# Patient Record
Sex: MALE | Race: WHITE | NOT HISPANIC OR LATINO | Employment: UNEMPLOYED | URBAN - METROPOLITAN AREA
[De-identification: names, ages, dates, MRNs, and addresses within clinical notes are randomized per-mention and may not be internally consistent; named-entity substitution may affect disease eponyms.]

---

## 2017-01-24 ENCOUNTER — ALLSCRIPTS OFFICE VISIT (OUTPATIENT)
Dept: OTHER | Facility: OTHER | Age: 37
End: 2017-01-24

## 2017-01-24 ENCOUNTER — GENERIC CONVERSION - ENCOUNTER (OUTPATIENT)
Dept: OTHER | Facility: OTHER | Age: 37
End: 2017-01-24

## 2017-02-23 ENCOUNTER — ALLSCRIPTS OFFICE VISIT (OUTPATIENT)
Dept: OTHER | Facility: OTHER | Age: 37
End: 2017-02-23

## 2017-02-23 DIAGNOSIS — R51.9 HEADACHE: ICD-10-CM

## 2017-02-23 DIAGNOSIS — E55.9 VITAMIN D DEFICIENCY: ICD-10-CM

## 2017-02-23 DIAGNOSIS — R30.0 DYSURIA: ICD-10-CM

## 2017-02-23 DIAGNOSIS — K50.90 CROHN'S DISEASE WITHOUT COMPLICATION (HCC): ICD-10-CM

## 2017-03-20 ENCOUNTER — TRANSCRIBE ORDERS (OUTPATIENT)
Dept: ADMINISTRATIVE | Facility: HOSPITAL | Age: 37
End: 2017-03-20

## 2017-03-20 ENCOUNTER — ALLSCRIPTS OFFICE VISIT (OUTPATIENT)
Dept: OTHER | Facility: OTHER | Age: 37
End: 2017-03-20

## 2017-03-20 ENCOUNTER — HOSPITAL ENCOUNTER (OUTPATIENT)
Dept: RADIOLOGY | Facility: HOSPITAL | Age: 37
Discharge: HOME | End: 2017-03-20
Payer: COMMERCIAL

## 2017-03-20 DIAGNOSIS — R51.9 HEADACHE: ICD-10-CM

## 2017-03-20 DIAGNOSIS — R51.9 FACIAL PAIN: Primary | ICD-10-CM

## 2017-03-20 DIAGNOSIS — R51.9 FACIAL PAIN: ICD-10-CM

## 2017-03-20 PROCEDURE — 70450 CT HEAD/BRAIN W/O DYE: CPT

## 2017-03-24 ENCOUNTER — ALLSCRIPTS OFFICE VISIT (OUTPATIENT)
Dept: OTHER | Facility: OTHER | Age: 37
End: 2017-03-24

## 2017-06-26 ENCOUNTER — APPOINTMENT (OUTPATIENT)
Dept: LAB | Facility: MEDICAL CENTER | Age: 37
End: 2017-06-26
Payer: COMMERCIAL

## 2017-06-26 ENCOUNTER — TRANSCRIBE ORDERS (OUTPATIENT)
Dept: ADMINISTRATIVE | Facility: HOSPITAL | Age: 37
End: 2017-06-26

## 2017-06-26 DIAGNOSIS — R30.0 DYSURIA: ICD-10-CM

## 2017-06-26 DIAGNOSIS — E55.9 VITAMIN D DEFICIENCY: ICD-10-CM

## 2017-06-26 DIAGNOSIS — K50.90 CROHN'S DISEASE WITHOUT COMPLICATION (HCC): ICD-10-CM

## 2017-06-26 LAB
25(OH)D3 SERPL-MCNC: 24.5 NG/ML (ref 30–100)
ALBUMIN SERPL BCP-MCNC: 3.9 G/DL (ref 3.5–5)
ALP SERPL-CCNC: 73 U/L (ref 46–116)
ALT SERPL W P-5'-P-CCNC: 36 U/L (ref 12–78)
ANION GAP SERPL CALCULATED.3IONS-SCNC: 6 MMOL/L (ref 4–13)
AST SERPL W P-5'-P-CCNC: 18 U/L (ref 5–45)
BILIRUB SERPL-MCNC: 0.62 MG/DL (ref 0.2–1)
BILIRUB UR QL STRIP: NEGATIVE
BUN SERPL-MCNC: 15 MG/DL (ref 5–25)
CALCIUM SERPL-MCNC: 9.4 MG/DL (ref 8.3–10.1)
CHLORIDE SERPL-SCNC: 108 MMOL/L (ref 100–108)
CLARITY UR: CLEAR
CO2 SERPL-SCNC: 28 MMOL/L (ref 21–32)
COLOR UR: YELLOW
CREAT SERPL-MCNC: 1.79 MG/DL (ref 0.6–1.3)
GFR SERPL CREATININE-BSD FRML MDRD: 43.2 ML/MIN/1.73SQ M
GLUCOSE SERPL-MCNC: 105 MG/DL (ref 65–140)
GLUCOSE UR STRIP-MCNC: NEGATIVE MG/DL
HGB UR QL STRIP.AUTO: NEGATIVE
KETONES UR STRIP-MCNC: NEGATIVE MG/DL
LEUKOCYTE ESTERASE UR QL STRIP: NEGATIVE
NITRITE UR QL STRIP: NEGATIVE
PH UR STRIP.AUTO: 7 [PH] (ref 4.5–8)
POTASSIUM SERPL-SCNC: 4.6 MMOL/L (ref 3.5–5.3)
PROT SERPL-MCNC: 7.5 G/DL (ref 6.4–8.2)
PROT UR STRIP-MCNC: NEGATIVE MG/DL
SODIUM SERPL-SCNC: 142 MMOL/L (ref 136–145)
SP GR UR STRIP.AUTO: 1.02 (ref 1–1.03)
UROBILINOGEN UR QL STRIP.AUTO: 0.2 E.U./DL
VIT B12 SERPL-MCNC: 432 PG/ML (ref 100–900)

## 2017-06-26 PROCEDURE — 81003 URINALYSIS AUTO W/O SCOPE: CPT

## 2017-06-26 PROCEDURE — 82306 VITAMIN D 25 HYDROXY: CPT

## 2017-06-26 PROCEDURE — 82607 VITAMIN B-12: CPT

## 2017-06-26 PROCEDURE — 80053 COMPREHEN METABOLIC PANEL: CPT

## 2017-06-26 PROCEDURE — 36415 COLL VENOUS BLD VENIPUNCTURE: CPT

## 2017-06-27 ENCOUNTER — ALLSCRIPTS OFFICE VISIT (OUTPATIENT)
Dept: OTHER | Facility: OTHER | Age: 37
End: 2017-06-27

## 2017-06-27 DIAGNOSIS — K50.90 CROHN'S DISEASE WITHOUT COMPLICATION (HCC): ICD-10-CM

## 2017-06-27 DIAGNOSIS — K90.0 CELIAC DISEASE: ICD-10-CM

## 2017-06-27 DIAGNOSIS — M79.10 MYALGIA: ICD-10-CM

## 2017-06-30 ENCOUNTER — APPOINTMENT (OUTPATIENT)
Dept: LAB | Facility: MEDICAL CENTER | Age: 37
End: 2017-06-30
Payer: COMMERCIAL

## 2017-06-30 ENCOUNTER — ALLSCRIPTS OFFICE VISIT (OUTPATIENT)
Dept: OTHER | Facility: OTHER | Age: 37
End: 2017-06-30

## 2017-06-30 ENCOUNTER — TRANSCRIBE ORDERS (OUTPATIENT)
Dept: ADMINISTRATIVE | Facility: HOSPITAL | Age: 37
End: 2017-06-30

## 2017-06-30 DIAGNOSIS — K50.90 CROHN'S DISEASE WITHOUT COMPLICATION (HCC): ICD-10-CM

## 2017-06-30 LAB
ALBUMIN SERPL BCP-MCNC: 4.3 G/DL (ref 3.5–5)
ALP SERPL-CCNC: 77 U/L (ref 46–116)
ALT SERPL W P-5'-P-CCNC: 34 U/L (ref 12–78)
ANION GAP SERPL CALCULATED.3IONS-SCNC: 5 MMOL/L (ref 4–13)
AST SERPL W P-5'-P-CCNC: 22 U/L (ref 5–45)
BILIRUB SERPL-MCNC: 0.74 MG/DL (ref 0.2–1)
BUN SERPL-MCNC: 17 MG/DL (ref 5–25)
CALCIUM SERPL-MCNC: 9.8 MG/DL (ref 8.3–10.1)
CHLORIDE SERPL-SCNC: 106 MMOL/L (ref 100–108)
CO2 SERPL-SCNC: 27 MMOL/L (ref 21–32)
CREAT SERPL-MCNC: 1.19 MG/DL (ref 0.6–1.3)
CRP SERPL QL: <3 MG/L
ERYTHROCYTE [SEDIMENTATION RATE] IN BLOOD: 2 MM/HOUR (ref 0–10)
GFR SERPL CREATININE-BSD FRML MDRD: >60 ML/MIN/1.73SQ M
GLUCOSE P FAST SERPL-MCNC: 86 MG/DL (ref 65–99)
POTASSIUM SERPL-SCNC: 4.5 MMOL/L (ref 3.5–5.3)
PROT SERPL-MCNC: 7.9 G/DL (ref 6.4–8.2)
SODIUM SERPL-SCNC: 138 MMOL/L (ref 136–145)

## 2017-06-30 PROCEDURE — 85652 RBC SED RATE AUTOMATED: CPT

## 2017-06-30 PROCEDURE — 86140 C-REACTIVE PROTEIN: CPT

## 2017-06-30 PROCEDURE — 36415 COLL VENOUS BLD VENIPUNCTURE: CPT

## 2017-06-30 PROCEDURE — 80053 COMPREHEN METABOLIC PANEL: CPT

## 2017-07-01 ENCOUNTER — TRANSCRIBE ORDERS (OUTPATIENT)
Dept: ADMINISTRATIVE | Facility: HOSPITAL | Age: 37
End: 2017-07-01

## 2017-07-01 DIAGNOSIS — K50.019 CROHN'S DISEASE OF SMALL INTESTINE WITH COMPLICATION (HCC): Primary | ICD-10-CM

## 2017-07-05 ENCOUNTER — TRANSCRIBE ORDERS (OUTPATIENT)
Dept: ADMINISTRATIVE | Facility: HOSPITAL | Age: 37
End: 2017-07-05

## 2017-07-05 ENCOUNTER — APPOINTMENT (OUTPATIENT)
Dept: LAB | Facility: CLINIC | Age: 37
End: 2017-07-05
Payer: COMMERCIAL

## 2017-07-05 ENCOUNTER — GENERIC CONVERSION - ENCOUNTER (OUTPATIENT)
Dept: OTHER | Facility: OTHER | Age: 37
End: 2017-07-05

## 2017-07-05 ENCOUNTER — TRANSCRIBE ORDERS (OUTPATIENT)
Dept: LAB | Facility: CLINIC | Age: 37
End: 2017-07-05

## 2017-07-05 DIAGNOSIS — K50.919 CROHN'S DISEASE WITH COMPLICATION, UNSPECIFIED GASTROINTESTINAL TRACT LOCATION (HCC): Primary | ICD-10-CM

## 2017-07-05 DIAGNOSIS — K50.90 CROHN'S DISEASE WITHOUT COMPLICATION (HCC): ICD-10-CM

## 2017-07-05 PROCEDURE — 87899 AGENT NOS ASSAY W/OPTIC: CPT

## 2017-07-05 PROCEDURE — 87493 C DIFF AMPLIFIED PROBE: CPT

## 2017-07-05 PROCEDURE — 87045 FECES CULTURE AEROBIC BACT: CPT

## 2017-07-05 PROCEDURE — 83993 ASSAY FOR CALPROTECTIN FECAL: CPT

## 2017-07-05 PROCEDURE — 87046 STOOL CULTR AEROBIC BACT EA: CPT

## 2017-07-05 PROCEDURE — 87015 SPECIMEN INFECT AGNT CONCNTJ: CPT

## 2017-07-06 ENCOUNTER — GENERIC CONVERSION - ENCOUNTER (OUTPATIENT)
Dept: OTHER | Facility: OTHER | Age: 37
End: 2017-07-06

## 2017-07-06 LAB — C DIFF TOX GENS STL QL NAA+PROBE: NORMAL

## 2017-07-07 ENCOUNTER — GENERIC CONVERSION - ENCOUNTER (OUTPATIENT)
Dept: OTHER | Facility: OTHER | Age: 37
End: 2017-07-07

## 2017-07-07 LAB
BACTERIA STL CULT: NORMAL
BACTERIA STL CULT: NORMAL

## 2017-07-10 ENCOUNTER — HOSPITAL ENCOUNTER (OUTPATIENT)
Dept: CT IMAGING | Facility: HOSPITAL | Age: 37
Discharge: HOME | End: 2017-07-10
Payer: COMMERCIAL

## 2017-07-10 DIAGNOSIS — K50.919 CROHN'S DISEASE WITH COMPLICATION, UNSPECIFIED GASTROINTESTINAL TRACT LOCATION (HCC): ICD-10-CM

## 2017-07-10 LAB — CALPROTECTIN STL-MCNT: <16 UG/G (ref 0–120)

## 2017-07-10 PROCEDURE — 74177 CT ABD & PELVIS W/CONTRAST: CPT

## 2017-07-10 RX ADMIN — IOHEXOL 100 ML: 350 INJECTION, SOLUTION INTRAVENOUS at 20:51

## 2017-07-17 ENCOUNTER — GENERIC CONVERSION - ENCOUNTER (OUTPATIENT)
Dept: OTHER | Facility: OTHER | Age: 37
End: 2017-07-17

## 2017-07-19 ENCOUNTER — ALLSCRIPTS OFFICE VISIT (OUTPATIENT)
Dept: OTHER | Facility: OTHER | Age: 37
End: 2017-07-19

## 2017-07-19 ENCOUNTER — GENERIC CONVERSION - ENCOUNTER (OUTPATIENT)
Dept: OTHER | Facility: OTHER | Age: 37
End: 2017-07-19

## 2017-07-21 RX ORDER — CETIRIZINE HYDROCHLORIDE 10 MG/1
10 TABLET ORAL DAILY PRN
COMMUNITY
End: 2018-04-04 | Stop reason: SDUPTHER

## 2017-07-24 ENCOUNTER — TRANSCRIBE ORDERS (OUTPATIENT)
Dept: LAB | Facility: CLINIC | Age: 37
End: 2017-07-24

## 2017-07-24 ENCOUNTER — ALLSCRIPTS OFFICE VISIT (OUTPATIENT)
Dept: OTHER | Facility: OTHER | Age: 37
End: 2017-07-24

## 2017-07-24 ENCOUNTER — APPOINTMENT (OUTPATIENT)
Dept: LAB | Facility: CLINIC | Age: 37
End: 2017-07-24
Payer: COMMERCIAL

## 2017-07-24 DIAGNOSIS — K50.90 CROHN'S DISEASE WITHOUT COMPLICATION (HCC): ICD-10-CM

## 2017-07-24 DIAGNOSIS — J34.2 DEVIATED NASAL SEPTUM: ICD-10-CM

## 2017-07-24 DIAGNOSIS — M79.10 MYALGIA: ICD-10-CM

## 2017-07-24 DIAGNOSIS — J34.89 ATROPHY OF NASAL TURBINATES: Primary | ICD-10-CM

## 2017-07-24 DIAGNOSIS — J34.89 ATROPHY OF NASAL TURBINATES: ICD-10-CM

## 2017-07-24 LAB
ALBUMIN SERPL BCP-MCNC: 3.8 G/DL (ref 3.5–5)
ALP SERPL-CCNC: 87 U/L (ref 46–116)
ALT SERPL W P-5'-P-CCNC: 35 U/L (ref 12–78)
ANION GAP SERPL CALCULATED.3IONS-SCNC: 8 MMOL/L (ref 4–13)
AST SERPL W P-5'-P-CCNC: 17 U/L (ref 5–45)
BASOPHILS # BLD AUTO: 0.05 THOUSANDS/ΜL (ref 0–0.1)
BASOPHILS NFR BLD AUTO: 1 % (ref 0–1)
BILIRUB SERPL-MCNC: 0.4 MG/DL (ref 0.2–1)
BUN SERPL-MCNC: 15 MG/DL (ref 5–25)
CALCIUM SERPL-MCNC: 8.8 MG/DL (ref 8.3–10.1)
CHLORIDE SERPL-SCNC: 103 MMOL/L (ref 100–108)
CO2 SERPL-SCNC: 25 MMOL/L (ref 21–32)
CREAT SERPL-MCNC: 0.91 MG/DL (ref 0.6–1.3)
EOSINOPHIL # BLD AUTO: 0.18 THOUSAND/ΜL (ref 0–0.61)
EOSINOPHIL NFR BLD AUTO: 2 % (ref 0–6)
ERYTHROCYTE [DISTWIDTH] IN BLOOD BY AUTOMATED COUNT: 13 % (ref 11.6–15.1)
GFR SERPL CREATININE-BSD FRML MDRD: 108 ML/MIN/1.73SQ M
GLUCOSE SERPL-MCNC: 96 MG/DL (ref 65–140)
HCT VFR BLD AUTO: 43.2 % (ref 36.5–49.3)
HGB BLD-MCNC: 15.3 G/DL (ref 12–17)
LYMPHOCYTES # BLD AUTO: 2.08 THOUSANDS/ΜL (ref 0.6–4.47)
LYMPHOCYTES NFR BLD AUTO: 25 % (ref 14–44)
MCH RBC QN AUTO: 32.6 PG (ref 26.8–34.3)
MCHC RBC AUTO-ENTMCNC: 35.4 G/DL (ref 31.4–37.4)
MCV RBC AUTO: 92 FL (ref 82–98)
MONOCYTES # BLD AUTO: 0.74 THOUSAND/ΜL (ref 0.17–1.22)
MONOCYTES NFR BLD AUTO: 9 % (ref 4–12)
NEUTROPHILS # BLD AUTO: 5.18 THOUSANDS/ΜL (ref 1.85–7.62)
NEUTS SEG NFR BLD AUTO: 63 % (ref 43–75)
NRBC BLD AUTO-RTO: 0 /100 WBCS
PLATELET # BLD AUTO: 251 THOUSANDS/UL (ref 149–390)
PMV BLD AUTO: 11 FL (ref 8.9–12.7)
POTASSIUM SERPL-SCNC: 3.9 MMOL/L (ref 3.5–5.3)
PROT SERPL-MCNC: 7.4 G/DL (ref 6.4–8.2)
RBC # BLD AUTO: 4.69 MILLION/UL (ref 3.88–5.62)
SODIUM SERPL-SCNC: 136 MMOL/L (ref 136–145)
TSH SERPL DL<=0.05 MIU/L-ACNC: 1.37 UIU/ML (ref 0.36–3.74)
WBC # BLD AUTO: 8.26 THOUSAND/UL (ref 4.31–10.16)

## 2017-07-24 PROCEDURE — 87389 HIV-1 AG W/HIV-1&-2 AB AG IA: CPT

## 2017-07-24 PROCEDURE — 36415 COLL VENOUS BLD VENIPUNCTURE: CPT

## 2017-07-24 PROCEDURE — 85025 COMPLETE CBC W/AUTO DIFF WBC: CPT

## 2017-07-24 PROCEDURE — 86038 ANTINUCLEAR ANTIBODIES: CPT

## 2017-07-24 PROCEDURE — 86803 HEPATITIS C AB TEST: CPT

## 2017-07-24 PROCEDURE — 84443 ASSAY THYROID STIM HORMONE: CPT

## 2017-07-24 PROCEDURE — 86705 HEP B CORE ANTIBODY IGM: CPT

## 2017-07-24 PROCEDURE — 87340 HEPATITIS B SURFACE AG IA: CPT

## 2017-07-24 PROCEDURE — 86235 NUCLEAR ANTIGEN ANTIBODY: CPT

## 2017-07-24 PROCEDURE — 86704 HEP B CORE ANTIBODY TOTAL: CPT

## 2017-07-24 PROCEDURE — 86430 RHEUMATOID FACTOR TEST QUAL: CPT

## 2017-07-24 PROCEDURE — 86200 CCP ANTIBODY: CPT

## 2017-07-24 PROCEDURE — 80053 COMPREHEN METABOLIC PANEL: CPT

## 2017-07-24 PROCEDURE — 81374 HLA I TYPING 1 ANTIGEN LR: CPT

## 2017-07-25 LAB
ENA SS-A AB SER-ACNC: <0.2 AI (ref 0–0.9)
ENA SS-B AB SER-ACNC: <0.2 AI (ref 0–0.9)
HBV CORE AB SER QL: NORMAL
HBV CORE IGM SER QL: NORMAL
HBV SURFACE AG SER QL: NORMAL
HCV AB SER QL: NORMAL
RHEUMATOID FACT SER QL LA: NEGATIVE

## 2017-07-26 LAB
CCP IGA+IGG SERPL IA-ACNC: 3 UNITS (ref 0–19)
HIV 1+2 AB+HIV1 P24 AG SERPL QL IA: NORMAL
RYE IGE QN: NEGATIVE

## 2017-07-27 ENCOUNTER — ANESTHESIA EVENT (OUTPATIENT)
Dept: PERIOP | Facility: HOSPITAL | Age: 37
End: 2017-07-27
Payer: COMMERCIAL

## 2017-07-28 ENCOUNTER — ANESTHESIA (OUTPATIENT)
Dept: PERIOP | Facility: HOSPITAL | Age: 37
End: 2017-07-28
Payer: COMMERCIAL

## 2017-07-28 ENCOUNTER — HOSPITAL ENCOUNTER (OUTPATIENT)
Facility: HOSPITAL | Age: 37
Setting detail: OUTPATIENT SURGERY
Discharge: HOME | End: 2017-07-28
Attending: OTOLARYNGOLOGY | Admitting: OTOLARYNGOLOGY
Payer: COMMERCIAL

## 2017-07-28 VITALS
SYSTOLIC BLOOD PRESSURE: 154 MMHG | HEIGHT: 68 IN | DIASTOLIC BLOOD PRESSURE: 100 MMHG | OXYGEN SATURATION: 97 % | BODY MASS INDEX: 24.25 KG/M2 | WEIGHT: 160 LBS | RESPIRATION RATE: 18 BRPM | HEART RATE: 49 BPM | TEMPERATURE: 100.9 F

## 2017-07-28 LAB — HLA-B27 QL NAA+PROBE: POSITIVE

## 2017-07-28 RX ORDER — CEPHALEXIN 500 MG/1
500 CAPSULE ORAL 4 TIMES DAILY
Qty: 40 CAPSULE | Refills: 0 | Status: SHIPPED | OUTPATIENT
Start: 2017-07-28 | End: 2017-08-07

## 2017-07-28 RX ORDER — LIDOCAINE HYDROCHLORIDE 10 MG/ML
INJECTION, SOLUTION INFILTRATION; PERINEURAL AS NEEDED
Status: DISCONTINUED | OUTPATIENT
Start: 2017-07-28 | End: 2017-07-28 | Stop reason: SURG

## 2017-07-28 RX ORDER — PROPOFOL 10 MG/ML
INJECTION, EMULSION INTRAVENOUS AS NEEDED
Status: DISCONTINUED | OUTPATIENT
Start: 2017-07-28 | End: 2017-07-28 | Stop reason: SURG

## 2017-07-28 RX ORDER — SODIUM CHLORIDE/ALOE VERA
GEL (GRAM) NASAL AS NEEDED
Status: DISCONTINUED | OUTPATIENT
Start: 2017-07-28 | End: 2017-07-28 | Stop reason: HOSPADM

## 2017-07-28 RX ORDER — SODIUM CHLORIDE, SODIUM LACTATE, POTASSIUM CHLORIDE, CALCIUM CHLORIDE 600; 310; 30; 20 MG/100ML; MG/100ML; MG/100ML; MG/100ML
125 INJECTION, SOLUTION INTRAVENOUS CONTINUOUS
Status: DISCONTINUED | OUTPATIENT
Start: 2017-07-28 | End: 2017-07-28 | Stop reason: HOSPADM

## 2017-07-28 RX ORDER — FENTANYL CITRATE 50 UG/ML
INJECTION, SOLUTION INTRAMUSCULAR; INTRAVENOUS AS NEEDED
Status: DISCONTINUED | OUTPATIENT
Start: 2017-07-28 | End: 2017-07-28 | Stop reason: SURG

## 2017-07-28 RX ORDER — ACETAMINOPHEN 325 MG/1
650 TABLET ORAL EVERY 6 HOURS PRN
Status: DISCONTINUED | OUTPATIENT
Start: 2017-07-28 | End: 2017-07-28 | Stop reason: HOSPADM

## 2017-07-28 RX ORDER — SODIUM CHLORIDE, SODIUM LACTATE, POTASSIUM CHLORIDE, CALCIUM CHLORIDE 600; 310; 30; 20 MG/100ML; MG/100ML; MG/100ML; MG/100ML
50 INJECTION, SOLUTION INTRAVENOUS CONTINUOUS
Status: DISCONTINUED | OUTPATIENT
Start: 2017-07-28 | End: 2017-07-28 | Stop reason: HOSPADM

## 2017-07-28 RX ORDER — FENTANYL CITRATE/PF 50 MCG/ML
25 SYRINGE (ML) INJECTION
Status: COMPLETED | OUTPATIENT
Start: 2017-07-28 | End: 2017-07-28

## 2017-07-28 RX ORDER — OXYMETAZOLINE HYDROCHLORIDE 0.05 G/100ML
SPRAY NASAL AS NEEDED
Status: DISCONTINUED | OUTPATIENT
Start: 2017-07-28 | End: 2017-07-28 | Stop reason: HOSPADM

## 2017-07-28 RX ORDER — LIDOCAINE HYDROCHLORIDE AND EPINEPHRINE 10; 10 MG/ML; UG/ML
INJECTION, SOLUTION INFILTRATION; PERINEURAL AS NEEDED
Status: DISCONTINUED | OUTPATIENT
Start: 2017-07-28 | End: 2017-07-28 | Stop reason: HOSPADM

## 2017-07-28 RX ORDER — GLYCOPYRROLATE 0.2 MG/ML
INJECTION INTRAMUSCULAR; INTRAVENOUS AS NEEDED
Status: DISCONTINUED | OUTPATIENT
Start: 2017-07-28 | End: 2017-07-28 | Stop reason: SURG

## 2017-07-28 RX ORDER — ACETAMINOPHEN 500 MG
500 TABLET ORAL EVERY 6 HOURS PRN
Qty: 30 TABLET | Refills: 0 | Status: SHIPPED | OUTPATIENT
Start: 2017-07-28 | End: 2017-08-02

## 2017-07-28 RX ORDER — ROCURONIUM BROMIDE 10 MG/ML
INJECTION, SOLUTION INTRAVENOUS AS NEEDED
Status: DISCONTINUED | OUTPATIENT
Start: 2017-07-28 | End: 2017-07-28 | Stop reason: SURG

## 2017-07-28 RX ORDER — ONDANSETRON 2 MG/ML
4 INJECTION INTRAMUSCULAR; INTRAVENOUS ONCE AS NEEDED
Status: DISCONTINUED | OUTPATIENT
Start: 2017-07-28 | End: 2017-07-28 | Stop reason: HOSPADM

## 2017-07-28 RX ORDER — ONDANSETRON 2 MG/ML
4 INJECTION INTRAMUSCULAR; INTRAVENOUS EVERY 6 HOURS PRN
Status: DISCONTINUED | OUTPATIENT
Start: 2017-07-28 | End: 2017-07-28 | Stop reason: HOSPADM

## 2017-07-28 RX ADMIN — FENTANYL CITRATE 100 MCG: 50 INJECTION, SOLUTION INTRAMUSCULAR; INTRAVENOUS at 09:41

## 2017-07-28 RX ADMIN — FENTANYL CITRATE 25 MCG: 50 INJECTION INTRAMUSCULAR; INTRAVENOUS at 12:01

## 2017-07-28 RX ADMIN — ROCURONIUM BROMIDE 30 MG: 10 INJECTION, SOLUTION INTRAVENOUS at 09:41

## 2017-07-28 RX ADMIN — FENTANYL CITRATE 25 MCG: 50 INJECTION INTRAMUSCULAR; INTRAVENOUS at 11:56

## 2017-07-28 RX ADMIN — FENTANYL CITRATE 25 MCG: 50 INJECTION INTRAMUSCULAR; INTRAVENOUS at 11:47

## 2017-07-28 RX ADMIN — NEOSTIGMINE METHYLSULFATE 3 MG: 1 INJECTION, SOLUTION INTRAMUSCULAR; INTRAVENOUS; SUBCUTANEOUS at 11:23

## 2017-07-28 RX ADMIN — FENTANYL CITRATE 25 MCG: 50 INJECTION INTRAMUSCULAR; INTRAVENOUS at 12:06

## 2017-07-28 RX ADMIN — SODIUM CHLORIDE, SODIUM LACTATE, POTASSIUM CHLORIDE, AND CALCIUM CHLORIDE: .6; .31; .03; .02 INJECTION, SOLUTION INTRAVENOUS at 09:37

## 2017-07-28 RX ADMIN — CEFAZOLIN SODIUM 1000 MG: 1 SOLUTION INTRAVENOUS at 09:41

## 2017-07-28 RX ADMIN — GLYCOPYRROLATE 0.4 MG: 0.2 INJECTION, SOLUTION INTRAMUSCULAR; INTRAVENOUS at 11:23

## 2017-07-28 RX ADMIN — SODIUM CHLORIDE, SODIUM LACTATE, POTASSIUM CHLORIDE, AND CALCIUM CHLORIDE 50 ML/HR: .6; .31; .03; .02 INJECTION, SOLUTION INTRAVENOUS at 08:03

## 2017-07-28 RX ADMIN — LIDOCAINE HYDROCHLORIDE 50 MG: 10 INJECTION, SOLUTION INFILTRATION; PERINEURAL at 09:41

## 2017-07-28 RX ADMIN — PROPOFOL 150 MG: 10 INJECTION, EMULSION INTRAVENOUS at 09:41

## 2017-07-28 RX ADMIN — ACETAMINOPHEN 650 MG: 325 TABLET, FILM COATED ORAL at 12:46

## 2017-08-25 ENCOUNTER — GENERIC CONVERSION - ENCOUNTER (OUTPATIENT)
Dept: OTHER | Facility: OTHER | Age: 37
End: 2017-08-25

## 2017-09-14 ENCOUNTER — ALLSCRIPTS OFFICE VISIT (OUTPATIENT)
Dept: OTHER | Facility: OTHER | Age: 37
End: 2017-09-14

## 2017-09-25 ENCOUNTER — ALLSCRIPTS OFFICE VISIT (OUTPATIENT)
Dept: OTHER | Facility: OTHER | Age: 37
End: 2017-09-25

## 2017-09-25 ENCOUNTER — LAB REQUISITION (OUTPATIENT)
Dept: LAB | Facility: HOSPITAL | Age: 37
End: 2017-09-25
Payer: COMMERCIAL

## 2017-09-25 ENCOUNTER — APPOINTMENT (OUTPATIENT)
Dept: LAB | Facility: HOSPITAL | Age: 37
End: 2017-09-25
Attending: INTERNAL MEDICINE
Payer: COMMERCIAL

## 2017-09-25 DIAGNOSIS — R10.11 RIGHT UPPER QUADRANT PAIN: ICD-10-CM

## 2017-09-25 LAB
ALBUMIN SERPL BCP-MCNC: 4.1 G/DL (ref 3.5–5)
ALP SERPL-CCNC: 77 U/L (ref 46–116)
ALT SERPL W P-5'-P-CCNC: 22 U/L (ref 12–78)
ANION GAP SERPL CALCULATED.3IONS-SCNC: 9 MMOL/L (ref 4–13)
AST SERPL W P-5'-P-CCNC: 15 U/L (ref 5–45)
BASOPHILS # BLD AUTO: 0.06 THOUSANDS/ΜL (ref 0–0.1)
BASOPHILS NFR BLD AUTO: 1 % (ref 0–1)
BILIRUB SERPL-MCNC: 0.44 MG/DL (ref 0.2–1)
BUN SERPL-MCNC: 13 MG/DL (ref 5–25)
CALCIUM SERPL-MCNC: 8.9 MG/DL (ref 8.3–10.1)
CHLORIDE SERPL-SCNC: 105 MMOL/L (ref 100–108)
CO2 SERPL-SCNC: 23 MMOL/L (ref 21–32)
CORTIS SERPL-MCNC: 21.2 UG/DL
CREAT SERPL-MCNC: 1.06 MG/DL (ref 0.6–1.3)
EOSINOPHIL # BLD AUTO: 0.05 THOUSAND/ΜL (ref 0–0.61)
EOSINOPHIL NFR BLD AUTO: 1 % (ref 0–6)
ERYTHROCYTE [DISTWIDTH] IN BLOOD BY AUTOMATED COUNT: 12.9 % (ref 11.6–15.1)
GFR SERPL CREATININE-BSD FRML MDRD: 90 ML/MIN/1.73SQ M
GLUCOSE P FAST SERPL-MCNC: 101 MG/DL (ref 65–99)
HCT VFR BLD AUTO: 44.3 % (ref 36.5–49.3)
HGB BLD-MCNC: 16 G/DL (ref 12–17)
LIPASE SERPL-CCNC: 134 U/L (ref 73–393)
LYMPHOCYTES # BLD AUTO: 1.72 THOUSANDS/ΜL (ref 0.6–4.47)
LYMPHOCYTES NFR BLD AUTO: 23 % (ref 14–44)
MCH RBC QN AUTO: 32.6 PG (ref 26.8–34.3)
MCHC RBC AUTO-ENTMCNC: 36.1 G/DL (ref 31.4–37.4)
MCV RBC AUTO: 90 FL (ref 82–98)
MONOCYTES # BLD AUTO: 0.51 THOUSAND/ΜL (ref 0.17–1.22)
MONOCYTES NFR BLD AUTO: 7 % (ref 4–12)
NEUTROPHILS # BLD AUTO: 5.02 THOUSANDS/ΜL (ref 1.85–7.62)
NEUTS SEG NFR BLD AUTO: 68 % (ref 43–75)
NRBC BLD AUTO-RTO: 0 /100 WBCS
PLATELET # BLD AUTO: 237 THOUSANDS/UL (ref 149–390)
PMV BLD AUTO: 10.3 FL (ref 8.9–12.7)
POTASSIUM SERPL-SCNC: 3.8 MMOL/L (ref 3.5–5.3)
PROT SERPL-MCNC: 7.3 G/DL (ref 6.4–8.2)
RBC # BLD AUTO: 4.91 MILLION/UL (ref 3.88–5.62)
SODIUM SERPL-SCNC: 137 MMOL/L (ref 136–145)
WBC # BLD AUTO: 7.38 THOUSAND/UL (ref 4.31–10.16)

## 2017-09-25 PROCEDURE — 84260 ASSAY OF SEROTONIN: CPT

## 2017-09-25 PROCEDURE — 80053 COMPREHEN METABOLIC PANEL: CPT

## 2017-09-25 PROCEDURE — 82533 TOTAL CORTISOL: CPT

## 2017-09-25 PROCEDURE — 36415 COLL VENOUS BLD VENIPUNCTURE: CPT

## 2017-09-25 PROCEDURE — 85025 COMPLETE CBC W/AUTO DIFF WBC: CPT

## 2017-09-25 PROCEDURE — 83497 ASSAY OF 5-HIAA: CPT | Performed by: INTERNAL MEDICINE

## 2017-09-25 PROCEDURE — 82570 ASSAY OF URINE CREATININE: CPT | Performed by: INTERNAL MEDICINE

## 2017-09-25 PROCEDURE — 84307 ASSAY OF SOMATOSTATIN: CPT

## 2017-09-25 PROCEDURE — 83690 ASSAY OF LIPASE: CPT

## 2017-09-26 ENCOUNTER — ALLSCRIPTS OFFICE VISIT (OUTPATIENT)
Dept: OTHER | Facility: OTHER | Age: 37
End: 2017-09-26

## 2017-09-27 ENCOUNTER — APPOINTMENT (OUTPATIENT)
Dept: LAB | Facility: CLINIC | Age: 37
End: 2017-09-27
Payer: COMMERCIAL

## 2017-09-27 ENCOUNTER — TRANSCRIBE ORDERS (OUTPATIENT)
Dept: LAB | Facility: CLINIC | Age: 37
End: 2017-09-27

## 2017-09-27 DIAGNOSIS — R10.11 RIGHT UPPER QUADRANT PAIN: ICD-10-CM

## 2017-09-27 PROCEDURE — 83835 ASSAY OF METANEPHRINES: CPT

## 2017-09-28 LAB — SEROTONIN PLAS-MCNC: 27 NG/ML (ref 21–321)

## 2017-09-30 LAB
METANEPH 24H UR-MRATE: 145 UG/24 HR (ref 45–290)
METANEPHS 24H UR-MCNC: 58 UG/L
NORMETANEPHRINE 24H UR-MCNC: 84 UG/L
NORMETANEPHRINE 24H UR-MRATE: 210 UG/24 HR (ref 82–500)

## 2017-10-08 LAB — MISCELLANEOUS LAB TEST RESULT: NORMAL

## 2017-10-12 LAB — SCAN RESULT: NORMAL

## 2017-10-25 DIAGNOSIS — K52.9 NONINFECTIVE GASTROENTERITIS AND COLITIS: ICD-10-CM

## 2017-10-25 DIAGNOSIS — Z15.89 GENETIC SUSCEPTIBILITY TO OTHER DISEASE(V84.89): ICD-10-CM

## 2017-11-07 ENCOUNTER — ALLSCRIPTS OFFICE VISIT (OUTPATIENT)
Dept: OTHER | Facility: OTHER | Age: 37
End: 2017-11-07

## 2017-11-08 NOTE — PROGRESS NOTES
Assessment  1  Crohn's ileitis (555 0) (K50 00)   2  Celiac disease (579 0) (K90 0)   3  Constipation (564 00) (K59 00)   4  Abdominal pain, right lower quadrant (789 03) (R10 31)    Plan  Abdominal pain, right lower quadrant, Celiac disease, Constipation, Crohn's ileitis    · Follow Up After Tests Complete Evaluation and Treatment  Follow-up  Status: Hold For -  Scheduling  Requested for: [id number]   Ordered; For: Abdominal pain, right lower quadrant, Celiac disease, Constipation, Crohn's ileitis; Ordered By: Reina Judge Performed:  Due: 29TZP4433   · Endoscopy Capsule Esophagus Through Ileum; Status:Hold For - Scheduling;  Requested PBR:05PQB1725;    Perform:[facility]; FYV:67YQP9147; Ordered; For:Abdominal pain, right lower quadrant, Celiac disease, Constipation, Crohn's ileitis; Ordered By:Lloyd Fowler;    Discussion/Summary  Discussion Summary:   Based on his labs, history, exam, and recent CT enterography, he does not have clinical evidence of active Crohn's disease at this time  Unfortunately he was not able to tolerate the budesonide and because of his allergy to aspirin he may not be able to take any of our 5 ASA formulation such as Pentasa  Because his allergy from aspirin was anaphylaxis, and since he does not currently have evidence of active Crohn's disease I will hold off on starting pantoprazole for now  I have asked him to take dicyclomine as needed for his irritable bowel syndrome and since it seems to help  also asked to continue trying a low FODMAP diet as that may help his symptoms as well  He is going to get further management for his whole body tension and discomfort and for his sinusitis  I will refer him for a video capsule endoscopy to evaluate for other evidence of small bowel Crohn's disease  If there is no improvement in his GI symptoms, then I will discuss referral to an allergist for possible desensitization to the Pentasa or starting a biologic treatment such as Remicade  Chief Complaint  Chief Complaint Free Text Note Form: f/u for IBS and Crohn's ileitis  Pt c/o abd pain, nausea, and constipation/diarrhea  Chief Complaint Chronic Condition [facility]: Patient is here today for follow up of chronic conditions described in HPI  History of Present Illness  HPI: He presents for follow-up of his mild Crohn's disease and irritable bowel syndrome  His colonoscopy revealed mild terminal ileum inflammation  He said he was unable to tolerate budesonide because it caused him to feel tense throughout his body  He has not been taking any medication for his Crohn's disease and takes dicyclomine as needed for his pain and feels it helps  His CT enterography, C-reactive protein x 2, fecal calprotectin were all normal without any evidence of active Crohn's disease  History Reviewed: The history was obtained today from the patient and I agree with the documented history  Review of Systems  Complete-Male GI Adult:   Constitutional: No fever or chills, feels well, no tiredness, no recent weight gain or weight loss  Eyes: eyesight problems-- and-- glasses, but-- No complaints of eye pain, no red eyes, no discharge from eyes, no itchy eyes,-- no eye pain,-- no dryness of the eyes,-- eyes not red,-- no purulent discharge from the eyes-- and-- no itching of the eyes  ENT: no complaints of earache, no hearing loss, no nosebleeds, no nasal discharge, no sore throat, no hoarseness  Cardiovascular: No complaints of slow heart rate, no fast heart rate, no chest pain, no palpitations, no leg claudication, no lower extremity  Respiratory: No complaints of shortness of breath, no wheezing, no cough, no SOB on exertion, no orthopnea or PND  Gastrointestinal: abdominal pain, but-- as noted in HPI,-- no nausea,-- no vomiting,-- no constipation,-- no diarrhea-- and-- no blood in stools     Genitourinary: No complaints of dysuria, no incontinence, no hesitancy, no nocturia, no genital lesion, no testicular pain  Musculoskeletal: No complaints of arthralgia, no myalgias, no joint swelling or stiffness, no limb pain or swelling  Integumentary: No complaints of skin rash or skin lesions, no itching, no skin wound, no dry skin  Neurological: No compliants of headache, no confusion, no convulsions, no numbness or tingling, no dizziness or fainting, no limb weakness, no difficulty walking  Psychiatric: Is not suicidal, no sleep disturbances, no anxiety or depression, no change in personality, no emotional problems  Endocrine: No complaints of proptosis, no hot flashes, no muscle weakness, no erectile dysfunction, no deepening of the voice, no feelings of weakness  Hematologic/Lymphatic: No complaints of swollen glands, no swollen glands in the neck, does not bleed easily, no easy bruising  ROS Reviewed:   ROS reviewed  Active Problems  1  ABRAHAM (acute kidney injury) (584 9) (N17 9)   2  Allergic asthma (493 00) (J45 909)   3  Anxiety (300 00) (F41 9)   4  Celiac disease (579 0) (K90 0)   5  Constipation (564 00) (K59 00)   6  Crohn's disease (555 9) (K50 90)   7  Crohn's ileitis (555 0) (K50 00)   8  Deviated septum (470) (J34 2)   9  Environmental and seasonal allergies (477 8) (J30 89)   10  Eustachian tube anomaly (744 3) (Q17 8)   11  Fatigue (780 79) (R53 83)   12  Headache (784 0) (R51)   13  HLA B27 (HLA B27 positive) (V84 89) (Z15 89)   14  IBD (inflammatory bowel disease) (558 9) (K52 9)   15  Irritable bowel syndrome without diarrhea (564 1) (K58 9)   16  Myofascial muscle pain (729 1) (M79 1)   17  Panic attack (300 01) (F41 0)   18  Retention cyst of nasal sinus (478 19) (J34 1)   19  Vitamin D deficiency (268 9) (E55 9)   20  Weight loss, unintentional (783 21) (R63 4)    Past Medical History  1  History of Abdominal pain, right upper quadrant (789 01) (R10 11)   2  History of Abnormal Liver Function Test (790 6)   3   History of Acute bilateral low back pain without sciatica (724 2,338 19) (M54 5)   4  History of Acute colitis (558 9) (K52 9)   5  History of Allergic rhinitis due to pollen (477 0) (J30 1)   6  History of Anxiety (300 00) (F41 9)   7  History of Benign essential HTN (401 1) (I10)   8  Denied: History of Carrier Of STD   9  History of Cervical lymphadenopathy (785 6) (R59 0)   10  History of Dependence On Nicotine In Cigarettes (305 1)   11  History of Dysuria (788 1) (R30 0)   12  History of Elevated BP without diagnosis of hypertension (796 2) (R03 0)   13  History of External Hemorrhoids (455 3)   14  History of Flank pain (789 09) (R10 9)   15  History of abnormal weight loss (V13 89) (Z87 898)   16  History of acute sinusitis (V12 69) (Z87 09)   17  History of acute sinusitis (V12 69) (Z87 09)   18  History of asthma (V12 69) (Z87 09)   19  History of chronic sinusitis (V12 69) (Z87 09)   20  History of Crohn's disease (V12 70) (Z87 19)   21  History of dizziness (V13 89) (Z87 898)   22  History of dysuria (V13 00) (Z87 898)   23  History of esophageal reflux (V12 79) (Z87 19)   24  History of esophageal reflux (V12 79) (Z87 19)   25  History of fatigue (V13 89) (Z87 898)   26  History of headache (V13 89) (Z87 898)   27  History of psoriasis (V13 3) (Z87 2)   28  History of urinary frequency (V13 09) (Z87 898)   29  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)   30  Denied: History of Mental Status Change   31  History of Pancreatic Neoplasm (239 0)   32  History of Positive depression screening (796 4) (Z13 89)   33  History of Viral URI with cough (465 9) (J06 9,B97 89)   34  History of Vitamin B12 deficiency (266 2) (E53 8)  Active Problems And Past Medical History Reviewed: The active problems and past medical history were reviewed and updated today  Surgical History  1  History of Cholecystectomy   2  History of Frontal Sinusectomy   3  History of Nasal Septal Deviation Repair   4   History of Tonsillectomy With Adenoidectomy  Surgical History Reviewed: The surgical history was reviewed and updated today  Family History  Mother    1  Family history of Anxiety   2  Family history of diabetes mellitus (V18 0) (Z83 3)   3  Family history of fibromyalgia (V17 89) (Z82 69)   4  Family history of thyroid disease (V18 19) (Z83 49)  Father    5  Family history of Hypertension (V17 49)  Sister    10  Family history of hypertension (V17 49) (Z82 49)  Grandparent    7  Family history of diabetes mellitus (V18 0) (Z83 3)  Paternal Cousin    6  Family history of rheumatoid arthritis (V17 7) (Z82 61)  Family History    9  Denied: Family history of Crohn's disease   10  Denied: Family history of psoriasis   11  Denied: Family history of systemic lupus erythematosus   12  Denied: Family history of ulcerative colitis  Family History Reviewed: The family history was reviewed and updated today  Social History   · Cigarette smoker (305 1) (F17 210)   · Current Every Day Smoker (305 1)   · Marital History - Single   · Occupation:   · Rarely consumes alcohol (V49 89) (Z78 9)   · 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  Social History Reviewed: The social history was reviewed and updated today  Current Meds   1  Amitriptyline HCl - 10 MG Oral Tablet; TAKE 1 TABLET AT BEDTIME  Requested for:   84YBE5120; Last Rx:40Tzh1780 Ordered   2  Benadryl 25 MG TABS; TAKE 1 TABLET Twice daily PRN ALLERGIES; Therapy: (Recorded:68Fpr0676) to Recorded   3  ClonazePAM 0 5 MG Oral Tablet; TAKE 1 TABLET EVERY 12 HOURS AS NEEDED; Therapy: 53RUP1602 to (39 463 135); Last Rx:12Ipx5002 Ordered   4  Dicyclomine HCl - 20 MG Oral Tablet; TAKE 1 TABLET EVERY 6 HOURS AS NEEDED; Therapy: 57APO6511 to (Juanita Garduno)  Requested for: 75IDT0029; Last   QK:99JNB5644 Ordered   5  Fluticasone Propionate 50 MCG/ACT Nasal Suspension; USE 1 SPRAY IN EACH   NOSTRIL ONCE DAILY; Therapy: 87BEZ4929 to (Last Rx:25Bgp5564)  Requested for: 97Uov4447 Ordered   6   Linzess 145 MCG Oral Capsule; take 1 capsule daily; Therapy: 50CRI9831 to (Last FQ:98KSN8522)  Requested for: 27Jun2017 Ordered  Medication List Reviewed: The medication list was reviewed and updated today  Allergies  1  Advil CAPS   2  Aspirin Buffered TABS   3  NSAIDs   4  Codeine Derivatives  5  Apples   6  Eggs   7  Other   8  Peanuts   9  Seasonal    Vitals  Vital Signs    Recorded: 29APE0789 08:46AM   Temperature 97 9 F   Heart Rate 70   Systolic 365, LUE, Sitting   Diastolic 82, LUE, Sitting   Height 5 ft 7 5 in   Weight 165 lb    BMI Calculated 25 46   BSA Calculated 1 87   O2 Saturation 98, RA     Physical Exam    Constitutional   General appearance: No acute distress, well appearing and well nourished  Eyes   Conjunctiva and lids: No swelling, erythema, or discharge  Pupils and irises: Equal, round and reactive to light  Ears, Nose, Mouth, and Throat   External inspection of ears and nose: Normal     Nasal mucosa, septum, and turbinates: Normal without edema or erythema  Oropharynx: Normal with no erythema, edema, exudate or lesions  Pulmonary   Respiratory effort: No increased work of breathing or signs of respiratory distress  Auscultation of lungs: Clear to auscultation, equal breath sounds bilaterally, no wheezes, no rales, no rhonci  Cardiovascular   Auscultation of heart: Normal rate and rhythm, normal S1 and S2, without murmurs  Examination of extremities for edema and/or varicosities: Normal     Abdomen   Abdomen: Abnormal  -- mild RLQ tenderness  Liver and spleen: No hepatomegaly or splenomegaly  Lymphatic   Palpation of lymph nodes in neck: No lymphadenopathy  Musculoskeletal   Gait and station: Normal     Digits and nails: Normal without clubbing or cyanosis  Inspection/palpation of joints, bones, and muscles: Normal     Skin   Skin and subcutaneous tissue: Normal without rashes or lesions      Psychiatric   Orientation to person, place and time: Normal     Mood and affect: Normal          Health Management  History of Depression screening   *VB-Depression Screening; every 1 year; Last 38ZKN2196; Next Due: 11LJK5136;  Active    Future Appointments    Date/Time Provider Specialty Site   12/19/2017 01:00 PM Peter Zarco MD Internal Medicine [facility]   11/09/2017 01:15 PM Maryam Smith [facility]     Signatures   Electronically signed by : Judit Boucher MD; Nov 7 2017 11:45AM EST                       (Author)

## 2017-11-09 ENCOUNTER — ALLSCRIPTS OFFICE VISIT (OUTPATIENT)
Dept: OTHER | Facility: OTHER | Age: 37
End: 2017-11-09

## 2017-11-10 NOTE — PROGRESS NOTES
Assessment    1  Abdominal pain of multiple sites (789 09) (R10 9)   2  Irritable bowel syndrome without diarrhea (564 1) (K58 9)   3  Constipation (564 00) (K59 00)   4  Panic attack (300 01) (F41 0)    Discussion/Summary  Discussion Summary:   Since last visit patient has seen GI, fluctuates with constipation, has not needed linzess in some time  Using Bentyl PRN  He is following with rheum  He is being evaluated for capsule endoscopy, he will call central scheduling to set up  Continue with amitriptyline at HS, has been taking 1 table daily  Restart OTC vitamin D3 capsules (2000 IU daily)  Klonopin as needed for anxiety  Discussed continue use of nasal sprays  Can use ocean nasal spray as well  Add daily claritin or zyrtec to help with persistent allergy sx  Discussed may need allegist if sx persist    Counseling Documentation With Imm: The patient was counseled regarding diagnostic results,-- instructions for management,-- risk factor reductions,-- prognosis,-- patient and family education,-- impressions,-- risks and benefits of treatment options,-- importance of compliance with treatment  Medication SE Review and Pt Understands Tx: Possible side effects of new medications were reviewed with the patient/guardian today  The treatment plan was reviewed with the patient/guardian  The patient/guardian understands and agrees with the treatment plan      Chief Complaint  Chief Complaint Free Text Note Form: pt here for f/u, need papers filled out for fmla recertification  History of Present Illness  HPI: 39 yo male for follow up  He saw GI and is being eval for capsule endoscopy  Notes his GI sx are improving  Notes he is getting more testing for possible UC/Crohns  He notes that his GI sx are worsened when he has sinus congestion  Notes he uses netipot and Flonase  Has to schedule capsule endoscopy, notes he is awaiting call back  Does not have a number to call  Constipation (Brief):  The patient is being seen for an initial evaluation of constipation  Symptoms:  incomplete evacuation, but-- no infrequent stools,-- no straining at stools,-- no hard stools-- and-- no anal leakage  Associated symptoms:  Notes his weight improving with decreasing GI sx , but-- no chills,-- no nausea,-- no vomiting-- and-- no rectal bleeding  Panic Attack (Brief): Associated symptoms:  Stable on klonipin, recently changed from ativan  Improved control of his sx Takes 1 tab daily of amitryptyline, has not increased to 2 tablets daily  Abdominal Pain (Brief): The patient is being seen for follow-up of abdominal pain  Symptoms: no diarrhea--   The patient presents with complaints of abdominal pain (Improved pain  Fluctuates at times in middle of abdomen, other times in RLQ  Has capsule endoscopy scheduled)  Irritable Bowel Syndrome (Follow-Up): The patient is being seen for follow-up of Following with GI, See HPI  Interval symptoms:  improved abdominal pain-- and-- improved constipation  Associated symptoms: no melena-- and-- no bloody stools  Review of Systems  Complete-Male:  Constitutional: Has sinus sx earlier this year  NOtes sinus sx stable, benadryl PRN  Flonase PRN  , but-- no fever-- and-- no chills  Cardiovascular: no chest pain-- and-- no palpitations  Respiratory: no cough  Gastrointestinal: 1 tablet of amitryptyline at night , but-- as noted in HPI  Active Problems  1  Abdominal pain, right lower quadrant (789 03) (R10 31)   2  Allergic asthma (493 00) (J45 909)   3  Anxiety (300 00) (F41 9)   4  Celiac disease (579 0) (K90 0)   5  Constipation (564 00) (K59 00)   6  Crohn's disease (555 9) (K50 90)   7  Crohn's ileitis (555 0) (K50 00)   8  Deviated septum (470) (J34 2)   9  Environmental and seasonal allergies (477 8) (J30 89)   10  Fatigue (780 79) (R53 83)   11  HLA B27 (HLA B27 positive) (V84 89) (Z15 89)   12  IBD (inflammatory bowel disease) (558 9) (K52 9)   13   Irritable bowel syndrome without diarrhea (564 1) (K58 9)   14  Myofascial muscle pain (729 1) (M79 1)   15  Panic attack (300 01) (F41 0)   16  Retention cyst of nasal sinus (478 19) (J34 1)   17  Vitamin D deficiency (268 9) (E55 9)   18  Weight loss, unintentional (783 21) (R63 4)    Past Medical History  1  History of Abdominal pain, right upper quadrant (789 01) (R10 11)   2  History of Abnormal Liver Function Test (790 6)   3  History of Acute bilateral low back pain without sciatica (724 2,338 19) (M54 5)   4  History of Acute colitis (558 9) (K52 9)   5  History of ABRAHAM (acute kidney injury) (584 9) (N17 9)   6  History of Allergic rhinitis due to pollen (477 0) (J30 1)   7  History of Anxiety (300 00) (F41 9)   8  History of Benign essential HTN (401 1) (I10)   9  Denied: History of Carrier Of STD   10  History of Cervical lymphadenopathy (785 6) (R59 0)   11  History of Dependence On Nicotine In Cigarettes (305 1)   12  History of Dysuria (788 1) (R30 0)   13  History of Elevated BP without diagnosis of hypertension (796 2) (R03 0)   14  History of Eustachian tube anomaly (744 3) (Q17 8)   15  History of External Hemorrhoids (455 3)   16  History of Flank pain (789 09) (R10 9)   17  History of abnormal weight loss (V13 89) (Z87 898)   18  History of acute sinusitis (V12 69) (Z87 09)   19  History of acute sinusitis (V12 69) (Z87 09)   20  History of asthma (V12 69) (Z87 09)   21  History of chronic sinusitis (V12 69) (Z87 09)   22  History of Crohn's disease (V12 70) (Z87 19)   23  History of dizziness (V13 89) (Z87 898)   24  History of dysuria (V13 00) (Z87 898)   25  History of esophageal reflux (V12 79) (Z87 19)   26  History of esophageal reflux (V12 79) (Z87 19)   27  History of fatigue (V13 89) (Z87 898)   28  History of headache (V13 89) (Z87 898)   29  History of headache (V13 89) (Z87 898)   30  History of psoriasis (V13 3) (Z87 2)   31  History of urinary frequency (V13 09) (Z87 898)   32   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)   33  Denied: History of Mental Status Change   34  History of Pancreatic Neoplasm (239 0)   35  History of Positive depression screening (796 4) (Z13 89)   36  History of Viral URI with cough (465 9) (J06 9,B97 89)   37  History of Vitamin B12 deficiency (266 2) (E53 8)  Active Problems And Past Medical History Reviewed: The active problems and past medical history were reviewed and updated today  Surgical History    1  History of Cholecystectomy   2  History of Frontal Sinusectomy   3  History of Nasal Septal Deviation Repair   4  History of Pancreatic Surgery   5  History of Tonsillectomy With Adenoidectomy  Surgical History Reviewed: The surgical history was reviewed and updated today  Family History  Mother    1  Family history of Anxiety   2  Family history of diabetes mellitus (V18 0) (Z83 3)   3  Family history of fibromyalgia (V17 89) (Z82 69)   4  Family history of thyroid disease (V18 19) (Z83 49)  Father    5  Family history of Hypertension (V17 49)  Sister    10  Family history of hypertension (V17 49) (Z82 49)  Grandparent    7  Family history of diabetes mellitus (V18 0) (Z83 3)  Paternal Cousin    6  Family history of rheumatoid arthritis (V17 7) (Z82 61)  Family History    9  Denied: Family history of Crohn's disease   10  Denied: Family history of psoriasis   11  Denied: Family history of systemic lupus erythematosus   12  Denied: Family history of ulcerative colitis  Family History Reviewed: The family history was reviewed and updated today  Social History     · Cigarette smoker (305 1) (F17 210)   · Current Every Day Smoker (305 1)   · Marital History - Single   · Occupation:   · Rarely consumes alcohol (V49 89) (Z78 9)   · Denied: History of 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  Social History Reviewed: The social history was reviewed and updated today  Current Meds   1   Amitriptyline HCl - 10 MG Oral Tablet; TAKE 1 TABLET AT BEDTIME  Requested for: 60DFX3888; Last Rx:96Men5394 Ordered   2  Benadryl 25 MG TABS; TAKE 1 TABLET Twice daily PRN ALLERGIES; Therapy: (Recorded:95Ibm7138) to Recorded   3  ClonazePAM 0 5 MG Oral Tablet; TAKE 1 TABLET EVERY 12 HOURS AS NEEDED; Therapy: 71CMZ3640 to [phone]); Last Rx:01Ond0648 Ordered   4  Dicyclomine HCl - 20 MG Oral Tablet; TAKE 1 TABLET EVERY 6 HOURS AS NEEDED; Therapy: 14HKI7633 to (182 89 564)  Requested for: 52MOX5190; Last VF:82UGD5438 Ordered   5  Fluticasone Propionate 50 MCG/ACT Nasal Suspension; USE 1 SPRAY IN EACH NOSTRIL ONCE DAILY; Therapy: 73XKY0816 to (Last Rx:76Jam8201)  Requested for: 52Zoi5230 Ordered   6  Linzess 145 MCG Oral Capsule; take 1 capsule daily; Therapy: 13NBG5835 to (Last PP:82HIN3527)  Requested for: 27Jun2017 Ordered  Medication List Reviewed: The medication list was reviewed and updated today  Allergies  1  Advil CAPS   2  Aspirin Buffered TABS   3  NSAIDs   4  Codeine Derivatives  5  Apples   6  Eggs   7  Other   8  Peanuts   9  Seasonal    Vitals  Vital Signs    Recorded: 90YGX8251 01:02PM   Temperature 98 4 F, Oral   Heart Rate 71   Systolic 680, LUE, Sitting   Diastolic 76, LUE, Sitting   Height 5 ft 7 25 in   Weight 165 lb    BMI Calculated 25 65   BSA Calculated 1 87   O2 Saturation 98, RA       Physical Exam   Constitutional  General appearance: No acute distress, well appearing and well nourished  -- Seated in room in NAD  Eyes  Pupils and irises: Equal, round and reactive to light  -- Reactive  Ears, Nose, Mouth, and Throat + clear nasal drainage  Oropharynx: Normal with no erythema, edema, exudate or lesions  -- MMM  Pulmonary  Respiratory effort: No increased work of breathing or signs of respiratory distress  -- CTA throughout  Auscultation of lungs: Clear to auscultation, equal breath sounds bilaterally, no wheezes, no rales, no rhonci  Cardiovascular  Auscultation of heart: Normal rate and rhythm, normal S1 and S2, without murmurs  -- RRR  Abdomen +BS x4, mild intermittent abdominal TTP  No palpable masses  No rigidity, no rebound  Musculoskeletal  Gait and station: Normal  -- No focal deficits  Psychiatric  Mood and affect: Normal          Health Management  History of Depression screening   *VB-Depression Screening; every 1 year; Last 47AVO9997; Next Due: 13YND6576;  Active    Signatures   Electronically signed by : Jerrell Crabtree, [facility]; Nov 9 2017  2:13PM EST                       (Author)    Electronically signed by : Jacki Givens DO; Nov 9 2017  2:46PM EST

## 2017-11-27 ENCOUNTER — APPOINTMENT (OUTPATIENT)
Dept: URGENT CARE | Age: 37
End: 2017-11-27
Payer: OTHER MISCELLANEOUS

## 2017-11-27 PROCEDURE — G0382 LEV 3 HOSP TYPE B ED VISIT: HCPCS | Performed by: PREVENTIVE MEDICINE

## 2017-11-27 PROCEDURE — 99283 EMERGENCY DEPT VISIT LOW MDM: CPT | Performed by: PREVENTIVE MEDICINE

## 2017-11-29 ENCOUNTER — GENERIC CONVERSION - ENCOUNTER (OUTPATIENT)
Dept: GASTROENTEROLOGY | Facility: CLINIC | Age: 37
End: 2017-11-29

## 2017-11-29 ENCOUNTER — HOSPITAL ENCOUNTER (OUTPATIENT)
Dept: GASTROENTEROLOGY | Facility: HOSPITAL | Age: 37
Discharge: HOME | End: 2017-11-29
Payer: COMMERCIAL

## 2017-11-29 ENCOUNTER — GENERIC CONVERSION - ENCOUNTER (OUTPATIENT)
Dept: OTHER | Facility: OTHER | Age: 37
End: 2017-11-29

## 2017-11-29 PROCEDURE — 91110 GI TRC IMG INTRAL ESOPH-ILE: CPT

## 2017-12-01 ENCOUNTER — APPOINTMENT (OUTPATIENT)
Dept: URGENT CARE | Age: 37
End: 2017-12-01
Payer: OTHER MISCELLANEOUS

## 2017-12-01 PROCEDURE — 99213 OFFICE O/P EST LOW 20 MIN: CPT | Performed by: PREVENTIVE MEDICINE

## 2017-12-06 ENCOUNTER — TRANSCRIBE ORDERS (OUTPATIENT)
Dept: URGENT CARE | Age: 37
End: 2017-12-06

## 2017-12-06 ENCOUNTER — APPOINTMENT (OUTPATIENT)
Dept: URGENT CARE | Age: 37
End: 2017-12-06
Payer: OTHER MISCELLANEOUS

## 2017-12-06 PROCEDURE — 99213 OFFICE O/P EST LOW 20 MIN: CPT | Performed by: PREVENTIVE MEDICINE

## 2017-12-19 ENCOUNTER — GENERIC CONVERSION - ENCOUNTER (OUTPATIENT)
Dept: OTHER | Facility: OTHER | Age: 37
End: 2017-12-19

## 2017-12-28 ENCOUNTER — GENERIC CONVERSION - ENCOUNTER (OUTPATIENT)
Dept: OTHER | Facility: OTHER | Age: 37
End: 2017-12-28

## 2018-01-09 NOTE — RESULT NOTES
Verified Results  CT SMALL BOWEL ENTEROGRAPHY [id number] 05:57PM Bronson Smaller     Test Name Result Flag Reference   CT SMALL BOWEL ENTEROGRAPHY (Report)     CT ABDOMEN AND PELVIS WITH IV CONTRAST- ENTEROGRAPHY - WITH CONTRAST     INDICATION: Crohn's disease  Right lower quadrant pain and cramping  Diarrhea, constipation and nausea  COMPARISON: 11/17/2016     TECHNIQUE: Contrast-enhanced CT examination of the abdomen and pelvis was performed utilizing thin section technique and after the administration of low density enteric contrast according to protocol designed specifically to obtain sensitive evaluation    of the small bowel  Reformatted images were created in axial, sagittal, and coronal planes  Radiation dose length product (DLP) for this visit: 749 mGy-cm   This examination, like all CT scans performed in the [facility], was performed utilizing techniques to minimize radiation dose exposure, including the use of iterative    reconstruction and automated exposure control  IV Contrast: 100 mL of iohexol (OMNIPAQUE)     Enteric Contrast: Standard volumen contrast      FINDINGS:      ABDOMEN     SMALL BOWEL: There is no small bowel wall thickening, abnormal bowel wall enhancement, or mesenteric inflammatory process  The terminal ileum appears within normal limits  Small bowel is normal in caliber  LARGE BOWEL: Normal caliber  No focal wall thickening or pericolonic inflammatory change  LOWER CHEST: No significant abnormality in the lung bases  STOMACH: Unremarkable  LIVER/BILIARY TREE: Unremarkable  GALLBLADDER: No calcified gallstones  No pericholecystic inflammatory change  SPLEEN: Unremarkable  PANCREAS: Unremarkable  ADRENAL GLANDS: Unremarkable  KIDNEYS/URETERS: Unremarkable  No hydronephrosis  PELVIS     REPRODUCTIVE ORGANS: Unremarkable for patient's age  URINARY BLADDER: Unremarkable       APPENDIX: No findings to suggest appendicitis  ABDOMINOPELVIC CAVITY: No ascites or free intraperitoneal air  No lymphadenopathy  VESSELS: Unremarkable for patient's age  ABDOMINAL WALL/INGUINAL REGIONS: Unremarkable  OSSEOUS STRUCTURES: No destructive osseous lesion  IMPRESSION:     Normal examination  Specifically, no small bowel abnormality is detected  Workstation performed: [id number]     Signed by:    Marysol Dawson MD   7/13/17

## 2018-01-10 NOTE — RESULT NOTES
Verified Results  (1) TISSUE EXAM [id number] 03:39PM Nahid Licona     Test Name Result Flag Reference   LAB AP CASE REPORT (Report)     Surgical Pathology Report             Case: [id number]                   Authorizing Provider: Fabrice Walker MD      Collected:      11/18/2016 1539        Ordering Location:   [address] [place] [address]   Received:      11/21/2016 [facility] [facility]                               Pathologist:      Piper Patterson MD                             Specimens:  A) - Small Bowel, NOS, Terminal illeum, illeitis, r/o Crohns                      B) - Colon, Random colon bx, r/o microscopic colitis   LAB AP FINAL DIAGNOSIS (Report)     A  Terminal ileum, biopsy:  - Benign small bowel mucosa with reactive changes, chronic inflammatory   cells and lymphoid aggregates (see note)  - Negative for active ileitis, dysplasia or carcinoma  B  Colon, random, biopsies:  - Benign colonic mucosa with mild reactive changes   - No thickened basement membranes or intraepithelial lymphocytosis to   suggest microscopic colitis (i e  collagenous colitis or lymphocytic   colitis, respectively)  - No active or chronic colitis, dysplasia or carcinoma  Electronically signed by Piper Patterson MD on 11/23/2016 at 1:14 PM   LAB AP NOTE (Report)     The terminal ileum biopsy shows mild mucosal reactive change with   associated lamina propria chronic inflammation and lymphoid aggregates  No   significant features of acute ileitis or chronic architectural distortion   are identified  The findings are most suggestive of Peyer's patch   sampling, though a component of inactive ileitis cannot be entirely   excluded  Correlation with clinical and imaging impression is recommended  LAB AP SURGICAL ADDITIONAL INFORMATION (Report)     These tests were developed and their performance characteristics   determined by [facility] or [employer]   They may not be cleared or approved by the [place]  Food and   Drug Administration  The FDA has determined that such clearance or   approval is not necessary  These tests are used for clinical purposes  They should not be regarded as investigational or for research  This   laboratory has been approved by [place] 88, designated as a high-complexity   laboratory and is qualified to perform these tests  Interpretation performed at [facility], [address] [place] [address], [id number]   LAB AP GROSS DESCRIPTION (Report)     A  The specimen is received in formalin, labeled with the patient's name   and hospital number, and is designated terminal ileum  The specimen   consists of 6 tan soft tissue fragments ranging in greatest dimension from   0 2 to 0 3 centimeters  Entirely submitted  One cassette  B  The specimen is received in formalin, labeled with the patient's name   and hospital number, and is designated random colon  The specimen   consists of 2 tan soft tissue fragments measuring 0 3 and 1 5 centimeters   in greatest dimension  Entirely submitted  One cassette  Note: The estimated total formalin fixation time based upon information   provided by the submitting clinician and the standard processing schedule   is over 72 hours      BMV

## 2018-01-10 NOTE — RESULT NOTES
Verified Results  (1) C  DIFFICILE TOXIN BY PCR [id number] 08:51AM Tabitha Margarita Order Number: [id number]     Test Name Result Flag Reference   C  DIFFICILE TOXIN BY PCR   NEGATIVE for C difficle toxin by PCR  NEGATIVE for C difficle toxin by PCR

## 2018-01-12 VITALS
BODY MASS INDEX: 22.82 KG/M2 | TEMPERATURE: 99.1 F | DIASTOLIC BLOOD PRESSURE: 88 MMHG | HEART RATE: 72 BPM | WEIGHT: 150.6 LBS | SYSTOLIC BLOOD PRESSURE: 100 MMHG | HEIGHT: 68 IN | OXYGEN SATURATION: 98 %

## 2018-01-12 NOTE — MISCELLANEOUS
Message  Return to work or school:   Srinivasa Trivedi is under my professional care  He was seen in my office on 01/26/2016   He is able to return to work on  01/29/2016            Signatures   Electronically signed by :  Fran Dandy, MD; Feb 1 2016 12:42PM EST                       (Author)

## 2018-01-12 NOTE — PROGRESS NOTES
Assessment    1  Viral URI with cough (465 9) (J06 9)    Plan  Viral URI with cough    · Azithromycin 250 MG Oral Tablet; Take 2 tablets today, then 1 tablet daily for 4 days   · ProAir  (90 Base) MCG/ACT Inhalation Aerosol Solution; INHALE 1 TO 2  PUFFS EVERY 4 TO 6 HOURS AS NEEDED   · Call [phone] if: The cough is not gone in 10 days ; Status:Complete;   Done:  10CAO4133   · Call [phone] if: The symptoms seem worse ; Status:Complete;   Done:  28JJM3128   · Seek Immediate Medical Attention if: Breathing starts to have a wheeze or whistling  sound ; Status:Complete;   Done: 90IQC7387   · Seek Immediate Medical Attention if: You have a fever, headache, and vomiting, or have a  stiff neck ; Status:Complete;   Done: 29ZMZ5551   · Seek Immediate Medical Attention if: You have pain in the chest that gets worse with  deep breathing or coughing ; Status:Complete;   Done: 01FKZ2751    Discussion/Summary    Symptoms are most likely viral  It is self-limiting and should resolve within one week  Start taking over the counter Tylenol Multi-Symptom Cold as directed on box  Drink plenty of fluids and get plenty of rest  Warm humidification and warm salt water gargles will help sooth symptoms  Practice good hand hygiene, do not share food or fluids  Contact office if symptoms do not improve  Written RX for antibiotic to take if not better in 3-5 days  You need to decrease the amount of cigarettes smoked and consider stop smoking in the near future  Cigarettes make you more susceptible to upper respiratory infections  Quitting will improve your overall quality of health and decrease your risks for other medical conditions  Work note for today  The patient was counseled regarding instructions for management, risk factor reductions, prognosis, patient and family education, impressions, risks and benefits of treatment options, importance of compliance with treatment     Possible side effects of new medications were reviewed with the patient/guardian today  The treatment plan was reviewed with the patient/guardian  The patient/guardian understands and agrees with the treatment plan      Chief Complaint  pt is here with c/o chest congestion  he says it has been a couple of days      History of Present Illness  Cold Symptoms:   Jaelyn Cummings presents with complaints of gradual onset of constant episodes of mild cold symptoms  His symptoms are caused by no known event  Symptoms are improved by cough suppressants  Symptoms are not made worse by activity and lying down  Symptoms are unchanged  Risk Factors: smoking Pertinent Medical History: allergic rhinitis and asthma (Sx x3 days  Complains most of chest congestion  Has been using cough drops and Vicks vapor rub  Of note, pt was seen last month for sinusitis and treated with Augmentin  )   Associated symptoms include nasal congestion, runny nose, post nasal drainage, sore throat, shortness of breath and fatigue, but no sneezing, no scratchy throat, no hoarseness, no dry cough, no productive cough, no facial pressure, no facial pain, no headache, no plugged ear(s), no ear pain, no swollen lymph nodes, no wheezing, no nausea, no vomiting, no diarrhea, no fever and no chills  Review of Systems    Constitutional: feeling poorly, but as noted in HPI    ENT: as noted in HPI  Cardiovascular: no chest pain and no palpitations  Respiratory: as noted in HPI  Gastrointestinal: as noted in HPI  Neurological: as noted in HPI  Active Problems    1  Abnormal Liver Function Test (790 6)   2  Abnormal loss of weight (783 21) (R63 4)   3  Acid reflux (530 81) (K21 9)   4  Acute colitis (558 9) (K52 9)   5  Acute sinusitis (461 9) (J01 90)   6  Allergic rhinitis due to pollen (477 0) (J30 1)   7  Celiac disease (579 0) (K90 0)   8  Chronic sinusitis (473 9) (J32 9)   9  Crohn's disease (555 9) (K50 90)   10  Dependence On Nicotine In Cigarettes (305 1)   11  Dizziness (780 4) (R42)   12  External Hemorrhoids (455 3)   13  Fatigue (780 79) (R53 83)   14  Hypertension (401 9) (I10)   15  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)   16  Pancreatic Neoplasm (239 0)   17  Regional enteritis (555 9) (K50 90)   18  Screening for diabetes mellitus (V77 1) (Z13 1)   19  Screening for hyperlipidemia (V77 91) (Z13 220)   20  Vitamin B12 deficiency (266 2) (E53 8)   21  Vitamin D deficiency (268 9) (E55 9)    Past Medical History  Active Problems And Past Medical History Reviewed: The active problems and past medical history were reviewed and updated today  Social History    · Being A Social Drinker   · Current Every Day Smoker (305 1)   · Marital History - Single   · Occupation:   ·    · 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   · former marijuana use  The social history was reviewed and updated today  The social history was reviewed and is unchanged  Family History  Family History Reviewed: The family history was reviewed and updated today  Current Meds   1  LORazepam 0 5 MG Oral Tablet; TAKE 1 TABLET DAILY AS NEEDED; Last Rx:[id number]   Ordered   2  Omeprazole 20 MG Oral Capsule Delayed Release; one prn; Last Rx:[id number]   Ordered    The medication list was reviewed and updated today  Allergies    1  Advil CAPS   2  Aspirin Buffered TABS   3  Codeine Derivatives    4  Seasonal    Vitals   Recorded: 76HPP2912 10:56AM   Temperature 98 4 F, Oral   Heart Rate 76   Systolic 310, LUE, Sitting   Diastolic 86, LUE, Sitting   Height 5 ft 8 in   Weight 171 lb 8 oz   BMI Calculated 26 08   BSA Calculated 1 91   O2 Saturation 99, RA     Physical Exam    Constitutional   General appearance: No acute distress, well appearing and well nourished  Eyes   Conjunctiva and lids: No swelling, erythema, or discharge  Pupils and irises: Equal, round and reactive to light      Ears, Nose, Mouth, and Throat   External inspection of ears and nose: Normal     Otoscopic examination: Tympanic membrance translucent with normal light reflex  Canals patent without erythema  Nasal mucosa, septum, and turbinates: Abnormal   (-TTP over sinus) There was clear rhinorrhea from both nares  The bilateral nasal mucosa was edematous  Oropharynx: Normal with no erythema, edema, exudate or lesions  Pulmonary   Respiratory effort: Abnormal   Respiratory Findings: dry cough  Auscultation of lungs: Clear to auscultation, equal breath sounds bilaterally, no wheezes, no rales, no rhonci  Cardiovascular   Auscultation of heart: Normal rate and rhythm, normal S1 and S2, without murmurs  Abdomen   Abdomen: Non-tender, no masses  Lymphatic   Palpation of lymph nodes in neck: No lymphadenopathy  Musculoskeletal   Gait and station: Normal     Skin   Skin and subcutaneous tissue: Normal without rashes or lesions  Neurologic   Cranial nerves: Cranial nerves 2-12 intact  Psychiatric   Orientation to person, place and time: Normal     Mood and affect: Normal          Signatures   Electronically signed by : NAIF Ravi; Jan 26 2016 11:12AM EST                       (Author)    Electronically signed by :  Frederic Graham MD; Jan 26 2016 11:43AM EST                       (Author)

## 2018-01-13 VITALS
HEIGHT: 68 IN | HEART RATE: 82 BPM | BODY MASS INDEX: 23.79 KG/M2 | DIASTOLIC BLOOD PRESSURE: 80 MMHG | OXYGEN SATURATION: 98 % | TEMPERATURE: 99.4 F | SYSTOLIC BLOOD PRESSURE: 136 MMHG | WEIGHT: 157 LBS

## 2018-01-13 VITALS
TEMPERATURE: 98.4 F | WEIGHT: 165 LBS | OXYGEN SATURATION: 98 % | SYSTOLIC BLOOD PRESSURE: 118 MMHG | BODY MASS INDEX: 25.9 KG/M2 | HEART RATE: 71 BPM | HEIGHT: 67 IN | DIASTOLIC BLOOD PRESSURE: 76 MMHG

## 2018-01-13 VITALS
TEMPERATURE: 99.2 F | HEART RATE: 90 BPM | SYSTOLIC BLOOD PRESSURE: 122 MMHG | OXYGEN SATURATION: 99 % | DIASTOLIC BLOOD PRESSURE: 86 MMHG

## 2018-01-13 VITALS
WEIGHT: 157.5 LBS | OXYGEN SATURATION: 98 % | HEIGHT: 68 IN | TEMPERATURE: 98 F | SYSTOLIC BLOOD PRESSURE: 125 MMHG | HEART RATE: 75 BPM | DIASTOLIC BLOOD PRESSURE: 86 MMHG | BODY MASS INDEX: 23.87 KG/M2

## 2018-01-13 VITALS
DIASTOLIC BLOOD PRESSURE: 80 MMHG | SYSTOLIC BLOOD PRESSURE: 118 MMHG | BODY MASS INDEX: 23.17 KG/M2 | OXYGEN SATURATION: 97 % | WEIGHT: 152.4 LBS | TEMPERATURE: 99.2 F | HEART RATE: 70 BPM

## 2018-01-13 NOTE — MISCELLANEOUS
Message  Return to work or school:   Alicia Vick is under my professional care  He was seen in my office on 07/08/2016   He is able to return to work on  07/10/2016      Weight Bearing Status: Full Weight-Bearing  Please excuse form work 7/7/16 for medical reasons  Please allow patient to work 8 hours shifts for total of 40 hours per week  Call if any questions          Signatures   Electronically signed by : Alexey Louie, [facility]; Jul 28 2016  4:25PM EST                       (Author)

## 2018-01-13 NOTE — MISCELLANEOUS
Message  Return to work or school:   Fredrick Klein is under my professional care   He was seen in my office on 09/26/2017   He is able to return to work on  10/01/2017            Signatures   Electronically signed by : Shira Villanueva MD; Sep 27 2017  5:32PM EST

## 2018-01-14 VITALS
DIASTOLIC BLOOD PRESSURE: 70 MMHG | WEIGHT: 158 LBS | HEIGHT: 68 IN | OXYGEN SATURATION: 98 % | HEART RATE: 80 BPM | TEMPERATURE: 98.9 F | BODY MASS INDEX: 23.95 KG/M2 | SYSTOLIC BLOOD PRESSURE: 112 MMHG | RESPIRATION RATE: 18 BRPM

## 2018-01-14 VITALS
TEMPERATURE: 98.6 F | SYSTOLIC BLOOD PRESSURE: 120 MMHG | BODY MASS INDEX: 23.92 KG/M2 | HEART RATE: 76 BPM | HEIGHT: 68 IN | DIASTOLIC BLOOD PRESSURE: 80 MMHG | OXYGEN SATURATION: 98 % | WEIGHT: 157.8 LBS

## 2018-01-14 VITALS
DIASTOLIC BLOOD PRESSURE: 82 MMHG | TEMPERATURE: 99.9 F | OXYGEN SATURATION: 97 % | WEIGHT: 160.13 LBS | BODY MASS INDEX: 24.27 KG/M2 | HEART RATE: 90 BPM | HEIGHT: 68 IN | SYSTOLIC BLOOD PRESSURE: 124 MMHG

## 2018-01-14 VITALS
HEIGHT: 68 IN | HEART RATE: 80 BPM | SYSTOLIC BLOOD PRESSURE: 136 MMHG | DIASTOLIC BLOOD PRESSURE: 84 MMHG | BODY MASS INDEX: 24.42 KG/M2 | WEIGHT: 161.13 LBS

## 2018-01-14 VITALS
BODY MASS INDEX: 23.66 KG/M2 | SYSTOLIC BLOOD PRESSURE: 122 MMHG | OXYGEN SATURATION: 98 % | DIASTOLIC BLOOD PRESSURE: 76 MMHG | WEIGHT: 156.13 LBS | TEMPERATURE: 98.2 F | HEIGHT: 68 IN | HEART RATE: 70 BPM

## 2018-01-14 VITALS
DIASTOLIC BLOOD PRESSURE: 88 MMHG | SYSTOLIC BLOOD PRESSURE: 130 MMHG | HEIGHT: 68 IN | HEART RATE: 82 BPM | TEMPERATURE: 98.4 F | OXYGEN SATURATION: 98 % | BODY MASS INDEX: 23.97 KG/M2 | WEIGHT: 158.13 LBS

## 2018-01-14 NOTE — MISCELLANEOUS
Message  Spoke with patient via telephone in regards to his lab results  Patient did have a positive HLA-B27 however all other autoimmune serologies were negative  Patient also had a negative hepatitis and HIV panel  Patient had normal inflammatory markers  Patient is describing widespread muscle and joint pain which appears to be more consistent with a myofascial pain syndrome  He is currently seeing GI for further evaluation of possible inflammatory bowel disease  Patient was asked to repeat blood work in about 3 months and if he does have inflammation on blood work and may consider further treatment if patient continues to have joint pain  He will contact the office after he has his blood work performed  Patient voiced understanding and thanked me  Signatures   Electronically signed by : Richard Amaral, [facility];  Aug 25 2017  3:39PM EST                       (Author)

## 2018-01-14 NOTE — PROGRESS NOTES
Assessment    1  Myofascial muscle pain (729 1) (M79 1)   2  Crohn's disease (555 9) (K50 90)   3  Vitamin D deficiency (268 9) (E55 9)   4  Irritable bowel syndrome without diarrhea (564 1) (K58 9)   5  Anxiety (300 00) (F41 9)    Plan  Abdominal pain, right upper quadrant, Anxiety    · Amitriptyline HCl - 10 MG Oral Tablet; TAKE 2 TABLET Bedtime  Crohn's disease, Myofascial muscle pain    · (1) LYDIA SCREEN W/REFLEX TO TITER/PATTERN; Status:Active; Requested  for:24Svl8709;    · (1) CBC/PLT/DIFF; Status:Active; Requested for:27Mes4800;    · (1) CHRONIC HEPATITIS PANEL; Status:Active; Requested for:68Aot2745;    · (1) COMPREHENSIVE METABOLIC PANEL; Status:Active; Requested CYO:68VME6803;    · (1) CYCLIC CITRULLINATED PEPTIDE; Status:Active; Requested for:51Fav1095;    · (1) HIV AG/AB COMBO, 4TH GEN; [Do Not Release]; Status:Active; Requested  for:78Ipg1240;    · (1) HLA B27; Status:Active; Requested for:80Lfd6306;    · (1) RHEUMATOID FACTOR SCREEN; Status:Active; Requested for:24Sik2463;    · (1) SJOGRENS ANTIBODIES; Status:Active; Requested for:47Yak5096;    · (1) TSH WITH FT4 REFLEX; Status:Active; Requested for:52Yhj6637;    · Follow-up PRN Evaluation and Treatment  Follow-up  Status: Complete  Done:  39CFA5742  Myofascial muscle pain    · Call [phone] if: The pain seems worse ; Status:Complete;   Done: 67NKX6155   · Call [phone] if: You have questions or concerns about your problem ;  Status:Complete;   Done: 95SYV0299    Discussion/Summary    Mr  Tennis Nela 11year-old  male who presents the office with a history of abdominal pain since the age of 28  He reports that he has been diagnosed with Crohn's disease on several occasions, but the diagnosis has also been "revoked" several times  He has had multiple CTs which have shown inflammation in the terminal ileum, and biopsies to have showed inflammatory changes well, but nothing was definitive for Crohn's disease   He has been placed on a trial of Lialda, but was unable to tolerate it due to an allergy  He has not been on any immunosuppressive therapy including Biologics for his possible Crohn's at this point  He reports feeling generally unwell  He complains of stiffness in his muscles, as well as intermittent gland swelling  He has not been taking any medications for his discomfort, as he is unable to tolerate NSAIDs due to anaphylaxis  He has tried steroids in the past, but he does report that this seems to worsen his stiffness  He denies any significant joint pain or obvious joint swelling  He states that his muscular stiffness is typically associated with stomach flares  He reports occasional morning stiffness typically lasting one hour before improvement  He does report difficulty sleeping due to abdominal pain on occasion  He also reports occasional nonrestorative sleep and occasional fatigue  On exam, there is no active synovitis  There is no tenderness to palpation of any of the joints or muscles  He has full passive range of motion of all the joints  There is no objective muscular weakness  Review of laboratory studies from June 26, 2017 revealed a mildly decreased vitamin D at 24 5  A CMP, ESR, and CRP from June 30, 2017 were within normal limits  At this time, his history, exam, and laboratory studies are not consistent with any specific autoimmune arthropathy or underlying connective tissue disease  There has been a questionable history of Crohn's disease, and it is possible that we may be dealing with a mild IBD associated spondyloarthropathy, but I would expect elevated inflammatory markers and synovitis if this were the case  I will check some additional laboratory studies to further investigate any cause for his symptoms  For now, I will increase his amitriptyline to 20 mg daily at bedtime to see if this will help with some of his abdominal complaints as well   If no other connective tissue diseases found, I will defer his management to his gastroenterologist  I advised him to contact my office approximately 1 week after the laboratory studies are drawn so we may review them and make further clinical decisions  I will not schedule a follow-up evaluation at this time  He will call if he has any additional questions or concerns  Patient is able to Self-Care  Counseling  Rheumatology Counseling Documentation: The patient was counseled regarding diagnostic results, instructions for management and impressions  Chief Complaint  Abdominal pain and myalgias      History of Present Illness  Pt  is a 39 yo  male who presents with history of abdominal pain since age of 28  Has been Dx'ed with Crohn's several times, but Dx was also revoked  CTs show inflammation in terminal ileum, but told he does not have definite Crohn's  Feels generally unwell  c/o stiffness in muscles and gland swelling as well  Muscle stiffness is intermittent  No pain meds taken  Has tried steroids in past with worsening of stiffness  Cannot take NSAIDs due to allergy  No significant joint pain  No obvious joint swelling  Stiffness is a/w with stomach flares  + AM stiffness on occasion x 1 hour  + difficulty sleeping due to abdominal pain  + occasional non-restorative sleep  + occasional fatigue  RAPID3: 14 7/30      Review of Systems    Constitutional: no fever, no recent weight gain, no chills and no anorexia    The patient presents with complaints of occasional episodes of fatigue  The patient presents with complaints of recent 20 lb weight loss (over 1 year; unintentional)   HEENT: dryness mouth and feeling congested, but no blurred vision, no double vision, no amaurosis fugax, no dryness of the eyes, no erythema eye(s), no mouth sores and no sore throat    The patient presents with complaints of occasional episodes of bilateral eye pain ("pressure" a/w sinus symptoms)     Cardiovascular: no dyspnea on exertion and no swelling in the arms or legs    The patient presents with complaints of occasional episodes of anterior mid-chest chest pain or pressure, described as heavy and tight  Episodes last about 2 hours  Respiratory: no unusual or persistent cough, no shortness of breath and no pleurisy  Gastrointestinal: constipation, but no vomiting, no heartburn, no melena and no BRBPR    The patient presents with complaints of intermittent episodes of abdominal pain  The patient presents with complaints of occasional episodes of nausea  The patient presents with complaints of diarrhea (intermittent with constipation)   Genitourinary: + weak stream at times; no foamy urine, but no dysuria and no hematuria  Musculoskeletal: as noted in HPI  Integumentary no rash, no Raynaud's, no alopecia, no nail changes and no photosensitivity  Endocrine no polyuria and no polydipsia  Hematologic/Lymphatic: no unusual bleeding and no tendency for easy bruising  Neurological: headache, but no tingling and no weakness    The patient presents with complaints of occasional episodes of vertigo or dizziness, described as lightheadedness Symptoms are made worse by standing ("foggy")  Psychiatric: insomnia, non-restorative sleep and anxiety, but no depression  Active Problems    1  Abdominal pain, right upper quadrant (789 01) (R10 11)   2  ABRAHAM (acute kidney injury) (584 9) (N17 9)   3  Anxiety (300 00) (F41 9)   4  Celiac disease (579 0) (K90 0)   5  Constipation (564 00) (K59 00)   6  Crohn's disease (555 9) (K50 90)   7  Crohn's ileitis (555 0) (K50 00)   8  Deviated septum (470) (J34 2)   9  Eustachian tube anomaly (744 3) (Q17 8)   10  Fatigue (780 79) (R53 83)   11  Headache (784 0) (R51)   12  IBD (inflammatory bowel disease) (558 9) (K52 9)   13  Irritable bowel syndrome without diarrhea (564 1) (K58 9)   14  Retention cyst of nasal sinus (478 19) (J34 1)   15   Vitamin D deficiency (268 9) (E55 9)   16  Weight loss, unintentional (783 21) (R63 4)    Past Medical History    1  History of Abnormal Liver Function Test (790 6)   2  History of Acute bilateral low back pain without sciatica (724 2,338 19) (M54 5)   3  History of Acute colitis (558 9) (K52 9)   4  History of Allergic rhinitis due to pollen (477 0) (J30 1)   5  History of Anxiety (300 00) (F41 9)   6  History of Benign essential HTN (401 1) (I10)   7  Denied: History of Carrier Of STD   8  History of Cervical lymphadenopathy (785 6) (R59 0)   9  History of Dependence On Nicotine In Cigarettes (305 1)   10  History of Dysuria (788 1) (R30 0)   11  History of Elevated BP without diagnosis of hypertension (796 2) (R03 0)   12  History of External Hemorrhoids (455 3)   13  History of Flank pain (789 09) (R10 9)   14  History of abnormal weight loss (V13 89) (Z87 898)   15  History of acute sinusitis (V12 69) (Z87 09)   16  History of acute sinusitis (V12 69) (Z87 09)   17  History of asthma (V12 69) (Z87 09)   18  History of chronic sinusitis (V12 69) (Z87 09)   19  History of Crohn's disease (V12 70) (Z87 19)   20  History of dizziness (V13 89) (Z87 898)   21  History of dysuria (V13 00) (Z87 898)   22  History of esophageal reflux (V12 79) (Z87 19)   23  History of esophageal reflux (V12 79) (Z87 19)   24  History of fatigue (V13 89) (Z87 898)   25  History of headache (V13 89) (Z87 898)   26  History of psoriasis (V13 3) (Z87 2)   27  History of urinary frequency (V13 09) (Z87 898)   28  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)   29  Denied: History of Mental Status Change   30  History of Pancreatic Neoplasm (239 0)   31  History of Positive depression screening (796 4) (Z13 89)   32  History of Viral URI with cough (465 9) (J06 9,B97 89)   33  History of Vitamin B12 deficiency (266 2) (E53 8)    Surgical History    1  History of Cholecystectomy   2  History of Frontal Sinusectomy   3  History of Nasal Septal Deviation Repair   4   History of Tonsillectomy With Adenoidectomy    Family History  Mother    1  Family history of Anxiety   2  Family history of diabetes mellitus (V18 0) (Z83 3)   3  Family history of fibromyalgia (V17 89) (Z82 69)   4  Family history of thyroid disease (V18 19) (Z83 49)  Father    5  Family history of Hypertension (V17 49)  Sister    10  Family history of hypertension (V17 49) (Z82 49)  Grandparent    7  Family history of diabetes mellitus (V18 0) (Z83 3)  Paternal Cousin    6  Family history of rheumatoid arthritis (V17 7) (Z82 61)  Family History    9  Denied: Family history of Crohn's disease   10  Denied: Family history of psoriasis   11  Denied: Family history of systemic lupus erythematosus   12  Denied: Family history of ulcerative colitis    Social History    · Cigarette smoker (305 1) (F17 210)   · Current Every Day Smoker (305 1)   · Marital History - Single   · Occupation:   · Rarely consumes alcohol (V49 89) (Z78 9)   · 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    Current Meds   1  Amitriptyline HCl - 10 MG Oral Tablet; TAKE 1 TABLET AT BEDTIME  Requested for:   93JMJ1864; Last Rx:83Oef5549 Ordered   2  Benadryl 25 MG TABS; TAKE 1 TABLET Twice daily PRN ALLERGIES; Therapy: (Recorded:96Abn4020) to Recorded   3  Dicyclomine HCl - 20 MG Oral Tablet; TAKE 1 TABLET EVERY 6 HOURS AS NEEDED; Therapy: 95MVT8231 [facility]  Requested for: 46HHP9349; Last   RD:38LMC4977 Ordered   4  Fluticasone Propionate 50 MCG/ACT Nasal Suspension; USE 1 SPRAY IN EACH   NOSTRIL ONCE DAILY; Therapy: 34LLQ9242 to (Last Rx:20Mar2017)  Requested for: 20Mar2017 Ordered   5  Linzess 145 MCG Oral Capsule; take 1 capsule daily; Therapy: 22MSJ9365 to (Last Rx:27Jun2017)  Requested for: 27Jun2017 Ordered   6  LORazepam 0 5 MG Oral Tablet; TAKE 1 TABLET TWICE DAILY AS NEEDED; Last   Rx:27Jun2017 Ordered   7  Vitamin D3 2000 UNIT Oral Tablet; Take 1 tablet daily; Therapy: 53VXC4688 to (Evaluate:15Jan2018)  Requested for: 62IIK0136; Last   Rx:68Gsy3719 Ordered    Allergies    1   Advil CAPS   2  Aspirin Buffered TABS   3  NSAIDs   4  Codeine Derivatives    5  Apples   6  Eggs   7  Other   8  Peanuts   9  Seasonal    Vitals  Signs   Recorded: 54Jej8064 09:21AM   Heart Rate: 80  Systolic: 664  Diastolic: 84  Height: 5 ft 8 in  Weight: 161 lb 2 oz  BMI Calculated: 24 5  BSA Calculated: 1 86    Physical Exam    Constitutional   General appearance: No acute distress, well appearing and well nourished  Eyes   Conjunctiva and lids: No swelling, erythema or discharge  Pupils and irises: Equal, round and reactive to light  Ears, Nose, Mouth, and Throat   External inspection of ears and nose: Normal     Oropharynx: Normal with no erythema, edema, exudate lesions, or ulcers  Pulmonary   Respiratory effort: No increased work of breathing or signs of respiratory distress  Auscultation of lungs: Clear to auscultation  Cardiovascular   Auscultation of heart: Normal rate and rhythm, normal S1 and S2, without murmurs  Examination of extremities for edema and/or varicosities: Normal     Lymphatic   Palpation of lymph nodes in neck: No lymphadenopathy  Psychiatric   Orientation to person, place, and time: Normal     Mood and affect: Normal         Right Upper Extremity: All normal    Left Upper Extremity: All normal    Right Lower Extremity: All normal    Left Lower Extremity: All normal    Musculoskeletal - Muscle strength/tone: Normal  Motor Strength Findings: right hand dominance, but normal upper extremity strength and normal lower extremity strength  Right upper extremity: shoulder flexion 5/5, shoulder extension 5/5, biceps 5/5, triceps 5/5, but normal hand   Left upper extremity shoulder flexion 5/5, shoulder extension 5/5, biceps 5/5, triceps 5/5, but normal hand   Right lower extremity strength: hip flexion 5/5, hip abduction 5/5, hip adduction 5/5, knee flexion 5/5, knee extension 5/5, ankle dorsiflexion 5/5, ankle plantar flexion 5/5     Left lower extremity strength: hip flexion 5/5, hip abduction 5/5, hip adduction 5/5, knee flexion 5/5, knee extension 5/5, ankle dorsiflexion 5/5, ankle plantar flexion 5/5  Skin - Skin and subcutaneous tissue: Abnormal  multiple tattoos  Neurologic - Reflexes: Normal  Deep tendon reflexes: 1+ right biceps, 1+ left biceps, 1+ right triceps, 1+ left triceps, 1+ right brachioradialis, 1+ left brachioradialis, 1+ right patella, 1+ left patella, 1+ right ankle jerk and 1+ left ankle jerk  Sensation: Normal       Results/Data  (1) C-REACTIVE PROTEIN 30Jun2017 04:43PM Asmita Solum Order Number: [id number]     Test Name Result Flag Reference   C-REACT PROTEIN <3 0 mg/L  <3 0     (1) SED RATE [id number] 04:43PM Asmita Solum Order Number: [id number]     Test Name Result Flag Reference   SED RATE 2 mm/hour  0-10     (1) COMPREHENSIVE METABOLIC PANEL 13EAQ8895 34:68CX Asmita Solum Order Number: [id number]     Test Name Result Flag Reference   SODIUM 138 mmol/L  136-145   POTASSIUM 4 5 mmol/L  3 5-5 3   CHLORIDE 106 mmol/L  100-108   CARBON DIOXIDE 27 mmol/L  21-32   ANION GAP (CALC) 5 mmol/L  4-13   BLOOD UREA NITROGEN 17 mg/dL  5-25   CREATININE 1 19 mg/dL  0 60-1 30   Standardized to IDMS reference method   CALCIUM 9 8 mg/dL  8 3-10 1   BILI, TOTAL 0 74 mg/dL  0 20-1 00   ALK PHOSPHATAS 77 U/L     ALT (SGPT) 34 U/L  12-78   AST(SGOT) 22 U/L  5-45   ALBUMIN 4 3 g/dL  3 5-5 0   TOTAL PROTEIN 7 9 g/dL  6 4-8 2   eGFR Non-African American      >60 0 ml/min/1 73sq [facility] Disease Education Program recommendations are as follows:  GFR calculation is accurate only with a steady state creatinine  Chronic Kidney disease less than 60 ml/min/1 73 sq  meters  Kidney failure less than 15 ml/min/1 73 sq  meters     GLUCOSE FASTING 86 mg/dL  65-99     (1) VITAMIN D 25-HYDROXY 17Zgu9446 04:35PM Brennan Cassette Order Number: [id number]     Test Name Result Flag Reference   VIT D 25-HYDROX 24 5 ng/mL L 30 0-100 0   This assay is a certified procedure of the CDC Vitamin D Standardization Certification Program (VDSCP)     Deficiency <20ng/ml   Insufficiency 20-30ng/ml   Sufficient  ng/ml     *Patients undergoing fluorescein dye angiography may retain small amounts of fluorescein in the body for 48-72 hours post procedure  Samples containing fluorescein can produce falsely elevated Vitamin D values  If the patient had this procedure, a specimen should be resubmitted post fluorescein clearance         Future Appointments    Date/Time Provider Specialty Site   09/26/2017 09:00 AM Quintin Ballesteros MD Gastroenterology Adult [address]     Signatures   Electronically signed by : Shahrzad Jones DO; Jul 24 2017 10:05AM EST                       (Author)

## 2018-01-14 NOTE — RESULT NOTES
Verified Results  (1) STOOL CULTURE 84RAX2416 08:51AM Sarai Chandler Order Number: [id number]     Test Name Result Flag Reference   CLINICAL REPORT (Report)     Test:        Stool culture  Specimen Type:   Stool  Specimen Date:   7/5/2017 8:51 AM  Result Date:    7/7/2017 10:45 AM  Result Status:   Final result  Resulting Lab:   [place] [address]            Tel: [phone]      CULTURE                                       ------------------                                   No Salmonella, Shigella or Campylobacter isolated      Shiga Toxin 1 NOT detected, Shiga Toxin 2 NOT detected

## 2018-01-15 VITALS — SYSTOLIC BLOOD PRESSURE: 132 MMHG | DIASTOLIC BLOOD PRESSURE: 92 MMHG

## 2018-01-15 VITALS
BODY MASS INDEX: 25.01 KG/M2 | SYSTOLIC BLOOD PRESSURE: 118 MMHG | HEIGHT: 68 IN | TEMPERATURE: 97.9 F | WEIGHT: 165 LBS | HEART RATE: 70 BPM | DIASTOLIC BLOOD PRESSURE: 82 MMHG | OXYGEN SATURATION: 98 %

## 2018-01-15 NOTE — MISCELLANEOUS
Message  Return to work or school:   Misha Nunez is under my professional care  He was seen in my office on 04/04/2016   He is able to return to work on  04/06/2016       Please also excuse patient from 04/03/2016  Signatures   Electronically signed by : Charline Scott DO;  Apr 5 2016  2:56PM EST

## 2018-01-15 NOTE — MISCELLANEOUS
Message  Message Free Text Note Form: Pt was called made aware of negative urine and if still having symptoms needes to follow up with PCP      Signatures   Electronically signed by : Sofie Garcia, [facility]; May  1 2016  1:49PM EST                       (Author)

## 2018-01-16 NOTE — PROGRESS NOTES
Assessment    1  Fatigue (780 79) (R53 83)   2  Constipation (564 00) (K59 00)   3   Abdominal pain (789 00) (R10 9)    Plan  Abdominal pain    · Avoid alcohol for 2 weeks ; Status:Complete;   Done: 17VEQ4427 05:16PM   · Avoid caffeine for 2 weeks ; Status:Complete;   Done: 46AYN7914 05:16PM   · Avoid dairy products for 5 days ; Status:Complete;   Done: 39TLH3857 05:16PM   · Avoid foods that contain lactose ; Status:Complete;   Done: 04LVL4492 05:16PM   · Do not take aspirin, ibuprofen, or naproxen for 1 month ; Status:Complete;   Done: 89CJT2405  05:16PM   · Drink at least 6 glasses of clear liquids a day ; Status:Complete;   Done: 11EVR5367 05:16PM   · Eat only clear liquids for 24 hours ; Status:Complete;   Done: 52AAH5376 05:16PM   · Please call our office in 3 days to report on how you are doing ; Status:Complete;   Done:  81TMK5592 05:16PM   · Take your temperature every 12 hours or if you feel you have a fever ; Status:Complete;   Done:  28RQB3725 05:16PM   · You may slowly resume your normal level of activity once you feel better ; Status:Complete;   Done:  84RRH7657 05:16PM   · Call [phone] if: You develop a cough ; Status:Complete;   Done: 16IBJ8271 05:16PM   · Call [phone] if: You have not had a normal bowel movement in 2 days ; Status:Complete;    Done: 97YCS6752 05:16PM   · Call [phone] if: You have pain in your chest ; Status:Complete;   Done: 58XER0831 05:16PM   · Call [phone] if: You notice your skin or the whites of your eyes appear yellow ;  Status:Complete;   Done: 52NNE4643 05:16PM   · Call [phone] if: Your abdominal pain is not better in 8 hours ; Status:Complete;   Done:  47TSA7804 05:16PM   · Call [phone] if: Your bowel movements become loose or watery ; Status:Complete;   Done:  28LUW9656 05:16PM   · Call [phone] if: Your temperature is higher than 102F ; Status:Complete;   Done: 68PQF3834  05:16PM   · Call [phone] if: You are vomiting any blood or material that looks black, or like coffee grounds ;  Status:Complete;   Done: 75JAJ4145 05:16PM   · Seek Immediate Medical Attention if: The symptoms are suddenly worse ; Status:Complete;   Done:  53XVN0575 05:16PM   · Seek Immediate Medical Attention if: You become dizzy or lightheaded, especially when you stand  up after sitting for awhile ; Status:Complete;   Done: 27AAH3786 05:16PM   · Seek Immediate Medical Attention if: You see any blood in the stool ; Status:Complete;   Done:  66GRR2193 05:16PM   · Seek Immediate Medical Attention if: You start vomiting ; Status:Complete;   Done: 06FDR3654  05:16PM   · Seek Immediate Medical Attention if: Your abdomen is getting large and round without an increase in  the amount of food you are eating ; Status:Complete;   Done: 99WIJ3562 05:16PM   · Seek Immediate Medical Attention if: Your abdominal pain is worse ; Status:Complete;   Done:  59GZT6214 05:16PM  Constipation    · Avoid frequent use of laxatives ; Status:Complete;   Done: 42DBC7032   · Drink plenty of fluids ; Status:Complete;   Done: 10WBQ4854   · Start eating more fiber ; Status:Complete;   Done: 81EHD9166   · Call [phone] if: The symptoms come back after a period of time of being normal ;  Status:Complete;   Done: 97JSI3030   · Call [phone] if: The symptoms come back after the medications are finished ;  Status:Complete;   Done: 31DVN7868   · Call [phone] if: You have not had a normal bowel movement in 2 days ; Status:Complete;    Done: 31ILZ5645   · Call [phone] if: You have pain around the rectum ; Status:Complete;   Done: 05QNT8590   · Call [phone] if: You have pain in your abdomen ; Status:Complete;   Done: 29OZU3882   · Call [phone] if: You see any blood in the stool ; Status:Complete;   Done: 44WMI5461   · Call [phone] if: Your constipation is getting worse ; Status:Complete;   Done: 60WHY0991  Fatigue    · (1) TSH WITH FT4 REFLEX; Status:Active; Requested for:04Apr2016;     Discussion/Summary  Discussion Summary:   As for fatigue - checking TSH  As for abd pain and constipation: Reviewed labs from ED visit - no CT sent with records, pt was told was negative  Reviewed labs and CT ordered by Rosamaria Nagy PA-C  Pt has not had a normal BM in 2 weeks  c/o lower abd pain, low back pain and rectal pain  Has tried OTC fleet enema and started senna last night with minimal relief  recommend trial of mirlax and/or mag citrate and to continue senna (pt states that he has mirlax at home and does not need a rx)  Discussed keeping well hydrated  Pt is not and has not been taking any narcotics to cause constipation  Pt states that he has been dx with chrons previously and has a f/u appt with GI  tomorrow  Counseling Documentation With Imm: The patient was counseled regarding diagnostic results, instructions for management, risk factor reductions, prognosis, patient and family education, impressions, risks and benefits of treatment options, importance of compliance with treatment  Medication SE Review and Pt Understands Tx: Possible side effects of new medications were reviewed with the patient/guardian today  The treatment plan was reviewed with the patient/guardian  The patient/guardian understands and agrees with the treatment plan      Chief Complaint  Chief Complaint Free Text Note Form: Pt is here for a follow-up appt after being in the ER @ [facility] 4/2/2016 for muscle aches, chest pain, dyspnea, nausea, abdominal pain, back pain & weakness  Pt states that he is still having pain and pressure in his abdomen/stomach and back, dry mouth, constant tiredness, weakness  Chief Complaint Chronic Condition [facility]: The patient is here for an emergency room follow-up  History of Present Illness  HPI: pt c/o lower abd pain, low back pain since saturday - constant pressure  c/o increased gas  took senna last night - small amount   States hasn't had normal bowel movement in approx 2 weeks - has small hard stools and small amount of loose stool  prior was having bowel movements daily  Denies n/v  Denies fever/chills  Fatigue:   [place] presents with complaints of gradual onset of constant episodes of mild fatigue  Episodes started 3 days ago  Symptoms are not made worse by exertion, walking and running  Symptoms are worsening Risk Factors: no poor sleep habits and no obesity (c/o fatigue for the past few days, progressively worse in the past few days)   Associated symptoms include abdominal pain and change in bowel habits, but no weakness, no poor sleep, no myalgias, no arthralgias, no sore throat, no irritability, no nausea, no anxiety, no depression, no dyspnea, no irregular heartbeat and no chest pain  Abdominal Pain (Follow-Up): The patient is being seen for follow-up of abdominal pain  The patient reports no change in the condition  Comorbid Illnesses: chron's and celiac  Interval symptoms:  new onset of abdominal pain, denies abdominal distention, denies nausea, denies vomiting, new onset of diarrhea and new onset of constipation  Associated symptoms: back pain, but no hematemesis, no melena, no hematochezia, no fever, no hematuria and no weight loss  Review of Systems  Complete-Male:   Constitutional: feeling poorly and feeling tired, but no fever and no chills  ENT: no earache and no nasal discharge  Cardiovascular: the heart rate was not slow, no chest pain, the heart rate was not fast, no palpitations and no extremity edema  Respiratory: no shortness of breath, no cough and no wheezing  Gastrointestinal: abdominal pain, constipation and diarrhea, but as noted in HPI and no nausea  Active Problems    1  Acid reflux (530 81) (K21 9)   2  Anxiety (300 00) (F41 9)   3  Benign essential HTN (401 1) (I10)   4  Celiac disease (579 0) (K90 0)   5  Crohn's disease (555 9) (K50 90)   6   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 2) (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 8)   7  IBD (inflammatory bowel disease) (558 9) (K63 89)   8  Increased blood pressure (not hypertension) (796 2) (R03 0)   9  Right lower quadrant abdominal pain (789 03) (R10 31)   10  Vitamin B12 deficiency (266 2) (E53 8)   11  Vitamin D deficiency (268 9) (E55 9)    Past Medical History    1  History of Abnormal Liver Function Test (790 6)   2  History of Acute colitis (558 9) (K52 9)   3  History of Allergic rhinitis due to pollen (477 0) (J30 1)   4  History of Anxiety (300 00) (F41 9)   5  Denied: History of Carrier Of STD   6  History of Dependence On Nicotine In Cigarettes (305 1)   7  History of External Hemorrhoids (455 3)   8  History of abnormal weight loss (V13 89) (Z87 898)   9  History of acute sinusitis (V12 69) (Z87 09)   10  History of asthma (V12 69) (Z87 09)   11  History of chronic sinusitis (V12 69) (Z87 09)   12  History of Crohn's disease (V12 70) (Z87 19)   13  History of dizziness (V13 89) (Z87 898)   14  History of esophageal reflux (V12 79) (Z87 19)   15  History of fatigue (V13 89) (Z87 898)   16  History of headache (V13 89) (Z87 898)   17  Denied: History of Mental Status Change   18  History of Pancreatic Neoplasm (239 0)   19  History of Viral URI with cough (465 9) (J06 9,B97 89)  Active Problems And Past Medical History Reviewed: The active problems and past medical history were reviewed and updated today  Surgical History    1  History of Cholecystectomy   2  History of Frontal Sinusectomy   3  History of Tonsillectomy With Adenoidectomy  Surgical History Reviewed: The surgical history was reviewed and updated today  Family History    1  Family history of Anxiety   2  Family history of diabetes mellitus (V18 0) (Z83 3)   3  Family history of fibromyalgia (V17 89) (Z82 69)   4  Family history of thyroid disease (V18 19) (Z83 49)    5  Family history of Hypertension (V17 49)    6  Family history of hypertension (V17 49) (Z82 49)    7   Family history of diabetes mellitus (V18 0) (Z83 3)  Family History Reviewed: The family history was reviewed and updated today  Social History    · Being A Social Drinker   · Current Every Day Smoker (305 1)   · Marital History - Single   · Occupation:   · 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  Social History Reviewed: The social history was reviewed and updated today  The social history was reviewed and is unchanged  Current Meds   1  LORazepam 0 5 MG Oral Tablet; TAKE 1 TABLET DAILY AS NEEDED; Last Rx:22Mar2016 Ordered   2  Omeprazole 20 MG Oral Capsule Delayed Release; one prn; Last Rx:82Ukd9563 Ordered  Medication List Reviewed: The medication list was reviewed and updated today  Allergies    1  Advil CAPS   2  Aspirin Buffered TABS   3  Codeine Derivatives    4  Apples   5  Other   6  Peanuts   7  Seasonal    Vitals  Vital Signs [Data Includes: Current Encounter]    Recorded: 04Apr2016 04:18PM   Temperature 98 4 F, Oral   Heart Rate 86   Systolic 890, LUE, Sitting   Diastolic 86, LUE, Sitting   BP CUFF SIZE Large   Height 5 ft 9 in   Weight 170 lb 8 oz   BMI Calculated 25 18   BSA Calculated 1 93   O2 Saturation 98, RA     Physical Exam    Constitutional   General appearance: No acute distress, well appearing and well nourished  pleasant, cooperative  Eyes   Conjunctiva and lids: No swelling, erythema, or discharge  Pupils and irises: Equal, round and reactive to light  Ears, Nose, Mouth, and Throat   External inspection of ears and nose: Normal     Otoscopic examination: Tympanic membrance translucent with normal light reflex  Canals patent without erythema  Nasal mucosa, septum, and turbinates: Normal without edema or erythema  Oropharynx: Normal with no erythema, edema, exudate or lesions  MMM  Pulmonary   Respiratory effort: No increased work of breathing or signs of respiratory distress      Auscultation of lungs: Clear to auscultation, equal breath sounds bilaterally, no wheezes, no rales, no rhonci  no rhonchi or wheezing  Cardiovascular   Auscultation of heart: Normal rate and rhythm, normal S1 and S2, without murmurs  no pedal edema  Abdomen soft, + BS x 4 quad, non distended, slight tenderness lower abd with deep palation  no CVA tenderness  Liver and spleen: No hepatomegaly or splenomegaly  Lymphatic   Palpation of lymph nodes in neck: No lymphadenopathy  Musculoskeletal   Gait and station: Normal     Digits and nails: Normal without clubbing or cyanosis  Skin   Skin and subcutaneous tissue: Normal without rashes or lesions  Psychiatric   Orientation to person, place and time: Normal     Mood and affect: Normal          Future Appointments    Date/Time Provider Specialty Site   04/22/2016 04:30 PM Joie Mae, [facility] PRIMARY CARE     Signatures   Electronically signed by : [employer], [address] [place] [address]; Apr 4 2016  5:17PM EST                       (Author)    Electronically signed by : Elsa Alicea DO;  Apr 5 2016  2:56PM EST

## 2018-01-16 NOTE — RESULT NOTES
Verified Results  (1) CALPROTECTIN, FECAL 76PHE1623 08:51AM [facility] Order Number: [id number]     Test Name Result Flag Reference   CALPROTECTIN, FECAL <16 ug/g  0 - 120   Concentration     Interpretation   Follow-Up  <16 - 50 ug/g     Normal           None  >50 -120 ug/g     Borderline       Re-evaluate in 4-6 weeks      >120 ug/g     Abnormal         Repeat as clinically                                      indicated    Performed at:  [address]  [id number]  : Tiffanie Andersen MD, Phone:  [phone]

## 2018-01-16 NOTE — MISCELLANEOUS
Message  Return to work or school:   Shakira Bello is under my professional care   He was seen in my office on 01/26/2016   He is able to return to work on  01/27/2016            Signatures   Electronically signed by : NAIF Crow; Jan 26 2016 11:16AM EST                       (Author)

## 2018-01-17 NOTE — RESULT NOTES
Verified Results  (1) COMPREHENSIVE METABOLIC PANEL 51VWG4573 86:14LU Darren Marion    Order Number: [id number]  TW Order Number: [id number] Order Number: [id number]     Test Name Result Flag Reference   GLUCOSE,RANDM 90 mg/dL     If the patient is fasting, the ADA then defines impaired fasting glucose as > 100 mg/dL and diabetes as > or equal to 123 mg/dL  SODIUM 137 mmol/L  136-145   POTASSIUM 4 4 mmol/L  3 5-5 3   CHLORIDE 104 mmol/L  100-108   CARBON DIOXIDE 29 mmol/L  21-32   ANION GAP (CALC) 4 mmol/L  4-13   BLOOD UREA NITROGEN 16 mg/dL  5-25   CREATININE 1 13 mg/dL  0 60-1 30   Standardized to IDMS reference method   CALCIUM 9 1 mg/dL  8 3-10 1   BILI, TOTAL 0 78 mg/dL  0 20-1 00   ALK PHOSPHATAS 72 U/L     ALT (SGPT) 29 U/L  12-78   AST(SGOT) 16 U/L  5-45   ALBUMIN 4 1 g/dL  3 5-5 0   TOTAL PROTEIN 7 0 g/dL  6 4-8 2   eGFR Non-African American      >60 0 ml/min/1 73sq [facility] Disease Education Program recommendations are as follows:  GFR calculation is accurate only with a steady state creatinine  Chronic Kidney disease less than 60 ml/min/1 73 sq  meters  Kidney failure less than 15 ml/min/1 73 sq  meters       (1) LIPASE 47NQU0634 10:35AM Eddye Negin Order Number: [id number]  TW Order Number: [id number] Order Number: [id number]     Test Name Result Flag Reference   LIPASE 164 u/L

## 2018-01-18 NOTE — RESULT NOTES
Verified Results  (1) TISSUE EXAM 28Apr2016 12:35PM Florinda Yusuf     Test Name Result Flag Reference   LAB AP CASE REPORT (Report)     Surgical Pathology Report             Case: [id number]                   Authorizing Provider: Ana Amaral MD      Collected:      04/28/2016 1235        Ordering Location:   [facility]    Received:      04/29/2016 [address] [place] [address] Endoscopy                            Pathologist:      Mike Plunkett MD                           Specimens:  A) - Duodenum, cold bx, duodenum, celiac                                B) - Stomach, cold bx, gastrum, r/o h pylori                              C) - Ileum, ileostomy stoma, cold bx, terminal ileum                          D) - Colon, cold bx, random colon, r/o crohns and microcolitis   LAB AP FINAL DIAGNOSIS (Report)     A  Duodenum, biopsy:    - Duodenal mucosa without significant histopathologic abnormalities  - Normal villous architecture is present; no active or chronic   enteritis  - Negative for granulomata, infectious organisms, dysplasia and   malignancy  B  Stomach, biopsy:    - Inactive mild chronic oxyntoantral gastritis  - No H  pylori organisms are identified on H&E and immunohistochemical   stains     - Negative for intestinal metaplasia, dysplasia and malignancy  Comment: Appropriate results obtained with controls for ancillary H    pylori staining  C  Terminal ileum, biopsy:    - Small bowel mucosa with focal prominent lymphoid aggregates  - Normal villous architecture is present; no active or chronic   enteritis  - Negative for granulomata, infectious organisms, dysplasia and   malignancy  D  Random colon, biopsies:    - Colonic mucosa without significant histopathologic abnormalities  - Negative for chronic colitis, active colitis and microscopic colitis  - Negative for dysplasia and malignancy     LAB AP NOTE      Interpretation performed at [facility], [address] [facility] [address]   LAB AP SURGICAL ADDITIONAL INFORMATION (Report)     These tests were developed and their performance characteristics   determined by [facility] or [facility]  They may not be cleared or approved by the [place]  Food and   Drug Administration  The FDA has determined that such clearance or   approval is not necessary  These tests are used for clinical purposes  They should not be regarded as investigational or for research  This   laboratory has been approved by CLIA 88, designated as a high-complexity   laboratory and is qualified to perform these tests  LAB AP GROSS DESCRIPTION (Report)     A  The specimen is received in formalin, labeled with the patient's name   and hospital number, and is designated duodenum biopsy  The specimen   consists of 2 tan-pink soft tissue fragments measuring 0 3 centimeters and   0 4 centimeters in greatest dimension  Entirely submitted  One cassette  B  The specimen is received in formalin, labeled with the patient's name   and hospital number, and is designated gastric biopsy  The specimen   consists of 2 tan-pink soft tissue fragments each measuring 0 3   centimeters in greatest dimension  Entirely submitted  One cassette  C  The specimen is received in formalin, labeled with the patient's name   and hospital number, and is designated terminal ileum biopsy  The   specimen consists of 4 tan-pink soft tissue fragments measuring 0 3   centimeters, 0 3 centimeters, 0 4 centimeters, and 0 4 centimeters in   greatest dimension  Entirely submitted  One cassette  D  The specimen is received in formalin, labeled with the patient's name   and hospital number, and is designated random colon biopsy  The   specimen consists of 2 tan-pink soft tissue fragments measuring 0 3   centimeters and 0 5 centimeters in greatest dimension  Entirely submitted  One cassette      Note: The estimated total formalin fixation time based upon information   provided by the submitting clinician and the standard processing schedule   is 39 5hours    MAS   LAB AP CLINICAL INFORMATION celiac     celiac

## 2018-01-22 VITALS
TEMPERATURE: 98.8 F | HEART RATE: 82 BPM | WEIGHT: 161.25 LBS | OXYGEN SATURATION: 97 % | DIASTOLIC BLOOD PRESSURE: 100 MMHG | HEIGHT: 68 IN | SYSTOLIC BLOOD PRESSURE: 130 MMHG | BODY MASS INDEX: 24.44 KG/M2

## 2018-01-23 NOTE — MISCELLANEOUS
Message  GI Reminder Recall [facility]:   Date: 12/28/2017   Dear Shakira Grande:     Review of our records shows you are due for the following: Follow Up Visit  Please call the following office to schedule your appointment:   [address], [address], [place], [place], [address] [facility] [phone]  We look forward to hearing from you! Sincerely,     [facility]      Signatures   Electronically signed by :  Geeta Puentes, ; Dec 28 2017  4:46PM EST                       (Author)

## 2018-01-24 VITALS
SYSTOLIC BLOOD PRESSURE: 124 MMHG | OXYGEN SATURATION: 98 % | DIASTOLIC BLOOD PRESSURE: 80 MMHG | HEART RATE: 70 BPM | BODY MASS INDEX: 24.31 KG/M2 | TEMPERATURE: 97.6 F | HEIGHT: 69 IN | WEIGHT: 164.13 LBS

## 2018-02-28 DIAGNOSIS — F41.9 ANXIETY: Primary | ICD-10-CM

## 2018-02-28 RX ORDER — CLONAZEPAM 0.5 MG/1
1 TABLET ORAL EVERY 12 HOURS PRN
COMMUNITY
Start: 2017-09-25 | End: 2018-02-28 | Stop reason: SDUPTHER

## 2018-02-28 RX ORDER — CLONAZEPAM 0.5 MG/1
0.5 TABLET ORAL EVERY 12 HOURS PRN
Qty: 30 TABLET | Refills: 0 | Status: SHIPPED | OUTPATIENT
Start: 2018-02-28 | End: 2018-04-04 | Stop reason: SDUPTHER

## 2018-04-04 ENCOUNTER — OFFICE VISIT (OUTPATIENT)
Dept: INTERNAL MEDICINE CLINIC | Facility: CLINIC | Age: 38
End: 2018-04-04
Payer: COMMERCIAL

## 2018-04-04 VITALS
OXYGEN SATURATION: 98 % | BODY MASS INDEX: 24.88 KG/M2 | HEART RATE: 59 BPM | HEIGHT: 69 IN | DIASTOLIC BLOOD PRESSURE: 82 MMHG | TEMPERATURE: 98.4 F | SYSTOLIC BLOOD PRESSURE: 120 MMHG | WEIGHT: 168 LBS | RESPIRATION RATE: 20 BRPM

## 2018-04-04 DIAGNOSIS — J30.1 SEASONAL ALLERGIC RHINITIS DUE TO POLLEN, UNSPECIFIED CHRONICITY: Primary | ICD-10-CM

## 2018-04-04 DIAGNOSIS — J30.2 CHRONIC SEASONAL ALLERGIC RHINITIS, UNSPECIFIED TRIGGER: Primary | ICD-10-CM

## 2018-04-04 DIAGNOSIS — F41.9 ANXIETY: ICD-10-CM

## 2018-04-04 PROBLEM — J34.1 RETENTION CYST OF NASAL SINUS: Status: ACTIVE | Noted: 2017-03-20

## 2018-04-04 PROBLEM — J30.9 ALLERGIC RHINITIS: Status: ACTIVE | Noted: 2018-04-04

## 2018-04-04 PROBLEM — J34.2 DEVIATED SEPTUM: Status: ACTIVE | Noted: 2017-03-20

## 2018-04-04 PROBLEM — J30.89 ENVIRONMENTAL AND SEASONAL ALLERGIES: Status: ACTIVE | Noted: 2017-09-14

## 2018-04-04 PROCEDURE — 99214 OFFICE O/P EST MOD 30 MIN: CPT | Performed by: INTERNAL MEDICINE

## 2018-04-04 RX ORDER — FLUTICASONE PROPIONATE 50 MCG
2 SPRAY, SUSPENSION (ML) NASAL AS NEEDED
COMMUNITY
End: 2018-11-11

## 2018-04-04 RX ORDER — CETIRIZINE HYDROCHLORIDE 10 MG/1
10 TABLET ORAL DAILY PRN
Qty: 30 TABLET | Refills: 5 | Status: SHIPPED | OUTPATIENT
Start: 2018-04-04

## 2018-04-04 RX ORDER — DIPHENHYDRAMINE HCL 25 MG
25 TABLET ORAL AS NEEDED
COMMUNITY
End: 2021-12-29

## 2018-04-04 RX ORDER — CLONAZEPAM 0.5 MG/1
0.5 TABLET ORAL EVERY 12 HOURS PRN
Qty: 30 TABLET | Refills: 2 | Status: SHIPPED | OUTPATIENT
Start: 2018-04-04 | End: 2018-07-10 | Stop reason: SDUPTHER

## 2018-04-04 RX ORDER — CETIRIZINE HYDROCHLORIDE 10 MG/1
10 TABLET ORAL DAILY PRN
Qty: 30 TABLET | Refills: 5 | Status: SHIPPED | OUTPATIENT
Start: 2018-04-04 | End: 2018-06-06

## 2018-04-05 NOTE — PROGRESS NOTES
Assessment/Plan:    Anxiety  Under fair control  Continue meds prn    Environmental and seasonal allergies  In addition to present meds recommend referral to allergist for maintenance injections    IBD (inflammatory bowel disease)  Wt has been steady, continue present rx plan       Diagnoses and all orders for this visit:    Chronic seasonal allergic rhinitis, unspecified trigger  -     cetirizine (ZyrTEC) 10 mg tablet; Take 1 tablet (10 mg total) by mouth daily as needed for allergies    Anxiety  -     clonazePAM (KlonoPIN) 0 5 mg tablet; Take 1 tablet (0 5 mg total) by mouth every 12 (twelve) hours as needed for anxiety    Other orders  -     diphenhydrAMINE (BENADRYL) 25 mg tablet; Take by mouth Twice daily  -     fluticasone (FLONASE) 50 mcg/act nasal spray; 2 sprays into each nostril          Subjective:      Patient ID: Zach Prieto is a 40 y o  male  Allergic Rhinitis  Patient presents for evaluation of allergic symptoms  Symptoms include clear rhinorrhea, headaches, nasal congestion, postnasal drip and sinus pressure and are present in a seasonal pattern     Treatment in the past has included nasal saline, intranasal steroids: , oral decongestants: , oral antihistamines: , beta-agonist inhalers:  , inhaled steroids:   Treatment currently includes nasal saline, intranasal steroids:  and are somewhat effective  Inflammatory Bowel Disease  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  Diagnosis was made by colonoscopy  Onset was a few months ago    The patient has had rx with Linzess and Bentyl for treatment of their IBD  The patient is currently having variable bowel movements per day which are mucous-laden  The patient currently denies significant abdominal pain or discomfort  The patient does not have fever  The patient has not weight loss  The patient does not have extraintestinal symptoms     effective in the patient's opinion                The following portions of the patient's history were reviewed and updated as appropriate: past family history, past medical history, past social history, past surgical history and problem list     Review of Systems   Constitutional: Positive for fatigue  HENT: Positive for postnasal drip, rhinorrhea, sinus pain, sinus pressure and sore throat  Respiratory: Positive for chest tightness and wheezing  Gastrointestinal: Positive for constipation, diarrhea and nausea  Negative for blood in stool  Musculoskeletal: Positive for back pain and neck pain  Allergic/Immunologic: Positive for environmental allergies  Neurological: Positive for headaches  All other systems reviewed and are negative              Objective:      /82 (BP Location: Left arm, Patient Position: Sitting, Cuff Size: Adult)   Pulse 59   Temp 98 4 °F (36 9 °C) (Oral)   Resp 20   Ht 5' 9" (1 753 m)   Wt 76 2 kg (168 lb)   SpO2 98%   BMI 24 81 kg/m²          Physical Exam      /82 (BP Location: Left arm, Patient Position: Sitting, Cuff Size: Adult)   Pulse 59   Temp 98 4 °F (36 9 °C) (Oral)   Resp 20   Ht 5' 9" (1 753 m)   Wt 76 2 kg (168 lb)   SpO2 98%   BMI 24 81 kg/m²     General Appearance:    Alert, cooperative, no distress, appears stated age   Head:    Normocephalic, without obvious abnormality, atraumatic   Eyes:    PERRL, conjunctiva/corneas clear, EOM's intact, fundi     benign, both eyes        Ears:    Normal TM's, clear serous fluid   Nose:   Nares normal, septum midline, mucosa boggy, clear drainage mild sinus tenderness   Throat:   Lips, mucosa, and tongue normal; teeth and gums normal   Neck:   Supple, symmetrical, trachea midline, no adenopathy;        thyroid:  No enlargement/tenderness/nodules; no carotid    bruit or JVD   Back:     Symmetric, no curvature, ROM normal, no CVA tenderness   Lungs:     Clear to auscultation bilaterally, respirations unlabored   Chest wall:    No tenderness or deformity   Heart:    Regular rate and rhythm, S1 and S2 normal, no murmur, rub    or gallop   Abdomen:     Soft, non-tender, bowel sounds active all four quadrants,     no masses, no organomegaly           Extremities:   Extremities normal, atraumatic, no cyanosis or edema   Pulses:   2+ and symmetric all extremities   Skin:   Skin color, texture, turgor normal, no rashes or lesions   Lymph nodes:   Cervical, supraclavicular, and axillary nodes normal   Neurologic:   CNII-XII intact   Normal strength, sensation and reflexes       throughout

## 2018-06-06 ENCOUNTER — LAB (OUTPATIENT)
Dept: LAB | Facility: MEDICAL CENTER | Age: 38
End: 2018-06-06
Attending: INTERNAL MEDICINE
Payer: COMMERCIAL

## 2018-06-06 ENCOUNTER — OFFICE VISIT (OUTPATIENT)
Dept: GASTROENTEROLOGY | Facility: MEDICAL CENTER | Age: 38
End: 2018-06-06
Payer: COMMERCIAL

## 2018-06-06 VITALS
TEMPERATURE: 99.6 F | WEIGHT: 165 LBS | BODY MASS INDEX: 24.44 KG/M2 | DIASTOLIC BLOOD PRESSURE: 70 MMHG | HEIGHT: 69 IN | HEART RATE: 86 BPM | SYSTOLIC BLOOD PRESSURE: 122 MMHG

## 2018-06-06 DIAGNOSIS — K50.00 CROHN'S DISEASE INVOLVING TERMINAL ILEUM (HCC): Primary | ICD-10-CM

## 2018-06-06 DIAGNOSIS — R19.8 ALTERNATING CONSTIPATION AND DIARRHEA: ICD-10-CM

## 2018-06-06 DIAGNOSIS — K50.00 CROHN'S DISEASE INVOLVING TERMINAL ILEUM (HCC): ICD-10-CM

## 2018-06-06 LAB
CRP SERPL QL: <3 MG/L
ERYTHROCYTE [SEDIMENTATION RATE] IN BLOOD: 3 MM/HOUR (ref 0–10)

## 2018-06-06 PROCEDURE — 36415 COLL VENOUS BLD VENIPUNCTURE: CPT

## 2018-06-06 PROCEDURE — 85652 RBC SED RATE AUTOMATED: CPT

## 2018-06-06 PROCEDURE — 99214 OFFICE O/P EST MOD 30 MIN: CPT | Performed by: INTERNAL MEDICINE

## 2018-06-06 PROCEDURE — 86140 C-REACTIVE PROTEIN: CPT

## 2018-06-06 RX ORDER — ACETAMINOPHEN 500 MG
1000 TABLET ORAL EVERY 4 HOURS PRN
COMMUNITY

## 2018-06-06 NOTE — LETTER
June 6, 2018     Arnold West DO  [address] [place] [address]    Patient: Radha Lott   YOB: 1980   Date of Visit: 6/6/2018       Dear Dr Lanza Pain: Thank you for referring Sam Eng to me for evaluation  Below are my notes for this consultation  If you have questions, please do not hesitate to call me  I look forward to following your patient along with you  Sincerely,        Mishel Ayala MD        CC: No Recipients  Mishel Ayala MD  6/6/2018  1:45 PM  Sign at close encounter  [facility] - Outpatient Follow-up Note  Radha Lott 41 y o  male MRN: [id number]  Encounter: [id number]          ASSESSMENT AND PLAN:      1  Crohn's disease involving terminal ileum ([address] )  2  Alternating constipation and diarrhea  His symptoms are most likely due to irritable bowel syndrome with alternating diarrhea and constipation, but he may have mild Crohn's disease involving the terminal ileum contributing to his symptoms as well  I will check his C-reactive protein, sedimentation rate, fecal calprotectin, and fecal occult blood to get a better sense if he has active inflammation or if his symptoms are only due to irritable bowel syndrome  I will have him follow up in the office in six weeks to discuss his results and his treatment plan further  Based on his allergies, I suspect there is a good chance he would be able to tolerate one of our biologic therapies and this would be the next step if there is evidence of significant active Crohn's disease   - C-reactive protein; Future  - Sedimentation rate, automated; Future  - Calprotectin,Fecal; Future  - Occult Bloood,Fecal Immunochemical; Future    ______________________________________________________________________    SUBJECTIVE:  He presents for follow-up of his Crohn's disease and alternating diarrhea and constipation   He has an allergy to many anti-inflammatories, so it has been difficult to find treatment for his Crohn's disease  His most recent C-reactive protein was normal and his most recent colonoscopy and capsule endoscopy revealed mild terminal ileum inflammation  He has not been having any bleeding, weight loss, or abdominal pain, but he continues to have alternating mild diarrhea and constipation  He was taking dicyclomine as needed when he was having the pain and he felt it helped  REVIEW OF SYSTEMS IS OTHERWISE NEGATIVE  Historical Information   Past Medical History:   Diagnosis Date    Abdominal pain     Abnormal liver function test     last assessed: Oct 16, 2013     Abnormal weight loss     last assessed: Yung 15, 2014     Allergic rhinitis due to pollen     last assessed: Yung 15, 2014     Anxiety     Anxiety     last assessed/resolved:  Aug 5, 2015     Asthma     last assessed: Yung 15, 2014     Benign essential HTN     last assessed/resolved: Feb 23, 2017     Celiac disease     Cervical lymphadenopathy     last assessed/resolved: Feb 23, 2017     Chronic sinusitis     last assessed: Yung 15, 2014     Constipation     [address]'s [address] ([facility] [address]     Crohn's disease [facility])     last assessed: Yung 15, 2014     Dysuria     last assessed: April 29, 2016     Elevated BP without diagnosis of hypertension     last assessed/resolved: Feb 23, 2017     Esophageal reflux     last assessed/resolved: Feb 23, 2017     Eustachian tube anomaly     Resolved: Nov 9, 2017     External hemorrhoids     last assessed: Yung 15, 2014     Hypertension     borderline    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     last assessed/resolved: July 19, 2017     Pancreatic neoplasm     last assessed: Yung 15, 2014     Positive depression screening     resolved: July 24, 2017     Psoriasis     Seasonal allergies     Vitamin B12 deficiency     last assessed/resolved: Feb 23, 2017      Past Surgical History:   Procedure Laterality Date    CHOLECYSTECTOMY      resolved: 2011     COLONOSCOPY N/A 11/18/2016    Procedure: COLONOSCOPY;  Surgeon: Maia Kellogg MD;  Location:  GI LAB; Service:     COLONOSCOPY N/A 4/28/2016    Procedure: COLONOSCOPY;  Surgeon: Maia Kellogg MD;  Location: [facility] GI LAB; Service:     ESOPHAGOGASTRODUODENOSCOPY N/A 4/28/2016    Procedure: ESOPHAGOGASTRODUODENOSCOPY (EGD); Surgeon: Maia Kellogg MD;  Location: [facility] GI LAB;   Service:    [facility]      resolved: April 2009     PANCREAS SURGERY      [place] REPAIR OF NASAL SEPTUM N/A 7/28/2017    Procedure: REVISION SEPTOPLASTY; TURBINOPLASTY; BILATERAL  GRAFTS; ALAR GRAFT; POSSIBLE AURICULAR CARTILAGE GRAFT;  Surgeon: Ranulfo Jain MD;  Location:  MAIN OR;  Service: ENT    TONSILLECTOMY AND ADENOIDECTOMY       Social History   History   Alcohol Use No     Comment: Carson Evans consumes alcohol noted in "allscripts"      History   Drug Use No     Comment: former marijuana use noted in "allscripts"      History   Smoking Status    Current Every Day Smoker    Packs/day: 0 50    Types: Cigarettes   Smokeless Tobacco    Never Used     Comment: Dependence on nicotine in cigarettes - 1/2 PPD x 20 years      Family History   Problem Relation Age of Onset    Diabetes Mother      mellitus     Hypertension Mother    [facility] Anxiety disorder Mother     Thyroid disease Mother     Fibromyalgia Mother     Diabetes Father     Hypertension Father     Hypertension Sister     No Known Problems Brother     No Known Problems Maternal Grandmother     No Known Problems Maternal Grandfather     No Known Problems Paternal Grandmother     No Known Problems Paternal Grandfather     Diabetes Other      mellitus     Rheum arthritis Cousin        Meds/Allergies       Current Outpatient Prescriptions:     acetaminophen (TYLENOL) 100 mg/mL solution    cetirizine (ZyrTEC) 10 mg tablet    clonazePAM (KlonoPIN) 0 5 mg tablet    dicyclomine (BENTYL) 20 mg tablet    diphenhydrAMINE (BENADRYL) 25 mg tablet    fluticasone (FLONASE) 50 mcg/act nasal spray    Linaclotide (LINZESS) 145 MCG CAPS    Allergies   Allergen Reactions    Advil [Ibuprofen] Anaphylaxis and Hives     Category: Allergy;     Apple Anaphylaxis    Aspirin Anaphylaxis and Hives     Category: Allergy;     Eggs Or Egg-Derived Products Anaphylaxis     Category: Allergy;     Nsaids Anaphylaxis     Category: Allergy;     Other Anaphylaxis     Category: Allergy; Annotation - [id number]: cherries, grapes , and kiwis    Peanuts [Peanut Oil] Anaphylaxis    Pollen Extract     Codeine Anxiety           Objective     Blood pressure 122/70, pulse 86, temperature 99 6 °F (37 6 °C), temperature source Tympanic, height 5' 9" (1 753 m), weight 74 8 kg (165 lb)  Body mass index is 24 37 kg/m²  PHYSICAL EXAM:      General Appearance:   Alert, cooperative, no distress   HEENT:   Normocephalic, atraumatic, anicteric      Neck:  Supple, symmetrical, trachea midline   Lungs:   Clear to auscultation bilaterally; no rales, rhonchi or wheezing; respirations unlabored    Heart[de-identified]   Regular rate and rhythm; no murmur, rub, or gallop  Abdomen:   Soft, mild RLQ tenderness, non-distended; normal bowel sounds; no masses, no organomegaly    Genitalia:   Deferred    Rectal:   Deferred    Extremities:  No cyanosis, clubbing or edema    Pulses:  2+ and symmetric    Skin:  No jaundice, rashes, or lesions    Lymph nodes:  No palpable cervical lymphadenopathy        Lab Results:   No visits with results within 1 Day(s) from this visit  Latest known visit with results is:   Appointment on 09/27/2017   Component Date Value    Metaneph, Total, 24H Ur 09/27/2017 145     Metanephrines, Ur 09/27/2017 58     Normetanephrine, Ur 09/27/2017 84     Normetanephrine, 24H Ur 09/27/2017 210          Radiology Results:   No results found

## 2018-06-06 NOTE — PROGRESS NOTES
[facility] - Outpatient Follow-up Note  Oliva Siu 41 y o  male MRN: [id number]  Encounter: [id number]          ASSESSMENT AND PLAN:      1  Crohn's disease involving terminal ileum ([address] )  2  Alternating constipation and diarrhea  His symptoms are most likely due to irritable bowel syndrome with alternating diarrhea and constipation, but he may have mild Crohn's disease involving the terminal ileum contributing to his symptoms as well  I will check his C-reactive protein, sedimentation rate, fecal calprotectin, and fecal occult blood to get a better sense if he has active inflammation or if his symptoms are only due to irritable bowel syndrome  I will have him follow up in the office in six weeks to discuss his results and his treatment plan further  Based on his allergies, I suspect there is a good chance he would be able to tolerate one of our biologic therapies and this would be the next step if there is evidence of significant active Crohn's disease   - C-reactive protein; Future  - Sedimentation rate, automated; Future  - Calprotectin,Fecal; Future  - Occult Bloood,Fecal Immunochemical; Future    ______________________________________________________________________    SUBJECTIVE:  He presents for follow-up of his Crohn's disease and alternating diarrhea and constipation  He has an allergy to many anti-inflammatories, so it has been difficult to find treatment for his Crohn's disease  His most recent C-reactive protein was normal and his most recent colonoscopy and capsule endoscopy revealed mild terminal ileum inflammation  He has not been having any bleeding, weight loss, or abdominal pain, but he continues to have alternating mild diarrhea and constipation  He was taking dicyclomine as needed when he was having the pain and he felt it helped  REVIEW OF SYSTEMS IS OTHERWISE NEGATIVE        Historical Information   Past Medical History:   Diagnosis Date    Abdominal pain     Abnormal liver function test     last assessed: Oct 16, 2013     Abnormal weight loss     last assessed: Yung 15, 2014     Allergic rhinitis due to pollen     last assessed: Yung 15, 2014     Anxiety     Anxiety     last assessed/resolved: Aug 5, 2015     Asthma     last assessed: Yung 15, 2014     Benign essential HTN     last assessed/resolved: Feb 23, 2017     Celiac disease     Cervical lymphadenopathy     last assessed/resolved: Feb 23, 2017     Chronic sinusitis     last assessed: Yung 15, 2014     Constipation     [facility]'s disease ([facility] [address] )     Crohn's disease [facility])     last assessed: Yung 15, 2014     Dysuria     last assessed: April 29, 2016     Elevated BP without diagnosis of hypertension     last assessed/resolved: Feb 23, 2017     Esophageal reflux     last assessed/resolved: Feb 23, 2017     Eustachian tube anomaly     Resolved: Nov 9, 2017     External hemorrhoids     last assessed: Yung 15, 2014     Hypertension     borderline    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     last assessed/resolved: July 19, 2017     Pancreatic neoplasm     last assessed: Yung 15, 2014     Positive depression screening     resolved: July 24, 2017     Psoriasis     Seasonal allergies     Vitamin B12 deficiency     last assessed/resolved: Feb 23, 2017      Past Surgical History:   Procedure Laterality Date    CHOLECYSTECTOMY      resolved: 2011     COLONOSCOPY N/A 11/18/2016    Procedure: COLONOSCOPY;  Surgeon: Reid Aguila MD;  Location:  GI LAB; Service:     COLONOSCOPY N/A 4/28/2016    Procedure: COLONOSCOPY;  Surgeon: Reid Aguila MD;  Location: [facility] GI LAB; Service:     ESOPHAGOGASTRODUODENOSCOPY N/A 4/28/2016    Procedure: ESOPHAGOGASTRODUODENOSCOPY (EGD); Surgeon: Reid Aguila MD;  Location: [facility] GI LAB;   Service:     FRONTAL SINUSOTOMY      resolved: April 2009     PANCREAS SURGERY      [place] REPAIR OF NASAL SEPTUM N/A 7/28/2017    Procedure: REVISION SEPTOPLASTY; TURBINOPLASTY; BILATERAL  GRAFTS; ALAR GRAFT; POSSIBLE AURICULAR CARTILAGE GRAFT;  Surgeon: Nguyễn Santos MD;  Location: [facility] MAIN [facility];  Service: ENT    TONSILLECTOMY AND ADENOIDECTOMY       Social History   History   Alcohol Use No     Comment: Kimber Rivera consumes alcohol noted in "allscripts"      History   Drug Use No     Comment: former marijuana use noted in "allscripts"      History   Smoking Status    Current Every Day Smoker    Packs/day: 0 50    Types: Cigarettes   Smokeless Tobacco    Never Used     Comment: Dependence on nicotine in cigarettes - 1/2 PPD x 20 years      Family History   Problem Relation Age of Onset    Diabetes Mother      mellitus     Hypertension Mother     Anxiety disorder Mother     Thyroid disease Mother     Fibromyalgia Mother     Diabetes Father     Hypertension Father     Hypertension Sister     No Known Problems Brother     No Known Problems Maternal Grandmother     No Known Problems Maternal Grandfather     No Known Problems Paternal Grandmother     No Known Problems Paternal Grandfather     Diabetes Other      mellitus     Rheum arthritis Cousin        Meds/Allergies       Current Outpatient Prescriptions:     acetaminophen (TYLENOL) 100 mg/mL solution    cetirizine (ZyrTEC) 10 mg tablet    clonazePAM (KlonoPIN) 0 5 mg tablet    dicyclomine (BENTYL) 20 mg tablet    diphenhydrAMINE (BENADRYL) 25 mg tablet    fluticasone (FLONASE) 50 mcg/act nasal spray    Linaclotide (LINZESS) 145 MCG CAPS    Allergies   Allergen Reactions    Advil [Ibuprofen] Anaphylaxis and Hives     Category: Allergy;     Apple Anaphylaxis    Aspirin Anaphylaxis and Hives     Category: Allergy;     Eggs Or Egg-Derived Products Anaphylaxis     Category: Allergy;     Nsaids Anaphylaxis     Category: Allergy;     Other Anaphylaxis     Category: Allergy;  Annotation - [id number]: cherries, grapes , and kiwis    Peanuts [Peanut Oil] Anaphylaxis    Pollen Extract     Codeine Anxiety Objective     Blood pressure 122/70, pulse 86, temperature 99 6 °F (37 6 °C), temperature source Tympanic, height 5' 9" (1 753 m), weight 74 8 kg (165 lb)  Body mass index is 24 37 kg/m²  PHYSICAL EXAM:      General Appearance:   Alert, cooperative, no distress   HEENT:   Normocephalic, atraumatic, anicteric      Neck:  Supple, symmetrical, trachea midline   Lungs:   Clear to auscultation bilaterally; no rales, rhonchi or wheezing; respirations unlabored    Heart[de-identified]   Regular rate and rhythm; no murmur, rub, or gallop  Abdomen:   Soft, mild RLQ tenderness, non-distended; normal bowel sounds; no masses, no organomegaly    Genitalia:   Deferred    Rectal:   Deferred    Extremities:  No cyanosis, clubbing or edema    Pulses:  2+ and symmetric    Skin:  No jaundice, rashes, or lesions    Lymph nodes:  No palpable cervical lymphadenopathy        Lab Results:   No visits with results within 1 Day(s) from this visit  Latest known visit with results is:   Appointment on 09/27/2017   Component Date Value    Metaneph, Total, 24H Ur 09/27/2017 145     Metanephrines, Ur 09/27/2017 58     Normetanephrine, Ur 09/27/2017 84     Normetanephrine, 24H Ur 09/27/2017 210          Radiology Results:   No results found

## 2018-06-07 NOTE — PROGRESS NOTES
My medical assistant will call him with his results  C-reactive protein and sed rate were both negative indicating no significant inflammation and symptoms are most likely due to IBS

## 2018-06-12 ENCOUNTER — LAB (OUTPATIENT)
Dept: LAB | Facility: MEDICAL CENTER | Age: 38
End: 2018-06-12
Attending: INTERNAL MEDICINE
Payer: COMMERCIAL

## 2018-06-12 ENCOUNTER — TRANSCRIBE ORDERS (OUTPATIENT)
Dept: ADMINISTRATIVE | Facility: HOSPITAL | Age: 38
End: 2018-06-12

## 2018-06-12 DIAGNOSIS — R19.8 GI SYMPTOMS: Primary | ICD-10-CM

## 2018-06-12 DIAGNOSIS — K50.00 CROHN'S DISEASE INVOLVING TERMINAL ILEUM (HCC): ICD-10-CM

## 2018-06-12 DIAGNOSIS — K50.919 CROHN'S DISEASE WITH COMPLICATION, UNSPECIFIED GASTROINTESTINAL TRACT LOCATION (HCC): ICD-10-CM

## 2018-06-12 DIAGNOSIS — R19.8 ALTERNATING CONSTIPATION AND DIARRHEA: ICD-10-CM

## 2018-06-12 PROCEDURE — 83993 ASSAY FOR CALPROTECTIN FECAL: CPT

## 2018-06-13 ENCOUNTER — OFFICE VISIT (OUTPATIENT)
Dept: INTERNAL MEDICINE CLINIC | Facility: CLINIC | Age: 38
End: 2018-06-13
Payer: COMMERCIAL

## 2018-06-13 VITALS
OXYGEN SATURATION: 96 % | HEIGHT: 69 IN | BODY MASS INDEX: 24.85 KG/M2 | DIASTOLIC BLOOD PRESSURE: 76 MMHG | HEART RATE: 68 BPM | SYSTOLIC BLOOD PRESSURE: 110 MMHG | TEMPERATURE: 98.4 F | WEIGHT: 167.8 LBS

## 2018-06-13 DIAGNOSIS — M54.2 NECK PAIN: Primary | ICD-10-CM

## 2018-06-13 PROCEDURE — 99214 OFFICE O/P EST MOD 30 MIN: CPT | Performed by: INTERNAL MEDICINE

## 2018-06-13 NOTE — ASSESSMENT & PLAN NOTE
Patient had extensive evaluations with orthopedic surgery and would like to be referred to a rheumatologist to evaluate if his positive HLA B27 could be contributing to his neck pain

## 2018-06-13 NOTE — PROGRESS NOTES
Assessment/Plan:    Neck pain   Patient had extensive evaluations with orthopedic surgery and would like to be referred to a rheumatologist to evaluate if his positive HLA B27 could be contributing to his neck pain  Diagnoses and all orders for this visit:    Neck pain  -     Ambulatory referral to Rheumatology; Future          Subjective:      Patient ID: Chacha Mcneil is a 41 y o  male  Neck Pain    This is a recurrent problem  The current episode started more than 1 month ago  The problem occurs intermittently  The problem has been unchanged  The pain is associated with lifting a heavy object and a twisting injury  The pain is present in the occipital region  The quality of the pain is described as shooting and stabbing  The pain is at a severity of 6/10  The pain is moderate  The symptoms are aggravated by bending, position and twisting  Pertinent negatives include no chest pain, numbness, pain with swallowing, paresis, tingling or weakness  He has tried acetaminophen, neck support and home exercises (evaluated by Orthopedics and advised to lift no more than 10 lbs) for the symptoms  The treatment provided no relief  Patient expressed the need to see a rheumatologist to evaluate the reason for his pain apparently the pain gets better when he is able to rest   However when he goes back to work it seems to aggravate his neck and upper back pain  He has been seen by a rheumatologist in the past because of his ongoing Crohn's disease and a complete workup which was reviewed was negative except for positive HLA B27  The following portions of the patient's history were reviewed and updated as appropriate: past family history, past medical history, past social history, past surgical history and problem list     Review of Systems   HENT: Positive for postnasal drip  Cardiovascular: Negative for chest pain     Gastrointestinal: Positive for abdominal distention, constipation, diarrhea and nausea  Endocrine: Negative  Genitourinary: Negative  Musculoskeletal: Positive for back pain and neck pain  Allergic/Immunologic: Positive for environmental allergies  Neurological: Negative for tingling, weakness and numbness  All other systems reviewed and are negative  Objective:      /76 (BP Location: Left arm, Patient Position: Sitting, Cuff Size: Standard)   Pulse 68   Temp 98 4 °F (36 9 °C) (Oral)   Ht 5' 9" (1 753 m)   Wt 76 1 kg (167 lb 12 8 oz)   SpO2 96%   BMI 24 78 kg/m²          Physical Exam      Active Problems  1  Allergic asthma (493 00) (J45 909)   2  Anxiety (300 00) (F41 9)   3  Celiac disease (579 0) (K90 0)   4  Constipation (564 00) (K59 00)   5  Crohn's disease (555 9) (K50 90)   6  Crohn's ileitis (555 0) (K50 00)   7  Deviated septum (470) (J34 2)   8  Environmental and seasonal allergies (477 8) (J30 89)   9  Fatigue (780 79) (R53 83)   10  HLA B27 (HLA B27 positive) (V84 89) (Z15 89)   11  IBD (inflammatory bowel disease) (558 9) (K52 9)   12  Irritable bowel syndrome without diarrhea (564 1) (K58 9)   13  Myofascial muscle pain (729 1) (M79 1)   14  Panic attack (300 01) (F41 0)   15  Retention cyst of nasal sinus (478 19) (J34 1)   16  Vitamin D deficiency (268 9) (E55 9)   17  Weight loss, unintentional (783 21) (R63 4)    Past Medical History  1  History of Abdominal pain of multiple sites (789 09) (R10 9)   2  History of Abdominal pain, right lower quadrant (789 03) (R10 31)   3  History of Abdominal pain, right upper quadrant (789 01) (R10 11)   4  History of Abnormal Liver Function Test (790 6)   5  History of Acute bilateral low back pain without sciatica (724 2,338 19) (M54 5)   6  History of Acute colitis (558 9) (K52 9)   7  History of ABRAHAM (acute kidney injury) (584 9) (N17 9)   8  History of Allergic rhinitis due to pollen (477 0) (J30 1)   9  History of Anxiety (300 00) (F41 9)   10  History of Benign essential HTN (401 1) (I10)    12  History of Cervical lymphadenopathy (785 6) (R59 0)   13  History of Dependence On Nicotine In Cigarettes (305 1)   14  History of Dysuria (788 1) (R30 0)   15  History of Elevated BP without diagnosis of hypertension (796 2) (R03 0)   16  History of Eustachian tube anomaly (744 3) (Q17 8)   17  History of External Hemorrhoids (455 3)   18  History of Flank pain (789 09) (R10 9)   19  History of abnormal weight loss (V13 89) (Z87 898)   20  History of acute sinusitis (V12 69) (Z87 09)   21  History of acute sinusitis (V12 69) (Z87 09)   22  History of asthma (V12 69) (Z87 09)   23  History of chronic sinusitis (V12 69) (Z87 09)   24  History of Crohn's disease (V12 70) (Z87 19)   25  History of dizziness (V13 89) (Z87 898)   26  History of dysuria (V13 00) (Z87 898)   27  History of esophageal reflux (V12 79) (Z87 19)  29  History of fatigue (V13 89) (Z87 898)   30  History of headache (V13 89) (Z87 898)   31  History of headache (V13 89) (Z87 898)   32  History of psoriasis (V13 3) (Z87 2)   33  History of urinary frequency (V13 09) (Z87 898)   34  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)   35  Denied: History of Mental Status Change  37  History of Positive depression screening (796 4) (Z13 89)   38  History of Viral URI with cough (465 9) (J06 9,B97 89)   39  History of Vitamin B12 deficiency (266 2) (E53 8)      Active Problems And Past Medical History Reviewed: The active problems and past medical history were reviewed and updated today  Surgical History  1  History of Cholecystectomy   2  History of Frontal Sinusectomy   3  History of Nasal Septal Deviation Repair   4  History of Pancreatic Surgery   5  History of Tonsillectomy With Adenoidectomy  Surgical History Reviewed: The surgical history was reviewed and updated today  Family History  Mother    1  Family history of Anxiety   2  Family history of diabetes mellitus (V18 0) (Z83 3)   3   Family history of fibromyalgia (V17 89) (Z82 69)   4  Family history of thyroid disease (V18 19) (Z83 49)  Father    5  Family history of Hypertension (V17 49)  Sister    10  Family history of hypertension (V17 49) (Z82 49)  Grandparent    7  Family history of diabetes mellitus (V18 0) (Z83 3)  Paternal Cousin    6  Family history of rheumatoid arthritis (V17 7) (Z82 61)  Family History    9  Denied: Family history of Crohn's disease   10  Denied: Family history of psoriasis   11  Denied: Family history of systemic lupus erythematosus   12  Denied: Family history of ulcerative colitis  Family History Reviewed: The family history was reviewed and updated today  Social History   · Cigarette smoker (305 1) (F17 210)   · Current Every Day Smoker (305 1)   · Marital History - Single   · Occupation:   · Rarely consumes alcohol (V49 89) (Z78 9)   · Denied: History of 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  Social History Reviewed: The social history was reviewed and updated today  The social history was reviewed and is unchanged  Physical Exam   Constitutional  General appearance: No acute distress, well appearing and well nourished  -- Seated in room in NAD  Eyes  Pupils and irises: Equal, round and reactive to light  -- Reactive  Ears, Nose, Mouth, and Throat + clear nasal drainage  Oropharynx: Normal with no erythema, edema, exudate or lesions  -- MMM  Pulmonary  Respiratory effort: No increased work of breathing or signs of respiratory distress  -- CTA throughout  Auscultation of lungs: Clear to auscultation, equal breath sounds bilaterally, no wheezes, no rales, no rhonci  Cardiovascular  Auscultation of heart: Normal rate and rhythm, normal S1 and S2, without murmurs  -- RRR  Abdomen +BS x4, mild intermittent abdominal TTP  No palpable masses  No rigidity, no rebound  Musculoskeletal  Gait and station: Normal  -- No focal deficits    Psychiatric  Mood and affect: Normal

## 2018-06-15 LAB — CALPROTECTIN STL-MCNT: 148 UG/G (ref 0–120)

## 2018-06-16 NOTE — PROGRESS NOTES
I called him with his results  His fecal calprotectin was slightly elevated and 148  We are still awaiting his fecal immunochemical test in his C-reactive protein

## 2018-06-24 ENCOUNTER — LAB (OUTPATIENT)
Dept: LAB | Facility: MEDICAL CENTER | Age: 38
End: 2018-06-24
Attending: INTERNAL MEDICINE
Payer: COMMERCIAL

## 2018-06-24 DIAGNOSIS — K50.919 CROHN'S DISEASE WITH COMPLICATION, UNSPECIFIED GASTROINTESTINAL TRACT LOCATION (HCC): ICD-10-CM

## 2018-06-24 DIAGNOSIS — R19.8 GI SYMPTOMS: ICD-10-CM

## 2018-06-24 LAB — HEMOCCULT STL QL IA: NEGATIVE

## 2018-06-24 PROCEDURE — G0328 FECAL BLOOD SCRN IMMUNOASSAY: HCPCS

## 2018-06-26 NOTE — PROGRESS NOTES
I called him with his results  Fecal calprotectin was elevated but FIT and CRP were negative  He may have mild inflammation for mild IBD but symptoms likely mainly from IBS and given his multiple allergies, I wouldn't start IBD treatment based on these labs  He will follow up with me in office for next visit within 1-2 months and he has an upcoming appt with Rheumatology

## 2018-07-01 ENCOUNTER — HOSPITAL ENCOUNTER (EMERGENCY)
Facility: HOSPITAL | Age: 38
Discharge: HOME | End: 2018-07-01
Attending: EMERGENCY MEDICINE | Admitting: EMERGENCY MEDICINE
Payer: OTHER MISCELLANEOUS

## 2018-07-01 VITALS
RESPIRATION RATE: 16 BRPM | OXYGEN SATURATION: 97 % | HEART RATE: 60 BPM | TEMPERATURE: 98 F | WEIGHT: 165 LBS | BODY MASS INDEX: 24.37 KG/M2 | DIASTOLIC BLOOD PRESSURE: 79 MMHG | SYSTOLIC BLOOD PRESSURE: 120 MMHG

## 2018-07-01 DIAGNOSIS — G89.29 CHRONIC MIDLINE THORACIC BACK PAIN: Primary | ICD-10-CM

## 2018-07-01 DIAGNOSIS — M54.6 CHRONIC MIDLINE THORACIC BACK PAIN: Primary | ICD-10-CM

## 2018-07-01 LAB
ALBUMIN SERPL BCP-MCNC: 3.9 G/DL (ref 3.5–5)
ALP SERPL-CCNC: 68 U/L (ref 46–116)
ALT SERPL W P-5'-P-CCNC: 38 U/L (ref 12–78)
ANION GAP SERPL CALCULATED.3IONS-SCNC: 8 MMOL/L (ref 4–13)
AST SERPL W P-5'-P-CCNC: 23 U/L (ref 5–45)
BILIRUB SERPL-MCNC: 0.36 MG/DL (ref 0.2–1)
BUN SERPL-MCNC: 17 MG/DL (ref 5–25)
CALCIUM SERPL-MCNC: 9.2 MG/DL (ref 8.3–10.1)
CHLORIDE SERPL-SCNC: 104 MMOL/L (ref 100–108)
CO2 SERPL-SCNC: 30 MMOL/L (ref 21–32)
CREAT SERPL-MCNC: 1.49 MG/DL (ref 0.6–1.3)
GFR SERPL CREATININE-BSD FRML MDRD: 59 ML/MIN/1.73SQ M
GLUCOSE SERPL-MCNC: 84 MG/DL (ref 65–140)
POTASSIUM SERPL-SCNC: 4.3 MMOL/L (ref 3.5–5.3)
PROT SERPL-MCNC: 7.1 G/DL (ref 6.4–8.2)
SODIUM SERPL-SCNC: 142 MMOL/L (ref 136–145)

## 2018-07-01 PROCEDURE — 99283 EMERGENCY DEPT VISIT LOW MDM: CPT

## 2018-07-01 PROCEDURE — 80053 COMPREHEN METABOLIC PANEL: CPT | Performed by: EMERGENCY MEDICINE

## 2018-07-01 PROCEDURE — 36415 COLL VENOUS BLD VENIPUNCTURE: CPT | Performed by: EMERGENCY MEDICINE

## 2018-07-01 PROCEDURE — 96372 THER/PROPH/DIAG INJ SC/IM: CPT

## 2018-07-01 RX ORDER — DIAZEPAM 5 MG/ML
10 INJECTION, SOLUTION INTRAMUSCULAR; INTRAVENOUS ONCE
Status: COMPLETED | OUTPATIENT
Start: 2018-07-01 | End: 2018-07-01

## 2018-07-01 RX ORDER — LIDOCAINE 50 MG/G
1 PATCH TOPICAL EVERY 24 HOURS
Qty: 15 PATCH | Refills: 0 | Status: SHIPPED | OUTPATIENT
Start: 2018-07-01 | End: 2018-07-10 | Stop reason: CLARIF

## 2018-07-01 RX ORDER — DIAZEPAM 5 MG/ML
10 INJECTION, SOLUTION INTRAMUSCULAR; INTRAVENOUS ONCE
Status: DISCONTINUED | OUTPATIENT
Start: 2018-07-01 | End: 2018-07-01

## 2018-07-01 RX ORDER — LIDOCAINE 50 MG/G
1 PATCH TOPICAL ONCE
Status: DISCONTINUED | OUTPATIENT
Start: 2018-07-01 | End: 2018-07-01 | Stop reason: HOSPADM

## 2018-07-01 RX ORDER — DIAZEPAM 5 MG/1
5 TABLET ORAL EVERY 8 HOURS PRN
Qty: 8 TABLET | Refills: 0 | Status: SHIPPED | OUTPATIENT
Start: 2018-07-01 | End: 2018-08-09

## 2018-07-01 RX ADMIN — DIAZEPAM 10 MG: 5 INJECTION, SOLUTION INTRAMUSCULAR; INTRAVENOUS at 19:37

## 2018-07-01 RX ADMIN — LIDOCAINE 1 PATCH: 50 PATCH TOPICAL at 19:38

## 2018-07-01 NOTE — ED PROVIDER NOTES
History  Chief Complaint   Patient presents with    Back Pain     Patient reports that he had injured his back 1 month ago at work  Patient reports that he had back checked and went for MRI  Reports seeing ortho as well  Patient reports that he is unaware of any fractures or herniations  Patient reports that he started having upper back and neck pain this week that he cannot tolerate and weakness in b/l legs  Patient reports that he cannot take NSAIDS or muscle relaxers  Patient denies loss of bowel or bladder function  40 yo male c/o pain he localizes to cervical and upper thoracic spine, chronic now for several months, originally attributed to work injury, for which he has had occupational follow up, was out of work on 3 separate occasions, and released back after periods of disability, says he has been getting "continuous" treatment, with PT, orthopedic referral, PCP surveillance, with MRI in [place] 23 and of thoracic spine in May, both of which were normal, says he has been having more pain again now for several weeks, worsening no matter what treatment is recommended  He says he can't take NSAIDs due to anaphylaxis, muscle relaxers--specifically tizanidine and robaxin flare up his Crohn's disease, steroids give him side effects and give him no therapeutic effect, particularly for his Crohn's  He recalls being prescribed tramadol, but says he is not actually looking for more pain medication  He has been referred to Rheumatologist by his PCP soon, because he is convinced he has an inflammatory condition that is not showing up on bloodwork, particularly CRP and ESR from 6/8/18, and in the past   He denies fever, chills, weakness, and denies bowel and bladder dysfunction  History provided by:  Patient      Prior to Admission Medications   Prescriptions Last Dose Informant Patient Reported? Taking?    Linaclotide (LINZESS) 145 MCG CAPS   Yes Yes   Sig: Take 145 mcg by mouth as needed acetaminophen (TYLENOL) 100 mg/mL solution   Yes No   Sig: Take 10 mg/kg by mouth every 4 (four) hours as needed for fever   cetirizine (ZyrTEC) 10 mg tablet   No No   Sig: Take 1 tablet (10 mg total) by mouth daily as needed for allergies   clonazePAM (KlonoPIN) 0 5 mg tablet   No Yes   Sig: Take 1 tablet (0 5 mg total) by mouth every 12 (twelve) hours as needed for anxiety   dicyclomine (BENTYL) 20 mg tablet   No Yes   Sig: Take 1 tablet by mouth every 6 (six) hours   Patient taking differently: Take 20 mg by mouth every 6 (six) hours as needed     diphenhydrAMINE (BENADRYL) 25 mg tablet   Yes Yes   Sig: Take by mouth Twice daily   fluticasone (FLONASE) 50 mcg/act nasal spray   Yes Yes   Si sprays into each nostril      Facility-Administered Medications: None       Past Medical History:   Diagnosis Date    Abdominal pain     Abnormal liver function test     last assessed: Oct 16, 2013     Abnormal weight loss     last assessed: Yung 15, 2014     Allergic rhinitis due to pollen     last assessed: Yung 15, 2014     Anxiety     Anxiety     last assessed/resolved:  Aug 5, 2015     Asthma     last assessed: Yung 15, 2014     Benign essential HTN     last assessed/resolved: 2017     Celiac disease     Cervical lymphadenopathy     last assessed/resolved: 2017     Chronic sinusitis     last assessed: Yung 15, 2014     Constipation     Crohn's disease ([facility] [address] )     Crohn's disease [facility])     last assessed: Yung 15, 2014     Dysuria     last assessed: 2016     Elevated BP without diagnosis of hypertension     last assessed/resolved: 2017     Esophageal reflux     last assessed/resolved: 2017     Eustachian tube anomaly     Resolved: 2017     External hemorrhoids     last assessed: Yung 15, 2014     Hypertension     borderline    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     last assessed/resolved: 2017     Pancreatic neoplasm     last assessed: Yung 15, 2014  Positive depression screening     resolved: July 24, 2017     Psoriasis     Seasonal allergies     Vitamin B12 deficiency     last assessed/resolved: Feb 23, 2017        Past Surgical History:   Procedure Laterality Date    CHOLECYSTECTOMY      resolved: 2011     COLONOSCOPY N/A 11/18/2016    Procedure: COLONOSCOPY;  Surgeon: Judit Boucher MD;  Location:  GI LAB; Service:     COLONOSCOPY N/A 4/28/2016    Procedure: COLONOSCOPY;  Surgeon: Judit Boucher MD;  Location: [facility] GI LAB; Service:     ESOPHAGOGASTRODUODENOSCOPY N/A 4/28/2016    Procedure: ESOPHAGOGASTRODUODENOSCOPY (EGD); Surgeon: Judit Boucher MD;  Location: [facility] GI LAB; Service:    [address]      resolved: April 2009     PANCREAS SURGERY      DE REPAIR OF NASAL SEPTUM N/A 7/28/2017    Procedure: REVISION SEPTOPLASTY; TURBINOPLASTY; BILATERAL  GRAFTS; ALAR GRAFT; POSSIBLE AURICULAR CARTILAGE GRAFT;  Surgeon: Mark Wan MD;  Location:  MAIN OR;  Service: ENT    TONSILLECTOMY AND ADENOIDECTOMY         Family History   Problem Relation Age of Onset    Diabetes Mother         mellitus     Hypertension Mother     Anxiety disorder Mother     Thyroid disease Mother     Fibromyalgia Mother     Diabetes Father     Hypertension Father     Hypertension Sister     No Known Problems Brother     No Known Problems Maternal Grandmother     No Known Problems Maternal Grandfather     No Known Problems Paternal Grandmother     No Known Problems Paternal Grandfather     Diabetes Other         mellitus     Rheum arthritis Cousin      I have reviewed and agree with the history as documented      Social History   Substance Use Topics    Smoking status: Current Every Day Smoker     Packs/day: 0 50     Types: Cigarettes    Smokeless tobacco: Never Used      Comment: Dependence on nicotine in cigarettes - 1/2 PPD x 20 years     Alcohol use No      Comment: John consumes alcohol noted in "[employer]"         Review of Systems   Constitutional: Negative for appetite change, chills and fever  HENT: Negative for sore throat  Respiratory: Negative for cough, shortness of breath and wheezing  Cardiovascular: Negative for chest pain and palpitations  Gastrointestinal: Negative for abdominal pain, diarrhea, nausea and vomiting  Genitourinary: Negative for dysuria and hematuria  Musculoskeletal: Positive for back pain  Negative for neck pain  Skin: Negative for rash  Neurological: Negative for dizziness, weakness and headaches  Psychiatric/Behavioral: Negative for suicidal ideas  All other systems reviewed and are negative  Physical Exam  Physical Exam   Constitutional: He is oriented to person, place, and time  He appears well-developed and well-nourished  Non-toxic appearance  HENT:   Head: Normocephalic  Right Ear: Tympanic membrane and external ear normal    Left Ear: External ear normal    Nose: Nose normal    Mouth/Throat: Oropharynx is clear and moist    Eyes: Conjunctivae, EOM and lids are normal  Pupils are equal, round, and reactive to light  Neck: Normal range of motion  Neck supple  No JVD present  No Brudzinski's sign and no Kernig's sign noted  Cardiovascular: Normal rate, regular rhythm and normal heart sounds  No murmur heard  Pulmonary/Chest: Effort normal and breath sounds normal  No accessory muscle usage  No tachypnea  No respiratory distress  He has no wheezes  Abdominal: Soft  Normal appearance and bowel sounds are normal  He exhibits no distension and no mass  There is no tenderness  There is no rigidity, no rebound and no guarding  Musculoskeletal: Normal range of motion  Lymphadenopathy:        Head (right side): No submental, no submandibular, no preauricular and no posterior auricular adenopathy present  Head (left side): No submental, no submandibular, no preauricular and no posterior auricular adenopathy present  He has no cervical adenopathy  Neurological: He is alert and oriented to person, place, and time  He has normal strength and normal reflexes  No cranial nerve deficit or sensory deficit  Coordination normal  GCS eye subscore is 4  GCS verbal subscore is 5  GCS motor subscore is 6  Skin: Skin is warm and dry  No rash noted  He is not diaphoretic  Psychiatric: He has a normal mood and affect  His speech is normal and behavior is normal  Thought content normal  Cognition and memory are normal    Nursing note and vitals reviewed        Vital Signs  ED Triage Vitals   Temperature Pulse Respirations Blood Pressure SpO2   07/01/18 1836 07/01/18 1836 07/01/18 1836 07/01/18 1837 07/01/18 1836   98 °F (36 7 °C) 81 18 125/89 100 %      Temp Source Heart Rate Source Patient Position - Orthostatic VS BP Location FiO2 (%)   07/01/18 1836 07/01/18 1836 07/01/18 1836 07/01/18 1836 --   Oral Monitor Sitting Right arm       Pain Score       07/01/18 1836       Worst Possible Pain           Vitals:    07/01/18 1836 07/01/18 1837 07/01/18 2009 07/01/18 2106   BP:  125/89 125/77 120/79   Pulse: 81  61 60   Patient Position - Orthostatic VS: Sitting  Lying Lying       Visual Acuity      ED Medications  Medications   lidocaine (LIDODERM) 5 % patch 1 patch (1 patch Transdermal Medication Applied 7/1/18 1938)   diazepam (VALIUM) injection 10 mg (10 mg Intramuscular Given 7/1/18 1937)       Diagnostic Studies  Results Reviewed     Procedure Component Value Units Date/Time    Comprehensive metabolic panel [[id number]]  (Abnormal) Collected:  07/01/18 1931    Lab Status:  Final result Specimen:  Blood from Arm, Right Updated:  07/01/18 1956     Sodium 142 mmol/L      Potassium 4 3 mmol/L      Chloride 104 mmol/L      CO2 30 mmol/L      Anion Gap 8 mmol/L      BUN 17 mg/dL      Creatinine 1 49 (H) mg/dL      Glucose 84 mg/dL      Calcium 9 2 mg/dL      AST 23 U/L      ALT 38 U/L      Alkaline Phosphatase 68 U/L      Total Protein 7 1 g/dL      Albumin 3 9 g/dL      Total Bilirubin 0 36 mg/dL      eGFR 59 ml/min/1 73sq m     Narrative:         National Kidney Disease Education Program recommendations are as follows:  GFR calculation is accurate only with a steady state creatinine  Chronic Kidney disease less than 60 ml/min/1 73 sq  meters  Kidney failure less than 15 ml/min/1 73 sq  meters  No orders to display              Procedures  Procedures       Phone Contacts  ED Phone Contact    ED Course  ED Course as of Jul 01 2216   Sun Jul 01, 2018   6624 Reviewed PA , he prescribed clonazepam chronically, and tramadol finished in April                                [facility]  CritCare Time    Disposition  Final diagnoses:   Chronic midline thoracic back pain     Time reflects when diagnosis was documented in both MDM as applicable and the Disposition within this note     Time User Action Codes Description Comment    7/1/2018  8:55 PM Dayanara Robles Add [M54 6,  G89 29] Chronic midline thoracic back pain       ED Disposition     ED Disposition Condition Comment    Discharge  [facility] discharge to home/self care  Condition at discharge: Good        Follow-up Information     Follow up With Specialties Details Why Iris Henson MD Internal Medicine Go to For followup [facility] [address] [facility]  [phone]            Discharge Medication List as of 7/1/2018  8:58 PM      START taking these medications    Details   diazepam (VALIUM) 5 mg tablet Take 1 tablet (5 mg total) by mouth every 8 (eight) hours as needed for anxiety for up to 10 days, Starting Sun 7/1/2018, Until Wed 7/11/2018, Print      lidocaine (LIDODERM) 5 % Place 1 patch on the skin every 24 hours Remove & Discard patch within 12 hours or as directed by MD, Starting Sun 7/1/2018, Print         CONTINUE these medications which have NOT CHANGED    Details   clonazePAM (KlonoPIN) 0 5 mg tablet Take 1 tablet (0 5 mg total) by mouth every 12 (twelve) hours as needed for anxiety, Starting Wed 4/4/2018, Print      dicyclomine (BENTYL) 20 mg tablet Take 1 tablet by mouth every 6 (six) hours, Starting 11/17/2016, Until Discontinued, Print      diphenhydrAMINE (BENADRYL) 25 mg tablet Take by mouth Twice daily, Historical Med      fluticasone (FLONASE) 50 mcg/act nasal spray 2 sprays into each nostril, Historical Med      Linaclotide (LINZESS) 145 MCG CAPS Take 145 mcg by mouth as needed  , Historical Med      acetaminophen (TYLENOL) 100 mg/mL solution Take 10 mg/kg by mouth every 4 (four) hours as needed for fever, Historical Med      cetirizine (ZyrTEC) 10 mg tablet Take 1 tablet (10 mg total) by mouth daily as needed for allergies, Starting Wed 4/4/2018, Normal           No discharge procedures on file      ED Provider  Electronically Signed by           Michoacano Saha MD  07/01/18 1703

## 2018-07-10 ENCOUNTER — OFFICE VISIT (OUTPATIENT)
Dept: INTERNAL MEDICINE CLINIC | Age: 38
End: 2018-07-10
Payer: COMMERCIAL

## 2018-07-10 VITALS
HEART RATE: 84 BPM | DIASTOLIC BLOOD PRESSURE: 88 MMHG | BODY MASS INDEX: 24.76 KG/M2 | TEMPERATURE: 98.6 F | SYSTOLIC BLOOD PRESSURE: 128 MMHG | HEIGHT: 69 IN | WEIGHT: 167.2 LBS | OXYGEN SATURATION: 98 %

## 2018-07-10 DIAGNOSIS — K52.9 IBD (INFLAMMATORY BOWEL DISEASE): ICD-10-CM

## 2018-07-10 DIAGNOSIS — F41.9 ANXIETY: ICD-10-CM

## 2018-07-10 DIAGNOSIS — M54.6 CHRONIC MIDLINE THORACIC BACK PAIN: ICD-10-CM

## 2018-07-10 DIAGNOSIS — J30.89 ENVIRONMENTAL AND SEASONAL ALLERGIES: ICD-10-CM

## 2018-07-10 DIAGNOSIS — R10.84 GENERALIZED ABDOMINAL PAIN: Primary | ICD-10-CM

## 2018-07-10 DIAGNOSIS — G89.29 CHRONIC MIDLINE THORACIC BACK PAIN: ICD-10-CM

## 2018-07-10 PROCEDURE — 99214 OFFICE O/P EST MOD 30 MIN: CPT | Performed by: INTERNAL MEDICINE

## 2018-07-10 RX ORDER — CLONAZEPAM 0.5 MG/1
0.5 TABLET ORAL EVERY 12 HOURS PRN
Qty: 90 TABLET | Refills: 1 | Status: SHIPPED | OUTPATIENT
Start: 2018-07-10 | End: 2018-12-31 | Stop reason: SDUPTHER

## 2018-07-10 RX ORDER — DICYCLOMINE HCL 20 MG
20 TABLET ORAL EVERY 6 HOURS PRN
Qty: 60 TABLET | Refills: 1 | Status: SHIPPED | OUTPATIENT
Start: 2018-07-10 | End: 2018-11-19

## 2018-07-15 PROBLEM — G89.29 CHRONIC MIDLINE THORACIC BACK PAIN: Status: ACTIVE | Noted: 2018-07-15

## 2018-07-15 PROBLEM — M54.6 CHRONIC MIDLINE THORACIC BACK PAIN: Status: ACTIVE | Noted: 2018-07-15

## 2018-07-15 NOTE — ASSESSMENT & PLAN NOTE
Unclear etiology of upper back/necp pain  Pt to see Rheumatology next week    Also referred to pain management

## 2018-07-23 ENCOUNTER — TRANSCRIBE ORDERS (OUTPATIENT)
Dept: ADMINISTRATIVE | Facility: HOSPITAL | Age: 38
End: 2018-07-23

## 2018-07-23 ENCOUNTER — HOSPITAL ENCOUNTER (OUTPATIENT)
Dept: RADIOLOGY | Facility: HOSPITAL | Age: 38
Discharge: HOME | End: 2018-07-23
Payer: COMMERCIAL

## 2018-07-23 DIAGNOSIS — R76.8 OTHER SPECIFIED ABNORMAL IMMUNOLOGICAL FINDINGS IN SERUM: ICD-10-CM

## 2018-07-23 DIAGNOSIS — R76.8 OTHER SPECIFIED ABNORMAL IMMUNOLOGICAL FINDINGS IN SERUM: Primary | ICD-10-CM

## 2018-07-23 PROCEDURE — 72202 X-RAY EXAM SI JOINTS 3/> VWS: CPT

## 2018-07-23 PROCEDURE — 72110 X-RAY EXAM L-2 SPINE 4/>VWS: CPT

## 2018-07-30 ENCOUNTER — APPOINTMENT (EMERGENCY)
Dept: CT IMAGING | Facility: HOSPITAL | Age: 38
End: 2018-07-30
Payer: COMMERCIAL

## 2018-07-30 ENCOUNTER — HOSPITAL ENCOUNTER (EMERGENCY)
Facility: HOSPITAL | Age: 38
Discharge: HOME | End: 2018-07-30
Attending: EMERGENCY MEDICINE | Admitting: EMERGENCY MEDICINE
Payer: COMMERCIAL

## 2018-07-30 ENCOUNTER — APPOINTMENT (EMERGENCY)
Dept: RADIOLOGY | Facility: HOSPITAL | Age: 38
End: 2018-07-30
Payer: COMMERCIAL

## 2018-07-30 VITALS
HEART RATE: 65 BPM | TEMPERATURE: 98.9 F | OXYGEN SATURATION: 98 % | WEIGHT: 165 LBS | RESPIRATION RATE: 18 BRPM | SYSTOLIC BLOOD PRESSURE: 115 MMHG | HEIGHT: 68 IN | BODY MASS INDEX: 25.01 KG/M2 | DIASTOLIC BLOOD PRESSURE: 61 MMHG

## 2018-07-30 DIAGNOSIS — R51.9 HEADACHE: Primary | ICD-10-CM

## 2018-07-30 DIAGNOSIS — R42 DIZZINESS: ICD-10-CM

## 2018-07-30 LAB
ALBUMIN SERPL BCP-MCNC: 4.6 G/DL (ref 3.5–5.7)
ALP SERPL-CCNC: 62 U/L (ref 40–150)
ALT SERPL W P-5'-P-CCNC: 16 U/L (ref 7–52)
AMPHETAMINES SERPL QL SCN: NEGATIVE
ANION GAP SERPL CALCULATED.3IONS-SCNC: 7 MMOL/L (ref 4–13)
AST SERPL W P-5'-P-CCNC: 16 U/L (ref 13–39)
BARBITURATES UR QL: NEGATIVE
BASOPHILS # BLD AUTO: 0.1 THOUSANDS/ΜL (ref 0–0.1)
BASOPHILS NFR BLD AUTO: 1 % (ref 0–1)
BENZODIAZ UR QL: NEGATIVE
BILIRUB SERPL-MCNC: 0.9 MG/DL (ref 0.2–1)
BILIRUB UR QL STRIP: NEGATIVE
BUN SERPL-MCNC: 10 MG/DL (ref 7–25)
CALCIUM SERPL-MCNC: 9.8 MG/DL (ref 8.6–10.5)
CHLORIDE SERPL-SCNC: 103 MMOL/L (ref 98–107)
CLARITY UR: CLEAR
CO2 SERPL-SCNC: 25 MMOL/L (ref 21–31)
COCAINE UR QL: NEGATIVE
COLOR UR: YELLOW
CREAT SERPL-MCNC: 1.11 MG/DL (ref 0.7–1.3)
EOSINOPHIL # BLD AUTO: 0.1 THOUSAND/ΜL (ref 0–0.61)
EOSINOPHIL NFR BLD AUTO: 1 % (ref 0–6)
ERYTHROCYTE [DISTWIDTH] IN BLOOD BY AUTOMATED COUNT: 13.5 % (ref 11.6–15.1)
GFR SERPL CREATININE-BSD FRML MDRD: 84 ML/MIN/1.73SQ M
GLUCOSE SERPL-MCNC: 94 MG/DL (ref 65–140)
GLUCOSE UR STRIP-MCNC: NEGATIVE MG/DL
HCT VFR BLD AUTO: 47.3 % (ref 42–52)
HGB BLD-MCNC: 16.4 G/DL (ref 12–17)
HGB UR QL STRIP.AUTO: NEGATIVE
KETONES UR STRIP-MCNC: NEGATIVE MG/DL
LEUKOCYTE ESTERASE UR QL STRIP: NEGATIVE
LYMPHOCYTES # BLD AUTO: 2 THOUSANDS/ΜL (ref 0.6–4.47)
LYMPHOCYTES NFR BLD AUTO: 22 % (ref 14–44)
MCH RBC QN AUTO: 32.5 PG (ref 26.8–34.3)
MCHC RBC AUTO-ENTMCNC: 34.6 G/DL (ref 31.4–37.4)
MCV RBC AUTO: 94 FL (ref 82–98)
METHADONE UR QL: NEGATIVE
MONOCYTES # BLD AUTO: 0.7 THOUSAND/ΜL (ref 0.17–1.22)
MONOCYTES NFR BLD AUTO: 7 % (ref 4–12)
NEUTROPHILS # BLD AUTO: 6.4 THOUSANDS/ΜL (ref 1.85–7.62)
NEUTS SEG NFR BLD AUTO: 69 % (ref 43–75)
NITRITE UR QL STRIP: NEGATIVE
NRBC BLD AUTO-RTO: 0 /100 WBCS
OPIATES UR QL SCN: NEGATIVE
PCP UR QL: NEGATIVE
PH UR STRIP.AUTO: 6 [PH] (ref 5–8)
PLATELET # BLD AUTO: 212 THOUSANDS/UL (ref 149–390)
PMV BLD AUTO: 8.3 FL (ref 8.9–12.7)
POTASSIUM SERPL-SCNC: 3.8 MMOL/L (ref 3.5–5.5)
PROT SERPL-MCNC: 7.3 G/DL (ref 6.4–8.9)
PROT UR STRIP-MCNC: NEGATIVE MG/DL
RBC # BLD AUTO: 5.04 MILLION/UL (ref 3.88–5.62)
SODIUM SERPL-SCNC: 135 MMOL/L (ref 134–143)
SP GR UR STRIP.AUTO: <=1.005 (ref 1–1.03)
THC UR QL: NEGATIVE
TROPONIN I SERPL-MCNC: <0.03 NG/ML
UROBILINOGEN UR QL STRIP.AUTO: 0.2 E.U./DL
WBC # BLD AUTO: 9.3 THOUSAND/UL (ref 4.31–10.16)

## 2018-07-30 PROCEDURE — 80053 COMPREHEN METABOLIC PANEL: CPT | Performed by: EMERGENCY MEDICINE

## 2018-07-30 PROCEDURE — 80307 DRUG TEST PRSMV CHEM ANLYZR: CPT | Performed by: EMERGENCY MEDICINE

## 2018-07-30 PROCEDURE — 71046 X-RAY EXAM CHEST 2 VIEWS: CPT

## 2018-07-30 PROCEDURE — 85025 COMPLETE CBC W/AUTO DIFF WBC: CPT | Performed by: EMERGENCY MEDICINE

## 2018-07-30 PROCEDURE — 99285 EMERGENCY DEPT VISIT HI MDM: CPT

## 2018-07-30 PROCEDURE — 36415 COLL VENOUS BLD VENIPUNCTURE: CPT | Performed by: EMERGENCY MEDICINE

## 2018-07-30 PROCEDURE — 84484 ASSAY OF TROPONIN QUANT: CPT | Performed by: EMERGENCY MEDICINE

## 2018-07-30 PROCEDURE — 81003 URINALYSIS AUTO W/O SCOPE: CPT | Performed by: EMERGENCY MEDICINE

## 2018-07-30 PROCEDURE — 70450 CT HEAD/BRAIN W/O DYE: CPT

## 2018-07-30 PROCEDURE — 93005 ELECTROCARDIOGRAM TRACING: CPT

## 2018-07-30 RX ORDER — MECLIZINE HYDROCHLORIDE 25 MG/1
25 TABLET ORAL ONCE
Status: COMPLETED | OUTPATIENT
Start: 2018-07-30 | End: 2018-07-30

## 2018-07-30 RX ADMIN — MECLIZINE HYDROCHLORIDE 25 MG: 25 TABLET ORAL at 14:27

## 2018-07-30 NOTE — ED PROVIDER NOTES
History  Chief Complaint   Patient presents with    Dizziness     pain on right side of head, dizziness, feels like he is going to pass out     Patient reports that while he was cutting grass today he developed severe choking feeling like he was suffocating and right-sided headache  Patient also experienced severe dizziness which has continued until now  Apparently the emergency department patient has dizziness and right-sided head pain/pressure  Patient reports he has been having chest pain on and off for the last month since he was in an accident  Patient currently has no chest pain  History provided by:  Patient      Prior to Admission Medications   Prescriptions Last Dose Informant Patient Reported? Taking? Linaclotide (LINZESS) 145 MCG CAPS   Yes Yes   Sig: Take 145 mcg by mouth as needed     acetaminophen (TYLENOL) 500 mg tablet  Self Yes No   Sig: Take 10 mg/kg by mouth every 4 (four) hours as needed for fever   cetirizine (ZyrTEC) 10 mg tablet   No No   Sig: Take 1 tablet (10 mg total) by mouth daily as needed for allergies   clonazePAM (KlonoPIN) 0 5 mg tablet   No Yes   Sig: Take 1 tablet (0 5 mg total) by mouth every 12 (twelve) hours as needed for anxiety   diazepam (VALIUM) 5 mg tablet   No No   Sig: Take 1 tablet (5 mg total) by mouth every 8 (eight) hours as needed for anxiety for up to 10 days   dicyclomine (BENTYL) 20 mg tablet   No Yes   Sig: Take 1 tablet (20 mg total) by mouth every 6 (six) hours as needed (pain)   diphenhydrAMINE (BENADRYL) 25 mg tablet   Yes Yes   Sig: Take by mouth Twice daily   fluticasone (FLONASE) 50 mcg/act nasal spray   Yes Yes   Si sprays into each nostril      Facility-Administered Medications: None       Past Medical History:   Diagnosis Date    Abdominal pain     Abnormal liver function test     last assessed:  Oct 16, 2013     Abnormal weight loss     last assessed: Yung 15, 2014     Allergic rhinitis due to pollen     last assessed: Yung 15, 2014     Anxiety     Anxiety     last assessed/resolved: Aug 5, 2015     Asthma     last assessed: Yung 15, 2014     Benign essential HTN     last assessed/resolved: Feb 23, 2017     Celiac disease     Cervical lymphadenopathy     last assessed/resolved: Feb 23, 2017     Chronic sinusitis     last assessed: Yung 15, 2014     Constipation     Crohn's disease ([facility] [address] ) 01/01/2011    last assessed: Yung 15, 2014     Dysuria     last assessed: April 29, 2016     Elevated BP without diagnosis of hypertension     last assessed/resolved: Feb 23, 2017     Esophageal reflux     last assessed/resolved: Feb 23, 2017     Eustachian tube anomaly     Resolved: Nov 9, 2017     External hemorrhoids     last assessed: Yung 15, 2014     Hypertension     borderline    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     last assessed/resolved: July 19, 2017     Pancreatic neoplasm     last assessed: Yung 15, 2014     Positive depression screening     resolved: July 24, 2017     Psoriasis     Seasonal allergies     Vitamin B12 deficiency     last assessed/resolved: Feb 23, 2017        Past Surgical History:   Procedure Laterality Date    CHOLECYSTECTOMY      resolved: 2011     COLONOSCOPY N/A 11/18/2016    Procedure: COLONOSCOPY;  Surgeon: Shanda Lr MD;  Location:  GI LAB; Service:     COLONOSCOPY N/A 4/28/2016    Procedure: COLONOSCOPY;  Surgeon: Shanda Lr MD;  Location: [facility]; Service:     ESOPHAGOGASTRODUODENOSCOPY N/A 4/28/2016    Procedure: ESOPHAGOGASTRODUODENOSCOPY (EGD); Surgeon: Shanda Lr MD;  Location: [facility] GI LAB;   Service:     FRONTAL SINUSOTOMY      resolved: April 2009     PANCREAS SURGERY      [place] REPAIR OF NASAL SEPTUM N/A 7/28/2017    Procedure: REVISION SEPTOPLASTY; TURBINOPLASTY; BILATERAL  GRAFTS; ALAR GRAFT; POSSIBLE AURICULAR CARTILAGE GRAFT;  Surgeon: Abiodun Guerrero MD;  Location:  MAIN OR;  Service: ENT    TONSILLECTOMY AND ADENOIDECTOMY         Family History   Problem Relation Age of Onset    Diabetes Mother         mellitus     Hypertension Mother     Anxiety disorder Mother     Thyroid disease Mother     Fibromyalgia Mother     Diabetes Father     Hypertension Father     Hypertension Sister     No Known Problems Brother     No Known Problems Maternal Grandmother     No Known Problems Maternal Grandfather     No Known Problems Paternal Grandmother     No Known Problems Paternal Grandfather     Diabetes Other         mellitus     Rheum arthritis Cousin      I have reviewed and agree with the history as documented  Social History   Substance Use Topics    Smoking status: Current Every Day Smoker     Packs/day: 0 50     Types: Cigarettes    Smokeless tobacco: Never Used      Comment: Dependence on nicotine in cigarettes - 1/2 PPD x 20 years     Alcohol use No      Comment: John consumes alcohol noted in "allscripts"         Review of Systems   Constitutional: Negative for chills and fever  HENT: Negative for rhinorrhea and sore throat  Eyes: Negative for visual disturbance  Respiratory: Negative for cough and shortness of breath  Choking feeling in the throat   Cardiovascular: Negative for chest pain and leg swelling  Gastrointestinal: Negative for abdominal pain, diarrhea, nausea and vomiting  Genitourinary: Negative for dysuria  Musculoskeletal: Negative for back pain and myalgias  Skin: Negative for rash  Neurological: Positive for dizziness, light-headedness and headaches  Difficulty walking due to feeling dizzy   Psychiatric/Behavioral: Negative for confusion  All other systems reviewed and are negative  Physical Exam  Physical Exam   Constitutional: He is oriented to person, place, and time  He appears well-developed and well-nourished  Anxious   HENT:   Nose: Nose normal    Mouth/Throat: Oropharynx is clear and moist  No oropharyngeal exudate     Eyes: Conjunctivae and EOM are normal  Pupils are equal, round, and reactive to light  No scleral icterus  Neck: Normal range of motion  Neck supple  No JVD present  No tracheal deviation present  Cardiovascular: Normal rate, regular rhythm and normal heart sounds  No murmur heard  Pulmonary/Chest: Effort normal and breath sounds normal  No respiratory distress  He has no wheezes  He has no rales  Abdominal: Soft  Bowel sounds are normal  There is no tenderness  There is no guarding  Musculoskeletal: Normal range of motion  He exhibits no edema or tenderness  Neurological: He is alert and oriented to person, place, and time  No cranial nerve deficit or sensory deficit  He exhibits normal muscle tone  5/5 motor, nl sens   Skin: Skin is warm and dry  Psychiatric: He has a normal mood and affect  His behavior is normal    Nursing note and vitals reviewed        Vital Signs  ED Triage Vitals [07/30/18 1359]   Temperature Pulse Respirations Blood Pressure SpO2   98 9 °F (37 2 °C) 60 20 144/98 100 %      Temp Source Heart Rate Source Patient Position - Orthostatic VS BP Location FiO2 (%)   Tympanic Monitor Lying Left arm --      Pain Score       7           Vitals:    07/30/18 1424 07/30/18 1425 07/30/18 1517 07/30/18 1606   BP: 134/92 121/86 130/88 115/61   Pulse: 63 76 68 65   Patient Position - Orthostatic VS: Sitting - Orthostatic VS Standing - Orthostatic VS Lying Lying       Visual Acuity      ED Medications  Medications   meclizine (ANTIVERT) tablet 25 mg (25 mg Oral Given 7/30/18 1427)       Diagnostic Studies  Results Reviewed     Procedure Component Value Units Date/Time    Rapid drug screen, urine [[id number]]  (Normal) Collected:  07/30/18 1514    Lab Status:  Final result Specimen:  Urine from Urine, Clean Catch Updated:  07/30/18 1539     Amph/Meth UR Negative     Barbiturate Ur Negative     Benzodiazepine Urine Negative     Cocaine Urine Negative     Methadone Urine Negative     Opiate Urine Negative     PCP Ur Negative     THC Urine Negative    Narrative: FOR MEDICAL PURPOSES ONLY  IF CONFIRMATION NEEDED PLEASE CONTACT THE LAB WITHIN 5 DAYS  Drug Screen Cutoff Levels:  AMPHETAMINE/METHAMPHETAMINES  1000 ng/mL  BARBITURATES     200 ng/mL  BENZODIAZEPINES     200 ng/mL  COCAINE      300 ng/mL  METHADONE      300 ng/mL  OPIATES      300 ng/mL  PHENCYCLIDINE     25 ng/mL  THC       50 ng/mL    UA w Reflex to Microscopic w Reflex to Culture [[id number]]  (Normal) Collected:  07/30/18 1514    Lab Status:  Final result Specimen:  Urine from Urine, Clean Catch Updated:  07/30/18 1523     Color, UA Yellow     Clarity, UA Clear     Specific Gravity, UA <=1 005     pH, UA 6 0     Leukocytes, UA Negative     Nitrite, UA Negative     Protein, UA Negative mg/dl      Glucose, UA Negative mg/dl      Ketones, UA Negative mg/dl      Urobilinogen, UA 0 2 E U /dl      Bilirubin, UA Negative     Blood, UA Negative    Troponin I [[id number]]  (Normal) Collected:  07/30/18 1415    Lab Status:  Final result Specimen:  Blood from Arm, Left Updated:  07/30/18 1450     Troponin I <0 03 ng/mL     Comprehensive metabolic panel [[id number]] Collected:  07/30/18 1415    Lab Status:  Final result Specimen:  Blood from Arm, Left Updated:  07/30/18 1450     Sodium 135 mmol/L      Potassium 3 8 mmol/L      Chloride 103 mmol/L      CO2 25 mmol/L      Anion Gap 7 mmol/L      BUN 10 mg/dL      Creatinine 1 11 mg/dL      Glucose 94 mg/dL      Calcium 9 8 mg/dL      AST 16 U/L      ALT 16 U/L      Alkaline Phosphatase 62 U/L      Total Protein 7 3 g/dL      Albumin 4 6 g/dL      Total Bilirubin 0 90 mg/dL      eGFR 84 ml/min/1 73sq m     Narrative:         National Kidney Disease Education Program recommendations are as follows:  GFR calculation is accurate only with a steady state creatinine  Chronic Kidney disease less than 60 ml/min/1 73 sq  meters  Kidney failure less than 15 ml/min/1 73 sq  meters      CBC and differential [[id number]]  (Abnormal) Collected:  07/30/18 1415    Lab Status:  Final result Specimen:  Blood from Arm, Left Updated:  07/30/18 1426     WBC 9 30 Thousand/uL      RBC 5 04 Million/uL      Hemoglobin 16 4 g/dL      Hematocrit 47 3 %      MCV 94 fL      MCH 32 5 pg      MCHC 34 6 g/dL      RDW 13 5 %      MPV 8 3 (L) fL      Platelets 146 Thousands/uL      nRBC 0 /100 WBCs      Neutrophils Relative 69 %      Lymphocytes Relative 22 %      Monocytes Relative 7 %      Eosinophils Relative 1 %      Basophils Relative 1 %      Neutrophils Absolute 6 40 Thousands/µL      Lymphocytes Absolute 2 00 Thousands/µL      Monocytes Absolute 0 70 Thousand/µL      Eosinophils Absolute 0 10 Thousand/µL      Basophils Absolute 0 10 Thousands/µL                  CT head without contrast   Final Result by Ehlam Tee (07/30 1523)   No change  No acute intracranial process  Signed by Marge Palacio MD      XR chest 2 views   Final Result by Interface, Radiology Results In (07/30 1518)   Normal chest evaluation  Clear lungs  The chest is stable in   the interval since April 2016              Signed by Dot Cain MD                 Procedures  Procedures       Phone Contacts  ED Phone Contact    ED Course  ED Course as of Jul 30 1612   Mon Jul 30, 2018   1409 EKG is sinus bradycardia at a rate of 56 beats per minute, normal axis, normal conduction, normal ST-T segments, no STEMI seen  ECG 12 lead   1559 FullResults reviewed and discussed with patient and spouse  Diagnosis disposition plan of treatment and follow up with Neurology for possible migraine headache discussed  Patient reports that his Crohn's disease may be acting up  Patient does have a gastroenterologist and has made an appointment                                  MDM  CritCare Time    Disposition  Final diagnoses:   Headache   Dizziness     Time reflects when diagnosis was documented in both MDM as applicable and the Disposition within this note     Time User Action Codes Description Comment    7/30/2018  4:00 PM Chris Palma Add [R51] Headache     7/30/2018  4:00 PM Chris Palma Add [R42] Dizziness       ED Disposition     ED Disposition Condition Comment    Discharge  [facility] discharge to home/self care  Condition at discharge: Stable        Follow-up Information    None         Discharge Medication List as of 7/30/2018  4:01 PM      CONTINUE these medications which have NOT CHANGED    Details   clonazePAM (KlonoPIN) 0 5 mg tablet Take 1 tablet (0 5 mg total) by mouth every 12 (twelve) hours as needed for anxiety, Starting Tue 7/10/2018, Print      dicyclomine (BENTYL) 20 mg tablet Take 1 tablet (20 mg total) by mouth every 6 (six) hours as needed (pain), Starting Tue 7/10/2018, Print      diphenhydrAMINE (BENADRYL) 25 mg tablet Take by mouth Twice daily, Historical Med      fluticasone (FLONASE) 50 mcg/act nasal spray 2 sprays into each nostril, Historical Med      Linaclotide (LINZESS) 145 MCG CAPS Take 145 mcg by mouth as needed  , Historical Med      acetaminophen (TYLENOL) 500 mg tablet Take 10 mg/kg by mouth every 4 (four) hours as needed for fever, Historical Med      cetirizine (ZyrTEC) 10 mg tablet Take 1 tablet (10 mg total) by mouth daily as needed for allergies, Starting Wed 4/4/2018, Normal      diazepam (VALIUM) 5 mg tablet Take 1 tablet (5 mg total) by mouth every 8 (eight) hours as needed for anxiety for up to 10 days, Starting Sun 7/1/2018, Until Wed 7/11/2018, Print           No discharge procedures on file      ED Provider  Electronically Signed by           Darlyn Soto DO  07/30/18 8653

## 2018-07-30 NOTE — DISCHARGE INSTRUCTIONS
Acute Headache, Ambulatory Care   GENERAL INFORMATION:   An acute headache  is pain or discomfort that starts suddenly and gets worse quickly  The cause of an acute headache may not be known  It may be triggered by stress, fatigue, hormones, food, or trauma  Common related symptoms include the following:   · Fever    · Sinus pressure    · Loss of memory    · Nausea or vomiting    · Problems with your vision, such as watery or red eyes, loss of vision, or pain in bright light    · Stiff neck    · Tenderness of the head and neck area    · Trouble staying awake, or being less alert than usual     · Weakness or less energy  Seek immediate care for the following symptoms:   · Severe pain    · A headache that occurs after a blow to the head, a fall, or other trauma     · Confusion or forgetfulness    · Numbness on one side of your face or body  Treatment for an acute headache  may include medicine to decrease pain  You may also need biofeedback or cognitive behavioral therapy  Ask your healthcare provider about these and other treatments for an acute headache  Manage my symptoms:   · Apply heat  on your head for 20 to 30 minutes every 2 hours for as many days as directed  Heat helps decrease pain and muscle spasms  You may alternate heat and ice  · Apply ice  on your head for 15 to 20 minutes every hour or as directed  Use an ice pack, or put crushed ice in a plastic bag  Cover it with a towel  Ice helps decrease pain  · Relax your muscles  Lie down in a comfortable position and close your eyes  Relax your muscles slowly  Start at your toes and work your way up your body  · Keep a record of your headaches  Write down when your headaches start and stop  Include your symptoms and what you were doing when the headache began  Record what you ate or drank for 24 hours before the headache started  Describe the pain and where it hurts  Keep track of what you did to treat your headache and whether it worked    Follow up with your healthcare provider as directed:  Bring your headache record with you when you see your healthcare provider  Write down your questions so you remember to ask them during your visits  CARE AGREEMENT:   You have the right to help plan your care  Learn about your health condition and how it may be treated  Discuss treatment options with your caregivers to decide what care you want to receive  You always have the right to refuse treatment  The above information is an  only  It is not intended as medical advice for individual conditions or treatments  Talk to your doctor, nurse or pharmacist before following any medical regimen to see if it is safe and effective for you  © 2014 [address] is for End User's use only and may not be sold, redistributed or otherwise used for commercial purposes  All illustrations and images included in CareNotes® are the copyrighted property of [employer]  or [employer]  Dizziness   WHAT YOU NEED TO KNOW:   Dizziness is a feeling of being off balance or unsteady  Common causes of dizziness are an inner ear fluid imbalance or a lack of oxygen in your blood  Dizziness may be acute (lasts 3 days or less) or chronic (lasts longer than 3 days)  You may have dizzy spells that last from seconds to a few hours  DISCHARGE INSTRUCTIONS:   Return to the emergency department if:   · You have a headache and a stiff neck  · You have shaking chills and a fever  · You vomit over and over with no relief  · Your vomit or bowel movements are red or black  · You have pain in your chest, back, or abdomen  · You have numbness, especially in your face, arms, or legs  · You have trouble moving your arms or legs  · You are confused  Contact your healthcare provider if:   · You have a fever  · Your symptoms do not get better with treatment       · You have questions or concerns about your condition or care   Manage your symptoms:   · Do not drive  or operate heavy machinery when you are dizzy  · Get up slowly  from sitting or lying down  · Drink plenty of liquids  Liquids help prevent dehydration  Ask how much liquid to drink each day and which liquids are best for you  Follow up with your healthcare provider as directed:  Write down your questions so you remember to ask them during your visits  © 2017 [address] is for End User's use only and may not be sold, redistributed or otherwise used for commercial purposes  All illustrations and images included in CareNotes® are the copyrighted property of [employer]  or Gerald Baz[employer]  The above information is an  only  It is not intended as medical advice for individual conditions or treatments  Talk to your doctor, nurse or pharmacist before following any medical regimen to see if it is safe and effective for you

## 2018-07-31 LAB
ATRIAL RATE: 55 BPM
ATRIAL RATE: 56 BPM
P AXIS: 137 DEGREES
P AXIS: 150 DEGREES
P AXIS: 151 DEGREES
P AXIS: 50 DEGREES
PR INTERVAL: 134 MS
PR INTERVAL: 140 MS
PR INTERVAL: 140 MS
PR INTERVAL: 142 MS
PR INTERVAL: 144 MS
QRS AXIS: 136 DEGREES
QRS AXIS: 139 DEGREES
QRS AXIS: 139 DEGREES
QRS AXIS: 44 DEGREES
QRS AXIS: 46 DEGREES
QRSD INTERVAL: 80 MS
QRSD INTERVAL: 82 MS
QRSD INTERVAL: 84 MS
QRSD INTERVAL: 84 MS
QRSD INTERVAL: 86 MS
QT INTERVAL: 424 MS
QT INTERVAL: 426 MS
QT INTERVAL: 428 MS
QT INTERVAL: 430 MS
QT INTERVAL: 430 MS
QTC INTERVAL: 409 MS
QTC INTERVAL: 411 MS
QTC INTERVAL: 411 MS
QTC INTERVAL: 413 MS
QTC INTERVAL: 414 MS
T WAVE AXIS: 138 DEGREES
T WAVE AXIS: 144 DEGREES
T WAVE AXIS: 145 DEGREES
T WAVE AXIS: 147 DEGREES
T WAVE AXIS: 47 DEGREES
VENTRICULAR RATE: 55 BPM
VENTRICULAR RATE: 56 BPM

## 2018-08-08 ENCOUNTER — APPOINTMENT (EMERGENCY)
Dept: CT IMAGING | Facility: HOSPITAL | Age: 38
End: 2018-08-08
Payer: COMMERCIAL

## 2018-08-08 ENCOUNTER — OFFICE VISIT (OUTPATIENT)
Dept: INTERNAL MEDICINE CLINIC | Facility: CLINIC | Age: 38
End: 2018-08-08
Payer: COMMERCIAL

## 2018-08-08 ENCOUNTER — HOSPITAL ENCOUNTER (EMERGENCY)
Facility: HOSPITAL | Age: 38
Discharge: HOME | End: 2018-08-08
Attending: EMERGENCY MEDICINE | Admitting: EMERGENCY MEDICINE
Payer: COMMERCIAL

## 2018-08-08 VITALS
RESPIRATION RATE: 18 BRPM | HEIGHT: 67 IN | TEMPERATURE: 97.1 F | DIASTOLIC BLOOD PRESSURE: 66 MMHG | HEART RATE: 68 BPM | OXYGEN SATURATION: 95 % | SYSTOLIC BLOOD PRESSURE: 112 MMHG | WEIGHT: 153.22 LBS | BODY MASS INDEX: 24.05 KG/M2

## 2018-08-08 VITALS
HEART RATE: 91 BPM | WEIGHT: 153.2 LBS | BODY MASS INDEX: 24.04 KG/M2 | SYSTOLIC BLOOD PRESSURE: 124 MMHG | TEMPERATURE: 98.7 F | DIASTOLIC BLOOD PRESSURE: 88 MMHG | OXYGEN SATURATION: 98 % | HEIGHT: 67 IN

## 2018-08-08 DIAGNOSIS — R10.9 ABDOMINAL PAIN: Primary | ICD-10-CM

## 2018-08-08 DIAGNOSIS — R10.13 EPIGASTRIC PAIN: Primary | ICD-10-CM

## 2018-08-08 LAB
ALBUMIN SERPL BCP-MCNC: 4.8 G/DL (ref 3.5–5.7)
ALP SERPL-CCNC: 67 U/L (ref 40–150)
ALT SERPL W P-5'-P-CCNC: 33 U/L (ref 7–52)
AMYLASE SERPL-CCNC: 26 IU/L (ref 29–103)
ANION GAP SERPL CALCULATED.3IONS-SCNC: 14 MMOL/L (ref 4–13)
AST SERPL W P-5'-P-CCNC: 20 U/L (ref 13–39)
BASOPHILS # BLD AUTO: 0.1 THOUSANDS/ΜL (ref 0–0.1)
BASOPHILS NFR BLD AUTO: 1 % (ref 0–1)
BILIRUB SERPL-MCNC: 1 MG/DL (ref 0.2–1)
BILIRUB UR QL STRIP: ABNORMAL
BUN SERPL-MCNC: 12 MG/DL (ref 7–25)
CALCIUM SERPL-MCNC: 9.6 MG/DL (ref 8.6–10.5)
CHLORIDE SERPL-SCNC: 101 MMOL/L (ref 98–107)
CLARITY UR: CLEAR
CO2 SERPL-SCNC: 21 MMOL/L (ref 21–31)
COLOR UR: YELLOW
CREAT SERPL-MCNC: 1.02 MG/DL (ref 0.7–1.3)
EOSINOPHIL # BLD AUTO: 0.1 THOUSAND/ΜL (ref 0–0.61)
EOSINOPHIL NFR BLD AUTO: 1 % (ref 0–6)
ERYTHROCYTE [DISTWIDTH] IN BLOOD BY AUTOMATED COUNT: 13.2 % (ref 11.6–15.1)
GFR SERPL CREATININE-BSD FRML MDRD: 93 ML/MIN/1.73SQ M
GLUCOSE SERPL-MCNC: 70 MG/DL (ref 65–140)
GLUCOSE UR STRIP-MCNC: NEGATIVE MG/DL
HCT VFR BLD AUTO: 48.6 % (ref 42–52)
HGB BLD-MCNC: 16.6 G/DL (ref 12–17)
HGB UR QL STRIP.AUTO: NEGATIVE
KETONES UR STRIP-MCNC: ABNORMAL MG/DL
LEUKOCYTE ESTERASE UR QL STRIP: NEGATIVE
LIPASE SERPL-CCNC: 11 U/L (ref 11–82)
LYMPHOCYTES # BLD AUTO: 2.6 THOUSANDS/ΜL (ref 0.6–4.47)
LYMPHOCYTES NFR BLD AUTO: 28 % (ref 14–44)
MCH RBC QN AUTO: 32.1 PG (ref 26.8–34.3)
MCHC RBC AUTO-ENTMCNC: 34.2 G/DL (ref 31.4–37.4)
MCV RBC AUTO: 94 FL (ref 82–98)
MONOCYTES # BLD AUTO: 0.8 THOUSAND/ΜL (ref 0.17–1.22)
MONOCYTES NFR BLD AUTO: 8 % (ref 4–12)
NEUTROPHILS # BLD AUTO: 5.8 THOUSANDS/ΜL (ref 1.85–7.62)
NEUTS SEG NFR BLD AUTO: 62 % (ref 43–75)
NITRITE UR QL STRIP: NEGATIVE
NRBC BLD AUTO-RTO: 0 /100 WBCS
PH UR STRIP.AUTO: 5.5 [PH] (ref 5–8)
PLATELET # BLD AUTO: 218 THOUSANDS/UL (ref 149–390)
PMV BLD AUTO: 8.5 FL (ref 8.9–12.7)
POTASSIUM SERPL-SCNC: 4.1 MMOL/L (ref 3.5–5.5)
PROT SERPL-MCNC: 7.5 G/DL (ref 6.4–8.9)
PROT UR STRIP-MCNC: NEGATIVE MG/DL
RBC # BLD AUTO: 5.18 MILLION/UL (ref 3.88–5.62)
SODIUM SERPL-SCNC: 136 MMOL/L (ref 134–143)
SP GR UR STRIP.AUTO: 1.01 (ref 1–1.03)
UROBILINOGEN UR QL STRIP.AUTO: 0.2 E.U./DL
WBC # BLD AUTO: 9.3 THOUSAND/UL (ref 4.31–10.16)

## 2018-08-08 PROCEDURE — 83690 ASSAY OF LIPASE: CPT | Performed by: EMERGENCY MEDICINE

## 2018-08-08 PROCEDURE — 96361 HYDRATE IV INFUSION ADD-ON: CPT

## 2018-08-08 PROCEDURE — 36415 COLL VENOUS BLD VENIPUNCTURE: CPT | Performed by: EMERGENCY MEDICINE

## 2018-08-08 PROCEDURE — 99284 EMERGENCY DEPT VISIT MOD MDM: CPT

## 2018-08-08 PROCEDURE — 81003 URINALYSIS AUTO W/O SCOPE: CPT | Performed by: EMERGENCY MEDICINE

## 2018-08-08 PROCEDURE — 99214 OFFICE O/P EST MOD 30 MIN: CPT | Performed by: NURSE PRACTITIONER

## 2018-08-08 PROCEDURE — 85025 COMPLETE CBC W/AUTO DIFF WBC: CPT | Performed by: EMERGENCY MEDICINE

## 2018-08-08 PROCEDURE — 96374 THER/PROPH/DIAG INJ IV PUSH: CPT

## 2018-08-08 PROCEDURE — 80053 COMPREHEN METABOLIC PANEL: CPT | Performed by: EMERGENCY MEDICINE

## 2018-08-08 PROCEDURE — 74177 CT ABD & PELVIS W/CONTRAST: CPT

## 2018-08-08 PROCEDURE — 82150 ASSAY OF AMYLASE: CPT | Performed by: EMERGENCY MEDICINE

## 2018-08-08 RX ORDER — DIPHENHYDRAMINE HYDROCHLORIDE 50 MG/ML
25 INJECTION INTRAMUSCULAR; INTRAVENOUS ONCE
Status: COMPLETED | OUTPATIENT
Start: 2018-08-08 | End: 2018-08-08

## 2018-08-08 RX ADMIN — IOHEXOL 38 ML: 240 INJECTION, SOLUTION INTRATHECAL; INTRAVASCULAR; INTRAVENOUS; ORAL at 20:37

## 2018-08-08 RX ADMIN — SODIUM CHLORIDE 1000 ML: 0.9 INJECTION, SOLUTION INTRAVENOUS at 18:19

## 2018-08-08 RX ADMIN — DIPHENHYDRAMINE HYDROCHLORIDE 25 MG: 50 INJECTION INTRAMUSCULAR; INTRAVENOUS at 20:38

## 2018-08-08 RX ADMIN — IOHEXOL 50 ML: 350 INJECTION, SOLUTION INTRAVENOUS at 20:37

## 2018-08-08 NOTE — PATIENT INSTRUCTIONS
Go for STAT labs now to check for pancreatitis  If positive will need to go to hospital  If negative recommend seeing our office for further eval tomorrow  Can continue bentyl as needed  Drink plenty of fluids and rest  If any worsening symptoms over night, recommend going to ER

## 2018-08-08 NOTE — ED PROVIDER NOTES
History  Chief Complaint   Patient presents with    Abdominal Pain     Patient reports epigastric pain for approximately 1 week and mucousy stools for approximately 1 week  Today patient began and pain in his back directly behind the epigastrium worse when the epigastric pain is exacerbating  Patient was seen by his primary care physician who ordered lab testing and told patient if the pain gets worse to go to the emergency department  Patient reports she has Crohn's disease but does not believe this to be an exacerbation of his Crohn's  Patient also has had abdominal surgery including a cholecystectomy  History provided by:  Patient      Prior to Admission Medications   Prescriptions Last Dose Informant Patient Reported? Taking? Linaclotide (LINZESS) 145 MCG CAPS  Self Yes No   Sig: Take 145 mcg by mouth as needed     acetaminophen (TYLENOL) 500 mg tablet  Self Yes No   Sig: Take 10 mg/kg by mouth every 4 (four) hours as needed     cetirizine (ZyrTEC) 10 mg tablet  Self No No   Sig: Take 1 tablet (10 mg total) by mouth daily as needed for allergies   clonazePAM (KlonoPIN) 0 5 mg tablet 2018 at Unknown time Self No Yes   Sig: Take 1 tablet (0 5 mg total) by mouth every 12 (twelve) hours as needed for anxiety   dicyclomine (BENTYL) 20 mg tablet  Self No No   Sig: Take 1 tablet (20 mg total) by mouth every 6 (six) hours as needed (pain)   diphenhydrAMINE (BENADRYL) 25 mg tablet  Self Yes No   Sig: Take 25 mg by mouth as needed     fluticasone (FLONASE) 50 mcg/act nasal spray  Self Yes No   Si sprays into each nostril as needed        Facility-Administered Medications: None       Past Medical History:   Diagnosis Date    Abdominal pain     Abnormal liver function test     last assessed: Oct 16, 2013     Abnormal weight loss     last assessed: Yung 15, 2014     Allergic rhinitis due to pollen     last assessed: Yung 15, 2014     Anxiety     Anxiety     last assessed/resolved:  Aug 5, 2015    Phuc Sahni Asthma     last assessed: Yung 15, 2014     Benign essential HTN     last assessed/resolved: Feb 23, 2017     Cervical lymphadenopathy     last assessed/resolved: Feb 23, 2017     Chronic sinusitis     last assessed: Yung 15, 2014     Constipation     Crohn's disease ([facility] [address] ) 01/01/2011    last assessed: Yung 15, 2014     Dysuria     last assessed: April 29, 2016     Elevated BP without diagnosis of hypertension     last assessed/resolved: Feb 23, 2017     Esophageal reflux     last assessed/resolved: Feb 23, 2017     Eustachian tube anomaly     Resolved: Nov 9, 2017     External hemorrhoids     last assessed: Yung 15, 2014     Hypertension     borderline    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     last assessed/resolved: July 19, 2017     Pancreatic neoplasm     last assessed: Yung 15, 2014     Positive depression screening     resolved: July 24, 2017     Psoriasis     Seasonal allergies     Vitamin B12 deficiency     last assessed/resolved: Feb 23, 2017        Past Surgical History:   Procedure Laterality Date    CHOLECYSTECTOMY      resolved: 2011     COLONOSCOPY N/A 11/18/2016    Procedure: COLONOSCOPY;  Surgeon: Syeda Strong MD;  Location:  GI LAB; Service:     COLONOSCOPY N/A 4/28/2016    Procedure: COLONOSCOPY;  Surgeon: Syeda Strong MD;  Location: [facility] GI LAB; Service:     ESOPHAGOGASTRODUODENOSCOPY N/A 4/28/2016    Procedure: ESOPHAGOGASTRODUODENOSCOPY (EGD); Surgeon: Syeda Strong MD;  Location: [facility] GI LAB;   Service:     FRONTAL SINUSOTOMY      resolved: April 2009     PANCREAS SURGERY      HI REPAIR OF NASAL SEPTUM N/A 7/28/2017    Procedure: REVISION SEPTOPLASTY; TURBINOPLASTY; BILATERAL  GRAFTS; ALAR GRAFT; POSSIBLE AURICULAR CARTILAGE GRAFT;  Surgeon: Stanford Do MD;  Location:  MAIN OR;  Service: ENT    TONSILLECTOMY AND ADENOIDECTOMY         Family History   Problem Relation Age of Onset    Diabetes Mother         mellitus     Hypertension Mother     Anxiety disorder Mother     Thyroid disease Mother     Fibromyalgia Mother     Diabetes Father     Hypertension Father     Hypertension Sister     No Known Problems Brother     No Known Problems Maternal Grandmother     No Known Problems Maternal Grandfather     No Known Problems Paternal Grandmother     No Known Problems Paternal Grandfather     Diabetes Other         mellitus     Rheum arthritis Cousin      I have reviewed and agree with the history as documented  Social History   Substance Use Topics    Smoking status: Current Every Day Smoker     Packs/day: 0 50     Types: Cigarettes    Smokeless tobacco: Never Used      Comment: Dependence on nicotine in cigarettes - 1/2 PPD x 20 years     Alcohol use No      Comment: John consumes alcohol noted in "allscripts"         Review of Systems   Constitutional: Negative for chills and fever  HENT: Negative for rhinorrhea and sore throat  Eyes: Negative for visual disturbance  Respiratory: Negative for cough and shortness of breath  Cardiovascular: Negative for chest pain and leg swelling  Gastrointestinal: Positive for abdominal pain  Negative for diarrhea, nausea and vomiting  Genitourinary: Negative for dysuria  Musculoskeletal: Positive for back pain  Negative for myalgias  Skin: Negative for rash  Neurological: Negative for dizziness and headaches  Psychiatric/Behavioral: Negative for confusion  All other systems reviewed and are negative  Physical Exam  Physical Exam   Constitutional: He is oriented to person, place, and time  He appears well-developed and well-nourished  HENT:   Nose: Nose normal    Mouth/Throat: Oropharynx is clear and moist  No oropharyngeal exudate  Eyes: Conjunctivae and EOM are normal  Pupils are equal, round, and reactive to light  No scleral icterus  Neck: Normal range of motion  Neck supple  No JVD present  No tracheal deviation present     Cardiovascular: Normal rate, regular rhythm and normal heart sounds  No murmur heard  Pulmonary/Chest: Effort normal and breath sounds normal  No respiratory distress  He has no wheezes  He has no rales  Abdominal: Soft  Bowel sounds are normal  He exhibits no distension  There is tenderness  There is guarding  There is no rebound  No hernia  There is epigastric tenderness with palpation of the upper abdomen which mildly exacerbates patient's back pain  Bowel sounds are decreased  There is minimal guarding when palpating to the epigastric region  There is very minimal tenderness with palpation of the periumbilical region  There is no tenderness with palpation of the right upper quadrant right lower quadrant left upper quadrant or left lower quadrant  Musculoskeletal: Normal range of motion  He exhibits no edema or tenderness  Neurological: He is alert and oriented to person, place, and time  No cranial nerve deficit or sensory deficit  He exhibits normal muscle tone  5/5 motor, nl sens   Skin: Skin is warm and dry  Psychiatric: He has a normal mood and affect  His behavior is normal    Nursing note and vitals reviewed        Vital Signs  ED Triage Vitals [08/08/18 1801]   Temperature Pulse Respirations Blood Pressure SpO2   100 °F (37 8 °C) 103 18 149/97 97 %      Temp Source Heart Rate Source Patient Position - Orthostatic VS BP Location FiO2 (%)   Tympanic Monitor Lying Left arm --      Pain Score       7           Vitals:    08/08/18 1801 08/08/18 2006 08/08/18 2254   BP: 149/97 125/75 112/66   Pulse: 103 89 68   Patient Position - Orthostatic VS: Lying Lying        Visual Acuity      ED Medications  Medications   sodium chloride 0 9 % bolus 1,000 mL (0 mL Intravenous Stopped 8/8/18 2031)   diphenhydrAMINE (BENADRYL) injection 25 mg (25 mg Intravenous Given 8/8/18 2038)   iohexol (OMNIPAQUE) 240 MG/ML solution 50 mL (38 mL Oral Given 8/8/18 2037)   iohexol (OMNIPAQUE) 350 MG/ML injection (SINGLE-DOSE) 50 mL (50 mL Intravenous Given 8/8/18 2037)       Diagnostic Studies  Results Reviewed     Procedure Component Value Units Date/Time    Amylase [[id number]]  (Abnormal) Collected:  08/08/18 1817    Lab Status:  Final result Specimen:  Blood from Arm, Left Updated:  08/08/18 1856     Amylase 26 (L) IU/L     Lipase [[id number]]  (Normal) Collected:  08/08/18 1817    Lab Status:  Final result Specimen:  Blood from Arm, Left Updated:  08/08/18 1856     Lipase 11 u/L     CMP [[id number]]  (Abnormal) Collected:  08/08/18 1817    Lab Status:  Final result Specimen:  Blood from Arm, Left Updated:  08/08/18 1856     Sodium 136 mmol/L      Potassium 4 1 mmol/L      Chloride 101 mmol/L      CO2 21 mmol/L      Anion Gap 14 (H) mmol/L      BUN 12 mg/dL      Creatinine 1 02 mg/dL      Glucose 70 mg/dL      Calcium 9 6 mg/dL      AST 20 U/L      ALT 33 U/L      Alkaline Phosphatase 67 U/L      Total Protein 7 5 g/dL      Albumin 4 8 g/dL      Total Bilirubin 1 00 mg/dL      eGFR 93 ml/min/1 73sq m     Narrative:         National Kidney Disease Education Program recommendations are as follows:  GFR calculation is accurate only with a steady state creatinine  Chronic Kidney disease less than 60 ml/min/1 73 sq  meters  Kidney failure less than 15 ml/min/1 73 sq  meters      CBC and differential [[id number]]  (Abnormal) Collected:  08/08/18 1817    Lab Status:  Final result Specimen:  Blood from Arm, Left Updated:  08/08/18 1830     WBC 9 30 Thousand/uL      RBC 5 18 Million/uL      Hemoglobin 16 6 g/dL      Hematocrit 48 6 %      MCV 94 fL      MCH 32 1 pg      MCHC 34 2 g/dL      RDW 13 2 %      MPV 8 5 (L) fL      Platelets 420 Thousands/uL      nRBC 0 /100 WBCs      Neutrophils Relative 62 %      Lymphocytes Relative 28 %      Monocytes Relative 8 %      Eosinophils Relative 1 %      Basophils Relative 1 %      Neutrophils Absolute 5 80 Thousands/µL      Lymphocytes Absolute 2 60 Thousands/µL      Monocytes Absolute 0 80 Thousand/µL      Eosinophils Absolute 0 10 Thousand/µL Basophils Absolute 0 10 Thousands/µL     UA w Reflex to Microscopic w Reflex to Culture [[id number]]  (Abnormal) Collected:  08/08/18 1819    Lab Status:  Final result Specimen:  Urine from Urine, Clean Catch Updated:  08/08/18 1826     Color, UA Yellow     Clarity, UA Clear     Specific Gravity, UA 1 015     pH, UA 5 5     Leukocytes, UA Negative     Nitrite, UA Negative     Protein, UA Negative mg/dl      Glucose, UA Negative mg/dl      Ketones, UA 80 (3+) (A) mg/dl      Urobilinogen, UA 0 2 E U /dl      Bilirubin, UA 1+ (A)     Blood, UA Negative                 CT abdomen pelvis with contrast   Final Result by Tangela Daniel (08/08 2106)      Prior cholecystectomy  No acute findings  Signed by Ellen Doyle MD                 Procedures  Procedures       Phone Contacts  ED Phone Contact    ED Course  ED Course as of Aug 13 0712   Wed Aug 08, 2018   Chong Lloyd My plan for this patient is to sign out to the oncoming physician at 7:00 p m , Dr Cletus Lombard is scheduled  Patient is awaiting a CT scan of his abdomen with oral and IV contrast for probable acute pancreatitis  However, patient does have a history of Crohn's disease and this may be exacerbated this time also  MDM  CritCare Time    Disposition  Final diagnoses:   Abdominal pain     Time reflects when diagnosis was documented in both MDM as applicable and the Disposition within this note     Time User Action Codes Description Comment    8/8/2018 10:30 PM Cletus Lombard, [place] [phone] [R10 9] Abdominal pain       ED Disposition     ED Disposition Condition Comment    Discharge  [facility] discharge to home/self care      Condition at discharge: Stable        Follow-up Information    None         Discharge Medication List as of 8/8/2018 10:57 PM      CONTINUE these medications which have NOT CHANGED    Details   acetaminophen (TYLENOL) 500 mg tablet Take 10 mg/kg by mouth every 4 (four) hours as needed  , Historical Med cetirizine (ZyrTEC) 10 mg tablet Take 1 tablet (10 mg total) by mouth daily as needed for allergies, Starting Wed 4/4/2018, Normal      clonazePAM (KlonoPIN) 0 5 mg tablet Take 1 tablet (0 5 mg total) by mouth every 12 (twelve) hours as needed for anxiety, Starting Tue 7/10/2018, Print      dicyclomine (BENTYL) 20 mg tablet Take 1 tablet (20 mg total) by mouth every 6 (six) hours as needed (pain), Starting Tue 7/10/2018, Print      diphenhydrAMINE (BENADRYL) 25 mg tablet Take 25 mg by mouth as needed  , Historical Med      fluticasone (FLONASE) 50 mcg/act nasal spray 2 sprays into each nostril as needed  , Historical Med      Linaclotide (LINZESS) 145 MCG CAPS Take 145 mcg by mouth as needed  , Historical Med      diazepam (VALIUM) 5 mg tablet Take 1 tablet (5 mg total) by mouth every 8 (eight) hours as needed for anxiety for up to 10 days, Starting Sun 7/1/2018, Until Wed 7/11/2018, Print           No discharge procedures on file      ED Provider  Electronically Signed by           Patricia Biggs, DO  08/13/18 3295

## 2018-08-08 NOTE — PROGRESS NOTES
Assessment/Plan:     Diagnoses and all orders for this visit:    Epigastric pain  -     CBC and differential; Future  -     Basic metabolic panel; Future  -     Amylase; Future  -     Lipase; Future      advised patient that this could likely be pancreatitis with his severe epigastric pain radiating to his back  Will send him for stat lab work now  Close follow-up tomorrow  I made our on call aware of the patient's situation and advised the patient that if his labs are positive he will need to go to the emergency room tonight  I also advised him that if any of his symptoms worsen throughout the night he should go to the ER  Currently the patient appears stable  Vital signs within normal limits  Afebrile  Patient stated that he  Really did not feel well and that he was getting dizzy occasionally and therefore I advised that he call  Someone to drive him home  Offered him Zofran but the patient really is not having nausea  Advised that he can continue his Bentyl as needed for cramping  Subjective:      Patient ID: Layvonne Shone is a 41 y o  male  Abdominal Pain   This is a new problem  The current episode started 1 to 4 weeks ago (a little over 1 week)  The onset quality is gradual  The problem occurs constantly  The problem has been gradually worsening  The pain is located in the epigastric region  The pain is at a severity of 7/10  The pain is severe  The quality of the pain is aching (shooting pains)  The abdominal pain radiates to the back  Associated symptoms include anorexia, belching (belched up yellow bile today) and headaches (on and off)  Pertinent negatives include no arthralgias, constipation, diarrhea, dysuria, fever, flatus, frequency, hematochezia, hematuria, melena, myalgias, nausea or vomiting  Associated symptoms comments: Shakiness    The pain is aggravated by eating and palpation (drinking)  Relieved by: fetal position helps pain  Treatments tried: linzess and bentyl   The treatment provided no relief  He has not been eating much for 3 days  Yesterday he had 1 pack of ramen  He has been drinking multiple bottles of water per day to stay hydrated  Associated symptoms also include some dizziness with position changes from going from a kneeling position to standing  The following portions of the patient's history were reviewed and updated as appropriate: allergies, current medications, past family history, past medical history, past social history, past surgical history and problem list     Review of Systems   Constitutional: Negative for chills and fever  Gastrointestinal: Positive for abdominal pain and anorexia  Negative for abdominal distention, constipation, diarrhea, flatus, hematochezia, melena, nausea and vomiting  Genitourinary: Negative for dysuria, frequency and hematuria  Musculoskeletal: Negative for arthralgias and myalgias  Neurological: Positive for dizziness and headaches (on and off)  Negative for syncope and light-headedness  Past Medical History:   Diagnosis Date    Abdominal pain     Abnormal liver function test     last assessed: Oct 16, 2013     Abnormal weight loss     last assessed: Yung 15, 2014     Allergic rhinitis due to pollen     last assessed: Yung 15, 2014     Anxiety     Anxiety     last assessed/resolved:  Aug 5, 2015     Asthma     last assessed: Yung 15, 2014     Benign essential HTN     last assessed/resolved: Feb 23, 2017     Celiac disease     Cervical lymphadenopathy     last assessed/resolved: Feb 23, 2017     Chronic sinusitis     last assessed: Yung 15, 2014     Constipation     Crohn's disease ([facility] [address] ) 01/01/2011    last assessed: Yung 15, 2014     Dysuria     last assessed: April 29, 2016     Elevated BP without diagnosis of hypertension     last assessed/resolved: Feb 23, 2017     Esophageal reflux     last assessed/resolved: Feb 23, 2017     Eustachian tube anomaly     Resolved: Nov 9, 2017    Paras Cook External hemorrhoids     last assessed: Yung 15, 2014     Hypertension     borderline    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     last assessed/resolved: July 19, 2017     Pancreatic neoplasm     last assessed: Yung 15, 2014     Positive depression screening     resolved: July 24, 2017     Psoriasis     Seasonal allergies     Vitamin B12 deficiency     last assessed/resolved: Feb 23, 2017          Current Outpatient Prescriptions:     acetaminophen (TYLENOL) 500 mg tablet, Take 10 mg/kg by mouth every 4 (four) hours as needed  , Disp: , Rfl:     cetirizine (ZyrTEC) 10 mg tablet, Take 1 tablet (10 mg total) by mouth daily as needed for allergies, Disp: 30 tablet, Rfl: 5    clonazePAM (KlonoPIN) 0 5 mg tablet, Take 1 tablet (0 5 mg total) by mouth every 12 (twelve) hours as needed for anxiety, Disp: 90 tablet, Rfl: 1    dicyclomine (BENTYL) 20 mg tablet, Take 1 tablet (20 mg total) by mouth every 6 (six) hours as needed (pain), Disp: 60 tablet, Rfl: 1    diphenhydrAMINE (BENADRYL) 25 mg tablet, Take 25 mg by mouth as needed  , Disp: , Rfl:     fluticasone (FLONASE) 50 mcg/act nasal spray, 2 sprays into each nostril as needed  , Disp: , Rfl:     Linaclotide (LINZESS) 145 MCG CAPS, Take 145 mcg by mouth as needed  , Disp: , Rfl:     diazepam (VALIUM) 5 mg tablet, Take 1 tablet (5 mg total) by mouth every 8 (eight) hours as needed for anxiety for up to 10 days, Disp: 8 tablet, Rfl: 0    Allergies   Allergen Reactions    Advil [Ibuprofen] Anaphylaxis and Hives     Category: Allergy;     Apple Anaphylaxis    Aspirin Anaphylaxis and Hives     Category: Allergy;     Eggs Or Egg-Derived Products Anaphylaxis     Category: Allergy;     Nsaids Anaphylaxis     Category: Allergy;     Other Anaphylaxis     Category: Allergy;  Annotation - [id number]: cherries, grapes , and kiwis    Peanuts [Peanut Oil] Anaphylaxis    Pollen Extract     Codeine Anxiety       Social History   Past Surgical History:   Procedure Laterality Date    CHOLECYSTECTOMY      resolved: 2011     COLONOSCOPY N/A 11/18/2016    Procedure: COLONOSCOPY;  Surgeon: Fabrice Walker MD;  Location:  GI LAB; Service:     COLONOSCOPY N/A 4/28/2016    Procedure: COLONOSCOPY;  Surgeon: Fabrice Walker MD;  Location: [facility] GI LAB; Service:     ESOPHAGOGASTRODUODENOSCOPY N/A 4/28/2016    Procedure: ESOPHAGOGASTRODUODENOSCOPY (EGD); Surgeon: Fabrice Walker MD;  Location: [facility] GI LAB; Service:    [address]      resolved: April 2009     PANCREAS SURGERY      AR REPAIR OF NASAL SEPTUM N/A 7/28/2017    Procedure: REVISION SEPTOPLASTY; TURBINOPLASTY; BILATERAL  GRAFTS; ALAR GRAFT; POSSIBLE AURICULAR CARTILAGE GRAFT;  Surgeon: Dhiraj Andrew MD;  Location:  MAIN OR;  Service: ENT    TONSILLECTOMY AND ADENOIDECTOMY       Family History   Problem Relation Age of Onset    Diabetes Mother         mellitus     Hypertension Mother     Anxiety disorder Mother     Thyroid disease Mother     Fibromyalgia Mother     Diabetes Father     Hypertension Father     Hypertension Sister     No Known Problems Brother     No Known Problems Maternal Grandmother     No Known Problems Maternal Grandfather     No Known Problems Paternal Grandmother     No Known Problems Paternal Grandfather     Diabetes Other         mellitus     Rheum arthritis Cousin        Objective:  /88 (BP Location: Right arm, Patient Position: Sitting, Cuff Size: Adult)   Pulse 91   Temp 98 7 °F (37 1 °C) (Oral)   Ht 5' 7" (1 702 m)   Wt 69 5 kg (153 lb 3 2 oz)   SpO2 98% Comment: room air  BMI 23 99 kg/m²      Physical Exam   Constitutional: He is oriented to person, place, and time  He appears well-developed and well-nourished  No distress  HENT:   Head: Normocephalic and atraumatic     Right Ear: Tympanic membrane and external ear normal    Left Ear: Tympanic membrane and external ear normal    Nose: Nose normal    Mouth/Throat: Oropharynx is clear and moist    Eyes: Conjunctivae and EOM are normal  Pupils are equal, round, and reactive to light  Cardiovascular: Normal rate, regular rhythm and normal heart sounds  No murmur heard  Pulmonary/Chest: Effort normal and breath sounds normal  No respiratory distress  He has no decreased breath sounds  He has no wheezes  He has no rhonchi  Abdominal: Soft  Normal appearance and bowel sounds are normal  He exhibits no distension and no mass  There is tenderness in the epigastric area  There is no CVA tenderness, no tenderness at McBurney's point and negative Coleman's sign  Musculoskeletal: He exhibits no edema  Thoracic back: He exhibits tenderness  He exhibits no bony tenderness, no swelling, no edema and no spasm  Back:    Neurological: He is alert and oriented to person, place, and time  Skin: Skin is warm and dry  He is not diaphoretic  Psychiatric: He has a normal mood and affect  His behavior is normal    Vitals reviewed

## 2018-08-09 ENCOUNTER — OFFICE VISIT (OUTPATIENT)
Dept: INTERNAL MEDICINE CLINIC | Age: 38
End: 2018-08-09
Payer: COMMERCIAL

## 2018-08-09 VITALS
DIASTOLIC BLOOD PRESSURE: 74 MMHG | TEMPERATURE: 98.5 F | OXYGEN SATURATION: 97 % | HEART RATE: 81 BPM | SYSTOLIC BLOOD PRESSURE: 106 MMHG

## 2018-08-09 DIAGNOSIS — R00.2 PALPITATIONS: Primary | ICD-10-CM

## 2018-08-09 DIAGNOSIS — K50.919 CROHN'S DISEASE WITH COMPLICATION, UNSPECIFIED GASTROINTESTINAL TRACT LOCATION (HCC): ICD-10-CM

## 2018-08-09 DIAGNOSIS — R42 DIZZINESS: ICD-10-CM

## 2018-08-09 DIAGNOSIS — R10.13 EPIGASTRIC PAIN: ICD-10-CM

## 2018-08-09 PROCEDURE — 99214 OFFICE O/P EST MOD 30 MIN: CPT | Performed by: NURSE PRACTITIONER

## 2018-08-09 NOTE — PROGRESS NOTES
Assessment/Plan:    Patient presents for follow-up from emergency room yesterday for abdominal pain  Reviewed the ER records to include CT abdomen with contrast, labs  Patient with a past medical history of Crohn's disease and was felt likely secondary to IBD  Patient is followed by [address] [place] [address] Gastroenterology but he feels they are not helping with his pain  He is seeking a 2nd opinion with Dr Pretty Pulling  No further work workup is warranted at this time  Patient did report tenderness on palpation to right upper quadrant, epigastric and left upper quadrant  However he did not appear in discomfort with palpation  Patient was instructed to patient by aggravating foods or factors  In follow-up with Gastroenterology  He should stay well hydrated  Additionally patient presented for follow up for emergency department one week ago for dizziness, lightheadedness, palpitations  Reviewed ER records to include CT of the head, labs, EKG  Of note orthostatic vitals were ordered  Do not see results  Patient told me were negative  Possibly secondary to dehydration relating to his abdominal pain S his symptoms are intermittent dizziness and lightheadedness  Will check holter    Patient to follow in two weeks, sooner if needed  Diagnoses and all orders for this visit:    Palpitations  -     Holter monitor - 48 hour; Future    Epigastric pain    Dizziness  -     Holter monitor - 48 hour; Future    Crohn's disease with complication, unspecified gastrointestinal tract location [facility])          Subjective:      Patient ID: Radha Lott is a 41 y o  male  Patient presents for emergency room follow-up  Patient was seen in office yesterday and emergency department yesterday for abdominal pain  Patient had a CT abdomen with contrast completed with no acute findings, labs  Abdominal Pain   This is a new problem  Episode onset: 1 week  The problem occurs constantly  The problem has been waxing and waning   The pain is located in the epigastric region, RUQ and right flank  The quality of the pain is aching, cramping, dull and sharp (stabbing  pressure)  Associated symptoms include constipation, diarrhea, headaches, nausea, vomiting and weight loss  Pertinent negatives include no belching, dysuria, fever, frequency, hematochezia or hematuria  Nothing aggravates the pain  Prior diagnostic workup includes lower endoscopy, GI consult, CT scan and upper endoscopy  His past medical history is significant for Crohn's disease  Patient is followed by [facility]  Patient states that they are not helping him with his pain and will not provide further medications  Patient is seeking a 2nd opinion with Dr Tej Martin with Gastroenterology  Additionally patient is following up regarding palpitations and dizziness that he was seen in the emergency department for one week ago  Patient states he has been having intermittent dizziness and lightheadedness primarily with standing as well as intermittent palpitations  On 07/30 patient was the emergency department he had a CT of the head which was no acute, a chest x-ray which was normal, EKG showing sinus bradycardia with no acute changes, labs  Additionally he had orthostatic vital signs completed  I see order from emergency room physician do not see results  Patient was told that they were normal     The following portions of the patient's history were reviewed and updated as appropriate: allergies, current medications, past family history, past medical history, past social history, past surgical history and problem list     Review of Systems   Constitutional: Positive for fatigue and weight loss  Negative for chills and fever  Respiratory: Negative for cough, chest tightness, shortness of breath and wheezing  Cardiovascular: Positive for palpitations (intermittently)  Negative for chest pain and leg swelling     Gastrointestinal: Positive for abdominal pain, constipation, diarrhea, nausea and vomiting  Negative for blood in stool and hematochezia  Genitourinary: Negative for dysuria, frequency and hematuria  Neurological: Positive for dizziness (intermittent for 1 week) and headaches  Negative for syncope, weakness, light-headedness and numbness  Past Medical History:   Diagnosis Date    Abdominal pain     Abnormal liver function test     last assessed: Oct 16, 2013     Abnormal weight loss     last assessed: Yung 15, 2014     Allergic rhinitis due to pollen     last assessed: Yung 15, 2014     Anxiety     Anxiety     last assessed/resolved:  Aug 5, 2015     Asthma     last assessed: Yung 15, 2014     Benign essential HTN     last assessed/resolved: Feb 23, 2017     Cervical lymphadenopathy     last assessed/resolved: Feb 23, 2017     Chronic sinusitis     last assessed: Yung 15, 2014     Constipation     [facility]    last assessed: Yung 15, 2014     Dysuria     last assessed: April 29, 2016     Elevated BP without diagnosis of hypertension     last assessed/resolved: Feb 23, 2017     Esophageal reflux     last assessed/resolved: Feb 23, 2017     Eustachian tube anomaly     Resolved: Nov 9, 2017     External hemorrhoids     last assessed: Yung 15, 2014     Hypertension     borderline    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     last assessed/resolved: July 19, 2017     Pancreatic neoplasm     last assessed: Yung 15, 2014     Positive depression screening     resolved: July 24, 2017     Psoriasis     Seasonal allergies     Vitamin B12 deficiency     last assessed/resolved: Feb 23, 2017          Current Outpatient Prescriptions:     acetaminophen (TYLENOL) 500 mg tablet, Take 10 mg/kg by mouth every 4 (four) hours as needed  , Disp: , Rfl:     cetirizine (ZyrTEC) 10 mg tablet, Take 1 tablet (10 mg total) by mouth daily as needed for allergies, Disp: 30 tablet, Rfl: 5    clonazePAM (KlonoPIN) 0 5 mg tablet, Take 1 tablet (0 5 mg total) by mouth every 12 (twelve) hours as needed for anxiety, Disp: 90 tablet, Rfl: 1    dicyclomine (BENTYL) 20 mg tablet, Take 1 tablet (20 mg total) by mouth every 6 (six) hours as needed (pain), Disp: 60 tablet, Rfl: 1    diphenhydrAMINE (BENADRYL) 25 mg tablet, Take 25 mg by mouth as needed  , Disp: , Rfl:     fluticasone (FLONASE) 50 mcg/act nasal spray, 2 sprays into each nostril as needed  , Disp: , Rfl:     Linaclotide (LINZESS) 145 MCG CAPS, Take 145 mcg by mouth as needed  , Disp: , Rfl:     diazepam (VALIUM) 5 mg tablet, Take 1 tablet (5 mg total) by mouth every 8 (eight) hours as needed for anxiety for up to 10 days, Disp: 8 tablet, Rfl: 0  No current facility-administered medications for this visit  Allergies   Allergen Reactions    Advil [Ibuprofen] Anaphylaxis and Hives     Category: Allergy;     Apple Anaphylaxis    Aspirin Anaphylaxis and Hives     Category: Allergy;     Eggs Or Egg-Derived Products Anaphylaxis     Category: Allergy;     Nsaids Anaphylaxis     Category: Allergy;     Other Anaphylaxis     Category: Allergy; [facility] - [id number]: cherries, grapes , and kiwis    Peanuts [Peanut Oil] Anaphylaxis    Pollen Extract     Codeine Anxiety       Social History   Past Surgical History:   Procedure Laterality Date    CHOLECYSTECTOMY      resolved: 2011     COLONOSCOPY N/A 11/18/2016    Procedure: COLONOSCOPY;  Surgeon: Aden Martinez MD;  Location:  GI LAB; Service:     COLONOSCOPY N/A 4/28/2016    Procedure: COLONOSCOPY;  Surgeon: Aden Martinez MD;  Location: [facility] LAB; Service:     ESOPHAGOGASTRODUODENOSCOPY N/A 4/28/2016    Procedure: ESOPHAGOGASTRODUODENOSCOPY (EGD); Surgeon: Aden Martinez MD;  Location: [facility] GI LAB;   Service:     FRONTAL SINUSOTOMY      resolved: April 2009     PANCREAS SURGERY      ME REPAIR OF NASAL SEPTUM N/A 7/28/2017    Procedure: REVISION SEPTOPLASTY; TURBINOPLASTY; BILATERAL  GRAFTS; ALAR GRAFT; POSSIBLE AURICULAR CARTILAGE GRAFT;  Surgeon: Jocelin Sumner MD;  Location: [facility];  Service: ENT    TONSILLECTOMY AND ADENOIDECTOMY       Family History   Problem Relation Age of Onset    Diabetes Mother         mellitus     Hypertension Mother     Anxiety disorder Mother     Thyroid disease Mother     Fibromyalgia Mother     Diabetes Father     Hypertension Father     Hypertension Sister     No Known Problems Brother     No Known Problems Maternal Grandmother     No Known Problems Maternal Grandfather     No Known Problems Paternal Grandmother     No Known Problems Paternal Grandfather     Diabetes Other         mellitus     Rheum arthritis Cousin        Objective:  /74 (BP Location: Left arm, Patient Position: Sitting, Cuff Size: Standard)   Pulse 81   Temp 98 5 °F (36 9 °C) (Tympanic)   SpO2 97%      Physical Exam   Constitutional: He is oriented to person, place, and time  He appears well-developed and well-nourished  No distress  Cardiovascular: Normal rate, regular rhythm and normal heart sounds  No murmur heard  No pedal edema   Pulmonary/Chest: Effort normal and breath sounds normal  No respiratory distress  He has no wheezes  Abdominal: Soft  Bowel sounds are normal  He exhibits no distension and no mass  There is tenderness (Patient reports tenderness with palpation to right upper quadrant, epigastric and left upper quadrant  However upon exam he does not appear in discomfort with palpation  )  There is no rebound and no guarding  Neurological: He is alert and oriented to person, place, and time  No focal deficits   Skin: Skin is warm and dry  Psychiatric: He has a normal mood and affect  His behavior is normal  Judgment and thought content normal    Nursing note and vitals reviewed

## 2018-08-09 NOTE — ED PROVIDER NOTES
History  Chief Complaint   Patient presents with    Abdominal Pain     HPI    Prior to Admission Medications   Prescriptions Last Dose Informant Patient Reported? Taking? Linaclotide (LINZESS) 145 MCG CAPS  Self Yes No   Sig: Take 145 mcg by mouth as needed     acetaminophen (TYLENOL) 500 mg tablet  Self Yes No   Sig: Take 10 mg/kg by mouth every 4 (four) hours as needed     cetirizine (ZyrTEC) 10 mg tablet  Self No No   Sig: Take 1 tablet (10 mg total) by mouth daily as needed for allergies   clonazePAM (KlonoPIN) 0 5 mg tablet 2018 at Unknown time Self No Yes   Sig: Take 1 tablet (0 5 mg total) by mouth every 12 (twelve) hours as needed for anxiety   diazepam (VALIUM) 5 mg tablet   No No   Sig: Take 1 tablet (5 mg total) by mouth every 8 (eight) hours as needed for anxiety for up to 10 days   dicyclomine (BENTYL) 20 mg tablet  Self No No   Sig: Take 1 tablet (20 mg total) by mouth every 6 (six) hours as needed (pain)   diphenhydrAMINE (BENADRYL) 25 mg tablet  Self Yes No   Sig: Take 25 mg by mouth as needed     fluticasone (FLONASE) 50 mcg/act nasal spray  Self Yes No   Si sprays into each nostril as needed        Facility-Administered Medications: None       Past Medical History:   Diagnosis Date    Abdominal pain     Abnormal liver function test     last assessed: Oct 16, 2013     Abnormal weight loss     last assessed: Yung 15, 2014     Allergic rhinitis due to pollen     last assessed: Yung 15, 2014     Anxiety     Anxiety     last assessed/resolved:  Aug 5, 2015     Asthma     last assessed: Yung 15, 2014     Benign essential HTN     last assessed/resolved: 2017     Cervical lymphadenopathy     last assessed/resolved: 2017     Chronic sinusitis     last assessed: Yung 15, 2014     Constipation     Crohn's disease [facility]) 2011    last assessed: Yung 15, 2014     Dysuria     last assessed: 2016     Elevated BP without diagnosis of hypertension     last assessed/resolved: Feb 23, 2017     Esophageal reflux     last assessed/resolved: Feb 23, 2017     Eustachian tube anomaly     Resolved: Nov 9, 2017     External hemorrhoids     last assessed: Yung 15, 2014     Hypertension     borderline    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     last assessed/resolved: July 19, 2017     Pancreatic neoplasm     last assessed: Yung 15, 2014     Positive depression screening     resolved: July 24, 2017     Psoriasis     Seasonal allergies     Vitamin B12 deficiency     last assessed/resolved: Feb 23, 2017        Past Surgical History:   Procedure Laterality Date    CHOLECYSTECTOMY      resolved: 2011     COLONOSCOPY N/A 11/18/2016    Procedure: COLONOSCOPY;  Surgeon: Judit Boucher MD;  Location:  GI LAB; Service:     COLONOSCOPY N/A 4/28/2016    Procedure: COLONOSCOPY;  Surgeon: Judit Boucher MD;  Location: [facility] GI LAB; Service:     ESOPHAGOGASTRODUODENOSCOPY N/A 4/28/2016    Procedure: ESOPHAGOGASTRODUODENOSCOPY (EGD); Surgeon: Judit Bouchre MD;  Location: [facility] GI LAB;   Service:    [address] [place] [address]      resolved: April 2009     PANCREAS SURGERY      [place] REPAIR OF NASAL SEPTUM N/A 7/28/2017    Procedure: REVISION SEPTOPLASTY; TURBINOPLASTY; BILATERAL  GRAFTS; ALAR GRAFT; POSSIBLE AURICULAR CARTILAGE GRAFT;  Surgeon: Mark Wan MD;  Location:  MAIN OR;  Service: ENT    TONSILLECTOMY AND ADENOIDECTOMY         Family History   Problem Relation Age of Onset    Diabetes Mother         mellitus     Hypertension Mother    [facility] Anxiety disorder Mother     Thyroid disease Mother     Fibromyalgia Mother     Diabetes Father     Hypertension Father     Hypertension Sister     No Known Problems Brother     No Known Problems Maternal Grandmother     No Known Problems Maternal Grandfather     No Known Problems Paternal Grandmother     No Known Problems Paternal Grandfather     Diabetes Other         mellitus     Rheum arthritis Cousin      I have reviewed and agree with the history as documented      Social History   Substance Use Topics    Smoking status: Current Every Day Smoker     Packs/day: 0 50     Types: Cigarettes    Smokeless tobacco: Never Used      Comment: Dependence on nicotine in cigarettes - 1/2 PPD x 20 years     Alcohol use No      Comment: John consumes alcohol noted in "allscripts"         Review of Systems    Physical Exam  Physical Exam    Vital Signs  ED Triage Vitals [08/08/18 1801]   Temperature Pulse Respirations Blood Pressure SpO2   100 °F (37 8 °C) 103 18 149/97 97 %      Temp Source Heart Rate Source Patient Position - Orthostatic VS BP Location FiO2 (%)   Tympanic Monitor Lying Left arm --      Pain Score       7           Vitals:    08/08/18 1801 08/08/18 2006 08/08/18 2254   BP: 149/97 125/75 112/66   Pulse: 103 89 68   Patient Position - Orthostatic VS: Lying Lying        Visual Acuity      ED Medications  Medications   sodium chloride 0 9 % bolus 1,000 mL (0 mL Intravenous Stopped 8/8/18 2031)   diphenhydrAMINE (BENADRYL) injection 25 mg (25 mg Intravenous Given 8/8/18 2038)   iohexol (OMNIPAQUE) 240 MG/ML solution 50 mL (38 mL Oral Given 8/8/18 2037)   iohexol (OMNIPAQUE) 350 MG/ML injection (SINGLE-DOSE) 50 mL (50 mL Intravenous Given 8/8/18 2037)       Diagnostic Studies  Results Reviewed     Procedure Component Value Units Date/Time    Amylase [[id number]]  (Abnormal) Collected:  08/08/18 1817    Lab Status:  Final result Specimen:  Blood from Arm, Left Updated:  08/08/18 1856     Amylase 26 (L) IU/L     Lipase [[id number]]  (Normal) Collected:  08/08/18 1817    Lab Status:  Final result Specimen:  Blood from Arm, Left Updated:  08/08/18 1856     Lipase 11 u/L     CMP [[id number]]  (Abnormal) Collected:  08/08/18 1817    Lab Status:  Final result Specimen:  Blood from Arm, Left Updated:  08/08/18 1856     Sodium 136 mmol/L      Potassium 4 1 mmol/L      Chloride 101 mmol/L      CO2 21 mmol/L      Anion Gap 14 (H) mmol/L BUN 12 mg/dL      Creatinine 1 02 mg/dL      Glucose 70 mg/dL      Calcium 9 6 mg/dL      AST 20 U/L      ALT 33 U/L      Alkaline Phosphatase 67 U/L      Total Protein 7 5 g/dL      Albumin 4 8 g/dL      Total Bilirubin 1 00 mg/dL      eGFR 93 ml/min/1 73sq m     Narrative:         National Kidney Disease Education Program recommendations are as follows:  GFR calculation is accurate only with a steady state creatinine  Chronic Kidney disease less than 60 ml/min/1 73 sq  meters  Kidney failure less than 15 ml/min/1 73 sq  meters  CBC and differential [[id number]]  (Abnormal) Collected:  08/08/18 1817    Lab Status:  Final result Specimen:  Blood from Arm, Left Updated:  08/08/18 1830     WBC 9 30 Thousand/uL      RBC 5 18 Million/uL      Hemoglobin 16 6 g/dL      Hematocrit 48 6 %      MCV 94 fL      MCH 32 1 pg      MCHC 34 2 g/dL      RDW 13 2 %      MPV 8 5 (L) fL      Platelets 271 Thousands/uL      nRBC 0 /100 WBCs      Neutrophils Relative 62 %      Lymphocytes Relative 28 %      Monocytes Relative 8 %      Eosinophils Relative 1 %      Basophils Relative 1 %      Neutrophils Absolute 5 80 Thousands/µL      Lymphocytes Absolute 2 60 Thousands/µL      Monocytes Absolute 0 80 Thousand/µL      Eosinophils Absolute 0 10 Thousand/µL      Basophils Absolute 0 10 Thousands/µL     UA w Reflex to Microscopic w Reflex to Culture [[id number]]  (Abnormal) Collected:  08/08/18 1819    Lab Status:  Final result Specimen:  Urine from Urine, Clean Catch Updated:  08/08/18 1826     Color, UA Yellow     Clarity, UA Clear     Specific Gravity, UA 1 015     pH, UA 5 5     Leukocytes, UA Negative     Nitrite, UA Negative     Protein, UA Negative mg/dl      Glucose, UA Negative mg/dl      Ketones, UA 80 (3+) (A) mg/dl      Urobilinogen, UA 0 2 E U /dl      Bilirubin, UA 1+ (A)     Blood, UA Negative                 CT abdomen pelvis with contrast   Final Result by Mindi Rinne (08/08 2106)      Prior cholecystectomy    No acute findings  Signed by Ema Parham MD                 Procedures  Procedures       Phone Contacts  ED Phone Contact    ED Course  ED Course as of Aug 09 0405   Wed Aug 08, 2018   0200 Patient's labs tests were benign  So, no pancreatitis or elevated WBC  His CT was reported as non-surgical/benign by Radiologist  He says he had past history of Crohn's  Therefore, this could easily be IBD episode  He says he is allergic to NSAID  But he does not want a brief Rx  Of Percocets  Told F/U with FMD or return if worse  MDM  CritCare Time    Disposition  Final diagnoses:   Abdominal pain     Time reflects when diagnosis was documented in both MDM as applicable and the Disposition within this note     Time User Action Codes Description Comment    8/8/2018 10:30 PM [address] [facility], [place] [address] [R10 9] Abdominal pain       ED Disposition     ED Disposition Condition Comment    Discharge  [facility] discharge to home/self care      Condition at discharge: Stable        Follow-up Information    None         Discharge Medication List as of 8/8/2018 10:57 PM      CONTINUE these medications which have NOT CHANGED    Details   clonazePAM (KlonoPIN) 0 5 mg tablet Take 1 tablet (0 5 mg total) by mouth every 12 (twelve) hours as needed for anxiety, Starting Tue 7/10/2018, Print      acetaminophen (TYLENOL) 500 mg tablet Take 10 mg/kg by mouth every 4 (four) hours as needed  , Historical Med      cetirizine (ZyrTEC) 10 mg tablet Take 1 tablet (10 mg total) by mouth daily as needed for allergies, Starting Wed 4/4/2018, Normal      diazepam (VALIUM) 5 mg tablet Take 1 tablet (5 mg total) by mouth every 8 (eight) hours as needed for anxiety for up to 10 days, Starting Sun 7/1/2018, Until Wed 7/11/2018, Print      dicyclomine (BENTYL) 20 mg tablet Take 1 tablet (20 mg total) by mouth every 6 (six) hours as needed (pain), Starting Tue 7/10/2018, Print      diphenhydrAMINE (BENADRYL) 25 mg tablet Take 25 mg by mouth as needed  , Historical Med      fluticasone (FLONASE) 50 mcg/act nasal spray 2 sprays into each nostril as needed  , Historical Med      Linaclotide (LINZESS) 145 MCG CAPS Take 145 mcg by mouth as needed  , Historical Med           No discharge procedures on file      ED Provider  Electronically Signed by           Christine Nova MD  08/09/18 2203

## 2018-08-09 NOTE — PATIENT INSTRUCTIONS
Follow-up with GI - Dr London Cevallos and/or Dr Miranda Hodge re: abdominal pain and chron's    Will check holter for palpitations and dizziness    Follow-up in 2 weeks, sooner if needed

## 2018-08-09 NOTE — ED NOTES
Patient states he is shaky since he came back from CT scan  Dr Salomon Poster at bedside and examined patient  Will order dose of Benadryl IV  Patient is having some fine tremors of upper extremities no hives or trouble breathing       Natalia Taylor RN  08/08/18 2034

## 2018-08-09 NOTE — ED NOTES
8/8/19 6649   Patient left without his discharge instructions, unwilling to "wait any longer "  IV heplock was removed     China Vences, CHANG  08/08/18 2865

## 2018-08-09 NOTE — DISCHARGE INSTRUCTIONS

## 2018-08-13 ENCOUNTER — HOSPITAL ENCOUNTER (OUTPATIENT)
Dept: NON INVASIVE DIAGNOSTICS | Facility: HOSPITAL | Age: 38
Discharge: HOME | End: 2018-08-13
Payer: COMMERCIAL

## 2018-08-13 DIAGNOSIS — R42 DIZZINESS: ICD-10-CM

## 2018-08-13 DIAGNOSIS — R00.2 PALPITATIONS: ICD-10-CM

## 2018-08-13 PROCEDURE — 93226 XTRNL ECG REC<48 HR SCAN A/R: CPT

## 2018-08-13 PROCEDURE — 93225 XTRNL ECG REC<48 HRS REC: CPT

## 2018-08-17 PROCEDURE — 93227 XTRNL ECG REC<48 HR R&I: CPT | Performed by: INTERNAL MEDICINE

## 2018-08-24 ENCOUNTER — OFFICE VISIT (OUTPATIENT)
Dept: INTERNAL MEDICINE CLINIC | Facility: CLINIC | Age: 38
End: 2018-08-24
Payer: COMMERCIAL

## 2018-08-24 VITALS
SYSTOLIC BLOOD PRESSURE: 130 MMHG | OXYGEN SATURATION: 98 % | HEART RATE: 85 BPM | TEMPERATURE: 98.1 F | DIASTOLIC BLOOD PRESSURE: 80 MMHG

## 2018-08-24 DIAGNOSIS — L02.216 ABSCESS OF UMBILICUS: ICD-10-CM

## 2018-08-24 DIAGNOSIS — R10.33 PERIUMBILICAL ABDOMINAL PAIN: Primary | ICD-10-CM

## 2018-08-24 PROCEDURE — 99213 OFFICE O/P EST LOW 20 MIN: CPT | Performed by: NURSE PRACTITIONER

## 2018-08-24 RX ORDER — CEPHALEXIN 500 MG/1
500 CAPSULE ORAL EVERY 8 HOURS SCHEDULED
Qty: 21 CAPSULE | Refills: 0 | Status: SHIPPED | OUTPATIENT
Start: 2018-08-24 | End: 2018-08-30 | Stop reason: ALTCHOICE

## 2018-08-24 NOTE — ASSESSMENT & PLAN NOTE
Do not feel that further imaging is needed at this point in time  Will start patient on Keflex to take 3 times a day for the next 7 days, advised patient to continue to stay hydrated and take his antibiotic with food  Also advised patient to take antibiotic with probiotic to prevent GI complaints  Patient is to apply warm compresses to the umbilicus 4 times a day, and wash area with mild soap and water  Will consider abdominal ultrasound, if patient has continued pain

## 2018-08-24 NOTE — PROGRESS NOTES
Assessment/Plan:    Abscess of umbilicus    Do not feel that further imaging is needed at this point in time  Will start patient on Keflex to take 3 times a day for the next 7 days, advised patient to continue to stay hydrated and take his antibiotic with food  Also advised patient to take antibiotic with probiotic to prevent GI complaints  Patient is to apply warm compresses to the umbilicus 4 times a day, and wash area with mild soap and water  Will consider abdominal ultrasound, if patient has continued pain  Diagnoses and all orders for this visit:    Periumbilical abdominal pain    Abscess of umbilicus  -     cephalexin (KEFLEX) 500 mg capsule; Take 1 capsule (500 mg total) by mouth every 8 (eight) hours for 7 days          Subjective:      Patient ID: Prashant Blackburn is a 41 y o  male  Patient presents today with complaints of abdominal pain  For the past 4 days  Patient has not been taking anything for the pain  And the pain is not located in the umbilical area  Patient stated that a few days ago his belly button aged really bad, and a red "pimple appeared ", he reports that he did have some drainage from his belly button  He describes the discomfort as a "stabbing feeling over his belly button "  Of note patient was in the emergency room for abdominal pain on 08/08/2018, at which time a CT scan was performed  Patient has a new GI appointment on September 7, 2018 for his history of Crohn's disease  Of note patient also had had his gallbladder taken out  Patient denies any changes bowel habits  Patient denies blood in his stools  Abdominal Pain   This is a new problem  Episode onset: 4 days  The onset quality is gradual  The problem occurs intermittently  The problem has been waxing and waning  The pain is located in the periumbilical region  The abdominal pain does not radiate  Associated symptoms include diarrhea (one episode) and nausea (mild)   Pertinent negatives include no arthralgias, belching, constipation, dysuria, fever, flatus, frequency, headaches, hematuria or vomiting  Associated symptoms comments: Has mucous in his stool which he reports is normal  The pain is aggravated by movement  The pain is relieved by nothing  He has tried nothing (warm compresses) for the symptoms  His past medical history is significant for Crohn's disease  The following portions of the patient's history were reviewed and updated as appropriate: allergies, current medications, past family history, past medical history, past social history, past surgical history and problem list     Review of Systems   Constitutional: Negative for activity change, appetite change, chills, diaphoresis and fever  HENT: Negative for congestion, ear discharge, ear pain, postnasal drip, rhinorrhea, sinus pain, sinus pressure and sore throat  Eyes: Negative for pain, discharge, itching and visual disturbance  Respiratory: Negative for cough, chest tightness, shortness of breath and wheezing  Cardiovascular: Negative for chest pain, palpitations and leg swelling  Gastrointestinal: Positive for abdominal pain, diarrhea (one episode) and nausea (mild)  Negative for blood in stool, constipation, flatus and vomiting  Endocrine: Negative for polydipsia, polyphagia and polyuria  Genitourinary: Negative for difficulty urinating, dysuria, frequency, hematuria and urgency  Musculoskeletal: Negative for arthralgias, back pain and neck pain  Skin: Negative for rash and wound  Neurological: Negative for dizziness, weakness, numbness and headaches  Past Medical History:   Diagnosis Date    Abdominal pain     Abnormal liver function test     last assessed: Oct 16, 2013     Abnormal weight loss     last assessed: Yung 15, 2014     Allergic rhinitis due to pollen     last assessed: Yung 15, 2014     Anxiety     Anxiety     last assessed/resolved:  Aug 5, 2015     Asthma     last assessed: Yung 15, 2014     Benign essential HTN     last assessed/resolved: Feb 23, 2017     Cervical lymphadenopathy     last assessed/resolved: Feb 23, 2017     Chronic sinusitis     last assessed: Yung 15, 2014     Constipation     [facility]    last assessed: Yung 15, 2014     Dysuria     last assessed: April 29, 2016     Elevated BP without diagnosis of hypertension     last assessed/resolved: Feb 23, 2017     Esophageal reflux     last assessed/resolved: Feb 23, 2017     Eustachian tube anomaly     Resolved: Nov 9, 2017     External hemorrhoids     last assessed: Yung 15, 2014     Hypertension     borderline    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     last assessed/resolved: July 19, 2017     Pancreatic neoplasm     last assessed: Yung 15, 2014     Positive depression screening     resolved: July 24, 2017     Psoriasis     Seasonal allergies     Vitamin B12 deficiency     last assessed/resolved: Feb 23, 2017          Current Outpatient Prescriptions:     acetaminophen (TYLENOL) 500 mg tablet, Take 10 mg/kg by mouth every 4 (four) hours as needed  , Disp: , Rfl:     cetirizine (ZyrTEC) 10 mg tablet, Take 1 tablet (10 mg total) by mouth daily as needed for allergies, Disp: 30 tablet, Rfl: 5    clonazePAM (KlonoPIN) 0 5 mg tablet, Take 1 tablet (0 5 mg total) by mouth every 12 (twelve) hours as needed for anxiety, Disp: 90 tablet, Rfl: 1    dicyclomine (BENTYL) 20 mg tablet, Take 1 tablet (20 mg total) by mouth every 6 (six) hours as needed (pain), Disp: 60 tablet, Rfl: 1    diphenhydrAMINE (BENADRYL) 25 mg tablet, Take 25 mg by mouth as needed  , Disp: , Rfl:     fluticasone (FLONASE) 50 mcg/act nasal spray, 2 sprays into each nostril as needed  , Disp: , Rfl:     Linaclotide (LINZESS) 145 MCG CAPS, Take 145 mcg by mouth as needed  , Disp: , Rfl:     cephalexin (KEFLEX) 500 mg capsule, Take 1 capsule (500 mg total) by mouth every 8 (eight) hours for 7 days, Disp: 21 capsule, Rfl: 0    Allergies   Allergen Reactions    Advil [Ibuprofen] Anaphylaxis and Hives     Category: Allergy;     Apple Anaphylaxis    Aspirin Anaphylaxis and Hives     Category: Allergy;     Eggs Or Egg-Derived Products Anaphylaxis     Category: Allergy;     Nsaids Anaphylaxis     Category: Allergy;     Other Anaphylaxis     Category: Allergy; Annotation - [id number]: cherries, grapes , and kiwis    Peanuts [Peanut Oil] Anaphylaxis    Pollen Extract     Codeine Anxiety       Social History   Past Surgical History:   Procedure Laterality Date    CHOLECYSTECTOMY      resolved: 2011     COLONOSCOPY N/A 11/18/2016    Procedure: COLONOSCOPY;  Surgeon: Liyah Baker MD;  Location:  GI LAB; Service:     COLONOSCOPY N/A 4/28/2016    Procedure: COLONOSCOPY;  Surgeon: Liyah Baker MD;  Location: [facility]; Service:     ESOPHAGOGASTRODUODENOSCOPY N/A 4/28/2016    Procedure: ESOPHAGOGASTRODUODENOSCOPY (EGD); Surgeon: Liyah Baker MD;  Location: [facility] GI LAB;   Service:    [address] [place] [address]      resolved: April 2009     PANCREAS SURGERY      MA REPAIR OF NASAL SEPTUM N/A 7/28/2017    Procedure: REVISION SEPTOPLASTY; TURBINOPLASTY; BILATERAL  GRAFTS; ALAR GRAFT; POSSIBLE AURICULAR CARTILAGE GRAFT;  Surgeon: Lucinda Lam MD;  Location:  MAIN OR;  Service: ENT    TONSILLECTOMY AND ADENOIDECTOMY       Family History   Problem Relation Age of Onset    Diabetes Mother         mellitus     Hypertension Mother    [facility] Anxiety disorder Mother     Thyroid disease Mother     Fibromyalgia Mother     Diabetes Father     Hypertension Father     Hypertension Sister     No Known Problems Brother     No Known Problems Maternal Grandmother     No Known Problems Maternal Grandfather     No Known Problems Paternal Grandmother     No Known Problems Paternal Grandfather     Diabetes Other         mellitus     Rheum arthritis Cousin        Objective:  /80 (BP Location: Left arm, Patient Position: Sitting, Cuff Size: Adult)   Pulse 85   Temp 98 1 °F (36 7 °C) (Oral)   SpO2 98% Comment: room air    Recent Results (from the past 1344 hour(s))   Comprehensive metabolic panel    Collection Time: 07/01/18  7:31 PM   Result Value Ref Range    Sodium 142 136 - 145 mmol/L    Potassium 4 3 3 5 - 5 3 mmol/L    Chloride 104 100 - 108 mmol/L    CO2 30 21 - 32 mmol/L    Anion Gap 8 4 - 13 mmol/L    BUN 17 5 - 25 mg/dL    Creatinine 1 49 (H) 0 60 - 1 30 mg/dL    Glucose 84 65 - 140 mg/dL    Calcium 9 2 8 3 - 10 1 mg/dL    AST 23 5 - 45 U/L    ALT 38 12 - 78 U/L    Alkaline Phosphatase 68 46 - 116 U/L    Total Protein 7 1 6 4 - 8 2 g/dL    Albumin 3 9 3 5 - 5 0 g/dL    Total Bilirubin 0 36 0 20 - 1 00 mg/dL    eGFR 59 ml/min/1 73sq m   ECG 12 lead    Collection Time: 07/30/18  2:04 PM   Result Value Ref Range    Ventricular Rate 56 BPM    Atrial Rate 56 BPM    NJ Interval 142 ms    QRSD Interval 82 ms    QT Interval 428 ms    QTC Interval 413 ms    P [place] 151 degrees    QRS Axis 136 degrees    T Wave Axis 147 degrees   ECG 12 lead    Collection Time: 07/30/18  2:05 PM   Result Value Ref Range    Ventricular Rate 56 BPM    Atrial Rate 56 BPM    NJ Interval 134 ms    QRSD Interval 80 ms    QT Interval 426 ms    QTC Interval 411 ms    P Axis  degrees    QRS Axis 46 degrees    T Wave Axis 138 degrees   ECG 12 lead    Collection Time: 07/30/18  2:05 PM   Result Value Ref Range    Ventricular Rate 55 BPM    Atrial Rate 55 BPM    NJ Interval 144 ms    QRSD Interval 84 ms    QT Interval 430 ms    QTC Interval 411 ms    P [place] 137 degrees    QRS Axis 139 degrees    T Wave Axis 145 degrees   ECG 12 lead    Collection Time: 07/30/18  2:06 PM   Result Value Ref Range    Ventricular Rate 56 BPM    Atrial Rate 56 BPM    NJ Interval 140 ms    QRSD Interval 84 ms    QT Interval 430 ms    QTC Interval 414 ms    P Axis 150 degrees    QRS Axis 139 degrees    T Wave Axis 144 degrees   ECG 12 lead    Collection Time: 07/30/18  2:09 PM Result Value Ref Range    Ventricular Rate 56 BPM    Atrial Rate 56 BPM    DC Interval 140 ms    QRSD Interval 86 ms    QT Interval 424 ms    QTC Interval 409 ms    P Axis 50 degrees    QRS Axis 44 degrees    T Wave Axis 47 degrees   CBC and differential    Collection Time: 07/30/18  2:15 PM   Result Value Ref Range    WBC 9 30 4 31 - 10 16 Thousand/uL    RBC 5 04 3 88 - 5 62 Million/uL    Hemoglobin 16 4 12 0 - 17 0 g/dL    Hematocrit 47 3 42 0 - 52 0 %    MCV 94 82 - 98 fL    MCH 32 5 26 8 - 34 3 pg    MCHC 34 6 31 4 - 37 4 g/dL    RDW 13 5 11 6 - 15 1 %    MPV 8 3 (L) 8 9 - 12 7 fL    Platelets 439 031 - 137 Thousands/uL    nRBC 0 /100 WBCs    Neutrophils Relative 69 43 - 75 %    Lymphocytes Relative 22 14 - 44 %    Monocytes Relative 7 4 - 12 %    Eosinophils Relative 1 0 - 6 %    Basophils Relative 1 0 - 1 %    Neutrophils Absolute 6 40 1 85 - 7 62 Thousands/µL    Lymphocytes Absolute 2 00 0 60 - 4 47 Thousands/µL    Monocytes Absolute 0 70 0 17 - 1 22 Thousand/µL    Eosinophils Absolute 0 10 0 00 - 0 61 Thousand/µL    Basophils Absolute 0 10 0 00 - 0 10 Thousands/µL   Comprehensive metabolic panel    Collection Time: 07/30/18  2:15 PM   Result Value Ref Range    Sodium 135 134 - 143 mmol/L    Potassium 3 8 3 5 - 5 5 mmol/L    Chloride 103 98 - 107 mmol/L    CO2 25 21 - 31 mmol/L    Anion Gap 7 4 - 13 mmol/L    BUN 10 7 - 25 mg/dL    Creatinine 1 11 0 70 - 1 30 mg/dL    Glucose 94 65 - 140 mg/dL    Calcium 9 8 8 6 - 10 5 mg/dL    AST 16 13 - 39 U/L    ALT 16 7 - 52 U/L    Alkaline Phosphatase 62 40 - 150 U/L    Total Protein 7 3 6 4 - 8 9 g/dL    Albumin 4 6 3 5 - 5 7 g/dL    Total Bilirubin 0 90 0 20 - 1 00 mg/dL    eGFR 84 ml/min/1 73sq m   Troponin I    Collection Time: 07/30/18  2:15 PM   Result Value Ref Range    Troponin I <0 03 <=0 03 ng/mL   Rapid drug screen, urine    Collection Time: 07/30/18  3:14 PM   Result Value Ref Range    Amph/Meth UR Negative Negative    Barbiturate Ur Negative Negative Benzodiazepine Urine Negative Negative    Cocaine Urine Negative Negative    Methadone Urine Negative Negative    Opiate Urine Negative Negative    PCP Ur Negative Negative    THC Urine Negative Negative   UA w Reflex to Microscopic w Reflex to Culture    Collection Time: 07/30/18  3:14 PM   Result Value Ref Range    Color, UA Yellow Yellow, Straw    Clarity, UA Clear Hazy, Clear    Specific Gravity, UA <=1 005 1 005 - 1 030    pH, UA 6 0 5 0-<8 0    Leukocytes, UA Negative Negative    Nitrite, UA Negative Negative    Protein, UA Negative Negative, Interference- unable to analyze mg/dl    Glucose, UA Negative Negative mg/dl    Ketones, UA Negative Negative mg/dl    Urobilinogen, UA 0 2 0 2, 1 0 E U /dl E U /dl    Bilirubin, UA Negative Negative    Blood, UA Negative Negative   CBC and differential    Collection Time: 08/08/18  6:17 PM   Result Value Ref Range    WBC 9 30 4 31 - 10 16 Thousand/uL    RBC 5 18 3 88 - 5 62 Million/uL    Hemoglobin 16 6 12 0 - 17 0 g/dL    Hematocrit 48 6 42 0 - 52 0 %    MCV 94 82 - 98 fL    MCH 32 1 26 8 - 34 3 pg    MCHC 34 2 31 4 - 37 4 g/dL    RDW 13 2 11 6 - 15 1 %    MPV 8 5 (L) 8 9 - 12 7 fL    Platelets 724 917 - 451 Thousands/uL    nRBC 0 /100 WBCs    Neutrophils Relative 62 43 - 75 %    Lymphocytes Relative 28 14 - 44 %    Monocytes Relative 8 4 - 12 %    Eosinophils Relative 1 0 - 6 %    Basophils Relative 1 0 - 1 %    Neutrophils Absolute 5 80 1 85 - 7 62 Thousands/µL    Lymphocytes Absolute 2 60 0 60 - 4 47 Thousands/µL    Monocytes Absolute 0 80 0 17 - 1 22 Thousand/µL    Eosinophils Absolute 0 10 0 00 - 0 61 Thousand/µL    Basophils Absolute 0 10 0 00 - 0 10 Thousands/µL   CMP    Collection Time: 08/08/18  6:17 PM   Result Value Ref Range    Sodium 136 134 - 143 mmol/L    Potassium 4 1 3 5 - 5 5 mmol/L    Chloride 101 98 - 107 mmol/L    CO2 21 21 - 31 mmol/L    Anion Gap 14 (H) 4 - 13 mmol/L    BUN 12 7 - 25 mg/dL    Creatinine 1 02 0 70 - 1 30 mg/dL    Glucose 70 65 - 140 mg/dL    Calcium 9 6 8 6 - 10 5 mg/dL    AST 20 13 - 39 U/L    ALT 33 7 - 52 U/L    Alkaline Phosphatase 67 40 - 150 U/L    Total Protein 7 5 6 4 - 8 9 g/dL    Albumin 4 8 3 5 - 5 7 g/dL    Total Bilirubin 1 00 0 20 - 1 00 mg/dL    eGFR 93 ml/min/1 73sq m   Lipase    Collection Time: 08/08/18  6:17 PM   Result Value Ref Range    Lipase 11 11 - 82 u/L   Amylase    Collection Time: 08/08/18  6:17 PM   Result Value Ref Range    Amylase 26 (L) 29 - 103 IU/L   UA w Reflex to Microscopic w Reflex to Culture    Collection Time: 08/08/18  6:19 PM   Result Value Ref Range    Color, UA Yellow Yellow, Straw    Clarity, UA Clear Hazy, Clear    Specific Gravity, UA 1 015 1 005 - 1 030    pH, UA 5 5 5 0-<8 0    Leukocytes, UA Negative Negative    Nitrite, UA Negative Negative    Protein, UA Negative Negative, Interference- unable to analyze mg/dl    Glucose, UA Negative Negative mg/dl    Ketones, UA 80 (3+) (A) Negative mg/dl    Urobilinogen, UA 0 2 0 2, 1 0 E U /dl E U /dl    Bilirubin, UA 1+ (A) Negative    Blood, UA Negative Negative            Physical Exam   Constitutional: He is oriented to person, place, and time  He appears well-developed and well-nourished  No distress  HENT:   Head: Normocephalic and atraumatic  Right Ear: External ear normal    Left Ear: External ear normal    Nose: Nose normal    Mouth/Throat: Oropharynx is clear and moist  No oropharyngeal exudate  Eyes: Conjunctivae and EOM are normal  Pupils are equal, round, and reactive to light  Right eye exhibits no discharge  Left eye exhibits no discharge  Neck: Normal range of motion  Neck supple  No thyromegaly present  Cardiovascular: Normal rate, regular rhythm, normal heart sounds and intact distal pulses  Exam reveals no gallop and no friction rub  No murmur heard  Pulmonary/Chest: Effort normal and breath sounds normal  No stridor  No respiratory distress  He has no wheezes  He has no rales  Abdominal: Soft   Bowel sounds are normal  He exhibits no distension  There is tenderness (mild tenderness around umbilicus) in the periumbilical area  erythremia noted to the distal aspect of the umbilicus at the 3 o clock area, no drainage noted, umbilicus tender to touch   Lymphadenopathy:     He has no cervical adenopathy  Neurological: He is alert and oriented to person, place, and time  Skin: Skin is warm and dry  No rash noted  He is not diaphoretic  No erythema  Psychiatric: He has a normal mood and affect   His behavior is normal  Judgment and thought content normal

## 2018-08-30 ENCOUNTER — OFFICE VISIT (OUTPATIENT)
Dept: INTERNAL MEDICINE CLINIC | Facility: CLINIC | Age: 38
End: 2018-08-30
Payer: COMMERCIAL

## 2018-08-30 VITALS
BODY MASS INDEX: 23.7 KG/M2 | TEMPERATURE: 98.3 F | HEART RATE: 84 BPM | DIASTOLIC BLOOD PRESSURE: 88 MMHG | SYSTOLIC BLOOD PRESSURE: 120 MMHG | OXYGEN SATURATION: 98 % | HEIGHT: 68 IN | WEIGHT: 156.4 LBS

## 2018-08-30 DIAGNOSIS — L02.216 ABSCESS OF UMBILICUS: ICD-10-CM

## 2018-08-30 DIAGNOSIS — R00.2 PALPITATIONS: Primary | ICD-10-CM

## 2018-08-30 DIAGNOSIS — R10.33 UMBILICAL PAIN: ICD-10-CM

## 2018-08-30 DIAGNOSIS — R42 DIZZINESS: ICD-10-CM

## 2018-08-30 PROBLEM — J45.909 ALLERGIC ASTHMA: Status: ACTIVE | Noted: 2017-09-14

## 2018-08-30 PROCEDURE — 99213 OFFICE O/P EST LOW 20 MIN: CPT | Performed by: INTERNAL MEDICINE

## 2018-08-30 NOTE — ASSESSMENT & PLAN NOTE
Has resolution of umbilical abscess with antibiotic therapy  CT A/P was negative on 8/8 for hernias or fistulas (given h/o of Crohn's disease)  He has an appointment with a new gastroenterologist on 9/7/2018  Will defer on ordering US of the umbilical region at this time until evaluation from GI  Last colonoscopy was in 2016

## 2018-08-30 NOTE — ASSESSMENT & PLAN NOTE
48-hour Holter was negative for arrythmias, however he continues to have daily palpitations  Palpitations may be from anxiety, however will order an echocardiogram for further cardiac evaluation

## 2018-09-06 ENCOUNTER — HOSPITAL ENCOUNTER (OUTPATIENT)
Dept: NON INVASIVE DIAGNOSTICS | Facility: HOSPITAL | Age: 38
Discharge: HOME | End: 2018-09-06
Payer: COMMERCIAL

## 2018-09-06 DIAGNOSIS — R42 DIZZINESS: ICD-10-CM

## 2018-09-06 DIAGNOSIS — R00.2 PALPITATIONS: ICD-10-CM

## 2018-09-06 PROCEDURE — 93306 TTE W/DOPPLER COMPLETE: CPT | Performed by: INTERNAL MEDICINE

## 2018-09-06 PROCEDURE — 93306 TTE W/DOPPLER COMPLETE: CPT

## 2018-09-11 ENCOUNTER — OFFICE VISIT (OUTPATIENT)
Dept: SURGERY | Facility: CLINIC | Age: 38
End: 2018-09-11
Payer: COMMERCIAL

## 2018-09-11 VITALS
SYSTOLIC BLOOD PRESSURE: 118 MMHG | BODY MASS INDEX: 23.82 KG/M2 | DIASTOLIC BLOOD PRESSURE: 82 MMHG | TEMPERATURE: 97.4 F | HEIGHT: 68 IN | WEIGHT: 157.2 LBS

## 2018-09-11 DIAGNOSIS — R10.33 UMBILICAL PAIN: Primary | ICD-10-CM

## 2018-09-11 PROCEDURE — 99203 OFFICE O/P NEW LOW 30 MIN: CPT | Performed by: SURGERY

## 2018-09-11 NOTE — ASSESSMENT & PLAN NOTE
He reportedly had more drainage previously which is since resolving  There is no inflammatory changes around his umbilicus at this time  By CT he does have a small hernia  At the umbilicus from previous laparoscopic cholecystectomy  The drainage he had could be from 1 of 2 sources  It could be a Crohn's fistula which I do not think is going on since the bowel underneath the area on CT scan was fairly normal-looking  The other possibility is there could be  debris from previous surgery in the old incision which  came to a head and began draining  At this time things seemed to be resolving    I told him I would recheck in a month's time before doing anything  Surgical

## 2018-09-11 NOTE — PROGRESS NOTES
Office Visit - General Surgery  Layvonne Shone MRN: [id number]  Encounter: [id number]    Assessment and Plan    Problem List Items Addressed This Visit        Other    Umbilical pain - Primary      He reportedly had more drainage previously which is since resolving  There is no inflammatory changes around his umbilicus at this time  By CT he does have a small hernia  At the umbilicus from previous laparoscopic cholecystectomy  The drainage he had could be from 1 of 2 sources  It could be a Crohn's fistula which I do not think is going on since the bowel underneath the area on CT scan was fairly normal-looking  The other possibility is there could be  debris from previous surgery in the old incision which  came to a head and began draining  At this time things seemed to be resolving  I told him I would recheck in a month's time before doing anything  Surgical                Chief Complaint:  Layvonne Shone is a 41 y o  male who presents for Hernia (CONSULT)    Subjective    43-year-old male who presents with pain around his umbilicus and drainage  He noted at several weeks ago the added lump and drainage at his umbilicus  He was seen by his family doctor who placed him on antibiotics for that  He had noted some brownish a drainage from the wound  This is getting somewhat better  Overall he said he felt like needles stabbing him around his umbilicus  He has not been feeling well of late  He previously has a questionable diagnosis of Crohn's disease  He had a capsule endoscopy in 2017 and colonoscopy in 2016 which showed inflammation in his terminal ileum  Past Medical History  Past Medical History:   Diagnosis Date    Abdominal pain     Abnormal liver function test     last assessed: Oct 16, 2013     Abnormal weight loss     last assessed: Yung 15, 2014     Allergic rhinitis due to pollen     last assessed: Yung 15, 2014     Anxiety     Anxiety     last assessed/resolved:  Aug 5, 2015    Gurwinder Lo Asthma     last assessed: Yung 15, 2014     Benign essential HTN     last assessed/resolved: Feb 23, 2017     Cervical lymphadenopathy     last assessed/resolved: Feb 23, 2017     Chronic sinusitis     last assessed: Yung 15, 2014     Constipation     Crohn's disease ([facility] [address] ) 01/01/2011    last assessed: Yung 15, 2014     Dysuria     last assessed: April 29, 2016     Elevated BP without diagnosis of hypertension     last assessed/resolved: Feb 23, 2017     Esophageal reflux     last assessed/resolved: Feb 23, 2017     Eustachian tube anomaly     Resolved: Nov 9, 2017     External hemorrhoids     last assessed: Yung 15, 2014     Hypertension     borderline    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     last assessed/resolved: July 19, 2017     Pancreatic neoplasm     last assessed: Yung 15, 2014     Positive depression screening     resolved: July 24, 2017     Psoriasis     Seasonal allergies     Vitamin B12 deficiency     last assessed/resolved: Feb 23, 2017        Past Surgical History  Past Surgical History:   Procedure Laterality Date    CHOLECYSTECTOMY      resolved: 2011     COLONOSCOPY N/A 11/18/2016    Procedure: COLONOSCOPY;  Surgeon: Trevor Godinez MD;  Location:  GI LAB; Service:     COLONOSCOPY N/A 4/28/2016    Procedure: COLONOSCOPY;  Surgeon: Trevor Godinez MD;  Location: [facility] GI LAB; Service:     ESOPHAGOGASTRODUODENOSCOPY N/A 4/28/2016    Procedure: ESOPHAGOGASTRODUODENOSCOPY (EGD); Surgeon: Trevor Godinez MD;  Location: [facility] GI LAB;   Service:     FRONTAL SINUSOTOMY      resolved: April 2009     PANCREAS SURGERY      [place] REPAIR OF NASAL SEPTUM N/A 7/28/2017    Procedure: REVISION SEPTOPLASTY; TURBINOPLASTY; BILATERAL  GRAFTS; ALAR GRAFT; POSSIBLE AURICULAR CARTILAGE GRAFT;  Surgeon: Sherry Rodriguez MD;  Location:  MAIN OR;  Service: ENT    TONSILLECTOMY AND ADENOIDECTOMY         Family History  Family History   Problem Relation Age of Onset    Diabetes Mother mellitus     Hypertension Mother     Anxiety disorder Mother     Thyroid disease Mother     Fibromyalgia Mother     Diabetes Father     Hypertension Father     Hypertension Sister     No Known Problems Brother     No Known Problems Maternal Grandmother     No Known Problems Maternal Grandfather     No Known Problems Paternal Grandmother     No Known Problems Paternal Grandfather     Diabetes Other         mellitus     Rheum arthritis Cousin        Medications  Current Outpatient Prescriptions on File Prior to Visit   Medication Sig Dispense Refill    acetaminophen (TYLENOL) 500 mg tablet Take 10 mg/kg by mouth every 4 (four) hours as needed        cetirizine (ZyrTEC) 10 mg tablet Take 1 tablet (10 mg total) by mouth daily as needed for allergies 30 tablet 5    clonazePAM (KlonoPIN) 0 5 mg tablet Take 1 tablet (0 5 mg total) by mouth every 12 (twelve) hours as needed for anxiety 90 tablet 1    dicyclomine (BENTYL) 20 mg tablet Take 1 tablet (20 mg total) by mouth every 6 (six) hours as needed (pain) 60 tablet 1    diphenhydrAMINE (BENADRYL) 25 mg tablet Take 25 mg by mouth as needed        fluticasone (FLONASE) 50 mcg/act nasal spray 2 sprays into each nostril as needed        Linaclotide (LINZESS) 145 MCG CAPS Take 145 mcg by mouth as needed         No current facility-administered medications on file prior to visit  Allergies  Allergies   Allergen Reactions    Advil [Ibuprofen] Anaphylaxis and Hives     Category: Allergy;     Apple Anaphylaxis    Aspirin Anaphylaxis and Hives     Category: Allergy;     Eggs Or Egg-Derived Products Anaphylaxis     Category: Allergy;     Nsaids Anaphylaxis     Category: Allergy;     Other Anaphylaxis     Category: Allergy; Annotation - [id number]: cherries, grapes , and kiwis    Peanuts [Peanut Oil] Anaphylaxis    Pollen Extract     Codeine Anxiety       Review of Systems   Constitutional: Positive for appetite change and fatigue   Negative for chills and fever  Not feeling as well as the used to  HENT: Negative for congestion, ear pain, sneezing, trouble swallowing and voice change  Eyes: Negative for pain and discharge  Respiratory: Negative for cough, shortness of breath and wheezing  Cardiovascular: Negative for palpitations and leg swelling  Gastrointestinal: Positive for abdominal pain and nausea  Negative for vomiting  Abdominal discomfort as noted  Usually soft stools but a hard time going on occasion   Endocrine: Negative for cold intolerance, heat intolerance and polyuria  Genitourinary: Negative for decreased urine volume, difficulty urinating and dysuria  Musculoskeletal: Negative for arthralgias and back pain  Skin: Negative for rash and wound  Allergic/Immunologic: Negative for environmental allergies and food allergies  Neurological: Negative for dizziness and weakness  Hematological: Negative for adenopathy  Does not bruise/bleed easily  Psychiatric/Behavioral: Negative for confusion and sleep disturbance  The patient is not nervous/anxious  All other systems reviewed and are negative        Objective  Vitals:    09/11/18 1157   BP: 118/82   Temp: (!) 97 4 °F (36 3 °C)       Physical Exam

## 2018-09-11 NOTE — LETTER
September 11, 2018     Tierney Cardenas MD  [facility]    Patient: Angus Miranda   YOB: 1980   Date of Visit: 9/11/2018       Dear Dr Everett Limon: Thank you for referring Romayne Caves to me for evaluation  Below are my notes for this consultation  If you have questions, please do not hesitate to call me  I look forward to following your patient along with you  Sincerely,        Caroline Boyd MD        CC: No Recipients  Caroline Boyd MD  9/11/2018 12:45 PM  Sign at close encounter  Office Visit - General Surgery  Angus Miranda MRN: [id number]  Encounter: [id number]    Assessment and Plan    Problem List Items Addressed This Visit        Other    Umbilical pain - Primary      He reportedly had more drainage previously which is since resolving  There is no inflammatory changes around his umbilicus at this time  By CT he does have a small hernia  At the umbilicus from previous laparoscopic cholecystectomy  The drainage he had could be from 1 of 2 sources  It could be a Crohn's fistula which I do not think is going on since the bowel underneath the area on CT scan was fairly normal-looking  The other possibility is there could be  debris from previous surgery in the old incision which  came to a head and began draining  At this time things seemed to be resolving  I told him I would recheck in a month's time before doing anything  Surgical                Chief Complaint:  Angus Miranda is a 41 y o  male who presents for Hernia (CONSULT)    Subjective    52-year-old male who presents with pain around his umbilicus and drainage  He noted at several weeks ago the added lump and drainage at his umbilicus  He was seen by his family doctor who placed him on antibiotics for that  He had noted some brownish a drainage from the wound  This is getting somewhat better    Overall he said he felt like needles stabbing him around his umbilicus  He has not been feeling well of late  He previously has a questionable diagnosis of Crohn's disease  He had a capsule endoscopy in 2017 and colonoscopy in 2016 which showed inflammation in his terminal ileum  Past Medical History  Past Medical History:   Diagnosis Date    Abdominal pain     Abnormal liver function test     last assessed: Oct 16, 2013     Abnormal weight loss     last assessed: Yung 15, 2014     Allergic rhinitis due to pollen     last assessed: Yung 15, 2014     Anxiety     Anxiety     last assessed/resolved: Aug 5, 2015     Asthma     last assessed: Yung 15, 2014     Benign essential HTN     last assessed/resolved: Feb 23, 2017     Cervical lymphadenopathy     last assessed/resolved: Feb 23, 2017     Chronic sinusitis     last assessed: Yung 15, 2014     Constipation     Crohn's disease ([facility] [address] ) 01/01/2011    last assessed: Yung 15, 2014     Dysuria     last assessed: April 29, 2016     Elevated BP without diagnosis of hypertension     last assessed/resolved: Feb 23, 2017     Esophageal reflux     last assessed/resolved: Feb 23, 2017     Eustachian tube anomaly     Resolved: Nov 9, 2017     External hemorrhoids     last assessed: Yung 15, 2014     Hypertension     borderline    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     last assessed/resolved: July 19, 2017     Pancreatic neoplasm     last assessed: Yung 15, 2014     Positive depression screening     resolved: July 24, 2017     Psoriasis     Seasonal allergies     Vitamin B12 deficiency     last assessed/resolved: Feb 23, 2017        Past Surgical History  Past Surgical History:   Procedure Laterality Date    CHOLECYSTECTOMY      resolved: 2011     COLONOSCOPY N/A 11/18/2016    Procedure: COLONOSCOPY;  Surgeon: Mike Cedillo MD;  Location:  GI LAB; Service:     COLONOSCOPY N/A 4/28/2016    Procedure: COLONOSCOPY;  Surgeon: Mike Cedillo MD;  Location: [facility] GI LAB;   Service:     ESOPHAGOGASTRODUODENOSCOPY N/A 4/28/2016    Procedure: ESOPHAGOGASTRODUODENOSCOPY (EGD); Surgeon: Rachael Bucio MD;  Location: [facility] GI LAB; Service:    [facility]      resolved: April 2009     PANCREAS SURGERY      [place] REPAIR OF NASAL SEPTUM N/A 7/28/2017    Procedure: REVISION SEPTOPLASTY; TURBINOPLASTY; BILATERAL  GRAFTS; ALAR GRAFT; POSSIBLE AURICULAR CARTILAGE GRAFT;  Surgeon: Mariama Toscano MD;  Location:  MAIN OR;  Service: ENT    TONSILLECTOMY AND ADENOIDECTOMY         Family History  Family History   Problem Relation Age of Onset    Diabetes Mother         mellitus     Hypertension Mother     Anxiety disorder Mother     Thyroid disease Mother     Fibromyalgia Mother     Diabetes Father     Hypertension Father     Hypertension Sister     No Known Problems Brother     No Known Problems Maternal Grandmother     No Known Problems Maternal Grandfather     No Known Problems Paternal Grandmother     No Known Problems Paternal Grandfather     Diabetes Other         mellitus     Rheum arthritis Cousin        Medications  Current Outpatient Prescriptions on File Prior to Visit   Medication Sig Dispense Refill    acetaminophen (TYLENOL) 500 mg tablet Take 10 mg/kg by mouth every 4 (four) hours as needed        cetirizine (ZyrTEC) 10 mg tablet Take 1 tablet (10 mg total) by mouth daily as needed for allergies 30 tablet 5    clonazePAM (KlonoPIN) 0 5 mg tablet Take 1 tablet (0 5 mg total) by mouth every 12 (twelve) hours as needed for anxiety 90 tablet 1    dicyclomine (BENTYL) 20 mg tablet Take 1 tablet (20 mg total) by mouth every 6 (six) hours as needed (pain) 60 tablet 1    diphenhydrAMINE (BENADRYL) 25 mg tablet Take 25 mg by mouth as needed        fluticasone (FLONASE) 50 mcg/act nasal spray 2 sprays into each nostril as needed        Linaclotide (LINZESS) 145 MCG CAPS Take 145 mcg by mouth as needed         No current facility-administered medications on file prior to visit  Allergies  Allergies   Allergen Reactions    Advil [Ibuprofen] Anaphylaxis and Hives     Category: Allergy;     Apple Anaphylaxis    Aspirin Anaphylaxis and Hives     Category: Allergy;     Eggs Or Egg-Derived Products Anaphylaxis     Category: Allergy;     Nsaids Anaphylaxis     Category: Allergy;     Other Anaphylaxis     Category: Allergy; Annotation - [id number]: cherries, grapes , and kiwis    Peanuts [Peanut Oil] Anaphylaxis    Pollen Extract     Codeine Anxiety       Review of Systems   Constitutional: Positive for appetite change and fatigue  Negative for chills and fever  Not feeling as well as the used to  HENT: Negative for congestion, ear pain, sneezing, trouble swallowing and voice change  Eyes: Negative for pain and discharge  Respiratory: Negative for cough, shortness of breath and wheezing  Cardiovascular: Negative for palpitations and leg swelling  Gastrointestinal: Positive for abdominal pain and nausea  Negative for vomiting  Abdominal discomfort as noted  Usually soft stools but a hard time going on occasion   Endocrine: Negative for cold intolerance, heat intolerance and polyuria  Genitourinary: Negative for decreased urine volume, difficulty urinating and dysuria  Musculoskeletal: Negative for arthralgias and back pain  Skin: Negative for rash and wound  Allergic/Immunologic: Negative for environmental allergies and food allergies  Neurological: Negative for dizziness and weakness  Hematological: Negative for adenopathy  Does not bruise/bleed easily  Psychiatric/Behavioral: Negative for confusion and sleep disturbance  The patient is not nervous/anxious  All other systems reviewed and are negative        Objective  Vitals:    09/11/18 1157   BP: 118/82   Temp: (!) 97 4 °F (36 3 °C)       Physical Exam

## 2018-09-19 ENCOUNTER — TELEPHONE (OUTPATIENT)
Dept: GASTROENTEROLOGY | Facility: HOSPITAL | Age: 38
End: 2018-09-19

## 2018-09-19 NOTE — TELEPHONE ENCOUNTER
It looks like Dr Presley Dunbar ordered this who works with [address] [facility]  & alex was seeking a second opinion from them  He should call their office for results    Olga Lidia Blackwood

## 2018-09-19 NOTE — TELEPHONE ENCOUNTER
Patient called in regards to his lab results dated 9/10/18  It's listed as a miscellaneous lab, results are complete just need to be reviewed  Please advise, thank you

## 2018-09-19 NOTE — PROGRESS NOTES
I called him with his results  I spoke to him about the results  His Prometheus antibody panel was not consistent with inflammatory bowel disease  Based on this, his imaging studies, and is normal sed rate and C-reactive protein, I suspect his symptoms are due to irritable bowel syndrome or adhesions  He is going to follow up with me in the office for further management

## 2018-10-08 ENCOUNTER — TELEPHONE (OUTPATIENT)
Dept: GASTROENTEROLOGY | Facility: AMBULARY SURGERY CENTER | Age: 38
End: 2018-10-08

## 2018-10-08 ENCOUNTER — APPOINTMENT (OUTPATIENT)
Dept: LAB | Facility: HOSPITAL | Age: 38
End: 2018-10-08
Attending: INTERNAL MEDICINE
Payer: COMMERCIAL

## 2018-10-08 ENCOUNTER — OFFICE VISIT (OUTPATIENT)
Dept: GASTROENTEROLOGY | Facility: MEDICAL CENTER | Age: 38
End: 2018-10-08
Payer: COMMERCIAL

## 2018-10-08 VITALS
BODY MASS INDEX: 23.66 KG/M2 | SYSTOLIC BLOOD PRESSURE: 104 MMHG | WEIGHT: 155.6 LBS | HEART RATE: 68 BPM | TEMPERATURE: 98.6 F | DIASTOLIC BLOOD PRESSURE: 68 MMHG

## 2018-10-08 DIAGNOSIS — R10.13 EPIGASTRIC PAIN: ICD-10-CM

## 2018-10-08 DIAGNOSIS — K52.9 IBD (INFLAMMATORY BOWEL DISEASE): Primary | ICD-10-CM

## 2018-10-08 DIAGNOSIS — K59.00 CONSTIPATION, UNSPECIFIED CONSTIPATION TYPE: ICD-10-CM

## 2018-10-08 DIAGNOSIS — K52.9 IBD (INFLAMMATORY BOWEL DISEASE): ICD-10-CM

## 2018-10-08 DIAGNOSIS — R10.33 UMBILICAL PAIN: ICD-10-CM

## 2018-10-08 DIAGNOSIS — R79.89 ELEVATED LACTIC ACID LEVEL: ICD-10-CM

## 2018-10-08 LAB
BASOPHILS # BLD AUTO: 0.1 THOUSANDS/ΜL (ref 0–0.1)
BASOPHILS NFR BLD AUTO: 1 % (ref 0–1)
CRP SERPL QL: 3.3 MG/L
EOSINOPHIL # BLD AUTO: 0.2 THOUSAND/ΜL (ref 0–0.61)
EOSINOPHIL NFR BLD AUTO: 2 % (ref 0–6)
ERYTHROCYTE [DISTWIDTH] IN BLOOD BY AUTOMATED COUNT: 13 % (ref 11.6–15.1)
ERYTHROCYTE [SEDIMENTATION RATE] IN BLOOD: 0 MM/HOUR (ref 0–10)
HCT VFR BLD AUTO: 44.2 % (ref 36.5–49.3)
HGB BLD-MCNC: 15 G/DL (ref 12–17)
IMM GRANULOCYTES # BLD AUTO: 0.04 THOUSAND/UL (ref 0–0.2)
IMM GRANULOCYTES NFR BLD AUTO: 1 % (ref 0–2)
LACTATE SERPL-SCNC: 2.8 MMOL/L (ref 0.5–2)
LYMPHOCYTES # BLD AUTO: 2.04 THOUSANDS/ΜL (ref 0.6–4.47)
LYMPHOCYTES NFR BLD AUTO: 24 % (ref 14–44)
MCH RBC QN AUTO: 32.3 PG (ref 26.8–34.3)
MCHC RBC AUTO-ENTMCNC: 33.9 G/DL (ref 31.4–37.4)
MCV RBC AUTO: 95 FL (ref 82–98)
MONOCYTES # BLD AUTO: 0.84 THOUSAND/ΜL (ref 0.17–1.22)
MONOCYTES NFR BLD AUTO: 10 % (ref 4–12)
NEUTROPHILS # BLD AUTO: 5.19 THOUSANDS/ΜL (ref 1.85–7.62)
NEUTS SEG NFR BLD AUTO: 62 % (ref 43–75)
NRBC BLD AUTO-RTO: 0 /100 WBCS
PLATELET # BLD AUTO: 225 THOUSANDS/UL (ref 149–390)
PMV BLD AUTO: 9.9 FL (ref 8.9–12.7)
RBC # BLD AUTO: 4.65 MILLION/UL (ref 3.88–5.62)
WBC # BLD AUTO: 8.41 THOUSAND/UL (ref 4.31–10.16)

## 2018-10-08 PROCEDURE — 85025 COMPLETE CBC W/AUTO DIFF WBC: CPT

## 2018-10-08 PROCEDURE — 83605 ASSAY OF LACTIC ACID: CPT

## 2018-10-08 PROCEDURE — 85652 RBC SED RATE AUTOMATED: CPT

## 2018-10-08 PROCEDURE — 86140 C-REACTIVE PROTEIN: CPT

## 2018-10-08 PROCEDURE — 36415 COLL VENOUS BLD VENIPUNCTURE: CPT

## 2018-10-08 PROCEDURE — 99214 OFFICE O/P EST MOD 30 MIN: CPT | Performed by: INTERNAL MEDICINE

## 2018-10-08 RX ORDER — SODIUM, POTASSIUM,MAG SULFATES 17.5-3.13G
1 SOLUTION, RECONSTITUTED, ORAL ORAL ONCE
Qty: 2 BOTTLE | Refills: 0 | Status: SHIPPED | OUTPATIENT
Start: 2018-10-08 | End: 2018-10-29

## 2018-10-08 RX ORDER — ZINC OXIDE 13 %
1 CREAM (GRAM) TOPICAL DAILY
Qty: 30 CAPSULE | Refills: 0 | Status: SHIPPED | OUTPATIENT
Start: 2018-10-08 | End: 2018-11-08 | Stop reason: HOSPADM

## 2018-10-08 RX ORDER — GABAPENTIN 100 MG/1
100 CAPSULE ORAL 3 TIMES DAILY
Qty: 90 CAPSULE | Refills: 0 | Status: SHIPPED | OUTPATIENT
Start: 2018-10-08 | End: 2018-11-03 | Stop reason: SDUPTHER

## 2018-10-08 NOTE — TELEPHONE ENCOUNTER
[address] [facility] lab called with critical results from Lactic acid, 2 8    was tiger txt

## 2018-10-08 NOTE — PROGRESS NOTES
Assessment/Plan:    IBD (inflammatory bowel disease)  It is unclear if he has inflammatory bowel disease  His prior workup has been negative  His Prometheus panel was negative  At this time due to his ongoing symptoms I would recommend that he undergo another endoscopy and colonoscopy for further evaluation  We will check his inflammatory markers as well  He did have a recent stool calprotectin which was elevated at 148 but previously had been normal   Follow up with Dr Alvis Closs  Elevated lactic acid level  Unclear etiology for the lactic acid elevation  Repeat levels  Unlikely to be ischemia since he does not appear symptomatic like that  He will be seeing surgery tomorrow and will await their recommendations regarding this  Other possibilities could include liver disease, in correct collection method  Umbilical pain  Umbilical hernia  Surgical evaluation  Epigastric pain  Recommend EGD with biopsies for evaluation  Since all his workup has been unremarkable and it does appear to be musculoskeletal in this pain could be radiated from the back  Could also represent neuropathy since he also describes burning sensation  I would suggest a trial of Neurontin  Follow up with Dr Alvis Closs  Constipation  Recommend to continue Linzess  Probiotics  Diagnoses and all orders for this visit:    IBD (inflammatory bowel disease)  -     Case request operating room: EGD AND COLONOSCOPY; Standing  -     Lactic acid, plasma; Future  -     CBC and differential; Future  -     SUPREP BOWEL PREP KIT 17 5-3 13-1 6 GM/180ML SOLN; Take 1 kit by mouth once for 1 dose Please follow instructions as per the office   -     gabapentin (NEURONTIN) 100 mg capsule; Take 1 capsule (100 mg total) by mouth 3 (three) times a day for 30 days  -     Probiotic Product (PROBIOTIC DAILY) CAPS;  Take 1 capsule by mouth daily for 30 days May use any over the counter brand - align, equate ([employer]), culturelle, monte or other store brands  -     Case request operating room: EGD AND COLONOSCOPY  -     C-reactive protein; Future  -     Sedimentation rate, automated; Future  -     Calprotectin,Fecal; Future    Umbilical pain  -     Case request operating room: EGD AND COLONOSCOPY; Standing  -     Case request operating room: EGD AND COLONOSCOPY  -     Linaclotide (LINZESS) 145 MCG CAPS; Take 1 capsule (145 mcg total) by mouth daily for 30 days    Elevated lactic acid level    Epigastric pain    Constipation, unspecified constipation type    Other orders  -     Cancel: Diet NPO; Sips with meds; Standing  -     Cancel: Void on call to OR; Standing  -     Cancel: Insert peripheral IV; Standing          Subjective:      Patient ID: Anthony Chang is a 41 y o  male  HPI    Izaita November presents for evaluation of ongoing abdominal pain  He has had pain diffusely throughout the abdomen but more so on the right side  This can also be exacerbated by movement  He he has been worked up for [place] disease however all workup so far has been unremarkable including endoscopy and colonoscopy in 2016 which were unremarkable  He recently had a drainage from his umbilicus  This was thought to be an abscess  He was treated with antibiotics  This has improved now  Another thought was that this could be secondary to a Crohn's fistula however his CT scan was unremarkable in August 2018  He has been seen by surgery as well  More concerning is that his lactate has been high  His lactic acid was in the 3 range  He did have a recent stool calprotectin which was elevated  He is has been complaining of back pain  He had a work injury (neck and back)  Pain around the umbilicus  He has had right abdominal pain as well  He has constipation  He is on Linzess 145 mcg daily however does not take that every day  No rectal bleeding  No discharge    The following portions of the patient's history were reviewed and updated as appropriate: allergies, current medications, past family history, past medical history, past social history, past surgical history and problem list     Review of Systems   Constitutional: Negative  HENT: Negative  Eyes: Negative  Respiratory: Negative  Cardiovascular: Negative  Gastrointestinal:        See HPI   Endocrine: Negative  Genitourinary: Negative  Musculoskeletal: Positive for back pain  Skin: Negative  Allergic/Immunologic: Negative  Neurological: Negative  Hematological: Negative  Psychiatric/Behavioral: Negative  All other systems reviewed and are negative  Objective:      /68 (BP Location: Left arm, Patient Position: Sitting, Cuff Size: Standard)   Pulse 68   Temp 98 6 °F (37 °C) (Tympanic)   Wt 70 6 kg (155 lb 9 6 oz)   BMI 23 66 kg/m²          Physical Exam   Constitutional: He is oriented to person, place, and time  Vital signs are normal  He appears well-developed and well-nourished  HENT:   Head: Normocephalic and atraumatic  Eyes: Pupils are equal, round, and reactive to light  Conjunctivae are normal  No scleral icterus  Neck: Normal range of motion  Cardiovascular: Normal rate, regular rhythm and normal heart sounds  Pulmonary/Chest: Effort normal and breath sounds normal  No respiratory distress  Abdominal: Soft  Normal appearance and bowel sounds are normal  He exhibits no distension, no ascites and no mass  There is no hepatosplenomegaly  There is tenderness (Tenderness in the right side  )  No hernia  He has a small umbilical hernia  No abscess or discharges seen at this time  Musculoskeletal: Normal range of motion  Lymphadenopathy:     He has no cervical adenopathy  Neurological: He is alert and oriented to person, place, and time  Skin: Skin is warm  Psychiatric: He has a normal mood and affect   His behavior is normal  Thought content normal

## 2018-10-09 ENCOUNTER — OFFICE VISIT (OUTPATIENT)
Dept: SURGERY | Facility: CLINIC | Age: 38
End: 2018-10-09
Payer: COMMERCIAL

## 2018-10-09 VITALS
SYSTOLIC BLOOD PRESSURE: 104 MMHG | DIASTOLIC BLOOD PRESSURE: 84 MMHG | BODY MASS INDEX: 23.43 KG/M2 | TEMPERATURE: 98.1 F | WEIGHT: 154.6 LBS | HEIGHT: 68 IN

## 2018-10-09 DIAGNOSIS — K43.2 INCISIONAL HERNIA, WITHOUT OBSTRUCTION OR GANGRENE: Primary | ICD-10-CM

## 2018-10-09 PROCEDURE — 99213 OFFICE O/P EST LOW 20 MIN: CPT | Performed by: SURGERY

## 2018-10-09 NOTE — PROGRESS NOTES
Office Visit - General Surgery  Florence England MRN: [id number]  Encounter: [id number]    Assessment and Plan    Problem List Items Addressed This Visit        Other    Incisional hernia, without obstruction or gangrene - Primary     Discussed repairing hernia at umbilicus laparoscopically which will allow us to see more of his intestines  He is to have his EGD and colonoscopy first   I discussed the proposed procedure including risks, benefits, alternatives, and what to expect postoperatively  He is agreeable to proceed  Plan: laparoscopic incisional hernia repair  Chief Complaint:  Florence England is a 41 y o  male who presents for Follow-up (Umbilical Pain)    Subjective  60-year-old male who returns for follow-up of umbilical pain  He has had no further drainage from the umbilicus  Still has pain at that site  Was recently seen by GI and plan for EGD and colonoscopy    Past Medical History  Past Medical History:   Diagnosis Date    Abdominal pain     Abnormal liver function test     last assessed: Oct 16, 2013     Abnormal weight loss     last assessed: Yung 15, 2014     Allergic rhinitis due to pollen     last assessed: Yung 15, 2014     Anxiety     Anxiety     last assessed/resolved:  Aug 5, 2015     Asthma     last assessed: Yung 15, 2014     Benign essential HTN     last assessed/resolved: Feb 23, 2017     Cervical lymphadenopathy     last assessed/resolved: Feb 23, 2017     Chronic sinusitis     last assessed: Yung 15, 2014     Constipation     Crohn's disease ([facility] [address] ) 01/01/2011    last assessed: Yung 15, 2014     Dysuria     last assessed: April 29, 2016     Elevated BP without diagnosis of hypertension     last assessed/resolved: Feb 23, 2017     Esophageal reflux     last assessed/resolved: Feb 23, 2017     Eustachian tube anomaly     Resolved: Nov 9, 2017     External hemorrhoids     last assessed: Yung 15, 2014     Hypertension     borderline    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     last assessed/resolved: July 19, 2017     Pancreatic neoplasm     last assessed: Yung 15, 2014     PONV (postoperative nausea and vomiting)     Positive depression screening     resolved: July 24, 2017     Psoriasis     Seasonal allergies     Vitamin B12 deficiency     last assessed/resolved: Feb 23, 2017        Past Surgical History  Past Surgical History:   Procedure Laterality Date    CHOLECYSTECTOMY      resolved: 2011     COLONOSCOPY N/A 11/18/2016    Procedure: COLONOSCOPY;  Surgeon: Piter Farmer MD;  Location:  GI LAB; Service:     COLONOSCOPY N/A 4/28/2016    Procedure: COLONOSCOPY;  Surgeon: Piter Farmer MD;  Location: [facility] GI LAB; Service:     ESOPHAGOGASTRODUODENOSCOPY N/A 4/28/2016    Procedure: ESOPHAGOGASTRODUODENOSCOPY (EGD); Surgeon: Piter Farmer MD;  Location: [facility] GI LAB; Service:     FRONTAL SINUSOTOMY      resolved: April 2009     PANCREAS SURGERY      NM COLONOSCOPY FLX DX W/COLLJ [facility] WHEN PFRMD N/A 10/12/2018    Procedure: EGD AND COLONOSCOPY;  Surgeon: José Miguel Gonzalez MD;  Location: [facility] GI LAB;   Service: Gastroenterology    NM REPAIR OF NASAL SEPTUM N/A 7/28/2017    Procedure: REVISION SEPTOPLASTY; TURBINOPLASTY; BILATERAL  GRAFTS; ALAR GRAFT; POSSIBLE AURICULAR CARTILAGE GRAFT;  Surgeon: Renato Atkinson MD;  [facility];  Service: ENT    TONSILLECTOMY AND ADENOIDECTOMY         Family History  Family History   Problem Relation Age of Onset    Diabetes Mother         mellitus     Hypertension Mother    Ardeth Needs Anxiety disorder Mother     Thyroid disease Mother     Fibromyalgia Mother     Diabetes Father     Hypertension Father     Hypertension Sister     No Known Problems Brother     No Known Problems Maternal Grandmother     No Known Problems Maternal Grandfather     No Known Problems Paternal Grandmother     No Known Problems Paternal Grandfather     Diabetes Other         mellitus     Rheum arthritis Cousin Medications  Current Outpatient Prescriptions on File Prior to Visit   Medication Sig Dispense Refill    acetaminophen (TYLENOL) 500 mg tablet Take 10 mg/kg by mouth every 4 (four) hours as needed        cetirizine (ZyrTEC) 10 mg tablet Take 1 tablet (10 mg total) by mouth daily as needed for allergies 30 tablet 5    clonazePAM (KlonoPIN) 0 5 mg tablet Take 1 tablet (0 5 mg total) by mouth every 12 (twelve) hours as needed for anxiety 90 tablet 1    dicyclomine (BENTYL) 20 mg tablet Take 1 tablet (20 mg total) by mouth every 6 (six) hours as needed (pain) 60 tablet 1    diphenhydrAMINE (BENADRYL) 25 mg tablet Take 25 mg by mouth as needed        fluticasone (FLONASE) 50 mcg/act nasal spray 2 sprays into each nostril as needed        gabapentin (NEURONTIN) 100 mg capsule Take 1 capsule (100 mg total) by mouth 3 (three) times a day for 30 days 90 capsule 0    Linaclotide (LINZESS) 145 MCG CAPS Take 1 capsule (145 mcg total) by mouth daily for 30 days 30 capsule 3    Probiotic Product (PROBIOTIC DAILY) CAPS Take 1 capsule by mouth daily for 30 days May use any over the counter brand - align, equate ([employer]), culturelle, monte or other store brands  30 capsule 0    SUPREP BOWEL PREP KIT 17 5-3 13-1 6 GM/180ML SOLN Take 1 kit by mouth once for 1 dose Please follow instructions as per the office  2 Bottle 0     No current facility-administered medications on file prior to visit  Allergies  Allergies   Allergen Reactions    Advil [Ibuprofen] Anaphylaxis and Hives     Category: Allergy;     Apple Anaphylaxis    Aspirin Anaphylaxis and Hives     Category: Allergy;     Eggs Or Egg-Derived Products Anaphylaxis     Category: Allergy;     Nsaids Anaphylaxis     Category: Allergy;     Other Anaphylaxis     Category: Allergy;  Annotation - [id number]: cherries, grapes , and kiwis    Peanuts [Peanut Oil] Anaphylaxis    Penicillins     Pollen Extract     Codeine Anxiety       Review of Systems Constitutional: Negative for chills, fatigue and fever  Does not quite feel well overall   HENT: Negative for congestion, ear pain, sneezing, trouble swallowing and voice change  Eyes: Negative for pain and discharge  Respiratory: Negative for cough, shortness of breath and wheezing  Cardiovascular: Negative for palpitations and leg swelling  Gastrointestinal: Positive for abdominal pain  Negative for vomiting  Endocrine: Negative for cold intolerance, heat intolerance and polyuria  Genitourinary: Negative for decreased urine volume, difficulty urinating and dysuria  Musculoskeletal: Negative for arthralgias and back pain  Skin: Negative for rash and wound  Allergic/Immunologic: Negative for environmental allergies and food allergies  Neurological: Negative for dizziness and weakness  Hematological: Negative for adenopathy  Does not bruise/bleed easily  Psychiatric/Behavioral: Negative for confusion and sleep disturbance  The patient is not nervous/anxious  All other systems reviewed and are negative  Objective  Vitals:    10/09/18 1147   BP: 104/84   Temp: 98 1 °F (36 7 °C)       Physical Exam   Constitutional: He is oriented to person, place, and time  He appears well-developed and well-nourished  No distress  HENT:   Head: Normocephalic and atraumatic  Right Ear: External ear normal    Left Ear: External ear normal    Eyes: Conjunctivae are normal  No scleral icterus  Neck: Normal range of motion  Neck supple  No tracheal deviation present  No thyromegaly present  Cardiovascular: Normal rate, regular rhythm and normal heart sounds  No murmur heard  Pulmonary/Chest: Effort normal and breath sounds normal  No respiratory distress  He has no wheezes  He has no rales  Abdominal: Soft  He exhibits no distension and no mass  There is no rebound and no guarding     Laparoscopic incisions epigastrium 2 on the right side and 1 supraumbilical   At the umbilicus there is a defect in the fascia about 2 cm  Remaining abdomen is soft   Musculoskeletal: Normal range of motion  He exhibits no edema  Lymphadenopathy:     He has no cervical adenopathy  Neurological: He is alert and oriented to person, place, and time  Skin: Skin is warm and dry  He is not diaphoretic  Psychiatric: He has a normal mood and affect   His behavior is normal  Judgment and thought content normal

## 2018-10-11 ENCOUNTER — ANESTHESIA EVENT (OUTPATIENT)
Dept: GASTROENTEROLOGY | Facility: MEDICAL CENTER | Age: 38
End: 2018-10-11
Payer: COMMERCIAL

## 2018-10-12 ENCOUNTER — HOSPITAL ENCOUNTER (OUTPATIENT)
Facility: MEDICAL CENTER | Age: 38
Setting detail: OUTPATIENT SURGERY
Discharge: HOME | End: 2018-10-12
Attending: INTERNAL MEDICINE | Admitting: INTERNAL MEDICINE
Payer: COMMERCIAL

## 2018-10-12 ENCOUNTER — ANESTHESIA (OUTPATIENT)
Dept: GASTROENTEROLOGY | Facility: MEDICAL CENTER | Age: 38
End: 2018-10-12
Payer: COMMERCIAL

## 2018-10-12 VITALS
OXYGEN SATURATION: 99 % | DIASTOLIC BLOOD PRESSURE: 78 MMHG | SYSTOLIC BLOOD PRESSURE: 118 MMHG | HEART RATE: 61 BPM | RESPIRATION RATE: 20 BRPM | WEIGHT: 154 LBS | TEMPERATURE: 98.8 F | HEIGHT: 68 IN | BODY MASS INDEX: 23.34 KG/M2

## 2018-10-12 DIAGNOSIS — K52.9 IBD (INFLAMMATORY BOWEL DISEASE): ICD-10-CM

## 2018-10-12 DIAGNOSIS — R10.33 UMBILICAL PAIN: ICD-10-CM

## 2018-10-12 PROCEDURE — 88305 TISSUE EXAM BY PATHOLOGIST: CPT | Performed by: PATHOLOGY

## 2018-10-12 PROCEDURE — 43239 EGD BIOPSY SINGLE/MULTIPLE: CPT | Performed by: INTERNAL MEDICINE

## 2018-10-12 PROCEDURE — 88342 IMHCHEM/IMCYTCHM 1ST ANTB: CPT | Performed by: PATHOLOGY

## 2018-10-12 PROCEDURE — 45380 COLONOSCOPY AND BIOPSY: CPT | Performed by: INTERNAL MEDICINE

## 2018-10-12 RX ORDER — MIDAZOLAM HYDROCHLORIDE 1 MG/ML
INJECTION INTRAMUSCULAR; INTRAVENOUS AS NEEDED
Status: DISCONTINUED | OUTPATIENT
Start: 2018-10-12 | End: 2018-10-12 | Stop reason: SURG

## 2018-10-12 RX ORDER — PROPOFOL 10 MG/ML
INJECTION, EMULSION INTRAVENOUS CONTINUOUS PRN
Status: DISCONTINUED | OUTPATIENT
Start: 2018-10-12 | End: 2018-10-12 | Stop reason: SURG

## 2018-10-12 RX ORDER — SODIUM CHLORIDE 9 MG/ML
125 INJECTION, SOLUTION INTRAVENOUS CONTINUOUS
Status: DISCONTINUED | OUTPATIENT
Start: 2018-10-12 | End: 2018-10-12 | Stop reason: HOSPADM

## 2018-10-12 RX ORDER — PROPOFOL 10 MG/ML
INJECTION, EMULSION INTRAVENOUS AS NEEDED
Status: DISCONTINUED | OUTPATIENT
Start: 2018-10-12 | End: 2018-10-12 | Stop reason: SURG

## 2018-10-12 RX ORDER — LIDOCAINE HYDROCHLORIDE 20 MG/ML
INJECTION, SOLUTION EPIDURAL; INFILTRATION; INTRACAUDAL; PERINEURAL AS NEEDED
Status: DISCONTINUED | OUTPATIENT
Start: 2018-10-12 | End: 2018-10-12 | Stop reason: SURG

## 2018-10-12 RX ORDER — MULTIVIT-MIN/IRON/FOLIC ACID/K 18-600-40
CAPSULE ORAL
COMMUNITY
End: 2018-11-11

## 2018-10-12 RX ADMIN — PROPOFOL 180 MCG/KG/MIN: 10 INJECTION, EMULSION INTRAVENOUS at 14:09

## 2018-10-12 RX ADMIN — PROPOFOL 110 MG: 10 INJECTION, EMULSION INTRAVENOUS at 14:04

## 2018-10-12 RX ADMIN — PROPOFOL 20 MG: 10 INJECTION, EMULSION INTRAVENOUS at 14:06

## 2018-10-12 RX ADMIN — PROPOFOL 10 MG: 10 INJECTION, EMULSION INTRAVENOUS at 14:05

## 2018-10-12 RX ADMIN — PROPOFOL 20 MG: 10 INJECTION, EMULSION INTRAVENOUS at 14:08

## 2018-10-12 RX ADMIN — SODIUM CHLORIDE 125 ML/HR: 0.9 INJECTION, SOLUTION INTRAVENOUS at 13:39

## 2018-10-12 RX ADMIN — SODIUM CHLORIDE: 0.9 INJECTION, SOLUTION INTRAVENOUS at 14:29

## 2018-10-12 RX ADMIN — LIDOCAINE HYDROCHLORIDE 3 ML: 20 INJECTION, SOLUTION EPIDURAL; INFILTRATION; INTRACAUDAL; PERINEURAL at 14:04

## 2018-10-12 RX ADMIN — MIDAZOLAM 2 MG: 1 INJECTION INTRAMUSCULAR; INTRAVENOUS at 14:04

## 2018-10-12 NOTE — OP NOTE
COLONOSCOPY    PROCEDURE: Colonoscopy/ Biopsy    INDICATIONS: weight loss    POST-OP DIAGNOSIS: See the impression below    SEDATION: Monitored anesthesia care, check anesthesia records    PHYSICAL EXAM:    /67   Pulse 67   Temp 98 8 °F (37 1 °C) (Temporal)   Resp (!) 25   Ht 5' 8" (1 727 m)   Wt 69 9 kg (154 lb)   SpO2 96%   BMI 23 42 kg/m²   Body mass index is 23 42 kg/m²  General: NAD  Heart: S1 & S2 normal, RRR  Lungs: CTA, No rales or rhonchi  Abdomen: Soft, nontender, nondistended, good bowel sounds    CONSENT:  Informed consent was obtained for the procedure, including sedation after explaining the risks and benefits of the procedure  Risks including but not limited to bleeding, perforation, infection, aspiration were discussed in detail  Also explained about less than 100%$ sensitivity with the exam and other alternatives  PREPARATION:   EKG tracing, pulse oximetry, blood pressure were monitored throughout the procedure  Patient was identified by myself both verbally and by visual inspection of ID band  DESCRIPTION:   Patient was placed in the left lateral decubitus position and was sedated with the above medication  Digital rectal examination was performed  The colonoscope was introduced in to the anal canal and advanced up to terminal ileum  A careful inspection was made as the colonoscope was withdrawn, including a retroflexed view of the rectum; findings and interventions are described below  Appropriate photodocumentation was obtained  The quality of the colonic preparation was good  Intubation time: 5 minutes  Withdrawal time: 9 minutes  FINDINGS:    TI: very mild ileitis  Cold forceps biopsies taken  Colon: Normal colonic mucosa  Random biopsies taken using cold forceps biopsies  Small internal hemorrhoids during retroflexed  IMPRESSIONS:      Mild ileitis  Colon appears to be normal with small hemorrhoids  RECOMMENDATIONS:    Follow up biopsy results  Repeat colonoscopy based on biopsy results  Follow-up in office  COMPLICATIONS:  None; patient tolerated the procedure well      DISPOSITION: PACU           CONDITION: Stable

## 2018-10-12 NOTE — OP NOTE
ESOPHAGOGASTRODUODENOSCOPY    PROCEDURE: EGD/ Biopsy    INDICATIONS: weight loss    POST-OP DIAGNOSIS: See the impression below    SEDATION: Monitored anesthesia care, check anesthesia records    PHYSICAL EXAM:    Vitals:    10/12/18 1318   BP: 127/85   Pulse: 67   Resp: 16   Temp: 98 8 °F (37 1 °C)   SpO2: 98%    Body mass index is 23 42 kg/m²  General: NAD  Heart: S1 & S2 normal, RRR  Lungs: CTA, No rales or rhonchi  Abdomen: Soft, nontender, nondistended, good bowel sounds    CONSENT:  Informed consent was obtained for the procedure, including sedation after explaining the risks and benefits of the procedure  Risks including but not limited to bleeding, perforation, infection, aspiration were discussed in detail  Also explained about less than 100% sensitivity with the exam and other alternatives  PREPARATION:   EKG tracing, pulse oximetry, blood pressure were monitored throughout the procedure  Patient was identified by myself both verbally and by visual inspection of ID band  DESCRIPTION:   Patient was placed in the left lateral decubitus position and was sedated with the above medication  The gastroscope was introduced in to the oropharynx and the esophagus was intubated under direct visualization  Scope was passed down the esophagus up to 2nd part of the duodenum  A careful inspection was made as the gastroscope was withdrawn, including a retroflexed view of the stomach; findings and interventions are described below  FINDINGS:    #1  Esophagus and GEJ- Z-line at 42 cm  Normal esophagus  #2  Stomach- Mild gastritis in the antrum  Cold forceps biopsies taken  #3  Duodenum- Normal         IMPRESSIONS:      Mild gastritis    RECOMMENDATIONS:     Follow up biopsy result  COMPLICATIONS:  None; patient tolerated the procedure well            DISPOSITION: PACU           CONDITION: Stable

## 2018-10-12 NOTE — DISCHARGE INSTRUCTIONS

## 2018-10-12 NOTE — ANESTHESIA PREPROCEDURE EVALUATION
Review of Systems/Medical History  Patient summary reviewed  Chart reviewed  History of anesthetic complications PONV    Cardiovascular  Hypertension controlled,   Comment: Borderline HTN,  Pulmonary  No asthma ,        GI/Hepatic    GERD , Bowel prep  Comment: Crohn's disease  Celiac disease  Constipation     Negative  ROS        Endo/Other  History of thyroid disease , hypothyroidism,   Comment: Deviated nasal septum  Sinus cyst  Turbinate hypertrophy    GYN       Hematology  Negative hematology ROS      Musculoskeletal  Negative musculoskeletal ROS        Neurology  Negative neurology ROS      Psychology   Anxiety,              Physical Exam    Airway    Mallampati score: II  TM Distance: >3 FB  Neck ROM: full     Dental   No notable dental hx     Cardiovascular  Rhythm: regular, Rate: normal, Cardiovascular exam normal    Pulmonary  Pulmonary exam normal Breath sounds clear to auscultation,     Other Findings        Anesthesia Plan  ASA Score- 2     Anesthesia Type- IV sedation with anesthesia with ASA Monitors  Additional Monitors:   Airway Plan:         Plan Factors- Patient instructed to abstain from smoking on day of procedure  Patient did not smoke on day of surgery  Induction- intravenous  Postoperative Plan-     Informed Consent- Anesthetic plan and risks discussed with patient              Recent labs personally reviewed:

## 2018-10-12 NOTE — H&P
History and Physical - [facility]  Anthony Chang 41 y o  male MRN: [id number]                  HPI: Anthony Chang is a 41y o  year old male who presents for hx of crohn's dz      REVIEW OF SYSTEMS: Per the HPI, and otherwise unremarkable  Historical Information   Past Medical History:   Diagnosis Date    Abdominal pain     Abnormal liver function test     last assessed: Oct 16, 2013     Abnormal weight loss     last assessed: Yung 15, 2014     Allergic rhinitis due to pollen     last assessed: Yung 15, 2014     Anxiety     Anxiety     last assessed/resolved: Aug 5, 2015     Asthma     last assessed: Yung 15, 2014     Benign essential HTN     last assessed/resolved: Feb 23, 2017     Cervical lymphadenopathy     last assessed/resolved: Feb 23, 2017     Chronic sinusitis     last assessed: Yung 15, 2014     Constipation     [facility]    last assessed: Yung 15, 2014     Dysuria     last assessed: April 29, 2016     Elevated BP without diagnosis of hypertension     last assessed/resolved: Feb 23, 2017     Esophageal reflux     last assessed/resolved: Feb 23, 2017     Eustachian tube anomaly     Resolved: Nov 9, 2017     External hemorrhoids     last assessed: Yung 15, 2014     Hypertension     borderline    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     last assessed/resolved: July 19, 2017     Pancreatic neoplasm     last assessed: Yung 15, 2014     PONV (postoperative nausea and vomiting)     Positive depression screening     resolved: July 24, 2017     Psoriasis     Seasonal allergies     Vitamin B12 deficiency     last assessed/resolved: Feb 23, 2017      Past Surgical History:   Procedure Laterality Date    CHOLECYSTECTOMY      resolved: 2011     COLONOSCOPY N/A 11/18/2016    Procedure: COLONOSCOPY;  Surgeon: Nelsy Marie MD;  [facility]: [facility];   Service:     COLONOSCOPY N/A 4/28/2016    Procedure: COLONOSCOPY;  Surgeon: Nelsy Marie MD; Location: [facility] GI LAB; Service:     ESOPHAGOGASTRODUODENOSCOPY N/A 4/28/2016    Procedure: ESOPHAGOGASTRODUODENOSCOPY (EGD); Surgeon: Francisca Troncoso MD;  Location: [facility] GI LAB;   Service:    [address] [facility]      resolved: April 2009     PANCREAS SURGERY      IA REPAIR OF NASAL SEPTUM N/A 7/28/2017    Procedure: REVISION SEPTOPLASTY; TURBINOPLASTY; BILATERAL  GRAFTS; ALAR GRAFT; POSSIBLE AURICULAR CARTILAGE GRAFT;  Surgeon: Aaron Goyal MD;  [facility];  Service: ENT    TONSILLECTOMY AND ADENOIDECTOMY       Social History   History   Alcohol Use No     Comment: Floridalma Bowser consumes alcohol noted in "allscripts"      History   Drug Use No     Comment: former marijuana use noted in "allscripts"      History   Smoking Status    Current Every Day Smoker    Packs/day: 0 50    Types: Cigarettes   Smokeless Tobacco    Never Used     Comment: Dependence on nicotine in cigarettes - 1/2 PPD x 20 years      Family History   Problem Relation Age of Onset    Diabetes Mother         mellitus     Hypertension Mother     Anxiety disorder Mother     Thyroid disease Mother     Fibromyalgia Mother     Diabetes Father     Hypertension Father     Hypertension Sister     No Known Problems Brother     No Known Problems Maternal Grandmother     No Known Problems Maternal Grandfather     No Known Problems Paternal Grandmother     No Known Problems Paternal Grandfather     Diabetes Other         mellitus     Rheum arthritis Cousin        Meds/Allergies     Prescriptions Prior to Admission   Medication    acetaminophen (TYLENOL) 500 mg tablet    Ascorbic Acid (VITAMIN C) 500 MG CAPS    clonazePAM (KlonoPIN) 0 5 mg tablet    dicyclomine (BENTYL) 20 mg tablet    fluticasone (FLONASE) 50 mcg/act nasal spray    gabapentin (NEURONTIN) 100 mg capsule    Linaclotide (LINZESS) 145 MCG CAPS    Probiotic Product (PROBIOTIC DAILY) CAPS    cetirizine (ZyrTEC) 10 mg tablet    diphenhydrAMINE (BENADRYL) 25 mg tablet    SUPREP BOWEL PREP KIT 17 5-3 13-1 6 GM/180ML SOLN       Allergies   Allergen Reactions    Advil [Ibuprofen] Anaphylaxis and Hives     Category: Allergy;     Apple Anaphylaxis    Aspirin Anaphylaxis and Hives     Category: Allergy;     Eggs Or Egg-Derived Products Anaphylaxis     Category: Allergy;     Nsaids Anaphylaxis     Category: Allergy;     Other Anaphylaxis     Category: Allergy; Annotation - [id number]: cherries, grapes , and kiwis    Peanuts [Peanut Oil] Anaphylaxis    Penicillins     Pollen Extract     Codeine Anxiety       Objective     Blood pressure 127/85, pulse 67, temperature 98 8 °F (37 1 °C), temperature source Temporal, resp  rate 16, height 5' 8" (1 727 m), weight 69 9 kg (154 lb), SpO2 98 %  PHYSICAL EXAM    Gen: NAD  CV: RRR  CHEST: Clear  ABD: soft, NT/ND  EXT: no edema      ASSESSMENT/PLAN:  This is a 41y o  year old male here for hx of crohn's dz, and he is stable and optimized for his procedure

## 2018-10-12 NOTE — DISCHARGE INSTR - AVS FIRST PAGE
ESOPHAGOGASTRODUODENOSCOPY    PROCEDURE: EGD/ Biopsy    INDICATIONS: weight loss    POST-OP DIAGNOSIS: See the impression below    SEDATION: Monitored anesthesia care, check anesthesia records    PHYSICAL EXAM:    Vitals:    10/12/18 1318   BP: 127/85   Pulse: 67   Resp: 16   Temp: 98 8 °F (37 1 °C)   SpO2: 98%    Body mass index is 23 42 kg/m²  General: NAD  Heart: S1 & S2 normal, RRR  Lungs: CTA, No rales or rhonchi  Abdomen: Soft, nontender, nondistended, good bowel sounds    CONSENT:  Informed consent was obtained for the procedure, including sedation after explaining the risks and benefits of the procedure  Risks including but not limited to bleeding, perforation, infection, aspiration were discussed in detail  Also explained about less than 100% sensitivity with the exam and other alternatives  PREPARATION:   EKG tracing, pulse oximetry, blood pressure were monitored throughout the procedure  Patient was identified by myself both verbally and by visual inspection of ID band  DESCRIPTION:   Patient was placed in the left lateral decubitus position and was sedated with the above medication  The gastroscope was introduced in to the oropharynx and the esophagus was intubated under direct visualization  Scope was passed down the esophagus up to 2nd part of the duodenum  A careful inspection was made as the gastroscope was withdrawn, including a retroflexed view of the stomach; findings and interventions are described below  FINDINGS:    #1  Esophagus and GEJ- Z-line at 42 cm  Normal esophagus  #2  Stomach- Mild gastritis in the antrum  Cold forceps biopsies taken  #3  Duodenum- Normal         IMPRESSIONS:      Mild gastritis    RECOMMENDATIONS:     Follow up biopsy result  COMPLICATIONS:  None; patient tolerated the procedure well            DISPOSITION: PACU           CONDITION: Stable      COLONOSCOPY    PROCEDURE: Colonoscopy/ Biopsy    INDICATIONS: weight loss    POST-OP DIAGNOSIS: See the impression below    SEDATION: Monitored anesthesia care, check anesthesia records    PHYSICAL EXAM:    /67   Pulse 67   Temp 98 8 °F (37 1 °C) (Temporal)   Resp (!) 25   Ht 5' 8" (1 727 m)   Wt 69 9 kg (154 lb)   SpO2 96%   BMI 23 42 kg/m²    Body mass index is 23 42 kg/m²  General: NAD  Heart: S1 & S2 normal, RRR  Lungs: CTA, No rales or rhonchi  Abdomen: Soft, nontender, nondistended, good bowel sounds    CONSENT:  Informed consent was obtained for the procedure, including sedation after explaining the risks and benefits of the procedure  Risks including but not limited to bleeding, perforation, infection, aspiration were discussed in detail  Also explained about less than 100%$ sensitivity with the exam and other alternatives  PREPARATION:   EKG tracing, pulse oximetry, blood pressure were monitored throughout the procedure  Patient was identified by myself both verbally and by visual inspection of ID band  DESCRIPTION:   Patient was placed in the left lateral decubitus position and was sedated with the above medication  Digital rectal examination was performed  The colonoscope was introduced in to the anal canal and advanced up to terminal ileum  A careful inspection was made as the colonoscope was withdrawn, including a retroflexed view of the rectum; findings and interventions are described below  Appropriate photodocumentation was obtained  The quality of the colonic preparation was good  Intubation time: 5 minutes  Withdrawal time: 9 minutes  FINDINGS:    TI: very mild ileitis  Cold forceps biopsies taken  Colon: Normal colonic mucosa  Random biopsies taken using cold forceps biopsies  Small internal hemorrhoids during retroflexed  IMPRESSIONS:      Mild ileitis  Colon appears to be normal with small hemorrhoids  RECOMMENDATIONS:    Follow up biopsy results  Repeat colonoscopy based on biopsy results  Follow-up in office      COMPLICATIONS:  None; patient tolerated the procedure well      DISPOSITION: PACU           CONDITION: Stable

## 2018-10-15 ENCOUNTER — TELEPHONE (OUTPATIENT)
Dept: GASTROENTEROLOGY | Facility: AMBULARY SURGERY CENTER | Age: 38
End: 2018-10-15

## 2018-10-15 PROBLEM — K59.00 CONSTIPATION: Status: ACTIVE | Noted: 2018-10-15

## 2018-10-15 NOTE — TELEPHONE ENCOUNTER
DR HODGES'S PT    Pt spouse called after receiving a call from the hospital to check pt health  Caller stated pt is currently experiencing abd pain towards the left side of his abd  They are requesting advise   Thank you

## 2018-10-15 NOTE — ASSESSMENT & PLAN NOTE
It is unclear if he has inflammatory bowel disease  His prior workup has been negative  His Prometheus panel was negative  At this time due to his ongoing symptoms I would recommend that he undergo another endoscopy and colonoscopy for further evaluation  We will check his inflammatory markers as well  He did have a recent stool calprotectin which was elevated at 148 but previously had been normal   Follow up with Dr De La Garza Hidden

## 2018-10-15 NOTE — ASSESSMENT & PLAN NOTE
Recommend EGD with biopsies for evaluation  Since all his workup has been unremarkable and it does appear to be musculoskeletal in this pain could be radiated from the back  Could also represent neuropathy since he also describes burning sensation  I would suggest a trial of Neurontin  Follow up with Dr Santillan More

## 2018-10-15 NOTE — ASSESSMENT & PLAN NOTE
Discussed repairing hernia at umbilicus laparoscopically which will allow us to see more of his intestines  He is to have his EGD and colonoscopy first   I discussed the proposed procedure including risks, benefits, alternatives, and what to expect postoperatively  He is agreeable to proceed  Plan: laparoscopic incisional hernia repair

## 2018-10-15 NOTE — ASSESSMENT & PLAN NOTE
Unclear etiology for the lactic acid elevation  Repeat levels  Unlikely to be ischemia since he does not appear symptomatic like that  He will be seeing surgery tomorrow and will await their recommendations regarding this  Other possibilities could include liver disease, in correct collection method

## 2018-10-16 NOTE — TELEPHONE ENCOUNTER
Spoke with pt  Having the same pain in the RUQ as he had prior to procedure  No relief with Bentyl  Bx not back from EGD/ colon yet  I discussed we will call when we get it & if the pain became more severe to go to the ER    Danni Denisse

## 2018-10-29 ENCOUNTER — ANESTHESIA EVENT (OUTPATIENT)
Dept: PERIOP | Facility: HOSPITAL | Age: 38
End: 2018-10-29
Payer: COMMERCIAL

## 2018-10-29 NOTE — PRE-PROCEDURE INSTRUCTIONS
Pre-Surgery Instructions:   Medication Instructions    acetaminophen (TYLENOL) 500 mg tablet Instructed patient per Anesthesia Guidelines   Ascorbic Acid (VITAMIN C) 500 MG CAPS Instructed patient per Anesthesia Guidelines   cetirizine (ZyrTEC) 10 mg tablet Instructed patient per Anesthesia Guidelines   clonazePAM (KlonoPIN) 0 5 mg tablet Instructed patient per Anesthesia Guidelines   dicyclomine (BENTYL) 20 mg tablet Instructed patient per Anesthesia Guidelines   diphenhydrAMINE (BENADRYL) 25 mg tablet Instructed patient per Anesthesia Guidelines   fluticasone (FLONASE) 50 mcg/act nasal spray Instructed patient per Anesthesia Guidelines   gabapentin (NEURONTIN) 100 mg capsule Instructed patient per Anesthesia Guidelines   Linaclotide (LINZESS) 145 MCG CAPS Instructed patient per Anesthesia Guidelines   Probiotic Product (PROBIOTIC DAILY) CAPS Instructed patient per Anesthesia Guidelines  Spoke to pt  Medication list reviewed & instructed  As of 11 1 18 pt to stop OTC vitamins / supplements  Instructed on tylenol only  Am DOS pt to take gabapentin & klonopin with 1-2 sips of water  Showering instructions given by office, reviewed at time of call  Smoking cessation education provided  Instructed no smoking 24 hour prior including am DOS  Pt understands  All instructions verbally understood by patient  No further questions  Acetaminophen Med Class     Continue to take this medication on your normal schedule  If this is an oral medication and you take it in the morning, then you may take this medicine with a sip of water  Antiepileptic Med Class     Continue to take this medication on your normal schedule  If this is an oral medication and you take it in the morning, then you may take this medicine with a sip of water  Benzodiazepine antagonist Med Class     If this medication is needed please continue to take on your normal schedule    If you take it in the morning, then you may take this medicine with a sip of water

## 2018-11-03 DIAGNOSIS — K52.9 IBD (INFLAMMATORY BOWEL DISEASE): ICD-10-CM

## 2018-11-05 RX ORDER — GABAPENTIN 100 MG/1
CAPSULE ORAL
Qty: 90 CAPSULE | Refills: 0 | Status: SHIPPED | OUTPATIENT
Start: 2018-11-05 | End: 2018-11-30 | Stop reason: SDUPTHER

## 2018-11-06 ENCOUNTER — TRANSCRIBE ORDERS (OUTPATIENT)
Dept: ADMINISTRATIVE | Facility: HOSPITAL | Age: 38
End: 2018-11-06

## 2018-11-06 ENCOUNTER — APPOINTMENT (OUTPATIENT)
Dept: LAB | Facility: HOSPITAL | Age: 38
End: 2018-11-06
Attending: SURGERY
Payer: COMMERCIAL

## 2018-11-06 DIAGNOSIS — Z13.9 SCREENING FOR CONDITION: ICD-10-CM

## 2018-11-06 DIAGNOSIS — Z13.9 SCREENING FOR CONDITION: Primary | ICD-10-CM

## 2018-11-06 LAB
ANION GAP SERPL CALCULATED.3IONS-SCNC: 6 MMOL/L (ref 4–13)
BUN SERPL-MCNC: 12 MG/DL (ref 5–25)
CALCIUM SERPL-MCNC: 9.1 MG/DL (ref 8.3–10.1)
CHLORIDE SERPL-SCNC: 104 MMOL/L (ref 100–108)
CO2 SERPL-SCNC: 28 MMOL/L (ref 21–32)
CREAT SERPL-MCNC: 0.91 MG/DL (ref 0.6–1.3)
GFR SERPL CREATININE-BSD FRML MDRD: 107 ML/MIN/1.73SQ M
GLUCOSE P FAST SERPL-MCNC: 105 MG/DL (ref 65–99)
POTASSIUM SERPL-SCNC: 4.8 MMOL/L (ref 3.5–5.3)
SODIUM SERPL-SCNC: 138 MMOL/L (ref 136–145)

## 2018-11-06 PROCEDURE — 80048 BASIC METABOLIC PNL TOTAL CA: CPT

## 2018-11-06 PROCEDURE — 36415 COLL VENOUS BLD VENIPUNCTURE: CPT

## 2018-11-08 ENCOUNTER — ANESTHESIA (OUTPATIENT)
Dept: PERIOP | Facility: HOSPITAL | Age: 38
End: 2018-11-08
Payer: COMMERCIAL

## 2018-11-08 ENCOUNTER — HOSPITAL ENCOUNTER (OUTPATIENT)
Facility: HOSPITAL | Age: 38
Setting detail: OUTPATIENT SURGERY
Discharge: HOME | End: 2018-11-08
Attending: SURGERY | Admitting: SURGERY
Payer: COMMERCIAL

## 2018-11-08 VITALS
TEMPERATURE: 96.9 F | BODY MASS INDEX: 23.34 KG/M2 | DIASTOLIC BLOOD PRESSURE: 90 MMHG | HEIGHT: 68 IN | WEIGHT: 154 LBS | SYSTOLIC BLOOD PRESSURE: 152 MMHG | HEART RATE: 54 BPM | OXYGEN SATURATION: 97 % | RESPIRATION RATE: 16 BRPM

## 2018-11-08 DIAGNOSIS — K43.2 INCISIONAL HERNIA, WITHOUT OBSTRUCTION OR GANGRENE: Primary | ICD-10-CM

## 2018-11-08 PROCEDURE — 49654 PR LAP, INCISIONAL HERNIA REPAIR,REDUCIBLE: CPT | Performed by: SURGERY

## 2018-11-08 PROCEDURE — C1781 MESH (IMPLANTABLE): HCPCS | Performed by: SURGERY

## 2018-11-08 DEVICE — CAPSURE PERMANENT FIXATION SYSTEM 30 PERMANENT FASTENERS
Type: IMPLANTABLE DEVICE | Site: ABDOMEN | Status: FUNCTIONAL
Brand: CAPSURE PERMANENT FIXATION SYSTEM

## 2018-11-08 DEVICE — VENTRIO HERNIA PATCH CIRCLE
Type: IMPLANTABLE DEVICE | Site: ABDOMEN | Status: FUNCTIONAL
Brand: VENTRIO HERNIA PATCH

## 2018-11-08 RX ORDER — ONDANSETRON 2 MG/ML
4 INJECTION INTRAMUSCULAR; INTRAVENOUS ONCE AS NEEDED
Status: CANCELLED | OUTPATIENT
Start: 2018-11-08

## 2018-11-08 RX ORDER — SODIUM CHLORIDE, SODIUM LACTATE, POTASSIUM CHLORIDE, CALCIUM CHLORIDE 600; 310; 30; 20 MG/100ML; MG/100ML; MG/100ML; MG/100ML
125 INJECTION, SOLUTION INTRAVENOUS CONTINUOUS
Status: DISCONTINUED | OUTPATIENT
Start: 2018-11-08 | End: 2018-11-08

## 2018-11-08 RX ORDER — FENTANYL CITRATE/PF 50 MCG/ML
50 SYRINGE (ML) INJECTION
Status: DISCONTINUED | OUTPATIENT
Start: 2018-11-08 | End: 2018-11-08 | Stop reason: HOSPADM

## 2018-11-08 RX ORDER — ONDANSETRON 2 MG/ML
4 INJECTION INTRAMUSCULAR; INTRAVENOUS ONCE AS NEEDED
Status: DISCONTINUED | OUTPATIENT
Start: 2018-11-08 | End: 2018-11-08 | Stop reason: HOSPADM

## 2018-11-08 RX ORDER — FENTANYL CITRATE/PF 50 MCG/ML
25 SYRINGE (ML) INJECTION
Status: CANCELLED | OUTPATIENT
Start: 2018-11-08

## 2018-11-08 RX ORDER — PROPOFOL 10 MG/ML
INJECTION, EMULSION INTRAVENOUS AS NEEDED
Status: DISCONTINUED | OUTPATIENT
Start: 2018-11-08 | End: 2018-11-08 | Stop reason: SURG

## 2018-11-08 RX ORDER — SODIUM CHLORIDE, SODIUM LACTATE, POTASSIUM CHLORIDE, CALCIUM CHLORIDE 600; 310; 30; 20 MG/100ML; MG/100ML; MG/100ML; MG/100ML
100 INJECTION, SOLUTION INTRAVENOUS CONTINUOUS
Status: DISCONTINUED | OUTPATIENT
Start: 2018-11-08 | End: 2018-11-08 | Stop reason: HOSPADM

## 2018-11-08 RX ORDER — BUPIVACAINE HYDROCHLORIDE AND EPINEPHRINE 5; 5 MG/ML; UG/ML
INJECTION, SOLUTION PERINEURAL AS NEEDED
Status: DISCONTINUED | OUTPATIENT
Start: 2018-11-08 | End: 2018-11-08 | Stop reason: HOSPADM

## 2018-11-08 RX ORDER — CEFAZOLIN SODIUM 1 G/3ML
INJECTION, POWDER, FOR SOLUTION INTRAMUSCULAR; INTRAVENOUS AS NEEDED
Status: DISCONTINUED | OUTPATIENT
Start: 2018-11-08 | End: 2018-11-08 | Stop reason: SURG

## 2018-11-08 RX ORDER — OXYCODONE HYDROCHLORIDE 5 MG/1
5 TABLET ORAL EVERY 4 HOURS PRN
Qty: 30 TABLET | Refills: 0 | Status: SHIPPED | OUTPATIENT
Start: 2018-11-08 | End: 2018-11-16 | Stop reason: ALTCHOICE

## 2018-11-08 RX ORDER — HYDROMORPHONE HCL/PF 1 MG/ML
0.4 SYRINGE (ML) INJECTION
Status: DISCONTINUED | OUTPATIENT
Start: 2018-11-08 | End: 2018-11-08 | Stop reason: HOSPADM

## 2018-11-08 RX ORDER — ONDANSETRON 2 MG/ML
INJECTION INTRAMUSCULAR; INTRAVENOUS AS NEEDED
Status: DISCONTINUED | OUTPATIENT
Start: 2018-11-08 | End: 2018-11-08 | Stop reason: SURG

## 2018-11-08 RX ORDER — DIPHENHYDRAMINE HYDROCHLORIDE 50 MG/ML
12.5 INJECTION INTRAMUSCULAR; INTRAVENOUS ONCE AS NEEDED
Status: CANCELLED | OUTPATIENT
Start: 2018-11-08

## 2018-11-08 RX ORDER — HYDROMORPHONE HCL/PF 1 MG/ML
SYRINGE (ML) INJECTION AS NEEDED
Status: DISCONTINUED | OUTPATIENT
Start: 2018-11-08 | End: 2018-11-08 | Stop reason: SURG

## 2018-11-08 RX ORDER — HYDROMORPHONE HCL/PF 1 MG/ML
0.2 SYRINGE (ML) INJECTION
Status: CANCELLED | OUTPATIENT
Start: 2018-11-08

## 2018-11-08 RX ORDER — MIDAZOLAM HYDROCHLORIDE 1 MG/ML
INJECTION INTRAMUSCULAR; INTRAVENOUS AS NEEDED
Status: DISCONTINUED | OUTPATIENT
Start: 2018-11-08 | End: 2018-11-08 | Stop reason: SURG

## 2018-11-08 RX ORDER — DIPHENHYDRAMINE HYDROCHLORIDE 50 MG/ML
12.5 INJECTION INTRAMUSCULAR; INTRAVENOUS ONCE AS NEEDED
Status: COMPLETED | OUTPATIENT
Start: 2018-11-08 | End: 2018-11-08

## 2018-11-08 RX ORDER — METOCLOPRAMIDE HYDROCHLORIDE 5 MG/ML
INJECTION INTRAMUSCULAR; INTRAVENOUS AS NEEDED
Status: DISCONTINUED | OUTPATIENT
Start: 2018-11-08 | End: 2018-11-08 | Stop reason: SURG

## 2018-11-08 RX ORDER — PROMETHAZINE HYDROCHLORIDE 25 MG/ML
25 INJECTION, SOLUTION INTRAMUSCULAR; INTRAVENOUS ONCE AS NEEDED
Status: CANCELLED | OUTPATIENT
Start: 2018-11-08

## 2018-11-08 RX ORDER — ROCURONIUM BROMIDE 10 MG/ML
INJECTION, SOLUTION INTRAVENOUS AS NEEDED
Status: DISCONTINUED | OUTPATIENT
Start: 2018-11-08 | End: 2018-11-08 | Stop reason: SURG

## 2018-11-08 RX ORDER — ALBUTEROL SULFATE 2.5 MG/3ML
2.5 SOLUTION RESPIRATORY (INHALATION) AS NEEDED
Status: DISCONTINUED | OUTPATIENT
Start: 2018-11-08 | End: 2018-11-08 | Stop reason: HOSPADM

## 2018-11-08 RX ORDER — FENTANYL CITRATE 50 UG/ML
INJECTION, SOLUTION INTRAMUSCULAR; INTRAVENOUS AS NEEDED
Status: DISCONTINUED | OUTPATIENT
Start: 2018-11-08 | End: 2018-11-08 | Stop reason: SURG

## 2018-11-08 RX ORDER — PROMETHAZINE HYDROCHLORIDE 25 MG/ML
6.25 INJECTION, SOLUTION INTRAMUSCULAR; INTRAVENOUS AS NEEDED
Status: DISCONTINUED | OUTPATIENT
Start: 2018-11-08 | End: 2018-11-08 | Stop reason: HOSPADM

## 2018-11-08 RX ORDER — LIDOCAINE HYDROCHLORIDE 10 MG/ML
INJECTION, SOLUTION INFILTRATION; PERINEURAL AS NEEDED
Status: DISCONTINUED | OUTPATIENT
Start: 2018-11-08 | End: 2018-11-08 | Stop reason: SURG

## 2018-11-08 RX ORDER — OXYCODONE HYDROCHLORIDE 5 MG/1
5 TABLET ORAL EVERY 4 HOURS PRN
Status: DISCONTINUED | OUTPATIENT
Start: 2018-11-08 | End: 2018-11-08 | Stop reason: HOSPADM

## 2018-11-08 RX ORDER — GLYCOPYRROLATE 0.2 MG/ML
INJECTION INTRAMUSCULAR; INTRAVENOUS AS NEEDED
Status: DISCONTINUED | OUTPATIENT
Start: 2018-11-08 | End: 2018-11-08 | Stop reason: SURG

## 2018-11-08 RX ADMIN — OXYCODONE HYDROCHLORIDE 5 MG: 5 TABLET ORAL at 16:14

## 2018-11-08 RX ADMIN — HYDROMORPHONE HYDROCHLORIDE 0.5 MG: 1 INJECTION, SOLUTION INTRAMUSCULAR; INTRAVENOUS; SUBCUTANEOUS at 11:35

## 2018-11-08 RX ADMIN — MIDAZOLAM 2 MG: 1 INJECTION INTRAMUSCULAR; INTRAVENOUS at 10:57

## 2018-11-08 RX ADMIN — METOCLOPRAMIDE 10 MG: 5 INJECTION, SOLUTION INTRAMUSCULAR; INTRAVENOUS at 11:12

## 2018-11-08 RX ADMIN — NEOSTIGMINE METHYLSULFATE 2 MG: 1 INJECTION, SOLUTION INTRAMUSCULAR; INTRAVENOUS; SUBCUTANEOUS at 12:26

## 2018-11-08 RX ADMIN — SODIUM CHLORIDE, SODIUM LACTATE, POTASSIUM CHLORIDE, AND CALCIUM CHLORIDE: .6; .31; .03; .02 INJECTION, SOLUTION INTRAVENOUS at 11:17

## 2018-11-08 RX ADMIN — PROPOFOL 200 MG: 10 INJECTION, EMULSION INTRAVENOUS at 11:06

## 2018-11-08 RX ADMIN — ONDANSETRON 4 MG: 2 INJECTION INTRAMUSCULAR; INTRAVENOUS at 12:24

## 2018-11-08 RX ADMIN — FENTANYL CITRATE 50 MCG: 50 INJECTION, SOLUTION INTRAMUSCULAR; INTRAVENOUS at 13:08

## 2018-11-08 RX ADMIN — CEFAZOLIN 1000 MG: 1 INJECTION, POWDER, FOR SOLUTION INTRAVENOUS at 11:05

## 2018-11-08 RX ADMIN — GLYCOPYRROLATE 0.2 MG: 0.2 INJECTION, SOLUTION INTRAMUSCULAR; INTRAVENOUS at 12:26

## 2018-11-08 RX ADMIN — DEXAMETHASONE SODIUM PHOSPHATE 10 MG: 10 INJECTION INTRAMUSCULAR; INTRAVENOUS at 11:12

## 2018-11-08 RX ADMIN — FENTANYL CITRATE 50 MCG: 50 INJECTION, SOLUTION INTRAMUSCULAR; INTRAVENOUS at 13:14

## 2018-11-08 RX ADMIN — LIDOCAINE HYDROCHLORIDE 50 MG: 10 INJECTION, SOLUTION INFILTRATION; PERINEURAL at 11:06

## 2018-11-08 RX ADMIN — DIPHENHYDRAMINE HYDROCHLORIDE 12.5 MG: 50 INJECTION, SOLUTION INTRAMUSCULAR; INTRAVENOUS at 13:27

## 2018-11-08 RX ADMIN — FENTANYL CITRATE 100 MCG: 50 INJECTION, SOLUTION INTRAMUSCULAR; INTRAVENOUS at 11:14

## 2018-11-08 RX ADMIN — ROCURONIUM BROMIDE 30 MG: 10 INJECTION INTRAVENOUS at 11:06

## 2018-11-08 RX ADMIN — SODIUM CHLORIDE, SODIUM LACTATE, POTASSIUM CHLORIDE, AND CALCIUM CHLORIDE 125 ML/HR: .6; .31; .03; .02 INJECTION, SOLUTION INTRAVENOUS at 10:11

## 2018-11-08 RX ADMIN — HYDROMORPHONE HYDROCHLORIDE 0.5 MG: 1 INJECTION, SOLUTION INTRAMUSCULAR; INTRAVENOUS; SUBCUTANEOUS at 12:36

## 2018-11-08 NOTE — PERIOPERATIVE NURSING NOTE
Dr Lyla Pozo md, aware of patient's bradycardia   md aware that heart rate went as low as 41 and spontaneously came back up to upper 40's to 50's  No new orders  Will continue to monitor

## 2018-11-08 NOTE — ANESTHESIA POSTPROCEDURE EVALUATION
Post-Op Assessment Note      CV Status:  Stable    Mental Status:  Awake    Hydration Status:  Stable    PONV Controlled:  None    Airway Patency:  Patent    Post Op Vitals Reviewed: Yes          Staff: Anesthesiologist, CRNA           BP   168/91   Temp (P) 98 2 °F (36 8 °C) (11/08/18 1240)    Pulse (!) (P) 48 (11/08/18 1240)   Resp (P) 18 (11/08/18 1240)    SpO2 (P) 99 % (11/08/18 1240)

## 2018-11-08 NOTE — PERIOPERATIVE NURSING NOTE
Pt  Rates pain 8/10 and is snoring with a flacc score of 0  Pt  Denies dilaudid that has been offered to him multiple times by this rn  Will continue to monitor

## 2018-11-08 NOTE — OP NOTE
OPERATIVE REPORT  PATIENT NAME: Rodger Hamilton    :  1980  MRN: [id number]  Pt Location: BE OR ROOM 05    SURGERY DATE: 2018    Surgeon(s) and Role:     * Marcela Romero MD - Primary     * Milvia Carpio MD - Assisting    Preop Diagnosis:  Incisional hernia, without obstruction or gangrene [K43 2]    Post-Op Diagnosis Codes:     * Incisional hernia, without obstruction or gangrene [K43 2]    Procedure(s) (LRB):  REPAIR HERNIA INCISIONAL LAPAROSCOPIC (N/A)    Specimen(s):  * No specimens in log *    Estimated Blood Loss:   Minimal    Drains:  [REMOVED] Urethral Catheter Latex 16 Fr  (Removed)   Number of days: 0       Anesthesia Type:   General    Operative Indications:  Incisional hernia, without obstruction or gangrene [K43 2]      Operative Findings:  Small hernia defect at the umbilicus  Most of the small bowel was inspected and looked fairly normal    Complications:   None    Procedure and Technique:  Patient was identified by visualization conversation on operating room table  After satisfactory induction general anesthesia, the patient's abdomen was prepped and draped in usual sterile fashion  In the left upper quadrant local anesthesia was infiltrated small stab wound was made a Veress needle introduced into abdominal cavity  Carbon oxide was insufflated told was about 2 L at the present in the abdomen  On the left side of the abdomen, local anesthesia was infiltrated  A stab wound was made a 5 mm trocar was introduced under direct vision  In the upper abdomen local anesthesia was infiltrated stab wound made 5 mm trocar introduced under direct vision and on the right lateral abdomen local anesthesia infiltrated incision made a 12 mm trocar introduced  Brief exploration showed an hernia at the umbilicus  Most of the small bowel ends was inspected and looked fairly normal no gross or obvious abnormality seen  Patient had a known clip from gallbladder surgery down in the pelvis    We were able to look down in the pelvis and found the clip in question which was then removed  At this point, an 11 mm Hamilton Ventralex patch was taken  A suture was placed at the midportion as a holding device  This was then rolled up and placed in the abdominal cavity  Lateral to the umbilicus a small stab wound was made and a 0 Ethibond suture was placed with a suture Passer to close the hernia defect  A small stab wound was then made in the umbilicus suture pass used to grasp the holding suture placed on the mesh and the mesh up against the abdominal wall  The capture was then used to circumferentially tack the mesh in place  Once this was done, a couple of tacks did not look like they were in good position so they were removed  The fascia of the 12 trocar site was closed with a suture Passer and 0 Vicryl suture  The skin incisions were all closed with subcuticular 4 Monocryl  Histoacryl dressing was applied to all the wounds  RF wanding was clear  Patient was awakened taken recovery satisfactorily     I was present for the entire procedure    Patient Disposition:  PACU     SIGNATURE: Rubin Mcguire MD  DATE: November 8, 2018  TIME: 12:54 PM

## 2018-11-08 NOTE — DISCHARGE INSTRUCTIONS
Incisional Hernia   WHAT YOU NEED TO KNOW:   An incisional hernia is a bulge through the healed incision of a previous surgery in your abdomen  An incisional hernia is usually caused by weakness in the tissues and muscles of your abdomen  The bulge is usually caused by a part of your intestine, but it may also be tissue or fat pushing through the weakness  DISCHARGE INSTRUCTIONS:   Call 911 for any of the following:   · You have sudden trouble breathing  Seek care immediately if:   · You have severe pain  · You have bloody bowel movements  · You stop having bowel movements or passing gas  · Your abdomen is suddenly very hard  Contact your healthcare provider if:   · You have a fever  · You have nausea and are vomiting  · You are constipated  · Your hernia has returned  · You have a lump, or collection of fluid, under your skin  · You have pain that does not go away, even after you take pain medicine  · You have questions or concerns about your condition or care  Medicines: You may need any of the following:  · NSAIDs , such as ibuprofen, help decrease swelling, pain, and fever  This medicine is available with or without a doctor's order  NSAIDs can cause stomach bleeding or kidney problems in certain people  If you take blood thinner medicine, always ask your healthcare provider if NSAIDs are safe for you  Always read the medicine label and follow directions  · Prescription pain medicine  may be given  Ask your how to take this medicine safely  · Take your medicine as directed  Contact your healthcare provider if you think your medicine is not helping or if you have side effects  Tell him or her if you are allergic to any medicine  Keep a list of the medicines, vitamins, and herbs you take  Include the amounts, and when and why you take them  Bring the list or the pill bottles to follow-up visits  Carry your medicine list with you in case of an emergency    Prevent another incisional hernia:   · Maintain a healthy weight  Ask your healthcare provider how much you should weigh  Ask him to help you create a weight loss plan if you are overweight  Ask your healthcare provider what types of food you should eat  · Do not strain  when you have a bowel movement  Take an over-the-counter bowel movement softener and drink plenty of water  When you cough, hold a pillow against your incision to prevent strain  · Do not smoke  If you smoke, it is never too late to quit  Ask for information if you need help quitting  · Wear your support device as directed  You may need to wear a support device, such as an abdominal binder for up to 2 weeks after surgery  This helps decrease pain and the risk of fluid collecting under your skin  · Return to your usual activities as directed  Do not lift more than 10 pounds or do strenuous activity for up to 6 weeks  Follow up with your healthcare provider as directed:  Write down your questions so you remember to ask them during your visits  © 2017 [facility] Information is for End User's use only and may not be sold, redistributed or otherwise used for commercial purposes  All illustrations and images included in CareNotes® are the copyrighted property of [employer]  or Gerald Bazzi  The above information is an  only  It is not intended as medical advice for individual conditions or treatments  Talk to your doctor, nurse or pharmacist before following any medical regimen to see if it is safe and effective for you

## 2018-11-08 NOTE — PERIOPERATIVE NURSING NOTE
This rn asked patient if he wants to try dilaudid for pain control, but patient did not answer and is currently snoring  Will continue to monitor

## 2018-11-08 NOTE — H&P
Assessment and Plan         Problem List Items Addressed This Visit               Other     Incisional hernia, without obstruction or gangrene - Primary       Discussed repairing hernia at umbilicus laparoscopically which will allow us to see more of his intestines  He is to have his EGD and colonoscopy first   I discussed the proposed procedure including risks, benefits, alternatives, and what to expect postoperatively  He is agreeable to proceed  Plan: laparoscopic incisional hernia repair                    Chief Complaint:  Rachna Perez is a 41 y o  male who presents for Follow-up (Umbilical Pain)     Subjective  43-year-old male who returns for follow-up of umbilical pain  He has had no further drainage from the umbilicus  Still has pain at that site      Past Medical History  Medical History        Past Medical History:   Diagnosis Date    Abdominal pain      Abnormal liver function test       last assessed: Oct 16, 2013     Abnormal weight loss       last assessed: Yung 15, 2014     Allergic rhinitis due to pollen       last assessed: Yung 15, 2014     Anxiety      Anxiety       last assessed/resolved:  Aug 5, 2015     Asthma       last assessed: Yung 15, 2014     Benign essential HTN       last assessed/resolved: Feb 23, 2017     Cervical lymphadenopathy       last assessed/resolved: Feb 23, 2017     Chronic sinusitis       last assessed: Yung 15, 2014     Constipation      Crohn's disease ([facility] [address] ) 01/01/2011     last assessed: Yung 15, 2014     Dysuria       last assessed: April 29, 2016     Elevated BP without diagnosis of hypertension       last assessed/resolved: Feb 23, 2017     Esophageal reflux       last assessed/resolved: Feb 23, 2017     Eustachian tube anomaly       Resolved: Nov 9, 2017     External hemorrhoids       last assessed: Yung 15, 2014     Hypertension       borderline    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       last assessed/resolved: July 19, 2017     Pancreatic neoplasm       last assessed: Yung 15, 2014     PONV (postoperative nausea and vomiting)      Positive depression screening       resolved: July 24, 2017     Psoriasis      Seasonal allergies      Vitamin B12 deficiency       last assessed/resolved: Feb 23, 2017             Past Surgical History  Surgical History         Past Surgical History:   Procedure Laterality Date    CHOLECYSTECTOMY         resolved: 2011     COLONOSCOPY N/A 11/18/2016     Procedure: COLONOSCOPY;  Surgeon: Lauryn Torres MD;  Location:  GI LAB; Service:     COLONOSCOPY N/A 4/28/2016     Procedure: COLONOSCOPY;  Surgeon: Lauryn Torres MD;  Location: [facility] GI LAB; Service:     ESOPHAGOGASTRODUODENOSCOPY N/A 4/28/2016     Procedure: ESOPHAGOGASTRODUODENOSCOPY (EGD); Surgeon: Lauryn Torres MD;  Location: [facility] GI LAB; Service:     FRONTAL SINUSOTOMY         resolved: April 2009     PANCREAS SURGERY        SD COLONOSCOPY FLX DX W/COLLJ SPEC WHEN PFRMD N/A 10/12/2018     Procedure: EGD AND COLONOSCOPY;  Surgeon: Dev Stack MD;  Location: [facility] GI LAB;   Service: Gastroenterology    SD REPAIR OF NASAL SEPTUM N/A 7/28/2017     Procedure: REVISION SEPTOPLASTY; TURBINOPLASTY; BILATERAL  GRAFTS; ALAR GRAFT; POSSIBLE AURICULAR CARTILAGE GRAFT;  Surgeon: Jr Peng MD;  Location:  MAIN OR;  Service: ENT    TONSILLECTOMY AND ADENOIDECTOMY                Family History        Family History   Problem Relation Age of Onset    Diabetes Mother           mellitus     Hypertension Mother      Anxiety disorder Mother      Thyroid disease Mother      Fibromyalgia Mother      Diabetes Father      Hypertension Father      Hypertension Sister      No Known Problems Brother      No Known Problems Maternal Grandmother      No Known Problems Maternal Grandfather      No Known Problems Paternal Grandmother      No Known Problems Paternal Grandfather      Diabetes Other           mellitus     Rheum arthritis Cousin         Medications         Current Outpatient Prescriptions on File Prior to Visit   Medication Sig Dispense Refill    acetaminophen (TYLENOL) 500 mg tablet Take 10 mg/kg by mouth every 4 (four) hours as needed          cetirizine (ZyrTEC) 10 mg tablet Take 1 tablet (10 mg total) by mouth daily as needed for allergies 30 tablet 5    clonazePAM (KlonoPIN) 0 5 mg tablet Take 1 tablet (0 5 mg total) by mouth every 12 (twelve) hours as needed for anxiety 90 tablet 1    dicyclomine (BENTYL) 20 mg tablet Take 1 tablet (20 mg total) by mouth every 6 (six) hours as needed (pain) 60 tablet 1    diphenhydrAMINE (BENADRYL) 25 mg tablet Take 25 mg by mouth as needed          fluticasone (FLONASE) 50 mcg/act nasal spray 2 sprays into each nostril as needed          gabapentin (NEURONTIN) 100 mg capsule Take 1 capsule (100 mg total) by mouth 3 (three) times a day for 30 days 90 capsule 0    Linaclotide (LINZESS) 145 MCG CAPS Take 1 capsule (145 mcg total) by mouth daily for 30 days 30 capsule 3    Probiotic Product (PROBIOTIC DAILY) CAPS Take 1 capsule by mouth daily for 30 days May use any over the counter brand - align, equate ([employer]), culturelle, monte or other store brands  30 capsule 0    SUPREP BOWEL PREP KIT 17 5-3 13-1 6 GM/180ML SOLN Take 1 kit by mouth once for 1 dose Please follow instructions as per the office  2 Bottle 0      No current facility-administered medications on file prior to visit           Allergies        Allergies   Allergen Reactions    Advil [Ibuprofen] Anaphylaxis and Hives       Category: Allergy;     Apple Anaphylaxis    Aspirin Anaphylaxis and Hives       Category: Allergy;     Eggs Or Egg-Derived Products Anaphylaxis       Category: Allergy;     Nsaids Anaphylaxis       Category: Allergy;     Other Anaphylaxis       Category: Allergy;  Annotation - [id number]: cherries, grapes , and kiwis    Peanuts [Peanut Oil] Anaphylaxis    Penicillins      Pollen Extract      Codeine Anxiety         Review of Systems   Constitutional: Negative for chills, fatigue and fever  HENT: Negative for congestion, ear pain, sneezing, trouble swallowing and voice change  Eyes: Negative for pain and discharge  Respiratory: Negative for cough, shortness of breath and wheezing  Cardiovascular: Negative for palpitations and leg swelling  Gastrointestinal: Positive for abdominal pain  Negative for vomiting  Endocrine: Negative for cold intolerance, heat intolerance and polyuria  Genitourinary: Negative for decreased urine volume, difficulty urinating and dysuria  Musculoskeletal: Negative for arthralgias and back pain  Skin: Negative for rash and wound  Allergic/Immunologic: Negative for environmental allergies and food allergies  Neurological: Negative for dizziness and weakness  Hematological: Negative for adenopathy  Does not bruise/bleed easily  Psychiatric/Behavioral: Negative for confusion and sleep disturbance  The patient is not nervous/anxious  All other systems reviewed and are negative         Objective      Vitals:     10/09/18 1147   BP: 104/84   Temp: 98 1 °F (36 7 °C)         Physical Exam   Constitutional: He is oriented to person, place, and time  He appears well-developed and well-nourished  No distress  HENT:   Head: Normocephalic and atraumatic  Right Ear: External ear normal    Left Ear: External ear normal    Eyes: Conjunctivae are normal  No scleral icterus  Neck: Normal range of motion  Neck supple  No tracheal deviation present  No thyromegaly present  Cardiovascular: Normal rate, regular rhythm and normal heart sounds  No murmur heard  Pulmonary/Chest: Effort normal and breath sounds normal  No respiratory distress  He has no wheezes  He has no rales  Abdominal: Soft  He exhibits no distension and no mass  There is no rebound and no guarding     Laparoscopic incisions epigastrium 2 on the right side and 1 supraumbilical   At the umbilicus there is a defect in the fascia about 2 cm  Remaining abdomen is soft   Musculoskeletal: Normal range of motion  He exhibits no edema  Lymphadenopathy:     He has no cervical adenopathy  Neurological: He is alert and oriented to person, place, and time  Skin: Skin is warm and dry  He is not diaphoretic  Psychiatric: He has a normal mood and affect   His behavior is normal  Judgment and thought content normal

## 2018-11-08 NOTE — PERIOPERATIVE NURSING NOTE
Pt  occassionally snores, and consistently appears comfortable with a flacc score of 0  Will continue to monitor

## 2018-11-08 NOTE — ANESTHESIA PREPROCEDURE EVALUATION
Review of Systems/Medical History  Patient summary reviewed  Chart reviewed  History of anesthetic complications (no motion sickness) PONV    Cardiovascular  EKG reviewed, Hypertension ,   Comment: Normal TTE 9/2018,  Pulmonary  Smoker cigarette smoker  , Tobacco cessation counseling given , Asthma (exercise - no meds) , seasonal/exercise induced ,        GI/Hepatic    GERD well controlled,   Comment: Crohn's disease     Negative  ROS        Endo/Other  Negative endo/other ROS      GYN       Hematology  Negative hematology ROS      Musculoskeletal  Negative musculoskeletal ROS        Neurology  Negative neurology ROS      Psychology   Anxiety,            Physical Exam    Airway  Comment: slade  Mallampati score: I  TM Distance: >3 FB  Neck ROM: full     Dental   No notable dental hx     Cardiovascular      Pulmonary      Other Findings       Lab Results   Component Value Date    WBC 8 41 10/08/2018    HGB 15 0 10/08/2018     10/08/2018     Lab Results   Component Value Date    K 4 8 11/06/2018    BUN 12 11/06/2018    CREATININE 0 91 11/06/2018     Anesthesia Plan  ASA Score- 2     Anesthesia Type- general with ASA Monitors  Additional Monitors:   Airway Plan: ETT  Comment: PONV ppx - reports was controlled with prior ENT surgery but no antiemetics documented  NO NSAIDS  Plan Factors-  Patient smoked on day of surgery  Induction- intravenous  Postoperative Plan-     Informed Consent- Anesthetic plan and risks discussed with patient (friend)  I personally reviewed this patient with the CRNA  Discussed and agreed on the Anesthesia Plan with the CRNA  Maru Medrano

## 2018-11-09 ENCOUNTER — TELEPHONE (OUTPATIENT)
Dept: SURGERY | Facility: CLINIC | Age: 38
End: 2018-11-09

## 2018-11-09 NOTE — TELEPHONE ENCOUNTER
Patient had incisional hernia repair with Dr Yasmine Monroy on 11/9/18 and taking Oxy 5mg every 4 hours with Tylenol in b/w  Patient still in a lot of discomfort  Per Dr Juanita Cummings GS today to take 2 every 6 hours with Tylenol in b/w  Informed patient if have increased pain over the weekend to call the 2200 # to speak with the on call doctor  Patient understands  Patient medication (Oxy) needed authorization Jerrell Israel called to obtain  Patient informed me that he paid full price until authorization was obtained  I will call the pharmacy to f/u that authorization was provided

## 2018-11-11 ENCOUNTER — HOSPITAL ENCOUNTER (OUTPATIENT)
Facility: HOSPITAL | Age: 38
Setting detail: OBSERVATION
Discharge: HOME | End: 2018-11-14
Attending: SURGERY | Admitting: SURGERY
Payer: COMMERCIAL

## 2018-11-11 ENCOUNTER — APPOINTMENT (EMERGENCY)
Dept: RADIOLOGY | Facility: HOSPITAL | Age: 38
End: 2018-11-11
Payer: COMMERCIAL

## 2018-11-11 DIAGNOSIS — R10.84 GENERALIZED ABDOMINAL PAIN: Primary | ICD-10-CM

## 2018-11-11 DIAGNOSIS — K59.00 CONSTIPATION, UNSPECIFIED CONSTIPATION TYPE: Primary | ICD-10-CM

## 2018-11-11 LAB
ANION GAP SERPL CALCULATED.3IONS-SCNC: 5 MMOL/L (ref 4–13)
BASOPHILS # BLD AUTO: 0.07 THOUSANDS/ΜL (ref 0–0.1)
BASOPHILS NFR BLD AUTO: 1 % (ref 0–1)
BUN SERPL-MCNC: 10 MG/DL (ref 5–25)
CALCIUM SERPL-MCNC: 8.9 MG/DL (ref 8.3–10.1)
CHLORIDE SERPL-SCNC: 105 MMOL/L (ref 100–108)
CO2 SERPL-SCNC: 28 MMOL/L (ref 21–32)
CREAT SERPL-MCNC: 0.75 MG/DL (ref 0.6–1.3)
EOSINOPHIL # BLD AUTO: 0.3 THOUSAND/ΜL (ref 0–0.61)
EOSINOPHIL NFR BLD AUTO: 3 % (ref 0–6)
ERYTHROCYTE [DISTWIDTH] IN BLOOD BY AUTOMATED COUNT: 12.6 % (ref 11.6–15.1)
GFR SERPL CREATININE-BSD FRML MDRD: 117 ML/MIN/1.73SQ M
GLUCOSE SERPL-MCNC: 86 MG/DL (ref 65–140)
HCT VFR BLD AUTO: 44.7 % (ref 36.5–49.3)
HGB BLD-MCNC: 15.7 G/DL (ref 12–17)
IMM GRANULOCYTES # BLD AUTO: 0.07 THOUSAND/UL (ref 0–0.2)
IMM GRANULOCYTES NFR BLD AUTO: 1 % (ref 0–2)
LYMPHOCYTES # BLD AUTO: 2.02 THOUSANDS/ΜL (ref 0.6–4.47)
LYMPHOCYTES NFR BLD AUTO: 22 % (ref 14–44)
MCH RBC QN AUTO: 32.1 PG (ref 26.8–34.3)
MCHC RBC AUTO-ENTMCNC: 35.1 G/DL (ref 31.4–37.4)
MCV RBC AUTO: 91 FL (ref 82–98)
MONOCYTES # BLD AUTO: 0.86 THOUSAND/ΜL (ref 0.17–1.22)
MONOCYTES NFR BLD AUTO: 9 % (ref 4–12)
NEUTROPHILS # BLD AUTO: 6.08 THOUSANDS/ΜL (ref 1.85–7.62)
NEUTS SEG NFR BLD AUTO: 64 % (ref 43–75)
NRBC BLD AUTO-RTO: 0 /100 WBCS
PLATELET # BLD AUTO: 237 THOUSANDS/UL (ref 149–390)
PMV BLD AUTO: 9.5 FL (ref 8.9–12.7)
POTASSIUM SERPL-SCNC: 3.5 MMOL/L (ref 3.5–5.3)
RBC # BLD AUTO: 4.89 MILLION/UL (ref 3.88–5.62)
SODIUM SERPL-SCNC: 138 MMOL/L (ref 136–145)
WBC # BLD AUTO: 9.4 THOUSAND/UL (ref 4.31–10.16)

## 2018-11-11 PROCEDURE — 85025 COMPLETE CBC W/AUTO DIFF WBC: CPT | Performed by: STUDENT IN AN ORGANIZED HEALTH CARE EDUCATION/TRAINING PROGRAM

## 2018-11-11 PROCEDURE — 80048 BASIC METABOLIC PNL TOTAL CA: CPT | Performed by: STUDENT IN AN ORGANIZED HEALTH CARE EDUCATION/TRAINING PROGRAM

## 2018-11-11 PROCEDURE — 96361 HYDRATE IV INFUSION ADD-ON: CPT

## 2018-11-11 PROCEDURE — 99024 POSTOP FOLLOW-UP VISIT: CPT | Performed by: SURGERY

## 2018-11-11 PROCEDURE — 96360 HYDRATION IV INFUSION INIT: CPT

## 2018-11-11 PROCEDURE — 96372 THER/PROPH/DIAG INJ SC/IM: CPT

## 2018-11-11 PROCEDURE — 99285 EMERGENCY DEPT VISIT HI MDM: CPT

## 2018-11-11 PROCEDURE — 74018 RADEX ABDOMEN 1 VIEW: CPT

## 2018-11-11 PROCEDURE — 36415 COLL VENOUS BLD VENIPUNCTURE: CPT | Performed by: STUDENT IN AN ORGANIZED HEALTH CARE EDUCATION/TRAINING PROGRAM

## 2018-11-11 RX ORDER — POLYETHYLENE GLYCOL 3350 17 G/17G
119 POWDER, FOR SOLUTION ORAL ONCE
Status: COMPLETED | OUTPATIENT
Start: 2018-11-11 | End: 2018-11-12

## 2018-11-11 RX ORDER — ONDANSETRON 2 MG/ML
4 INJECTION INTRAMUSCULAR; INTRAVENOUS EVERY 6 HOURS PRN
Status: DISCONTINUED | OUTPATIENT
Start: 2018-11-11 | End: 2018-11-14 | Stop reason: HOSPADM

## 2018-11-11 RX ORDER — GABAPENTIN 100 MG/1
100 CAPSULE ORAL 3 TIMES DAILY
Status: DISCONTINUED | OUTPATIENT
Start: 2018-11-11 | End: 2018-11-14 | Stop reason: HOSPADM

## 2018-11-11 RX ORDER — DICYCLOMINE HCL 20 MG
20 TABLET ORAL EVERY 6 HOURS PRN
Status: DISCONTINUED | OUTPATIENT
Start: 2018-11-11 | End: 2018-11-14 | Stop reason: HOSPADM

## 2018-11-11 RX ORDER — POLYETHYLENE GLYCOL 3350 17 G/17G
17 POWDER, FOR SOLUTION ORAL ONCE
Status: COMPLETED | OUTPATIENT
Start: 2018-11-11 | End: 2018-11-11

## 2018-11-11 RX ORDER — OXYCODONE HYDROCHLORIDE 5 MG/1
5 TABLET ORAL EVERY 4 HOURS PRN
Status: DISCONTINUED | OUTPATIENT
Start: 2018-11-11 | End: 2018-11-14 | Stop reason: HOSPADM

## 2018-11-11 RX ORDER — ACETAMINOPHEN 325 MG/1
650 TABLET ORAL EVERY 6 HOURS PRN
Status: DISCONTINUED | OUTPATIENT
Start: 2018-11-11 | End: 2018-11-14 | Stop reason: HOSPADM

## 2018-11-11 RX ORDER — CLONAZEPAM 0.5 MG/1
0.5 TABLET ORAL EVERY 12 HOURS PRN
Status: DISCONTINUED | OUTPATIENT
Start: 2018-11-11 | End: 2018-11-12

## 2018-11-11 RX ORDER — MINERAL OIL 100 G/100G
1 OIL RECTAL ONCE
Status: COMPLETED | OUTPATIENT
Start: 2018-11-11 | End: 2018-11-11

## 2018-11-11 RX ORDER — SODIUM CHLORIDE 9 MG/ML
125 INJECTION, SOLUTION INTRAVENOUS CONTINUOUS
Status: DISCONTINUED | OUTPATIENT
Start: 2018-11-11 | End: 2018-11-13

## 2018-11-11 RX ORDER — NICOTINE 21 MG/24HR
1 PATCH, TRANSDERMAL 24 HOURS TRANSDERMAL DAILY
Status: DISCONTINUED | OUTPATIENT
Start: 2018-11-12 | End: 2018-11-14 | Stop reason: HOSPADM

## 2018-11-11 RX ORDER — HEPARIN SODIUM 5000 [USP'U]/ML
5000 INJECTION, SOLUTION INTRAVENOUS; SUBCUTANEOUS EVERY 8 HOURS SCHEDULED
Status: DISCONTINUED | OUTPATIENT
Start: 2018-11-11 | End: 2018-11-14 | Stop reason: HOSPADM

## 2018-11-11 RX ADMIN — POLYETHYLENE GLYCOL 3350 17 G: 17 POWDER, FOR SOLUTION ORAL at 20:57

## 2018-11-11 RX ADMIN — SODIUM CHLORIDE 125 ML/HR: 0.9 INJECTION, SOLUTION INTRAVENOUS at 18:16

## 2018-11-11 RX ADMIN — OXYCODONE HYDROCHLORIDE 5 MG: 5 TABLET ORAL at 18:19

## 2018-11-11 RX ADMIN — OXYCODONE HYDROCHLORIDE 5 MG: 5 TABLET ORAL at 22:15

## 2018-11-11 RX ADMIN — MINERAL OIL 1 ENEMA: 100 ENEMA RECTAL at 20:59

## 2018-11-11 RX ADMIN — METHYLNALTREXONE BROMIDE 12 MG: 12 INJECTION, SOLUTION SUBCUTANEOUS at 20:13

## 2018-11-11 NOTE — PROGRESS NOTES
Pt is POD 3 from lap VHR  Called by patient's wife re persistent uncontrolled pain  He has not passed flatus or had a BM since surgery and is having nausea  No fevers but +chills  I advised pt come to [facility] ED for red surgery evaluation  PACS notified      Rachel Segal MD

## 2018-11-12 ENCOUNTER — APPOINTMENT (OUTPATIENT)
Dept: RADIOLOGY | Facility: HOSPITAL | Age: 38
End: 2018-11-12
Payer: COMMERCIAL

## 2018-11-12 PROBLEM — Z98.890 S/P REPAIR OF VENTRAL HERNIA: Status: ACTIVE | Noted: 2018-11-12

## 2018-11-12 PROBLEM — K43.2 INCISIONAL HERNIA, WITHOUT OBSTRUCTION OR GANGRENE: Status: RESOLVED | Noted: 2018-10-09 | Resolved: 2018-11-12

## 2018-11-12 PROBLEM — R79.89 ELEVATED LACTIC ACID LEVEL: Status: RESOLVED | Noted: 2018-10-08 | Resolved: 2018-11-12

## 2018-11-12 PROBLEM — K91.89 POSTOPERATIVE ILEUS (HCC): Status: ACTIVE | Noted: 2018-11-12

## 2018-11-12 PROBLEM — R10.13 EPIGASTRIC PAIN: Status: RESOLVED | Noted: 2018-08-08 | Resolved: 2018-11-12

## 2018-11-12 PROBLEM — R42 DIZZINESS: Status: RESOLVED | Noted: 2018-08-09 | Resolved: 2018-11-12

## 2018-11-12 PROBLEM — Z87.19 S/P REPAIR OF VENTRAL HERNIA: Status: ACTIVE | Noted: 2018-11-12

## 2018-11-12 PROBLEM — K56.7 POSTOPERATIVE ILEUS (HCC): Status: ACTIVE | Noted: 2018-11-12

## 2018-11-12 PROBLEM — R10.33 UMBILICAL PAIN: Status: RESOLVED | Noted: 2018-08-30 | Resolved: 2018-11-12

## 2018-11-12 LAB
ANION GAP SERPL CALCULATED.3IONS-SCNC: 4 MMOL/L (ref 4–13)
BASOPHILS # BLD AUTO: 0.08 THOUSANDS/ΜL (ref 0–0.1)
BASOPHILS NFR BLD AUTO: 1 % (ref 0–1)
BUN SERPL-MCNC: 10 MG/DL (ref 5–25)
CALCIUM SERPL-MCNC: 8.2 MG/DL (ref 8.3–10.1)
CHLORIDE SERPL-SCNC: 105 MMOL/L (ref 100–108)
CO2 SERPL-SCNC: 28 MMOL/L (ref 21–32)
CREAT SERPL-MCNC: 0.87 MG/DL (ref 0.6–1.3)
EOSINOPHIL # BLD AUTO: 0.45 THOUSAND/ΜL (ref 0–0.61)
EOSINOPHIL NFR BLD AUTO: 5 % (ref 0–6)
ERYTHROCYTE [DISTWIDTH] IN BLOOD BY AUTOMATED COUNT: 12.7 % (ref 11.6–15.1)
GFR SERPL CREATININE-BSD FRML MDRD: 110 ML/MIN/1.73SQ M
GLUCOSE SERPL-MCNC: 97 MG/DL (ref 65–140)
HCT VFR BLD AUTO: 43.1 % (ref 36.5–49.3)
HGB BLD-MCNC: 14.8 G/DL (ref 12–17)
IMM GRANULOCYTES # BLD AUTO: 0.08 THOUSAND/UL (ref 0–0.2)
IMM GRANULOCYTES NFR BLD AUTO: 1 % (ref 0–2)
LYMPHOCYTES # BLD AUTO: 2.12 THOUSANDS/ΜL (ref 0.6–4.47)
LYMPHOCYTES NFR BLD AUTO: 22 % (ref 14–44)
MAGNESIUM SERPL-MCNC: 2.2 MG/DL (ref 1.6–2.6)
MCH RBC QN AUTO: 32 PG (ref 26.8–34.3)
MCHC RBC AUTO-ENTMCNC: 34.3 G/DL (ref 31.4–37.4)
MCV RBC AUTO: 93 FL (ref 82–98)
MONOCYTES # BLD AUTO: 0.89 THOUSAND/ΜL (ref 0.17–1.22)
MONOCYTES NFR BLD AUTO: 9 % (ref 4–12)
NEUTROPHILS # BLD AUTO: 5.9 THOUSANDS/ΜL (ref 1.85–7.62)
NEUTS SEG NFR BLD AUTO: 62 % (ref 43–75)
NRBC BLD AUTO-RTO: 0 /100 WBCS
PHOSPHATE SERPL-MCNC: 3 MG/DL (ref 2.7–4.5)
PLATELET # BLD AUTO: 223 THOUSANDS/UL (ref 149–390)
PMV BLD AUTO: 9.8 FL (ref 8.9–12.7)
POTASSIUM SERPL-SCNC: 3.7 MMOL/L (ref 3.5–5.3)
RBC # BLD AUTO: 4.63 MILLION/UL (ref 3.88–5.62)
SODIUM SERPL-SCNC: 137 MMOL/L (ref 136–145)
WBC # BLD AUTO: 9.52 THOUSAND/UL (ref 4.31–10.16)

## 2018-11-12 PROCEDURE — 99024 POSTOP FOLLOW-UP VISIT: CPT | Performed by: SURGERY

## 2018-11-12 PROCEDURE — 80048 BASIC METABOLIC PNL TOTAL CA: CPT | Performed by: STUDENT IN AN ORGANIZED HEALTH CARE EDUCATION/TRAINING PROGRAM

## 2018-11-12 PROCEDURE — 74022 RADEX COMPL AQT ABD SERIES: CPT

## 2018-11-12 PROCEDURE — 83735 ASSAY OF MAGNESIUM: CPT | Performed by: STUDENT IN AN ORGANIZED HEALTH CARE EDUCATION/TRAINING PROGRAM

## 2018-11-12 PROCEDURE — 85025 COMPLETE CBC W/AUTO DIFF WBC: CPT | Performed by: STUDENT IN AN ORGANIZED HEALTH CARE EDUCATION/TRAINING PROGRAM

## 2018-11-12 PROCEDURE — 84100 ASSAY OF PHOSPHORUS: CPT | Performed by: STUDENT IN AN ORGANIZED HEALTH CARE EDUCATION/TRAINING PROGRAM

## 2018-11-12 RX ORDER — CLONAZEPAM 0.5 MG/1
0.5 TABLET ORAL 3 TIMES DAILY
Status: DISCONTINUED | OUTPATIENT
Start: 2018-11-12 | End: 2018-11-14 | Stop reason: HOSPADM

## 2018-11-12 RX ORDER — LORAZEPAM 2 MG/ML
1 INJECTION INTRAMUSCULAR EVERY 4 HOURS PRN
Status: DISCONTINUED | OUTPATIENT
Start: 2018-11-12 | End: 2018-11-14 | Stop reason: HOSPADM

## 2018-11-12 RX ORDER — LORAZEPAM 2 MG/ML
1 INJECTION INTRAMUSCULAR ONCE
Status: DISCONTINUED | OUTPATIENT
Start: 2018-11-12 | End: 2018-11-12

## 2018-11-12 RX ADMIN — OXYCODONE HYDROCHLORIDE 5 MG: 5 TABLET ORAL at 15:56

## 2018-11-12 RX ADMIN — CLONAZEPAM 0.5 MG: 0.5 TABLET ORAL at 00:10

## 2018-11-12 RX ADMIN — GABAPENTIN 100 MG: 100 CAPSULE ORAL at 21:28

## 2018-11-12 RX ADMIN — OXYCODONE HYDROCHLORIDE 5 MG: 5 TABLET ORAL at 01:49

## 2018-11-12 RX ADMIN — POLYETHYLENE GLYCOL 3350 119 G: 17 POWDER, FOR SOLUTION ORAL at 02:38

## 2018-11-12 RX ADMIN — SODIUM CHLORIDE 125 ML/HR: 0.9 INJECTION, SOLUTION INTRAVENOUS at 09:25

## 2018-11-12 RX ADMIN — GABAPENTIN 100 MG: 100 CAPSULE ORAL at 00:11

## 2018-11-12 RX ADMIN — BISACODYL 5 MG: 5 TABLET, COATED ORAL at 00:11

## 2018-11-12 RX ADMIN — GABAPENTIN 100 MG: 100 CAPSULE ORAL at 09:23

## 2018-11-12 RX ADMIN — LORAZEPAM 1 MG: 2 INJECTION INTRAMUSCULAR; INTRAVENOUS at 18:45

## 2018-11-12 RX ADMIN — SODIUM CHLORIDE 125 ML/HR: 0.9 INJECTION, SOLUTION INTRAVENOUS at 20:10

## 2018-11-12 RX ADMIN — HEPARIN SODIUM 5000 UNITS: 5000 INJECTION INTRAVENOUS; SUBCUTANEOUS at 01:49

## 2018-11-12 RX ADMIN — HEPARIN SODIUM 5000 UNITS: 5000 INJECTION INTRAVENOUS; SUBCUTANEOUS at 09:23

## 2018-11-12 RX ADMIN — CLONAZEPAM 0.5 MG: 0.5 TABLET ORAL at 11:10

## 2018-11-12 RX ADMIN — CLONAZEPAM 0.5 MG: 0.5 TABLET ORAL at 21:28

## 2018-11-12 RX ADMIN — HEPARIN SODIUM 5000 UNITS: 5000 INJECTION INTRAVENOUS; SUBCUTANEOUS at 18:54

## 2018-11-12 RX ADMIN — OXYCODONE HYDROCHLORIDE 5 MG: 5 TABLET ORAL at 07:35

## 2018-11-12 NOTE — PLAN OF CARE
Problem: DISCHARGE PLANNING - CARE MANAGEMENT  Goal: Discharge to post-acute care or home with appropriate resources  INTERVENTIONS:  - Conduct assessment to determine patient/family and health care team treatment goals, and need for post-acute services based on payer coverage, community resources, and patient preferences, and barriers to discharge  - Address psychosocial, clinical, and financial barriers to discharge as identified in assessment in conjunction with the patient/family and health care team  - Arrange appropriate level of post-acute services according to patients   needs and preference and payer coverage in collaboration with the physician and health care team  - Communicate with and update the patient/family, physician, and health care team regarding progress on the discharge plan  - Arrange appropriate transportation to post-acute venues  Outcome: Completed Date Met: 11/12/18  Patient to be discharged with appropriate services when medically cleared by MD  No additional needs reported at present  CM to follow as needed

## 2018-11-12 NOTE — PLAN OF CARE
Problem: Potential for Falls  Goal: Patient will remain free of falls  INTERVENTIONS:  - Assess patient frequently for physical needs  -  Identify cognitive and physical deficits and behaviors that affect risk of falls    -  Lambert fall precautions as indicated by assessment   - Educate patient/family on patient safety including physical limitations  - Instruct patient to call for assistance with activity based on assessment  - Modify environment to reduce risk of injury  - Consider OT/PT consult to assist with strengthening/mobility   Outcome: Progressing

## 2018-11-12 NOTE — PROGRESS NOTES
Progress Note - General Surgery  Rachna Code 41 y o  male MRN: [id number]  Unit/Bed#: CW3 351-01 Encounter: [id number]    Assessment:  38M POD4 lap VHR who presents with opioid-induced constipation    Plan:  - advance diet today as tolerated  - continue bowel regimen  - ok to d/c once pain controlled and having BM  - IVF hydration  - prn pain control  - OOB/ambulate      Subjective/Objective   Subjective: still with no bowel movement    Objective:    Blood pressure 124/82, pulse 62, temperature 98 1 °F (36 7 °C), temperature source Oral, resp  rate 15, SpO2 97 %  ,There is no height or weight on file to calculate BMI  No intake/output data recorded      Invasive Devices     Peripheral Intravenous Line            Peripheral IV 11/11/18 Right Antecubital less than 1 day                Physical Exam:   NAD, alert and oriented x3  Normocephalic, atraumatic  MMM, EOMI, PERRLA  Norm resp effort on RA  RRR  Abd soft, nonfocal tenderness, fullness, no tympany  No calf tenderness or peripheral edema  Motor/sensation intact in distal extremities  CN grossly intact  -rash/lesions      Lab, Imaging and other studies:  Lab Results   Component Value Date    WBC 9 40 11/11/2018    HGB 15 7 11/11/2018    HCT 44 7 11/11/2018    MCV 91 11/11/2018     11/11/2018      Lab Results   Component Value Date    CALCIUM 8 9 11/11/2018    K 3 5 11/11/2018    CO2 28 11/11/2018     11/11/2018    BUN 10 11/11/2018    CREATININE 0 75 11/11/2018       VTE Pharmacologic Prophylaxis: Heparin  VTE Mechanical Prophylaxis: sequential compression device

## 2018-11-12 NOTE — SOCIAL WORK
CM met with patient to complete a general SW assessment and discuss discharge needs  CM introduced herself, provided extension, and explained her role in the discharge planning  Patient resides in a multi-level house with his wife Ozzie Jasso [phone] and his mother  Patient identifies as his family their primary support system  Patient is independent with ADLs and functional mobility  Patient denies using any DMEs and declined needing any at discharge  Patient drives; reports his wife is available to transport at discharge  Patient uses [address], [address] in [facility]; CM informed patient of their access to [address] at discharge  Patient denied history with VNA services; declined the need for services at discharge  PCP identified as Dr Theodor Peabody  Patient does not have a POA and declined further information  No history of Mental health/ D&A reported  Patient/caregiver received discharge checklist   Content reviewed  Patient/caregiver encouraged to participate in discharge plan of care prior to discharge home  CM reviewed d/c planning process including the following: identifying help at home, patient preference for d/c planning needs, [facility], [facility], availability of treatment team to discuss questions or concerns patient and/or family may have regarding understanding medications and recognizing signs and symptoms once discharged  CM also encouraged patient to follow up with all recommended appointments after discharge  Patient advised of importance for patient and family to participate in managing patients medical well being

## 2018-11-12 NOTE — H&P
H&P Exam - General Surgery   Soni Tee 41 y o  male MRN: [id number]  Unit/Bed#: ED 08 Encounter: [id number]    Assessment/Plan     Assessment:  38M POD3 laparoscopic incisional hernia repair (Dr Yossi Dejesus), with abdominal pain and constipation  Received bowel regimen in ED with no success, continued pain  Plan:  - admit to surgery for observation  - clear liquid diet  - will administer glycolax bowel prep 119g as well as dulcolax  - obstruction series and labs in AM  - prn pain control  - IVF hydration  - OOB/ambulate  - SQH/SCDs      History of Present Illness     HPI:  Soni Tee is a 41 y o  male who presents with abdominal pain and constipation  Patient had laparoscopic incisional hernia repair with Dr Cheryl Garrett on 11/8  He was doing well, taking occasional oxycodone, and began to feel better from a pain standpoint, but then began to feel constipated  He does have a history of Crohn's so he has Linzess that he takes occasionally for constipation; he took one earlier today with no bowel movement  He admits to mild nausea with no emesis, abdominal pain diffusely throughout his abdomen, abdominal bloating, and poor appetite  He denies CP, SOB, lightheadedness/dizziness, fever/chills, diarrhea, dysuria, weakness  He has not been passing flatus  Last BM was prior to the surgery  Review of Systems   Constitutional: Negative for chills and fever  HENT: Negative  Eyes: Negative  Respiratory: Negative  Negative for cough and shortness of breath  Cardiovascular: Negative  Negative for chest pain and palpitations  Gastrointestinal: Positive for abdominal distention, abdominal pain, constipation and nausea  Negative for diarrhea and vomiting  Endocrine: Negative  Genitourinary: Negative  Negative for difficulty urinating and dysuria  Musculoskeletal: Negative  Skin: Negative  Allergic/Immunologic: Negative  Neurological: Negative    Negative for dizziness and light-headedness  Hematological: Negative  Psychiatric/Behavioral: Negative  Historical Information   Past Medical History:   Diagnosis Date    Abdominal pain     Abnormal liver function test     last assessed: Oct 16, 2013     Abnormal weight loss     last assessed: Yung 15, 2014     Allergic rhinitis due to pollen     last assessed: Yung 15, 2014     Anxiety     Anxiety     last assessed/resolved: Aug 5, 2015     Asthma     last assessed: Yung 15, 2014     Benign essential HTN     last assessed/resolved: Feb 23, 2017     Cervical lymphadenopathy     last assessed/resolved: Feb 23, 2017     Chronic sinusitis     last assessed: Yung 15, 2014     Constipation     Crohn's disease ([facility] [address] ) 01/01/2011    last assessed: Yung 15, 2014     Dysuria     last assessed: April 29, 2016     Elevated BP without diagnosis of hypertension     last assessed/resolved: Feb 23, 2017     Esophageal reflux     last assessed/resolved: Feb 23, 2017     Eustachian tube anomaly     Resolved: Nov 9, 2017     External hemorrhoids     last assessed: Yung 15, 2014     Hypertension     borderline    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     last assessed/resolved: July 19, 2017     Pancreatic neoplasm     last assessed: Yung 15, 2014     PONV (postoperative nausea and vomiting)     Positive depression screening     resolved: July 24, 2017     Psoriasis     Seasonal allergies     Vitamin B12 deficiency     last assessed/resolved: Feb 23, 2017      Past Surgical History:   Procedure Laterality Date    CHOLECYSTECTOMY      resolved: 2011     COLONOSCOPY N/A 11/18/2016    Procedure: COLONOSCOPY;  Surgeon: Francisca Troncoso MD;  Location:  GI LAB; Service:     COLONOSCOPY N/A 4/28/2016    Procedure: COLONOSCOPY;  Surgeon: Francisca Troncoso MD;  Location: [facility] GI LAB; Service:     ESOPHAGOGASTRODUODENOSCOPY N/A 4/28/2016    Procedure: ESOPHAGOGASTRODUODENOSCOPY (EGD);   Surgeon: Francisca Troncoso MD;  Location: [facility] GI LAB; Service:     FRONTAL SINUSOTOMY      resolved: April 2009     PANCREAS SURGERY      PA COLONOSCOPY FLX DX W/COLLJ [facility] WHEN PFRMD N/A 10/12/2018    Procedure: EGD AND COLONOSCOPY;  Surgeon: Arsen Clark MD;  Location: [facility] GI LAB; Service: Gastroenterology    PA LAP, INCISIONAL HERNIA REPAIR,REDUCIBLE N/A 11/8/2018    Procedure: REPAIR HERNIA INCISIONAL LAPAROSCOPIC;  Surgeon: Adebayo Bartlett MD;  Location: [facility]IN [facility];  Service: General    PA REPAIR OF NASAL SEPTUM N/A 7/28/2017    Procedure: REVISION SEPTOPLASTY; TURBINOPLASTY; BILATERAL  GRAFTS; ALAR GRAFT; POSSIBLE AURICULAR CARTILAGE GRAFT;  Surgeon: Dasha Whitley MD;  Location: [facility];  Service: ENT    TONSILLECTOMY AND ADENOIDECTOMY       Social History   History   Alcohol Use No     Comment: Angela De La Rosa consumes alcohol noted in "allscripts"      History   Drug Use No     Comment: former marijuana use noted in "allscripts"      History   Smoking Status    Current Every Day Smoker    Packs/day: 0 50    Types: Cigarettes   Smokeless Tobacco    Never Used     Comment: Dependence on nicotine in cigarettes - 1/2 PPD x 20 years      Family History: non-contributory    Meds/Allergies   all medications and allergies reviewed  Allergies   Allergen Reactions    Advil [Ibuprofen] Anaphylaxis and Hives     Category: Allergy;     Apple Anaphylaxis    Aspirin Anaphylaxis and Hives     Category: Allergy;     Eggs Or Egg-Derived Products Anaphylaxis     Category: Allergy;     Nsaids Anaphylaxis     Category: Allergy;     Other Anaphylaxis     Category: Allergy;  Annotation - [id number]: cherries, grapes , and kiwis    Peanuts [Peanut Oil] Anaphylaxis    Penicillins Shortness Of Breath    Pollen Extract     Codeine Anxiety       Objective   First Vitals:   Blood Pressure: 132/91 (11/11/18 1733)  Pulse: 86 (11/11/18 1732)  Temperature: 97 9 °F (36 6 °C) (11/11/18 1732)  Temp Source: Tympanic (11/11/18 1732)  Respirations: 20 (11/11/18 1732)  SpO2: 98 % (11/11/18 1732)    Current Vitals:   Blood Pressure: 132/91 (11/11/18 1733)  Pulse: 79 (11/11/18 2103)  Temperature: 97 9 °F (36 6 °C) (11/11/18 1732)  Temp Source: Tympanic (11/11/18 1732)  Respirations: 16 (11/11/18 2103)  SpO2: 97 % (11/11/18 2103)    No intake or output data in the 24 hours ending 11/11/18 2206    Invasive Devices     Peripheral Intravenous Line            Peripheral IV 11/11/18 Right Antecubital less than 1 day                Physical Exam   Constitutional: He is oriented to person, place, and time  He appears well-developed and well-nourished  No distress  HENT:   Head: Normocephalic and atraumatic  Eyes: Pupils are equal, round, and reactive to light  EOM are normal  No scleral icterus  Neck: No JVD present  Cardiovascular: Normal rate, regular rhythm and normal heart sounds  Pulmonary/Chest: Effort normal and breath sounds normal  No stridor  No respiratory distress  Abdominal: Soft  He exhibits no distension  There is tenderness (periumbilical/RLQ)  There is no rebound and no guarding  Abdominal fullness  Incisions cdi with no erythema   Musculoskeletal: He exhibits no edema  Neurological: He is alert and oriented to person, place, and time  No cranial nerve deficit  Skin: Skin is warm and dry  He is not diaphoretic  Psychiatric: He has a normal mood and affect  Lab Results:   CBC:   Lab Results   Component Value Date    WBC 9 40 11/11/2018    HGB 15 7 11/11/2018    HCT 44 7 11/11/2018    MCV 91 11/11/2018     11/11/2018    MCH 32 1 11/11/2018    MCHC 35 1 11/11/2018    RDW 12 6 11/11/2018    MPV 9 5 11/11/2018    NRBC 0 11/11/2018   , CMP:   Lab Results   Component Value Date    SODIUM 138 11/11/2018    K 3 5 11/11/2018     11/11/2018    CO2 28 11/11/2018    BUN 10 11/11/2018    CREATININE 0 75 11/11/2018    CALCIUM 8 9 11/11/2018    EGFR 117 11/11/2018     Imaging: I have personally reviewed pertinent reports       EKG, Pathology, and Other Studies: I have personally reviewed pertinent reports  Code Status: No Order  Advance Directive and Living Will:      Power of :    POLST:      Counseling / Coordination of Care  Total floor / unit time spent today 30 minutes  Greater than 50% of total time was spent with the patient and / or family counseling and / or coordination of care

## 2018-11-12 NOTE — UTILIZATION REVIEW
Initial Clinical Review    Admission: Date/Time/Statement: Observation 11/11 @ 2204    Orders Placed This Encounter   Procedures    Place in Observation     Standing Status:   Standing     Number of Occurrences:   1     Order Specific Question:   Admitting Physician     Answer:   Kassandra Ramirez [[id number]]     Order Specific Question:   Level of Care     Answer:   Med Surg [16]       ED: Date/Time/Mode of Arrival:   ED Arrival Information     Expected Arrival Acuity Means of Arrival Escorted By Service Admission Type    11/11/2018 11/11/2018 17:28 Urgent Walk-In Self Surgery-General Urgent    Arrival Complaint    abd pain - s/p surgery          Chief Complaint:   Chief Complaint   Patient presents with    Abdominal Pain     Pt had umbilical hernia repair on 11/08/18,  has not moved his bowels since wednesday, has taken laxatives and is not passing gas  Hx of crohns and IBS  Denies hematuria  Denies Vomiting, +nausea  History of Illness: 41 y o  male who presents with abdominal pain and constipation  Patient had laparoscopic incisional hernia repair with Dr Sarah Reilly on 11/8  He was doing well, taking occasional oxycodone, and began to feel better from a pain standpoint, but then began to feel constipated  He does have a history of Crohn's so he has Linzess that he takes occasionally for constipation; he took one earlier today with no bowel movement  He admits to mild nausea with no emesis, abdominal pain diffusely throughout his abdomen, abdominal bloating, and poor appetite      ED Vital Signs:   ED Triage Vitals   Temperature Pulse Respirations Blood Pressure SpO2   11/11/18 1732 11/11/18 1732 11/11/18 1732 11/11/18 1733 11/11/18 1732   97 9 °F (36 6 °C) 86 20 132/91 98 %      Temp Source Heart Rate Source Patient Position - Orthostatic VS BP Location FiO2 (%)   11/11/18 1732 11/11/18 1732 11/11/18 1732 11/11/18 1732 --   Tympanic Monitor Sitting Left arm       Pain Score       11/11/18 2103       6 Wt Readings from Last 1 Encounters:   11/08/18 69 9 kg (154 lb)       Vital Signs (abnormal):   11/12/18 0719   97 4 °F (36 3 °C)   53  16  144/92  98 %  --  Lying   11/12/18 0142  98 1 °F (36 7 °C)  62  15  124/82  97 %  None (Room air)  Lying   11/11/18 2259  --  68  14  127/77  96 %  None (Room air)       Abnormal Labs: Ca = 8 2    Diagnostic Test Results: Xray Abd/ Kub - Findings suggesting small bowel obstruction versus ileus  ED Treatment:   Medication Administration from 11/11/2018 1718 to 11/12/2018 0123       Date/Time Order Dose Route Action     11/11/2018 1816 sodium chloride 0 9 % infusion 125 mL/hr Intravenous New Bag     11/11/2018 2215 oxyCODONE (ROXICODONE) IR tablet 5 mg 5 mg Oral Given     11/11/2018 1819 oxyCODONE (ROXICODONE) IR tablet 5 mg 5 mg Oral Given     11/11/2018 2057 polyethylene glycol (MIRALAX) packet 17 g 17 g Oral Given     11/11/2018 2059 mineral oil enema 1 enema 1 enema Rectal Given     11/11/2018 2013 methylnaltrexone (RELISTOR) subcutaneous injection 12 mg 12 mg Subcutaneous Given     11/12/2018 0011 bisacodyl (DULCOLAX) EC tablet 5 mg 5 mg Oral Given     11/12/2018 0010 clonazePAM (KlonoPIN) tablet 0 5 mg 0 5 mg Oral Given     11/12/2018 0011 gabapentin (NEURONTIN) capsule 100 mg 100 mg Oral Given       Past Medical/Surgical History:    Active Ambulatory Problems     Diagnosis Date Noted    Anxiety 04/04/2018    Allergic rhinitis 04/04/2018    Deviated septum 03/20/2017    Environmental and seasonal allergies 09/14/2017    IBD (inflammatory bowel disease) 03/22/2016    Retention cyst of nasal sinus 03/20/2017    Neck pain 06/13/2018    Chronic midline thoracic back pain 07/15/2018    Palpitations 08/09/2018    Crohn disease ([facility] [address] ) 01/01/2011    Abscess of umbilicus 14/74/2425    Allergic asthma 09/14/2017    Constipation 10/15/2018     Resolved Ambulatory Problems     Diagnosis Date Noted    Vitamin D deficiency 10/16/2013    Epigastric pain 08/08/2018  Dizziness 24/23/5684    Periumbilical abdominal pain 97/81/1889    Umbilical pain 89/99/3624    Elevated lactic acid level 10/08/2018    Incisional hernia, without obstruction or gangrene 10/09/2018     Past Medical History:   Diagnosis Date    Abdominal pain     Abnormal liver function test     Abnormal weight loss     Allergic rhinitis due to pollen     Anxiety     Anxiety     Asthma     Benign essential HTN     Cervical lymphadenopathy     Chronic sinusitis     Constipation     Crohn's disease ([facility] [address] ) 01/01/2011    Dysuria     Elevated BP without diagnosis of hypertension     Esophageal reflux     Eustachian tube anomaly     External hemorrhoids     Hypertension     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     Pancreatic neoplasm     PONV (postoperative nausea and vomiting)     Positive depression screening     Psoriasis     Seasonal allergies     Vitamin B12 deficiency        Admitting Diagnosis: Abdominal pain in male [R10 9]    Age/Sex: 41 y o  male    Assessment/Plan:   46y Male to ED with POD #3 Laparoscopic Incisional Hernia Repair/ Abdominal Pain and Constipation  Bowel Regimen in ED with no success, continued pain  Clear liquid  Glycolax bowel prep as well as dulcolax  Obstructive Series and labs in AM  Pain control  IVF Hydration  OOB         Admission Orders:  Clear liquid    Scheduled Meds:   Current Facility-Administered Medications:  gabapentin 100 mg Oral TID   heparin (porcine) 5,000 Units Subcutaneous Q8H [address]   nicotine 1 patch Transdermal Daily     Continuous Infusions:   sodium chloride 125 mL/hr Last Rate: Stopped (11/12/18 0012)     PRN Meds:   acetaminophen    clonazePAM po x1    dicyclomine    ondansetron    oxyCODONE po x3    ------------------------------------------------------------------------------------------------    11/13 Progress notes:  POD #4 Lap VHR presents with opiod-induced constipation    - poss clamp trial NGT  - continue bowel regimen  - IVF hydration  - prn pain control  - OOB/ambulate

## 2018-11-12 NOTE — PLAN OF CARE

## 2018-11-12 NOTE — PROGRESS NOTES
[facility] - Internal Medicine Progress Note      PATIENT INFORMATION      Patient: Elizabeth Carter 41 y o  male   MRN: [id number]  PCP: Oni Montiel MD  Unit/Bed#: FA8 750-82 Encounter: [id number]  Date Of Visit: 18  Current Length of Stay: 0 day(s)     ASSESSMENTS & PLAN      Active Problems:    Crohn disease ([address] )    Constipation    S/P repair of ventral hernia      Plan:     Observation, conservative mgmt, decompression if necessary        Disposition:       SUBJECTIVE     Unable to move his bowels, abd pain      OBJECTIVE     Vitals:   Temp (24hrs), Av 8 °F (36 6 °C), Min:97 4 °F (36 3 °C), Max:98 1 °F (36 7 °C)    Temp:  [97 4 °F (36 3 °C)-98 1 °F (36 7 °C)] 97 4 °F (36 3 °C)  HR:  [53-86] 53  Resp:  [14-20] 16  BP: (124-144)/(77-92) 144/92  SpO2:  [96 %-98 %] 98 %  There is no height or weight on file to calculate BMI  Input and Output Summary (last 24 hours): Intake/Output Summary (Last 24 hours) at 18 1313  Last data filed at 18 0743   Gross per 24 hour   Intake              320 ml   Output                0 ml   Net              320 ml       Physical Exam:   GENERAL: C/O abd pain and nausea  HEENT:  NC/AT, MMM, no scleral icterus  CARDIAC:  RRR, +S1/S2, no murmur appreciated  PULMONARY:  CTA B/L, no wheezing/rales/rhonci, non-labored breathing  ABDOMEN:  Tender to exam, no BS  Extremities:  2+ Pulses in DP/PT  No edema, cyanosis, or clubbing  NEUROLOGIC:  Alert/oriented x3   No focal neurologic deficits   SKIN:  No rashes or erythema          ADDITIONAL DATA     Labs & Recent Cultures:       Results from last 7 days  Lab Units 18  0528   WBC Thousand/uL 9 52   HEMOGLOBIN g/dL 14 8   HEMATOCRIT % 43 1   PLATELETS Thousands/uL 223   NEUTROS PCT % 62   LYMPHS PCT % 22   MONOS PCT % 9   EOS PCT % 5       Results from last 7 days  Lab Units 18  0528   POTASSIUM mmol/L 3 7   CHLORIDE mmol/L 105   CO2 mmol/L 28   BUN mg/dL 10   CREATININE mg/dL 0 87   CALCIUM mg/dL 8 2*                     Last 24 Hours Medication List:     Current Facility-Administered Medications:  acetaminophen 650 mg Oral Q6H PRN Tammy Dumont MD    clonazePAM 0 5 mg Oral Q12H PRN Tammy Dumont MD    dicyclomine 20 mg Oral Q6H PRN Tammy Dumont MD    gabapentin 100 mg Oral TID Tammy Dumont MD    heparin (porcine) 5,000 Units Subcutaneous Q8H Meron Kapadia MD    nicotine 1 patch Transdermal Daily Tammy Dumont MD    ondansetron 4 mg Intravenous Q6H PRN Tammy Dumont MD    oxyCODONE 5 mg Oral Q4H PRN Tammy Dumont MD    sodium chloride 125 mL/hr Intravenous Continuous Tammy Dumont MD Last Rate: 125 mL/hr (11/12/18 0925)            Time Spent for Care: 30 mins spent in total   More than 50% of total time spent on counseling and coordination of care as described above        Current Length of Stay: 0 day(s)      Code Status: Level 1 - Full Code      VTE Pharmacologic Prophylaxis: Sequential compression device (Venodyne)  and Heparin   VTE Mechanical Prophylaxis: SCD's        Victoria Oliva MD FACP

## 2018-11-13 ENCOUNTER — APPOINTMENT (OUTPATIENT)
Dept: RADIOLOGY | Facility: HOSPITAL | Age: 38
End: 2018-11-13
Payer: COMMERCIAL

## 2018-11-13 LAB
ALBUMIN SERPL BCP-MCNC: 3.1 G/DL (ref 3.5–5)
ALP SERPL-CCNC: 65 U/L (ref 46–116)
ALT SERPL W P-5'-P-CCNC: 22 U/L (ref 12–78)
ANION GAP SERPL CALCULATED.3IONS-SCNC: 7 MMOL/L (ref 4–13)
AST SERPL W P-5'-P-CCNC: 13 U/L (ref 5–45)
BASOPHILS # BLD AUTO: 0.08 THOUSANDS/ΜL (ref 0–0.1)
BASOPHILS NFR BLD AUTO: 1 % (ref 0–1)
BILIRUB SERPL-MCNC: 0.72 MG/DL (ref 0.2–1)
BUN SERPL-MCNC: 10 MG/DL (ref 5–25)
CALCIUM SERPL-MCNC: 8.3 MG/DL (ref 8.3–10.1)
CHLORIDE SERPL-SCNC: 109 MMOL/L (ref 100–108)
CO2 SERPL-SCNC: 24 MMOL/L (ref 21–32)
CREAT SERPL-MCNC: 0.92 MG/DL (ref 0.6–1.3)
EOSINOPHIL # BLD AUTO: 0.41 THOUSAND/ΜL (ref 0–0.61)
EOSINOPHIL NFR BLD AUTO: 4 % (ref 0–6)
ERYTHROCYTE [DISTWIDTH] IN BLOOD BY AUTOMATED COUNT: 12.6 % (ref 11.6–15.1)
GFR SERPL CREATININE-BSD FRML MDRD: 106 ML/MIN/1.73SQ M
GLUCOSE SERPL-MCNC: 86 MG/DL (ref 65–140)
HCT VFR BLD AUTO: 43.1 % (ref 36.5–49.3)
HGB BLD-MCNC: 14.7 G/DL (ref 12–17)
IMM GRANULOCYTES # BLD AUTO: 0.04 THOUSAND/UL (ref 0–0.2)
IMM GRANULOCYTES NFR BLD AUTO: 0 % (ref 0–2)
LYMPHOCYTES # BLD AUTO: 1.63 THOUSANDS/ΜL (ref 0.6–4.47)
LYMPHOCYTES NFR BLD AUTO: 14 % (ref 14–44)
MCH RBC QN AUTO: 31.6 PG (ref 26.8–34.3)
MCHC RBC AUTO-ENTMCNC: 34.1 G/DL (ref 31.4–37.4)
MCV RBC AUTO: 93 FL (ref 82–98)
MONOCYTES # BLD AUTO: 0.95 THOUSAND/ΜL (ref 0.17–1.22)
MONOCYTES NFR BLD AUTO: 8 % (ref 4–12)
NEUTROPHILS # BLD AUTO: 8.4 THOUSANDS/ΜL (ref 1.85–7.62)
NEUTS SEG NFR BLD AUTO: 73 % (ref 43–75)
NRBC BLD AUTO-RTO: 0 /100 WBCS
PLATELET # BLD AUTO: 221 THOUSANDS/UL (ref 149–390)
PMV BLD AUTO: 9.8 FL (ref 8.9–12.7)
POTASSIUM SERPL-SCNC: 4 MMOL/L (ref 3.5–5.3)
PROT SERPL-MCNC: 6.4 G/DL (ref 6.4–8.2)
RBC # BLD AUTO: 4.65 MILLION/UL (ref 3.88–5.62)
SODIUM SERPL-SCNC: 140 MMOL/L (ref 136–145)
WBC # BLD AUTO: 11.51 THOUSAND/UL (ref 4.31–10.16)

## 2018-11-13 PROCEDURE — 80053 COMPREHEN METABOLIC PANEL: CPT | Performed by: SURGERY

## 2018-11-13 PROCEDURE — 85025 COMPLETE CBC W/AUTO DIFF WBC: CPT | Performed by: SURGERY

## 2018-11-13 PROCEDURE — 99024 POSTOP FOLLOW-UP VISIT: CPT | Performed by: SURGERY

## 2018-11-13 PROCEDURE — 74018 RADEX ABDOMEN 1 VIEW: CPT

## 2018-11-13 RX ORDER — HYDROMORPHONE HCL/PF 1 MG/ML
0.5 SYRINGE (ML) INJECTION
Status: DISCONTINUED | OUTPATIENT
Start: 2018-11-13 | End: 2018-11-14 | Stop reason: HOSPADM

## 2018-11-13 RX ORDER — ECHINACEA PURPUREA EXTRACT 125 MG
1 TABLET ORAL
Status: DISCONTINUED | OUTPATIENT
Start: 2018-11-13 | End: 2018-11-14 | Stop reason: HOSPADM

## 2018-11-13 RX ORDER — DEXTROSE, SODIUM CHLORIDE, AND POTASSIUM CHLORIDE 5; .45; .15 G/100ML; G/100ML; G/100ML
50 INJECTION INTRAVENOUS CONTINUOUS
Status: DISCONTINUED | OUTPATIENT
Start: 2018-11-13 | End: 2018-11-14 | Stop reason: HOSPADM

## 2018-11-13 RX ADMIN — CLONAZEPAM 0.5 MG: 0.5 TABLET ORAL at 16:38

## 2018-11-13 RX ADMIN — CLONAZEPAM 0.5 MG: 0.5 TABLET ORAL at 21:02

## 2018-11-13 RX ADMIN — SODIUM CHLORIDE 125 ML/HR: 0.9 INJECTION, SOLUTION INTRAVENOUS at 04:07

## 2018-11-13 RX ADMIN — GABAPENTIN 100 MG: 100 CAPSULE ORAL at 16:38

## 2018-11-13 RX ADMIN — HEPARIN SODIUM 5000 UNITS: 5000 INJECTION INTRAVENOUS; SUBCUTANEOUS at 05:01

## 2018-11-13 RX ADMIN — DEXTROSE, SODIUM CHLORIDE, AND POTASSIUM CHLORIDE 100 ML/HR: 5; .45; .15 INJECTION INTRAVENOUS at 09:17

## 2018-11-13 RX ADMIN — ACETAMINOPHEN 650 MG: 325 TABLET, FILM COATED ORAL at 19:55

## 2018-11-13 RX ADMIN — LORAZEPAM 1 MG: 2 INJECTION INTRAMUSCULAR; INTRAVENOUS at 04:05

## 2018-11-13 RX ADMIN — ACETAMINOPHEN 650 MG: 325 TABLET, FILM COATED ORAL at 13:47

## 2018-11-13 RX ADMIN — Medication 1 SPRAY: at 13:47

## 2018-11-13 RX ADMIN — GABAPENTIN 100 MG: 100 CAPSULE ORAL at 21:02

## 2018-11-13 RX ADMIN — HEPARIN SODIUM 5000 UNITS: 5000 INJECTION INTRAVENOUS; SUBCUTANEOUS at 21:02

## 2018-11-13 RX ADMIN — HEPARIN SODIUM 5000 UNITS: 5000 INJECTION INTRAVENOUS; SUBCUTANEOUS at 13:48

## 2018-11-13 NOTE — PROGRESS NOTES
Progress Note - General Surgery  Greg Hobbs 41 y o  male MRN: [id number]  Unit/Bed#: CW3 351-01 Encounter: [id number]    Assessment:  38M POD4 lap VHR who presents with opioid-induced constipation    Plan:  - poss clamp trial NGT  - continue bowel regimen  - IVF hydration  - prn pain control  - OOB/ambulate       Subjective/Objective   Subjective: passing flatus now but no BM    Objective:    Blood pressure 112/76, pulse 83, temperature 97 8 °F (36 6 °C), temperature source Oral, resp  rate 16, SpO2 98 %  ,There is no height or weight on file to calculate BMI  I/O last 24 hours:   In: 2769 [P O :560; I V :900]  Out: -     Invasive Devices     Peripheral Intravenous Line            Peripheral IV 11/11/18 Right Antecubital 1 day          Drain            NG/OG/Enteral Tube Nasogastric Left nares less than 1 day                Physical Exam:   NAD, alert and oriented x3  Normocephalic, atraumatic  MMM, EOMI, PERRLA  Norm resp effort on RA  NGT in place with minimal output  RRR  Abd soft, ND, min tenderness periumbilical, incisions cdi  No calf tenderness or peripheral edema  Motor/sensation intact in distal extremities  CN grossly intact  -rash/lesions        Lab, Imaging and other studies:  Lab Results   Component Value Date    WBC 9 52 11/12/2018    HGB 14 8 11/12/2018    HCT 43 1 11/12/2018    MCV 93 11/12/2018     11/12/2018      Lab Results   Component Value Date    CALCIUM 8 2 (L) 11/12/2018    K 3 7 11/12/2018    CO2 28 11/12/2018     11/12/2018    BUN 10 11/12/2018    CREATININE 0 87 11/12/2018       VTE Pharmacologic Prophylaxis: Heparin  VTE Mechanical Prophylaxis: sequential compression device

## 2018-11-13 NOTE — PHYSICIAN ADVISOR
Current patient class: Observation  The patient is currently on Hospital Day: 3 at [address] [place] [address]        The patient was admitted to the hospital  on N/A at N/A for the following diagnosis:  Abdominal pain in male [R10 9]     After review of the relevant documentation, labs, vital signs and test results, the patient is most appropriate for OBSERVATION STATUS  The patient was admitted to the hospital without an expected length of stay of at least 2 midnights  Given that the patient is subject to the 2 midnight benchmark as a CMS patient, they are appropriate for observation status at this time  Should the patient remain hospitalized for a second midnight the status should be reevaluated for medical necessity  Rationale is as follows: The patient is a 41 yrs   Male who presented to the ED at 11/11/2018  5:35 PM with a chief complaint of Abdominal Pain (Pt had umbilical hernia repair on 11/08/18,  has not moved his bowels since wednesday, has taken laxatives and is not passing gas  Hx of crohns and IBS  Denies hematuria  Denies Vomiting, +nausea )     Patient admitted with a report of abdominal discomfort and constipation  He had a laparoscopic incisional hernia repair on 11/8/18 and has been taking prescribed opiates for pain relief  He admits to nausea but no vomiting  Labs have been WNL and imaging studies reveal a probable post-op ileus which at this time is being attributed to the opiates  An OBSERVATION status would be considered appropriate for this patient      The patients vitals on arrival were ED Triage Vitals   Temperature Pulse Respirations Blood Pressure SpO2   11/11/18 1732 11/11/18 1732 11/11/18 1732 11/11/18 1733 11/11/18 1732   97 9 °F (36 6 °C) 86 20 132/91 98 %      Temp Source Heart Rate Source Patient Position - Orthostatic VS BP Location FiO2 (%)   11/11/18 1732 11/11/18 1732 11/11/18 1732 11/11/18 1732 --   Tympanic Monitor Sitting Left arm Pain Score       11/11/18 2103       6           Past Medical History:   Diagnosis Date    Abdominal pain     Abnormal liver function test     last assessed: Oct 16, 2013     Abnormal weight loss     last assessed: Yung 15, 2014     Allergic rhinitis due to pollen     last assessed: Yung 15, 2014     Anxiety     Anxiety     last assessed/resolved: Aug 5, 2015     Asthma     last assessed: Yung 15, 2014     Benign essential HTN     last assessed/resolved: Feb 23, 2017     Cervical lymphadenopathy     last assessed/resolved: Feb 23, 2017     Chronic sinusitis     last assessed: Yung 15, 2014     Constipation     [facility]    last assessed: Yung 15, 2014     Dysuria     last assessed: April 29, 2016     Elevated BP without diagnosis of hypertension     last assessed/resolved: Feb 23, 2017     Esophageal reflux     last assessed/resolved: Feb 23, 2017     Eustachian tube anomaly     Resolved: Nov 9, 2017     External hemorrhoids     last assessed: Yung 15, 2014     Hypertension     borderline    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     last assessed/resolved: July 19, 2017     Pancreatic neoplasm     last assessed: Yung 15, 2014     PONV (postoperative nausea and vomiting)     Positive depression screening     resolved: July 24, 2017     Psoriasis     Seasonal allergies     Vitamin B12 deficiency     last assessed/resolved: Feb 23, 2017      Past Surgical History:   Procedure Laterality Date    CHOLECYSTECTOMY      resolved: 2011     COLONOSCOPY N/A 11/18/2016    Procedure: COLONOSCOPY;  Surgeon: Mo Mckenzie MD;  Location:  GI LAB; Service:     COLONOSCOPY N/A 4/28/2016    Procedure: COLONOSCOPY;  Surgeon: Mo Mckenzie MD;  Location: [facility] GI LAB; Service:     ESOPHAGOGASTRODUODENOSCOPY N/A 4/28/2016    Procedure: ESOPHAGOGASTRODUODENOSCOPY (EGD); Surgeon: Mo Mckenzie MD;  Location: [facility] GI LAB;   Service:     FRONTAL SINUSOTOMY      resolved: April 2009     PANCREAS SURGERY      IN COLONOSCOPY FLX DX W/COLLJ SPEC WHEN PFRMD N/A 10/12/2018    Procedure: EGD AND COLONOSCOPY;  Surgeon: Pastor Dubon MD;  Location: [facility] GI LAB;   Service: Gastroenterology    IN LAP, INCISIONAL HERNIA REPAIR,REDUCIBLE N/A 11/8/2018    Procedure: REPAIR HERNIA INCISIONAL LAPAROSCOPIC;  Surgeon: Mary Ann Shore MD;  Location: [facility];  Service: General    IN REPAIR OF NASAL SEPTUM N/A 7/28/2017    Procedure: REVISION SEPTOPLASTY; TURBINOPLASTY; BILATERAL  GRAFTS; ALAR GRAFT; POSSIBLE AURICULAR CARTILAGE GRAFT;  Surgeon: Tamika Williamson MD;  Location: [facility];  Service: ENT    TONSILLECTOMY AND ADENOIDECTOMY             Consults have been placed to:   None    Vitals:    11/12/18 0719 11/12/18 1500 11/12/18 2300 11/13/18 0700   BP: 144/92 133/89 112/76 114/77   BP Location: Left arm Left arm Left arm Left arm   Pulse: (!) 53 70 83 80   Resp: 16 16 16 18   Temp: (!) 97 4 °F (36 3 °C) 98 1 °F (36 7 °C) 97 8 °F (36 6 °C) 98 °F (36 7 °C)   TempSrc: Oral Oral Oral Oral   SpO2: 98% 99% 98% 97%       Most recent labs:    Recent Labs      11/13/18   0619   WBC  11 51*   HGB  14 7   HCT  43 1   PLT  221   K  4 0   CALCIUM  8 3   BUN  10   CREATININE  0 92   AST  13   ALT  22   ALKPHOS  65       Scheduled Meds:  Current Facility-Administered Medications:  acetaminophen 650 mg Oral Q6H PRN Blake Anne MD    clonazePAM 0 5 mg Oral TID Asif Anaya MD    dextrose 5 % and sodium chloride 0 45 % with KCl 20 mEq/L 75 mL/hr Intravenous Continuous Stacie Adkins MD Last Rate: 75 mL/hr (11/13/18 1343)   dicyclomine 20 mg Oral Q6H PRN Blake Anne MD    gabapentin 100 mg Oral TID Blake Anne MD    heparin (porcine) 5,000 Units Subcutaneous Q8H [facility] Blake Anne MD    HYDROmorphone 0 5 mg Intravenous Q3H PRN Blake Anne MD    LORazepam 1 mg Intravenous Q4H PRN Asif Anaya MD    nicotine 1 patch Transdermal Daily Blake Anne MD    ondansetron 4 mg Intravenous Q6H PRN Babs Diaz MD    oxyCODONE 5 mg Oral Q4H PRN Babs Diaz MD    sodium chloride 1 spray Each Nare Q1H PRN Babs Diaz MD      Continuous Infusions:  dextrose 5 % and sodium chloride 0 45 % with KCl 20 mEq/L 75 mL/hr Last Rate: 75 mL/hr (11/13/18 1343)     PRN Meds:   acetaminophen    dicyclomine    HYDROmorphone    LORazepam    ondansetron    oxyCODONE    sodium chloride    Surgical procedures (if appropriate):

## 2018-11-13 NOTE — PROGRESS NOTES
2 attempts made at inserting NGT per order, Dr Shena Jenkins with [facility] on the unit and made aware that attempts were unsuccessful  Per Dr Shena Jenkins no need for NGT at this time, pt not vomiting

## 2018-11-14 VITALS
SYSTOLIC BLOOD PRESSURE: 114 MMHG | RESPIRATION RATE: 18 BRPM | TEMPERATURE: 98 F | HEART RATE: 64 BPM | OXYGEN SATURATION: 98 % | DIASTOLIC BLOOD PRESSURE: 61 MMHG

## 2018-11-14 PROCEDURE — 99024 POSTOP FOLLOW-UP VISIT: CPT | Performed by: SURGERY

## 2018-11-14 RX ORDER — POLYETHYLENE GLYCOL 3350 17 G/17G
17 POWDER, FOR SOLUTION ORAL DAILY
Qty: 14 EACH | Refills: 0 | Status: SHIPPED | OUTPATIENT
Start: 2018-11-14 | End: 2019-02-13

## 2018-11-14 RX ORDER — NICOTINE 21 MG/24HR
1 PATCH, TRANSDERMAL 24 HOURS TRANSDERMAL DAILY
Qty: 28 PATCH | Refills: 0 | Status: SHIPPED | OUTPATIENT
Start: 2018-11-15 | End: 2018-11-16 | Stop reason: ALTCHOICE

## 2018-11-14 RX ORDER — BISACODYL 10 MG
10 SUPPOSITORY, RECTAL RECTAL DAILY
Status: DISCONTINUED | OUTPATIENT
Start: 2018-11-14 | End: 2018-11-14 | Stop reason: HOSPADM

## 2018-11-14 RX ORDER — MAGNESIUM CARB/ALUMINUM HYDROX 105-160MG
296 TABLET,CHEWABLE ORAL ONCE
Status: COMPLETED | OUTPATIENT
Start: 2018-11-14 | End: 2018-11-14

## 2018-11-14 RX ADMIN — DEXTROSE, SODIUM CHLORIDE, AND POTASSIUM CHLORIDE 75 ML/HR: 5; .45; .15 INJECTION INTRAVENOUS at 00:01

## 2018-11-14 RX ADMIN — HEPARIN SODIUM 5000 UNITS: 5000 INJECTION INTRAVENOUS; SUBCUTANEOUS at 13:28

## 2018-11-14 RX ADMIN — ACETAMINOPHEN 650 MG: 325 TABLET, FILM COATED ORAL at 05:18

## 2018-11-14 RX ADMIN — GABAPENTIN 100 MG: 100 CAPSULE ORAL at 09:25

## 2018-11-14 RX ADMIN — ACETAMINOPHEN 650 MG: 325 TABLET, FILM COATED ORAL at 12:27

## 2018-11-14 RX ADMIN — LORAZEPAM 1 MG: 2 INJECTION INTRAMUSCULAR; INTRAVENOUS at 05:23

## 2018-11-14 RX ADMIN — CLONAZEPAM 0.5 MG: 0.5 TABLET ORAL at 09:25

## 2018-11-14 RX ADMIN — HEPARIN SODIUM 5000 UNITS: 5000 INJECTION INTRAVENOUS; SUBCUTANEOUS at 05:18

## 2018-11-14 RX ADMIN — MAGNESIUM CITRATE 296 ML: 1.75 LIQUID ORAL at 13:55

## 2018-11-14 RX ADMIN — BISACODYL 10 MG: 10 SUPPOSITORY RECTAL at 09:26

## 2018-11-14 NOTE — DISCHARGE INSTRUCTIONS
Acute Care Surgery Discharge Instructions    Please follow-up as instructed  If you do not already have a follow-up appointment, please call the office when you leave to schedule an appointment to be seen in 1 weeks for post-operative re-evaluation  Activity:  - No lifting greater than 20 pounds or strenuous physical activity or exercise for at least 4 weeks  - Walking and normal light activities are encouraged  - Normal daily activities including climbing steps are okay  - No driving until no longer using pain medications  Return to work:    - You may return to work in 2 weeks or sooner if you are feeling well enough  Diet:    - You may resume your normal diet  Wound Care:  - May shower daily  No tub baths or swimming until cleared by your surgeon   - Wash incision gently with soap and water and pat dry  - Do not apply any creams or ointments unless instructed to do so by your surgeon   - Elena Scales may apply ice as needed (no longer than 20 minutes an hour) for the first 48 hours  - Bruising is not unusual and will go away with a little time  You may apply a warm, moist compress that may help the bruising resolve quicker  - You may remove the dressings the day after surgery (unless otherwise instructed)  Leave any skin tapes (steri-strips) on the incision(s) in place until they fall off on their own  Any new dressings are optional     Medications:    - You may resume all of your regular medications, including blood thinners and aspirin, after going home unless otherwise instructed  Please refer to your discharge medication list for further details  - Please take the pain medications as directed  - You are encouraged to use non-narcotic pain medications first and whenever possible  Please AVOID NARCOTICS AT THIS TIME  Reserve the use of narcotic pain medication for moderate to severe pain not controlled by non-narcotic medications   - No driving while taking narcotic pain medications     - You may become constipated, especially if taking pain medications  You may take any over the counter stool softeners or laxatives as needed  Examples: Milk of Magnesia, Colace, Senna  Additional Instructions:  - If you have any questions or concerns after discharge please call the office   - Call office or return to ER if fever greater than 101, chills, persistent nausea/vomiting, worsening/uncontrollable pain, and/or increasing redness or purulent/foul smelling drainage from incision(s)

## 2018-11-14 NOTE — UTILIZATION REVIEW
Continued Stay Review    Date: 11/14    Vital Signs: /61 (BP Location: Left arm)   Pulse 64   Temp 98 °F (36 7 °C) (Oral)   Resp 18   SpO2 98%     Medications:   Scheduled Meds:   Current Facility-Administered Medications:  bisacodyl 10 mg Rectal Daily   clonazePAM 0 5 mg Oral TID   gabapentin 100 mg Oral TID   heparin (porcine) 5,000 Units Subcutaneous Q8H [facility]   magnesium citrate 296 mL Oral Once   nicotine 1 patch Transdermal Daily     Continuous Infusions:   dextrose 5 % and sodium chloride 0 45 % with KCl 20 mEq/L 50 mL/hr Last Rate: 50 mL/hr (11/14/18 0925)     PRN Meds:   acetaminophen    dicyclomine    HYDROmorphone    LORazepam    ondansetron    oxyCODONE    sodium chloride    Abnormal Labs/Diagnostic Results:   Wbc = 11 51    Age/Sex: 41 y o  male     Assessment/Plan:   11/14 Progress notes:  42 y/o M s/p laparoscopic ventral hernia repair on 51/8, complicated by opioid-induced constipation, post-operative ileus  Clears/ ADAT  IVF/ Pain control  Has had some flatus and small amount of BM    Discharge Plan: TBD

## 2018-11-14 NOTE — PROGRESS NOTES
Progress Note - Anthony Chang 41 y o  male MRN: [id number]    Unit/Bed#: CW3 351-01 Encounter: [id number]              Problem List     * (Principal)Postoperative ileus ([facility] )    Anxiety    Allergic rhinitis    Deviated septum    Environmental and seasonal allergies    Overview Signed 4/4/2018 10:12 PM by Lázaro Serna MD     Allergies continue to be an issue  IBD (inflammatory bowel disease)    Retention cyst of nasal sinus    Neck pain    Chronic midline thoracic back pain    Palpitations    Crohn disease ([facility] )    Overview Addendum 8/30/2018 11:30 AM by Mallika Anaya MD     last assessed: Yung 15, 2014          Abscess of umbilicus    Allergic asthma    Constipation    S/P repair of ventral hernia          Subjective:   Has had some flatus and small amount of BM    Objective:     Vitals: Blood pressure 114/61, pulse 64, temperature 98 °F (36 7 °C), temperature source Oral, resp  rate 18, SpO2 98 %  ,There is no height or weight on file to calculate BMI  Intake/Output Summary (Last 24 hours) at 11/14/18 1237  Last data filed at 11/14/18 0900   Gross per 24 hour   Intake             1469 ml   Output                0 ml   Net             1469 ml       Physical Exam: Abd distended, has bowel sounds           Lab, Imaging and other studies: I have personally reviewed pertinent reports      VTE Pharmacologic Prophylaxis: Reason for no pharmacologic prophylaxis pt mobile    Would benefit from Mag Citrate

## 2018-11-14 NOTE — PROGRESS NOTES
Progress Note - General Surgery   [place] 41 y o  male MRN: [id number]  Unit/Bed#: CW3 351-01 Encounter: [id number]    Assessment:  40 y/o M s/p laparoscopic ventral hernia repair on 63/8, complicated by opioid-induced constipation, post-operative ileus    Plan:  --Clears/ADAT  --IVF  --Pain control  --OOB, ambulate    Subjective/Objective     Subjective:     No acute events overnight  Pt c/o b/l lower abdominal pain this morning, mostly near his incisions  + flatus, no bowel movements yet  Tolerating clear liquids  Denies any nausea, vomiting  Objective:     Blood pressure 110/57, pulse 60, temperature 98 °F (36 7 °C), temperature source Oral, resp  rate 16, SpO2 98 %  ,There is no height or weight on file to calculate BMI  I/O       11/12 0701 - 11/13 0700 11/13 0701 - 11/14 0700    P  O  360 380    I V  900     Total Intake 1260 380    Net +1260 +380          Unmeasured Urine Occurrence 5 x 3 x    Unmeasured Stool Occurrence  0 x          Invasive Devices     Peripheral Intravenous Line            Peripheral IV 11/11/18 Right Antecubital 2 days                Physical Exam:    GEN: NAD  HEENT: MMM  CV: RRR  Lung: normal effort  Ab: Soft, ND, +incisional tenderness  Extrem: No CCE  Neuro:  A+Ox3, motor and sensation grossly intact    Lab, Imaging and other studies:  CBC:   Lab Results   Component Value Date    WBC 11 51 (H) 11/13/2018    HGB 14 7 11/13/2018    HCT 43 1 11/13/2018    MCV 93 11/13/2018     11/13/2018    MCH 31 6 11/13/2018    MCHC 34 1 11/13/2018    RDW 12 6 11/13/2018    MPV 9 8 11/13/2018    NRBC 0 11/13/2018   , CMP:   Lab Results   Component Value Date    SODIUM 140 11/13/2018    K 4 0 11/13/2018     (H) 11/13/2018    CO2 24 11/13/2018    BUN 10 11/13/2018    CREATININE 0 92 11/13/2018    CALCIUM 8 3 11/13/2018    AST 13 11/13/2018    ALT 22 11/13/2018    ALKPHOS 65 11/13/2018    EGFR 106 11/13/2018   , Coagulation: No results found for: PT, INR, APTT, Urinalysis: No results found for: Mj Harder, SPECGRAV, PHUR, LEUKOCYTESUR, NITRITE, PROTEINUA, GLUCOSEU, KETONESU, BILIRUBINUR, BLOODU, Amylase: No results found for: AMYLASE  VTE Pharmacologic Prophylaxis: Heparin  VTE Mechanical Prophylaxis: sequential compression device

## 2018-11-14 NOTE — DISCHARGE SUMMARY
Discharge Summary - Daria Honeycutt 41 y o  male MRN: [id number]    Unit/Bed#: CW3 351-01 Encounter: [id number]    Admission Date:   Admission Orders     Ordered        11/11/18 2205  Place in Observation  Once               Admitting Diagnosis: Abdominal pain in male [R10 9]    HPI: Per Samir Marrero, "Daria Honeycutt is a 41 y o  male who presents with abdominal pain and constipation  Patient had laparoscopic incisional hernia repair with Dr Terrell Knowles on 11/8  He was doing well, taking occasional oxycodone, and began to feel better from a pain standpoint, but then began to feel constipated  He does have a history of Crohn's so he has Linzess that he takes occasionally for constipation; he took one earlier today with no bowel movement  He admits to mild nausea with no emesis, abdominal pain diffusely throughout his abdomen, abdominal bloating, and poor appetite  He denies CP, SOB, lightheadedness/dizziness, fever/chills, diarrhea, dysuria, weakness  He has not been passing flatus  Last BM was prior to the surgery  "       Procedures Performed: No orders of the defined types were placed in this encounter  Summary of Hospital Course:  Patient is a 60-year-old male comes in after status post laparoscopic ventral hernia repair  Patient had opioid induced constipation as well as a postoperative ileus  Patient had an NG tube placed secondary to small bowel distension  Patient was able to have his NG tube removed after minimal output  The NG tube was removed on 11/12/2018  Patient was cleared to start a clear liquid diet on 11/12/2018  Patient was then subsequently placed on a regular diet on 11/13/2018  Patient had multiple episodes of a bowel movements that were nonbloody on 11/14/2018  Patient had no episodes of nausea or vomiting  Patient has abdominal distention improved    Patient was then subsequently discharged with outpatient follow-up with his continued appointment on 11/21/2018  Significant Findings, Care, Treatment and Services Provided: Xr Abdomen 1 View Kub    Result Date: 11/12/2018  Impression: No evidence of bowel obstruction  Workstation performed: [id number]     Xr Abdomen Obstruction Series    Result Date: 11/12/2018  Impression: Findings suggesting small bowel obstruction versus ileus  Continued follow-up is recommended  The study was marked in [facility] for immediate notification  Workstation performed: [id number]       Complications: no complications    Discharge Diagnosis:   1  Small-bowel ileus with opiate induced constipation    Resolved Problems  Date Reviewed: 11/12/2018          Resolved    Incisional hernia, without obstruction or gangrene 11/12/2018     Resolved by  Maria Esther Perry MD    Overview Signed 10/9/2018  3:19 PM by Luz Maria Stone MA     Added automatically from request for surgery [id number]               Condition at Discharge: good         Discharge instructions/Information to patient and family:   See after visit summary for information provided to patient and family  Provisions for Follow-Up Care:  See after visit summary for information related to follow-up care and any pertinent home health orders  PCP: Gregg Koo MD    Disposition: Home    Planned Readmission: No    Discharge Statement   I spent 23 minutes discharging the patient  This time was spent on the day of discharge  I had direct contact with the patient on the day of discharge  Additional documentation is required if more than 30 minutes were spent on discharge  Discharge Medications:  See after visit summary for reconciled discharge medications provided to patient and family

## 2018-11-15 ENCOUNTER — TRANSITIONAL CARE MANAGEMENT (OUTPATIENT)
Dept: INTERNAL MEDICINE CLINIC | Facility: CLINIC | Age: 38
End: 2018-11-15

## 2018-11-16 ENCOUNTER — APPOINTMENT (OUTPATIENT)
Dept: LAB | Facility: HOSPITAL | Age: 38
End: 2018-11-16
Payer: COMMERCIAL

## 2018-11-16 ENCOUNTER — OFFICE VISIT (OUTPATIENT)
Dept: INTERNAL MEDICINE CLINIC | Facility: CLINIC | Age: 38
End: 2018-11-16
Payer: COMMERCIAL

## 2018-11-16 ENCOUNTER — HOSPITAL ENCOUNTER (OUTPATIENT)
Dept: RADIOLOGY | Facility: HOSPITAL | Age: 38
Discharge: HOME | End: 2018-11-16
Payer: COMMERCIAL

## 2018-11-16 VITALS
DIASTOLIC BLOOD PRESSURE: 72 MMHG | BODY MASS INDEX: 23.22 KG/M2 | HEIGHT: 68 IN | SYSTOLIC BLOOD PRESSURE: 120 MMHG | OXYGEN SATURATION: 98 % | HEART RATE: 96 BPM | TEMPERATURE: 98.9 F | WEIGHT: 153.2 LBS

## 2018-11-16 DIAGNOSIS — K50.919 CROHN'S DISEASE WITH COMPLICATION, UNSPECIFIED GASTROINTESTINAL TRACT LOCATION (HCC): Primary | ICD-10-CM

## 2018-11-16 DIAGNOSIS — K91.89 ILEUS FOLLOWING GASTROINTESTINAL SURGERY (HCC): ICD-10-CM

## 2018-11-16 DIAGNOSIS — T40.2X5A CONSTIPATION DUE TO OPIOID THERAPY: ICD-10-CM

## 2018-11-16 DIAGNOSIS — K56.7 ILEUS FOLLOWING GASTROINTESTINAL SURGERY (HCC): ICD-10-CM

## 2018-11-16 DIAGNOSIS — K59.03 CONSTIPATION DUE TO OPIOID THERAPY: ICD-10-CM

## 2018-11-16 DIAGNOSIS — IMO0001 TRANSITION OF CARE PERFORMED WITH SHARING OF CLINICAL SUMMARY: ICD-10-CM

## 2018-11-16 DIAGNOSIS — K50.919 CROHN'S DISEASE WITH COMPLICATION, UNSPECIFIED GASTROINTESTINAL TRACT LOCATION (HCC): ICD-10-CM

## 2018-11-16 DIAGNOSIS — R10.13 EPIGASTRIC PAIN: ICD-10-CM

## 2018-11-16 PROBLEM — Z78.9 TRANSITION OF CARE PERFORMED WITH SHARING OF CLINICAL SUMMARY: Status: ACTIVE | Noted: 2018-11-16

## 2018-11-16 PROBLEM — R10.84 GENERALIZED ABDOMINAL PAIN: Status: ACTIVE | Noted: 2018-11-16

## 2018-11-16 LAB
ALBUMIN SERPL BCP-MCNC: 3.5 G/DL (ref 3.5–5)
ALP SERPL-CCNC: 85 U/L (ref 46–116)
ALT SERPL W P-5'-P-CCNC: 68 U/L (ref 12–78)
AMYLASE SERPL-CCNC: 21 IU/L (ref 25–115)
ANION GAP SERPL CALCULATED.3IONS-SCNC: 10 MMOL/L (ref 4–13)
AST SERPL W P-5'-P-CCNC: 50 U/L (ref 5–45)
BASOPHILS # BLD AUTO: 0.08 THOUSANDS/ΜL (ref 0–0.1)
BASOPHILS NFR BLD AUTO: 1 % (ref 0–1)
BILIRUB SERPL-MCNC: 0.28 MG/DL (ref 0.2–1)
BUN SERPL-MCNC: 12 MG/DL (ref 5–25)
CALCIUM SERPL-MCNC: 9.7 MG/DL (ref 8.3–10.1)
CHLORIDE SERPL-SCNC: 104 MMOL/L (ref 100–108)
CO2 SERPL-SCNC: 28 MMOL/L (ref 21–32)
CREAT SERPL-MCNC: 0.97 MG/DL (ref 0.6–1.3)
EOSINOPHIL # BLD AUTO: 0.37 THOUSAND/ΜL (ref 0–0.61)
EOSINOPHIL NFR BLD AUTO: 4 % (ref 0–6)
ERYTHROCYTE [DISTWIDTH] IN BLOOD BY AUTOMATED COUNT: 12.6 % (ref 11.6–15.1)
GFR SERPL CREATININE-BSD FRML MDRD: 99 ML/MIN/1.73SQ M
GLUCOSE SERPL-MCNC: 97 MG/DL (ref 65–140)
HCT VFR BLD AUTO: 45.4 % (ref 36.5–49.3)
HGB BLD-MCNC: 15.1 G/DL (ref 12–17)
IMM GRANULOCYTES # BLD AUTO: 0.06 THOUSAND/UL (ref 0–0.2)
IMM GRANULOCYTES NFR BLD AUTO: 1 % (ref 0–2)
LACTATE SERPL-SCNC: 1.2 MMOL/L (ref 0.5–2)
LIPASE SERPL-CCNC: 104 U/L (ref 73–393)
LYMPHOCYTES # BLD AUTO: 1.97 THOUSANDS/ΜL (ref 0.6–4.47)
LYMPHOCYTES NFR BLD AUTO: 19 % (ref 14–44)
MCH RBC QN AUTO: 31.7 PG (ref 26.8–34.3)
MCHC RBC AUTO-ENTMCNC: 33.3 G/DL (ref 31.4–37.4)
MCV RBC AUTO: 95 FL (ref 82–98)
MONOCYTES # BLD AUTO: 1.2 THOUSAND/ΜL (ref 0.17–1.22)
MONOCYTES NFR BLD AUTO: 11 % (ref 4–12)
NEUTROPHILS # BLD AUTO: 6.9 THOUSANDS/ΜL (ref 1.85–7.62)
NEUTS SEG NFR BLD AUTO: 64 % (ref 43–75)
NRBC BLD AUTO-RTO: 0 /100 WBCS
PLATELET # BLD AUTO: 253 THOUSANDS/UL (ref 149–390)
PMV BLD AUTO: 9.4 FL (ref 8.9–12.7)
POTASSIUM SERPL-SCNC: 4.7 MMOL/L (ref 3.5–5.3)
PROT SERPL-MCNC: 7.6 G/DL (ref 6.4–8.2)
RBC # BLD AUTO: 4.77 MILLION/UL (ref 3.88–5.62)
SODIUM SERPL-SCNC: 142 MMOL/L (ref 136–145)
WBC # BLD AUTO: 10.58 THOUSAND/UL (ref 4.31–10.16)

## 2018-11-16 PROCEDURE — 74022 RADEX COMPL AQT ABD SERIES: CPT

## 2018-11-16 PROCEDURE — 85025 COMPLETE CBC W/AUTO DIFF WBC: CPT

## 2018-11-16 PROCEDURE — 83690 ASSAY OF LIPASE: CPT

## 2018-11-16 PROCEDURE — 82150 ASSAY OF AMYLASE: CPT

## 2018-11-16 PROCEDURE — 36415 COLL VENOUS BLD VENIPUNCTURE: CPT

## 2018-11-16 PROCEDURE — 83605 ASSAY OF LACTIC ACID: CPT

## 2018-11-16 PROCEDURE — 99496 TRANSJ CARE MGMT HIGH F2F 7D: CPT | Performed by: PHYSICIAN ASSISTANT

## 2018-11-16 PROCEDURE — 80053 COMPREHEN METABOLIC PANEL: CPT

## 2018-11-16 RX ORDER — POLYETHYLENE GLYCOL 3350 17 G/17G
POWDER, FOR SOLUTION ORAL DAILY
COMMUNITY
Start: 2018-11-15 | End: 2018-11-16 | Stop reason: SDUPTHER

## 2018-11-16 NOTE — ASSESSMENT & PLAN NOTE
Patient missed his last Gastroenterology follow-up as he was admitted to the hospital during that time  Advised him to stop Bentyl at this time due to ileus    Our office staff will call and try and expedite gastroenterology follow-up

## 2018-11-16 NOTE — ASSESSMENT & PLAN NOTE
Patient here today for evaluation following hospitalization  He was discharged from the hospital on 11/14/2018  He underwent laparoscopic hernia repair on 11/8/2018 with Dr Amanda Carpenter  Hospital records reviewed  He is no longer taking any opioids  He did start taking MiraLax  He has not started taking Bentyl  I discussed that he should not take Bentyl as it can make his ileus worse  Patient verbalized understanding  Due to persistent pain will sent for stat CBC CMP and lactate as well as obstruction series of the abdomen  He also was sent for a stat abdominal obstruction series  Discussed case with Dr Sia Dobson who was present and evaluated patient himself  Go for stat labs and x-ray now  Follow up in our office on Monday  Discussed red flag symptoms and ER precautions at need evaluation and treatment  Advised patient to be on a bland liquid based diet with soups and broth  Avoid starchy and high fiber containing foods  Will have our office schedule GI and general surgery follow up for patient

## 2018-11-16 NOTE — PROGRESS NOTES
Called and reviewed xray and labs with patient today  Continue with plan as outlined today in office  GI and general surgery follow up scheduled next week  [facility]

## 2018-11-16 NOTE — ASSESSMENT & PLAN NOTE
Discharge summary reviewed  See plan as above  Follow up in our office on Monday after stat labs and x-ray  With recent surgery encourage patient to do deep breathing exercises to help prevent atelectasis  Patient notes he lost his job and may be losing his insurance coverage at the end of this month  I discussed at length with him the importance of contacting the hospital and working with the  to help set up insurance whether it be private insurance or medical assistance  Patient notes he has already applied for disability and is waiting to hear back

## 2018-11-16 NOTE — PATIENT INSTRUCTIONS
Ileus following gastrointestinal surgery  Patient here today for evaluation following hospitalization  He was discharged from the hospital on 11/14/2018  He underwent laparoscopic hernia repair on 11/8/2018 with Dr Angel Mason  Hospital records reviewed  He is no longer taking any opioids  He did start taking MiraLax  He has not started taking Bentyl  I discussed that he should not take Bentyl as it can make his ileus worse  Patient verbalized understanding  Due to persistent pain will sent for stat CBC CMP and lactate as well as obstruction series of the abdomen  He also was sent for a stat abdominal obstruction series  Discussed case with Dr Marco Sanz who was present and evaluated patient himself  Go for stat labs and x-ray now  Follow up in our office on Monday  Discussed red flag symptoms and ER precautions at need evaluation and treatment  Advised patient to be on a bland liquid based diet with soups and broth  Avoid starchy and high fiber containing foods  Will have our office schedule GI and general surgery follow up for patient  Constipation due to opioid therapy  Since being in the hospital patient has discontinued opioid analgesics  He is using Tylenol as needed for pain    Crohn disease [facility])  Patient missed his last Gastroenterology follow-up as he was admitted to the hospital during that time  Advised him to stop Bentyl at this time due to ileus  Our office staff will call and try and expedite gastroenterology follow-up    Transition of care performed with sharing of clinical summary  Discharge summary reviewed  See plan as above  Follow up in our office on Monday after stat labs and x-ray  With recent surgery encourage patient to do deep breathing exercises to help prevent atelectasis  Patient notes he lost his job and may be losing his insurance coverage at the end of this month    I discussed at length with him the importance of contacting the hospital and working with the  to help set up insurance whether it be private insurance or medical assistance  Patient notes he has already applied for disability and is waiting to hear back

## 2018-11-16 NOTE — PROGRESS NOTES
[facility] C[facility]  Transition of Care Visit  Patient ID: Greg Hobbs    : 1980  Age/Gender: 41 y o  male     DATE: 2018      Assessment/Plan:    Ileus following gastrointestinal surgery  Patient here today for evaluation following hospitalization  He was discharged from the hospital on 2018  He underwent laparoscopic hernia repair on 2018 with Dr Jeancarlos Harrison  Hospital records reviewed  He is no longer taking any opioids  He did start taking MiraLax  He has not started taking Bentyl  I discussed that he should not take Bentyl as it can make his ileus worse  Patient verbalized understanding  Due to persistent pain will sent for stat CBC CMP and lactate as well as obstruction series of the abdomen  He also was sent for a stat abdominal obstruction series  Discussed case with Dr Virgel Hatchet who was present and evaluated patient himself  Go for stat labs and x-ray now  Follow up in our office on Monday  Discussed red flag symptoms and ER precautions at need evaluation and treatment  Advised patient to be on a bland liquid based diet with soups and broth  Avoid starchy and high fiber containing foods  Will have our office schedule GI and general surgery follow up for patient  Constipation due to opioid therapy  Since being in the hospital patient has discontinued opioid analgesics  He is using Tylenol as needed for pain    Crohn disease [facility])  Patient missed his last Gastroenterology follow-up as he was admitted to the hospital during that time  Advised him to stop Bentyl at this time due to ileus  Our office staff will call and try and expedite gastroenterology follow-up    Transition of care performed with sharing of clinical summary  Discharge summary reviewed  See plan as above  Follow up in our office on Monday after stat labs and x-ray    With recent surgery encourage patient to do deep breathing exercises to help prevent atelectasis  Patient notes he lost his job and may be losing his insurance coverage at the end of this month  I discussed at length with him the importance of contacting the hospital and working with the  to help set up insurance whether it be private insurance or medical assistance  Patient notes he has already applied for disability and is waiting to hear back  Diagnoses and all orders for this visit:    Crohn's disease with complication, unspecified gastrointestinal tract location [facility])  -     Comprehensive metabolic panel; Future  -     CBC; Future  -     Lactic acid, plasma; Future  -     XR abdomen obstruction series; Future    Ileus following gastrointestinal surgery  -     Comprehensive metabolic panel; Future  -     CBC; Future  -     Lactic acid, plasma; Future  -     XR abdomen obstruction series; Future    Constipation due to opioid therapy  -     Comprehensive metabolic panel; Future  -     CBC; Future  -     Lactic acid, plasma; Future  -     XR abdomen obstruction series; Future    Transition of care performed with sharing of clinical summary    Other orders  -     Discontinue: polyethylene glycol (GLYCOLAX) powder; Take by mouth daily  -     Cancel: Respiratory Therapy Supplies ([employer]) ANGEL; by Does not apply route 3 (three) times a day  -     Cancel: XR chest pa & lateral; Future          Subjective:       Dmitriy Riley is a 41 y o  male who is here for TCM follow up on 11/16/2018  During the TCM phone call the patient stated: For follow up from hospital   Had laparoscopic ventral hernia repair with Dr Santana Orlando on 11/8 and was d/c to home the same day  He started experiencing worsening of abdominal pain and was not moving his bowels  He contacted his surgeon and was redirected to the hospital   He was readmitted on Sunday 11/11/18 and was discharged 11/14/18    He has not seen in surgeon since the hospital   He was evalauted by the surgical team while inpatient  Since leaving the hospital he continues to have abdominal gas and abdominal cramps  Last bowel movement was yesterday evening, he notes he went "a little bit "  His BM was loose  He feels he has to strain to moves his bowels  His abdominal pain worsens to strain  No blood or mucus in the stool  He notes he missed his last GI apt because he was admitted to the hospital   He notes he was injured at work the beginning of this year and has lost his job  No vomitus  No appetite  Abdominal cramping is bothering him at its worst 10/10, currently a 7/10  He notes he was given a laxative while he was inpatient and he thinks that it made it worse  He has not repeated a suppository at home  Wants to know if he can try to use bentyl which he has used prior  NO redness or drainage form surgical site  Has not vomitted  No fever or chills  Incision sites healing well without bleeding or drainage  He did have a bowel movement yesterday  He is taking MiraLax  He is not taking Bentyl      Abdominal Pain   This is a chronic problem  The onset quality is undetermined  The problem has been waxing and waning  The pain is located in the RUQ, generalized abdominal region and periumbilical region  The pain is at a severity of 7/10  The pain is moderate  The quality of the pain is sharp and dull  Associated symptoms include constipation ( last bowel movement yesterday  No blood in the stool ) and nausea (Without vomiting)  Pertinent negatives include no diarrhea, dysuria, fever, frequency, headaches, hematuria or vomiting  The pain is aggravated by movement  The pain is relieved by bowel movements and certain positions  He has tried acetaminophen for the symptoms  The treatment provided no relief  His past medical history is significant for abdominal surgery and Crohn's disease  There is no history of colon cancer, GERD, pancreatitis or ulcerative colitis     Sinusitis   This is a recurrent problem  The current episode started in the past 7 days  The problem has been waxing and waning since onset  There has been no fever  He is experiencing no pain  Associated symptoms include coughing  Pertinent negatives include no chills, headaches, shortness of breath or sinus pressure  (Postnasal drainage) Past treatments include nothing  The following portions of the patient's history were reviewed and updated as appropriate: allergies, current medications, past family history, past medical history, past social history, past surgical history and problem list     Review of Systems   Constitutional: Negative for chills and fever  HENT: Negative for sinus pressure  Respiratory: Positive for cough  Negative for shortness of breath and wheezing  Cardiovascular: Negative for chest pain and palpitations  Gastrointestinal: Positive for abdominal pain, constipation ( last bowel movement yesterday  No blood in the stool ) and nausea (Without vomiting)  Negative for abdominal distention, blood in stool, diarrhea and vomiting  Genitourinary: Negative for dysuria, frequency and hematuria  Skin: Negative for rash  Neurological: Negative for dizziness and headaches  Psychiatric/Behavioral: Positive for agitation          Frustrated with abdominal pain         Patient Active Problem List   Diagnosis    Anxiety    Allergic rhinitis    Deviated septum    Environmental and seasonal allergies    IBD (inflammatory bowel disease)    Retention cyst of nasal sinus    Neck pain    Chronic midline thoracic back pain    Palpitations    Crohn disease ([address] )    Abscess of umbilicus    Allergic asthma    Constipation    S/P repair of ventral hernia    Postoperative ileus ([address] )    Generalized abdominal pain    Ileus following gastrointestinal surgery    Constipation due to opioid therapy    Transition of care performed with sharing of clinical summary       Past Medical History:   Diagnosis Date    Abdominal pain     Abnormal liver function test     last assessed: Oct 16, 2013     Abnormal weight loss     last assessed: Yung 15, 2014     Allergic rhinitis due to pollen     last assessed: Yung 15, 2014     Anxiety     Anxiety     last assessed/resolved: Aug 5, 2015     Asthma     last assessed: Yung 15, 2014     Benign essential HTN     last assessed/resolved: Feb 23, 2017     Cervical lymphadenopathy     last assessed/resolved: Feb 23, 2017     Chronic sinusitis     last assessed: Yung 15, 2014     Constipation     Cro[facility]'s disease [facility] ) 01/01/2011    last assessed: Yung 15, 2014     Dysuria     last assessed: April 29, 2016     Elevated BP without diagnosis of hypertension     last assessed/resolved: Feb 23, 2017     Esophageal reflux     last assessed/resolved: Feb 23, 2017     Eustachian tube anomaly     Resolved: Nov 9, 2017     External hemorrhoids     last assessed: Yung 15, 2014     Hypertension     borderline    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     last assessed/resolved: July 19, 2017     Pancreatic neoplasm     last assessed: Yung 15, 2014     PONV (postoperative nausea and vomiting)     Positive depression screening     resolved: July 24, 2017     Psoriasis     Seasonal allergies     [facility] ) 11/11/2018    Vitamin B12 deficiency     last assessed/resolved: Feb 23, 2017        Past Surgical History:   Procedure Laterality Date    CHOLECYSTECTOMY      resolved: 2011     COLONOSCOPY N/A 11/18/2016    Procedure: COLONOSCOPY;  Surgeon: Arminda Elias MD;  Location:  GI LAB; Service:     COLONOSCOPY N/A 4/28/2016    Procedure: COLONOSCOPY;  Surgeon: Arminda Elias MD;  Location: [facility] GI LAB; Service:     ESOPHAGOGASTRODUODENOSCOPY N/A 4/28/2016    Procedure: ESOPHAGOGASTRODUODENOSCOPY (EGD); Surgeon: Arminda Elias MD;  Location: [facility] GI LAB;   Service:     FRONTAL SINUSOTOMY      resolved: April 2009     PANCREAS SURGERY      ID COLONOSCOPY FLX DX W/COLLJ SPEC WHEN PFRMD N/A 10/12/2018    Procedure: EGD AND COLONOSCOPY;  Surgeon: Yesika Petty MD;  Location: [facility] GI LAB; Service: Gastroenterology    ME LAP, INCISIONAL HERNIA REPAIR,REDUCIBLE N/A 11/8/2018    Procedure: REPAIR HERNIA INCISIONAL LAPAROSCOPIC;  Surgeon: Karen Chamberlain MD;  [facility];  Service: General    ME REPAIR OF NASAL SEPTUM N/A 7/28/2017    Procedure: REVISION SEPTOPLASTY; TURBINOPLASTY; BILATERAL  GRAFTS; ALAR GRAFT; POSSIBLE AURICULAR CARTILAGE GRAFT;  Surgeon: Andrae Viveros MD;  Location: [facility];  Service: ENT    TONSILLECTOMY AND ADENOIDECTOMY           Current Outpatient Prescriptions:     acetaminophen (TYLENOL) 500 mg tablet, Take 10 mg/kg by mouth every 4 (four) hours as needed  , Disp: , Rfl:     clonazePAM (KlonoPIN) 0 5 mg tablet, Take 1 tablet (0 5 mg total) by mouth every 12 (twelve) hours as needed for anxiety, Disp: 90 tablet, Rfl: 1    diphenhydrAMINE (BENADRYL) 25 mg tablet, Take 25 mg by mouth as needed  , Disp: , Rfl:     gabapentin (NEURONTIN) 100 mg capsule, TAKE ONE CAPSULE BY MOUTH 3 TIMES DAILY, Disp: 90 capsule, Rfl: 0    polyethylene glycol (MIRALAX) 17 g packet, Take 17 g by mouth daily, Disp: 14 each, Rfl: 0    cetirizine (ZyrTEC) 10 mg tablet, Take 1 tablet (10 mg total) by mouth daily as needed for allergies (Patient not taking: Reported on 11/16/2018 ), Disp: 30 tablet, Rfl: 5    dicyclomine (BENTYL) 20 mg tablet, Take 1 tablet (20 mg total) by mouth every 6 (six) hours as needed (pain) (Patient not taking: Reported on 11/16/2018 ), Disp: 60 tablet, Rfl: 1    Allergies   Allergen Reactions    Advil [Ibuprofen] Anaphylaxis and Hives     Category: Allergy; aspirin    Apple Anaphylaxis    Aspirin Anaphylaxis and Hives     Category: Allergy;     Eggs Or Egg-Derived Products Anaphylaxis     Category: Allergy;     Nsaids Anaphylaxis     Category: Allergy;     Other Anaphylaxis     Category: Allergy;  Annotation - [id number]: cherries, grapes , and kiwis; pt states all fruits    Peanuts [Peanut Oil] Anaphylaxis    Penicillins Shortness Of Breath    Pollen Extract     Codeine Anxiety       Social History     Social History    Marital status: /Civil Union     Spouse name: N/A    Number of children: N/A    Years of education: N/A     Occupational History           Social History Main Topics    Smoking status: Current Every Day Smoker     Packs/day: 0 50     Types: Cigarettes    Smokeless tobacco: Never Used      Comment: Dependence on nicotine in cigarettes - 1/2 PPD x 20 years     Alcohol use No      Comment: John consumes alcohol noted in "allscripts"     Drug use: No      Comment: former marijuana use noted in "allscripts"     Sexual activity: Not Asked     Other Topics Concern    None     Social History Narrative    Single noted in "allscripts"        Family History   Problem Relation Age of Onset    Diabetes Mother         mellitus     Hypertension Mother     Anxiety disorder Mother     Thyroid disease Mother     Fibromyalgia Mother     Diabetes Father     Hypertension Father     Hypertension Sister     No Known Problems Brother     No Known Problems Maternal Grandmother     No Known Problems Maternal Grandfather     No Known Problems Paternal Grandmother     No Known Problems Paternal Grandfather     Diabetes Other         mellitus     Rheum arthritis Cousin        PHQ-9 Depression Screening    PHQ-9:    Frequency of the following problems over the past two weeks:              Health Maintenance   Topic Date Due    Pneumococcal PPSV23 Medium Risk Adult (1 of 1 - PPSV23) 04/11/2000    DTaP,Tdap,and Td Vaccines (1 - Tdap) 04/11/2002    Depression Screening PHQ  08/30/2019    INFLUENZA VACCINE  Excluded       Immunization History   Administered Date(s) Administered    DTaP 5 1980        Objective:  Vitals:    11/16/18 1026   BP: 120/72   BP Location: Left arm   Patient Position: Sitting   Cuff Size: Standard   Pulse: 96   Temp: 98 9 °F (37 2 °C)   TempSrc: Oral   SpO2: 98%   Weight: 69 5 kg (153 lb 3 2 oz)   Height: 5' 8" (1 727 m)     Wt Readings from Last 3 Encounters:   11/16/18 69 5 kg (153 lb 3 2 oz)   11/08/18 69 9 kg (154 lb)   10/12/18 69 9 kg (154 lb)     Body mass index is 23 29 kg/m²  No exam data present       Physical Exam   Constitutional: He is oriented to person, place, and time  He appears well-developed and well-nourished  No distress  Alert pleasant cooperative seated in room  Appears in discomfort but does not appear to be toxic   HENT:   Head: Normocephalic and atraumatic  Mouth/Throat: Oropharynx is clear and moist  No oropharyngeal exudate  Moist mucous membranes  Normal posterior pharynx  Clear nasal drainage  No sinus tenderness to palpation  Mild edema of nasal mucosa  no oral lesions or ulcerations   Eyes: Pupils are equal, round, and reactive to light  Right eye exhibits no discharge  Left eye exhibits no discharge  No scleral icterus  Neck: Neck supple  Cardiovascular: Normal rate, regular rhythm and normal heart sounds  No murmur heard  Pulmonary/Chest: Effort normal and breath sounds normal  No respiratory distress  He has no wheezes  He has no rales  Clear to auscultation throughout  No crackles no rhonchi no wheeze  No respiratory distress   Abdominal: Soft  Bowel sounds are normal  He exhibits no distension and no mass  There is no hepatosplenomegaly  There is tenderness in the right lower quadrant  There is no rigidity, no rebound, no guarding, no CVA tenderness and negative Coleman's sign  Musculoskeletal: He exhibits no edema  Neurological: He is alert and oriented to person, place, and time  No gross focal neurologic deficits on exam   Skin: Skin is warm and dry  No rash noted  He is not diaphoretic  Psychiatric: He has a normal mood and affect   His behavior is normal  Thought content normal    Nursing note and vitals reviewed            Future Appointments  Date Time Provider Litzy Miller   11/19/2018 11:00 AM Dairl Burkitt, PA-C [facility]   11/19/2018 3:15 PM Shannon Stover MD [address] [place] [address]   11/21/2018 10:00 AM Oni Vences MD [facility]   11/21/2018 3:00 PM Evangelist Wright MD [address] CARE [address] [place] [address]    Patient Care Team:  Evangelist Wright MD as PCP - General (Internal Medicine)  Sharlee Harvest, MD [place], PA-C Dairl Burkitt, PA-C

## 2018-11-16 NOTE — ASSESSMENT & PLAN NOTE
Since being in the hospital patient has discontinued opioid analgesics    He is using Tylenol as needed for pain

## 2018-11-19 ENCOUNTER — OFFICE VISIT (OUTPATIENT)
Dept: GASTROENTEROLOGY | Facility: MEDICAL CENTER | Age: 38
End: 2018-11-19
Payer: COMMERCIAL

## 2018-11-19 ENCOUNTER — OFFICE VISIT (OUTPATIENT)
Dept: INTERNAL MEDICINE CLINIC | Facility: CLINIC | Age: 38
End: 2018-11-19
Payer: COMMERCIAL

## 2018-11-19 VITALS
DIASTOLIC BLOOD PRESSURE: 98 MMHG | SYSTOLIC BLOOD PRESSURE: 126 MMHG | BODY MASS INDEX: 22.91 KG/M2 | HEART RATE: 70 BPM | OXYGEN SATURATION: 97 % | TEMPERATURE: 99.1 F | HEIGHT: 68 IN | WEIGHT: 151.2 LBS

## 2018-11-19 VITALS
SYSTOLIC BLOOD PRESSURE: 116 MMHG | TEMPERATURE: 98.2 F | DIASTOLIC BLOOD PRESSURE: 78 MMHG | HEART RATE: 84 BPM | HEIGHT: 68 IN | BODY MASS INDEX: 22.73 KG/M2 | WEIGHT: 150 LBS

## 2018-11-19 DIAGNOSIS — K91.89 ILEUS FOLLOWING GASTROINTESTINAL SURGERY (HCC): Primary | ICD-10-CM

## 2018-11-19 DIAGNOSIS — K50.919 CROHN'S DISEASE WITH COMPLICATION, UNSPECIFIED GASTROINTESTINAL TRACT LOCATION (HCC): ICD-10-CM

## 2018-11-19 DIAGNOSIS — K56.7 ILEUS FOLLOWING GASTROINTESTINAL SURGERY (HCC): Primary | ICD-10-CM

## 2018-11-19 DIAGNOSIS — R10.84 GENERALIZED ABDOMINAL PAIN: Primary | ICD-10-CM

## 2018-11-19 DIAGNOSIS — K59.00 CONSTIPATION, UNSPECIFIED CONSTIPATION TYPE: ICD-10-CM

## 2018-11-19 DIAGNOSIS — K52.9 IBD (INFLAMMATORY BOWEL DISEASE): ICD-10-CM

## 2018-11-19 PROCEDURE — 99214 OFFICE O/P EST MOD 30 MIN: CPT | Performed by: INTERNAL MEDICINE

## 2018-11-19 PROCEDURE — 99214 OFFICE O/P EST MOD 30 MIN: CPT | Performed by: PHYSICIAN ASSISTANT

## 2018-11-19 RX ORDER — METOCLOPRAMIDE 5 MG/1
5 TABLET ORAL 4 TIMES DAILY
Qty: 60 TABLET | Refills: 0 | Status: SHIPPED | OUTPATIENT
Start: 2018-11-19 | End: 2018-11-30

## 2018-11-19 NOTE — PROGRESS NOTES
Assessment/Plan:    No problem-specific Assessment & Plan notes found for this encounter  Diagnoses and all orders for this visit:    Ileus following gastrointestinal surgery    Crohn's disease with complication, unspecified gastrointestinal tract location ([facility] )    IBD (inflammatory bowel disease)          Subjective:   He is here for the follow-up from his hospital admission where he had an surgery for GS and also biopsies were done for stomach terminal ileum and the colon, side the mild gastritis all other biopsies were negative for Crohn's or inflammatory bowel disease  I am reading the reports from October 12, 2018, I reviewed all the notes from the surgical and medical physicians from the hospital and also the pathology, because of the very sluggish bowel sounds but no distention of the abdomen I will tell him to continue with soft or liquid diet until he sees the surgeon and I recommended him to stay away from any kind of narcotics or Bentyl   Patient ID: Elizabeth Carter is a 41 y o  male  HPI    The following portions of the patient's history were reviewed and updated as appropriate: allergies, current medications, past family history, past medical history, past social history, past surgical history and problem list     Review of Systems      Past Medical History:   Diagnosis Date    Abdominal pain     Abnormal liver function test     last assessed: Oct 16, 2013     Abnormal weight loss     last assessed: Yung 15, 2014     Allergic rhinitis due to pollen     last assessed: Yung 15, 2014     Anxiety     Anxiety     last assessed/resolved:  Aug 5, 2015     Asthma     last assessed: Yung 15, 2014     Benign essential HTN     last assessed/resolved: Feb 23, 2017     Cervical lymphadenopathy     last assessed/resolved: Feb 23, 2017     Chronic sinusitis     last assessed: Yung 15, 2014     Constipation     Crohn's disease ([facility] ) 01/01/2011    last assessed: Yung 15, 2014     Dysuria     last assessed: April 29, 2016     Elevated BP without diagnosis of hypertension     last assessed/resolved: Feb 23, 2017     Esophageal reflux     last assessed/resolved: Feb 23, 2017     Eustachian tube anomaly     Resolved: Nov 9, 2017     External hemorrhoids     last assessed: Yung 15, 2014     Hypertension     borderline    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     last assessed/resolved: July 19, 2017     Inflammatory bowel disease     Pancreatic neoplasm     last assessed: Yung 15, 2014     PONV (postoperative nausea and vomiting)     Positive depression screening     resolved: July 24, 2017     Psoriasis     Seasonal allergies     Small bowel obstruction ([facility] ) 11/11/2018    Vitamin B12 deficiency     last assessed/resolved: Feb 23, 2017          Current Outpatient Prescriptions:     acetaminophen (TYLENOL) 500 mg tablet, Take 10 mg/kg by mouth every 4 (four) hours as needed  , Disp: , Rfl:     cetirizine (ZyrTEC) 10 mg tablet, Take 1 tablet (10 mg total) by mouth daily as needed for allergies, Disp: 30 tablet, Rfl: 5    clonazePAM (KlonoPIN) 0 5 mg tablet, Take 1 tablet (0 5 mg total) by mouth every 12 (twelve) hours as needed for anxiety, Disp: 90 tablet, Rfl: 1    dicyclomine (BENTYL) 20 mg tablet, Take 1 tablet (20 mg total) by mouth every 6 (six) hours as needed (pain), Disp: 60 tablet, Rfl: 1    diphenhydrAMINE (BENADRYL) 25 mg tablet, Take 25 mg by mouth as needed  , Disp: , Rfl:     gabapentin (NEURONTIN) 100 mg capsule, TAKE ONE CAPSULE BY MOUTH 3 TIMES DAILY, Disp: 90 capsule, Rfl: 0    polyethylene glycol (MIRALAX) 17 g packet, Take 17 g by mouth daily, Disp: 14 each, Rfl: 0    Allergies   Allergen Reactions    Advil [Ibuprofen] Anaphylaxis and Hives     Category: Allergy; aspirin    Apple Anaphylaxis    Aspirin Anaphylaxis and Hives     Category: Allergy;     Eggs Or Egg-Derived Products Anaphylaxis     Category: Allergy;     Nsaids Anaphylaxis     Category:  Allergy;     Other Anaphylaxis     Category: Allergy; [facility] - [id number]: cherries, grapes , and kiwis; pt states all fruits    Peanuts [Peanut Oil] Anaphylaxis    Penicillins Shortness Of Breath    Pollen Extract     Codeine Anxiety       Social History   Past Surgical History:   Procedure Laterality Date    CHOLECYSTECTOMY      resolved: 2011     COLONOSCOPY N/A 11/18/2016    Procedure: COLONOSCOPY;  Surgeon: Lita Gibbons MD;  Location: [facility]; Service:     COLONOSCOPY N/A 4/28/2016    Procedure: COLONOSCOPY;  Surgeon: Lita Gibbons MD;  Location: [facility]; Service:     ESOPHAGOGASTRODUODENOSCOPY N/A 4/28/2016    Procedure: ESOPHAGOGASTRODUODENOSCOPY (EGD); Surgeon: Lita Gibbons MD;  Location: [facility]; Service:     FRONTAL SINUSOTOMY      resolved: April 2009     PANCREAS SURGERY      [facility] WHEN P[facility] N/A 10/12/2018    Procedure: EGD AND COLONOSCOPY;  Surgeon: Alejandrina Moreau MD;  Location: [facility];   Service: Gastroenterology    DC LAP, INCISIONAL HERNIA REPAIR,REDUCIBLE N/A 11/8/2018    Procedure: REPAIR HERNIA INCISIONAL LAPAROSCOPIC;  Surgeon: Marge Head MD;  [facility]: [facility];  Service: General    DC REPAIR OF NASAL SEPTUM N/A 7/28/2017    Procedure: REVISION SEPTOPLASTY; TURBINOPLASTY; BILATERAL  GRAFTS; ALAR GRAFT; POSSIBLE AURICULAR CARTILAGE GRAFT;  Surgeon: Brenda Bartholomew MD;  [facility];  Service: ENT    TONSILLECTOMY AND ADENOIDECTOMY       Family History   Problem Relation Age of Onset    Diabetes Mother         mellitus     Hypertension Mother    [facility] Anxiety disorder Mother     Thyroid disease Mother     Fibromyalgia Mother     Diabetes Father     Hypertension Father     Hypertension Sister     No Known Problems Brother     No Known Problems Maternal Grandmother     No Known Problems Maternal Grandfather     No Known Problems Paternal Grandmother     No Known Problems Paternal Grandfather     Diabetes Other mellitus     Rheum arthritis Cousin        Objective:  /98 (BP Location: Left arm, Patient Position: Sitting, Cuff Size: Adult)   Pulse 70   Temp 99 1 °F (37 3 °C) (Oral)   Ht 5' 8" (1 727 m)   Wt 68 6 kg (151 lb 3 2 oz)   SpO2 97%   BMI 22 99 kg/m²        Physical Exam   Constitutional: He is oriented to person, place, and time  He appears well-developed and well-nourished  No distress  Alert pleasant cooperative seated in room  Appears in discomfort but does not appear to be toxic   HENT:   Head: Normocephalic and atraumatic  Mouth/Throat: Oropharynx is clear and moist  No oropharyngeal exudate  Moist mucous membranes  Normal posterior pharynx  Clear nasal drainage  No sinus tenderness to palpation  Mild edema of nasal mucosa  no oral lesions or ulcerations   Eyes: Pupils are equal, round, and reactive to light  Right eye exhibits no discharge  Left eye exhibits no discharge  No scleral icterus  Neck: Neck supple  Cardiovascular: Normal rate, regular rhythm and normal heart sounds  No murmur heard  Pulmonary/Chest: Effort normal and breath sounds normal  No respiratory distress  He has no wheezes  He has no rales  Clear to auscultation throughout  No crackles no rhonchi no wheeze  No respiratory distress   Abdominal: Soft  Bowel sounds are normal  There is no hepatosplenomegaly  There is tenderness in the right lower quadrant  There is no rigidity, no CVA tenderness and negative Coleman's sign  Abdomen is soft tender in the right hypochondrium multiple marks of endoscopic surgery very decreased bowel sounds are so very sluggish bowel sounds he need to be followed up by the surgeon he he had appointment in 2 days   Musculoskeletal: He exhibits no edema  Neurological: He is alert and oriented to person, place, and time  No gross focal neurologic deficits on exam   Skin: Skin is warm and dry  No rash noted  He is not diaphoretic     Psychiatric: He has a normal mood and affect  His behavior is normal  Thought content normal    Nursing note and vitals reviewed          Recent Results (from the past 672 hour(s))   Basic metabolic panel    Collection Time: 11/06/18  9:22 AM   Result Value Ref Range    Sodium 138 136 - 145 mmol/L    Potassium 4 8 3 5 - 5 3 mmol/L    Chloride 104 100 - 108 mmol/L    CO2 28 21 - 32 mmol/L    ANION GAP 6 4 - 13 mmol/L    BUN 12 5 - 25 mg/dL    Creatinine 0 91 0 60 - 1 30 mg/dL    Glucose, Fasting 105 (H) 65 - 99 mg/dL    Calcium 9 1 8 3 - 10 1 mg/dL    eGFR 107 ml/min/1 73sq m   CBC and differential    Collection Time: 11/11/18  6:18 PM   Result Value Ref Range    WBC 9 40 4 31 - 10 16 Thousand/uL    RBC 4 89 3 88 - 5 62 Million/uL    Hemoglobin 15 7 12 0 - 17 0 g/dL    Hematocrit 44 7 36 5 - 49 3 %    MCV 91 82 - 98 fL    MCH 32 1 26 8 - 34 3 pg    MCHC 35 1 31 4 - 37 4 g/dL    RDW 12 6 11 6 - 15 1 %    MPV 9 5 8 9 - 12 7 fL    Platelets 580 044 - 418 Thousands/uL    nRBC 0 /100 WBCs    Neutrophils Relative 64 43 - 75 %    Immat GRANS % 1 0 - 2 %    Lymphocytes Relative 22 14 - 44 %    Monocytes Relative 9 4 - 12 %    Eosinophils Relative 3 0 - 6 %    Basophils Relative 1 0 - 1 %    Neutrophils Absolute 6 08 1 85 - 7 62 Thousands/µL    Immature Grans Absolute 0 07 0 00 - 0 20 Thousand/uL    Lymphocytes Absolute 2 02 0 60 - 4 47 Thousands/µL    Monocytes Absolute 0 86 0 17 - 1 22 Thousand/µL    Eosinophils Absolute 0 30 0 00 - 0 61 Thousand/µL    Basophils Absolute 0 07 0 00 - 0 10 Thousands/µL   Basic metabolic panel    Collection Time: 11/11/18  6:18 PM   Result Value Ref Range    Sodium 138 136 - 145 mmol/L    Potassium 3 5 3 5 - 5 3 mmol/L    Chloride 105 100 - 108 mmol/L    CO2 28 21 - 32 mmol/L    ANION GAP 5 4 - 13 mmol/L    BUN 10 5 - 25 mg/dL    Creatinine 0 75 0 60 - 1 30 mg/dL    Glucose 86 65 - 140 mg/dL    Calcium 8 9 8 3 - 10 1 mg/dL    eGFR 117 ml/min/1 73sq m   Basic metabolic panel    Collection Time: 11/12/18  5:28 AM   Result Value Ref Range    Sodium 137 136 - 145 mmol/L    Potassium 3 7 3 5 - 5 3 mmol/L    Chloride 105 100 - 108 mmol/L    CO2 28 21 - 32 mmol/L    ANION GAP 4 4 - 13 mmol/L    BUN 10 5 - 25 mg/dL    Creatinine 0 87 0 60 - 1 30 mg/dL    Glucose 97 65 - 140 mg/dL    Calcium 8 2 (L) 8 3 - 10 1 mg/dL    eGFR 110 ml/min/1 73sq m   Magnesium    Collection Time: 11/12/18  5:28 AM   Result Value Ref Range    Magnesium 2 2 1 6 - 2 6 mg/dL   Phosphorus    Collection Time: 11/12/18  5:28 AM   Result Value Ref Range    Phosphorus 3 0 2 7 - 4 5 mg/dL   CBC and differential    Collection Time: 11/12/18  5:28 AM   Result Value Ref Range    WBC 9 52 4 31 - 10 16 Thousand/uL    RBC 4 63 3 88 - 5 62 Million/uL    Hemoglobin 14 8 12 0 - 17 0 g/dL    Hematocrit 43 1 36 5 - 49 3 %    MCV 93 82 - 98 fL    MCH 32 0 26 8 - 34 3 pg    MCHC 34 3 31 4 - 37 4 g/dL    RDW 12 7 11 6 - 15 1 %    MPV 9 8 8 9 - 12 7 fL    Platelets 600 871 - 931 Thousands/uL    nRBC 0 /100 WBCs    Neutrophils Relative 62 43 - 75 %    Immat GRANS % 1 0 - 2 %    Lymphocytes Relative 22 14 - 44 %    Monocytes Relative 9 4 - 12 %    Eosinophils Relative 5 0 - 6 %    Basophils Relative 1 0 - 1 %    Neutrophils Absolute 5 90 1 85 - 7 62 Thousands/µL    Immature Grans Absolute 0 08 0 00 - 0 20 Thousand/uL    Lymphocytes Absolute 2 12 0 60 - 4 47 Thousands/µL    Monocytes Absolute 0 89 0 17 - 1 22 Thousand/µL    Eosinophils Absolute 0 45 0 00 - 0 61 Thousand/µL    Basophils Absolute 0 08 0 00 - 0 10 Thousands/µL   CBC and differential    Collection Time: 11/13/18  6:19 AM   Result Value Ref Range    WBC 11 51 (H) 4 31 - 10 16 Thousand/uL    RBC 4 65 3 88 - 5 62 Million/uL    Hemoglobin 14 7 12 0 - 17 0 g/dL    Hematocrit 43 1 36 5 - 49 3 %    MCV 93 82 - 98 fL    MCH 31 6 26 8 - 34 3 pg    MCHC 34 1 31 4 - 37 4 g/dL    RDW 12 6 11 6 - 15 1 %    MPV 9 8 8 9 - 12 7 fL    Platelets 920 796 - 043 Thousands/uL    nRBC 0 /100 WBCs    Neutrophils Relative 73 43 - 75 %    Immat GRANS % 0 0 - 2 % Lymphocytes Relative 14 14 - 44 %    Monocytes Relative 8 4 - 12 %    Eosinophils Relative 4 0 - 6 %    Basophils Relative 1 0 - 1 %    Neutrophils Absolute 8 40 (H) 1 85 - 7 62 Thousands/µL    Immature Grans Absolute 0 04 0 00 - 0 20 Thousand/uL    Lymphocytes Absolute 1 63 0 60 - 4 47 Thousands/µL    Monocytes Absolute 0 95 0 17 - 1 22 Thousand/µL    Eosinophils Absolute 0 41 0 00 - 0 61 Thousand/µL    Basophils Absolute 0 08 0 00 - 0 10 Thousands/µL   Comprehensive metabolic panel    Collection Time: 11/13/18  6:19 AM   Result Value Ref Range    Sodium 140 136 - 145 mmol/L    Potassium 4 0 3 5 - 5 3 mmol/L    Chloride 109 (H) 100 - 108 mmol/L    CO2 24 21 - 32 mmol/L    ANION GAP 7 4 - 13 mmol/L    BUN 10 5 - 25 mg/dL    Creatinine 0 92 0 60 - 1 30 mg/dL    Glucose 86 65 - 140 mg/dL    Calcium 8 3 8 3 - 10 1 mg/dL    AST 13 5 - 45 U/L    ALT 22 12 - 78 U/L    Alkaline Phosphatase 65 46 - 116 U/L    Total Protein 6 4 6 4 - 8 2 g/dL    Albumin 3 1 (L) 3 5 - 5 0 g/dL    Total Bilirubin 0 72 0 20 - 1 00 mg/dL    eGFR 106 ml/min/1 73sq m   Comprehensive metabolic panel    Collection Time: 11/16/18 12:26 PM   Result Value Ref Range    Sodium 142 136 - 145 mmol/L    Potassium 4 7 3 5 - 5 3 mmol/L    Chloride 104 100 - 108 mmol/L    CO2 28 21 - 32 mmol/L    ANION GAP 10 4 - 13 mmol/L    BUN 12 5 - 25 mg/dL    Creatinine 0 97 0 60 - 1 30 mg/dL    Glucose 97 65 - 140 mg/dL    Calcium 9 7 8 3 - 10 1 mg/dL    AST 50 (H) 5 - 45 U/L    ALT 68 12 - 78 U/L    Alkaline Phosphatase 85 46 - 116 U/L    Total Protein 7 6 6 4 - 8 2 g/dL    Albumin 3 5 3 5 - 5 0 g/dL    Total Bilirubin 0 28 0 20 - 1 00 mg/dL    eGFR 99 ml/min/1 73sq m   Lactic acid, plasma    Collection Time: 11/16/18 12:26 PM   Result Value Ref Range    LACTIC ACID 1 2 0 5 - 2 0 mmol/L   CBC and differential    Collection Time: 11/16/18 12:26 PM   Result Value Ref Range    WBC 10 58 (H) 4 31 - 10 16 Thousand/uL    RBC 4 77 3 88 - 5 62 Million/uL    Hemoglobin 15 1 12 0 - 17 0 g/dL    Hematocrit 45 4 36 5 - 49 3 %    MCV 95 82 - 98 fL    MCH 31 7 26 8 - 34 3 pg    MCHC 33 3 31 4 - 37 4 g/dL    RDW 12 6 11 6 - 15 1 %    MPV 9 4 8 9 - 12 7 fL    Platelets 247 910 - 281 Thousands/uL    nRBC 0 /100 WBCs    Neutrophils Relative 64 43 - 75 %    Immat GRANS % 1 0 - 2 %    Lymphocytes Relative 19 14 - 44 %    Monocytes Relative 11 4 - 12 %    Eosinophils Relative 4 0 - 6 %    Basophils Relative 1 0 - 1 %    Neutrophils Absolute 6 90 1 85 - 7 62 Thousands/µL    Immature Grans Absolute 0 06 0 00 - 0 20 Thousand/uL    Lymphocytes Absolute 1 97 0 60 - 4 47 Thousands/µL    Monocytes Absolute 1 20 0 17 - 1 22 Thousand/µL    Eosinophils Absolute 0 37 0 00 - 0 61 Thousand/µL    Basophils Absolute 0 08 0 00 - 0 10 Thousands/µL   Amylase    Collection Time: 11/16/18 12:26 PM   Result Value Ref Range    Amylase 21 (L) 25 - 115 IU/L   Lipase    Collection Time: 11/16/18 12:26 PM   Result Value Ref Range    Lipase 104 73 - 393 u/L

## 2018-11-19 NOTE — LETTER
November 19, 2018     Danni Jorgensen MD  [address] [place] [address]    Patient: Lou Han   YOB: 1980   Date of Visit: 11/19/2018       Dear Dr Chong Mcguire: Thank you for referring Alcides Isbell to me for evaluation  Below are my notes for this consultation  If you have questions, please do not hesitate to call me  I look forward to following your patient along with you  Sincerely,        Sherrine Prader, PA-C        CC: No Recipients  Sherrine Prader, [place]  11/19/2018 12:22 PM  Sign at close encounter  Assessment/Plan:     Diagnoses and all orders for this visit:    Generalized abdominal pain  Patient continues with abdominal pain that is mainly right-sided  He has had this pain for over 7 years now  He was diagnosed with Crohn's disease however I do not have these records and every test that he has had done since then has been negative he is unable to take any aspirin products as he has anaphylaxis  I would not start him on biologic therapy without a definitive diagnosis  It is more likely that his symptoms are related to IBS however he has tried Bentyl, amitriptyline and Neurontin without relief  We can consider increasing his dose of Neurontin but unsure if this would be much benefit  He states he has avoided gluten and dairy and has had no improvement of his symptoms  I will discuss this with Dr Kaiden Almaraz to see if he has any further recommendations  Constipation, unspecified constipation type  He has occasional constipation and was prescribed Linzess however he states he needs to take it on an as-needed basis or it makes his abdominal pain worse  Commend continue this regimen for the time being as he has tried other options in the past and then been unsuccessful  Subjective:      Patient ID: Lou Han is a 41 y o  male  HPI     This is a follow-up for abdominal pain and constipation   Patient has a diagnosis of Crohn's disease however he has had multiple endoscopies and colonoscopies with the only finding of ileitis which biopsies did not show inflammation  This was likely related to prep  He did have 1 cat scan 2 years ago in the ileum  He has had a small-bowel enterography that was negative he is had a capsule study that was negative  He has never taken any medication for Crohn's disease as he has an aspirin allergy  He states he has chronic abdominal pain that varies in intensity he states the pain is typically in his right upper and lower quadrants and can become severe in nature  He has tried Bentyl as well as amitriptyline without relief  At his last visit he was started on Neurontin which he again states he has not beneficial    He states he gets occasional constipation  Sometimes this is associated with worsening of his abdominal pain but not always  He states he has been taking Linzess 145 mcg on an as-needed basis because if he takes a daily makes his pain worse  He does have occasional nausea but no vomiting  He denies any heartburn or reflux symptoms  Patient recently was hospitalized for a umbilical hernia repair  At that time he states they evaluated his small bowel which was within normal limits  His pain has worsened since then  He developed a postop ileus  He did have a follow-up obstruction series 2 days ago which was within normal limits  He has been tolerating clear liquid diet  Last endoscopy was in October which showed mild gastritis  He had a colonoscopy at that time which showed very mild ileitis  Biopsies were negative for H pylori  Small bowel and colon biopsies showed no signs of inflammation      Patient Active Problem List   Diagnosis    Anxiety    Allergic rhinitis    Deviated septum    Environmental and seasonal allergies    IBD (inflammatory bowel disease)    Retention cyst of nasal sinus    Neck pain    Chronic midline thoracic back pain    Palpitations    Crohn disease ([facility] [address] )    Abscess of umbilicus    Allergic asthma    Constipation    S/P repair of ventral hernia    Postoperative ileus (HCC)    Generalized abdominal pain    Ileus following gastrointestinal surgery    Constipation due to opioid therapy    Transition of care performed with sharing of clinical summary     Allergies   Allergen Reactions    Advil [Ibuprofen] Anaphylaxis and Hives     Category: Allergy; aspirin    Apple Anaphylaxis    Aspirin Anaphylaxis and Hives     Category: Allergy;     Eggs Or Egg-Derived Products Anaphylaxis     Category: Allergy;     Nsaids Anaphylaxis     Category: Allergy;     Other Anaphylaxis     Category: Allergy; Annotation - [id number]: cherries, grapes , and kiwis; pt states all fruits    Peanuts [Peanut Oil] Anaphylaxis    Penicillins Shortness Of Breath    Pollen Extract     Codeine Anxiety     Current Outpatient Prescriptions on File Prior to Visit   Medication Sig    acetaminophen (TYLENOL) 500 mg tablet Take 10 mg/kg by mouth every 4 (four) hours as needed      cetirizine (ZyrTEC) 10 mg tablet Take 1 tablet (10 mg total) by mouth daily as needed for allergies    clonazePAM (KlonoPIN) 0 5 mg tablet Take 1 tablet (0 5 mg total) by mouth every 12 (twelve) hours as needed for anxiety    dicyclomine (BENTYL) 20 mg tablet Take 1 tablet (20 mg total) by mouth every 6 (six) hours as needed (pain)    diphenhydrAMINE (BENADRYL) 25 mg tablet Take 25 mg by mouth as needed      gabapentin (NEURONTIN) 100 mg capsule TAKE ONE CAPSULE BY MOUTH 3 TIMES DAILY    polyethylene glycol (MIRALAX) 17 g packet Take 17 g by mouth daily     No current facility-administered medications on file prior to visit        Family History   Problem Relation Age of Onset    Diabetes Mother         mellitus     Hypertension Mother     Anxiety disorder Mother     Thyroid disease Mother     Fibromyalgia Mother     Diabetes Father     Hypertension Father     Hypertension Sister    Idalia Pall No Known Problems Brother     No Known Problems Maternal Grandmother     No Known Problems Maternal Grandfather     No Known Problems Paternal Grandmother     No Known Problems Paternal Grandfather     Diabetes Other         mellitus     Rheum arthritis Cousin      Past Medical History:   Diagnosis Date    Abdominal pain     Abnormal liver function test     last assessed: Oct 16, 2013     Abnormal weight loss     last assessed: Yung 15, 2014     Allergic rhinitis due to pollen     last assessed: Yung 15, 2014     Anxiety     Anxiety     last assessed/resolved:  Aug 5, 2015     Asthma     last assessed: Yung 15, 2014     Benign essential HTN     last assessed/resolved: Feb 23, 2017     Cervical lymphadenopathy     last assessed/resolved: Feb 23, 2017     Chronic sinusitis     last assessed: Yung 15, 2014     Constipation     Crohn's disease ([facility] [address] ) 01/01/2011    last assessed: Yung 15, 2014     Dysuria     last assessed: April 29, 2016     Elevated BP without diagnosis of hypertension     last assessed/resolved: Feb 23, 2017     Esophageal reflux     last assessed/resolved: Feb 23, 2017     Eustachian tube anomaly     Resolved: Nov 9, 2017     External hemorrhoids     last assessed: Yung 15, 2014     Hypertension     borderline    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     last assessed/resolved: July 19, 2017     Pancreatic neoplasm     last assessed: Yung 15, 2014     PONV (postoperative nausea and vomiting)     Positive depression screening     resolved: July 24, 2017     Psoriasis     Seasonal allergies     [facility]    Vitamin B12 deficiency     last assessed/resolved: Feb 23, 2017      Social History     Social History    Marital status: /Civil Union     Spouse name: N/A    Number of children: N/A    Years of education: N/A     Occupational History           Social History Main Topics    Smoking status: Current Every Day Smoker     Packs/day: 0 50     Types: Cigarettes    Smokeless tobacco: Never Used      Comment: Dependence on nicotine in cigarettes - 1/2 PPD x 20 years     Alcohol use No      Comment: John consumes alcohol noted in "allscripts"     Drug use: No      Comment: former marijuana use noted in "allscripts"     Sexual activity: Not Asked     Other Topics Concern    None     Social History Narrative    Single noted in "allscripts"      Past Surgical History:   Procedure Laterality Date    CHOLECYSTECTOMY      resolved: 2011     COLONOSCOPY N/A 11/18/2016    Procedure: COLONOSCOPY;  Surgeon: Alexa Goncalves MD;  [facility]; Service:     COLONOSCOPY N/A 4/28/2016    Procedure: COLONOSCOPY;  Surgeon: Alexa Goncalves MD;  Location: [facility]; Service:     ESOPHAGOGASTRODUODENOSCOPY N/A 4/28/2016    Procedure: ESOPHAGOGASTRODUODENOSCOPY (EGD); Surgeon: Alexa Goncalves MD;  Location: [facility]; Service:     FRONTAL SINUSOTOMY      resolved: April 2009     PANCREAS SURGERY      NM COLONOSCOPY FLX DX W/COLLJ [address] WHEN PFRMD N/A 10/12/2018    Procedure: EGD AND COLONOSCOPY;  Surgeon: Agapito Bates MD;  Location: [facility]; Service: Gastroenterology    NM LAP, INCISIONAL HERNIA REPAIR,REDUCIBLE N/A 11/8/2018    Procedure: REPAIR HERNIA INCISIONAL LAPAROSCOPIC;  Surgeon: Eboni Fernandes MD;  [facility];  Service: General    NM REPAIR OF NASAL SEPTUM N/A 7/28/2017    Procedure: REVISION SEPTOPLASTY; TURBINOPLASTY; BILATERAL  GRAFTS; ALAR GRAFT; POSSIBLE AURICULAR CARTILAGE GRAFT;  Surgeon: Hansa Coker MD;  [facility];  Service: ENT    TONSILLECTOMY AND ADENOIDECTOMY           Review of Systems   All other systems reviewed and are negative          Objective:      /78 (BP Location: Left arm, Patient Position: Sitting, Cuff Size: Adult)   Pulse 84   Temp 98 2 °F (36 8 °C) (Tympanic)   Ht 5' 8" (1 727 m)   Wt 68 kg (150 lb)   BMI 22 81 kg/m²           Physical Exam   Constitutional: He is oriented to person, place, and time  He appears well-developed and well-nourished  HENT:   Head: Normocephalic and atraumatic  Eyes: Conjunctivae and EOM are normal    Neck: Normal range of motion  Cardiovascular: Normal rate and regular rhythm  Pulmonary/Chest: Effort normal and breath sounds normal    Abdominal: Soft  Bowel sounds are normal  There is tenderness (Diffuse; incision is clean dry and intact)  Musculoskeletal: Normal range of motion  Neurological: He is alert and oriented to person, place, and time  Skin: Skin is warm and dry  Psychiatric: He has a normal mood and affect   His behavior is normal

## 2018-11-19 NOTE — PROGRESS NOTES
Assessment/Plan:     Diagnoses and all orders for this visit:    Generalized abdominal pain  Patient continues with abdominal pain that is mainly right-sided  He has had this pain for over 7 years now  He was diagnosed with Crohn's disease however I do not have these records and every test that he has had done since then has been negative he is unable to take any aspirin products as he has anaphylaxis  I would not start him on biologic therapy without a definitive diagnosis  It is more likely that his symptoms are related to IBS however he has tried Bentyl, amitriptyline and Neurontin without relief  We can consider increasing his dose of Neurontin but unsure if this would be much benefit  He states he has avoided gluten and dairy and has had no improvement of his symptoms  I will discuss this with Dr Jefry Petty to see if he has any further recommendations  Constipation, unspecified constipation type  He has occasional constipation and was prescribed Linzess however he states he needs to take it on an as-needed basis or it makes his abdominal pain worse  Commend continue this regimen for the time being as he has tried other options in the past and then been unsuccessful  Subjective:      Patient ID: Sonia Luna is a 41 y o  male  HPI     This is a follow-up for abdominal pain and constipation  Patient has a diagnosis of Crohn's disease however he has had multiple endoscopies and colonoscopies with the only finding of ileitis which biopsies did not show inflammation  This was likely related to prep  He did have 1 cat scan 2 years ago in the ileum  He has had a small-bowel enterography that was negative he is had a capsule study that was negative  He has never taken any medication for Crohn's disease as he has an aspirin allergy     He states he has chronic abdominal pain that varies in intensity he states the pain is typically in his right upper and lower quadrants and can become severe in nature  He has tried Bentyl as well as amitriptyline without relief  At his last visit he was started on Neurontin which he again states he has not beneficial    He states he gets occasional constipation  Sometimes this is associated with worsening of his abdominal pain but not always  He states he has been taking Linzess 145 mcg on an as-needed basis because if he takes a daily makes his pain worse  He does have occasional nausea but no vomiting  He denies any heartburn or reflux symptoms  Patient recently was hospitalized for a umbilical hernia repair  At that time he states they evaluated his small bowel which was within normal limits  His pain has worsened since then  He developed a postop ileus  He did have a follow-up obstruction series 2 days ago which was within normal limits  He has been tolerating clear liquid diet  Last endoscopy was in October which showed mild gastritis  He had a colonoscopy at that time which showed very mild ileitis  Biopsies were negative for H pylori  Small bowel and colon biopsies showed no signs of inflammation  Patient Active Problem List   Diagnosis    Anxiety    Allergic rhinitis    Deviated septum    Environmental and seasonal allergies    IBD (inflammatory bowel disease)    Retention cyst of nasal sinus    Neck pain    Chronic midline thoracic back pain    Palpitations    Crohn disease ([address] )    Abscess of umbilicus    Allergic asthma    Constipation    S/P repair of ventral hernia    Postoperative ileus ([facility] [address] )    Generalized abdominal pain    Ileus following gastrointestinal surgery    Constipation due to opioid therapy    Transition of care performed with sharing of clinical summary     Allergies   Allergen Reactions    Advil [Ibuprofen] Anaphylaxis and Hives     Category: Allergy; aspirin    Apple Anaphylaxis    Aspirin Anaphylaxis and Hives     Category: Allergy;     Eggs Or Egg-Derived Products Anaphylaxis     Category:  Allergy;  Nsaids Anaphylaxis     Category: Allergy;     Other Anaphylaxis     Category: Allergy; Annotation - [id number]: cherries, grapes , and kiwis; pt states all fruits    Peanuts [Peanut Oil] Anaphylaxis    Penicillins Shortness Of Breath    Pollen Extract     Codeine Anxiety     Current Outpatient Prescriptions on File Prior to Visit   Medication Sig    acetaminophen (TYLENOL) 500 mg tablet Take 10 mg/kg by mouth every 4 (four) hours as needed      cetirizine (ZyrTEC) 10 mg tablet Take 1 tablet (10 mg total) by mouth daily as needed for allergies    clonazePAM (KlonoPIN) 0 5 mg tablet Take 1 tablet (0 5 mg total) by mouth every 12 (twelve) hours as needed for anxiety    dicyclomine (BENTYL) 20 mg tablet Take 1 tablet (20 mg total) by mouth every 6 (six) hours as needed (pain)    diphenhydrAMINE (BENADRYL) 25 mg tablet Take 25 mg by mouth as needed      gabapentin (NEURONTIN) 100 mg capsule TAKE ONE CAPSULE BY MOUTH 3 TIMES DAILY    polyethylene glycol (MIRALAX) 17 g packet Take 17 g by mouth daily     No current facility-administered medications on file prior to visit  Family History   Problem Relation Age of Onset    Diabetes Mother         mellitus     Hypertension Mother     Anxiety disorder Mother     Thyroid disease Mother     Fibromyalgia Mother     Diabetes Father     Hypertension Father     Hypertension Sister     No Known Problems Brother     No Known Problems Maternal Grandmother     No Known Problems Maternal Grandfather     No Known Problems Paternal Grandmother     No Known Problems Paternal Grandfather     Diabetes Other         mellitus     Rheum arthritis Cousin      Past Medical History:   Diagnosis Date    Abdominal pain     Abnormal liver function test     last assessed:  Oct 16, 2013     Abnormal weight loss     last assessed: Yung 15, 2014     Allergic rhinitis due to pollen     last assessed: Yung 15, 2014     Anxiety     Anxiety     last assessed/resolved: Aug 5, 2015     Asthma     last assessed: Yung 15, 2014     Benign essential HTN     last assessed/resolved: Feb 23, 2017     Cervical lymphadenopathy     last assessed/resolved: Feb 23, 2017     Chronic sinusitis     last assessed: Yung 15, 2014     Constipation     [facility]    last assessed: Yung 15, 2014     Dysuria     last assessed: April 29, 2016     Elevated BP without diagnosis of hypertension     last assessed/resolved: Feb 23, 2017     Esophageal reflux     last assessed/resolved: Feb 23, 2017     Eustachian tube anomaly     Resolved: Nov 9, 2017     External hemorrhoids     last assessed: Yung 15, 2014     Hypertension     borderline    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     last assessed/resolved: July 19, 2017     Pancreatic neoplasm     last assessed: Yung 15, 2014     PONV (postoperative nausea and vomiting)     Positive depression screening     resolved: July 24, 2017     Psoriasis     Seasonal allergies     Small bowel obstruction ([facility] ) 11/11/2018    Vitamin B12 deficiency     last assessed/resolved: Feb 23, 2017      Social History     Social History    Marital status: /Civil Union     Spouse name: N/A    Number of children: N/A    Years of education: N/A     Occupational History           Social History Main Topics    Smoking status: Current Every Day Smoker     Packs/day: 0 50     Types: Cigarettes    Smokeless tobacco: Never Used      Comment: Dependence on nicotine in cigarettes - 1/2 PPD x 20 years     Alcohol use No      Comment: Gayl Able consumes alcohol noted in "allscripts"     Drug use: No      Comment: former marijuana use noted in "allscripts"     Sexual activity: Not Asked     Other Topics Concern    None     Social History Narrative    Single noted in "allscripts"      Past Surgical History:   Procedure Laterality Date    CHOLECYSTECTOMY      resolved: 2011     COLONOSCOPY N/A 11/18/2016    Procedure: COLONOSCOPY; Surgeon: Jose Quick MD;  [facility]; Service:     COLONOSCOPY N/A 4/28/2016    Procedure: COLONOSCOPY;  Surgeon: Jose Quick MD;  Location: [facility]; Service:     ESOPHAGOGASTRODUODENOSCOPY N/A 4/28/2016    Procedure: ESOPHAGOGASTRODUODENOSCOPY (EGD); Surgeon: Jose Quick MD;  Location: [facility]; Service:     FRONTAL SINUSOTOMY      resolved: April 2009     PANCREAS SURGERY      NH COLONOSCOPY FLX DX W[facility]LLJ [facility] WHEN PFRMD N/A 10/12/2018    Procedure: EGD AND COLONOSCOPY;  Surgeon: Yesika Petty MD;  Location: [facility]; Service: Gastroenterology    NH LAP, INCISIONAL HERNIA REPAIR,REDUCIBLE N/A 11/8/2018    Procedure: REPAIR HERNIA INCISIONAL LAPAROSCOPIC;  Surgeon: Karen Chamberlain MD;  [facility];  Service: General    NH REPAIR OF NASAL SEPTUM N/A 7/28/2017    Procedure: REVISION SEPTOPLASTY; TURBINOPLASTY; BILATERAL  GRAFTS; ALAR GRAFT; POSSIBLE AURICULAR CARTILAGE GRAFT;  Surgeon: Andrae Viveros MD;  [facility];  Service: ENT    TONSILLECTOMY AND ADENOIDECTOMY           Review of Systems   All other systems reviewed and are negative  Objective:      /78 (BP Location: Left arm, Patient Position: Sitting, Cuff Size: Adult)   Pulse 84   Temp 98 2 °F (36 8 °C) (Tympanic)   Ht 5' 8" (1 727 m)   Wt 68 kg (150 lb)   BMI 22 81 kg/m²          Physical Exam   Constitutional: He is oriented to person, place, and time  He appears well-developed and well-nourished  HENT:   Head: Normocephalic and atraumatic  Eyes: Conjunctivae and EOM are normal    Neck: Normal range of motion  Cardiovascular: Normal rate and regular rhythm  Pulmonary/Chest: Effort normal and breath sounds normal    Abdominal: Soft  Bowel sounds are normal  There is tenderness (Diffuse; incision is clean dry and intact)  Musculoskeletal: Normal range of motion  Neurological: He is alert and oriented to person, place, and time  Skin: Skin is warm and dry  Psychiatric: He has a normal mood and affect   His behavior is normal

## 2018-11-21 ENCOUNTER — OFFICE VISIT (OUTPATIENT)
Dept: INTERNAL MEDICINE CLINIC | Age: 38
End: 2018-11-21
Payer: COMMERCIAL

## 2018-11-21 ENCOUNTER — OFFICE VISIT (OUTPATIENT)
Dept: SURGERY | Facility: CLINIC | Age: 38
End: 2018-11-21

## 2018-11-21 VITALS
TEMPERATURE: 98.1 F | SYSTOLIC BLOOD PRESSURE: 116 MMHG | WEIGHT: 151.3 LBS | HEIGHT: 68 IN | BODY MASS INDEX: 22.93 KG/M2 | DIASTOLIC BLOOD PRESSURE: 74 MMHG

## 2018-11-21 VITALS
HEIGHT: 68 IN | TEMPERATURE: 97 F | BODY MASS INDEX: 22.79 KG/M2 | SYSTOLIC BLOOD PRESSURE: 100 MMHG | OXYGEN SATURATION: 98 % | WEIGHT: 150.4 LBS | DIASTOLIC BLOOD PRESSURE: 64 MMHG | HEART RATE: 71 BPM

## 2018-11-21 DIAGNOSIS — K59.00 CONSTIPATION, UNSPECIFIED CONSTIPATION TYPE: Primary | ICD-10-CM

## 2018-11-21 DIAGNOSIS — Z87.19 S/P REPAIR OF VENTRAL HERNIA: Primary | ICD-10-CM

## 2018-11-21 DIAGNOSIS — Z98.890 S/P REPAIR OF VENTRAL HERNIA: Primary | ICD-10-CM

## 2018-11-21 PROCEDURE — 99024 POSTOP FOLLOW-UP VISIT: CPT | Performed by: SURGERY

## 2018-11-21 PROCEDURE — 99214 OFFICE O/P EST MOD 30 MIN: CPT | Performed by: INTERNAL MEDICINE

## 2018-11-21 NOTE — PROGRESS NOTES
Assessment/Plan:    Constipation  Reviewed x-rays and CT scans of abdomen and pelvis  Large dilated bowel loops all the way through the abdomen  That electrolytes liver function all was normal CBC was normal   Plan is as follows 1   1  Linzess 145 mg tablet take 1 tablet now followed by milk of magnesia 30 cc x1 now wait for 3-4 hours repeat milk of magnesia if needed wait for 3-4 hours take the rectal suppository if needed  Tomorrow try the milk a magnesia along with Linzess again informed that he would need to move his bowels quite a bit before her he is able to completely evacuate the bowel  Abdominal pain may be pressure on the right upper quadrant secondary to pressure from the colon at the site of previous surgery from cholecystectomy  Diagnoses and all orders for this visit:    Constipation, unspecified constipation type          Subjective:      Patient ID: Juan Ramon Juan is a 41 y o  male  Constipation   This is a new problem  The current episode started 1 to 4 weeks ago  The problem has been gradually improving since onset  His stool frequency is 1 time per day  The stool is described as loose  There has been adequate water intake  Associated symptoms include abdominal pain, back pain, bloating, flatus and nausea  Risk factors include change in medication usage/dosage  He has tried enemas, fiber and stool softeners for the symptoms  The treatment provided mild relief  His past medical history is significant for abdominal surgery and inflammatory bowel disease  Thejose enrique Bucio recently underwent surgery for an incarcerated abdominal hernia and had mesh placement  His past history consists a cholecystectomy  Following his surgery he developed abdominal distention and constipation    He was admitted to the hospital for observation for no evidence of obstruction was found he was managed with conservative treatment      The following portions of the patient's history were reviewed and updated as appropriate: past family history, past medical history, past social history, past surgical history and problem list     Review of Systems   Gastrointestinal: Positive for abdominal pain, bloating, constipation, flatus and nausea  Musculoskeletal: Positive for arthralgias and back pain  Allergic/Immunologic: Positive for environmental allergies and food allergies  Past Medical History:   Diagnosis Date    Abdominal pain     Abnormal liver function test     last assessed: Oct 16, 2013     Abnormal weight loss     last assessed: Yung 15, 2014     Allergic rhinitis due to pollen     last assessed: Yung 15, 2014     Anxiety     Anxiety     last assessed/resolved:  Aug 5, 2015     Asthma     last assessed: Yung 15, 2014     Benign essential HTN     last assessed/resolved: Feb 23, 2017     Cervical lymphadenopathy     last assessed/resolved: Feb 23, 2017     Chronic sinusitis     last assessed: Yung 15, 2014     Constipation     Crohn's disease ([facility] [address] ) 01/01/2011    last assessed: Yung 15, 2014     Dysuria     last assessed: April 29, 2016     Elevated BP without diagnosis of hypertension     last assessed/resolved: Feb 23, 2017     Esophageal reflux     last assessed/resolved: Feb 23, 2017     Eustachian tube anomaly     Resolved: Nov 9, 2017     External hemorrhoids     last assessed: Yung 15, 2014     Hypertension     borderline    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     last assessed/resolved: July 19, 2017     Inflammatory bowel disease     Pancreatic neoplasm     last assessed: Yung 15, 2014     PONV (postoperative nausea and vomiting)     Positive depression screening     resolved: July 24, 2017     Psoriasis     Seasonal allergies     Small bowel obstruction ([facility] [address] ) 11/11/2018    Vitamin B12 deficiency     last assessed/resolved: Feb 23, 2017          Current Outpatient Prescriptions:     acetaminophen (TYLENOL) 500 mg tablet, Take 10 mg/kg by mouth every 4 (four) hours as needed  , Disp: , Rfl:     cetirizine (ZyrTEC) 10 mg tablet, Take 1 tablet (10 mg total) by mouth daily as needed for allergies, Disp: 30 tablet, Rfl: 5    clonazePAM (KlonoPIN) 0 5 mg tablet, Take 1 tablet (0 5 mg total) by mouth every 12 (twelve) hours as needed for anxiety, Disp: 90 tablet, Rfl: 1    diphenhydrAMINE (BENADRYL) 25 mg tablet, Take 25 mg by mouth as needed  , Disp: , Rfl:     gabapentin (NEURONTIN) 100 mg capsule, TAKE ONE CAPSULE BY MOUTH 3 TIMES DAILY, Disp: 90 capsule, Rfl: 0    polyethylene glycol (MIRALAX) 17 g packet, Take 17 g by mouth daily, Disp: 14 each, Rfl: 0    metoclopramide (REGLAN) 5 mg tablet, Take 1 tablet (5 mg total) by mouth 4 (four) times a day (Patient not taking: Reported on 11/21/2018 ), Disp: 60 tablet, Rfl: 0    Allergies   Allergen Reactions    Advil [Ibuprofen] Anaphylaxis and Hives     Category: Allergy; aspirin    Apple Anaphylaxis    Aspirin Anaphylaxis and Hives     Category: Allergy;     Eggs Or Egg-Derived Products Anaphylaxis     Category: Allergy;     Nsaids Anaphylaxis     Category: Allergy;     Other Anaphylaxis     Category: Allergy; Annotation - [id number]: cherries, grapes , and kiwis; pt states all fruits    Peanuts [Peanut Oil] Anaphylaxis     nuts    Penicillins Shortness Of Breath    Pollen Extract     Codeine Anxiety       Social History   Past Surgical History:   Procedure Laterality Date    CHOLECYSTECTOMY      resolved: 2011     COLONOSCOPY N/A 11/18/2016    Procedure: COLONOSCOPY;  Surgeon: Chuck Guillermo MD;  Location:  GI LAB; Service:     COLONOSCOPY N/A 4/28/2016    Procedure: COLONOSCOPY;  Surgeon: Chuck Guillermo MD;  Location: [facility] GI LAB; Service:     ESOPHAGOGASTRODUODENOSCOPY N/A 4/28/2016    Procedure: ESOPHAGOGASTRODUODENOSCOPY (EGD); Surgeon: Chuck Guillermo MD;  Location: [facility] GI LAB;   Service:     FRONTAL SINUSOTOMY      resolved: April 2009     PANCREAS SURGERY      IA COLONOSCOPY FLX DX W/COLLJ SPEC WHEN PFRMD N/A 10/12/2018    Procedure: EGD AND COLONOSCOPY;  Surgeon: Dex Duvall MD;  Location: [facility] GI LAB;   Service: Gastroenterology    NC LAP, INCISIONAL HERNIA REPAIR,REDUCIBLE N/A 11/8/2018    Procedure: REPAIR HERNIA INCISIONAL LAPAROSCOPIC;  Surgeon: Michelle Babin MD;  Location:  MAIN OR;  Service: General    NC REPAIR OF NASAL SEPTUM N/A 7/28/2017    Procedure: REVISION SEPTOPLASTY; TURBINOPLASTY; BILATERAL  GRAFTS; ALAR GRAFT; POSSIBLE AURICULAR CARTILAGE GRAFT;  Surgeon: Aaron Goyal MD;  Location: [facility];  Service: ENT    TONSILLECTOMY AND ADENOIDECTOMY       Family History   Problem Relation Age of Onset    Diabetes Mother         mellitus     Hypertension Mother     Thyroid disease Mother     Fibromyalgia Mother     Hypertension Father     Hypertension Sister     No Known Problems Brother     No Known Problems Maternal Grandmother     No Known Problems Maternal Grandfather     Diabetes Paternal Grandmother     Diabetes Paternal Grandfather     Diabetes Other         mellitus     Rheum arthritis Cousin        Objective:  /64 (BP Location: Left arm, Patient Position: Sitting, Cuff Size: Standard)   Pulse 71   Temp (!) 97 °F (36 1 °C) (Tympanic)   Ht 5' 8 35" (1 736 m)   Wt 68 2 kg (150 lb 6 4 oz)   SpO2 98%   BMI 22 64 kg/m²     Recent Results (from the past 1344 hour(s))   Lactic acid, plasma    Collection Time: 10/08/18 12:11 PM   Result Value Ref Range    LACTIC ACID 2 8 (HH) 0 5 - 2 0 mmol/L   CBC and differential    Collection Time: 10/08/18 12:11 PM   Result Value Ref Range    WBC 8 41 4 31 - 10 16 Thousand/uL    RBC 4 65 3 88 - 5 62 Million/uL    Hemoglobin 15 0 12 0 - 17 0 g/dL    Hematocrit 44 2 36 5 - 49 3 %    MCV 95 82 - 98 fL    MCH 32 3 26 8 - 34 3 pg    MCHC 33 9 31 4 - 37 4 g/dL    RDW 13 0 11 6 - 15 1 %    MPV 9 9 8 9 - 12 7 fL    Platelets 322 634 - 840 Thousands/uL    nRBC 0 /100 WBCs    Neutrophils Relative 62 43 - 75 %    Immat GRANS % 1 0 - 2 % Lymphocytes Relative 24 14 - 44 %    Monocytes Relative 10 4 - 12 %    Eosinophils Relative 2 0 - 6 %    Basophils Relative 1 0 - 1 %    Neutrophils Absolute 5 19 1 85 - 7 62 Thousands/µL    Immature Grans Absolute 0 04 0 00 - 0 20 Thousand/uL    Lymphocytes Absolute 2 04 0 60 - 4 47 Thousands/µL    Monocytes Absolute 0 84 0 17 - 1 22 Thousand/µL    Eosinophils Absolute 0 20 0 00 - 0 61 Thousand/µL    Basophils Absolute 0 10 0 00 - 0 10 Thousands/µL   C-reactive protein    Collection Time: 10/08/18 12:11 PM   Result Value Ref Range    CRP 3 3 (H) <3 0 mg/L   Sedimentation rate, automated    Collection Time: 10/08/18 12:11 PM   Result Value Ref Range    Sed Rate 0 0 - 10 mm/hour   Tissue Exam    Collection Time: 10/12/18  2:09 PM   Result Value Ref Range    Case Report       Surgical Pathology Report                         Case: [id number]                                   Authorizing Provider:  Dev Stack MD              Collected:           10/12/2018 1409              Ordering Location:     [facility]        Received:            10/12/2018 [address] [place] [address] Endoscopy                                                     Pathologist:           Marcello Jain MD                                                   Specimens:   A) - Stomach, random gastric biopsy r/o h pylori                                                    B) - Terminal Ileum, Terminal Ileum biospy mild illeitis                                            C) - Colon, Random colon biopsy r/o microscopic colitis                                    Final Diagnosis       A  Stomach, random gastric biopsy r/o h pylori :  - Mild chronic inactive gastritis  - Immunohistochemistry for H  pylori is negative  - Negative for malignancy  B  Terminal Ileum, Terminal Ileum biospy mild illeitis :  - Small bowel mucosa with no significant histopathologic abnormality   - Negative for malabsorption pattern    - Negative for malignancy  C  Colon, Random colon biopsy r/o microscopic colitis :  - Colonic mucosa with no significant histopathologic abnormality   - No evidence of microscopic colitis or acute inflammation   - Negative for dysplasia and malignancy  Additional Information       All controls performed with the immunohistochemical stains reported above reacted appropriately  These tests were developed and their performance characteristics determined by [facility] or [facility]  They may not be cleared or approved by the [place]  Food and Drug Administration  The FDA has determined that such clearance or approval is not necessary  These tests are used for clinical purposes  They should not be regarded as investigational or for research  This laboratory has been approved by [place] [employer], designated as a high-complexity laboratory and is qualified to perform these tests  Gross Description       A  The specimen is received in formalin, labeled with the patient's name and hospital number, and is designated "random gastric biopsy rule out H pylori  The specimen consists of 2 tan-red soft tissue fragments measuring 0 2 and 0 4 cm in greatest dimension  The specimen is entirely submitted in 1 cassette  B  The specimen is received in formalin, labeled with the patient's name and hospital number, and is designated "terminal ileum biopsy, mild ileitis  The specimen consists of 3 tan-pink, focally friable soft tissue fragments ranging from 0 2-0 4 cm in greatest dimension  The specimen is entirely submitted in 1 cassette  C  The specimen is received in formalin, labeled with the patient's name and hospital number, and is designated "random colon biopsy rule out microscopic colitis  The specimen consists of a single tan-pink soft tissue fragment measuring 0 4 cm in greatest dimension  The specimen is entirely submitted in 1 cassette      Note: The estimated total formalin fixation time based upon information provided by the submitting clinician and the standard processing schedule is under 72 hours                              Abilio     Basic metabolic panel    Collection Time: 11/06/18  9:22 AM   Result Value Ref Range    Sodium 138 136 - 145 mmol/L    Potassium 4 8 3 5 - 5 3 mmol/L    Chloride 104 100 - 108 mmol/L    CO2 28 21 - 32 mmol/L    ANION GAP 6 4 - 13 mmol/L    BUN 12 5 - 25 mg/dL    Creatinine 0 91 0 60 - 1 30 mg/dL    Glucose, Fasting 105 (H) 65 - 99 mg/dL    Calcium 9 1 8 3 - 10 1 mg/dL    eGFR 107 ml/min/1 73sq m   CBC and differential    Collection Time: 11/11/18  6:18 PM   Result Value Ref Range    WBC 9 40 4 31 - 10 16 Thousand/uL    RBC 4 89 3 88 - 5 62 Million/uL    Hemoglobin 15 7 12 0 - 17 0 g/dL    Hematocrit 44 7 36 5 - 49 3 %    MCV 91 82 - 98 fL    MCH 32 1 26 8 - 34 3 pg    MCHC 35 1 31 4 - 37 4 g/dL    RDW 12 6 11 6 - 15 1 %    MPV 9 5 8 9 - 12 7 fL    Platelets 578 825 - 423 Thousands/uL    nRBC 0 /100 WBCs    Neutrophils Relative 64 43 - 75 %    Immat GRANS % 1 0 - 2 %    Lymphocytes Relative 22 14 - 44 %    Monocytes Relative 9 4 - 12 %    Eosinophils Relative 3 0 - 6 %    Basophils Relative 1 0 - 1 %    Neutrophils Absolute 6 08 1 85 - 7 62 Thousands/µL    Immature Grans Absolute 0 07 0 00 - 0 20 Thousand/uL    Lymphocytes Absolute 2 02 0 60 - 4 47 Thousands/µL    Monocytes Absolute 0 86 0 17 - 1 22 Thousand/µL    Eosinophils Absolute 0 30 0 00 - 0 61 Thousand/µL    Basophils Absolute 0 07 0 00 - 0 10 Thousands/µL   Basic metabolic panel    Collection Time: 11/11/18  6:18 PM   Result Value Ref Range    Sodium 138 136 - 145 mmol/L    Potassium 3 5 3 5 - 5 3 mmol/L    Chloride 105 100 - 108 mmol/L    CO2 28 21 - 32 mmol/L    ANION GAP 5 4 - 13 mmol/L    BUN 10 5 - 25 mg/dL    Creatinine 0 75 0 60 - 1 30 mg/dL    Glucose 86 65 - 140 mg/dL    Calcium 8 9 8 3 - 10 1 mg/dL    eGFR 117 ml/min/1 73sq m   Basic metabolic panel    Collection Time: 11/12/18 5:28 AM   Result Value Ref Range    Sodium 137 136 - 145 mmol/L    Potassium 3 7 3 5 - 5 3 mmol/L    Chloride 105 100 - 108 mmol/L    CO2 28 21 - 32 mmol/L    ANION GAP 4 4 - 13 mmol/L    BUN 10 5 - 25 mg/dL    Creatinine 0 87 0 60 - 1 30 mg/dL    Glucose 97 65 - 140 mg/dL    Calcium 8 2 (L) 8 3 - 10 1 mg/dL    eGFR 110 ml/min/1 73sq m   Magnesium    Collection Time: 11/12/18  5:28 AM   Result Value Ref Range    Magnesium 2 2 1 6 - 2 6 mg/dL   Phosphorus    Collection Time: 11/12/18  5:28 AM   Result Value Ref Range    Phosphorus 3 0 2 7 - 4 5 mg/dL   CBC and differential    Collection Time: 11/12/18  5:28 AM   Result Value Ref Range    WBC 9 52 4 31 - 10 16 Thousand/uL    RBC 4 63 3 88 - 5 62 Million/uL    Hemoglobin 14 8 12 0 - 17 0 g/dL    Hematocrit 43 1 36 5 - 49 3 %    MCV 93 82 - 98 fL    MCH 32 0 26 8 - 34 3 pg    MCHC 34 3 31 4 - 37 4 g/dL    RDW 12 7 11 6 - 15 1 %    MPV 9 8 8 9 - 12 7 fL    Platelets 247 220 - 024 Thousands/uL    nRBC 0 /100 WBCs    Neutrophils Relative 62 43 - 75 %    Immat GRANS % 1 0 - 2 %    Lymphocytes Relative 22 14 - 44 %    Monocytes Relative 9 4 - 12 %    Eosinophils Relative 5 0 - 6 %    Basophils Relative 1 0 - 1 %    Neutrophils Absolute 5 90 1 85 - 7 62 Thousands/µL    Immature Grans Absolute 0 08 0 00 - 0 20 Thousand/uL    Lymphocytes Absolute 2 12 0 60 - 4 47 Thousands/µL    Monocytes Absolute 0 89 0 17 - 1 22 Thousand/µL    Eosinophils Absolute 0 45 0 00 - 0 61 Thousand/µL    Basophils Absolute 0 08 0 00 - 0 10 Thousands/µL   CBC and differential    Collection Time: 11/13/18  6:19 AM   Result Value Ref Range    WBC 11 51 (H) 4 31 - 10 16 Thousand/uL    RBC 4 65 3 88 - 5 62 Million/uL    Hemoglobin 14 7 12 0 - 17 0 g/dL    Hematocrit 43 1 36 5 - 49 3 %    MCV 93 82 - 98 fL    MCH 31 6 26 8 - 34 3 pg    MCHC 34 1 31 4 - 37 4 g/dL    RDW 12 6 11 6 - 15 1 %    MPV 9 8 8 9 - 12 7 fL    Platelets 733 591 - 457 Thousands/uL    nRBC 0 /100 WBCs    Neutrophils Relative 73 43 - 75 % Immat GRANS % 0 0 - 2 %    Lymphocytes Relative 14 14 - 44 %    Monocytes Relative 8 4 - 12 %    Eosinophils Relative 4 0 - 6 %    Basophils Relative 1 0 - 1 %    Neutrophils Absolute 8 40 (H) 1 85 - 7 62 Thousands/µL    Immature Grans Absolute 0 04 0 00 - 0 20 Thousand/uL    Lymphocytes Absolute 1 63 0 60 - 4 47 Thousands/µL    Monocytes Absolute 0 95 0 17 - 1 22 Thousand/µL    Eosinophils Absolute 0 41 0 00 - 0 61 Thousand/µL    Basophils Absolute 0 08 0 00 - 0 10 Thousands/µL   Comprehensive metabolic panel    Collection Time: 11/13/18  6:19 AM   Result Value Ref Range    Sodium 140 136 - 145 mmol/L    Potassium 4 0 3 5 - 5 3 mmol/L    Chloride 109 (H) 100 - 108 mmol/L    CO2 24 21 - 32 mmol/L    ANION GAP 7 4 - 13 mmol/L    BUN 10 5 - 25 mg/dL    Creatinine 0 92 0 60 - 1 30 mg/dL    Glucose 86 65 - 140 mg/dL    Calcium 8 3 8 3 - 10 1 mg/dL    AST 13 5 - 45 U/L    ALT 22 12 - 78 U/L    Alkaline Phosphatase 65 46 - 116 U/L    Total Protein 6 4 6 4 - 8 2 g/dL    Albumin 3 1 (L) 3 5 - 5 0 g/dL    Total Bilirubin 0 72 0 20 - 1 00 mg/dL    eGFR 106 ml/min/1 73sq m   Comprehensive metabolic panel    Collection Time: 11/16/18 12:26 PM   Result Value Ref Range    Sodium 142 136 - 145 mmol/L    Potassium 4 7 3 5 - 5 3 mmol/L    Chloride 104 100 - 108 mmol/L    CO2 28 21 - 32 mmol/L    ANION GAP 10 4 - 13 mmol/L    BUN 12 5 - 25 mg/dL    Creatinine 0 97 0 60 - 1 30 mg/dL    Glucose 97 65 - 140 mg/dL    Calcium 9 7 8 3 - 10 1 mg/dL    AST 50 (H) 5 - 45 U/L    ALT 68 12 - 78 U/L    Alkaline Phosphatase 85 46 - 116 U/L    Total Protein 7 6 6 4 - 8 2 g/dL    Albumin 3 5 3 5 - 5 0 g/dL    Total Bilirubin 0 28 0 20 - 1 00 mg/dL    eGFR 99 ml/min/1 73sq m   Lactic acid, plasma    Collection Time: 11/16/18 12:26 PM   Result Value Ref Range    LACTIC ACID 1 2 0 5 - 2 0 mmol/L   CBC and differential    Collection Time: 11/16/18 12:26 PM   Result Value Ref Range    WBC 10 58 (H) 4 31 - 10 16 Thousand/uL    RBC 4 77 3 88 - 5 62 Million/uL    Hemoglobin 15 1 12 0 - 17 0 g/dL    Hematocrit 45 4 36 5 - 49 3 %    MCV 95 82 - 98 fL    MCH 31 7 26 8 - 34 3 pg    MCHC 33 3 31 4 - 37 4 g/dL    RDW 12 6 11 6 - 15 1 %    MPV 9 4 8 9 - 12 7 fL    Platelets 090 802 - 837 Thousands/uL    nRBC 0 /100 WBCs    Neutrophils Relative 64 43 - 75 %    Immat GRANS % 1 0 - 2 %    Lymphocytes Relative 19 14 - 44 %    Monocytes Relative 11 4 - 12 %    Eosinophils Relative 4 0 - 6 %    Basophils Relative 1 0 - 1 %    Neutrophils Absolute 6 90 1 85 - 7 62 Thousands/µL    Immature Grans Absolute 0 06 0 00 - 0 20 Thousand/uL    Lymphocytes Absolute 1 97 0 60 - 4 47 Thousands/µL    Monocytes Absolute 1 20 0 17 - 1 22 Thousand/µL    Eosinophils Absolute 0 37 0 00 - 0 61 Thousand/µL    Basophils Absolute 0 08 0 00 - 0 10 Thousands/µL   Amylase    Collection Time: 11/16/18 12:26 PM   Result Value Ref Range    Amylase 21 (L) 25 - 115 IU/L   Lipase    Collection Time: 11/16/18 12:26 PM   Result Value Ref Range    Lipase 104 73 - 393 u/L            Physical Exam      Vitals:    11/21/18 1231   BP: 100/64   BP Location: Left arm   Patient Position: Sitting   Cuff Size: Standard   Pulse: 71   Temp: (!) 97 °F (36 1 °C)   TempSrc: Tympanic   SpO2: 98%   Weight: 68 2 kg (150 lb 6 4 oz)   Height: 5' 8 35" (1 736 m)     Constitutional: He is oriented to person, place, and time  He appears well-developed and well-nourished  No distress  Alert pleasant cooperative seated in room  Appears in discomfort but does not appear to be toxic   HENT:   Head: Normocephalic and atraumatic  Mouth/Throat: Oropharynx is clear and moist  No oropharyngeal exudate  Moist mucous membranes  Normal posterior pharynx  Clear nasal drainage  No sinus tenderness to palpation  Mild edema of nasal mucosa  no oral lesions or ulcerations   Eyes: Pupils are equal, round, and reactive to light  Right eye exhibits no discharge  Left eye exhibits no discharge  No scleral icterus  Neck: Neck supple  Cardiovascular: Normal rate, regular rhythm and normal heart sounds  No murmur heard  Pulmonary/Chest: Effort normal and breath sounds normal  No respiratory distress  He has no wheezes  He has a few dry crackles in the right base  Abdominal: Soft  Bowel sounds are normal  He exhibits no distension and no mass  There is no hepatosplenomegaly  There is tenderness in the right upper quadrant  There is no rigidity, no rebound, no guarding, no CVA tenderness and negative Coleman's sign  Musculoskeletal: He exhibits no edema  Neurological: He is alert and oriented to person, place, and time  Skin: Skin is warm and dry  No rash noted  He is not diaphoretic  Psychiatric: He has a normal mood and affect  His behavior is normal  Thought content normal    Nursing note and vitals reviewed

## 2018-11-21 NOTE — ASSESSMENT & PLAN NOTE
Still having some abdominal complaints  I reviewed the pictures from surgery which showed fairly normal intestines  I told there were no adhesions found at the time of surgery  I had the hernia was fairly small in repaired primarily and then with the mesh  I told him I do not have anything else to offer him surgically at this time  See back if needed  Activity as he feels comfortable

## 2018-11-21 NOTE — LETTER
November 21, 2018     Ludin Barker MD  [facility]    Patient: Rodger Hamilton   YOB: 1980   Date of Visit: 11/21/2018       Dear Dr Lawrence Carrasco: Thank you for referring Jessi Dawson to me for evaluation  Below are my notes for this consultation  If you have questions, please do not hesitate to call me  I look forward to following your patient along with you  Sincerely,        Marcela Romero MD        CC: MD Marcela Ryder MD  11/21/2018 11:38 AM  Sign at close encounter  Office Visit - General Surgery  Rodger Hamilton MRN: [id number]  Encounter: [id number]    Assessment and Plan    Problem List Items Addressed This Visit        Other    S/P repair of ventral hernia - Primary     Still having some abdominal complaints  I reviewed the pictures from surgery which showed fairly normal intestines  I told there were no adhesions found at the time of surgery  I had the hernia was fairly small in repaired primarily and then with the mesh  I told him I do not have anything else to offer him surgically at this time  See back if needed  Activity as he feels comfortable  Chief Complaint:  Rodger Hamilton is a 41 y o  male who presents for Post-op (Incisional Hernia)    Subjective  [de-identified] year male status post laparoscopic incisional hernia repair he was readmitted a couple days after surgery for presumed ileus  His bowels have still not recovered completely  He is mostly on liquid diet and still has abdominal pain  No fever or chills  Past Medical History  Past Medical History:   Diagnosis Date    Abdominal pain     Abnormal liver function test     last assessed: Oct 16, 2013     Abnormal weight loss     last assessed: Yung 15, 2014     Allergic rhinitis due to pollen     last assessed: Yung 15, 2014     Anxiety     Anxiety     last assessed/resolved:  Aug 5, 2015     Asthma     last assessed: Jan 15, 2014     Benign essential HTN     last assessed/resolved: Feb 23, 2017     Cervical lymphadenopathy     last assessed/resolved: Feb 23, 2017     Chronic sinusitis     last assessed: Yung 15, 2014     Constipation     Crohn's disease ([facility] [address] ) 01/01/2011    last assessed: Yung 15, 2014     Dysuria     last assessed: April 29, 2016     Elevated BP without diagnosis of hypertension     last assessed/resolved: Feb 23, 2017     Esophageal reflux     last assessed/resolved: Feb 23, 2017     Eustachian tube anomaly     Resolved: Nov 9, 2017     External hemorrhoids     last assessed: Yung 15, 2014     Hypertension     borderline    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     last assessed/resolved: July 19, 2017     Inflammatory bowel disease     Pancreatic neoplasm     last assessed: Yung 15, 2014     PONV (postoperative nausea and vomiting)     Positive depression screening     resolved: July 24, 2017     Psoriasis     Seasonal allergies     Small bowel obstruction ([facility] ) 11/11/2018    Vitamin B12 deficiency     last assessed/resolved: Feb 23, 2017        Past Surgical History  Past Surgical History:   Procedure Laterality Date    CHOLECYSTECTOMY      resolved: 2011     COLONOSCOPY N/A 11/18/2016    Procedure: COLONOSCOPY;  Surgeon: Gricel Vaca MD;  Location:  GI LAB; Service:     COLONOSCOPY N/A 4/28/2016    Procedure: COLONOSCOPY;  Surgeon: Gricel Vaca MD;  Location: [facility] GI LAB; Service:     ESOPHAGOGASTRODUODENOSCOPY N/A 4/28/2016    Procedure: ESOPHAGOGASTRODUODENOSCOPY (EGD); Surgeon: Gricel Vaca MD;  Location: [facility] GI LAB; Service:     FRONTAL SINUSOTOMY      resolved: April 2009     PANCREAS SURGERY      GA COLONOSCOPY FLX DX W/[facility] WHEN PFRMD N/A 10/12/2018    Procedure: EGD AND COLONOSCOPY;  Surgeon: Clau Kowalski MD;  Location: [facility] GI LAB;   Service: Gastroenterology    GA LAP, INCISIONAL HERNIA REPAIR,REDUCIBLE N/A 11/8/2018    Procedure: REPAIR HERNIA INCISIONAL LAPAROSCOPIC;  Surgeon: Janice Alanis MD;  Location: [facility];  Service: General    DC REPAIR OF NASAL SEPTUM N/A 7/28/2017    Procedure: REVISION SEPTOPLASTY; TURBINOPLASTY; BILATERAL  GRAFTS; ALAR GRAFT; POSSIBLE AURICULAR CARTILAGE GRAFT;  Surgeon: Daylin Saravia MD;  Location: [facility];  Service: ENT    TONSILLECTOMY AND ADENOIDECTOMY         Family History  Family History   Problem Relation Age of Onset    Diabetes Mother         mellitus     Hypertension Mother     Thyroid disease Mother     Fibromyalgia Mother     Hypertension Father     Hypertension Sister     No Known Problems Brother     No Known Problems Maternal Grandmother     No Known Problems Maternal Grandfather     Diabetes Paternal Grandmother     Diabetes Paternal Grandfather     Diabetes Other         mellitus     Rheum arthritis Cousin        Medications  Current Outpatient Prescriptions on File Prior to Visit   Medication Sig Dispense Refill    acetaminophen (TYLENOL) 500 mg tablet Take 10 mg/kg by mouth every 4 (four) hours as needed        cetirizine (ZyrTEC) 10 mg tablet Take 1 tablet (10 mg total) by mouth daily as needed for allergies 30 tablet 5    clonazePAM (KlonoPIN) 0 5 mg tablet Take 1 tablet (0 5 mg total) by mouth every 12 (twelve) hours as needed for anxiety 90 tablet 1    diphenhydrAMINE (BENADRYL) 25 mg tablet Take 25 mg by mouth as needed        gabapentin (NEURONTIN) 100 mg capsule TAKE ONE CAPSULE BY MOUTH 3 TIMES DAILY 90 capsule 0    polyethylene glycol (MIRALAX) 17 g packet Take 17 g by mouth daily 14 each 0    metoclopramide (REGLAN) 5 mg tablet Take 1 tablet (5 mg total) by mouth 4 (four) times a day (Patient not taking: Reported on 11/21/2018 ) 60 tablet 0     No current facility-administered medications on file prior to visit  Allergies  Allergies   Allergen Reactions    Advil [Ibuprofen] Anaphylaxis and Hives     Category:  Allergy; aspirin    Apple Anaphylaxis    Aspirin Anaphylaxis and Hives     Category: Allergy;     Eggs Or Egg-Derived Products Anaphylaxis     Category: Allergy;     Nsaids Anaphylaxis     Category: Allergy;     Other Anaphylaxis     Category: Allergy; Annotation - [id number]: cherries, grapes , and kiwis; pt states all fruits    Peanuts [Peanut Oil] Anaphylaxis    Penicillins Shortness Of Breath    Pollen Extract     Codeine Anxiety       Review of Systems    Objective  Vitals:    11/21/18 1032   BP: 116/74   Temp: 98 1 °F (36 7 °C)       Physical Exam   Abdomen:  Laparoscopic incisions healing well    Abdomen soft

## 2018-11-21 NOTE — ASSESSMENT & PLAN NOTE
Reviewed x-rays and CT scans of abdomen and pelvis  Large dilated bowel loops all the way through the abdomen  That electrolytes liver function all was normal CBC was normal   Plan is as follows 1   1  Linzess 145 mg tablet take 1 tablet now followed by milk of magnesia 30 cc x1 now wait for 3-4 hours repeat milk of magnesia if needed wait for 3-4 hours take the rectal suppository if needed  Tomorrow try the milk a magnesia along with Linzess again informed that he would need to move his bowels quite a bit before her he is able to completely evacuate the bowel  Abdominal pain may be pressure on the right upper quadrant secondary to pressure from the colon at the site of previous surgery from cholecystectomy

## 2018-11-30 ENCOUNTER — OFFICE VISIT (OUTPATIENT)
Dept: INTERNAL MEDICINE CLINIC | Facility: CLINIC | Age: 38
End: 2018-11-30
Payer: COMMERCIAL

## 2018-11-30 ENCOUNTER — HOSPITAL ENCOUNTER (OUTPATIENT)
Dept: CT IMAGING | Facility: HOSPITAL | Age: 38
Discharge: HOME | End: 2018-11-30
Attending: INTERNAL MEDICINE
Payer: COMMERCIAL

## 2018-11-30 VITALS
OXYGEN SATURATION: 98 % | HEART RATE: 77 BPM | BODY MASS INDEX: 22.84 KG/M2 | SYSTOLIC BLOOD PRESSURE: 100 MMHG | HEIGHT: 69 IN | DIASTOLIC BLOOD PRESSURE: 58 MMHG | TEMPERATURE: 97.6 F | WEIGHT: 154.2 LBS

## 2018-11-30 DIAGNOSIS — K56.7 POSTOPERATIVE ILEUS (HCC): ICD-10-CM

## 2018-11-30 DIAGNOSIS — K91.89 POSTOPERATIVE ILEUS (HCC): ICD-10-CM

## 2018-11-30 DIAGNOSIS — K59.09 OTHER CONSTIPATION: ICD-10-CM

## 2018-11-30 DIAGNOSIS — K59.03 CONSTIPATION DUE TO OPIOID THERAPY: ICD-10-CM

## 2018-11-30 DIAGNOSIS — J30.9 ALLERGIC RHINITIS, UNSPECIFIED SEASONALITY, UNSPECIFIED TRIGGER: ICD-10-CM

## 2018-11-30 DIAGNOSIS — K52.9 IBD (INFLAMMATORY BOWEL DISEASE): Primary | ICD-10-CM

## 2018-11-30 DIAGNOSIS — T40.2X5A CONSTIPATION DUE TO OPIOID THERAPY: ICD-10-CM

## 2018-11-30 PROCEDURE — 74177 CT ABD & PELVIS W/CONTRAST: CPT

## 2018-11-30 PROCEDURE — 3008F BODY MASS INDEX DOCD: CPT | Performed by: INTERNAL MEDICINE

## 2018-11-30 PROCEDURE — 99214 OFFICE O/P EST MOD 30 MIN: CPT | Performed by: INTERNAL MEDICINE

## 2018-11-30 RX ORDER — FLUTICASONE PROPIONATE 50 MCG
1 SPRAY, SUSPENSION (ML) NASAL DAILY PRN
Qty: 3 BOTTLE | Refills: 1 | Status: SHIPPED | OUTPATIENT
Start: 2018-11-30 | End: 2022-02-23 | Stop reason: SDUPTHER

## 2018-11-30 RX ORDER — FLUTICASONE PROPIONATE 50 MCG
1 SPRAY, SUSPENSION (ML) NASAL DAILY PRN
COMMUNITY
End: 2018-11-30 | Stop reason: SDUPTHER

## 2018-11-30 RX ORDER — GABAPENTIN 100 MG/1
100 CAPSULE ORAL 3 TIMES DAILY
Qty: 90 CAPSULE | Refills: 0 | Status: SHIPPED | OUTPATIENT
Start: 2018-11-30 | End: 2019-02-20

## 2018-11-30 RX ADMIN — IOHEXOL 100 ML: 350 INJECTION, SOLUTION INTRAVENOUS at 14:57

## 2018-11-30 RX ADMIN — IOHEXOL 50 ML: 240 INJECTION, SOLUTION INTRATHECAL; INTRAVASCULAR; INTRAVENOUS; ORAL at 14:57

## 2018-11-30 NOTE — ASSESSMENT & PLAN NOTE
Previous laboratory studies were negative and recent x-ray of the abdomen shows nonobstructive bowel gas pattern  However he continues to have persistent right lower and left lower quadrant pain with constipation unless taking Linzess  Will order a CT of the abdomen pelvis with contrast given recent history of postoperative ileus without resolution of symptoms 3 weeks following procedure  Continue with modified diet, soft

## 2018-11-30 NOTE — PROGRESS NOTES
Assessment/Plan:    Postoperative ileus (HCC)  Previous laboratory studies were negative and recent x-ray of the abdomen shows nonobstructive bowel gas pattern  However he continues to have persistent right lower and left lower quadrant pain with constipation unless taking Linzess  Will order a CT of the abdomen pelvis with contrast given recent history of postoperative ileus without resolution of symptoms 3 weeks following procedure  Continue with modified diet, soft  Other constipation  Continue with Linzess PRN and Miralax PRN  Diagnoses and all orders for this visit:    IBD (inflammatory bowel disease)  -     Linaclotide (LINZESS) 145 MCG CAPS; Take 1 capsule (145 mcg total) by mouth daily  -     gabapentin (NEURONTIN) 100 mg capsule; Take 1 capsule (100 mg total) by mouth 3 (three) times a day    Allergic rhinitis, unspecified seasonality, unspecified trigger  -     fluticasone (FLONASE) 50 mcg/act nasal spray; 1 spray into each nostril daily as needed for rhinitis    Postoperative ileus (HCC)  -     CT abdomen pelvis w contrast; Future    Constipation due to opioid therapy    Other constipation  -     CT abdomen pelvis w contrast; Future    Other orders  -     Discontinue: Linaclotide (LINZESS) 145 MCG CAPS; Take 145 mcg by mouth daily  -     Discontinue: fluticasone (FLONASE) 50 mcg/act nasal spray; 1 spray into each nostril daily as needed for rhinitis        Time spent during encounter: 25 minutes (reviewing records and counseling)    Subjective:      Patient ID: Sonia Luna is a 41 y o  male  60-year-old male is seen today with acute symptoms of persistent constipation and right lower quadrant pain since undergoing umbilical hernia repair on 11/08/2018 to which he was hospitalized for acute postoperative ileus  He was evaluated on 11/21/2018 with our office to which he was prescribed linzess for constipation    Abdominal imaging studies, x-ray bowel series, showed nonobstructing bowel gas pattern  He reports his stools are loose and he feels that he would not be able to have a bowel movement without the linzess  He has bowel movements daily however has been taking Linzess daily  If he does not take Linzess, he feels bloated and has RLQ abdominal pain  Abdominal Pain   This is a new problem  The current episode started 1 to 4 weeks ago  The onset quality is sudden  The problem occurs daily  The problem has been unchanged  The pain is located in the RLQ  The quality of the pain is sharp  The abdominal pain does not radiate  Pertinent negatives include no anorexia, arthralgias, belching, constipation, diarrhea, dysuria, fever, flatus, frequency, headaches, hematochezia, hematuria, melena, myalgias, nausea, vomiting or weight loss  Exacerbated by: Constipation  Treatments tried: Linzess  The treatment provided mild relief  His past medical history is significant for abdominal surgery  The following portions of the patient's history were reviewed and updated as appropriate: allergies, current medications, past family history, past medical history, past social history, past surgical history and problem list     Review of Systems   Constitutional: Negative for activity change, appetite change, chills, diaphoresis, fatigue, fever and weight loss  HENT: Negative for congestion, postnasal drip, rhinorrhea, sinus pain, sinus pressure, sneezing and sore throat  Eyes: Negative for visual disturbance  Respiratory: Negative for apnea, cough, choking, chest tightness, shortness of breath and wheezing  Cardiovascular: Negative for chest pain, palpitations and leg swelling  Gastrointestinal: Positive for abdominal pain  Negative for abdominal distention, anal bleeding, anorexia, blood in stool, constipation, diarrhea, flatus, hematochezia, melena, nausea and vomiting  Endocrine: Negative for cold intolerance and heat intolerance     Genitourinary: Negative for difficulty urinating, dysuria, frequency and hematuria  Musculoskeletal: Negative  Negative for arthralgias and myalgias  Skin: Negative  Neurological: Negative for dizziness, weakness, light-headedness, numbness and headaches  Hematological: Negative for adenopathy  Psychiatric/Behavioral: Negative for agitation, sleep disturbance and suicidal ideas  All other systems reviewed and are negative  Past Medical History:   Diagnosis Date    Abdominal pain     Abnormal liver function test     last assessed: Oct 16, 2013     Abnormal weight loss     last assessed: Yung 15, 2014     Allergic rhinitis due to pollen     last assessed: Yung 15, 2014     Anxiety     Anxiety     last assessed/resolved:  Aug 5, 2015     Asthma     last assessed: Yung 15, 2014     Benign essential HTN     last assessed/resolved: Feb 23, 2017     Cervical lymphadenopathy     last assessed/resolved: Feb 23, 2017     Chronic sinusitis     last assessed: Yung 15, 2014     Constipation     Crohn's disease (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     last assessed/resolved: July 19, 2017     Inflammatory bowel disease     Pancreatic neoplasm     last assessed: Yung 15, 2014     PONV (postoperative nausea and vomiting)     Positive depression screening     resolved: July 24, 2017     Psoriasis     Seasonal allergies     Small bowel obstruction ([facility] ) 11/11/2018    Vitamin B12 deficiency     last assessed/resolved: Feb 23, 2017          Current Outpatient Prescriptions:     acetaminophen (TYLENOL) 500 mg tablet, Take 10 mg/kg by mouth every 4 (four) hours as needed  , Disp: , Rfl:     cetirizine (ZyrTEC) 10 mg tablet, Take 1 tablet (10 mg total) by mouth daily as needed for allergies, Disp: 30 tablet, Rfl: 5    clonazePAM (KlonoPIN) 0 5 mg tablet, Take 1 tablet (0 5 mg total) by mouth every 12 (twelve) hours as needed for anxiety, Disp: 90 tablet, Rfl: 1    diphenhydrAMINE (BENADRYL) 25 mg tablet, Take 25 mg by mouth as needed  , Disp: , Rfl:     fluticasone (FLONASE) 50 mcg/act nasal spray, 1 spray into each nostril daily as needed for rhinitis, Disp: 3 Bottle, Rfl: 1    gabapentin (NEURONTIN) 100 mg capsule, Take 1 capsule (100 mg total) by mouth 3 (three) times a day, Disp: 90 capsule, Rfl: 0    Linaclotide (LINZESS) 145 MCG CAPS, Take 1 capsule (145 mcg total) by mouth daily, Disp: 90 capsule, Rfl: 1    polyethylene glycol (MIRALAX) 17 g packet, Take 17 g by mouth daily, Disp: 14 each, Rfl: 0    Allergies   Allergen Reactions    Advil [Ibuprofen] Anaphylaxis and Hives     Category: Allergy; aspirin    Apple Anaphylaxis    Aspirin Anaphylaxis and Hives     Category: Allergy;     Eggs Or Egg-Derived Products Anaphylaxis     Category: Allergy;     Nsaids Anaphylaxis     Category: Allergy;     Other Anaphylaxis     Category: Allergy; Annotation - [id number]: cherries, grapes , and kiwis; pt states all fruits    Peanuts [Peanut Oil] Anaphylaxis     nuts    Penicillins Shortness Of Breath    Pollen Extract     Codeine Anxiety       Social History   Past Surgical History:   Procedure Laterality Date    CHOLECYSTECTOMY      resolved: 2011     COLONOSCOPY N/A 11/18/2016    Procedure: COLONOSCOPY;  Surgeon: Chano Good MD;  Location:  GI LAB; Service:     COLONOSCOPY N/A 4/28/2016    Procedure: COLONOSCOPY;  Surgeon: Chano Good MD;  Location: [facility] GI LAB; Service:     ESOPHAGOGASTRODUODENOSCOPY N/A 4/28/2016    Procedure: ESOPHAGOGASTRODUODENOSCOPY (EGD); Surgeon: Chano Good MD;  Location: [facility] GI LAB;   Service:    Rodney Jose SINUSOTOMY      resolved: April 2009     PANCREAS SURGERY      [facility] N/A 10/12/2018    Procedure: EGD AND COLONOSCOPY;  Surgeon: Pratibha Barrera MD;  Location: [facility] GI LAB;   Service: Gastroenterology    ME LAP, INCISIONAL HERNIA REPAIR,REDUCIBLE N/A 11/8/2018    Procedure: REPAIR HERNIA INCISIONAL LAPAROSCOPIC;  Surgeon: Bailey Lo MD;  Location: [facility];  Service: General    ME REPAIR OF NASAL SEPTUM N/A 7/28/2017    Procedure: REVISION SEPTOPLASTY; TURBINOPLASTY; BILATERAL  GRAFTS; ALAR GRAFT; POSSIBLE AURICULAR CARTILAGE GRAFT;  Surgeon: Faiza Cervantes MD;  Location: [facility];  Service: ENT    TONSILLECTOMY AND ADENOIDECTOMY       Family History   Problem Relation Age of Onset    Diabetes Mother         mellitus     Hypertension Mother     Thyroid disease Mother     Fibromyalgia Mother     Hypertension Father     Hypertension Sister     No Known Problems Brother     No Known Problems Maternal Grandmother     No Known Problems Maternal Grandfather     Diabetes Paternal Grandmother     Diabetes Paternal Grandfather     Diabetes Other         mellitus     Rheum arthritis Cousin        Objective:  /58 (BP Location: Left arm, Patient Position: Sitting, Cuff Size: Standard)   Pulse 77   Temp 97 6 °F (36 4 °C) (Oral)   Ht 5' 8 75" (1 746 m)   Wt 69 9 kg (154 lb 3 2 oz) Comment: with sneakers  SpO2 98%   BMI 22 94 kg/m²     Recent Results (from the past 1344 hour(s))   Lactic acid, plasma    Collection Time: 10/08/18 12:11 PM   Result Value Ref Range    LACTIC ACID 2 8 (HH) 0 5 - 2 0 mmol/L   CBC and differential    Collection Time: 10/08/18 12:11 PM   Result Value Ref Range    WBC 8 41 4 31 - 10 16 Thousand/uL    RBC 4 65 3 88 - 5 62 Million/uL    Hemoglobin 15 0 12 0 - 17 0 g/dL    Hematocrit 44 2 36 5 - 49 3 %    MCV 95 82 - 98 fL    MCH 32 3 26 8 - 34 3 pg    MCHC 33 9 31 4 - 37 4 g/dL    RDW 13 0 11 6 - 15 1 %    MPV 9 9 8 9 - 12 7 fL    Platelets 412 000 - 697 Thousands/uL    nRBC 0 /100 WBCs    Neutrophils Relative 62 43 - 75 %    Immat GRANS % 1 0 - 2 %    Lymphocytes Relative 24 14 - 44 %    Monocytes Relative 10 4 - 12 %    Eosinophils Relative 2 0 - 6 %    Basophils Relative 1 0 - 1 %    Neutrophils Absolute 5 19 1 85 - 7 62 Thousands/µL    Immature Grans Absolute 0 04 0 00 - 0 20 Thousand/uL    Lymphocytes Absolute 2 04 0 60 - 4 47 Thousands/µL    Monocytes Absolute 0 84 0 17 - 1 22 Thousand/µL    Eosinophils Absolute 0 20 0 00 - 0 61 Thousand/µL    Basophils Absolute 0 10 0 00 - 0 10 Thousands/µL   C-reactive protein    Collection Time: 10/08/18 12:11 PM   Result Value Ref Range    CRP 3 3 (H) <3 0 mg/L   Sedimentation rate, automated    Collection Time: 10/08/18 12:11 PM   Result Value Ref Range    Sed Rate 0 0 - 10 mm/hour   Tissue Exam    Collection Time: 10/12/18  2:09 PM   Result Value Ref Range    Case Report       Surgical Pathology Report                         Case: [id number]                                   Authorizing Provider:  Estephania Brown MD              Collected:           10/12/2018 1409              Ordering Location:     [facility]        Received:            10/12/2018 [facility]                                     [address] Endoscopy                                                     Pathologist:           Walt Herndon MD                                                   Specimens:   A) - Stomach, random gastric biopsy r/o h pylori                                                    B) - Terminal Ileum, Terminal Ileum biospy mild illeitis                                            C) - Colon, Random colon biopsy r/o microscopic colitis                                    Final Diagnosis       A  Stomach, random gastric biopsy r/o h pylori :  - Mild chronic inactive gastritis  - Immunohistochemistry for H  pylori is negative  - Negative for malignancy  B   Terminal Ileum, Terminal Ileum biospy mild illeitis :  - Small bowel mucosa with no significant histopathologic abnormality   - Negative for malabsorption pattern  - Negative for malignancy  C  Colon, Random colon biopsy r/o microscopic colitis :  - Colonic mucosa with no significant histopathologic abnormality   - No evidence of microscopic colitis or acute inflammation   - Negative for dysplasia and malignancy  Additional Information       All controls performed with the immunohistochemical stains reported above reacted appropriately  These tests were developed and their performance characteristics determined by [facility] or [facility]  They may not be cleared or approved by the [place]  Food and Drug Administration  The FDA has determined that such clearance or approval is not necessary  These tests are used for clinical purposes  They should not be regarded as investigational or for research  This laboratory has been approved by [place] [address], designated as a high-complexity laboratory and is qualified to perform these tests  Gross Description       A  The specimen is received in formalin, labeled with the patient's name and hospital number, and is designated "random gastric biopsy rule out H pylori  The specimen consists of 2 tan-red soft tissue fragments measuring 0 2 and 0 4 cm in greatest dimension  The specimen is entirely submitted in 1 cassette  B  The specimen is received in formalin, labeled with the patient's name and hospital number, and is designated "terminal ileum biopsy, mild ileitis  The specimen consists of 3 tan-pink, focally friable soft tissue fragments ranging from 0 2-0 4 cm in greatest dimension  The specimen is entirely submitted in 1 cassette  C  The specimen is received in formalin, labeled with the patient's name and hospital number, and is designated "random colon biopsy rule out microscopic colitis    The specimen consists of a single tan-pink soft tissue fragment measuring 0 4 cm in greatest dimension  The specimen is entirely submitted in 1 cassette  Note: The estimated total formalin fixation time based upon information provided by the submitting clinician and the standard processing schedule is under 72 hours                              Abilio     Basic metabolic panel    Collection Time: 11/06/18  9:22 AM   Result Value Ref Range    Sodium 138 136 - 145 mmol/L    Potassium 4 8 3 5 - 5 3 mmol/L    Chloride 104 100 - 108 mmol/L    CO2 28 21 - 32 mmol/L    ANION GAP 6 4 - 13 mmol/L    BUN 12 5 - 25 mg/dL    Creatinine 0 91 0 60 - 1 30 mg/dL    Glucose, Fasting 105 (H) 65 - 99 mg/dL    Calcium 9 1 8 3 - 10 1 mg/dL    eGFR 107 ml/min/1 73sq m   CBC and differential    Collection Time: 11/11/18  6:18 PM   Result Value Ref Range    WBC 9 40 4 31 - 10 16 Thousand/uL    RBC 4 89 3 88 - 5 62 Million/uL    Hemoglobin 15 7 12 0 - 17 0 g/dL    Hematocrit 44 7 36 5 - 49 3 %    MCV 91 82 - 98 fL    MCH 32 1 26 8 - 34 3 pg    MCHC 35 1 31 4 - 37 4 g/dL    RDW 12 6 11 6 - 15 1 %    MPV 9 5 8 9 - 12 7 fL    Platelets 966 501 - 608 Thousands/uL    nRBC 0 /100 WBCs    Neutrophils Relative 64 43 - 75 %    Immat GRANS % 1 0 - 2 %    Lymphocytes Relative 22 14 - 44 %    Monocytes Relative 9 4 - 12 %    Eosinophils Relative 3 0 - 6 %    Basophils Relative 1 0 - 1 %    Neutrophils Absolute 6 08 1 85 - 7 62 Thousands/µL    Immature Grans Absolute 0 07 0 00 - 0 20 Thousand/uL    Lymphocytes Absolute 2 02 0 60 - 4 47 Thousands/µL    Monocytes Absolute 0 86 0 17 - 1 22 Thousand/µL    Eosinophils Absolute 0 30 0 00 - 0 61 Thousand/µL    Basophils Absolute 0 07 0 00 - 0 10 Thousands/µL   Basic metabolic panel    Collection Time: 11/11/18  6:18 PM   Result Value Ref Range    Sodium 138 136 - 145 mmol/L    Potassium 3 5 3 5 - 5 3 mmol/L    Chloride 105 100 - 108 mmol/L    CO2 28 21 - 32 mmol/L    ANION GAP 5 4 - 13 mmol/L    BUN 10 5 - 25 mg/dL    Creatinine 0 75 0 60 - 1 30 mg/dL    Glucose 86 65 - 140 mg/dL    Calcium 8 9 8 3 - 10 1 mg/dL    eGFR 117 ml/min/1 73sq m   Basic metabolic panel    Collection Time: 11/12/18  5:28 AM   Result Value Ref Range    Sodium 137 136 - 145 mmol/L    Potassium 3 7 3 5 - 5 3 mmol/L    Chloride 105 100 - 108 mmol/L    CO2 28 21 - 32 mmol/L    ANION GAP 4 4 - 13 mmol/L    BUN 10 5 - 25 mg/dL    Creatinine 0 87 0 60 - 1 30 mg/dL    Glucose 97 65 - 140 mg/dL    Calcium 8 2 (L) 8 3 - 10 1 mg/dL    eGFR 110 ml/min/1 73sq m   Magnesium    Collection Time: 11/12/18  5:28 AM   Result Value Ref Range    Magnesium 2 2 1 6 - 2 6 mg/dL   Phosphorus    Collection Time: 11/12/18  5:28 AM   Result Value Ref Range    Phosphorus 3 0 2 7 - 4 5 mg/dL   CBC and differential    Collection Time: 11/12/18  5:28 AM   Result Value Ref Range    WBC 9 52 4 31 - 10 16 Thousand/uL    RBC 4 63 3 88 - 5 62 Million/uL    Hemoglobin 14 8 12 0 - 17 0 g/dL    Hematocrit 43 1 36 5 - 49 3 %    MCV 93 82 - 98 fL    MCH 32 0 26 8 - 34 3 pg    MCHC 34 3 31 4 - 37 4 g/dL    RDW 12 7 11 6 - 15 1 %    MPV 9 8 8 9 - 12 7 fL    Platelets 971 820 - 001 Thousands/uL    nRBC 0 /100 WBCs    Neutrophils Relative 62 43 - 75 %    Immat GRANS % 1 0 - 2 %    Lymphocytes Relative 22 14 - 44 %    Monocytes Relative 9 4 - 12 %    Eosinophils Relative 5 0 - 6 %    Basophils Relative 1 0 - 1 %    Neutrophils Absolute 5 90 1 85 - 7 62 Thousands/µL    Immature Grans Absolute 0 08 0 00 - 0 20 Thousand/uL    Lymphocytes Absolute 2 12 0 60 - 4 47 Thousands/µL    Monocytes Absolute 0 89 0 17 - 1 22 Thousand/µL    Eosinophils Absolute 0 45 0 00 - 0 61 Thousand/µL    Basophils Absolute 0 08 0 00 - 0 10 Thousands/µL   CBC and differential    Collection Time: 11/13/18  6:19 AM   Result Value Ref Range    WBC 11 51 (H) 4 31 - 10 16 Thousand/uL    RBC 4 65 3 88 - 5 62 Million/uL    Hemoglobin 14 7 12 0 - 17 0 g/dL    Hematocrit 43 1 36 5 - 49 3 %    MCV 93 82 - 98 fL    MCH 31 6 26 8 - 34 3 pg    MCHC 34 1 31 4 - 37 4 g/dL    RDW 12 6 11 6 - 15 1 %    MPV 9 8 8 9 - 12 7 fL    Platelets 146 780 - 668 Thousands/uL    nRBC 0 /100 WBCs    Neutrophils Relative 73 43 - 75 %    Immat GRANS % 0 0 - 2 %    Lymphocytes Relative 14 14 - 44 %    Monocytes Relative 8 4 - 12 %    Eosinophils Relative 4 0 - 6 %    Basophils Relative 1 0 - 1 %    Neutrophils Absolute 8 40 (H) 1 85 - 7 62 Thousands/µL    Immature Grans Absolute 0 04 0 00 - 0 20 Thousand/uL    Lymphocytes Absolute 1 63 0 60 - 4 47 Thousands/µL    Monocytes Absolute 0 95 0 17 - 1 22 Thousand/µL    Eosinophils Absolute 0 41 0 00 - 0 61 Thousand/µL    Basophils Absolute 0 08 0 00 - 0 10 Thousands/µL   Comprehensive metabolic panel    Collection Time: 11/13/18  6:19 AM   Result Value Ref Range    Sodium 140 136 - 145 mmol/L    Potassium 4 0 3 5 - 5 3 mmol/L    Chloride 109 (H) 100 - 108 mmol/L    CO2 24 21 - 32 mmol/L    ANION GAP 7 4 - 13 mmol/L    BUN 10 5 - 25 mg/dL    Creatinine 0 92 0 60 - 1 30 mg/dL    Glucose 86 65 - 140 mg/dL    Calcium 8 3 8 3 - 10 1 mg/dL    AST 13 5 - 45 U/L    ALT 22 12 - 78 U/L    Alkaline Phosphatase 65 46 - 116 U/L    Total Protein 6 4 6 4 - 8 2 g/dL    Albumin 3 1 (L) 3 5 - 5 0 g/dL    Total Bilirubin 0 72 0 20 - 1 00 mg/dL    eGFR 106 ml/min/1 73sq m   Comprehensive metabolic panel    Collection Time: 11/16/18 12:26 PM   Result Value Ref Range    Sodium 142 136 - 145 mmol/L    Potassium 4 7 3 5 - 5 3 mmol/L    Chloride 104 100 - 108 mmol/L    CO2 28 21 - 32 mmol/L    ANION GAP 10 4 - 13 mmol/L    BUN 12 5 - 25 mg/dL    Creatinine 0 97 0 60 - 1 30 mg/dL    Glucose 97 65 - 140 mg/dL    Calcium 9 7 8 3 - 10 1 mg/dL    AST 50 (H) 5 - 45 U/L    ALT 68 12 - 78 U/L    Alkaline Phosphatase 85 46 - 116 U/L    Total Protein 7 6 6 4 - 8 2 g/dL    Albumin 3 5 3 5 - 5 0 g/dL    Total Bilirubin 0 28 0 20 - 1 00 mg/dL    eGFR 99 ml/min/1 73sq m   Lactic acid, plasma    Collection Time: 11/16/18 12:26 PM   Result Value Ref Range    LACTIC ACID 1 2 0 5 - 2 0 mmol/L   CBC and differential Collection Time: 11/16/18 12:26 PM   Result Value Ref Range    WBC 10 58 (H) 4 31 - 10 16 Thousand/uL    RBC 4 77 3 88 - 5 62 Million/uL    Hemoglobin 15 1 12 0 - 17 0 g/dL    Hematocrit 45 4 36 5 - 49 3 %    MCV 95 82 - 98 fL    MCH 31 7 26 8 - 34 3 pg    MCHC 33 3 31 4 - 37 4 g/dL    RDW 12 6 11 6 - 15 1 %    MPV 9 4 8 9 - 12 7 fL    Platelets 688 291 - 663 Thousands/uL    nRBC 0 /100 WBCs    Neutrophils Relative 64 43 - 75 %    Immat GRANS % 1 0 - 2 %    Lymphocytes Relative 19 14 - 44 %    Monocytes Relative 11 4 - 12 %    Eosinophils Relative 4 0 - 6 %    Basophils Relative 1 0 - 1 %    Neutrophils Absolute 6 90 1 85 - 7 62 Thousands/µL    Immature Grans Absolute 0 06 0 00 - 0 20 Thousand/uL    Lymphocytes Absolute 1 97 0 60 - 4 47 Thousands/µL    Monocytes Absolute 1 20 0 17 - 1 22 Thousand/µL    Eosinophils Absolute 0 37 0 00 - 0 61 Thousand/µL    Basophils Absolute 0 08 0 00 - 0 10 Thousands/µL   Amylase    Collection Time: 11/16/18 12:26 PM   Result Value Ref Range    Amylase 21 (L) 25 - 115 IU/L   Lipase    Collection Time: 11/16/18 12:26 PM   Result Value Ref Range    Lipase 104 73 - 393 u/L            Physical Exam   Constitutional: He is oriented to person, place, and time  He appears well-developed and well-nourished  No distress  HENT:   Head: Normocephalic and atraumatic  Eyes: Pupils are equal, round, and reactive to light  Conjunctivae and EOM are normal  Right eye exhibits no discharge  Left eye exhibits no discharge  No scleral icterus  Neck: Normal range of motion  Neck supple  No JVD present  No thyromegaly present  Cardiovascular: Normal rate, regular rhythm, normal heart sounds and intact distal pulses  Exam reveals no gallop and no friction rub  No murmur heard  Pulmonary/Chest: Effort normal and breath sounds normal  No respiratory distress  He has no wheezes  He has no rales  He exhibits no tenderness  Abdominal: Soft  He exhibits no distension and no mass   Bowel sounds are increased  There is tenderness in the right lower quadrant, suprapubic area and left lower quadrant  There is no rebound and no guarding  Musculoskeletal: Normal range of motion  He exhibits no edema, tenderness or deformity  Lymphadenopathy:     He has no cervical adenopathy  Neurological: He is alert and oriented to person, place, and time  He has normal reflexes  No cranial nerve deficit  Coordination normal    Skin: Skin is warm and dry  No rash noted  He is not diaphoretic  No erythema  No pallor  Psychiatric: He has a normal mood and affect  His behavior is normal  Judgment and thought content normal    Nursing note and vitals reviewed

## 2018-12-31 DIAGNOSIS — F41.9 ANXIETY: ICD-10-CM

## 2018-12-31 RX ORDER — CLONAZEPAM 0.5 MG/1
0.5 TABLET ORAL EVERY 12 HOURS PRN
Qty: 90 TABLET | Refills: 0 | Status: SHIPPED | OUTPATIENT
Start: 2018-12-31 | End: 2019-02-20 | Stop reason: SDUPTHER

## 2019-02-09 ENCOUNTER — APPOINTMENT (EMERGENCY)
Dept: CT IMAGING | Facility: HOSPITAL | Age: 39
End: 2019-02-09

## 2019-02-09 ENCOUNTER — HOSPITAL ENCOUNTER (EMERGENCY)
Facility: HOSPITAL | Age: 39
Discharge: HOME | End: 2019-02-09
Attending: EMERGENCY MEDICINE | Admitting: EMERGENCY MEDICINE

## 2019-02-09 VITALS
DIASTOLIC BLOOD PRESSURE: 84 MMHG | OXYGEN SATURATION: 99 % | BODY MASS INDEX: 22.46 KG/M2 | RESPIRATION RATE: 16 BRPM | WEIGHT: 151.01 LBS | HEART RATE: 57 BPM | TEMPERATURE: 98.6 F | SYSTOLIC BLOOD PRESSURE: 129 MMHG

## 2019-02-09 DIAGNOSIS — R10.9 ABDOMINAL PAIN: Primary | ICD-10-CM

## 2019-02-09 LAB
ALBUMIN SERPL BCP-MCNC: 3.9 G/DL (ref 3.5–5)
ALP SERPL-CCNC: 70 U/L (ref 46–116)
ALT SERPL W P-5'-P-CCNC: 25 U/L (ref 12–78)
ANION GAP SERPL CALCULATED.3IONS-SCNC: 8 MMOL/L (ref 4–13)
AST SERPL W P-5'-P-CCNC: 19 U/L (ref 5–45)
BASOPHILS # BLD AUTO: 0.06 THOUSANDS/ΜL (ref 0–0.1)
BASOPHILS NFR BLD AUTO: 1 % (ref 0–1)
BILIRUB SERPL-MCNC: 0.45 MG/DL (ref 0.2–1)
BILIRUB UR QL STRIP: NEGATIVE
BUN SERPL-MCNC: 13 MG/DL (ref 5–25)
CALCIUM SERPL-MCNC: 8.9 MG/DL (ref 8.3–10.1)
CHLORIDE SERPL-SCNC: 104 MMOL/L (ref 100–108)
CLARITY UR: CLEAR
CO2 SERPL-SCNC: 28 MMOL/L (ref 21–32)
COLOR UR: YELLOW
CREAT SERPL-MCNC: 0.91 MG/DL (ref 0.6–1.3)
EOSINOPHIL # BLD AUTO: 0.06 THOUSAND/ΜL (ref 0–0.61)
EOSINOPHIL NFR BLD AUTO: 1 % (ref 0–6)
ERYTHROCYTE [DISTWIDTH] IN BLOOD BY AUTOMATED COUNT: 12.5 % (ref 11.6–15.1)
GFR SERPL CREATININE-BSD FRML MDRD: 107 ML/MIN/1.73SQ M
GLUCOSE SERPL-MCNC: 94 MG/DL (ref 65–140)
GLUCOSE UR STRIP-MCNC: NEGATIVE MG/DL
HCT VFR BLD AUTO: 46 % (ref 36.5–49.3)
HGB BLD-MCNC: 15.6 G/DL (ref 12–17)
HGB UR QL STRIP.AUTO: NEGATIVE
IMM GRANULOCYTES # BLD AUTO: 0.04 THOUSAND/UL (ref 0–0.2)
IMM GRANULOCYTES NFR BLD AUTO: 1 % (ref 0–2)
KETONES UR STRIP-MCNC: NEGATIVE MG/DL
LEUKOCYTE ESTERASE UR QL STRIP: NEGATIVE
LIPASE SERPL-CCNC: 130 U/L (ref 73–393)
LYMPHOCYTES # BLD AUTO: 1.87 THOUSANDS/ΜL (ref 0.6–4.47)
LYMPHOCYTES NFR BLD AUTO: 31 % (ref 14–44)
MCH RBC QN AUTO: 31.7 PG (ref 26.8–34.3)
MCHC RBC AUTO-ENTMCNC: 33.9 G/DL (ref 31.4–37.4)
MCV RBC AUTO: 94 FL (ref 82–98)
MONOCYTES # BLD AUTO: 0.56 THOUSAND/ΜL (ref 0.17–1.22)
MONOCYTES NFR BLD AUTO: 9 % (ref 4–12)
NEUTROPHILS # BLD AUTO: 3.46 THOUSANDS/ΜL (ref 1.85–7.62)
NEUTS SEG NFR BLD AUTO: 57 % (ref 43–75)
NITRITE UR QL STRIP: NEGATIVE
NRBC BLD AUTO-RTO: 0 /100 WBCS
PH UR STRIP.AUTO: 7 [PH] (ref 4.5–8)
PLATELET # BLD AUTO: 229 THOUSANDS/UL (ref 149–390)
PMV BLD AUTO: 9.6 FL (ref 8.9–12.7)
POTASSIUM SERPL-SCNC: 4.6 MMOL/L (ref 3.5–5.3)
PROT SERPL-MCNC: 7.1 G/DL (ref 6.4–8.2)
PROT UR STRIP-MCNC: NEGATIVE MG/DL
RBC # BLD AUTO: 4.92 MILLION/UL (ref 3.88–5.62)
SODIUM SERPL-SCNC: 140 MMOL/L (ref 136–145)
SP GR UR STRIP.AUTO: 1.02 (ref 1–1.03)
UROBILINOGEN UR QL STRIP.AUTO: 0.2 E.U./DL
WBC # BLD AUTO: 6.05 THOUSAND/UL (ref 4.31–10.16)

## 2019-02-09 PROCEDURE — 83690 ASSAY OF LIPASE: CPT | Performed by: PODIATRIST

## 2019-02-09 PROCEDURE — 80053 COMPREHEN METABOLIC PANEL: CPT | Performed by: PODIATRIST

## 2019-02-09 PROCEDURE — 74177 CT ABD & PELVIS W/CONTRAST: CPT

## 2019-02-09 PROCEDURE — 81003 URINALYSIS AUTO W/O SCOPE: CPT | Performed by: EMERGENCY MEDICINE

## 2019-02-09 PROCEDURE — 96374 THER/PROPH/DIAG INJ IV PUSH: CPT

## 2019-02-09 PROCEDURE — 99284 EMERGENCY DEPT VISIT MOD MDM: CPT

## 2019-02-09 PROCEDURE — 36415 COLL VENOUS BLD VENIPUNCTURE: CPT | Performed by: PODIATRIST

## 2019-02-09 PROCEDURE — 85025 COMPLETE CBC W/AUTO DIFF WBC: CPT | Performed by: PODIATRIST

## 2019-02-09 RX ORDER — DIAZEPAM 5 MG/ML
5 INJECTION, SOLUTION INTRAMUSCULAR; INTRAVENOUS ONCE
Status: COMPLETED | OUTPATIENT
Start: 2019-02-09 | End: 2019-02-09

## 2019-02-09 RX ADMIN — Medication 5 MG: at 15:28

## 2019-02-09 RX ADMIN — IOHEXOL 100 ML: 350 INJECTION, SOLUTION INTRAVENOUS at 15:35

## 2019-02-09 NOTE — DISCHARGE INSTRUCTIONS
Abdominal Pain   WHAT YOU NEED TO KNOW:   Abdominal pain can be dull, achy, or sharp  You may have pain in one area of your abdomen, or in your entire abdomen  Your pain may be caused by a condition such as constipation, food sensitivity or poisoning, infection, or a blockage  Abdominal pain can also be from a hernia, appendicitis, or an ulcer  Liver, gallbladder, or kidney conditions can also cause abdominal pain  The cause of your abdominal pain may be unknown  DISCHARGE INSTRUCTIONS:   Return to the emergency department if:   · You have new chest pain or shortness of breath  · You have pulsing pain in your upper abdomen or lower back that suddenly becomes constant  · Your pain is in the right lower abdominal area and worsens with movement  · You have a fever over 100 4°F (38°C) or shaking chills  · You are vomiting and cannot keep food or liquids down  · Your pain does not improve or gets worse over the next 8 to 12 hours  · You see blood in your vomit or bowel movements, or they look black and tarry  · Your skin or the whites of your eyes turn yellow  · You are a woman and have a large amount of vaginal bleeding that is not your monthly period  Contact your healthcare provider if:   · You have pain in your lower back  · You are a man and have pain in your testicles  · You have pain when you urinate  · You have questions or concerns about your condition or care  Follow up with your healthcare provider within 24 hours or as directed:  Write down your questions so you remember to ask them during your visits  Medicines:   · Medicines  may be given to calm your stomach and prevent vomiting or to decrease pain  Ask how to take pain medicine safely  · Take your medicine as directed  Contact your healthcare provider if you think your medicine is not helping or if you have side effects  Tell him of her if you are allergic to any medicine   Keep a list of the medicines, vitamins, and herbs you take  Include the amounts, and when and why you take them  Bring the list or the pill bottles to follow-up visits  Carry your medicine list with you in case of an emergency  © 2017 [employer] Information is for End User's use only and may not be sold, redistributed or otherwise used for commercial purposes  All illustrations and images included in CareNotes® are the copyrighted property of A D A M , Inc  or Gerald Bazzi  The above information is an  only  It is not intended as medical advice for individual conditions or treatments  Talk to your doctor, nurse or pharmacist before following any medical regimen to see if it is safe and effective for you

## 2019-02-09 NOTE — ED PROVIDER NOTES
History  Chief Complaint   Patient presents with    Abdominal Pain     Reports that since hernia surgery in November, his abdomen has been right  Reports past couple weeks pain has been increasing  Reports hx of ileus  Pt reports nausea, "burped up bile " Reports diarrhea,      A 50-year-old male presents to the emergency department for abdominal pain  He states that the pain starts on his right side and travels up and to left side  He has been having chronic upon remains dense head is umbilical hernia surgery by Dr Rebeca Don which was performed on November 8, 2018  After surgery he developed postoperative ileus and was hospitalized  On November 30th he had a CT and which was negative for obstruction  He is the using linzess for pain/constipation  He has lost weight due to pain and the surgery  History provided by:  Patient and spouse   used: No    Abdominal Pain   Pain location:  RLQ, RUQ and LUQ  Pain quality: aching, bloating, sharp, shooting and stabbing    Pain severity:  Moderate  Duration: Since surgery  Timing:  Intermittent  Progression:  Worsening  Chronicity:  Chronic  Context: diet changes and previous surgery    Relieved by:  Belching, bowel activity and lying down  Associated symptoms: constipation, diarrhea and nausea    Associated symptoms: no fever and no shortness of breath    Risk factors comment:  History a hernia surgery with postoperative ileus      Prior to Admission Medications   Prescriptions Last Dose Informant Patient Reported? Taking?    Linaclotide (LINZESS) 145 MCG CAPS 2/7/2019  No No   Sig: Take 1 capsule (145 mcg total) by mouth daily   acetaminophen (TYLENOL) 500 mg tablet Unknown at Unknown time Self Yes No   Sig: Take 10 mg/kg by mouth every 4 (four) hours as needed     cetirizine (ZyrTEC) 10 mg tablet More than a month at Unknown time Self No No   Sig: Take 1 tablet (10 mg total) by mouth daily as needed for allergies   clonazePAM (KlonoPIN) 0 5 mg tablet 2019 at 1000  No Yes   Sig: Take 1 tablet (0 5 mg total) by mouth every 12 (twelve) hours as needed for anxiety   diphenhydrAMINE (BENADRYL) 25 mg tablet More than a month at Unknown time Self Yes No   Sig: Take 25 mg by mouth as needed     fluticasone (FLONASE) 50 mcg/act nasal spray 2019  No No   Si spray into each nostril daily as needed for rhinitis   gabapentin (NEURONTIN) 100 mg capsule More than a month at Unknown time  No No   Sig: Take 1 capsule (100 mg total) by mouth 3 (three) times a day   polyethylene glycol (MIRALAX) 17 g packet More than a month at Unknown time Self No No   Sig: Take 17 g by mouth daily      Facility-Administered Medications: None       Past Medical History:   Diagnosis Date    Abdominal pain     Abnormal liver function test     last assessed: Oct 16, 2013     Abnormal weight loss     last assessed: Yung 15, 2014     Allergic rhinitis due to pollen     last assessed: Yung 15, 2014     Anxiety     Anxiety     last assessed/resolved:  Aug 5, 2015     Asthma     last assessed: Yung 15, 2014     Benign essential HTN     last assessed/resolved: 2017     Cervical lymphadenopathy     last assessed/resolved: 2017     Chronic sinusitis     last assessed: Yung 15, 2014     Constipation     Crohn's disease ([facility] [address] ) 2011    last assessed: Yung 15, 2014     Dysuria     last assessed: 2016     Elevated BP without diagnosis of hypertension     last assessed/resolved: 2017     Esophageal reflux     last assessed/resolved: 2017     Eustachian tube anomaly     Resolved: 2017     External hemorrhoids     last assessed: Yung 15, 2014     Hypertension     borderline    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     last assessed/resolved: 2017     Inflammatory bowel disease     Pancreatic neoplasm     last assessed: Yung 15, 2014     PONV (postoperative nausea and vomiting)     Positive depression screening     resolved: July 24, 2017     Psoriasis     Seasonal allergies     Small bowel obstruction ([facility] [address] ) 11/11/2018    Vitamin B12 deficiency     last assessed/resolved: Feb 23, 2017        Past Surgical History:   Procedure Laterality Date    CHOLECYSTECTOMY      resolved: 2011     COLONOSCOPY N/A 11/18/2016    Procedure: COLONOSCOPY;  Surgeon: Francisca Troncoso MD;  [facility]; Service:     COLONOSCOPY N/A 4/28/2016    Procedure: COLONOSCOPY;  Surgeon: Francisca Troncoso MD;  Location: [facility]; Service:     ESOPHAGOGASTRODUODENOSCOPY N/A 4/28/2016    Procedure: ESOPHAGOGASTRODUODENOSCOPY (EGD); Surgeon: Francisca Troncoso MD;  Location: [facility] GI LAB; Service:     FRONTAL SINUSOTOMY      resolved: April 2009     PANCREAS SURGERY      [place] COLONOSCOPY FLX DX W/[facility] WHEN PFRMD N/A 10/12/2018    Procedure: EGD AND COLONOSCOPY;  Surgeon: Dex Duvall MD;  Location: [facility] GI LAB; Service: Gastroenterology    [place] LAP, INCISIONAL HERNIA REPAIR,REDUCIBLE N/A 11/8/2018    Procedure: REPAIR HERNIA INCISIONAL LAPAROSCOPIC;  Surgeon: Michelle Babin MD;  [facility];  Service: General    [place] REPAIR OF NASAL SEPTUM N/A 7/28/2017    Procedure: REVISION SEPTOPLASTY; TURBINOPLASTY; BILATERAL  GRAFTS; ALAR GRAFT; POSSIBLE AURICULAR CARTILAGE GRAFT;  Surgeon: Aaron Goyal MD;  [facility];  Service: ENT    TONSILLECTOMY AND ADENOIDECTOMY         Family History   Problem Relation Age of Onset    Diabetes Mother         mellitus     Hypertension Mother     Thyroid disease Mother     Fibromyalgia Mother     Hypertension Father     Hypertension Sister     No Known Problems Brother     No Known Problems Maternal Grandmother     No Known Problems Maternal Grandfather     Diabetes Paternal Grandmother     Diabetes Paternal Grandfather     Diabetes Other         mellitus     Rheum arthritis Cousin      I have reviewed and agree with the history as documented      Social History   Substance Use Topics  Smoking status: Current Every Day Smoker     Packs/day: 0 50     Types: Cigarettes    Smokeless tobacco: Never Used      Comment: Dependence on nicotine in cigarettes - 1/2 PPD x 20 years     Alcohol use No      Comment: John consumes alcohol noted in "allscripts"         Review of Systems   Constitutional: Negative for fever  Respiratory: Negative for shortness of breath  Gastrointestinal: Positive for abdominal pain, constipation, diarrhea and nausea  All other systems reviewed and are negative  Physical Exam  ED Triage Vitals [02/09/19 1308]   Temperature Pulse Respirations Blood Pressure SpO2   98 6 °F (37 °C) 70 16 126/80 98 %      Temp Source Heart Rate Source Patient Position - Orthostatic VS BP Location FiO2 (%)   Oral Monitor Sitting Right arm --      Pain Score       5           Orthostatic Vital Signs  Vitals:    02/09/19 1308 02/09/19 1425   BP: 126/80 119/74   Pulse: 70 56   Patient Position - Orthostatic VS: Sitting Lying       Physical Exam   Constitutional: He is oriented to person, place, and time  He appears well-developed and well-nourished  No distress  HENT:   Head: Normocephalic and atraumatic  Eyes: Pupils are equal, round, and reactive to light  Neck: Normal range of motion  Cardiovascular: Normal rate and regular rhythm  Pulmonary/Chest: Effort normal and breath sounds normal  No respiratory distress  Abdominal: Soft  He exhibits no distension  There is tenderness  Musculoskeletal: Normal range of motion  He exhibits tenderness  He exhibits no edema  Neurological: He is alert and oriented to person, place, and time  Skin: Skin is warm  No erythema  Psychiatric: He has a normal mood and affect   His behavior is normal  Judgment and thought content normal        ED Medications  Medications   diazepam (VALIUM) injection 5 mg (5 mg Intravenous Given 2/9/19 1528)   iohexol (OMNIPAQUE) 350 MG/ML injection (SINGLE-DOSE) 100 mL (100 mL Intravenous Given 2/9/19 1535)       Diagnostic Studies  Results Reviewed     Procedure Component Value Units Date/Time    Urinalysis with reflex to microscopic [[id number]] Collected:  02/09/19 1433    Lab Status:  Final result Specimen:  Urine from Urine, Clean Catch Updated:  02/09/19 1506     Color, UA Yellow     Clarity, UA Clear     Specific [place], UA 1 020     pH, UA 7 0     Leukocytes, UA Negative     Nitrite, UA Negative     Protein, UA Negative mg/dl      Glucose, UA Negative mg/dl      Ketones, UA Negative mg/dl      Urobilinogen, UA 0 2 E U /dl      Bilirubin, UA Negative     Blood, UA Negative    CMP [[id number]] Collected:  02/09/19 1345    Lab Status:  Final result Specimen:  Blood from Arm, Right Updated:  02/09/19 1411     Sodium 140 mmol/L      Potassium 4 6 mmol/L      Chloride 104 mmol/L      CO2 28 mmol/L      ANION GAP 8 mmol/L      BUN 13 mg/dL      Creatinine 0 91 mg/dL      Glucose 94 mg/dL      Calcium 8 9 mg/dL      AST 19 U/L      ALT 25 U/L      Alkaline Phosphatase 70 U/L      Total Protein 7 1 g/dL      Albumin 3 9 g/dL      Total Bilirubin 0 45 mg/dL      eGFR 107 ml/min/1 73sq m     Narrative:         National Kidney Disease Education Program recommendations are as follows:  GFR calculation is accurate only with a steady state creatinine  Chronic Kidney disease less than 60 ml/min/1 73 sq  meters  Kidney failure less than 15 ml/min/1 73 sq  meters      Lipase [[id number]]  (Normal) Collected:  02/09/19 1345    Lab Status:  Final result Specimen:  Blood from Arm, Right Updated:  02/09/19 1411     Lipase 130 u/L     CBC and differential [[id number]] Collected:  02/09/19 1345    Lab Status:  Final result Specimen:  Blood from Arm, Right Updated:  02/09/19 1351     WBC 6 05 Thousand/uL      RBC 4 92 Million/uL      Hemoglobin 15 6 g/dL      Hematocrit 46 0 %      MCV 94 fL      MCH 31 7 pg      MCHC 33 9 g/dL      RDW 12 5 %      MPV 9 6 fL      Platelets 482 Thousands/uL      nRBC 0 /100 WBCs      Neutrophils Relative 57 %      Immat GRANS % 1 %      Lymphocytes Relative 31 %      Monocytes Relative 9 %      Eosinophils Relative 1 %      Basophils Relative 1 %      Neutrophils Absolute 3 46 Thousands/µL      Immature Grans Absolute 0 04 Thousand/uL      Lymphocytes Absolute 1 87 Thousands/µL      Monocytes Absolute 0 56 Thousand/µL      Eosinophils Absolute 0 06 Thousand/µL      Basophils Absolute 0 06 Thousands/µL                  CT abdomen pelvis with contrast   Final Result by Shon Cintron MD (02/09 5092)      No acute abnormality in the abdomen or pelvis  Workstation performed: [id number]               Procedures  Procedures      Phone Consults  ED Phone Contact    ED Course       MDM    Disposition  Final diagnoses:   None     ED Disposition     None      Follow-up Information     Follow up With Specialties Details Why Contact Info    Josh Koch MD Internal Medicine   [facility] [address] [facility] [place], MD General Surgery   [address] [place] [address] [address] [facility]  [phone]            Patient's Medications   Discharge Prescriptions    No medications on file     No discharge procedures on file  ED Provider  Attending physically available and evaluated Thaliaozzy Joe SPENCER managed the patient along with the ED Attending      Electronically Signed by         Kelly Marin DPM  02/09/19 8938

## 2019-02-09 NOTE — ED NOTES
Pt reports abd pain since umbilical hernia surgery in November  +diarrhea  Pt denies urinary symptoms          Velia Cerrato, RN  02/09/19 0782

## 2019-02-13 ENCOUNTER — OFFICE VISIT (OUTPATIENT)
Dept: INTERNAL MEDICINE CLINIC | Age: 39
End: 2019-02-13

## 2019-02-13 VITALS
SYSTOLIC BLOOD PRESSURE: 126 MMHG | TEMPERATURE: 98.1 F | BODY MASS INDEX: 22.25 KG/M2 | WEIGHT: 149.6 LBS | OXYGEN SATURATION: 98 % | HEART RATE: 84 BPM | DIASTOLIC BLOOD PRESSURE: 94 MMHG

## 2019-02-13 DIAGNOSIS — K59.00 CONSTIPATION, UNSPECIFIED CONSTIPATION TYPE: ICD-10-CM

## 2019-02-13 DIAGNOSIS — K58.1 IRRITABLE BOWEL SYNDROME WITH CONSTIPATION: Primary | ICD-10-CM

## 2019-02-13 DIAGNOSIS — K21.9 GASTROESOPHAGEAL REFLUX DISEASE WITHOUT ESOPHAGITIS: ICD-10-CM

## 2019-02-13 PROCEDURE — 99213 OFFICE O/P EST LOW 20 MIN: CPT | Performed by: PHYSICIAN ASSISTANT

## 2019-02-13 RX ORDER — POLYETHYLENE GLYCOL 3350 17 G/17G
17 POWDER, FOR SOLUTION ORAL DAILY
Qty: 14 EACH | Refills: 0
Start: 2019-02-13 | End: 2019-07-09

## 2019-02-13 RX ORDER — HYOSCYAMINE SULFATE 0.125 MG
0.12 TABLET ORAL EVERY 6 HOURS PRN
Qty: 45 TABLET | Refills: 0 | Status: SHIPPED | OUTPATIENT
Start: 2019-02-13 | End: 2019-10-16

## 2019-02-13 NOTE — PROGRESS NOTES
Assessment/Plan:         Diagnoses and all orders for this visit:    Irritable bowel syndrome with constipation  -     hyoscyamine (ANASPAZ,LEVSIN) 0 125 MG tablet; Take 1 tablet (0 125 mg total) by mouth every 6 (six) hours as needed for cramping    Constipation, unspecified constipation type  -     polyethylene glycol (MIRALAX) 17 g packet; Take 17 g by mouth daily  -     wheat dextrin (BENEFIBER); Take 1 tablet by mouth daily    Gastroesophageal reflux disease without esophagitis  Comments:  resume PPI    pt has prevacid at home   Orders:  -     lansoprazole (PREVACID SOLUTAB) 15 mg disintegrating tablet; Take 1 tablet (15 mg total) by mouth daily        Will discuss with Dr Khari Moise possible referral to GI  Pt does not have insurance at this time and is concerned with cost   Start bowel training program, instructions given to patient  F/u in 1-2 weeks, call if any problems prior to this   Resume ppi for now   Subjective:      Patient ID: Phylicia Vargas is a 41 y o  male      C/o persistent lower ab pain - intermittent  Very severe this am, pressure upper abdomen - started at 4am - put heat on abdomen and took klonopin   Felt cramping pain and reports it was very bad  Feels burning sensation all over when this occurs in muscles  This am he had associated panic and anxiety over this feeling  No bm today (had 1 last night after linzess)    Pt denies relationship with food but states he gets indigesion symptoms and acid feeling  "Burping up bile" - tastes acid and yellow   Sometimes putting pressure on abdomen helps     Pt has lost weight over the past few days due to this  Eats less bc he is fearful that it can bring on the pain      Diet consists of baked chicken, tomato soup, cheese sandwich (white bread), corn, peas, brocoli  Potatos/mashed potatos  Chicken for breakfast, poptarts             The following portions of the patient's history were reviewed and updated as appropriate: allergies, current medications, past family history, past medical history, past social history, past surgical history and problem list     Review of Systems   Constitutional: Negative for appetite change, chills, fatigue and fever  HENT: Negative for congestion, hearing loss and postnasal drip  Eyes: Negative for itching and visual disturbance  Respiratory: Negative for cough, shortness of breath and wheezing  Cardiovascular: Negative for chest pain, palpitations and leg swelling  Gastrointestinal: Positive for abdominal distention, abdominal pain and constipation  Negative for anal bleeding, blood in stool, diarrhea, nausea, rectal pain and vomiting  Musculoskeletal: Positive for arthralgias  Negative for back pain, gait problem and myalgias  Skin: Negative for rash  Allergic/Immunologic: Negative for environmental allergies  Neurological: Negative for dizziness, speech difficulty, light-headedness and headaches  Hematological: Does not bruise/bleed easily  Psychiatric/Behavioral: Negative for sleep disturbance  The patient is not nervous/anxious  Past Medical History:   Diagnosis Date    Abdominal pain     Abnormal liver function test     last assessed: Oct 16, 2013     Abnormal weight loss     last assessed: Yung 15, 2014     Allergic rhinitis due to pollen     last assessed: Yung 15, 2014     Anxiety     Anxiety     last assessed/resolved:  Aug 5, 2015     Asthma     last assessed: Yung 15, 2014     Benign essential HTN     last assessed/resolved: Feb 23, 2017     Cervical lymphadenopathy     last assessed/resolved: Feb 23, 2017     Chronic sinusitis     last assessed: Yung 15, 2014     Constipation     Crohn's disease [facility]) 01/01/2011    last assessed: Yung 15, 2014     Dysuria     last assessed: April 29, 2016     Elevated BP without diagnosis of hypertension     last assessed/resolved: Feb 23, 2017     Esophageal reflux     last assessed/resolved: Feb 23, 2017     Eustachian tube anomaly     Resolved: Nov 9, 2017     External hemorrhoids     last assessed: Yung 15, 2014     Hypertension     borderline    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     last assessed/resolved: July 19, 2017     Inflammatory bowel disease     Pancreatic neoplasm     last assessed: Yung 15, 2014     PONV (postoperative nausea and vomiting)     Positive depression screening     resolved: July 24, 2017     Psoriasis     Seasonal allergies     Small bowel obstruction ([facility] [address] ) 11/11/2018    Vitamin B12 deficiency     last assessed/resolved: Feb 23, 2017          Current Outpatient Medications:     acetaminophen (TYLENOL) 500 mg tablet, Take 10 mg/kg by mouth every 4 (four) hours as needed  , Disp: , Rfl:     cetirizine (ZyrTEC) 10 mg tablet, Take 1 tablet (10 mg total) by mouth daily as needed for allergies, Disp: 30 tablet, Rfl: 5    clonazePAM (KlonoPIN) 0 5 mg tablet, Take 1 tablet (0 5 mg total) by mouth every 12 (twelve) hours as needed for anxiety, Disp: 90 tablet, Rfl: 0    diphenhydrAMINE (BENADRYL) 25 mg tablet, Take 25 mg by mouth as needed  , Disp: , Rfl:     fluticasone (FLONASE) 50 mcg/act nasal spray, 1 spray into each nostril daily as needed for rhinitis, Disp: 3 Bottle, Rfl: 1    Linaclotide (LINZESS) 145 MCG CAPS, Take 1 capsule (145 mcg total) by mouth daily, Disp: 90 capsule, Rfl: 1    gabapentin (NEURONTIN) 100 mg capsule, Take 1 capsule (100 mg total) by mouth 3 (three) times a day (Patient not taking: Reported on 2/13/2019), Disp: 90 capsule, Rfl: 0    hyoscyamine (ANASPAZ,LEVSIN) 0 125 MG tablet, Take 1 tablet (0 125 mg total) by mouth every 6 (six) hours as needed for cramping, Disp: 45 tablet, Rfl: 0    lansoprazole (PREVACID SOLUTAB) 15 mg disintegrating tablet, Take 1 tablet (15 mg total) by mouth daily, Disp: , Rfl:     polyethylene glycol (MIRALAX) 17 g packet, Take 17 g by mouth daily, Disp: 14 each, Rfl: 0    wheat dextrin (BENEFIBER), Take 1 tablet by mouth daily, Disp: 30 each, Rfl: 0    Allergies   Allergen Reactions    Advil [Ibuprofen] Anaphylaxis and Hives     Category: Allergy; aspirin    Apple Anaphylaxis    Aspirin Anaphylaxis and Hives     Category: Allergy;     Eggs Or Egg-Derived Products Anaphylaxis     Category: Allergy;     Nsaids Anaphylaxis     Category: Allergy;     Other Anaphylaxis     Category: Allergy; Annotation - [id number]: cherries, grapes , and kiwis; pt states all fruits    Peanuts [Peanut Oil] Anaphylaxis     nuts    Penicillins Shortness Of Breath    Pollen Extract     Codeine Anxiety       Social History   Past Surgical History:   Procedure Laterality Date    CHOLECYSTECTOMY      resolved: 2011     COLONOSCOPY N/A 11/18/2016    Procedure: COLONOSCOPY;  Surgeon: Paola Laughlin MD;  [facility]; Service:     COLONOSCOPY N/A 4/28/2016    Procedure: COLONOSCOPY;  Surgeon: Paola Laughlin MD;  Location: [facility] GI [facility]; Service:     ESOPHAGOGASTRODUODENOSCOPY N/A 4/28/2016    Procedure: ESOPHAGOGASTRODUODENOSCOPY (EGD); Surgeon: Paloa Laughlin MD;  Location: [facility]; Service:     FRONTAL SINUSOTOMY      resolved: April 2009     PANCREAS SURGERY      [place] COLONOSCOPY FLX DX W/[facility] WHEN PFRMD N/A 10/12/2018    Procedure: EGD AND COLONOSCOPY;  Surgeon: Marda Dubin, MD;  Location: [facility] GI [facility];   Service: Gastroenterology    [place] LAP, INCISIONAL HERNIA REPAIR,REDUCIBLE N/A 11/8/2018    Procedure: REPAIR HERNIA INCISIONAL LAPAROSCOPIC;  Surgeon: Lauryn Stiles MD;  [facility];  Service: General    [place] REPAIR OF NASAL SEPTUM N/A 7/28/2017    Procedure: REVISION SEPTOPLASTY; TURBINOPLASTY; BILATERAL  GRAFTS; ALAR GRAFT; POSSIBLE AURICULAR CARTILAGE GRAFT;  Surgeon: Elizabeth Campo MD;  [facility];  Service: ENT    TONSILLECTOMY AND ADENOIDECTOMY       Family History   Problem Relation Age of Onset    Diabetes Mother         mellitus     Hypertension Mother     Thyroid disease Mother     Fibromyalgia Mother     Hypertension Father     Hypertension Sister     No Known Problems Brother     No Known Problems Maternal Grandmother     No Known Problems Maternal Grandfather     Diabetes Paternal Grandmother     Diabetes Paternal Grandfather     Diabetes Other         mellitus     Rheum arthritis Cousin        Objective:  /94 (BP Location: Left arm, Patient Position: Sitting, Cuff Size: Standard)   Pulse 84   Temp 98 1 °F (36 7 °C) (Tympanic)   Wt 67 9 kg (149 lb 9 6 oz)   SpO2 98%   BMI 22 25 kg/m²        Physical Exam   Constitutional: He is oriented to person, place, and time  He appears well-developed and well-nourished  No distress  HENT:   Head: Normocephalic and atraumatic  Mouth/Throat: Oropharynx is clear and moist    Eyes: Pupils are equal, round, and reactive to light  EOM are normal    Neck: Normal range of motion  Cardiovascular: Normal rate, regular rhythm and normal heart sounds  No murmur heard  Pulmonary/Chest: Effort normal and breath sounds normal  No respiratory distress  He has no wheezes  He has no rales  Abdominal: Soft  Bowel sounds are normal  He exhibits no distension  There is no tenderness  Bowel sounds present  Abdomen soft, no guarding      Musculoskeletal: Normal range of motion  He exhibits no edema or deformity  Lymphadenopathy:     He has no cervical adenopathy  Neurological: He is alert and oriented to person, place, and time  Skin: Skin is warm and dry  No rash noted  No erythema  Psychiatric: He has a normal mood and affect   His behavior is normal  Judgment and thought content normal

## 2019-02-13 NOTE — PATIENT INSTRUCTIONS
Fiber supplement (metamucil or benefiber)  - 1/2 recommended doses   miralax 1/2 dose as well     Gas - x or beano before eating for gas symptoms    Resume prilosec daily     Start levsin with dinner

## 2019-02-14 DIAGNOSIS — R63.4 WEIGHT LOSS: ICD-10-CM

## 2019-02-14 DIAGNOSIS — K58.1 IRRITABLE BOWEL SYNDROME WITH CONSTIPATION: Primary | ICD-10-CM

## 2019-02-15 ENCOUNTER — TELEPHONE (OUTPATIENT)
Dept: INTERNAL MEDICINE CLINIC | Age: 39
End: 2019-02-15

## 2019-02-15 NOTE — TELEPHONE ENCOUNTER
----- Message from Kosta Green PA-C sent at 2/14/2019  7:51 PM EST -----  Regarding: referral to [facility], Would you see if patient would be accepted as patient of Dr Curtis Fox [facility]) [phone]  He has very severe IBS with possible IBD that has led to weight loss and loss of job  Pt has [facility]   Would you see if Al[facility] would make him consult apt?

## 2019-02-15 NOTE — TELEPHONE ENCOUNTER
I called the GI facility at [address] at [phone] and have been on hold for a while  Over 1/2 hour to get someone on the line for me  I spoke with the lady and gave her all the information on this patient  But since we are referring patient to Dr Hamida Akhtar we have to send the records to him and his  for them to look at the visit notes and testing then schedule the appointment for this patient        Faxing the information to Dr Hamida Akhtar and Diana Campbell his  at [phone]

## 2019-02-20 ENCOUNTER — OFFICE VISIT (OUTPATIENT)
Dept: INTERNAL MEDICINE CLINIC | Facility: CLINIC | Age: 39
End: 2019-02-20

## 2019-02-20 VITALS
BODY MASS INDEX: 22.97 KG/M2 | TEMPERATURE: 98.1 F | OXYGEN SATURATION: 99 % | WEIGHT: 151.6 LBS | HEIGHT: 68 IN | SYSTOLIC BLOOD PRESSURE: 117 MMHG | HEART RATE: 69 BPM | DIASTOLIC BLOOD PRESSURE: 80 MMHG

## 2019-02-20 DIAGNOSIS — K59.01 SLOW TRANSIT CONSTIPATION: ICD-10-CM

## 2019-02-20 DIAGNOSIS — F41.9 ANXIETY: ICD-10-CM

## 2019-02-20 DIAGNOSIS — K52.9 IBD (INFLAMMATORY BOWEL DISEASE): Primary | ICD-10-CM

## 2019-02-20 PROBLEM — Z78.9 TRANSITION OF CARE PERFORMED WITH SHARING OF CLINICAL SUMMARY: Status: RESOLVED | Noted: 2018-11-16 | Resolved: 2019-02-20

## 2019-02-20 PROBLEM — IMO0001 TRANSITION OF CARE PERFORMED WITH SHARING OF CLINICAL SUMMARY: Status: RESOLVED | Noted: 2018-11-16 | Resolved: 2019-02-20

## 2019-02-20 PROCEDURE — 99214 OFFICE O/P EST MOD 30 MIN: CPT | Performed by: INTERNAL MEDICINE

## 2019-02-20 RX ORDER — SENNA AND DOCUSATE SODIUM 50; 8.6 MG/1; MG/1
1 TABLET, FILM COATED ORAL DAILY
Qty: 30 TABLET | Refills: 3 | Status: SHIPPED | OUTPATIENT
Start: 2019-02-20 | End: 2019-02-20

## 2019-02-20 RX ORDER — SENNA AND DOCUSATE SODIUM 50; 8.6 MG/1; MG/1
1 TABLET, FILM COATED ORAL DAILY
Qty: 30 TABLET | Refills: 3
Start: 2019-02-20 | End: 2019-02-20

## 2019-02-20 RX ORDER — CLONAZEPAM 0.5 MG/1
0.5 TABLET ORAL EVERY 12 HOURS PRN
Qty: 60 TABLET | Refills: 3 | Status: SHIPPED | OUTPATIENT
Start: 2019-02-20 | End: 2019-07-09 | Stop reason: SDUPTHER

## 2019-02-20 RX ORDER — SENNA AND DOCUSATE SODIUM 50; 8.6 MG/1; MG/1
1 TABLET, FILM COATED ORAL DAILY
Qty: 30 TABLET | Refills: 3
Start: 2019-02-20 | End: 2019-07-09 | Stop reason: ALTCHOICE

## 2019-02-20 NOTE — PATIENT INSTRUCTIONS
Constipation   WHAT YOU NEED TO KNOW:   Constipation is when you have hard, dry bowel movements, or you go longer than usual between bowel movements  DISCHARGE INSTRUCTIONS:   Return to the emergency department if:   · You have blood in your bowel movements  · You have a fever and abdominal pain with the constipation  Contact your healthcare provider if:   · Your constipation gets worse  · You start to vomit  · You have questions or concerns about your condition or care  Medicines:   · Medicine or a fiber supplement  may help make your bowel movement softer  A laxative may help relax and loosen your intestines to help you have a bowel movement  You may also be given medicine to increase fluid in your intestines  The fluid may help move bowel movements through your intestines  · Take your medicine as directed  Contact your healthcare provider if you think your medicine is not helping or if you have side effects  Tell him of her if you are allergic to any medicine  Keep a list of the medicines, vitamins, and herbs you take  Include the amounts, and when and why you take them  Bring the list or the pill bottles to follow-up visits  Carry your medicine list with you in case of an emergency  Manage your constipation:   · Drink liquids as directed  You may need to drink extra liquids to help soften and move your bowels  Ask how much liquid to drink each day and which liquids are best for you  · Eat high-fiber foods  This may help decrease constipation by adding bulk to your bowel movements  High-fiber foods include fruit, vegetables, whole-grain breads and cereals, and beans  Your healthcare provider or dietitian can help you create a high-fiber meal plan  · Exercise regularly  Regular physical activity can help stimulate your intestines  Ask which exercises are best for you  · Schedule a time each day to have a bowel movement    This may help train your body to have regular bowel movements  Bend forward while you are on the toilet to help move the bowel movement out  Sit on the toilet for at least 10 minutes, even if you do not have a bowel movement  Follow up with your healthcare provider as directed:  Write down your questions so you remember to ask them during your visits  © 2017 [employer] Information is for End User's use only and may not be sold, redistributed or otherwise used for commercial purposes  All illustrations and images included in CareNotes® are the copyrighted property of [email or web address]  or [employer] Baz[employer]  The above information is an  only  It is not intended as medical advice for individual conditions or treatments  Talk to your doctor, nurse or pharmacist before following any medical regimen to see if it is safe and effective for you

## 2019-02-21 NOTE — PROGRESS NOTES
Assessment/Plan:    No problem-specific Assessment & Plan notes found for this encounter  Diagnoses and all orders for this visit:    IBD (inflammatory bowel disease)    Anxiety  -     clonazePAM (KlonoPIN) 0 5 mg tablet; Take 1 tablet (0 5 mg total) by mouth every 12 (twelve) hours as needed for anxiety for up to 60 days    Postoperative ileus (HCC)  -     senna-docusate sodium (SENOKOT-S) 8 6-50 mg per tablet; Take 1 tablet by mouth daily for 30 days          Subjective:      Patient ID: Elisa Schmidt is a 41 y o  male  Constipation   This is a recurrent problem  The current episode started more than 1 year ago  The problem is unchanged  His stool frequency is 2 to 3 times per week  The stool is described as loose and watery  The patient is not on a high fiber diet  He does not exercise regularly  There has not been adequate water intake  Associated symptoms include abdominal pain and bloating  Risk factors: previous surgery  The following portions of the patient's history were reviewed and updated as appropriate: past family history, past medical history, past social history, past surgical history and problem list     Review of Systems   Constitutional: Positive for fatigue  HENT: Positive for postnasal drip and sinus pressure  Gastrointestinal: Positive for abdominal pain, bloating and constipation  Musculoskeletal: Positive for arthralgias  Allergic/Immunologic: Positive for environmental allergies and food allergies  Psychiatric/Behavioral: Positive for dysphoric mood  All other systems reviewed and are negative  Past Medical History:   Diagnosis Date    Abdominal pain     Abnormal liver function test     last assessed: Oct 16, 2013     Abnormal weight loss     last assessed: Yung 15, 2014     Allergic rhinitis due to pollen     last assessed: Yung 15, 2014     Anxiety     Anxiety     last assessed/resolved:  Aug 5, 2015     Asthma     last assessed: Yung 15, 2014     Benign essential HTN     last assessed/resolved: Feb 23, 2017     Cervical lymphadenopathy     last assessed/resolved: Feb 23, 2017     Chronic sinusitis     last assessed: Yung 15, 2014     Constipation     Crohn's disease ([facility] ) 01/01/2011    last assessed: Yung 15, 2014     Dysuria     last assessed: April 29, 2016     Elevated BP without diagnosis of hypertension     last assessed/resolved: Feb 23, 2017     Esophageal reflux     last assessed/resolved: Feb 23, 2017     Eustachian tube anomaly     Resolved: Nov 9, 2017     External hemorrhoids     last assessed: Yung 15, 2014     Hypertension     borderline    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     last assessed/resolved: July 19, 2017     Inflammatory bowel disease     Pancreatic neoplasm     last assessed: Yung 15, 2014     PONV (postoperative nausea and vomiting)     Positive depression screening     resolved: July 24, 2017     Psoriasis     Seasonal allergies     Small bowel obstruction ([facility] ) 11/11/2018    Vitamin B12 deficiency     last assessed/resolved: Feb 23, 2017          Current Outpatient Medications:     acetaminophen (TYLENOL) 500 mg tablet, Take 10 mg/kg by mouth every 4 (four) hours as needed  , Disp: , Rfl:     cetirizine (ZyrTEC) 10 mg tablet, Take 1 tablet (10 mg total) by mouth daily as needed for allergies, Disp: 30 tablet, Rfl: 5    clonazePAM (KlonoPIN) 0 5 mg tablet, Take 1 tablet (0 5 mg total) by mouth every 12 (twelve) hours as needed for anxiety for up to 60 days, Disp: 60 tablet, Rfl: 3    diphenhydrAMINE (BENADRYL) 25 mg tablet, Take 25 mg by mouth as needed  , Disp: , Rfl:     fluticasone (FLONASE) 50 mcg/act nasal spray, 1 spray into each nostril daily as needed for rhinitis, Disp: 3 Bottle, Rfl: 1    hyoscyamine (ANASPAZ,LEVSIN) 0 125 MG tablet, Take 1 tablet (0 125 mg total) by mouth every 6 (six) hours as needed for cramping, Disp: 45 tablet, Rfl: 0    Linaclotide (LINZESS) 145 MCG CAPS, Take 1 capsule (145 mcg total) by mouth daily, Disp: 90 capsule, Rfl: 1    polyethylene glycol (MIRALAX) 17 g packet, Take 17 g by mouth daily, Disp: 14 each, Rfl: 0    gabapentin (NEURONTIN) 100 mg capsule, Take 1 capsule (100 mg total) by mouth 3 (three) times a day (Patient not taking: Reported on 2/13/2019), Disp: 90 capsule, Rfl: 0    lansoprazole (PREVACID SOLUTAB) 15 mg disintegrating tablet, Take 1 tablet (15 mg total) by mouth daily (Patient not taking: Reported on 2/20/2019), Disp: , Rfl:     senna-docusate sodium (SENOKOT-S) 8 6-50 mg per tablet, Take 1 tablet by mouth daily for 30 days, Disp: 30 tablet, Rfl: 3    wheat dextrin (BENEFIBER), Take 1 tablet by mouth daily (Patient not taking: Reported on 2/20/2019), Disp: 30 each, Rfl: 0    Allergies   Allergen Reactions    Advil [Ibuprofen] Anaphylaxis and Hives     Category: Allergy; aspirin    Apple Anaphylaxis    Aspirin Anaphylaxis and Hives     Category: Allergy;     Eggs Or Egg-Derived Products Anaphylaxis     Category: Allergy;     Nsaids Anaphylaxis     Category: Allergy;     Other Anaphylaxis     Category: Allergy; Annotation - [id number]: cherries, grapes , and kiwis; pt states all fruits    Peanuts [Peanut Oil] Anaphylaxis     nuts    Penicillins Shortness Of Breath    Pollen Extract     Codeine Anxiety       Social History   Past Surgical History:   Procedure Laterality Date    CHOLECYSTECTOMY      resolved: 2011     COLONOSCOPY N/A 11/18/2016    Procedure: COLONOSCOPY;  Surgeon: Ly Espinal MD;  Location:  GI LAB; Service:     COLONOSCOPY N/A 4/28/2016    Procedure: COLONOSCOPY;  Surgeon: Ly Espinal MD;  Location: [facility] GI LAB; Service:     ESOPHAGOGASTRODUODENOSCOPY N/A 4/28/2016    Procedure: ESOPHAGOGASTRODUODENOSCOPY (EGD); Surgeon: Ly Espinal MD;  Location: [facility] GI LAB;   Service:     FRONTAL SINUSOTOMY      resolved: April 2009     PANCREAS SURGERY      WA COLONOSCOPY FLX DX W/COLLJ SPEC WHEN PFRMD N/A 10/12/2018    Procedure: EGD AND COLONOSCOPY;  Surgeon: Dalila Rao MD;  Location: [facility] GI LAB;   Service: Gastroenterology    IL LAP, INCISIONAL HERNIA REPAIR,REDUCIBLE N/A 11/8/2018    Procedure: REPAIR HERNIA INCISIONAL LAPAROSCOPIC;  Surgeon: Jenae Burch MD;  Location:  MAIN OR;  Service: General    IL REPAIR OF NASAL SEPTUM N/A 7/28/2017    Procedure: REVISION SEPTOPLASTY; TURBINOPLASTY; BILATERAL  GRAFTS; ALAR GRAFT; POSSIBLE AURICULAR CARTILAGE GRAFT;  Surgeon: Ashley Monaco MD;  [facility];  Service: ENT    TONSILLECTOMY AND ADENOIDECTOMY       Family History   Problem Relation Age of Onset    Diabetes Mother         mellitus     Hypertension Mother     Thyroid disease Mother     Fibromyalgia Mother     Hypertension Father     Hypertension Sister     No Known Problems Brother     No Known Problems Maternal Grandmother     No Known Problems Maternal Grandfather     Diabetes Paternal Grandmother     Diabetes Paternal Grandfather     Diabetes Other         mellitus     Rheum arthritis Cousin        Objective:  /80 (BP Location: Left arm, Patient Position: Sitting, Cuff Size: Standard)   Pulse 69   Temp 98 1 °F (36 7 °C) (Oral)   Ht 5' 8" (1 727 m)   Wt 68 8 kg (151 lb 9 6 oz)   SpO2 99%   BMI 23 05 kg/m²     Recent Results (from the past 1344 hour(s))   CBC and differential    Collection Time: 02/09/19  1:45 PM   Result Value Ref Range    WBC 6 05 4 31 - 10 16 Thousand/uL    RBC 4 92 3 88 - 5 62 Million/uL    Hemoglobin 15 6 12 0 - 17 0 g/dL    Hematocrit 46 0 36 5 - 49 3 %    MCV 94 82 - 98 fL    MCH 31 7 26 8 - 34 3 pg    MCHC 33 9 31 4 - 37 4 g/dL    RDW 12 5 11 6 - 15 1 %    MPV 9 6 8 9 - 12 7 fL    Platelets 236 742 - 096 Thousands/uL    nRBC 0 /100 WBCs    Neutrophils Relative 57 43 - 75 %    Immat GRANS % 1 0 - 2 %    Lymphocytes Relative 31 14 - 44 %    Monocytes Relative 9 4 - 12 %    Eosinophils Relative 1 0 - 6 %    Basophils Relative 1 0 - 1 % Neutrophils Absolute 3 46 1 85 - 7 62 Thousands/µL    Immature Grans Absolute 0 04 0 00 - 0 20 Thousand/uL    Lymphocytes Absolute 1 87 0 60 - 4 47 Thousands/µL    Monocytes Absolute 0 56 0 17 - 1 22 Thousand/µL    Eosinophils Absolute 0 06 0 00 - 0 61 Thousand/µL    Basophils Absolute 0 06 0 00 - 0 10 Thousands/µL   CMP    Collection Time: 02/09/19  1:45 PM   Result Value Ref Range    Sodium 140 136 - 145 mmol/L    Potassium 4 6 3 5 - 5 3 mmol/L    Chloride 104 100 - 108 mmol/L    CO2 28 21 - 32 mmol/L    ANION GAP 8 4 - 13 mmol/L    BUN 13 5 - 25 mg/dL    Creatinine 0 91 0 60 - 1 30 mg/dL    Glucose 94 65 - 140 mg/dL    Calcium 8 9 8 3 - 10 1 mg/dL    AST 19 5 - 45 U/L    ALT 25 12 - 78 U/L    Alkaline Phosphatase 70 46 - 116 U/L    Total Protein 7 1 6 4 - 8 2 g/dL    Albumin 3 9 3 5 - 5 0 g/dL    Total Bilirubin 0 45 0 20 - 1 00 mg/dL    eGFR 107 ml/min/1 73sq m   Lipase    Collection Time: 02/09/19  1:45 PM   Result Value Ref Range    Lipase 130 73 - 393 u/L   Urinalysis with reflex to microscopic    Collection Time: 02/09/19  2:33 PM   Result Value Ref Range    Color, UA Yellow     Clarity, UA Clear     Specific Gravity, UA 1 020 1 003 - 1 030    pH, UA 7 0 4 5 - 8 0    Leukocytes, UA Negative Negative    Nitrite, UA Negative Negative    Protein, UA Negative Negative mg/dl    Glucose, UA Negative Negative mg/dl    Ketones, UA Negative Negative mg/dl    Urobilinogen, UA 0 2 0 2, 1 0 E U /dl E U /dl    Bilirubin, UA Negative Negative    Blood, UA Negative Negative            Physical Exam      Constitutional: He is oriented to person, place, and time  He appears well-developed and well-nourished  No distress  HENT:   Head: Normocephalic and atraumatic  Mouth/Throat: Oropharynx is clear and moist    Eyes: Pupils are equal, round, and reactive to light  EOM are normal    Neck: Normal range of motion  Cardiovascular: Normal rate, regular rhythm and normal heart sounds  No murmur heard    Pulmonary/Chest: Effort normal and breath sounds normal  No respiratory distress  He has no wheezes  He has no rales  Abdominal: Soft  Bowel sounds are normal  He exhibits no distension  There is no tenderness     Bowel sounds present  Abdomen soft, no guarding

## 2019-05-05 ENCOUNTER — HOSPITAL ENCOUNTER (EMERGENCY)
Facility: HOSPITAL | Age: 39
Discharge: HOME | End: 2019-05-05
Attending: EMERGENCY MEDICINE | Admitting: EMERGENCY MEDICINE
Payer: COMMERCIAL

## 2019-05-05 VITALS
HEART RATE: 74 BPM | RESPIRATION RATE: 16 BRPM | WEIGHT: 145 LBS | OXYGEN SATURATION: 97 % | SYSTOLIC BLOOD PRESSURE: 139 MMHG | BODY MASS INDEX: 21.98 KG/M2 | HEIGHT: 68 IN | TEMPERATURE: 98.2 F | DIASTOLIC BLOOD PRESSURE: 76 MMHG
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
## 2019-05-05 PROCEDURE — 99282 EMERGENCY DEPT VISIT SF MDM: CPT

## 2019-05-05 RX ORDER — DOXYCYCLINE HYCLATE 50 MG/1
100 CAPSULE ORAL ONCE
Status: DISCONTINUED | OUTPATIENT
Start: 2019-05-05 | End: 2019-05-05

## 2019-05-05 RX ORDER — DOXYCYCLINE HYCLATE 100 MG/1
200 CAPSULE ORAL ONCE
Qty: 2 CAPSULE | Refills: 0 | Status: SHIPPED | OUTPATIENT
Start: 2019-05-05 | End: 2019-05-05

## 2019-07-09 ENCOUNTER — OFFICE VISIT (OUTPATIENT)
Dept: INTERNAL MEDICINE CLINIC | Facility: CLINIC | Age: 39
End: 2019-07-09
Payer: COMMERCIAL

## 2019-07-09 VITALS
HEIGHT: 69 IN | HEART RATE: 79 BPM | BODY MASS INDEX: 24.97 KG/M2 | SYSTOLIC BLOOD PRESSURE: 136 MMHG | OXYGEN SATURATION: 99 % | WEIGHT: 168.6 LBS | TEMPERATURE: 98.6 F | DIASTOLIC BLOOD PRESSURE: 98 MMHG

## 2019-07-09 DIAGNOSIS — J30.9 ALLERGIC RHINITIS, UNSPECIFIED SEASONALITY, UNSPECIFIED TRIGGER: ICD-10-CM

## 2019-07-09 DIAGNOSIS — J30.89 ENVIRONMENTAL AND SEASONAL ALLERGIES: ICD-10-CM

## 2019-07-09 DIAGNOSIS — R10.84 GENERALIZED ABDOMINAL PAIN: ICD-10-CM

## 2019-07-09 DIAGNOSIS — F41.9 ANXIETY: ICD-10-CM

## 2019-07-09 DIAGNOSIS — K52.9 IBD (INFLAMMATORY BOWEL DISEASE): Primary | ICD-10-CM

## 2019-07-09 DIAGNOSIS — R10.9 FUNCTIONAL ABDOMINAL PAIN SYNDROME: ICD-10-CM

## 2019-07-09 PROBLEM — K59.09 OTHER CONSTIPATION: Status: RESOLVED | Noted: 2018-11-30 | Resolved: 2019-07-09

## 2019-07-09 PROBLEM — K59.01 SLOW TRANSIT CONSTIPATION: Status: RESOLVED | Noted: 2018-10-15 | Resolved: 2019-07-09

## 2019-07-09 PROCEDURE — 99215 OFFICE O/P EST HI 40 MIN: CPT | Performed by: INTERNAL MEDICINE

## 2019-07-09 RX ORDER — CLONAZEPAM 0.5 MG/1
0.5 TABLET ORAL EVERY 12 HOURS PRN
Qty: 60 TABLET | Refills: 0 | Status: SHIPPED | OUTPATIENT
Start: 2019-07-09 | End: 2019-08-09 | Stop reason: SDUPTHER

## 2019-07-09 NOTE — PROGRESS NOTES
Assessment/Plan:    Allergic rhinitis  Continue with Flonase and Zyrtec  Functional abdominal pain syndrome  He has had no improvement of abdominal symptoms with previous medications (linzess, amitriptyline, Neurontin, Bentyl, and hyoscyamine)  Laboratory and imaging studies essentially remained benign  Discussed considering Cymbalta therapy to help with this pain that he has been experiencing for many years  He will do some research in the meantime, I will contact his gastroenterologist and discuss other potential therapeutic options  BMI 25 0-25 9,adult  Discussed diet and exercise  Anxiety  Continue with Klonopin 0 5 mg q 12 hours as needed for increased agitation  Consider starting Cymbalta as this may help with his anxiety and also his functional chronic abdominal pain  Diagnoses and all orders for this visit:    IBD (inflammatory bowel disease)    Anxiety  -     clonazePAM (KlonoPIN) 0 5 mg tablet; Take 1 tablet (0 5 mg total) by mouth every 12 (twelve) hours as needed for anxiety for up to 139 days    Allergic rhinitis, unspecified seasonality, unspecified trigger    Environmental and seasonal allergies    Generalized abdominal pain    Functional abdominal pain syndrome    BMI 25 0-25 9,adult        BMI Counseling: Body mass index is 25 26 kg/m²  Discussed the patient's BMI with him  The BMI is above average  BMI counseling and education was provided to the patient  Nutrition recommendations include reducing portion sizes, decreasing overall calorie intake, 3-5 servings of fruits/vegetables daily, reducing fast food intake, consuming healthier snacks, decreasing soda and/or juice intake, moderation in carbohydrate intake, increasing intake of lean protein, reducing intake of saturated fat and trans fat and reducing intake of cholesterol  Exercise recommendations include moderate aerobic physical activity for 150 minutes/week and exercising 3-5 times per week      Time spent during encounter:  45 minutes (counseling, discussing symptomatology in reviewing previous procedures/imaging studies/laboratory studies  Discussing treatment options)  Subjective:      Patient ID: Rose Prabhakar is a 46 y o  male  46year old male is seen today for follow up  No labs to review today  Regarding abdominal pain:  Since last visit, he attempted to be seen by a gastroenterologist at  however due to scheduling issues he was not able to be seen  In reviewing records from [facility], he has been on previous medications such as Bentyl, amitriptyline, and Neurontin without improvement of abdominal symptoms  He is currently on Linzess and hyoscyamine, however he admits to not taking as his symptoms go back and forth between diarrhea and constipation which have not improved with this current medication regimen  He has been taking Gas-X and digestive enzymes with every meal, with minimal improvement of symptoms  The following portions of the patient's history were reviewed and updated as appropriate: allergies, current medications, past family history, past medical history, past social history, past surgical history and problem list     Review of Systems   Constitutional: Negative for activity change, appetite change, chills, diaphoresis, fatigue and fever  HENT: Negative for congestion, postnasal drip, rhinorrhea, sinus pressure, sinus pain, sneezing and sore throat  Eyes: Negative for visual disturbance  Respiratory: Negative for apnea, cough, choking, chest tightness, shortness of breath and wheezing  Cardiovascular: Negative for chest pain, palpitations and leg swelling  Gastrointestinal: Negative for abdominal distention, abdominal pain, anal bleeding, blood in stool, constipation, diarrhea, nausea and vomiting  Endocrine: Negative for cold intolerance and heat intolerance     Genitourinary: Negative for difficulty urinating, dysuria and hematuria  Musculoskeletal: Negative  Skin: Negative  Neurological: Negative for dizziness, weakness, light-headedness, numbness and headaches  Hematological: Negative for adenopathy  Psychiatric/Behavioral: Negative for agitation, sleep disturbance and suicidal ideas  All other systems reviewed and are negative  Past Medical History:   Diagnosis Date    Abdominal pain     Abnormal liver function test     last assessed: Oct 16, 2013     Abnormal weight loss     last assessed: Yung 15, 2014     Allergic rhinitis due to pollen     last assessed: Yung 15, 2014     Anxiety     Anxiety     last assessed/resolved:  Aug 5, 2015     Asthma     last assessed: Yung 15, 2014     Benign essential HTN     last assessed/resolved: Feb 23, 2017     Cervical lymphadenopathy     last assessed/resolved: Feb 23, 2017     Chronic sinusitis     last assessed: Yung 15, 2014     Constipation     Crohn's disease ([facility] [address] ) 01/01/2011    last assessed: Yung 15, 2014     Dysuria     last assessed: April 29, 2016     Elevated BP without diagnosis of hypertension     last assessed/resolved: Feb 23, 2017     Esophageal reflux     last assessed/resolved: Feb 23, 2017     Eustachian tube anomaly     Resolved: Nov 9, 2017     External hemorrhoids     last assessed: Yung 15, 2014     Hypertension     borderline    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     last assessed/resolved: July 19, 2017     Inflammatory bowel disease     Pancreatic neoplasm     last assessed: Yung 15, 2014     PONV (postoperative nausea and vomiting)     Positive depression screening     resolved: July 24, 2017     Psoriasis     Seasonal allergies     Small bowel obstruction ([facility] ) 11/11/2018    Vitamin B12 deficiency     last assessed/resolved: Feb 23, 2017          Current Outpatient Medications:     acetaminophen (TYLENOL) 500 mg tablet, Take 10 mg/kg by mouth every 4 (four) hours as needed  , Disp: , Rfl:     cetirizine (ZyrTEC) 10 mg tablet, Take 1 tablet (10 mg total) by mouth daily as needed for allergies, Disp: 30 tablet, Rfl: 5    clonazePAM (KlonoPIN) 0 5 mg tablet, Take 1 tablet (0 5 mg total) by mouth every 12 (twelve) hours as needed for anxiety for up to 139 days, Disp: 60 tablet, Rfl: 0    diphenhydrAMINE (BENADRYL) 25 mg tablet, Take 25 mg by mouth as needed  , Disp: , Rfl:     fluticasone (FLONASE) 50 mcg/act nasal spray, 1 spray into each nostril daily as needed for rhinitis, Disp: 3 Bottle, Rfl: 1    hyoscyamine (ANASPAZ,LEVSIN) 0 125 MG tablet, Take 1 tablet (0 125 mg total) by mouth every 6 (six) hours as needed for cramping, Disp: 45 tablet, Rfl: 0    Linaclotide (LINZESS) 145 MCG CAPS, Take 1 capsule (145 mcg total) by mouth daily, Disp: 90 capsule, Rfl: 1    Allergies   Allergen Reactions    Apple Anaphylaxis    Aspirin Anaphylaxis and Hives     Category: Allergy;     Eggs Or Egg-Derived Products Anaphylaxis     Category: Allergy;     Ibuprofen Anaphylaxis and Hives     Category: Allergy; aspirin    Nsaids Anaphylaxis     Category: Allergy;     Other Anaphylaxis     Category: Allergy; Annotation - [id number]: cherries, grapes , and kiwis; pt states all fruits    Peanuts [Peanut Oil] Anaphylaxis     nuts    Penicillins Shortness Of Breath    Pollen Extract     Codeine Anxiety       Social History   Past Surgical History:   Procedure Laterality Date    CHOLECYSTECTOMY      resolved: 2011     COLONOSCOPY N/A 11/18/2016    Procedure: COLONOSCOPY;  Surgeon: Yennifer Cehng MD;  Location:  GI LAB; Service:     COLONOSCOPY N/A 4/28/2016    Procedure: COLONOSCOPY;  Surgeon: Yennifer Cheng MD;  Location: [facility] GI LAB; Service:     ESOPHAGOGASTRODUODENOSCOPY N/A 4/28/2016    Procedure: ESOPHAGOGASTRODUODENOSCOPY (EGD); Surgeon: Yennifer Cheng MD;  Location: [facility] GI LAB;   Service:     FRONTAL SINUSOTOMY      resolved: April 2009     PANCREAS SURGERY      RI COLONOSCOPY FLX DX W/COLLJ SPEC WHEN PFRMD N/A 10/12/2018    Procedure: EGD AND COLONOSCOPY;  Surgeon: Angelic King MD;  Location: [facility] GI LAB; Service: Gastroenterology    NV LAP, INCISIONAL HERNIA REPAIR,REDUCIBLE N/A 11/8/2018    Procedure: REPAIR HERNIA INCISIONAL LAPAROSCOPIC;  Surgeon: Sebastian Anton MD;  Location: [facility];  Service: General    NV REPAIR OF NASAL SEPTUM N/A 7/28/2017    Procedure: REVISION SEPTOPLASTY; TURBINOPLASTY; BILATERAL  GRAFTS; ALAR GRAFT; POSSIBLE AURICULAR CARTILAGE GRAFT;  Surgeon: Fabrice Saha MD;  Location: [facility];  Service: ENT    TONSILLECTOMY AND ADENOIDECTOMY       Family History   Problem Relation Age of Onset    Diabetes Mother         mellitus     Hypertension Mother     Thyroid disease Mother     Fibromyalgia Mother     Hypertension Father     Hypertension Sister     No Known Problems Brother     No Known Problems Maternal Grandmother     No Known Problems Maternal Grandfather     Diabetes Paternal Grandmother     Diabetes Paternal Grandfather     Diabetes Other         mellitus     Rheum arthritis Cousin        Objective:  /98 (BP Location: Left arm, Patient Position: Sitting, Cuff Size: Adult)   Pulse 79   Temp 98 6 °F (37 °C) (Oral)   Ht 5' 8 5" (1 74 m)   Wt 76 5 kg (168 lb 9 6 oz)   SpO2 99%   BMI 25 26 kg/m²     No results found for this or any previous visit (from the past 1344 hour(s))  Physical Exam   Constitutional: He is oriented to person, place, and time  He appears well-developed and well-nourished  No distress  HENT:   Head: Normocephalic and atraumatic  Eyes: Pupils are equal, round, and reactive to light  Conjunctivae and EOM are normal  Right eye exhibits no discharge  Left eye exhibits no discharge  No scleral icterus  Neck: Normal range of motion  Neck supple  No JVD present  No thyromegaly present  Cardiovascular: Normal rate, regular rhythm, normal heart sounds and intact distal pulses   Exam reveals no gallop and no friction rub    No murmur heard  Pulmonary/Chest: Effort normal and breath sounds normal  No respiratory distress  He has no wheezes  He has no rales  He exhibits no tenderness  Abdominal: Soft  Bowel sounds are normal  He exhibits no distension and no mass  There is tenderness in the right upper quadrant and periumbilical area  There is positive Coleman's sign  There is no rebound and no guarding  Musculoskeletal: Normal range of motion  He exhibits no edema, tenderness or deformity  Lymphadenopathy:     He has no cervical adenopathy  Neurological: He is alert and oriented to person, place, and time  He has normal reflexes  No cranial nerve deficit  Coordination normal    Skin: Skin is warm and dry  No rash noted  He is not diaphoretic  No erythema  No pallor  Psychiatric: He has a normal mood and affect  His behavior is normal  Judgment and thought content normal    Nursing note and vitals reviewed

## 2019-07-09 NOTE — ASSESSMENT & PLAN NOTE
He has had no improvement of abdominal symptoms with previous medications (linzess, amitriptyline, Neurontin, Bentyl, and hyoscyamine)  Laboratory and imaging studies essentially remained benign  Discussed considering Cymbalta therapy to help with this pain that he has been experiencing for many years  He will do some research in the meantime, I will contact his gastroenterologist and discuss other potential therapeutic options

## 2019-07-09 NOTE — PATIENT INSTRUCTIONS
Duloxetine (By mouth)   Duloxetine (doo-LOX-[id number]-teen)  Treats depression, anxiety, diabetic peripheral neuropathy, fibromyalgia, and chronic muscle or bone pain  This medicine is an SSNRI  Brand Name(s): Cymbalta, DermacinRx Howie Olmedo   There may be other brand names for this medicine  When This Medicine Should Not Be Used: This medicine is not right for everyone  Do not use it if you had an allergic reaction to duloxetine  How to Use This Medicine:   Capsule, Delayed Release Capsule  · Take your medicine as directed  Your dose may need to be changed several times to find what works best for you  · Delayed-release capsule: Swallow the capsule whole  Do not crush, chew, break, or open it  · This medicine should come with a Medication Guide  Ask your pharmacist for a copy if you do not have one  · Missed dose: Take a dose as soon as you remember  If it is almost time for your next dose, wait until then and take a regular dose  Do not take extra medicine to make up for a missed dose  · Store the medicine in a closed container at room temperature, away from heat, moisture, and direct light  Drugs and Foods to Avoid:   Ask your doctor or pharmacist before using any other medicine, including over-the-counter medicines, vitamins, and herbal products  · Do not take duloxetine if you have used an MAO inhibitor (MAOI) within the past 14 days  Do not start taking an MAO inhibitor within 5 days of stopping duloxetine  · Some medicines can affect how duloxetine works   Tell your doctor if you are using any of the following:  ¨ Buspirone, cimetidine, ciprofloxacin, enoxacin, fentanyl, lithium, Claudia's wort, theophylline, tramadol, tryptophan, or warfarin  ¨ Amphetamines  ¨ Blood pressure medicine  ¨ Diuretic (water pill)  ¨ Medicine for heart rhythm problems (including flecainide, propafenone, quinidine)  ¨ Medicine to treat migraine headaches (including triptans)  ¨ NSAID pain or arthritis medicine (including aspirin, celecoxib, diclofenac, ibuprofen, naproxen)  ¨ Other medicine to treat depression or mood disorders (including amitriptyline, desipramine, fluoxetine, imipramine, nortriptyline, paroxetine)  ¨ Phenothiazine medicine (including thioridazine)  · Tell your doctor if you use anything else that makes you sleepy  Some examples are allergy medicine, narcotic pain medicine, and alcohol  · Do not drink alcohol while you are using this medicine  Warnings While Using This Medicine:   · Tell your doctor if you are pregnant or breastfeeding, or if you have kidney disease, liver disease, diabetes, digestion problems, glaucoma, heart disease, high or low blood pressure, or problems with urination  Tell your doctor if you smoke or you have a history of seizures, or drug or alcohol addiction  · This medicine may cause the following problems:   ¨ Serious liver problems  ¨ Serotonin syndrome (more likely when used with certain other medicines)  ¨ Increased risk of bleeding problems  ¨ Serious skin reactions  ¨ Low sodium levels in the blood  · This medicine can increase thoughts of suicide  Tell your doctor right away if you start to feel depressed and have thoughts about hurting yourself  · This medicine can cause changes in your blood pressure  This may make you dizzy or drowsy  Do not drive or do anything that could be dangerous until you know how this medicine affects you  Stand up slowly to avoid falls  · Do not stop using this medicine suddenly  Your doctor will need to slowly decrease your dose before you stop it completely  · Your doctor will check your progress and the effects of this medicine at regular visits  Keep all appointments  · Keep all medicine out of the reach of children  Never share your medicine with anyone    Possible Side Effects While Using This Medicine:   Call your doctor right away if you notice any of these side effects:  · Allergic reaction: Itching or hives, swelling in your face or hands, swelling or tingling in your mouth or throat, chest tightness, trouble breathing  · Anxiety, restlessness, fever, fast heartbeat, sweating, muscle spasms, diarrhea, seeing or hearing things that are not there  · Blistering, peeling, red skin rash  · Confusion, weakness, muscle twitching  · Dark urine or pale stools, nausea, vomiting, loss of appetite, stomach pain, yellow skin or eyes  · Decrease in how much or how often you urinate  · Eye pain, vision changes, seeing halos around lights  · Feeling more energetic than usual  · Lightheadedness, dizziness, or fainting  · Unusual moods or behaviors, worsening depression, thoughts about hurting yourself, trouble sleeping  · Unusual bleeding or bruising  If you notice these less serious side effects, talk with your doctor:   · Decrease in appetite or weight  · Dry mouth, constipation, mild nausea  · Unusual drowsiness, sleepiness, or tiredness  If you notice other side effects that you think are caused by this medicine, tell your doctor  Call your doctor for medical advice about side effects  You may report side effects to FDA at 8-703-FDA-4254  © 2017 [employer] Information is for End User's use only and may not be sold, redistributed or otherwise used for commercial purposes  The above information is an  only  It is not intended as medical advice for individual conditions or treatments  Talk to your doctor, nurse or pharmacist before following any medical regimen to see if it is safe and effective for you

## 2019-07-09 NOTE — ASSESSMENT & PLAN NOTE
Continue with Klonopin 0 5 mg q 12 hours as needed for increased agitation  Consider starting Cymbalta as this may help with his anxiety and also his functional chronic abdominal pain

## 2019-07-17 ENCOUNTER — TELEPHONE (OUTPATIENT)
Dept: GASTROENTEROLOGY | Facility: MEDICAL CENTER | Age: 39
End: 2019-07-17

## 2019-07-17 NOTE — TELEPHONE ENCOUNTER
----- Message from Maia Kellogg MD sent at 7/11/2019  2:41 PM EDT -----  Regarding: FW: mutual patient  Please schedule him for an office visit with me in the next 2 months  Thanks,    Sonia Crum    ----- Message -----  From: Otilia Sever, MD  Sent: 7/11/2019   9:08 AM EDT  To: Maia Kellogg MD, Sheryle Congress, MD  Subject: mutual patient                                   Ibis,    Mr Mychal Meza is a mutual patient that I recently saw in the office  He has persistent abdominal pain with a history of multiple abdominal surgeries for treatment of ileus, ventral hernia repair, and exploratory laparotomy for investigation of abdominal pain  After reviewing records, the etiology of his abdominal pain is unclear and has persisted despite multiple therapies for treatment of IBS (linzess, amitriptyline, Neurontin, Bentyl, and hyoscyamine)  He was scheduled to see a gastroenterologist at the [address] [place] [address] however due to arriving late to the appointment and/or insurance/financial issues, was never seen and no appointment was rescheduled  I advised he follow up with her team regarding this persistent abdominal pain, which is unclear whether it is inflammatory bowel disease verses IBS versus chronic functional abdominal pain or something else  I offered to trial a therapy of Cymbalta which may provide some relief of his abdominal symptoms also while treating for depression, which may be due to his abdominal symptomatology  Can you have someone from your office contact him to schedule a follow-up appointment with your office for follow-up?

## 2019-08-08 DIAGNOSIS — F41.9 ANXIETY: ICD-10-CM

## 2019-08-08 NOTE — TELEPHONE ENCOUNTER
Needs Klonopin refilled only one month was given but he does not come back in until October  Can we send a rx with a refill  He uses [facility]

## 2019-08-09 RX ORDER — CLONAZEPAM 0.5 MG/1
0.5 TABLET ORAL EVERY 12 HOURS PRN
Qty: 60 TABLET | Refills: 1 | Status: SHIPPED | OUTPATIENT
Start: 2019-08-09 | End: 2019-08-12 | Stop reason: SDUPTHER

## 2019-08-12 DIAGNOSIS — F41.9 ANXIETY: ICD-10-CM

## 2019-08-12 RX ORDER — CLONAZEPAM 0.5 MG/1
0.5 TABLET ORAL EVERY 12 HOURS PRN
Qty: 60 TABLET | Refills: 1 | Status: SHIPPED | OUTPATIENT
Start: 2019-08-12 | End: 2019-10-16 | Stop reason: SDUPTHER

## 2019-09-13 ENCOUNTER — TELEPHONE (OUTPATIENT)
Dept: GASTROENTEROLOGY | Facility: CLINIC | Age: 39
End: 2019-09-13

## 2019-09-13 ENCOUNTER — OFFICE VISIT (OUTPATIENT)
Dept: GASTROENTEROLOGY | Facility: MEDICAL CENTER | Age: 39
End: 2019-09-13
Payer: COMMERCIAL

## 2019-09-13 VITALS
WEIGHT: 166.6 LBS | HEIGHT: 69 IN | DIASTOLIC BLOOD PRESSURE: 74 MMHG | TEMPERATURE: 97.6 F | HEART RATE: 62 BPM | BODY MASS INDEX: 24.68 KG/M2 | SYSTOLIC BLOOD PRESSURE: 138 MMHG

## 2019-09-13 DIAGNOSIS — R10.11 RIGHT UPPER QUADRANT ABDOMINAL PAIN: ICD-10-CM

## 2019-09-13 DIAGNOSIS — K50.00 CROHN'S DISEASE INVOLVING TERMINAL ILEUM (HCC): Primary | ICD-10-CM

## 2019-09-13 DIAGNOSIS — R10.31 RIGHT LOWER QUADRANT ABDOMINAL PAIN: ICD-10-CM

## 2019-09-13 PROCEDURE — 99214 OFFICE O/P EST MOD 30 MIN: CPT | Performed by: INTERNAL MEDICINE

## 2019-09-13 NOTE — TELEPHONE ENCOUNTER
I called and the represented told me that I was unable to clarify the order right now  I would have to wait until the patient dropped off a stool specimen to clarify the order  She stated they would reach out to us when the patient dropped off a stool specimen

## 2019-09-13 NOTE — TELEPHONE ENCOUNTER
[place] pt    Please call akbar from "[employer] to clarify the order for Stool Enteric Bacterial Panel [phone]

## 2019-09-13 NOTE — PROGRESS NOTES
[facility] - Outpatient Consultation  Hedy Gomez 46 y o  male MRN: [id number]  Encounter: [id number]          ASSESSMENT AND PLAN:      1  Crohn's disease involving terminal ileum ([address] )  2  Right upper quadrant abdominal pain  3  Right lower quadrant abdominal pain  I will check his stool studies including fecal calprotectin as well as a C-reactive protein, CBC, chemistries, liver and pancreas enzymes  I want to determine if he has a Crohn's flare or an infectious gastroenteritis  I also want to rule out a liver, biliary, or pancreatic reason for his pain  I suspect he has mild Crohn's disease that is currently in remission and that his symptoms are due to irritable bowel syndrome and functional pain  Dr Matt Kinney is considering starting Cymbalta to see if this helps with his pain and I think this is a good idea  I will give him a referral to see Dr Valentino Dacosta in our practice as a second opinion   - CBC; Future  - Comprehensive metabolic panel; Future  - Lipase; Future  - C-reactive protein; Future  - Calprotectin,Fecal; Future  - Clostridium difficile toxin by PCR; Future  - Stool Enteric Bacterial Panel by PCR; Future    ______________________________________________________________________    HPI:  He has a history of mild Crohn's disease involving his terminal ileum but has been unable to tolerate a number of medical treatments for this because of allergies  He has irritable bowel syndrome with alternating diarrhea and constipation and recently said he has been having more diarrhea so he is not taking Linzess  He said dicyclomine helps occasionally and he sometimes takes this  He has not had any bleeding or weight loss  He also denies reflux, nausea, and vomiting  He said the color of his stool is now often orange or pale  REVIEW OF SYSTEMS:    CONSTITUTIONAL: Denies any fever, chills, rigors, and weight loss  HEENT: No earache or tinnitus   Denies hearing loss or visual disturbances  CARDIOVASCULAR: No chest pain or palpitations  RESPIRATORY: Denies any cough, hemoptysis, shortness of breath or dyspnea on exertion  GASTROINTESTINAL: As noted in the History of Present Illness  GENITOURINARY: No problems with urination  Denies any hematuria or dysuria  NEUROLOGIC: No dizziness or vertigo, denies headaches  MUSCULOSKELETAL: Denies any muscle pain but has joint pains in hands  SKIN: Denies skin rashes or itching  ENDOCRINE: Denies excessive thirst  Denies intolerance to heat or cold  PSYCHOSOCIAL: Denies depression or anxiety  Denies any recent memory loss  Historical Information   Past Medical History:   Diagnosis Date    Abdominal pain     Abnormal liver function test     last assessed: Oct 16, 2013     Abnormal weight loss     last assessed: Yung 15, 2014     Allergic rhinitis due to pollen     last assessed: Yung 15, 2014     Anxiety     Anxiety     last assessed/resolved:  Aug 5, 2015     Asthma     last assessed: Yung 15, 2014     Benign essential HTN     last assessed/resolved: Feb 23, 2017     Cervical lymphadenopathy     last assessed/resolved: Feb 23, 2017     Chronic sinusitis     last assessed: Yung 15, 2014     Constipation     Crohn's disease ([facility] [address] ) 01/01/2011    last assessed: Yung 15, 2014     Dysuria     last assessed: April 29, 2016     Elevated BP without diagnosis of hypertension     last assessed/resolved: Feb 23, 2017     Esophageal reflux     last assessed/resolved: Feb 23, 2017     Eustachian tube anomaly     Resolved: Nov 9, 2017     External hemorrhoids     last assessed: Yung 15, 2014     Hypertension     borderline    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     last assessed/resolved: July 19, 2017     Inflammatory bowel disease     Pancreatic neoplasm     last assessed: Yung 15, 2014     PONV (postoperative nausea and vomiting)     Positive depression screening     resolved: July 24, 2017     Psoriasis     Seasonal allergies     Small bowel obstruction ([facility] [address] ) 11/11/2018    Vitamin B12 deficiency     last assessed/resolved: Feb 23, 2017      Past Surgical History:   Procedure Laterality Date    CHOLECYSTECTOMY      resolved: 2011     COLONOSCOPY N/A 11/18/2016    Procedure: COLONOSCOPY;  Surgeon: Fabrice Walker MD;  Location:  GI LAB; Service:     COLONOSCOPY N/A 4/28/2016    Procedure: COLONOSCOPY;  Surgeon: Fabrice Walker MD;  Location: [facility] GI LAB; Service:     ESOPHAGOGASTRODUODENOSCOPY N/A 4/28/2016    Procedure: ESOPHAGOGASTRODUODENOSCOPY (EGD); Surgeon: Fabrice Walker MD;  Location: [facility] GI LAB; Service:     FRONTAL SINUSOTOMY      resolved: April 2009     PANCREAS SURGERY      IA COLONOSCOPY FLX DX W/[facility]J [facility] WHEN PFRMD N/A 10/12/2018    Procedure: EGD AND COLONOSCOPY;  Surgeon: Imelda Mancuso MD;  Location: [facility] GI LAB; Service: Gastroenterology    IA LAP, INCISIONAL HERNIA REPAIR,REDUCIBLE N/A 11/8/2018    Procedure: REPAIR HERNIA INCISIONAL LAPAROSCOPIC;  Surgeon: Amanda Husain MD;  Location: [facility];  Service: General    IA REPAIR OF NASAL SEPTUM N/A 7/28/2017    Procedure: REVISION SEPTOPLASTY; TURBINOPLASTY; BILATERAL  GRAFTS; ALAR GRAFT; POSSIBLE AURICULAR CARTILAGE GRAFT;  Surgeon: Dhiraj Andrew MD;  Location: [facility];  Service: ENT    TONSILLECTOMY AND ADENOIDECTOMY       Social History   Social History     Substance and Sexual Activity   Alcohol Use No    Comment: Regi Snea consumes alcohol noted in "allscripts"      Social History     Substance and Sexual Activity   Drug Use Not Currently    Types: Marijuana    Comment: uses medical marijuana via vaping   last use 2/8/19     Social History     Tobacco Use   Smoking Status Current Every Day Smoker    Packs/day: 0 50    Types: Cigarettes   Smokeless Tobacco Never Used   Tobacco Comment    Dependence on nicotine in cigarettes - 1/2 PPD x 20 years      Family History   Problem Relation Age of Onset    Diabetes Mother mellitus     Hypertension Mother     Thyroid disease Mother     Fibromyalgia Mother     Hypertension Father     Hypertension Sister     No Known Problems Brother     No Known Problems Maternal Grandmother     No Known Problems Maternal Grandfather     Diabetes Paternal Grandmother     Diabetes Paternal Grandfather     Diabetes Other         mellitus     Rheum arthritis Cousin        Meds/Allergies       Current Outpatient Medications:     acetaminophen (TYLENOL) 500 mg tablet    cetirizine (ZyrTEC) 10 mg tablet    clonazePAM (KlonoPIN) 0 5 mg tablet    diphenhydrAMINE (BENADRYL) 25 mg tablet    fluticasone (FLONASE) 50 mcg/act nasal spray    hyoscyamine (ANASPAZ,LEVSIN) 0 125 MG tablet    Linaclotide (LINZESS) 145 MCG CAPS    Allergies   Allergen Reactions    Apple Anaphylaxis    Aspirin Anaphylaxis and Hives     Category: Allergy;     Eggs Or Egg-Derived Products Anaphylaxis     Category: Allergy;     Ibuprofen Anaphylaxis and Hives     Category: Allergy; aspirin    Nsaids Anaphylaxis     Category: Allergy;     Other Anaphylaxis     Category: Allergy; Annotation - [id number]: cherries, grapes , and kiwis; pt states all fruits    Peanuts [Peanut Oil] Anaphylaxis     nuts    Penicillins Shortness Of Breath    Pollen Extract     Codeine Anxiety           Objective     Blood pressure 138/74, pulse 62, temperature 97 6 °F (36 4 °C), height 5' 8 5" (1 74 m), weight 75 6 kg (166 lb 9 6 oz)  Body mass index is 24 96 kg/m²  PHYSICAL EXAM:      General Appearance:   Alert, cooperative, no distress   HEENT:   Normocephalic, atraumatic, anicteric      Neck:  Supple, symmetrical, trachea midline   Lungs:   Clear to auscultation bilaterally; no rales, rhonchi or wheezing; respirations unlabored    Heart[de-identified]   Regular rate and rhythm; no murmur, rub, or gallop     Abdomen:   Soft, right side tenderness, non-distended; normal bowel sounds; no masses, no organomegaly    Genitalia:   Deferred  Rectal:   Deferred    Extremities:  No cyanosis, clubbing or edema    Pulses:  2+ and symmetric    Skin:  No jaundice, rashes, or lesions    Lymph nodes:  No palpable cervical lymphadenopathy        Lab Results:   No visits with results within 1 Day(s) from this visit     Latest known visit with results is:   Admission on 02/09/2019, Discharged on 02/09/2019   Component Date Value    WBC 02/09/2019 6 05     RBC 02/09/2019 4 92     Hemoglobin 02/09/2019 15 6     Hematocrit 02/09/2019 46 0     MCV 02/09/2019 94     MCH 02/09/2019 31 7     MCHC 02/09/2019 33 9     RDW 02/09/2019 12 5     MPV 02/09/2019 9 6     Platelets 09/96/2299 229     nRBC 02/09/2019 0     Neutrophils Relative 02/09/2019 57     Immat GRANS % 02/09/2019 1     Lymphocytes Relative 02/09/2019 31     Monocytes Relative 02/09/2019 9     Eosinophils Relative 02/09/2019 1     Basophils Relative 02/09/2019 1     Neutrophils Absolute 02/09/2019 3 46     Immature Grans Absolute 02/09/2019 0 04     Lymphocytes Absolute 02/09/2019 1 87     Monocytes Absolute 02/09/2019 0 56     Eosinophils Absolute 02/09/2019 0 06     Basophils Absolute 02/09/2019 0 06     Sodium 02/09/2019 140     Potassium 02/09/2019 4 6     Chloride 02/09/2019 104     CO2 02/09/2019 28     ANION GAP 02/09/2019 8     BUN 02/09/2019 13     Creatinine 02/09/2019 0 91     Glucose 02/09/2019 94     Calcium 02/09/2019 8 9     AST 02/09/2019 19     ALT 02/09/2019 25     Alkaline Phosphatase 02/09/2019 70     Total Protein 02/09/2019 7 1     Albumin 02/09/2019 3 9     Total Bilirubin 02/09/2019 0 45     eGFR 02/09/2019 107     Lipase 02/09/2019 130     Color, UA 02/09/2019 Yellow     Clarity, UA 02/09/2019 Clear     Specific Gravity, UA 02/09/2019 1 020     pH, UA 02/09/2019 7 0     Leukocytes, UA 02/09/2019 Negative     Nitrite, UA 02/09/2019 Negative     Protein, UA 02/09/2019 Negative     Glucose, UA 02/09/2019 Negative     Ketones, UA 02/09/2019 Negative     Urobilinogen, UA 02/09/2019 0 2     Bilirubin, UA 02/09/2019 Negative     Blood, UA 02/09/2019 Negative          Radiology Results:   No results found

## 2019-09-16 LAB
ALBUMIN SERPL-MCNC: 4.2 G/DL (ref 3.6–5.1)
ALBUMIN/GLOB SERPL: 1.5 (CALC) (ref 1–2.5)
ALP SERPL-CCNC: 74 U/L (ref 40–115)
ALT SERPL-CCNC: 27 U/L (ref 9–46)
AST SERPL-CCNC: 18 U/L (ref 10–40)
BASOPHILS # BLD AUTO: 97 CELLS/UL (ref 0–200)
BASOPHILS NFR BLD AUTO: 1.2 %
BILIRUB SERPL-MCNC: 0.4 MG/DL (ref 0.2–1.2)
BUN SERPL-MCNC: 11 MG/DL (ref 7–25)
BUN/CREAT SERPL: NORMAL (CALC) (ref 6–22)
CALCIUM SERPL-MCNC: 9.5 MG/DL (ref 8.6–10.3)
CHLORIDE SERPL-SCNC: 107 MMOL/L (ref 98–110)
CO2 SERPL-SCNC: 26 MMOL/L (ref 20–32)
CREAT SERPL-MCNC: 1.03 MG/DL (ref 0.6–1.35)
CRP SERPL-MCNC: 1.2 MG/L
EOSINOPHIL # BLD AUTO: 162 CELLS/UL (ref 15–500)
EOSINOPHIL NFR BLD AUTO: 2 %
ERYTHROCYTE [DISTWIDTH] IN BLOOD BY AUTOMATED COUNT: 13.1 % (ref 11–15)
GLOBULIN SER CALC-MCNC: 2.8 G/DL (CALC) (ref 1.9–3.7)
GLUCOSE SERPL-MCNC: 96 MG/DL (ref 65–99)
HCT VFR BLD AUTO: 46.4 % (ref 38.5–50)
HGB BLD-MCNC: 15.7 G/DL (ref 13.2–17.1)
LIPASE SERPL-CCNC: 17 U/L (ref 7–60)
LYMPHOCYTES # BLD AUTO: 1887 CELLS/UL (ref 850–3900)
LYMPHOCYTES NFR BLD AUTO: 23.3 %
MCH RBC QN AUTO: 31.8 PG (ref 27–33)
MCHC RBC AUTO-ENTMCNC: 33.8 G/DL (ref 32–36)
MCV RBC AUTO: 93.9 FL (ref 80–100)
MONOCYTES # BLD AUTO: 616 CELLS/UL (ref 200–950)
MONOCYTES NFR BLD AUTO: 7.6 %
NEUTROPHILS # BLD AUTO: 5338 CELLS/UL (ref 1500–7800)
NEUTROPHILS NFR BLD AUTO: 65.9 %
PLATELET # BLD AUTO: 247 THOUSAND/UL (ref 140–400)
PMV BLD REES-ECKER: 10 FL (ref 7.5–12.5)
POTASSIUM SERPL-SCNC: 4.9 MMOL/L (ref 3.5–5.3)
PROT SERPL-MCNC: 7 G/DL (ref 6.1–8.1)
RBC # BLD AUTO: 4.94 MILLION/UL (ref 4.2–5.8)
SL AMB EGFR AFRICAN AMERICAN: 106 ML/MIN/1.73M2
SL AMB EGFR NON AFRICAN AMERICAN: 92 ML/MIN/1.73M2
SODIUM SERPL-SCNC: 140 MMOL/L (ref 135–146)
WBC # BLD AUTO: 8.1 THOUSAND/UL (ref 3.8–10.8)

## 2019-09-18 ENCOUNTER — TELEPHONE (OUTPATIENT)
Dept: GASTROENTEROLOGY | Facility: MEDICAL CENTER | Age: 39
End: 2019-09-18

## 2019-09-18 NOTE — TELEPHONE ENCOUNTER
Dr Talat Choe patient    Judy Bryant from [address] [place] [address] called for clarification on a test culture that was ordered by Dr Maty Kim Dad can be reached at [phone]

## 2019-09-26 ENCOUNTER — OFFICE VISIT (OUTPATIENT)
Dept: GASTROENTEROLOGY | Facility: MEDICAL CENTER | Age: 39
End: 2019-09-26
Payer: COMMERCIAL

## 2019-09-26 VITALS
BODY MASS INDEX: 24.62 KG/M2 | DIASTOLIC BLOOD PRESSURE: 78 MMHG | SYSTOLIC BLOOD PRESSURE: 112 MMHG | WEIGHT: 166.2 LBS | TEMPERATURE: 98.6 F | HEIGHT: 69 IN | HEART RATE: 68 BPM

## 2019-09-26 DIAGNOSIS — R19.7 DIARRHEA, UNSPECIFIED TYPE: Primary | ICD-10-CM

## 2019-09-26 DIAGNOSIS — R10.9 ABDOMINAL PAIN, UNSPECIFIED ABDOMINAL LOCATION: ICD-10-CM

## 2019-09-26 LAB — CALPROTECTIN STL-MCNT: <15.6 MCG/G

## 2019-09-26 PROCEDURE — 99244 OFF/OP CNSLTJ NEW/EST MOD 40: CPT | Performed by: INTERNAL MEDICINE

## 2019-09-26 RX ORDER — DIAZEPAM 5 MG/1
5 TABLET ORAL ONCE AS NEEDED
Qty: 1 TABLET | Refills: 0 | Status: SHIPPED | OUTPATIENT
Start: 2019-09-26 | End: 2019-10-16

## 2019-09-26 NOTE — PATIENT INSTRUCTIONS
Today we discussed:  -- We will check some blood work and a stool tests to evaluate for other possible explanations for your symptoms  -- We will get a special MRI to look for inflammation in the small bowel  -- Please  over the counter IBGard and take this 4 times a day  -- You can continue your Probiotic and digestive aid  -- Please make sure to drink plenty of water (enough so that your urine is a light yellow color), try to exercise for 15-30 minutes on most days of the week    Follow-up in the office in 6 weeks, but be in touch via the [employer] portal or by phone with questions, concerns or changes

## 2019-09-27 ENCOUNTER — TELEPHONE (OUTPATIENT)
Dept: GASTROENTEROLOGY | Facility: AMBULARY SURGERY CENTER | Age: 39
End: 2019-09-27

## 2019-09-27 NOTE — TELEPHONE ENCOUNTER
Tried calling  Incorrect number   Location of [facility] patient is at is unknown so I am unable to reach appropriate lab to clarify

## 2019-09-27 NOTE — TELEPHONE ENCOUNTER
Go patient      He is currently at [facility] and they have a question about the order      Evelyn Cleveland @ [facility] #   [phone], sent tiger text

## 2019-09-30 LAB
IGA SERPL-MCNC: 176 MG/DL (ref 47–310)
M TB IFN-G CD4+ BCKGRND COR BLD-ACNC: 0 IU/ML
M TB IFN-G CD4+ BCKGRND COR BLD-ACNC: <0 IU/ML
M TB IFN-G CD4+ T-CELLS BLD-ACNC: 0.03 IU/ML
M TB TUBERC IFN-G BLD QL: NEGATIVE
M TB TUBERC IGNF/MITOGEN IGNF CONTROL: >10 IU/ML
TSH SERPL-ACNC: 0.99 MIU/L (ref 0.4–4.5)
TTG IGA SER-ACNC: 1 U/ML

## 2019-10-10 LAB — CALPROTECTIN STL-MCNT: 80.6 MCG/G

## 2019-10-11 LAB — ELASTASE PANC STL-MCNT: >500 MCG/G

## 2019-10-15 ENCOUNTER — DOCUMENTATION (OUTPATIENT)
Dept: GASTROENTEROLOGY | Facility: MEDICAL CENTER | Age: 39
End: 2019-10-15

## 2019-10-16 ENCOUNTER — OFFICE VISIT (OUTPATIENT)
Dept: INTERNAL MEDICINE CLINIC | Facility: CLINIC | Age: 39
End: 2019-10-16
Payer: COMMERCIAL

## 2019-10-16 VITALS
HEIGHT: 69 IN | TEMPERATURE: 98.4 F | WEIGHT: 167.2 LBS | HEART RATE: 74 BPM | DIASTOLIC BLOOD PRESSURE: 100 MMHG | OXYGEN SATURATION: 97 % | BODY MASS INDEX: 24.76 KG/M2 | SYSTOLIC BLOOD PRESSURE: 142 MMHG

## 2019-10-16 DIAGNOSIS — R51.9 CHRONIC INTRACTABLE HEADACHE, UNSPECIFIED HEADACHE TYPE: ICD-10-CM

## 2019-10-16 DIAGNOSIS — J30.2 SEASONAL ALLERGIC RHINITIS, UNSPECIFIED TRIGGER: Primary | ICD-10-CM

## 2019-10-16 DIAGNOSIS — Z13.220 LIPID SCREENING: ICD-10-CM

## 2019-10-16 DIAGNOSIS — F41.9 ANXIETY: ICD-10-CM

## 2019-10-16 DIAGNOSIS — G89.29 CHRONIC INTRACTABLE HEADACHE, UNSPECIFIED HEADACHE TYPE: ICD-10-CM

## 2019-10-16 PROBLEM — R03.0 WHITE COAT SYNDROME WITHOUT DIAGNOSIS OF HYPERTENSION: Status: ACTIVE | Noted: 2019-10-16

## 2019-10-16 PROBLEM — L02.216 ABSCESS OF UMBILICUS: Status: RESOLVED | Noted: 2018-08-24 | Resolved: 2019-10-16

## 2019-10-16 PROBLEM — J30.89 ENVIRONMENTAL AND SEASONAL ALLERGIES: Status: RESOLVED | Noted: 2017-09-14 | Resolved: 2019-10-16

## 2019-10-16 PROCEDURE — 99214 OFFICE O/P EST MOD 30 MIN: CPT | Performed by: INTERNAL MEDICINE

## 2019-10-16 RX ORDER — CLONAZEPAM 0.5 MG/1
0.5 TABLET ORAL EVERY 12 HOURS PRN
Qty: 60 TABLET | Refills: 1 | Status: SHIPPED | OUTPATIENT
Start: 2019-10-16 | End: 2019-10-16 | Stop reason: SDUPTHER

## 2019-10-16 RX ORDER — DULOXETIN HYDROCHLORIDE 20 MG/1
20 CAPSULE, DELAYED RELEASE ORAL DAILY
Qty: 30 CAPSULE | Refills: 3 | Status: SHIPPED | OUTPATIENT
Start: 2019-10-16 | End: 2019-11-14

## 2019-10-16 RX ORDER — CLONAZEPAM 0.5 MG/1
0.5 TABLET ORAL EVERY 12 HOURS PRN
Qty: 60 TABLET | Refills: 1 | Status: SHIPPED | OUTPATIENT
Start: 2019-10-16 | End: 2019-12-16 | Stop reason: SDUPTHER

## 2019-10-16 NOTE — ASSESSMENT & PLAN NOTE
Will defer on referring to Neurology at this time  Cymbalta may help decrease the frequency of headaches  Continue with Tylenol as needed for breakthrough headaches

## 2019-10-16 NOTE — PROGRESS NOTES
Assessment/Plan:    Allergic rhinitis  Continue zyrtec and flonase  Anxiety  He is willing to trial Cymbalta 20 mg daily  Continue clonazepam 0 5 mg q 12 hours as needed for increased agitation/insomnia as well as medical marijuana  White coat syndrome without diagnosis of hypertension  Previous blood pressure readings are within acceptable range  Blood pressure is elevated today and is likely attributed to anxiety  Will defer on starting antihypertensives at this time  Chronic headaches  Will defer on referring to Neurology at this time  Cymbalta may help decrease the frequency of headaches  Continue with Tylenol as needed for breakthrough headaches  Diagnoses and all orders for this visit:    Seasonal allergic rhinitis, unspecified trigger    Anxiety  -     clonazePAM (KlonoPIN) 0 5 mg tablet; Take 1 tablet (0 5 mg total) by mouth every 12 (twelve) hours as needed for anxiety  -     DULoxetine (CYMBALTA) 20 mg capsule; Take 1 capsule (20 mg total) by mouth daily    BMI 25 0-25 9,adult    Lipid screening  -     Lipid panel; Future    Chronic intractable headache, unspecified headache type            Tobacco Cessation Counseling: Tobacco cessation counseling was provided  The patient is sincerely urged to quit consumption of tobacco  He is not ready to quit tobacco  Medication options and side effects of medication discussed  Patient refused medication  Subjective:      Patient ID: Davidson Burrell is a 46 y o  male  46year old male is seen today for follow up of chronic conditions  Since last visit, he has establish care with a gastroenterologist who plans on undergoing evaluation for inflammatory versus irritable bowel disease  Plan is to undergo MRI of the abdomen for further evaluation  He gets headaches regularly, sometimes lasting days at a time and frequency of more than 15 times a month  Anxiety   Presents for follow-up visit   Symptoms include excessive worry and nervous/anxious behavior  Patient reports no chest pain, compulsions, confusion, decreased concentration, depressed mood, dizziness, dry mouth, feeling of choking, hyperventilation, impotence, insomnia, irritability, malaise, muscle tension, nausea, obsessions, palpitations, panic, restlessness, shortness of breath or suicidal ideas  The severity of symptoms is moderate  The patient sleeps 6 hours per night  The quality of sleep is fair  Compliance with medications is % (klonopin, medical marijuana)  The following portions of the patient's history were reviewed and updated as appropriate: allergies, current medications, past family history, past medical history, past social history, past surgical history and problem list     Review of Systems   Constitutional: Negative  Negative for chills, fatigue, fever and irritability  HENT: Negative for congestion, ear pain, postnasal drip, rhinorrhea and sore throat  Eyes: Negative  Respiratory: Negative for cough, chest tightness, shortness of breath and wheezing  Cardiovascular: Negative for chest pain and palpitations  Gastrointestinal: Negative for abdominal distention, abdominal pain, blood in stool, constipation, diarrhea and nausea  Endocrine: Negative  Genitourinary: Negative for difficulty urinating, dysuria, hematuria and impotence  Musculoskeletal: Negative  Skin: Negative  Allergic/Immunologic: Negative for environmental allergies and food allergies  Neurological: Negative  Negative for dizziness  Hematological: Negative for adenopathy  Psychiatric/Behavioral: Negative for agitation, behavioral problems, confusion, decreased concentration, sleep disturbance and suicidal ideas  The patient is nervous/anxious  The patient does not have insomnia  Past Medical History:   Diagnosis Date    Abdominal pain     Abnormal liver function test     last assessed:  Oct 16, 2013     Abnormal weight loss     last assessed: Yung 15, 2014     Allergic rhinitis due to pollen     last assessed: Yung 15, 2014     Anxiety     Anxiety     last assessed/resolved:  Aug 5, 2015     Asthma     last assessed: Yung 15, 2014     Benign essential HTN     last assessed/resolved: Feb 23, 2017     Cervical lymphadenopathy     last assessed/resolved: Feb 23, 2017     Chronic sinusitis     last assessed: Yung 15, 2014     Constipation     [facility]2011    last assessed: Yung 15, 2014     Dysuria     last assessed: April 29, 2016     Elevated BP without diagnosis of hypertension     last assessed/resolved: Feb 23, 2017     Esophageal reflux     last assessed/resolved: Feb 23, 2017     Eustachian tube anomaly     Resolved: Nov 9, 2017     External hemorrhoids     last assessed: Yung 15, 2014     Hypertension     borderline    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     last assessed/resolved: July 19, 2017     Inflammatory bowel disease     Pancreatic neoplasm     last assessed: Yung 15, 2014     PONV (postoperative nausea and vomiting)     Positive depression screening     resolved: July 24, 2017     Psoriasis     Seasonal allergies     Small bowel obstruction ([facility] ) 11/11/2018    Vitamin B12 deficiency     last assessed/resolved: Feb 23, 2017          Current Outpatient Medications:     acetaminophen (TYLENOL) 500 mg tablet, Take 10 mg/kg by mouth every 4 (four) hours as needed  , Disp: , Rfl:     cetirizine (ZyrTEC) 10 mg tablet, Take 1 tablet (10 mg total) by mouth daily as needed for allergies, Disp: 30 tablet, Rfl: 5    clonazePAM (KlonoPIN) 0 5 mg tablet, Take 1 tablet (0 5 mg total) by mouth every 12 (twelve) hours as needed for anxiety, Disp: 60 tablet, Rfl: 1    diphenhydrAMINE (BENADRYL) 25 mg tablet, Take 25 mg by mouth as needed  , Disp: , Rfl:     fluticasone (FLONASE) 50 mcg/act nasal spray, 1 spray into each nostril daily as needed for rhinitis, Disp: 3 Bottle, Rfl: 1    Linaclotide (LINZESS) 145 MCG CAPS, Take 1 capsule (145 mcg total) by mouth daily, Disp: 90 capsule, Rfl: 1    DULoxetine (CYMBALTA) 20 mg capsule, Take 1 capsule (20 mg total) by mouth daily, Disp: 30 capsule, Rfl: 3    Allergies   Allergen Reactions    Apple Anaphylaxis    Aspirin Anaphylaxis and Hives     Category: Allergy;     Eggs Or Egg-Derived Products Anaphylaxis     Category: Allergy;     Ibuprofen Anaphylaxis and Hives     Category: Allergy; aspirin    Nsaids Anaphylaxis     Category: Allergy;     Other Anaphylaxis     Category: Allergy; [facility] - [id number]: cherries, grapes , and kiwis; pt states all fruits    Peanuts [Peanut Oil] Anaphylaxis     nuts    Penicillins Shortness Of Breath    Pollen Extract     Codeine Anxiety       Social History   Past Surgical History:   Procedure Laterality Date    CHOLECYSTECTOMY      resolved: 2011     COLONOSCOPY N/A 11/18/2016    Procedure: COLONOSCOPY;  Surgeon: Wendy Ding MD;  Location:  GI LAB; Service:     COLONOSCOPY N/A 4/28/2016    Procedure: COLONOSCOPY;  Surgeon: Wendy Ding MD;  Location: [facility] GI LAB; Service:     ESOPHAGOGASTRODUODENOSCOPY N/A 4/28/2016    Procedure: ESOPHAGOGASTRODUODENOSCOPY (EGD); Surgeon: Wendy Ding MD;  Location: [facility] GI LAB; Service:     FRONTAL SINUSOTOMY      resolved: April 2009     PANCREAS SURGERY      GA COLONOSCOPY FLX DX W/COLLJ [facility] WHEN PFRMD N/A 10/12/2018    Procedure: EGD AND COLONOSCOPY;  Surgeon: Thanh Salgado MD;  Location: [facility] GI LAB;   Service: Gastroenterology    GA LAP, INCISIONAL HERNIA REPAIR,REDUCIBLE N/A 11/8/2018    Procedure: REPAIR HERNIA INCISIONAL LAPAROSCOPIC;  Surgeon: Jose Mfcarlane MD;  Location: [facility];  Service: General    GA REPAIR OF NASAL SEPTUM N/A 7/28/2017    Procedure: REVISION SEPTOPLASTY; TURBINOPLASTY; BILATERAL  GRAFTS; ALAR GRAFT; POSSIBLE AURICULAR CARTILAGE GRAFT;  Surgeon: Sailaja Soliz MD;  Location: [facility];  Service: ENT    TONSILLECTOMY AND ADENOIDECTOMY       Family History   Problem Relation Age of Onset    Diabetes Mother         mellitus     Hypertension Mother     Thyroid disease Mother     Fibromyalgia Mother     Hypertension Father     Hypertension Sister     No Known Problems Brother     No Known Problems Maternal Grandmother     No Known Problems Maternal Grandfather     Diabetes Paternal Grandmother     Diabetes Paternal Grandfather     Diabetes Other         mellitus     Rheum arthritis Cousin        Objective:  /100 (BP Location: Left arm, Patient Position: Sitting, Cuff Size: Standard)   Pulse 74   Temp 98 4 °F (36 9 °C) (Oral)   Ht 5' 8 5" (1 74 m)   Wt 75 8 kg (167 lb 3 2 oz)   SpO2 97%   BMI 25 05 kg/m²     Recent Results (from the past 1344 hour(s))   Comprehensive metabolic panel    Collection Time: 09/13/19 12:32 PM   Result Value Ref Range    Glucose, Random 96 65 - 99 mg/dL    BUN 11 7 - 25 mg/dL    Creatinine 1 03 0 60 - 1 35 mg/dL    eGFR Non  92 > OR = 60 mL/min/1 73m2    eGFR  106 > OR = 60 mL/min/1 73m2    SL AMB BUN/CREATININE RATIO NOT APPLICABLE 6 - 22 (calc)    Sodium 140 135 - 146 mmol/L    Potassium 4 9 3 5 - 5 3 mmol/L    Chloride 107 98 - 110 mmol/L    CO2 26 20 - 32 mmol/L    SL AMB CALCIUM 9 5 8 6 - 10 3 mg/dL    Protein, Total 7 0 6 1 - 8 1 g/dL    Albumin 4 2 3 6 - 5 1 g/dL    Globulin 2 8 1 9 - 3 7 g/dL (calc)    Albumin/Globulin Ratio 1 5 1 0 - 2 5 (calc)    TOTAL BILIRUBIN 0 4 0 2 - 1 2 mg/dL    Alkaline Phosphatase 74 40 - 115 U/L    AST 18 10 - 40 U/L    ALT 27 9 - 46 U/L   CBC and differential    Collection Time: 09/13/19 12:32 PM   Result Value Ref Range    White Blood Cell Count 8 1 3 8 - 10 8 Thousand/uL    Red Blood Cell Count 4 94 4 20 - 5 80 Million/uL    Hemoglobin 15 7 13 2 - 17 1 g/dL    HCT 46 4 38 5 - 50 0 %    MCV 93 9 80 0 - 100 0 fL    MCH 31 8 27 0 - 33 0 pg    MCHC 33 8 32 0 - 36 0 g/dL    RDW 13 1 11 0 - 15 0 %    Platelet Count 729 688 - 400 Thousand/uL    SL AMB MPV 10 0 7 5 - 12 5 fL    Neutrophils (Absolute) 5,338 1,500 - 7,800 cells/uL    Lymphocytes (Absolute) 1,887 850 - 3,900 cells/uL    Monocytes (Absolute) 616 200 - 950 cells/uL    Eosinophils (Absolute) 162 15 - 500 cells/uL    Basophils ABS 97 0 - 200 cells/uL    Neutrophils 65 9 %    Lymphocytes 23 3 %    Monocytes 7 6 %    Eosinophils 2 0 %    Basophils PCT 1 2 %   C-reactive protein    Collection Time: 09/13/19 12:32 PM   Result Value Ref Range    C-Reactive Protein, Quant 1 2 <8 0 mg/L   Lipase    Collection Time: 09/13/19 12:32 PM   Result Value Ref Range    Lipase, Serum 17 7 - 60 U/L   Calprotectin,Fecal    Collection Time: 09/18/19 12:03 PM   Result Value Ref Range    Calprotectin <15 6 mcg/g   Clostridium difficile Toxin/GDH W/Refl To PCR    Collection Time: 09/18/19 12:03 PM   Result Value Ref Range    C   Diff Toxin/GDH w/Reflx to PCR     Stool Enteric Bacterial Panel by PCR    Collection Time: 09/18/19 12:03 PM   Result Value Ref Range    [place]/SHIG/CAMPY, CULTURE/SHIGA TOXIN, EIA W/RFL TO E COLI, CULTURE      Culture Result      SAL/SHIG/CAMPY, CULTURE/SHIGA TOXIN, EIA W/RFL TO E COLI, CULTURE     Celiac Disease Comprehensive Panel    Collection Time: 09/27/19  8:52 AM   Result Value Ref Range    Interpretation      TISSUE TRANSGLUTAMINASE IGA 1 U/mL    IgA 176 47 - 310 mg/dL   TSH, 3rd generation with Free T4 reflex    Collection Time: 09/27/19  8:52 AM   Result Value Ref Range    TSH W/RFX TO FREE T4 0 99 0 40 - 4 50 mIU/L   Quantiferon-TB Gold Plus, 1 tube    Collection Time: 09/27/19  8:52 AM   Result Value Ref Range    [facility] Quantiferon(R)-TB Gold Plus, 1 Tube NEGATIVE NEGATIVE    NIL 0 03 IU/mL    Mitogen-NIL >10 00 IU/mL    TB1-NIL <0 00 IU/mL    TB2-NIL 0 00 IU/mL   Calprotectin,Fecal    Collection Time: 10/04/19  8:47 AM   Result Value Ref Range    Calprotectin 80 6 mcg/g   Fecal fat, qualitative    Collection Time: 10/04/19  8:47 AM   Result Value Ref Range    Fecal Fat, Qualitative     Pancreatic elastase, fecal    Collection Time: 10/04/19  8:47 AM   Result Value Ref Range    Pancreatic Elastase-1 >500 mcg/g            Physical Exam   Constitutional: He is oriented to person, place, and time  He appears well-developed and well-nourished  No distress  HENT:   Head: Normocephalic and atraumatic  Eyes: Pupils are equal, round, and reactive to light  Conjunctivae and EOM are normal  Right eye exhibits no discharge  Left eye exhibits no discharge  No scleral icterus  Neck: Normal range of motion  Neck supple  No JVD present  No thyromegaly present  Cardiovascular: Normal rate, regular rhythm, normal heart sounds and intact distal pulses  Exam reveals no gallop and no friction rub  No murmur heard  Pulmonary/Chest: Effort normal and breath sounds normal  No respiratory distress  He has no wheezes  He has no rales  He exhibits no tenderness  Abdominal: Soft  Bowel sounds are normal  He exhibits no distension and no mass  There is no tenderness  There is no rebound and no guarding  Musculoskeletal: Normal range of motion  He exhibits no edema, tenderness or deformity  Lymphadenopathy:     He has no cervical adenopathy  Neurological: He is alert and oriented to person, place, and time  He has normal reflexes  No cranial nerve deficit  Coordination normal    Skin: Skin is warm and dry  No rash noted  He is not diaphoretic  No erythema  No pallor  Psychiatric: He has a normal mood and affect   His behavior is normal  Judgment and thought content normal

## 2019-10-16 NOTE — ASSESSMENT & PLAN NOTE
He is willing to trial Cymbalta 20 mg daily  Continue clonazepam 0 5 mg q 12 hours as needed for increased agitation/insomnia as well as medical marijuana

## 2019-10-16 NOTE — ASSESSMENT & PLAN NOTE
Previous blood pressure readings are within acceptable range  Blood pressure is elevated today and is likely attributed to anxiety  Will defer on starting antihypertensives at this time

## 2019-10-31 ENCOUNTER — OFFICE VISIT (OUTPATIENT)
Dept: INTERNAL MEDICINE CLINIC | Facility: CLINIC | Age: 39
End: 2019-10-31
Payer: COMMERCIAL

## 2019-10-31 VITALS
SYSTOLIC BLOOD PRESSURE: 132 MMHG | BODY MASS INDEX: 24.73 KG/M2 | TEMPERATURE: 98.3 F | HEIGHT: 69 IN | WEIGHT: 167 LBS | DIASTOLIC BLOOD PRESSURE: 94 MMHG | HEART RATE: 54 BPM | OXYGEN SATURATION: 97 %

## 2019-10-31 DIAGNOSIS — R29.818 NEUROLOGICAL DEFICIT PRESENT: ICD-10-CM

## 2019-10-31 DIAGNOSIS — M54.42 ACUTE LEFT-SIDED LOW BACK PAIN WITH LEFT-SIDED SCIATICA: Primary | ICD-10-CM
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
## 2019-10-31 PROCEDURE — 99214 OFFICE O/P EST MOD 30 MIN: CPT | Performed by: INTERNAL MEDICINE

## 2019-10-31 PROCEDURE — 3008F BODY MASS INDEX DOCD: CPT | Performed by: INTERNAL MEDICINE

## 2019-10-31 NOTE — ASSESSMENT & PLAN NOTE
Will order an x-ray of lumbar spine for further evaluation  Defer on MRI of lumbar spine at this time despite neurologic deficits due to delay in MRI scheduling  Will refer to Physical therapy for further evaluation  Continue with Tylenol as needed for pain

## 2019-10-31 NOTE — ASSESSMENT & PLAN NOTE
Tick removed today  No rash appreciated  Will defer on antibiotics at this time  If he develops a rash or arthralgia, will order Lyme disease panel and start antibiotics

## 2019-10-31 NOTE — PROGRESS NOTES
Assessment/Plan:    Acute left-sided low back pain with left-sided sciatica  Will order an x-ray of lumbar spine for further evaluation  Defer on MRI of lumbar spine at this time despite neurologic deficits due to delay in MRI scheduling  Will refer to Physical therapy for further evaluation  Continue with Tylenol as needed for pain  Tick bite  Tick removed today  No rash appreciated  Will defer on antibiotics at this time  If he develops a rash or arthralgia, will order Lyme disease panel and start antibiotics  Neurological deficit present  Left lower extremity weakness (4/5) and weakness of left foot dorsiflexion  Diagnoses and all orders for this visit:    Acute left-sided low back pain with left-sided sciatica  -     Cancel: MRI lumbar spine wo contrast; Future  -     Ambulatory referral to Physical Therapy; Future  -     XR spine lumbar minimum 4 views non injury; Future    Tick bite, initial encounter    Neurological deficit present  -     XR spine lumbar minimum 4 views non injury; Future                Subjective:      Patient ID: Nathan Zazueta is a 46 y o  male  72-year-old male is seen today with concern for low back pain that he initially felt 1 5 weeks ago  Regarding low back pain, he denies any recent trauma or exertional activity  He reports numbness of his left great toe and is having difficulty with dorsiflexion of his left foot  He also reports intermittent episodes of left lower extremity weakness, causing him to trip on occasion  He also found a tick on his left posterior arm  He denies any rash  Back Pain   This is a new problem  The current episode started 1 to 4 weeks ago  The problem occurs daily  The problem is unchanged  The pain is present in the lumbar spine  The quality of the pain is described as stabbing  The pain radiates to the left knee, left thigh and left foot  The pain is at a severity of 7/10  The pain is moderate   The symptoms are aggravated by bending, twisting and position  Stiffness is present all day  Associated symptoms include numbness (left great toe)  Pertinent negatives include no abdominal pain, chest pain, dysuria, fever, headaches or weakness  Treatments tried: tylenol  The treatment provided mild relief  The following portions of the patient's history were reviewed and updated as appropriate: allergies, current medications, past family history, past medical history, past social history, past surgical history and problem list     Review of Systems   Constitutional: Negative for activity change, appetite change, chills, diaphoresis, fatigue and fever  HENT: Negative for congestion, postnasal drip, rhinorrhea, sinus pressure, sinus pain, sneezing and sore throat  Eyes: Negative for visual disturbance  Respiratory: Negative for apnea, cough, choking, chest tightness, shortness of breath and wheezing  Cardiovascular: Negative for chest pain, palpitations and leg swelling  Gastrointestinal: Negative for abdominal distention, abdominal pain, anal bleeding, blood in stool, constipation, diarrhea, nausea and vomiting  Endocrine: Negative for cold intolerance and heat intolerance  Genitourinary: Negative for difficulty urinating, dysuria and hematuria  Musculoskeletal: Positive for back pain  Skin: Negative  Neurological: Positive for numbness (left great toe)  Negative for dizziness, weakness, light-headedness and headaches  Hematological: Negative for adenopathy  Psychiatric/Behavioral: Negative for agitation, sleep disturbance and suicidal ideas  All other systems reviewed and are negative  Past Medical History:   Diagnosis Date    Abdominal pain     Abnormal liver function test     last assessed: Oct 16, 2013     Abnormal weight loss     last assessed: Yung 15, 2014     Allergic rhinitis due to pollen     last assessed: Yung 15, 2014     Anxiety     Anxiety     last assessed/resolved:  Aug 5, 2015    Shayne Bones Asthma     last assessed: Yung 15, 2014     Benign essential HTN     last assessed/resolved: Feb 23, 2017     Cervical lymphadenopathy     last assessed/resolved: Feb 23, 2017     Chronic sinusitis     last assessed: Yung 15, 2014     Constipation     Crohn's disease ([facility] [address] ) 01/01/2011    last assessed: Yung 15, 2014     Dysuria     last assessed: April 29, 2016     Elevated BP without diagnosis of hypertension     last assessed/resolved: Feb 23, 2017     Esophageal reflux     last assessed/resolved: Feb 23, 2017     Eustachian tube anomaly     Resolved: Nov 9, 2017     External hemorrhoids     last assessed: Yung 15, 2014     Hypertension     borderline    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     last assessed/resolved: July 19, 2017     Inflammatory bowel disease     Pancreatic neoplasm     last assessed: Yung 15, 2014     PONV (postoperative nausea and vomiting)     Positive depression screening     resolved: July 24, 2017     Psoriasis     Seasonal allergies     Small bowel obstruction ([facility] ) 11/11/2018    Vitamin B12 deficiency     last assessed/resolved: Feb 23, 2017          Current Outpatient Medications:     acetaminophen (TYLENOL) 500 mg tablet, Take 10 mg/kg by mouth every 4 (four) hours as needed  , Disp: , Rfl:     cetirizine (ZyrTEC) 10 mg tablet, Take 1 tablet (10 mg total) by mouth daily as needed for allergies, Disp: 30 tablet, Rfl: 5    clonazePAM (KlonoPIN) 0 5 mg tablet, Take 1 tablet (0 5 mg total) by mouth every 12 (twelve) hours as needed for anxiety, Disp: 60 tablet, Rfl: 1    diphenhydrAMINE (BENADRYL) 25 mg tablet, Take 25 mg by mouth as needed  , Disp: , Rfl:     DULoxetine (CYMBALTA) 20 mg capsule, Take 1 capsule (20 mg total) by mouth daily, Disp: 30 capsule, Rfl: 3    fluticasone (FLONASE) 50 mcg/act nasal spray, 1 spray into each nostril daily as needed for rhinitis, Disp: 3 Bottle, Rfl: 1    Linaclotide (LINZESS) 145 MCG CAPS, Take 1 capsule (145 mcg total) by mouth daily, Disp: 90 capsule, Rfl: 1    Allergies   Allergen Reactions    Apple Anaphylaxis    Aspirin Anaphylaxis and Hives     Category: Allergy;     Eggs Or Egg-Derived Products Anaphylaxis     Category: Allergy;     Ibuprofen Anaphylaxis and Hives     Category: Allergy; aspirin    Nsaids Anaphylaxis     Category: Allergy;     Other Anaphylaxis     Category: Allergy; Annotation - [id number]: cherries, grapes , and kiwis; pt states all fruits    Peanuts [Peanut Oil] Anaphylaxis     nuts    Penicillins Shortness Of Breath    Pollen Extract     Codeine Anxiety       Social History   Past Surgical History:   Procedure Laterality Date    CHOLECYSTECTOMY      resolved: 2011     COLONOSCOPY N/A 11/18/2016    Procedure: COLONOSCOPY;  Surgeon: Josiane Denise MD;  Location:  GI LAB; Service:     COLONOSCOPY N/A 4/28/2016    Procedure: COLONOSCOPY;  Surgeon: Josiane Denise MD;  Location: [facility] GI LAB; Service:     ESOPHAGOGASTRODUODENOSCOPY N/A 4/28/2016    Procedure: ESOPHAGOGASTRODUODENOSCOPY (EGD); Surgeon: Josiane Denise MD;  Location: [facility] GI LAB; Service:     FRONTAL SINUSOTOMY      resolved: April 2009     PANCREAS SURGERY      AR COLONOSCOPY FLX DX W/[facility] WHEN PFRMD N/A 10/12/2018    Procedure: EGD AND COLONOSCOPY;  Surgeon: Ruchi Schaefer MD;  Location: [facility] GI LAB;   Service: Gastroenterology    AR LAP, INCISIONAL HERNIA REPAIR,REDUCIBLE N/A 11/8/2018    Procedure: REPAIR HERNIA INCISIONAL LAPAROSCOPIC;  Surgeon: Veronica Givens MD;  Location: [facility];  Service: General    AR REPAIR OF NASAL SEPTUM N/A 7/28/2017    Procedure: REVISION SEPTOPLASTY; TURBINOPLASTY; BILATERAL  GRAFTS; ALAR GRAFT; POSSIBLE AURICULAR CARTILAGE GRAFT;  Surgeon: Christin Ritchie MD;  Location: [facility];  Service: ENT    TONSILLECTOMY AND ADENOIDECTOMY       Family History   Problem Relation Age of Onset    Diabetes Mother         mellitus     Hypertension Mother     Thyroid disease Mother    Shamar Moose Fibromyalgia Mother     Hypertension Father     Hypertension Sister     No Known Problems Brother     No Known Problems Maternal Grandmother     No Known Problems Maternal Grandfather     Diabetes Paternal Grandmother     Diabetes Paternal Grandfather     Diabetes Other         mellitus     Rheum arthritis Cousin        Objective:  /94 (BP Location: Right arm, Patient Position: Sitting, Cuff Size: Standard)   Pulse (!) 54   Temp 98 3 °F (36 8 °C) (Oral)   Ht 5' 8 5" (1 74 m)   Wt 75 8 kg (167 lb)   SpO2 97%   BMI 25 02 kg/m²     Recent Results (from the past 1344 hour(s))   Comprehensive metabolic panel    Collection Time: 09/13/19 12:32 PM   Result Value Ref Range    Glucose, Random 96 65 - 99 mg/dL    BUN 11 7 - 25 mg/dL    Creatinine 1 03 0 60 - 1 35 mg/dL    eGFR Non  92 > OR = 60 mL/min/1 73m2    eGFR  106 > OR = 60 mL/min/1 73m2    SL AMB BUN/CREATININE RATIO NOT APPLICABLE 6 - 22 (calc)    Sodium 140 135 - 146 mmol/L    Potassium 4 9 3 5 - 5 3 mmol/L    Chloride 107 98 - 110 mmol/L    CO2 26 20 - 32 mmol/L    SL AMB CALCIUM 9 5 8 6 - 10 3 mg/dL    Protein, Total 7 0 6 1 - 8 1 g/dL    Albumin 4 2 3 6 - 5 1 g/dL    Globulin 2 8 1 9 - 3 7 g/dL (calc)    Albumin/Globulin Ratio 1 5 1 0 - 2 5 (calc)    TOTAL BILIRUBIN 0 4 0 2 - 1 2 mg/dL    Alkaline Phosphatase 74 40 - 115 U/L    AST 18 10 - 40 U/L    ALT 27 9 - 46 U/L   CBC and differential    Collection Time: 09/13/19 12:32 PM   Result Value Ref Range    White Blood Cell Count 8 1 3 8 - 10 8 Thousand/uL    Red Blood Cell Count 4 94 4 20 - 5 80 Million/uL    Hemoglobin 15 7 13 2 - 17 1 g/dL    HCT 46 4 38 5 - 50 0 %    MCV 93 9 80 0 - 100 0 fL    MCH 31 8 27 0 - 33 0 pg    MCHC 33 8 32 0 - 36 0 g/dL    RDW 13 1 11 0 - 15 0 %    Platelet Count 890 132 - 400 Thousand/uL    SL AMB MPV 10 0 7 5 - 12 5 fL    Neutrophils (Absolute) 5,338 1,500 - 7,800 cells/uL    Lymphocytes (Absolute) 1,887 850 - 3,900 cells/uL Monocytes (Absolute) 616 200 - 950 cells/uL    Eosinophils (Absolute) 162 15 - 500 cells/uL    Basophils ABS 97 0 - 200 cells/uL    Neutrophils 65 9 %    Lymphocytes 23 3 %    Monocytes 7 6 %    Eosinophils 2 0 %    Basophils PCT 1 2 %   C-reactive protein    Collection Time: 09/13/19 12:32 PM   Result Value Ref Range    C-Reactive Protein, Quant 1 2 <8 0 mg/L   Lipase    Collection Time: 09/13/19 12:32 PM   Result Value Ref Range    Lipase, Serum 17 7 - 60 U/L   Calprotectin,Fecal    Collection Time: 09/18/19 12:03 PM   Result Value Ref Range    Calprotectin <15 6 mcg/g   Clostridium difficile Toxin/GDH W/Refl To PCR    Collection Time: 09/18/19 12:03 PM   Result Value Ref Range    C   Diff Toxin/GDH w/Reflx to PCR     Stool Enteric Bacterial Panel by PCR    Collection Time: 09/18/19 12:03 PM   Result Value Ref Range    [place]/SHIG/CAMPY, CULTURE/SHIGA TOXIN, EIA W/RFL TO E COLI, CULTURE      Culture Result      SAL/SHIG/CAMPY, CULTURE/SHIGA TOXIN, EIA W/RFL TO E COLI, CULTURE     Celiac Disease Comprehensive Panel    Collection Time: 09/27/19  8:52 AM   Result Value Ref Range    Interpretation      TISSUE TRANSGLUTAMINASE IGA 1 U/mL    IgA 176 47 - 310 mg/dL   TSH, 3rd generation with Free T4 reflex    Collection Time: 09/27/19  8:52 AM   Result Value Ref Range    TSH W/RFX TO FREE T4 0 99 0 40 - 4 50 mIU/L   Quantiferon-TB Gold Plus, 1 tube    Collection Time: 09/27/19  8:52 AM   Result Value Ref Range    [facility] Quantiferon(R)-TB Gold Plus, 1 Tube NEGATIVE NEGATIVE    NIL 0 03 IU/mL    Mitogen-NIL >10 00 IU/mL    TB1-NIL <0 00 IU/mL    TB2-NIL 0 00 IU/mL   Calprotectin,Fecal    Collection Time: 10/04/19  8:47 AM   Result Value Ref Range    Calprotectin 80 6 mcg/g   Fecal fat, qualitative    Collection Time: 10/04/19  8:47 AM   Result Value Ref Range    Fecal Fat, Qualitative     Pancreatic elastase, fecal    Collection Time: 10/04/19  8:47 AM   Result Value Ref Range    Pancreatic Elastase-1 >500 mcg/g Physical Exam   Constitutional: He is oriented to person, place, and time  He appears well-developed and well-nourished  No distress  HENT:   Head: Normocephalic and atraumatic  Eyes: Pupils are equal, round, and reactive to light  Conjunctivae and EOM are normal  Right eye exhibits no discharge  Left eye exhibits no discharge  No scleral icterus  Neck: Normal range of motion  Neck supple  No JVD present  No thyromegaly present  Cardiovascular: Normal rate, regular rhythm, normal heart sounds and intact distal pulses  Exam reveals no gallop and no friction rub  No murmur heard  Pulmonary/Chest: Effort normal and breath sounds normal  No respiratory distress  He has no wheezes  He has no rales  He exhibits no tenderness  Abdominal: Soft  Bowel sounds are normal  He exhibits no distension and no mass  There is no tenderness  There is no rebound and no guarding  Musculoskeletal: Normal range of motion  He exhibits no edema or deformity  Lumbar back: He exhibits tenderness, pain and spasm  He exhibits normal range of motion, no bony tenderness, no swelling, no edema, no deformity and no laceration  Lymphadenopathy:     He has no cervical adenopathy  Neurological: He is alert and oriented to person, place, and time  He has normal reflexes  No cranial nerve deficit  Coordination normal    Decreased strength of the left lower extremity, 4/5  Decreased strength in dorsiflexion of the left foot  Skin: Skin is warm and dry  No rash noted  He is not diaphoretic  No erythema  No pallor  Psychiatric: He has a normal mood and affect  His behavior is normal  Judgment and thought content normal    Nursing note and vitals reviewed

## 2019-11-01 ENCOUNTER — HOSPITAL ENCOUNTER (OUTPATIENT)
Dept: RADIOLOGY | Facility: IMAGING CENTER | Age: 39
Discharge: HOME | End: 2019-11-01
Payer: COMMERCIAL

## 2019-11-01 ENCOUNTER — TRANSCRIBE ORDERS (OUTPATIENT)
Dept: ADMINISTRATIVE | Facility: HOSPITAL | Age: 39
End: 2019-11-01

## 2019-11-01 DIAGNOSIS — R29.818 NEUROLOGICAL DEFICIT PRESENT: ICD-10-CM

## 2019-11-01 DIAGNOSIS — M54.42 ACUTE LEFT-SIDED LOW BACK PAIN WITH LEFT-SIDED SCIATICA: ICD-10-CM

## 2019-11-01 PROCEDURE — 72110 X-RAY EXAM L-2 SPINE 4/>VWS: CPT

## 2019-11-04 ENCOUNTER — HOSPITAL ENCOUNTER (OUTPATIENT)
Dept: RADIOLOGY | Facility: HOSPITAL | Age: 39
Discharge: HOME | End: 2019-11-04
Attending: INTERNAL MEDICINE
Payer: COMMERCIAL

## 2019-11-04 DIAGNOSIS — R10.9 ABDOMINAL PAIN, UNSPECIFIED ABDOMINAL LOCATION: ICD-10-CM

## 2019-11-04 DIAGNOSIS — R19.7 DIARRHEA, UNSPECIFIED TYPE: ICD-10-CM

## 2019-11-04 PROCEDURE — 72197 MRI PELVIS W/O & W/DYE: CPT

## 2019-11-04 PROCEDURE — A9585 GADOBUTROL INJECTION: HCPCS | Performed by: INTERNAL MEDICINE

## 2019-11-04 PROCEDURE — 74183 MRI ABD W/O CNTR FLWD CNTR: CPT

## 2019-11-04 RX ADMIN — GLUCAGON HYDROCHLORIDE 1 MG: KIT at 08:45

## 2019-11-04 RX ADMIN — GADOBUTROL 8 ML: 604.72 INJECTION INTRAVENOUS at 08:54

## 2019-11-06 ENCOUNTER — EVALUATION (OUTPATIENT)
Dept: PHYSICAL THERAPY | Facility: CLINIC | Age: 39
End: 2019-11-06
Payer: COMMERCIAL

## 2019-11-06 DIAGNOSIS — M54.42 ACUTE LEFT-SIDED LOW BACK PAIN WITH LEFT-SIDED SCIATICA: Primary | ICD-10-CM

## 2019-11-06 PROCEDURE — 97535 SELF CARE MNGMENT TRAINING: CPT | Performed by: PHYSICAL THERAPIST

## 2019-11-06 PROCEDURE — 97110 THERAPEUTIC EXERCISES: CPT | Performed by: PHYSICAL THERAPIST

## 2019-11-06 PROCEDURE — 97162 PT EVAL MOD COMPLEX 30 MIN: CPT | Performed by: PHYSICAL THERAPIST

## 2019-11-06 NOTE — PROGRESS NOTES
PT Evaluation     Today's date: 2019  Patient name: Alejandro Salazar  : 1980  MRN: [id number]  Referring provider: Guillermo Hamilton MD  Dx:   Encounter Diagnosis     ICD-10-CM    1  Acute left-sided low back pain with left-sided sciatica M54 [id number]                   Assessment  Assessment details: Alejandro Salazar is a 46 y o  male with a history of Crohn's disease, asthma, anxiety, hx pancreas surgery, HTN, GERD, and Psoriasis that presents for a moderate complexity physical therapy initial evaluation  The patient demonstrates signs and symptoms consistent with left acute LBP with sciatica L LE  During the examination the patient demonstrated decreased L LE strength, decreased lumbar ROM, gait dysfunction, and pain  The patient's impairments are causing the following functional limitations: difficulty bending/stooping, difficulty lifting/carrying heavy objects, difficulty with prolonged sitting and walking, difficulty donning/doffing shoes/socks  The patient's clinical presentation is evolving due to the presence of neurologic symptoms, significant medical history, and significant functional limitation ( FOTO: 46%)  The patient will benefit from skilled PT services to address impairments, work towards goals, and restore PLOF  Impairments: abnormal or restricted ROM, activity intolerance, impaired physical strength, lacks appropriate home exercise program, pain with function and poor body mechanics  Functional limitations: difficulty bending/stooping, difficulty lifting/carrying heavy objects, difficulty with prolonged sitting and walking, difficulty donning/doffing shoes/socks  Symptom irritability: moderateBarriers to therapy: Significant medical history  Understanding of Dx/Px/POC: good   Prognosis: good  Prognosis details: Significant medical history/ Severe pain    Goals  STG: Achieve in 4 weeks  1    Decrease lumbar pain at worst by 50% to improve activity tolerance for self/care and leisure activities  2   Improve left LE strength by 1/2-1 muscle grade to improve ability to change body positions without difficulty  3   Improve lumbar extension ROM to minimal restriction to facilitate normal movement patterns for ADL's/work tasks  LTG: Achieve in 6-8 weeks  1  Patient to return to near [facility] as indicated by an increase in FOTO score of: 65% function  2   Patient tolerate walking 1/2 mile without feeling like his left leg will give out to achieve patient specific goal   3   Patient achieve independence with performing home exercise plan  Plan  Patient would benefit from: skilled physical therapy  Planned modality interventions: traction, ultrasound, unattended electrical stimulation, TENS, cryotherapy and thermotherapy: hydrocollator packs  Other planned modality interventions: IAS  Planned therapy interventions: joint mobilization, manual therapy, massage, neuromuscular re-education, patient education, postural training, self care, strengthening, stretching, therapeutic activities, therapeutic exercise, therapeutic training, home exercise program, abdominal trunk stabilization and body mechanics training  Frequency: 1-2x/wk  Duration in weeks: 6  Plan of Care beginning date: 11/6/2019  Plan of Care expiration date: 12/18/2019  Treatment plan discussed with: PTA and patient        Subjective Evaluation    History of Present Illness  Date of onset: 10/23/2019  Mechanism of injury: Afshan Ford is a 46 y o  male that presents to outpatient physical therapy with complaints of left sided low back pain radiating into his left thigh, knee, and toe along with muscle weakness down his left leg causing instability with walking  The patient reports onset 2 weeks ago with no TRACEY  The patient notes previous bouts of buttock pain up to 12 times over the past 4-5 years  The patient did not receive any treatment for this bout of pain other than he received Lumbar X-rays    The patient's main goal for physical therapy is to decrease his left leg pain and weakness so that he can walk and transfer without difficulty  XRAY Lumbar: IMPRESSION:     No acute osseous abnormality  Pain  Current pain ratin  At best pain ratin  At worst pain ratin  Location: left buttocks, left lateral lower leg, left great toe  Quality: sharp and dull ache  Relieving factors: ice and heat  Aggravating factors: sitting, lifting, walking and running  Progression: no change    Social Support  Steps to enter house: yes  4  Stairs in house: yes   8  Lives in: multiple-level home  Lives with: spouse    Employment status: not working  Hand dominance: right    Treatments  Current treatment: physical therapy  Patient Goals  Patient goals for therapy: decreased pain, increased motion, increased strength, independence with ADLs/IADLs and return to sport/leisure activities  Patient goal: walk and move without pain/instability at his left leg        Objective     Concurrent Complaints  Positive for disturbed sleep  Negative for night pain, bladder dysfunction, bowel dysfunction, saddle (S4) numbness, history of cancer, history of trauma and infection    Postural Observations  Seated posture: fair  Standing posture: fair  Correction of posture: has no consistent effect        Palpation   Left   Tenderness of the erector spinae       Additional Palpation Details  (+) tenderness at left buttocks    Neurological Testing     Sensation     Lumbar   Left   Diminished: light touch  Paresthesia: light touch    Comments   Left light touch: L4-L5 dermatome    Reflexes   Left   Patellar (L4): trace (1+)  Achilles (S1): trace (1+)  Babinski sign: negative    Right   Patellar (L4): normal (2+)  Achilles (S1): trace (1+)  Babinski sign: negative    Active Range of Motion     Lumbar   Flexion:  with pain Restriction level: minimal  Extension:  with pain Restriction level: moderate  Left lateral flexion:  Restriction level: moderate  Right lateral flexion:  Restriction level: moderate  Left rotation:  Restriction level: minimal  Right rotation:  Restriction level: minimal    Joint Play     Hypomobile: L3, L4 and L5     Pain: L3, L4 and L5   Mechanical Assessment    Cervical      Thoracic      Lumbar    Sitting flexion: sustained positions  Pain location: peripheralized  Pain intensity: worse  Standing extension: repeated movements  Pain location: centralized  Lying extension: repeated movements  Pain location: centralized    Strength/Myotome Testing     Left Hip   Planes of Motion   Flexion: 4    Right Hip   Planes of Motion   Flexion: 4    Left Knee   Flexion: 3-  Extension: 3-    Right Knee   Flexion: 4  Extension: 4    Left Ankle/Foot   Dorsiflexion: 3-  Plantar flexion: 3+  Great toe extension: 3-    Right Ankle/Foot   Plantar flexion: 4  Great toe extension: 5    Muscle Activation     Additional Muscle Activation Details  Decreased TrA activation    Tests     Lumbar   Negative SIJ compression and sacral spring   Left   Positive passive SLR and slump test      Right   Negative slump test      Additional Tests Details  The patient was unable to tolerate SLB with L LE due to weakness and pain    (+) back pain with L knee flexion in prone  (+) L LE pain with L piriformis stretching    Gait Abnormalities: The patient's gait is antalgic with L LE limp, unequal step lengths, slow gait speed, decreased left toe rollover, decreased left heel strike  Graphical documentation             Precautions: Avoid increasing L LE weakness / pain  RE-EVALUATION: 12/4/19    Specialty Daily Treatment Diary     Manual  11/6/19       STM lumbar paraspinals                Hamstring stretch B/L  *      Piriformis Stretch B/L Attempted p!p!  L LE       Hip Flexor Stretch B/L            Exercise Diary  11/6/19       Treadmill Ambulation        UBE Stand ALT FWD/BACK        Core brace        Brace with knee fall outs        Brace with Heelslides        Bridges        Self 90-90 hamstring stretch B/L        Prone press ups 3x10 with exhale       Standing back extensions 2x10       Quad DLS UE  LE  UE+LE        LTR cross legs        Clam shells (back stable)        Quadruped knee lifts        Lean on mat donkey kicks                        Sitting Posture correction Done 10 minutes                                   Modalities 11/6/19       CP/MHP L/B declined                               The patient was given a new home exercise plan with written handout, pictures, and verbal instruction  The patient accepts and understands the new home activities

## 2019-11-13 ENCOUNTER — OFFICE VISIT (OUTPATIENT)
Dept: PHYSICAL THERAPY | Facility: CLINIC | Age: 39
End: 2019-11-13
Payer: COMMERCIAL

## 2019-11-13 DIAGNOSIS — M54.42 ACUTE LEFT-SIDED LOW BACK PAIN WITH LEFT-SIDED SCIATICA: Primary | ICD-10-CM

## 2019-11-13 PROCEDURE — 97535 SELF CARE MNGMENT TRAINING: CPT | Performed by: PHYSICAL THERAPIST

## 2019-11-13 PROCEDURE — 97110 THERAPEUTIC EXERCISES: CPT | Performed by: PHYSICAL THERAPIST

## 2019-11-13 NOTE — PROGRESS NOTES
Daily Note     Today's date: 2019  Patient name: Bryce Melvin  : 1980  MRN: [id number]  Referring provider: Tirso Sosa MD  Dx:   Encounter Diagnosis     ICD-10-CM    1  Acute left-sided low back pain with left-sided sciatica M54 42                   Subjective: The patient reports feeling less pain at his back and posterior thigh - presently not painful  The patient continues to feel an "ache" at his left lower lateral leg and notes brief periods of time when his left great toe is less numb  Objective: See treatment diary below      Assessment: The patient demonstrated improved lumbar extension ROM today and notes decreased pain at his left posterior thigh/low back area  The patient notes his left lateral lower leg and foot continue to ache and feel weak - mild improvement noted  The patient needed verbal and manual cues to perform the new exercises properly today  The patient will benefit from continued PT to achieve his functional goals  Plan: Continue per plan of care  Progress treatment as tolerated  Precautions: Avoid increasing L LE weakness / pain  RE-EVALUATION: 19    Specialty Daily Treatment Diary     Manual  19      STM lumbar paraspinals        Grade 2 P/A mobs @ L-spine  Attempted - p! So they were held      Hamstring stretch B/L        Piriformis Stretch B/L Attempted p!p! L LE       Hip Flexor Stretch B/L            Exercise Diary  19      Treadmill Ambulation        UBE Stand ALT FWD/BACK  *90/70  6 mins      Core brace  *15x:05      Brace with knee fall outs        Step heel drop calf stretch  3x:30 p!p!       Bridges  *x15      Self 90-90 hamstring stretch B/L  *4x:20 B/L      Prone press ups 3x10 with exhale x10 with exhale      Standing back extensions 2x10 x10      Quad DLS UE  LE  UE+LE        Sit cross L over R and hinge forward  *4x:20      Clam shells (back stable)  *NV      Quadruped knee lifts        HR/TR  x15 Sitting Posture correction Done 10 minutes                                   Modalities 11/6/19 11/13/19      CP/MHP L/B declined declined                            The patient was given a new home exercise plan with written handout, pictures, and verbal instruction  The patient accepts and understands the new home activities

## 2019-11-14 ENCOUNTER — OFFICE VISIT (OUTPATIENT)
Dept: GASTROENTEROLOGY | Facility: MEDICAL CENTER | Age: 39
End: 2019-11-14
Payer: COMMERCIAL

## 2019-11-14 VITALS
BODY MASS INDEX: 25.41 KG/M2 | HEART RATE: 73 BPM | DIASTOLIC BLOOD PRESSURE: 74 MMHG | TEMPERATURE: 98.6 F | WEIGHT: 169.6 LBS | SYSTOLIC BLOOD PRESSURE: 116 MMHG

## 2019-11-14 DIAGNOSIS — R10.11 RIGHT UPPER QUADRANT ABDOMINAL PAIN: Primary | ICD-10-CM

## 2019-11-14 DIAGNOSIS — R19.7 DIARRHEA, UNSPECIFIED TYPE: ICD-10-CM

## 2019-11-14 PROCEDURE — 99214 OFFICE O/P EST MOD 30 MIN: CPT | Performed by: INTERNAL MEDICINE

## 2019-11-14 RX ORDER — AMITRIPTYLINE HYDROCHLORIDE 10 MG/1
10 TABLET, FILM COATED ORAL
Qty: 30 TABLET | Refills: 2 | Status: SHIPPED | OUTPATIENT
Start: 2019-11-14 | End: 2019-11-14

## 2019-11-14 RX ORDER — AMITRIPTYLINE HYDROCHLORIDE 10 MG/1
10 TABLET, FILM COATED ORAL
Qty: 30 TABLET | Refills: 2 | Status: SHIPPED | OUTPATIENT
Start: 2019-11-14 | End: 2020-01-09 | Stop reason: ALTCHOICE

## 2019-11-14 NOTE — PROGRESS NOTES
[facility] - Outpatient Consultation  Priya Chan 46 y o  male MRN: [id number]  Encounter: [id number]          ASSESSMENT AND PLAN:    Priya Chan is a 46 y o  male who presents with complaint of abdominal pain, prior diagnosis of possible Crohn's (based on a VCE) but multiple colonoscopies and pathology without evidence of Crohn's, suggesting he does not have Crohn's disease  Suspect symptoms are functional in nature  Available blood, imaging, endoluminal evaluations and pathology reviewed  1  Abdominal pain, unspecified abdominal location    2  Diarrhea, unspecified type        No orders of the defined types were placed in this encounter  -- Will initiate Elavil for IBS/functional pain  Start at 10mg at bedtime  -- Trial of OTC peppermint oil and ginger for functional symptoms  -- Continue Linzess as needed but only when constipated  -- Repeat fecal calprotectin in 6 months  -- Probiotics  -- Please make sure to drink plenty of water (enough so that your urine is a light yellow color), try to exercise for 15-30 minutes on most days of the week     Follow-up in the office in 6-8 weeks, but be in touch via the [email or web address] portal or by phone with questions, concerns or changes    ______________________________________________________________________    HPI:    Priya Chan is a 46 y o  male who presents with complaint of GI symptoms since 28years old        Still has RUQ pain, almost daily  He gets only occasional relief 10% of the time  Heating pads, pressure help a little bit  Nothing else makes it better or worse  He is still getting diarrhea with up to 5 BMs per day, semi-formed, without blood or black stools  Rare Linzess  No weight loss  Rare heartburn  No dysphagia, odynophagia  Occasional nausea but no vomiting     He uses some medical marijuana and it helps a little     He had several colonoscopies and EGDs in the past  He also had a capsule endoscopy and was told it looks like ileitis  He was put on Pentasa in the past, but he is allergic to NSAIDs and aspirin (anaphylaxis)      No formal FH of GI issues  MRE (11/4/2019)  1  No evidence of active or fibrostenotic inflammatory bowel disease  2   Normal, limited evaluation of the remainder of the visualized abdomen and pelvis  3   Prior cholecystectomy  REVIEW OF SYSTEMS:  10 point ROS reviewed and negative, except as above    Historical Information   Past Medical History:   Diagnosis Date    Abdominal pain     Abnormal liver function test     last assessed: Oct 16, 2013     Abnormal weight loss     last assessed: Yung 15, 2014     Allergic rhinitis due to pollen     last assessed: Yung 15, 2014     Anxiety     Anxiety     last assessed/resolved:  Aug 5, 2015     Asthma     last assessed: Yung 15, 2014     Benign essential HTN     last assessed/resolved: Feb 23, 2017     Cervical lymphadenopathy     last assessed/resolved: Feb 23, 2017     Chronic sinusitis     last assessed: Yung 15, 2014     Constipation     Crohn's disease ([facility] [address] ) 01/01/2011    last assessed: Yung 15, 2014     Dysuria     last assessed: April 29, 2016     Elevated BP without diagnosis of hypertension     last assessed/resolved: Feb 23, 2017     Esophageal reflux     last assessed/resolved: Feb 23, 2017     Eustachian tube anomaly     Resolved: Nov 9, 2017     External hemorrhoids     last assessed: Yung 15, 2014     Hypertension     borderline    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     last assessed/resolved: July 19, 2017     Inflammatory bowel disease     Pancreatic neoplasm     last assessed: Yung 15, 2014     PONV (postoperative nausea and vomiting)     Positive depression screening     resolved: July 24, 2017     Psoriasis     Seasonal allergies     [facility]    Vitamin B12 deficiency     last assessed/resolved: Feb 23, 2017      Past Surgical History:   Procedure Laterality Date    CHOLECYSTECTOMY      resolved: 2011     COLONOSCOPY N/A 11/18/2016    Procedure: COLONOSCOPY;  Surgeon: Josiane Denise MD;  Location:  GI LAB; Service:     COLONOSCOPY N/A 4/28/2016    Procedure: COLONOSCOPY;  Surgeon: Josiane Denise MD;  Location: [facility] GI LAB; Service:     ESOPHAGOGASTRODUODENOSCOPY N/A 4/28/2016    Procedure: ESOPHAGOGASTRODUODENOSCOPY (EGD); Surgeon: Josiane Denise MD;  Location: [facility] GI LAB; Service:     FRONTAL SINUSOTOMY      resolved: April 2009     PANCREAS SURGERY      DC COLONOSCOPY FL[facility] DX W[facility] WHEN PFRMD N/A 10/12/2018    Procedure: EGD AND COLONOSCOPY;  Surgeon: Ruchi Schaefer MD;  Location: [facility] GI LAB; Service: Gastroenterology    DC LAP, INCISIONAL HERNIA REPAIR,REDUCIBLE N/A 11/8/2018    Procedure: REPAIR HERNIA INCISIONAL LAPAROSCOPIC;  Surgeon: Veronica Givens MD;  Location: [facility];  Service: General    DC REPAIR OF NASAL SEPTUM N/A 7/28/2017    Procedure: REVISION SEPTOPLASTY; TURBINOPLASTY; BILATERAL  GRAFTS; ALAR GRAFT; POSSIBLE AURICULAR CARTILAGE GRAFT;  Surgeon: Christin Ritchie MD;  Location: [facility];  Service: ENT    TONSILLECTOMY AND ADENOIDECTOMY       Social History   Social History     Substance and Sexual Activity   Alcohol Use Yes    Frequency: Monthly or less    Comment: Mary Jane Scoobyshavon consumes alcohol noted in "allscripts"      Social History     Substance and Sexual Activity   Drug Use Yes    Types: Marijuana    Comment: uses medical marijuana via vaping   last use 2/8/19     Social History     Tobacco Use   Smoking Status Current Every Day Smoker    Packs/day: 0 50    Types: Cigarettes   Smokeless Tobacco Never Used   Tobacco Comment    Dependence on nicotine in cigarettes - 1/2 PPD x 20 years      Family History   Problem Relation Age of Onset    Diabetes Mother         mellitus     Hypertension Mother     Thyroid disease Mother     Fibromyalgia Mother     Hypertension Father     Hypertension Sister     No Known Problems Brother     No Known Problems Maternal Grandmother     No Known Problems Maternal Grandfather     Diabetes Paternal Grandmother     Diabetes Paternal Grandfather     Diabetes Other         mellitus     Rheum arthritis Cousin        Meds/Allergies       Current Outpatient Medications:     acetaminophen (TYLENOL) 500 mg tablet    cetirizine (ZyrTEC) 10 mg tablet    clonazePAM (KlonoPIN) 0 5 mg tablet    diphenhydrAMINE (BENADRYL) 25 mg tablet    fluticasone (FLONASE) 50 mcg/act nasal spray    Linaclotide (LINZESS) 145 MCG CAPS    amitriptyline (ELAVIL) 10 mg tablet    Allergies   Allergen Reactions    Apple Anaphylaxis    Aspirin Anaphylaxis and Hives     Category: Allergy;     Eggs Or Egg-Derived Products Anaphylaxis     Category: Allergy;     Ibuprofen Anaphylaxis and Hives     Category: Allergy; aspirin    Nsaids Anaphylaxis     Category: Allergy;     Other Anaphylaxis     Category: Allergy; Annotation - [id number]: cherries, grapes , and kiwis; pt states all fruits    Peanuts [Peanut Oil] Anaphylaxis     nuts    Penicillins Shortness Of Breath    Pollen Extract     Codeine Anxiety           Objective     Blood pressure 116/74, pulse 73, temperature 98 6 °F (37 °C), temperature source Tympanic, weight 76 9 kg (169 lb 9 6 oz)  Body mass index is 25 41 kg/m²  PHYSICAL EXAM:      General Appearance:   Alert, cooperative, no distress   HEENT:   Normocephalic, atraumatic, anicteric  Neck:  Supple, symmetrical, trachea midline   Lungs:   Clear to auscultation bilaterally; no rales, rhonchi or wheezing; respirations unlabored    Heart[de-identified]   Regular rate and rhythm; no murmur, rub, or gallop     Abdomen:   Soft, non-tender, non-distended; normal bowel sounds; no masses, no organomegaly    Genitalia:   Deferred    Rectal:   Deferred    Extremities:  No cyanosis, clubbing or edema    Pulses:  2+ and symmetric    Skin:  No jaundice, rashes, or lesions    Lymph nodes:  No palpable cervical lymphadenopathy        Lab Results:   No visits with results within 1 Day(s) from this visit  Latest known visit with results is:   Orders Only on 10/04/2019   Component Date Value    Pancreatic Elastase-1 10/04/2019 >500          Radiology Results:   Xr Spine Lumbar Minimum 4 Views Non Injury    Result Date: 11/1/2019  Narrative: LUMBAR SPINE INDICATION:   M54 42: Lumbago with sciatica, left side R29 818: Other symptoms and signs involving the nervous system  COMPARISON:  7/23/2018 VIEWS:  XR SPINE LUMBAR MINIMUM 4 VIEWS NON INJURY FINDINGS: There is no evidence of acute fracture or destructive osseous lesion  Alignment is anatomic with slight levoscoliosis  No significant lumbar degenerative change noted  L4-L5 mild degenerative disc change  The pedicles appear intact  Cholecystectomy and lower abdomen hernioplasty  Impression: No acute osseous abnormality Workstation performed: [id number]     Mri Enterography W Wo    Result Date: 11/4/2019  Narrative: MRI ABDOMEN AND PELVIS WITH AND WITHOUT CONTRAST (MR ENTEROGRAPHY) INDICATION:  Regional Enteritis  Diarrhea  Abdominal pain  COMPARISON: CT February 9, 2019 TECHNIQUE:  The following pulse sequences of the abdomen and pelvis were obtained: Coronal 2D FIESTA multiphase with fat saturation, axial 2D FIESTA with fat saturation, axial and coronal T2, pre-contrast coronal T1 with fat saturation, post-contrast dynamic coronal T1 at 20, 70, and 180 seconds with fat saturation, and axial T1 post-contrast with fat saturation through the abdomen and pelvis  1350 mL of oral contrast containing 0 1% (wt/vol) barium suspension with sorbitol (VoLumen) was administered 45 minutes prior to scanning  1 mg of glucagon was administered IM prior to contrast administration by the radiology nurse   IV Contrast:  8 mL of gadobutrol injection (MULTI-DOSE) Note that dynamic imaging was tailored for evaluation of the bowel with limited evaluation of the remainder of the abdominal and pelvic viscera  FINDINGS: ABDOMEN: BOWEL:   No evidence of active inflammation  No evidence of fibrostenotic disease  LIVER:  Normal   GALLBLADDER:  Post cholecystectomy  PANCREAS:  Normal  ADRENAL GLANDS:  Normal  SPLEEN:  Normal  KIDNEYS:  Subcentimeter cortical cyst right lower pole, stable  Monique Aspen LUNG BASES:  Unremarkable MRI appearance  PELVIS: REPRODUCTIVE STRUCTURES:   Age-appropriate  BLADDER:  Normal  OTHER STRUCTURES OF THE ABDOMEN AND PELVIS OSSEOUS STRUCTURES:   No osseous destruction  VASCULAR STRUCTURES:  Visualized vasculature is normal  ABDOMINOPELVIC CAVITY:  No lymphadenopathy or mass  No ascites  ABDOMINOPELVIC WALL:  Normal      Impression: 1  No evidence of active or fibrostenotic inflammatory bowel disease  2   Normal, limited evaluation of the remainder of the visualized abdomen and pelvis  3   Prior cholecystectomy   Workstation performed: [id number]

## 2019-11-18 ENCOUNTER — OFFICE VISIT (OUTPATIENT)
Dept: PHYSICAL THERAPY | Facility: CLINIC | Age: 39
End: 2019-11-18
Payer: COMMERCIAL

## 2019-11-18 DIAGNOSIS — M54.42 ACUTE LEFT-SIDED LOW BACK PAIN WITH LEFT-SIDED SCIATICA: Primary | ICD-10-CM

## 2019-11-18 PROCEDURE — 97110 THERAPEUTIC EXERCISES: CPT | Performed by: PHYSICAL THERAPIST

## 2019-11-18 NOTE — PROGRESS NOTES
Daily Note     Today's date: 2019  Patient name: Debora Guillory  : 1980  MRN: [id number]  Referring provider: Sindy Loomis MD  Dx:   Encounter Diagnosis     ICD-10-CM    1  Acute left-sided low back pain with left-sided sciatica M54 42                   Subjective: The patient reports feeling a 3-4/10 pain at his left lateral shin and at his left great toe  The patient also has left buttocks pain rated a 3/10 today  Objective: See treatment diary below      Assessment: The patient notes he is able to move his left ankle into more dorsiflexion compared to last week when performing a toe raise exercise  The patient continues to experience intermittent left toe numbness  The patient appears to be very compliant with his home exercises  The patient had some lumbar pinching/pain after quadruped exercises so those activities were not given for the patient to perform at home  The patient will benefit from continued PT to achieve his functional goals  Plan: Continue per plan of care  Progress treatment as tolerated  Precautions: Avoid increasing L LE weakness / pain  RE-EVALUATION: 19    Specialty Daily Treatment Diary     Manual  19     STM lumbar paraspinals        Grade 2 P/A mobs @ L-spine  Attempted - p! So they were held      Hamstring stretch B/L        Piriformis Stretch B/L Attempted p!p! L LE       Hip Flexor Stretch B/L            Exercise Diary  19     Treadmill Ambulation        UBE Stand ALT FWD/BACK  *90/70  6 mins 90/70  X 8mins     Core brace  *15x:05 x20:05     Brace with knee fall outs        Step heel drop calf stretch  3x:30 p!p!  D/C     Bridges  *x15 x20     Self 90-90 hamstring stretch B/L  *4x:20 B/L 4x:30 Towel B/L - avoid pinch @ L/B     Prone press ups 3x10 with exhale x10 with exhale With O P  x10     Standing back extensions 2x10 x10 2X10     Quad DLS UE  LE  UE+LE   UE x10  LE x10  ( pain afterwards) Sit cross L over R and hinge forward  *4x:20 D/C     Clam shells (back stable)  *NV 10x:05     Quadruped knee lifts        HR/TR  x15 x10 reviewed                     Sitting Posture correction Done 10 minutes               MTP/LTP/ANTIROTATION                    Modalities 11/6/19 11/13/19 11/18/19     CP/MHP L/B declined declined declined                           The patient was given a new home exercise plan with written handout, pictures, and verbal instruction  The patient accepts and understands the new home activities

## 2019-11-19 ENCOUNTER — APPOINTMENT (OUTPATIENT)
Dept: PHYSICAL THERAPY | Facility: CLINIC | Age: 39
End: 2019-11-19
Payer: COMMERCIAL

## 2019-11-21 ENCOUNTER — APPOINTMENT (OUTPATIENT)
Dept: PHYSICAL THERAPY | Facility: CLINIC | Age: 39
End: 2019-11-21
Payer: COMMERCIAL

## 2019-11-21 NOTE — PROGRESS NOTES
Daily Note     Today's date: 2019  Patient name: Bryce Melvin  : 1980  MRN: [id number]  Referring provider: Tirso Sosa MD  Dx:   Encounter Diagnosis     ICD-10-CM    1  Acute left-sided low back pain with left-sided sciatica M54 42                   Subjective: ***      Objective: See treatment diary below      Assessment: Tolerated treatment {Tolerated treatment :[id number]}  Patient {assessment:}      Plan: {PLAN:[id number]}     Precautions: Avoid increasing L LE weakness / pain  RE-EVALUATION: 19    Specialty Daily Treatment Diary     Manual  19     STM lumbar paraspinals        Grade 2 P/A mobs @ L-spine  Attempted - p! So they were held      Hamstring stretch B/L        Piriformis Stretch B/L Attempted p!p! L LE       Hip Flexor Stretch B/L            Exercise Diary  19     Treadmill Ambulation        UBE Stand ALT FWD/BACK  *90/70  6 mins 90/70  X 8mins     Core brace  *15x:05 x20:05     Brace with knee fall outs        Step heel drop calf stretch  3x:30 p!p!  D/C     Bridges  *x15 x20     Self 90-90 hamstring stretch B/L  *4x:20 B/L 4x:30 Towel B/L - avoid pinch @ L/B     Prone press ups 3x10 with exhale x10 with exhale With O P  x10     Standing back extensions 2x10 x10 2X10     Quad DLS UE  LE  UE+LE   UE x10  LE x10  ( pain afterwards)     Sit cross L over R and hinge forward  *4x:20 D/C     Clam shells (back stable)  *NV 10x:05     Quadruped knee lifts        HR/TR  x15 x10 reviewed                     Sitting Posture correction Done 10 minutes               MTP/LTP/ANTIROTATION                    Modalities 19     CP/MHP L/B declined declined declined

## 2019-11-25 ENCOUNTER — APPOINTMENT (OUTPATIENT)
Dept: PHYSICAL THERAPY | Facility: CLINIC | Age: 39
End: 2019-11-25
Payer: COMMERCIAL

## 2019-11-26 ENCOUNTER — OFFICE VISIT (OUTPATIENT)
Dept: PHYSICAL THERAPY | Facility: CLINIC | Age: 39
End: 2019-11-26
Payer: COMMERCIAL

## 2019-11-26 DIAGNOSIS — M54.42 ACUTE LEFT-SIDED LOW BACK PAIN WITH LEFT-SIDED SCIATICA: Primary | ICD-10-CM

## 2019-11-26 PROCEDURE — 97110 THERAPEUTIC EXERCISES: CPT

## 2019-11-26 NOTE — PROGRESS NOTES
Daily Note     Today's date: 2019  Patient name: Isabella Mims  : 1980  MRN: [id number]  Referring provider: Gabriela Stanley MD  Dx:   Encounter Diagnosis     ICD-10-CM    1  Acute left-sided low back pain with left-sided sciatica M54 42                   Subjective: Patient states the last two days he had increased back and the leg pain not as bad  He really has not done his exercises  Patient is better today and rates his low back pain 5/10 with little residual in the left leg  Patient has noticed he is now able to lift his left foot higher during toe raises  Objective: See treatment diary below      Assessment: Tolerated treatment well  Patient would benefit from continued PT for stretching and strengthening  Patient educated on what exercises should be done during pain flair ups and it may happen  Patient continues to have a limp when he walks  Patient was able to perform exercises with little difficulty and discomfort, some increases seen  Patient felt better after treatment  He rated his pain 3/10 with less tingling in left big toe and pes anserinus  Plan: Continue per plan of care  Progress treatment as tolerated  Precautions: Avoid increasing L LE weakness / pain  RE-EVALUATION: 19    Specialty Daily Treatment Diary     Manual  19    STM lumbar paraspinals        Grade 2 P/A mobs @ L-spine  Attempted - p! So they were held      Hamstring stretch B/L        Piriformis Stretch B/L Attempted p!p! L LE       Hip Flexor Stretch B/L            Exercise Diary  19    Treadmill Ambulation        UBE Stand ALT FWD/BACK  *90/70  6 mins 90/70  X 8mins 90/70  x10 min    Core brace  *15x:05 x20:05 :[id number]    Brace with knee fall outs        Step heel drop calf stretch  3x:30 p!p!  D/C     Bridges  *x15 x20 x20    Self 90-90 hamstring stretch B/L  *4x:20 B/L 4x:30 Towel B/L - avoid pinch @ L/B 4x:30 Towel B/L - avoid pinch @ L/B with ankle pumps    Prone press ups 3x10 with exhale x10 with exhale With O P  x10 With O P  x10    Standing back extensions 2x10 x10 2X10 x10    Quad DLS UE  LE  UE+LE   UE x10  LE x10  ( pain afterwards) UE x10  LE x10 with O P     Sit cross L over R and hinge forward  *4x:20 D/C     Clam shells (back stable)  *NV 10x:05 :03x15    Quadruped knee lifts    *10x ea    HR/TR  x15 x10 reviewed x20                    Sitting Posture correction Done 10 minutes               MTP/LTP/ANTIROTATION                    Modalities 11/6/19 11/13/19 11/18/19 11/26/19    CP/MHP L/B declined declined declined CP x10 min

## 2019-11-27 ENCOUNTER — APPOINTMENT (OUTPATIENT)
Dept: PHYSICAL THERAPY | Facility: CLINIC | Age: 39
End: 2019-11-27
Payer: COMMERCIAL

## 2019-11-29 ENCOUNTER — OFFICE VISIT (OUTPATIENT)
Dept: PHYSICAL THERAPY | Facility: CLINIC | Age: 39
End: 2019-11-29
Payer: COMMERCIAL

## 2019-11-29 DIAGNOSIS — M54.42 ACUTE LEFT-SIDED LOW BACK PAIN WITH LEFT-SIDED SCIATICA: Primary | ICD-10-CM

## 2019-11-29 PROCEDURE — 97110 THERAPEUTIC EXERCISES: CPT

## 2019-11-29 NOTE — PROGRESS NOTES
Daily Note     Today's date: 2019  Patient name: Lance Yusuf  : 1980  MRN: [id number]  Referring provider: Kaia Watt MD  Dx:   Encounter Diagnosis     ICD-10-CM    1  Acute left-sided low back pain with left-sided sciatica M54 42        Start Time: 934  Stop Time: 1029  Total time in clinic (min): 55 minutes    Subjective: Patient states his pain is not bad today (-3/10)  He does not know if it was the ice or what, but he is feeling better since last visit  He did have a little sharp pain when he looked up in standing a couple of times when he was shopping with his wife  He thinks it may have been positional        Objective: See treatment diary below    Patient's home exercise program updated to include additional exercises  Handout issued and explained  Assessment: Tolerated treatment well  Patient would benefit from continued PT for stretching and strengthening  Patient was able to increase and add exercises to program without difficulty or increase of pain  By end of session, he felt "ok" leaving department  Plan: Continue per plan of care  Progress treatment as tolerated  Precautions: Avoid increasing L LE weakness / pain  RE-EVALUATION: 19    Specialty Daily Treatment Diary     Manual  19   STM lumbar paraspinals        Grade 2 P/A mobs @ L-spine  Attempted - p! So they were held      Hamstring stretch B/L        Piriformis Stretch B/L Attempted p!p! L LE       Hip Flexor Stretch B/L            Exercise Diary  19   Treadmill Ambulation        UBE Stand ALT FWD/BACK  *90/70  6 mins 90/70  X 8mins 90/70  x10 min 90/70  x10 min   Core brace  *15x:05 x20:05 :05x25 :05 x30   Brace with knee fall outs        Step heel drop calf stretch  3x:30 p!p!  D/C     Bridges  *x15 x20 x20 x25   Self 90-90 hamstring stretch B/L  *4x:20 B/L 4x:30 Towel B/L - avoid pinch @ L/B 4x:30 Towel B/L - avoid pinch @ L/B with ankle pumps    Prone press ups 3x10 with exhale x10 with exhale With O P  x10 With O P  x10 With O P  x10   Standing back extensions 2x10 x10 2X10 x10 x10   Quad DLS UE  LE  UE+LE   UE x10  LE x10  ( pain afterwards) UE x10  LE x10 with O P  UE x10  LE x 10 with O P    Sit cross L over R and hinge forward  *4x:20 D/C     Clam shells (back stable)  *NV 10x:05 :03x15 x20   Quadruped knee lifts    *10x ea x10   HR/TR  x15 x10 reviewed x20 x20                   Sitting Posture correction Done 10 minutes               MTP/LTP/ANTIROTATION     Red x10 ea               Modalities 11/6/19 11/13/19 11/18/19 11/26/19 11/29/19   CP/MHP L/B declined declined declined CP x10 min CP x10

## 2019-12-01 ENCOUNTER — APPOINTMENT (EMERGENCY)
Dept: RADIOLOGY | Facility: HOSPITAL | Age: 39
End: 2019-12-01
Payer: COMMERCIAL

## 2019-12-01 ENCOUNTER — HOSPITAL ENCOUNTER (EMERGENCY)
Facility: HOSPITAL | Age: 39
Discharge: HOME | End: 2019-12-01
Attending: EMERGENCY MEDICINE
Payer: COMMERCIAL

## 2019-12-01 VITALS
BODY MASS INDEX: 25.46 KG/M2 | OXYGEN SATURATION: 98 % | SYSTOLIC BLOOD PRESSURE: 142 MMHG | DIASTOLIC BLOOD PRESSURE: 102 MMHG | WEIGHT: 168 LBS | TEMPERATURE: 99.3 F | RESPIRATION RATE: 18 BRPM | HEIGHT: 68 IN | HEART RATE: 79 BPM

## 2019-12-01 DIAGNOSIS — M25.561 RIGHT KNEE PAIN: ICD-10-CM
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
## 2019-12-01 PROCEDURE — 99284 EMERGENCY DEPT VISIT MOD MDM: CPT

## 2019-12-01 PROCEDURE — 73564 X-RAY EXAM KNEE 4 OR MORE: CPT

## 2019-12-01 PROCEDURE — 99283 EMERGENCY DEPT VISIT LOW MDM: CPT | Performed by: PHYSICIAN ASSISTANT

## 2019-12-01 RX ORDER — ACETAMINOPHEN 325 MG/1
650 TABLET ORAL ONCE
Status: COMPLETED | OUTPATIENT
Start: 2019-12-01 | End: 2019-12-01

## 2019-12-01 RX ADMIN — ACETAMINOPHEN 650 MG: 325 TABLET ORAL at 14:01
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
## 2019-12-03 ENCOUNTER — OFFICE VISIT (OUTPATIENT)
Dept: PHYSICAL THERAPY | Facility: CLINIC | Age: 39
End: 2019-12-03
Payer: COMMERCIAL

## 2019-12-03 DIAGNOSIS — M54.42 ACUTE LEFT-SIDED LOW BACK PAIN WITH LEFT-SIDED SCIATICA: Primary | ICD-10-CM
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
## 2019-12-03 NOTE — PROGRESS NOTES
Daily Note     Today's date: 12/3/2019  Patient name: Lyssa Ruiz  : 1980  MRN: [id number]  Referring provider: Konstantin Hernandez MD  Dx:   Encounter Diagnosis     ICD-10-CM    1  Acute left-sided low back pain with left-sided sciatica M54 42                   Subjective: ***      Objective: See treatment diary below      Assessment: Tolerated treatment {Tolerated treatment :[id number]}  Patient {assessment:[id number]}      Plan: {PLAN:0477026903}     Precautions: Avoid increasing L LE weakness / pain  RE-EVALUATION: 19    Specialty Daily Treatment Diary     Manual   19   STM lumbar paraspinals        Grade 2 P/A mobs @ L-spine  Attempted - p! So they were held      Hamstring stretch B/L        Piriformis Stretch B/L        Hip Flexor Stretch B/L            Exercise Diary   19   Treadmill Ambulation        UBE Stand ALT FWD/BACK  *90/70  6 mins 90/70  X 8mins 90/70  x10 min 90/70  x10 min   Core brace  *15x:05 x20:05 :05x25 :05 x30   Brace with knee fall outs        Step heel drop calf stretch  3x:30 p!p!  D/C     Bridges  *x15 x20 x20 x25   Self 90-90 hamstring stretch B/L  *4x:20 B/L 4x:30 Towel B/L - avoid pinch @ L/B 4x:30 Towel B/L - avoid pinch @ L/B with ankle pumps    Prone press ups  x10 with exhale With O P  x10 With O P  x10 With O P  x10   Standing back extensions  x10 2X10 x10 x10   Quad DLS UE  LE  UE+LE   UE x10  LE x10  ( pain afterwards) UE x10  LE x10 with O P  UE x10  LE x 10 with O P    Sit cross L over R and hinge forward  *4x:20 D/C     Clam shells (back stable)  *NV 10x:05 :03x15 x20   Quadruped knee lifts    *10x ea x10   HR/TR  x15 x10 reviewed x20 x20                   Sitting Posture correction                MTP/LTP/ANTIROTATION     Red x10 ea               Modalities  19   CP/MHP L/B  declined declined CP x10 min CP x10

## 2019-12-05 ENCOUNTER — EVALUATION (OUTPATIENT)
Dept: PHYSICAL THERAPY | Facility: CLINIC | Age: 39
End: 2019-12-05
Payer: COMMERCIAL

## 2019-12-05 DIAGNOSIS — M54.42 ACUTE LEFT-SIDED LOW BACK PAIN WITH LEFT-SIDED SCIATICA: Primary | ICD-10-CM

## 2019-12-05 PROCEDURE — 97535 SELF CARE MNGMENT TRAINING: CPT | Performed by: PHYSICAL THERAPIST

## 2019-12-05 PROCEDURE — 97140 MANUAL THERAPY 1/> REGIONS: CPT | Performed by: PHYSICAL THERAPIST

## 2019-12-05 PROCEDURE — 97110 THERAPEUTIC EXERCISES: CPT | Performed by: PHYSICAL THERAPIST
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
## 2019-12-06 ENCOUNTER — OFFICE VISIT (OUTPATIENT)
Dept: INTERNAL MEDICINE CLINIC | Facility: CLINIC | Age: 39
End: 2019-12-06
Payer: COMMERCIAL

## 2019-12-06 ENCOUNTER — NURSE TRIAGE (OUTPATIENT)
Dept: PHYSICAL THERAPY | Facility: OTHER | Age: 39
End: 2019-12-06

## 2019-12-06 VITALS
HEART RATE: 88 BPM | BODY MASS INDEX: 25.67 KG/M2 | DIASTOLIC BLOOD PRESSURE: 84 MMHG | SYSTOLIC BLOOD PRESSURE: 122 MMHG | WEIGHT: 169.4 LBS | OXYGEN SATURATION: 98 % | HEIGHT: 68 IN | TEMPERATURE: 98.4 F

## 2019-12-06 DIAGNOSIS — M51.36 DEGENERATIVE DISC DISEASE, LUMBAR: ICD-10-CM

## 2019-12-06 DIAGNOSIS — M54.42 ACUTE LEFT-SIDED LOW BACK PAIN WITH LEFT-SIDED SCIATICA: Primary | ICD-10-CM

## 2019-12-06 DIAGNOSIS — M54.42 CHRONIC MIDLINE LOW BACK PAIN WITH LEFT-SIDED SCIATICA: ICD-10-CM

## 2019-12-06 DIAGNOSIS — G89.29 CHRONIC MIDLINE LOW BACK PAIN WITH LEFT-SIDED SCIATICA: ICD-10-CM

## 2019-12-06 PROBLEM — M51.37 DEGENERATIVE DISC DISEASE AT L5-S1 LEVEL: Status: ACTIVE | Noted: 2019-12-06

## 2019-12-06 PROBLEM — M51.369 DEGENERATIVE DISC DISEASE, LUMBAR: Status: ACTIVE | Noted: 2019-12-06

## 2019-12-06 PROBLEM — M51.379 DEGENERATIVE DISC DISEASE AT L5-S1 LEVEL: Status: ACTIVE | Noted: 2019-12-06

## 2019-12-06 PROCEDURE — 3008F BODY MASS INDEX DOCD: CPT | Performed by: INTERNAL MEDICINE

## 2019-12-06 PROCEDURE — 99213 OFFICE O/P EST LOW 20 MIN: CPT | Performed by: INTERNAL MEDICINE

## 2019-12-06 NOTE — PATIENT INSTRUCTIONS
Degenerative Disc Disease   WHAT YOU NEED TO KNOW:   What is degenerative disc disease? Degenerative disc disease happens when one or more discs between the vertebrae (bones in your spine) wear down  Discs act like a cushion between your vertebrae and help to stabilize your spine  Degenerative disc disease commonly occurs in the neck or lower back as you get older  What increases my risk for degenerative disc disease? · A previous herniated disc or spinal injury     · A job that requires heavy, physical work    · Obesity     · Inherited genes    · Smoking  What are the signs and symptoms of degenerative disc disease? Your symptoms may depend on where you have the degenerative disc  You may have headaches or neck, shoulder, or lower back pain that gets worse with activity  How is degenerative disc disease diagnosed? An x-ray, CT scan, or MRI may show signs of disc degeneration  You may be given contrast dye to help the spinal canal show up better in the pictures  Tell the healthcare provider if you have ever had an allergic reaction to contrast dye  Do not enter the MRI room with anything metal  Metal can cause serious injury  Tell the healthcare provider if you have any metal in or on your body  How is degenerative disc disease treated? · NSAIDs , such as ibuprofen, help decrease swelling, pain, and fever  This medicine is available with or without a doctor's order  NSAIDs can cause stomach bleeding or kidney problems in certain people  If you take blood thinner medicine, always ask your healthcare provider if NSAIDs are safe for you  Always read the medicine label and follow directions  · Acetaminophen  decreases pain  It is available without a doctor's order  Ask how much to take and how often to take it  Follow directions  Acetaminophen can cause liver damage if not taken correctly  · Prescription pain medicine  may be given  Ask how to take this medicine safely      · Physical therapy  may be recommended to decrease pain and help improve movement and strength  A physical therapist may also do spinal decompression to stretch and open the area between your vertebrae  · Spinal injections  may help to decrease pain and inflammation around the disc  · Surgery  may be needed if other treatments do not work  You may need surgery on the vertebrae, spinal fusion, or a disc replacement  How can I manage my symptoms? · Avoid activities that make your symptoms worse  Ask your healthcare provider for ways to decrease your symptoms  Certain stretches or exercises may relieve your symptoms  Ask how to stay active without further injury  · Apply heat or ice as directed  Heat or ice may help decrease pain, inflammation, or muscle spasms  · Maintain a healthy weight  If you are overweight, weight loss may help improve your symptoms  Ask your healthcare provider to help you create a weight loss plan if you are overweight  · Find ways to manage your stress  Behavioral therapy may help you learn ways to manage stress and decrease pain  Ask for more information about behavioral therapy  · Do not smoke  If you smoke, it is never too late to quit  Ask for information if you need help quitting  When should I contact my healthcare provider? · Your pain gets worse, or you cannot control it with pain medicine  · Your symptoms get worse  · You have questions or concerns about your condition or care  When should I seek immediate care? · You have severe pain or weakness, or you cannot move your arm or leg  · You lose control of your bladder or bowels  CARE AGREEMENT:   You have the right to help plan your care  Learn about your health condition and how it may be treated  Discuss treatment options with your caregivers to decide what care you want to receive  You always have the right to refuse treatment  The above information is an  only   It is not intended as medical advice for individual conditions or treatments  Talk to your doctor, nurse or pharmacist before following any medical regimen to see if it is safe and effective for you  © 2017 [facility] Information is for End User's use only and may not be sold, redistributed or otherwise used for commercial purposes  All illustrations and images included in CareNotes® are the copyrighted property of [employer]  or [employer]az[employer]
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
## 2019-12-13 ENCOUNTER — CONSULT (OUTPATIENT)
Dept: OBGYN CLINIC | Facility: CLINIC | Age: 39
End: 2019-12-13
Payer: COMMERCIAL

## 2019-12-13 VITALS
BODY MASS INDEX: 25.22 KG/M2 | HEIGHT: 68 IN | DIASTOLIC BLOOD PRESSURE: 86 MMHG | WEIGHT: 166.4 LBS | SYSTOLIC BLOOD PRESSURE: 120 MMHG | HEART RATE: 80 BPM | RESPIRATION RATE: 16 BRPM

## 2019-12-13 DIAGNOSIS — M54.42 ACUTE LEFT-SIDED LOW BACK PAIN WITH LEFT-SIDED SCIATICA: Primary | ICD-10-CM

## 2019-12-13 DIAGNOSIS — M54.16 RADICULOPATHY, LUMBAR REGION: ICD-10-CM

## 2019-12-13 DIAGNOSIS — M62.830 BACK MUSCLE SPASM: ICD-10-CM

## 2019-12-13 PROCEDURE — 99243 OFF/OP CNSLTJ NEW/EST LOW 30: CPT | Performed by: FAMILY MEDICINE

## 2019-12-13 RX ORDER — CYCLOBENZAPRINE HCL 10 MG
10 TABLET ORAL 2 TIMES DAILY PRN
Qty: 30 TABLET | Refills: 1 | Status: SHIPPED | OUTPATIENT
Start: 2019-12-13 | End: 2020-01-15

## 2019-12-13 NOTE — PROGRESS NOTES
Assessment/Plan:  Assessment/Plan   Diagnoses and all orders for this visit:    Acute left-sided low back pain with left-sided sciatica  -     Ambulatory referral to Orthopedic Surgery  -     MRI lumbar spine wo contrast; Future  -     Ambulatory referral to Pain Management; Future    Radiculopathy, lumbar region  -     MRI lumbar spine wo contrast; Future  -     Ambulatory referral to Pain Management; Future    Back muscle spasm  -     cyclobenzaprine (FLEXERIL) 10 mg tablet; Take 1 tablet (10 mg total) by mouth 2 (two) times a day as needed for muscle spasms      43-year-old active male with low back pain more than 2 months duration  Discussed with patient physical exam, radiographs, impression and plan  X-rays of lumbar spine noted for mild degenerative disc disease  Physical exam is noted for midline tenderness L4-S1, and bilateral paraspinal and bilateral sacroiliac joint tenderness  He has limited range of motion right and left rotation  He has weakness in left lower extremity with hip flexion, great toe extension, and ankle dorsiflexion  He has decreased sensation light touch dermatome L5 left lower extremity  There is no groin pain with GOMEZ and FADDIR maneuvers of the hips  There is positive straight leg raise on the left  Clinical impression that he is symptomatic from lumbar spine pathology contributing to symptoms in left lower extremity  He is now more than 2 months since onset of pain and his pain, numbness, and weakness have been worsening despite formal physical therapy and home exercises since 11/06/2019, rest, and taking Tylenol  Oral medications are limited due to Crohn's disease and multiple allergies including allergies to NSAIDs and intolerance to muscle relaxer methocarbamol  At this time I will refer him for MRI of lumbar spine to evaluate for degree of osseous pathology and soft tissue abnormality, as more invasive management may be warranted    I will also refer him to pain management for further evaluation and treatment  Subjective:   Patient ID: Jerod Corado is a 46 y o  male  Chief Complaint   Patient presents with    Lower Back - Pain       80-year-old active male presents for evaluation of low back pain more than 2 months duration  He denies any trauma or inciting event  Pain described as sudden onset, localized to the lumbosacral aspect of the spine, constant, aching and sometimes sharp, radiating distally along the posterior aspect of the lower extremity to the foot, associated numbness and tingling in left lower extremity, associated with weakness in left lower extremity, worse with standing and physical activity, and improved with rest   He denies any bowel or bladder incontinence  He has multiple allergies to medications and Crohn's disease has been unable to take oral NSAIDs  He was seen by primary care provider and referred to formal physical therapy  He has been doing formal physical therapy and home exercises since 11/06/2019 reports that his symptoms have been gradually worsening  He has been taking Tylenol intermittently which provides only mild improvement in his symptoms  He has tried taking muscle relaxers methocarbamol in the past however was unable tolerate the medication  Back Pain   This is a new problem  The current episode started more than 1 month ago  The problem occurs constantly  The problem has been gradually improving  Associated symptoms include numbness and weakness  Pertinent negatives include no abdominal pain, arthralgias, chest pain, chills, fever, joint swelling, rash or sore throat  The symptoms are aggravated by standing, twisting, walking and bending  He has tried rest and acetaminophen (Physical therapy) for the symptoms  The treatment provided no relief             The following portions of the patient's history were reviewed and updated as appropriate: He  has a past medical history of Abdominal pain, Abnormal liver function test, Abnormal weight loss, Allergic rhinitis due to pollen, Anxiety, Anxiety, Asthma, Benign essential HTN, Cervical lymphadenopathy, Chronic sinusitis, Constipation, Crohn's disease ([facility] ) (01/01/2011), Dysuria, Elevated BP without diagnosis of hypertension, Esophageal reflux, Eustachian tube anomaly, External hemorrhoids, Hypertension, 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, Inflammatory bowel disease, Pancreatic neoplasm, PONV (postoperative nausea and vomiting), Positive depression screening, Psoriasis, Seasonal allergies, Small bowel obstruction ([facility] ) (11/11/2018), and Vitamin B12 deficiency  He  has a past surgical history that includes Cholecystectomy; Frontal sinusotomy; Tonsillectomy and adenoidectomy; Colonoscopy (N/A, 11/18/2016); Colonoscopy (N/A, 4/28/2016); Esophagogastroduodenoscopy (N/A, 4/28/2016); pr repair of nasal septum (N/A, 7/28/2017); Pancreas surgery; pr colonoscopy flx dx w/collj spec when pfrmd (N/A, 10/12/2018); and pr lap, incisional hernia repair,reducible (N/A, 11/8/2018)  His family history includes Diabetes in his mother, other, paternal grandfather, and paternal grandmother; Fibromyalgia in his mother; Hypertension in his father, mother, and sister; No Known Problems in his brother, maternal grandfather, and maternal grandmother; Rheum arthritis in his cousin; Thyroid disease in his mother  He  reports that he has been smoking cigarettes  He has been smoking about 0 50 packs per day  He has never used smokeless tobacco  He reports that he drinks alcohol  He reports that he has current or past drug history  Drug: Marijuana  He is allergic to apple; aspirin; eggs or egg-derived products; ibuprofen; nsaids; other; peanuts [peanut oil]; penicillins; pollen extract; and codeine       Review of Systems   Constitutional: Negative for chills and fever  HENT: Negative for sore throat  Eyes: Negative for visual disturbance  Respiratory: Negative for shortness of breath  Cardiovascular: Negative for chest pain  Gastrointestinal: Negative for abdominal pain  Genitourinary: Negative for flank pain  Musculoskeletal: Positive for back pain  Negative for arthralgias and joint swelling  Skin: Negative for rash and wound  Neurological: Positive for weakness and numbness  Hematological: Does not bruise/bleed easily  Psychiatric/Behavioral: Negative for self-injury  Objective:  Vitals:    12/13/19 0930   BP: 120/86   BP Location: Right arm   Patient Position: Sitting   Cuff Size: Large   Pulse: 80   Resp: 16   Weight: 75 5 kg (166 lb 6 4 oz)   Height: 5' 8" (1 727 m)     Right Ankle Exam     Muscle Strength   Dorsiflexion:  5/5  Plantar flexion:  5/5      Left Ankle Exam     Muscle Strength   Dorsiflexion:  4+/5       Right Hip Exam     Muscle Strength   Flexion: 5/5     Tests   GOMEZ: negative    Comments:  Negative FADDIR      Left Hip Exam     Muscle Strength   Flexion: 4/5     Tests   GOMEZ: negative    Comments:  Negative FADDIR      Back Exam     Tenderness   The patient is experiencing tenderness in the sacroiliac (Midline tenderness L4-S1, bilateral paraspinal and bilateral sacroiliac joint tenderness)  Range of Motion   Extension: normal   Flexion: normal   Lateral bend right: normal   Lateral bend left: normal   Rotation right: abnormal   Rotation left: abnormal     Muscle Strength   Right Quadriceps:  5/5   Left Quadriceps:  5/5     Tests   Straight leg raise right: negative  Straight leg raise left: positive    Reflexes   Patellar: normal    Other   Sensation: decreased  Gait: normal           Strength/Myotome Testing     Left Ankle/Foot   Dorsiflexion: 4+  Great toe extension: 4    Right Ankle/Foot   Dorsiflexion: 5  Plantar flexion: 5      Physical Exam   Constitutional: He is oriented to person, place, and time  He appears well-developed  No distress  HENT:   Head: Normocephalic and atraumatic     Eyes: Conjunctivae are normal    Neck: No tracheal deviation present  Cardiovascular: Normal rate  Pulmonary/Chest: Effort normal  No respiratory distress  Abdominal: He exhibits no distension  Neurological: He is alert and oriented to person, place, and time  Skin: Skin is warm and dry  Psychiatric: He has a normal mood and affect  His behavior is normal    Nursing note and vitals reviewed  I have personally reviewed pertinent films in PACS and my interpretation is Lumbar spine mild degenerative disc disease

## 2019-12-13 NOTE — LETTER
December 13, 2019     [place], [place]  [address]  [address] [place] [address]    Patient: Davidson Burrell   YOB: 1980   Date of Visit: 12/13/2019       Dear Dr Sherren Sons: Thank you for referring Wai Landry to me for evaluation  Below are my notes for this consultation  If you have questions, please do not hesitate to call me  I look forward to following your patient along with you  Sincerely,        [employer], DO        CC: No Recipients  [employer], DO  12/13/2019  1:14 PM  Sign at close encounter  Assessment/Plan:  Assessment/Plan   Diagnoses and all orders for this visit:    Acute left-sided low back pain with left-sided sciatica  -     Ambulatory referral to Orthopedic Surgery  -     MRI lumbar spine wo contrast; Future  -     Ambulatory referral to Pain Management; Future    Radiculopathy, lumbar region  -     MRI lumbar spine wo contrast; Future  -     Ambulatory referral to Pain Management; Future    Back muscle spasm  -     cyclobenzaprine (FLEXERIL) 10 mg tablet; Take 1 tablet (10 mg total) by mouth 2 (two) times a day as needed for muscle spasms      44-year-old active male with low back pain more than 2 months duration  Discussed with patient physical exam, radiographs, impression and plan  X-rays of lumbar spine noted for mild degenerative disc disease  Physical exam is noted for midline tenderness L4-S1, and bilateral paraspinal and bilateral sacroiliac joint tenderness  He has limited range of motion right and left rotation  He has weakness in left lower extremity with hip flexion, great toe extension, and ankle dorsiflexion  He has decreased sensation light touch dermatome L5 left lower extremity  There is no groin pain with GOMEZ and FADDIR maneuvers of the hips  There is positive straight leg raise on the left  Clinical impression that he is symptomatic from lumbar spine pathology contributing to symptoms in left lower extremity  He is now more than 2 months since onset of pain and his pain, numbness, and weakness have been worsening despite formal physical therapy and home exercises since 11/06/2019, rest, and taking Tylenol  Oral medications are limited due to Crohn's disease and multiple allergies including allergies to NSAIDs and intolerance to muscle relaxer methocarbamol  At this time I will refer him for MRI of lumbar spine to evaluate for degree of osseous pathology and soft tissue abnormality, as more invasive management may be warranted  I will also refer him to pain management for further evaluation and treatment  Subjective:   Patient ID: Angelique Vazquez is a 46 y o  male  Chief Complaint   Patient presents with    Lower Back - Pain       27-year-old active male presents for evaluation of low back pain more than 2 months duration  He denies any trauma or inciting event  Pain described as sudden onset, localized to the lumbosacral aspect of the spine, constant, aching and sometimes sharp, radiating distally along the posterior aspect of the lower extremity to the foot, associated numbness and tingling in left lower extremity, associated with weakness in left lower extremity, worse with standing and physical activity, and improved with rest   He denies any bowel or bladder incontinence  He has multiple allergies to medications and Crohn's disease has been unable to take oral NSAIDs  He was seen by primary care provider and referred to formal physical therapy  He has been doing formal physical therapy and home exercises since 11/06/2019 reports that his symptoms have been gradually worsening  He has been taking Tylenol intermittently which provides only mild improvement in his symptoms  He has tried taking muscle relaxers methocarbamol in the past however was unable tolerate the medication  Back Pain   This is a new problem  The current episode started more than 1 month ago  The problem occurs constantly   The problem has been gradually improving  Associated symptoms include numbness and weakness  Pertinent negatives include no abdominal pain, arthralgias, chest pain, chills, fever, joint swelling, rash or sore throat  The symptoms are aggravated by standing, twisting, walking and bending  He has tried rest and acetaminophen (Physical therapy) for the symptoms  The treatment provided no relief  The following portions of the patient's history were reviewed and updated as appropriate: He  has a past medical history of Abdominal pain, Abnormal liver function test, Abnormal weight loss, Allergic rhinitis due to pollen, Anxiety, Anxiety, Asthma, Benign essential HTN, Cervical lymphadenopathy, Chronic sinusitis, Constipation, Crohn's disease ([facility] ) (01/01/2011), Dysuria, Elevated BP without diagnosis of hypertension, Esophageal reflux, Eustachian tube anomaly, External hemorrhoids, Hypertension, 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, Inflammatory bowel disease, Pancreatic neoplasm, PONV (postoperative nausea and vomiting), Positive depression screening, Psoriasis, Seasonal allergies, Small bowel obstruction ([facility] ) (11/11/2018), and Vitamin B12 deficiency  He  has a past surgical history that includes Cholecystectomy; Frontal sinusotomy; Tonsillectomy and adenoidectomy; Colonoscopy (N/A, 11/18/2016); Colonoscopy (N/A, 4/28/2016); Esophagogastroduodenoscopy (N/A, 4/28/2016); pr repair of nasal septum (N/A, 7/28/2017); Pancreas surgery; pr colonoscopy flx dx w/collj spec when pfrmd (N/A, 10/12/2018); and pr lap, incisional hernia repair,reducible (N/A, 11/8/2018)  His family history includes Diabetes in his mother, other, paternal grandfather, and paternal grandmother; Fibromyalgia in his mother; Hypertension in his father, mother, and sister; No Known Problems in his brother, maternal grandfather, and maternal grandmother; Rheum arthritis in his cousin; Thyroid disease in his mother    He  reports that he has been smoking cigarettes  He has been smoking about 0 50 packs per day  He has never used smokeless tobacco  He reports that he drinks alcohol  He reports that he has current or past drug history  Drug: Marijuana  He is allergic to apple; aspirin; eggs or egg-derived products; ibuprofen; nsaids; other; peanuts [peanut oil]; penicillins; pollen extract; and codeine       Review of Systems   Constitutional: Negative for chills and fever  HENT: Negative for sore throat  Eyes: Negative for visual disturbance  Respiratory: Negative for shortness of breath  Cardiovascular: Negative for chest pain  Gastrointestinal: Negative for abdominal pain  Genitourinary: Negative for flank pain  Musculoskeletal: Positive for back pain  Negative for arthralgias and joint swelling  Skin: Negative for rash and wound  Neurological: Positive for weakness and numbness  Hematological: Does not bruise/bleed easily  Psychiatric/Behavioral: Negative for self-injury  Objective:  Vitals:    12/13/19 0930   BP: 120/86   BP Location: Right arm   Patient Position: Sitting   Cuff Size: Large   Pulse: 80   Resp: 16   Weight: 75 5 kg (166 lb 6 4 oz)   Height: 5' 8" (1 727 m)     Right Ankle Exam     Muscle Strength   Dorsiflexion:  5/5  Plantar flexion:  5/5      Left Ankle Exam     Muscle Strength   Dorsiflexion:  4+/5       Right Hip Exam     Muscle Strength   Flexion: 5/5     Tests   GOMEZ: negative    Comments:  Negative FADDIR      Left Hip Exam     Muscle Strength   Flexion: 4/5     Tests   GOMEZ: negative    Comments:  Negative FADDIR      Back Exam     Tenderness   The patient is experiencing tenderness in the sacroiliac (Midline tenderness L4-S1, bilateral paraspinal and bilateral sacroiliac joint tenderness)      Range of Motion   Extension: normal   Flexion: normal   Lateral bend right: normal   Lateral bend left: normal   Rotation right: abnormal   Rotation left: abnormal     Muscle Strength   Right Quadriceps:  5/5   Left Quadriceps:  5/5     Tests   Straight leg raise right: negative  Straight leg raise left: positive    Reflexes   Patellar: normal    Other   Sensation: decreased  Gait: normal           Strength/Myotome Testing     Left Ankle/Foot   Dorsiflexion: 4+  Great toe extension: 4    Right Ankle/Foot   Dorsiflexion: 5  Plantar flexion: 5      Physical Exam   Constitutional: He is oriented to person, place, and time  He appears well-developed  No distress  HENT:   Head: Normocephalic and atraumatic  Eyes: Conjunctivae are normal    Neck: No tracheal deviation present  Cardiovascular: Normal rate  Pulmonary/Chest: Effort normal  No respiratory distress  Abdominal: He exhibits no distension  Neurological: He is alert and oriented to person, place, and time  Skin: Skin is warm and dry  Psychiatric: He has a normal mood and affect  His behavior is normal    Nursing note and vitals reviewed  I have personally reviewed pertinent films in PACS and my interpretation is Lumbar spine mild degenerative disc disease

## 2019-12-16 DIAGNOSIS — F41.9 ANXIETY: ICD-10-CM

## 2019-12-16 RX ORDER — CLONAZEPAM 0.5 MG/1
0.5 TABLET ORAL EVERY 12 HOURS PRN
Qty: 60 TABLET | Refills: 1 | Status: SHIPPED | OUTPATIENT
Start: 2019-12-16 | End: 2020-01-15 | Stop reason: SDUPTHER

## 2019-12-16 NOTE — TELEPHONE ENCOUNTER
Needs refill on klonopin sent to [facility] in [facility]  Next appt is on 1/15/20

## 2019-12-24 ENCOUNTER — HOSPITAL ENCOUNTER (OUTPATIENT)
Dept: MRI IMAGING | Facility: HOSPITAL | Age: 39
Discharge: HOME | End: 2019-12-24
Payer: COMMERCIAL

## 2019-12-24 DIAGNOSIS — M54.16 RADICULOPATHY, LUMBAR REGION: ICD-10-CM

## 2019-12-24 DIAGNOSIS — M54.42 ACUTE LEFT-SIDED LOW BACK PAIN WITH LEFT-SIDED SCIATICA: ICD-10-CM

## 2019-12-24 PROCEDURE — 72148 MRI LUMBAR SPINE W/O DYE: CPT

## 2020-01-02 ENCOUNTER — OFFICE VISIT (OUTPATIENT)
Dept: GASTROENTEROLOGY | Facility: MEDICAL CENTER | Age: 40
End: 2020-01-02
Payer: COMMERCIAL

## 2020-01-02 VITALS
WEIGHT: 166.4 LBS | DIASTOLIC BLOOD PRESSURE: 64 MMHG | BODY MASS INDEX: 25.22 KG/M2 | TEMPERATURE: 97.8 F | SYSTOLIC BLOOD PRESSURE: 120 MMHG | HEIGHT: 68 IN | HEART RATE: 86 BPM

## 2020-01-02 DIAGNOSIS — R10.9 ABDOMINAL PAIN, UNSPECIFIED ABDOMINAL LOCATION: ICD-10-CM

## 2020-01-02 DIAGNOSIS — K58.0 IRRITABLE BOWEL SYNDROME WITH DIARRHEA: Primary | ICD-10-CM

## 2020-01-02 DIAGNOSIS — K50.00 CROHN'S DISEASE INVOLVING TERMINAL ILEUM (HCC): ICD-10-CM

## 2020-01-02 PROCEDURE — 99214 OFFICE O/P EST MOD 30 MIN: CPT | Performed by: INTERNAL MEDICINE

## 2020-01-02 NOTE — PROGRESS NOTES
[facility] - Outpatient Follow-up Note  Addie Doss 46 y o  male MRN: [id number]  Encounter: [id number]          ASSESSMENT AND PLAN:    Addie Doss is a 46 y o  male who presents with complaint of abdominal pain and prior diagnosis of possible Crohn's disease (based on VCE and 1 TI biopsy, but most of work-up not c/w IBD)  Suspect his symptoms are secondary to IBS-D, but ddx also includes SIBO and other functional pathology  Available labs and imaging reviewed  1  Irritable bowel syndrome with diarrhea    2  Abdominal pain, unspecified abdominal location    3  Crohn's disease involving terminal ileum ([facility] [address] )      Orders Placed This Encounter   Procedures    Calprotectin,Fecal    CBC and differential    Comprehensive metabolic panel    C-reactive protein    Ambulatory referral to Allergy     -- Allergy referral to evaluate if he can possibly receive mesalamine (in the setting of severe aspirin allergy)  -- Recheck blood work for inflammation and anemia, including CBC, CMP, CRP, fecal calprotectin  -- Continue Elavil  He does not want to increase the dose  -- Trial of Rifaximin for IBS-D  -- Take Beano before meals and snacks  -- Try peppermint oil or tea  -- Linzess PRN if constipated  -- Consider trial of cholestyramine in the future  ______________________________________________________________________    SUBJECTIVE:    Addie Doss is a 46 y o  male who presents with complaint of abdominal pain      Since last visit no improvement  Taking Elavil at bedtime and no issues  He does not feel that it helped  He did not try the pepermint oil  IBGard was too costly  He has not used the Linzess  Still up to 5 times perday  Loose  No blood unless he wipes and has irritation  Nothing in the bowel  He is on a probitoic and digestive enzymes  He is still getting RUQ abdominal pain, radiating down his right side    + Nausea  No vomiting  The nausea longers   He uses marijuana and it helps  Weight stable    REVIEW OF SYSTEMS IS OTHERWISE NEGATIVE  Historical Information   Past Medical History:   Diagnosis Date    Abdominal pain     Abnormal liver function test     last assessed: Oct 16, 2013     Abnormal weight loss     last assessed: Yung 15, 2014     Allergic rhinitis due to pollen     last assessed: Yung 15, 2014     Anxiety     Anxiety     last assessed/resolved: Aug 5, 2015     Asthma     last assessed: Yung 15, 2014     Benign essential HTN     last assessed/resolved: Feb 23, 2017     Cervical lymphadenopathy     last assessed/resolved: Feb 23, 2017     Chronic sinusitis     last assessed: Yung 15, 2014     Constipation     Crohn's disease ([facility] [address] ) 01/01/2011    last assessed: Yung 15, 2014     Dysuria     last assessed: April 29, 2016     Elevated BP without diagnosis of hypertension     last assessed/resolved: Feb 23, 2017     Esophageal reflux     last assessed/resolved: Feb 23, 2017     Eustachian tube anomaly     Resolved: Nov 9, 2017     External hemorrhoids     last assessed: Yung 15, 2014     Hypertension     borderline    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     last assessed/resolved: July 19, 2017     Inflammatory bowel disease     Pancreatic neoplasm     last assessed: Yung 15, 2014     PONV (postoperative nausea and vomiting)     Positive depression screening     resolved: July 24, 2017     Psoriasis     Seasonal allergies     Small bowel obstruction ([facility] [address] ) 11/11/2018    Vitamin B12 deficiency     last assessed/resolved: Feb 23, 2017      Past Surgical History:   Procedure Laterality Date    CHOLECYSTECTOMY      resolved: 2011     COLONOSCOPY N/A 11/18/2016    Procedure: COLONOSCOPY;  Surgeon: Merrick Sanchez MD;  Location:  GI LAB; Service:     COLONOSCOPY N/A 4/28/2016    Procedure: COLONOSCOPY;  Surgeon: Merrick Sanchez MD;  Location: [facility] GI LAB;   Service:     ESOPHAGOGASTRODUODENOSCOPY N/A 4/28/2016    Procedure: ESOPHAGOGASTRODUODENOSCOPY (EGD); Surgeon: Artemio Fuentes MD;  Location: [facility] GI LAB; Service:     FRONTAL SINUSOTOMY      resolved: April 2009     PANCREAS SURGERY      DC COLONOSCOPY FLX DX W/COLLJ [facility] WHEN PFRMD N/A 10/12/2018    Procedure: EGD AND COLONOSCOPY;  Surgeon: Eva Sanford MD;  Location: [facility] GI LAB; Service: Gastroenterology    DC LAP, INCISIONAL HERNIA REPAIR,REDUCIBLE N/A 11/8/2018    Procedure: REPAIR HERNIA INCISIONAL LAPAROSCOPIC;  Surgeon: Heidi Chilel MD;  Location:  MAIN OR;  Service: General    DC REPAIR OF NASAL SEPTUM N/A 7/28/2017    Procedure: REVISION SEPTOPLASTY; TURBINOPLASTY; BILATERAL  GRAFTS; ALAR GRAFT; POSSIBLE AURICULAR CARTILAGE GRAFT;  Surgeon: Kalpana Cheng MD;  [facility];  Service: ENT    TONSILLECTOMY AND ADENOIDECTOMY       Social History   Social History     Substance and Sexual Activity   Alcohol Use Yes    Frequency: Monthly or less    Comment: Nancy Talley consumes alcohol noted in "allscripts"      Social History     Substance and Sexual Activity   Drug Use Yes    Types: Marijuana    Comment: uses medical marijuana via vaping   last use 2/8/19     Social History     Tobacco Use   Smoking Status Current Every Day Smoker    Packs/day: 0 50    Types: Cigarettes   Smokeless Tobacco Never Used   Tobacco Comment    Dependence on nicotine in cigarettes - 1/2 PPD x 20 years      Family History   Problem Relation Age of Onset    Diabetes Mother         mellitus     Hypertension Mother     Thyroid disease Mother     Fibromyalgia Mother     Hypertension Father     Hypertension Sister     No Known Problems Brother     No Known Problems Maternal Grandmother     No Known Problems Maternal Grandfather     Diabetes Paternal Grandmother     Diabetes Paternal Grandfather     Diabetes Other         mellitus     Rheum arthritis Cousin        Meds/Allergies       Current Outpatient Medications:     acetaminophen (TYLENOL) 500 mg tablet   amitriptyline (ELAVIL) 10 mg tablet    cetirizine (ZyrTEC) 10 mg tablet    clonazePAM (KlonoPIN) 0 5 mg tablet    cyclobenzaprine (FLEXERIL) 10 mg tablet    diphenhydrAMINE (BENADRYL) 25 mg tablet    fluticasone (FLONASE) 50 mcg/act nasal spray    Linaclotide (LINZESS) 145 MCG CAPS    NON FORMULARY    rifaximin (XIFAXAN) 550 mg tablet    Allergies   Allergen Reactions    Apple Anaphylaxis    Aspirin Anaphylaxis and Hives     Category: Allergy;     Eggs Or Egg-Derived Products Anaphylaxis     Category: Allergy;     Ibuprofen Anaphylaxis and Hives     Category: Allergy; aspirin    Nsaids Anaphylaxis     Category: Allergy;     Other Anaphylaxis     Category: Allergy; Annotation - [id number]: cherries, grapes , and kiwis; pt states all fruits    Peanuts [Peanut Oil] Anaphylaxis     nuts    Penicillins Shortness Of Breath    Pollen Extract     Codeine Anxiety           Objective     Blood pressure 120/64, pulse 86, temperature 97 8 °F (36 6 °C), height 5' 8" (1 727 m), weight 75 5 kg (166 lb 6 4 oz)  Body mass index is 25 3 kg/m²  PHYSICAL EXAM:      General Appearance:   Alert, cooperative, no distress   HEENT:   Normocephalic, atraumatic, anicteric  Neck:  Supple, symmetrical, trachea midline   Lungs:   Clear to auscultation bilaterally; no rales, rhonchi or wheezing; respirations unlabored    Heart[de-identified]   Regular rate and rhythm; no murmur, rub, or gallop  Abdomen:   Soft, non-tender, non-distended; normal bowel sounds; no masses, no organomegaly    Genitalia:   Deferred    Rectal:   Deferred    Extremities:  No cyanosis, clubbing or edema    Pulses:  2+ and symmetric    Skin:  No jaundice, rashes, or lesions    Lymph nodes:  No palpable cervical lymphadenopathy        Lab Results:   No visits with results within 1 Day(s) from this visit     Latest known visit with results is:   Orders Only on 10/04/2019   Component Date Value    Pancreatic Elastase-1 10/04/2019 >500          Radiology Results:   Mri Lumbar Spine Wo Contrast    Result Date: 12/26/2019  Narrative: MRI LUMBAR SPINE WITHOUT CONTRAST INDICATION: M54 42: Lumbago with sciatica, left side M54 16: Radiculopathy, lumbar region  46year old active male with low back pain with numbness and pain in left lower extremity with decreased sensation to light touch L5-dermatome and weakness with great toe extension and ankle dorsiflexion, and positive straight leg raise on the left COMPARISON:  11/1/2019 TECHNIQUE:  Sagittal T1, sagittal T2, sagittal inversion recovery, axial T1 and axial T2, coronal T2   IMAGE QUALITY:  Diagnostic FINDINGS: VERTEBRAL BODIES:  There are 5 lumbar type vertebral bodies  Minimal levoscoliosis mid lumbar spine  Normal lordosis  Normal marrow signal is identified within the visualized bony structures  No discrete marrow lesion  SACRUM:  Normal signal within the sacrum  No evidence of insufficiency or stress fracture  DISTAL CORD AND CONUS:  Normal size and signal within the distal cord and conus  The conus medullaris terminates at the L1-2 intervertebral disc space  PARASPINAL SOFT TISSUES:  Paraspinal soft tissues are unremarkable  LOWER THORACIC DISC SPACES:  Normal disc height and signal   No disc herniation, canal stenosis or foraminal narrowing  LUMBAR DISC SPACES: L1-L2:  Normal  L2-L3:  Normal  L3-L4:  Normal  L4-L5:  There is loss of disc height and signal   There is a central/left paracentral disc herniation, protrusion type  Mild central canal and left neural foraminal narrowing  Right neural foramen patent  L5-S1:  Normal      Impression: Mild spondylosis centered at the L4-5 level where there is a small disc herniation   Workstation performed: [id number]

## 2020-01-09 ENCOUNTER — CONSULT (OUTPATIENT)
Dept: PAIN MEDICINE | Facility: MEDICAL CENTER | Age: 40
End: 2020-01-09
Payer: COMMERCIAL

## 2020-01-09 VITALS
SYSTOLIC BLOOD PRESSURE: 112 MMHG | RESPIRATION RATE: 14 BRPM | HEART RATE: 76 BPM | DIASTOLIC BLOOD PRESSURE: 76 MMHG | HEIGHT: 68 IN | BODY MASS INDEX: 25.16 KG/M2 | WEIGHT: 166 LBS

## 2020-01-09 DIAGNOSIS — M54.42 ACUTE LEFT-SIDED LOW BACK PAIN WITH LEFT-SIDED SCIATICA: ICD-10-CM

## 2020-01-09 DIAGNOSIS — M54.16 RADICULOPATHY, LUMBAR REGION: ICD-10-CM

## 2020-01-09 PROCEDURE — 99244 OFF/OP CNSLTJ NEW/EST MOD 40: CPT | Performed by: PHYSICAL MEDICINE & REHABILITATION

## 2020-01-09 RX ORDER — GABAPENTIN 300 MG/1
300 CAPSULE ORAL 3 TIMES DAILY
Qty: 90 CAPSULE | Refills: 1 | Status: SHIPPED | OUTPATIENT
Start: 2020-01-09 | End: 2020-07-15 | Stop reason: ALTCHOICE

## 2020-01-09 NOTE — PROGRESS NOTES
Assessment  1  Acute left-sided low back pain with left-sided sciatica    2  Radiculopathy, lumbar region        Plan  1  Initiate gabapentin 300 mg q h s  And titrate up to t i d  Dosing  2  Follow-up in 4 weeks with nurse practitioner if continues to have significant symptoms would recommend left L5-S1 transforaminal epidural steroid injection           My impressions and treatment recommendations were discussed in detail with the patient who verbalized understanding and had no further questions  Discharge instructions were provided  I personally saw and examined the patient and I agree with the above discussed plan of care  No orders of the defined types were placed in this encounter  No orders of the defined types were placed in this encounter  History of Present Illness    Teresita Berry is a 46 y o  male seen in consultation at the request of Dr Jet Burch regarding chronic back and radiating left leg pain  The patient has been experiencing symptoms over the past 3 months without any specific inciting event or trauma  He describes moderate intensity symptoms currently rated as 4/10 which is nearly constant without any typical pattern characterized as shooting, numbness, sharp, dull, aching  He does describe some lower extremity weakness which has actually been improving recently  He had some trouble with great toe extension and left ankle dorsiflexion which is improving  Aggravating factors include standing, bending, sitting, walking, and exercise  He is unable to determine any significant alleviating factors  Diagnostic studies include MRI of the lumbar spine  This does reveal L4-5 disc herniation which is small and does not appear to be compressing the exiting L4 nerve roots but may be irritating the traversing L5 nerve root  He has tried physical therapy with moderate relief, moderate relief with heat or ice application and a TENS unit as well      Patient does smoke tobacco as well as marijuana on a daily basis  I have personally reviewed and/or updated the patient's past medical history, past surgical history, family history, social history, current medications, allergies, and vital signs today  Review of Systems   Constitutional: Negative for fever and unexpected weight change  HENT: Negative for trouble swallowing  Eyes: Negative for visual disturbance  Respiratory: Negative for shortness of breath and wheezing  Cardiovascular: Negative for chest pain and palpitations  Gastrointestinal: Positive for abdominal pain and nausea  Negative for constipation, diarrhea and vomiting  Endocrine: Negative for cold intolerance, heat intolerance and polydipsia  Genitourinary: Negative for difficulty urinating and frequency  Musculoskeletal: Positive for back pain (lumbar L>R and radiating down the left leg), joint swelling and myalgias  Negative for arthralgias and gait problem  Skin: Negative for rash  Neurological: Positive for numbness (left great toe) and headaches  Negative for dizziness, seizures, syncope and weakness  Hematological: Does not bruise/bleed easily  Psychiatric/Behavioral: Negative for dysphoric mood  The patient is nervous/anxious  All other systems reviewed and are negative        Patient Active Problem List   Diagnosis    Anxiety    Allergic rhinitis    Deviated septum    Crohn's disease involving terminal ileum ([address] )    Retention cyst of nasal sinus    Neck pain    Chronic midline thoracic back pain    Palpitations    Allergic asthma    S/P repair of ventral hernia    Postoperative ileus ([address] )    Functional abdominal pain syndrome    BMI 25 0-25 9,adult    Right upper quadrant abdominal pain    Right lower quadrant abdominal pain    White coat syndrome without diagnosis of hypertension    Chronic headaches    Acute left-sided low back pain with left-sided sciatica    Tick bite    Neurological deficit present    Degenerative disc disease, lumbar    Chronic midline low back pain with left-sided sciatica       Past Medical History:   Diagnosis Date    Abdominal pain     Abnormal liver function test     last assessed: Oct 16, 2013     Abnormal weight loss     last assessed: Yung 15, 2014     Allergic rhinitis due to pollen     last assessed: Yung 15, 2014     Anxiety     Anxiety     last assessed/resolved: Aug 5, 2015     Asthma     last assessed: Yung 15, 2014     Benign essential HTN     last assessed/resolved: Feb 23, 2017     Cervical lymphadenopathy     last assessed/resolved: Feb 23, 2017     Chronic sinusitis     last assessed: Yung 15, 2014     Constipation     Crohn's disease ([facility] [address] ) 01/01/2011    last assessed: Yung 15, 2014     Dysuria     last assessed: April 29, 2016     Elevated BP without diagnosis of hypertension     last assessed/resolved: Feb 23, 2017     Esophageal reflux     last assessed/resolved: Feb 23, 2017     Eustachian tube anomaly     Resolved: Nov 9, 2017     External hemorrhoids     last assessed: Yung 15, 2014     Hypertension     borderline    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     last assessed/resolved: July 19, 2017     Inflammatory bowel disease     Pancreatic neoplasm     last assessed: Yung 15, 2014     PONV (postoperative nausea and vomiting)     Positive depression screening     resolved: July 24, 2017     Psoriasis     Seasonal allergies     Small bowel obstruction ([facility] [address] ) 11/11/2018    Vitamin B12 deficiency     last assessed/resolved: Feb 23, 2017        Past Surgical History:   Procedure Laterality Date    CHOLECYSTECTOMY      resolved: 2011     COLONOSCOPY N/A 11/18/2016    Procedure: COLONOSCOPY;  Surgeon: Dilia Murphy MD;  Location:  GI LAB; Service:     COLONOSCOPY N/A 4/28/2016    Procedure: COLONOSCOPY;  Surgeon: Dilia Murphy MD;  Location: [facility] GI LAB;   Service:     ESOPHAGOGASTRODUODENOSCOPY N/A 4/28/2016    Procedure: ESOPHAGOGASTRODUODENOSCOPY (EGD); Surgeon: Jordan Munoz MD;  Location: [facility] GI LAB; Service:     FRONTAL SINUSOTOMY      resolved: April 2009     PANCREAS SURGERY      [place] COLONOSCOPY FLX DX W/COLLJ [facility] WHEN PFRMD N/A 10/12/2018    Procedure: EGD AND COLONOSCOPY;  Surgeon: Nimco Webb MD;  Location: [facility] GI LAB; Service: Gastroenterology    [place] LAP, INCISIONAL HERNIA REPAIR,REDUCIBLE N/A 11/8/2018    Procedure: REPAIR HERNIA INCISIONAL LAPAROSCOPIC;  Surgeon: Suri Anders MD;  [facility];  Service: General    [place] REPAIR OF NASAL SEPTUM N/A 7/28/2017    Procedure: REVISION SEPTOPLASTY; TURBINOPLASTY; BILATERAL  GRAFTS; ALAR GRAFT; POSSIBLE AURICULAR CARTILAGE GRAFT;  Surgeon: Larry Medrano MD;  [facility];  Service: ENT    TONSILLECTOMY AND ADENOIDECTOMY         Family History   Problem Relation Age of Onset    Diabetes Mother         mellitus     Hypertension Mother     Thyroid disease Mother    [facility] Fibromyalgia Mother     Hypertension Father     Hypertension Sister     No Known Problems Brother     No Known Problems Maternal Grandmother     No Known Problems Maternal Grandfather     Diabetes Paternal Grandmother     Diabetes Paternal Grandfather     Diabetes Other         mellitus     Rheum arthritis Cousin        Social History     Occupational History    Occupation:     Tobacco Use    Smoking status: Current Every Day Smoker     Packs/day: 0 50     Types: Cigarettes    Smokeless tobacco: Never Used    Tobacco comment: Dependence on nicotine in cigarettes - 1/2 PPD x 20 years    Substance and Sexual Activity    Alcohol use: Yes     Frequency: Monthly or less     Comment: John consumes alcohol noted in "allscripts"     Drug use: Yes     Types: Marijuana     Comment: uses medical marijuana via vaping   last use 2/8/19    Sexual activity: Yes       Current Outpatient Medications on File Prior to Visit   Medication Sig    acetaminophen (TYLENOL) 500 mg tablet Take 1,000 mg by mouth every 4 (four) hours as needed     cetirizine (ZyrTEC) 10 mg tablet Take 1 tablet (10 mg total) by mouth daily as needed for allergies    clonazePAM (KlonoPIN) 0 5 mg tablet Take 1 tablet (0 5 mg total) by mouth every 12 (twelve) hours as needed for anxiety    diphenhydrAMINE (BENADRYL) 25 mg tablet Take 25 mg by mouth as needed      fluticasone (FLONASE) 50 mcg/act nasal spray 1 spray into each nostril daily as needed for rhinitis    Linaclotide (LINZESS) 145 MCG CAPS Take 1 capsule (145 mcg total) by mouth daily    NON FORMULARY     cyclobenzaprine (FLEXERIL) 10 mg tablet Take 1 tablet (10 mg total) by mouth 2 (two) times a day as needed for muscle spasms (Patient not taking: Reported on 1/9/2020)    rifaximin (XIFAXAN) 550 mg tablet Take 1 tablet (550 mg total) by mouth every 8 (eight) hours for 14 days (Patient not taking: Reported on 1/9/2020)    [DISCONTINUED] amitriptyline (ELAVIL) 10 mg tablet Take 1 tablet (10 mg total) by mouth daily at bedtime     No current facility-administered medications on file prior to visit  Allergies   Allergen Reactions    Apple Anaphylaxis    Aspirin Anaphylaxis and Hives     Category: Allergy;     Eggs Or Egg-Derived Products Anaphylaxis     Category: Allergy;     Ibuprofen Anaphylaxis and Hives     Category: Allergy; aspirin    Nsaids Anaphylaxis     Category: Allergy;     Other Anaphylaxis     Category: Allergy; Annotation - [id number]: cherries, grapes , and kiwis; pt states all fruits    Peanuts [Peanut Oil] Anaphylaxis     nuts    Penicillins Shortness Of Breath    Pollen Extract     Codeine Anxiety       Physical Exam    /76   Pulse 76   Resp 14   Ht 5' 8" (1 727 m)   Wt 75 3 kg (166 lb)   BMI 25 24 kg/m²     LUMBAR  General: Well-developed, well-nourished individual in no acute distress  Mental: Appropriate mood and affect  Grossly oriented with coherent speech and thought processing       Neuro:  Cranial nerves: Cranial nerve function is grossly intact bilaterally   Strength: Bilateral lower extremity strength is normal and symmetric   No atrophy or tone abnormalities noted   Reflexes: Bilateral lower extremity muscle stretch reflexes are physiologic and symmetric   No ankle clonus is noted   Sensation: No loss of sensation is noted   SLR/Foraminal Compression Maneuvers: Straight leg raising in the sitting position is mildly positive for radicular pain on the left  Gait:  Gait/gross motor: Gait is normal  Station is normal  Toe walking, heel walking  are normal     Musculoskeletal:  Palpation: Inspection and palpation of the spine and extremities are unremarkable   Spine: Normal pain-free range of motion except for lumbar extension which reproduces axial back pain  No gross axial skeletal deformities   Skin: Skin inspection grossly negative for erythema, breakdown, or concerning lesions in affected area   Lymph: No lymphadenopathy is appreciated in the involved extremity   Vessels: No lower extremity edema   Lungs: Breathing is comfortable and regular  No dyspnea noted during examination   Eyes: Visual field grossly intact to confrontation  No redness appreciated  ENT: No craniofacial deformities or asymmetry  No neck masses appreciated  Imaging  Study Result     MRI LUMBAR SPINE WITHOUT CONTRAST     INDICATION: M54 42: Lumbago with sciatica, left side  M54 16: Radiculopathy, lumbar region  46year old active male with low back pain with numbness and pain in left lower extremity with decreased sensation to light touch L5-dermatome and weakness with great toe extension and ankle dorsiflexion, and   positive straight leg raise on the left     COMPARISON:  11/1/2019     TECHNIQUE:  Sagittal T1, sagittal T2, sagittal inversion recovery, axial T1 and axial T2, coronal T2     IMAGE QUALITY:  Diagnostic     FINDINGS:     VERTEBRAL BODIES:  There are 5 lumbar type vertebral bodies    Minimal levoscoliosis mid lumbar spine  Normal lordosis  Normal marrow signal is identified within the visualized bony structures  No discrete marrow lesion      SACRUM:  Normal signal within the sacrum  No evidence of insufficiency or stress fracture      DISTAL CORD AND CONUS:  Normal size and signal within the distal cord and conus  The conus medullaris terminates at the L1-2 intervertebral disc space      PARASPINAL SOFT TISSUES:  Paraspinal soft tissues are unremarkable      LOWER THORACIC DISC SPACES:  Normal disc height and signal   No disc herniation, canal stenosis or foraminal narrowing      LUMBAR DISC SPACES:     L1-L2:  Normal      L2-L3:  Normal      L3-L4:  Normal      L4-L5:  There is loss of disc height and signal   There is a central/left paracentral disc herniation, protrusion type  Mild central canal and left neural foraminal narrowing    Right neural foramen patent      L5-S1:  Normal      IMPRESSION:     Mild spondylosis centered at the L4-5 level where there is a small disc herniation         Workstation performed: [id number]

## 2020-01-15 ENCOUNTER — OFFICE VISIT (OUTPATIENT)
Dept: INTERNAL MEDICINE CLINIC | Facility: CLINIC | Age: 40
End: 2020-01-15
Payer: COMMERCIAL

## 2020-01-15 VITALS
SYSTOLIC BLOOD PRESSURE: 112 MMHG | DIASTOLIC BLOOD PRESSURE: 80 MMHG | WEIGHT: 165.6 LBS | BODY MASS INDEX: 25.1 KG/M2 | TEMPERATURE: 98.5 F | HEIGHT: 68 IN | OXYGEN SATURATION: 95 % | HEART RATE: 91 BPM

## 2020-01-15 DIAGNOSIS — J45.20 MILD INTERMITTENT EXTRINSIC ASTHMA WITHOUT COMPLICATION: ICD-10-CM

## 2020-01-15 DIAGNOSIS — M51.36 DEGENERATIVE DISC DISEASE, LUMBAR: ICD-10-CM

## 2020-01-15 DIAGNOSIS — M54.6 CHRONIC MIDLINE THORACIC BACK PAIN: Primary | ICD-10-CM

## 2020-01-15 DIAGNOSIS — M54.42 CHRONIC MIDLINE LOW BACK PAIN WITH LEFT-SIDED SCIATICA: ICD-10-CM

## 2020-01-15 DIAGNOSIS — M54.42 ACUTE LEFT-SIDED LOW BACK PAIN WITH LEFT-SIDED SCIATICA: ICD-10-CM

## 2020-01-15 DIAGNOSIS — J30.2 SEASONAL ALLERGIC RHINITIS, UNSPECIFIED TRIGGER: ICD-10-CM

## 2020-01-15 DIAGNOSIS — R10.9 FUNCTIONAL ABDOMINAL PAIN SYNDROME: ICD-10-CM

## 2020-01-15 DIAGNOSIS — F41.9 ANXIETY: ICD-10-CM

## 2020-01-15 DIAGNOSIS — G89.29 CHRONIC MIDLINE THORACIC BACK PAIN: Primary | ICD-10-CM

## 2020-01-15 DIAGNOSIS — G89.29 CHRONIC MIDLINE LOW BACK PAIN WITH LEFT-SIDED SCIATICA: ICD-10-CM
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
## 2020-01-15 PROCEDURE — 99214 OFFICE O/P EST MOD 30 MIN: CPT | Performed by: INTERNAL MEDICINE

## 2020-01-15 RX ORDER — CLONAZEPAM 0.5 MG/1
0.5 TABLET ORAL EVERY 12 HOURS PRN
Qty: 60 TABLET | Refills: 1 | Status: SHIPPED | OUTPATIENT
Start: 2020-01-15 | End: 2020-04-13 | Stop reason: SDUPTHER

## 2020-01-15 NOTE — PROGRESS NOTES
Assessment/Plan:    Anxiety  Clonopin refilled today     Chronic midline low back pain with left-sided sciatica  Continue follow-up with spine and pain  continue gabapentin    Functional abdominal pain syndrome  Continue follow-up with Gastroenterology  Allergic rhinitis  Stable  Continue Flonase and Zyrtec  He is to establish care with an allergist    Allergic asthma  No recent exacerbation       Diagnoses and all orders for this visit:    Chronic midline thoracic back pain    Anxiety  -     clonazePAM (KlonoPIN) 0 5 mg tablet; Take 1 tablet (0 5 mg total) by mouth every 12 (twelve) hours as needed for anxiety    Chronic midline low back pain with left-sided sciatica    Degenerative disc disease, lumbar    Functional abdominal pain syndrome    Acute left-sided low back pain with left-sided sciatica    Seasonal allergic rhinitis, unspecified trigger    Mild intermittent extrinsic asthma without complication          BMI Counseling: Body mass index is 25 18 kg/m²  The BMI is above normal  Nutrition recommendations include decreasing portion sizes, encouraging healthy choices of fruits and vegetables, decreasing fast food intake, consuming healthier snacks, limiting drinks that contain sugar, moderation in carbohydrate intake, increasing intake of lean protein, reducing intake of saturated and trans fat and reducing intake of cholesterol  Exercise recommendations include moderate physical activity 150 minutes/week and exercising 3-5 times per week  No pharmacotherapy was ordered  Patient referred to PCP due to patient being overweight  Time spent during encounter: 25 minutes (counseling, reviewing medications, and discussing treatment and plan)    Subjective:      Patient ID: Silvana Riley is a 46 y o  male  Chief Complaint   Patient presents with    Follow-up     Patient is here for f/u Anxiety  Pt did not have any new complains at the moment         28-year-old male is seen today for follow-up of chronic conditions  No labs to review today as he forgot to have his lipid panel completed  Since last visit, he has follow-up with gastroenterologist and an attempt was made to start is rifaximin however it was denied by his insurance company  He takes linzess prn for IBS constipation  He is on Klonopin for anxiety, which he takes BID  He is also on medical marijuana, which helps with insomnia  He sees spine and pain and is on Gabapentin  MRI lumbar spine showed "Mild spondylosis centered at the L4-5 level where there is a small disc herniation"  Anxiety   Presents for follow-up visit  Patient reports no chest pain, compulsions, confusion, decreased concentration, depressed mood, dizziness, dry mouth, excessive worry, feeling of choking, hyperventilation, impotence, insomnia, irritability, malaise, muscle tension, nausea, nervous/anxious behavior, obsessions, palpitations, panic, restlessness, shortness of breath or suicidal ideas  Symptoms occur most days  The severity of symptoms is mild  The patient sleeps 6 hours per night  The quality of sleep is good  Compliance with medications is %  Back Pain   This is a chronic problem  The current episode started more than 1 month ago  The problem occurs daily  The problem is unchanged  The pain is present in the lumbar spine  Radiates to: paresthesia of left big toe  The pain is at a severity of 6/10  The pain is moderate  The symptoms are aggravated by twisting, bending and position  Pertinent negatives include no abdominal pain, chest pain, dysuria, fever, headaches, numbness or weakness  Treatments tried: gabapentin  The treatment provided moderate relief         The following portions of the patient's history were reviewed and updated as appropriate: allergies, current medications, past family history, past medical history, past social history, past surgical history and problem list     Review of Systems   Constitutional: Negative for activity change, appetite change, chills, diaphoresis, fatigue, fever and irritability  HENT: Negative for congestion, postnasal drip, rhinorrhea, sinus pressure, sinus pain, sneezing and sore throat  Eyes: Negative for visual disturbance  Respiratory: Negative for apnea, cough, choking, chest tightness, shortness of breath and wheezing  Cardiovascular: Negative for chest pain, palpitations and leg swelling  Gastrointestinal: Negative for abdominal distention, abdominal pain, anal bleeding, blood in stool, constipation, diarrhea, nausea and vomiting  Endocrine: Negative for cold intolerance and heat intolerance  Genitourinary: Negative for difficulty urinating, dysuria, hematuria and impotence  Musculoskeletal: Positive for back pain  Skin: Negative  Neurological: Negative for dizziness, weakness, light-headedness, numbness and headaches  Hematological: Negative for adenopathy  Psychiatric/Behavioral: Negative for agitation, confusion, decreased concentration, sleep disturbance and suicidal ideas  The patient is not nervous/anxious and does not have insomnia  All other systems reviewed and are negative  Past Medical History:   Diagnosis Date    Abdominal pain     Abnormal liver function test     last assessed: Oct 16, 2013     Abnormal weight loss     last assessed: Yung 15, 2014     Allergic rhinitis due to pollen     last assessed: Yung 15, 2014     Anxiety     Anxiety     last assessed/resolved:  Aug 5, 2015     Asthma     last assessed: Yung 15, 2014     Benign essential HTN     last assessed/resolved: Feb 23, 2017     Cervical lymphadenopathy     last assessed/resolved: Feb 23, 2017     Chronic sinusitis     last assessed: Yung 15, 2014     Constipation     Crohn's disease [facility]) 01/01/2011    last assessed: Yung 15, 2014     Dysuria     last assessed: April 29, 2016     Elevated BP without diagnosis of hypertension     last assessed/resolved: Feb 23, 2017    [facility] Esophageal reflux     last assessed/resolved: Feb 23, 2017     Eustachian tube anomaly     Resolved: Nov 9, 2017     External hemorrhoids     last assessed: Yung 15, 2014     Hypertension     borderline    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     last assessed/resolved: July 19, 2017     Inflammatory bowel disease     Pancreatic neoplasm     last assessed: Yung 15, 2014     PONV (postoperative nausea and vomiting)     Positive depression screening     resolved: July 24, 2017     Psoriasis     Seasonal allergies     Small bowel obstruction ([facility] [address] ) 11/11/2018    Vitamin B12 deficiency     last assessed/resolved: Feb 23, 2017          Current Outpatient Medications:     acetaminophen (TYLENOL) 500 mg tablet, Take 1,000 mg by mouth every 4 (four) hours as needed , Disp: , Rfl:     cetirizine (ZyrTEC) 10 mg tablet, Take 1 tablet (10 mg total) by mouth daily as needed for allergies, Disp: 30 tablet, Rfl: 5    clonazePAM (KlonoPIN) 0 5 mg tablet, Take 1 tablet (0 5 mg total) by mouth every 12 (twelve) hours as needed for anxiety, Disp: 60 tablet, Rfl: 1    diphenhydrAMINE (BENADRYL) 25 mg tablet, Take 25 mg by mouth as needed  , Disp: , Rfl:     fluticasone (FLONASE) 50 mcg/act nasal spray, 1 spray into each nostril daily as needed for rhinitis, Disp: 3 Bottle, Rfl: 1    gabapentin (NEURONTIN) 300 mg capsule, Take 1 capsule (300 mg total) by mouth 3 (three) times a day, Disp: 90 capsule, Rfl: 1    Linaclotide (LINZESS) 145 MCG CAPS, Take 1 capsule (145 mcg total) by mouth daily, Disp: 90 capsule, Rfl: 1    NON FORMULARY, , Disp: , Rfl:     Allergies   Allergen Reactions    Apple Anaphylaxis    Aspirin Anaphylaxis and Hives     Category: Allergy;     Eggs Or Egg-Derived Products Anaphylaxis     Category: Allergy;     Ibuprofen Anaphylaxis and Hives     Category: Allergy; aspirin    Nsaids Anaphylaxis     Category: Allergy;     Other Anaphylaxis     Category: Allergy;  Annotation - [id number]: krista grapes , and kiwis; pt states all fruits    Peanuts [Peanut Oil] Anaphylaxis     nuts    Penicillins Shortness Of Breath    Pollen Extract     Codeine Anxiety       Social History   Past Surgical History:   Procedure Laterality Date    CHOLECYSTECTOMY      resolved: 2011     COLONOSCOPY N/A 11/18/2016    Procedure: COLONOSCOPY;  Surgeon: Emily Bates MD;  Location:  GI LAB; Service:     COLONOSCOPY N/A 4/28/2016    Procedure: COLONOSCOPY;  Surgeon: Emily Bates MD;  Location: [facility] GI LAB; Service:     ESOPHAGOGASTRODUODENOSCOPY N/A 4/28/2016    Procedure: ESOPHAGOGASTRODUODENOSCOPY (EGD); Surgeon: Emily Bates MD;  Location: [facility] GI LAB; Service:     FRONTAL SINUSOTOMY      resolved: April 2009     PANCREAS SURGERY      LA COLONOSCOPY FLX DX W/[facility] WHEN PFRMD N/A 10/12/2018    Procedure: EGD AND COLONOSCOPY;  Surgeon: Agapito Land MD;  Location: [facility] GI LAB;   Service: Gastroenterology    LA LAP, INCISIONAL HERNIA REPAIR,REDUCIBLE N/A 11/8/2018    Procedure: REPAIR HERNIA INCISIONAL LAPAROSCOPIC;  Surgeon: Anish Davis MD;  [facility];  Service: General    LA REPAIR OF NASAL SEPTUM N/A 7/28/2017    Procedure: REVISION SEPTOPLASTY; TURBINOPLASTY; BILATERAL  GRAFTS; ALAR GRAFT; POSSIBLE AURICULAR CARTILAGE GRAFT;  Surgeon: Lilian Mcclelland MD;  [facility];  Service: ENT    TONSILLECTOMY AND ADENOIDECTOMY       Family History   Problem Relation Age of Onset    Diabetes Mother         mellitus     Hypertension Mother     Thyroid disease Mother     Fibromyalgia Mother     Hypertension Father     Hypertension Sister     No Known Problems Brother     No Known Problems Maternal Grandmother     No Known Problems Maternal Grandfather     Diabetes Paternal Grandmother     Diabetes Paternal Grandfather     Diabetes Other         mellitus     Rheum arthritis Cousin        Objective:  /80 (BP Location: Left arm, Patient Position: Sitting, Cuff Size: Standard)   Pulse 91   Temp 98 5 °F (36 9 °C) (Oral)   Ht 5' 8" (1 727 m)   Wt 75 1 kg (165 lb 9 6 oz)   SpO2 95%   BMI 25 18 kg/m²     No results found for this or any previous visit (from the past 1344 hour(s))  Physical Exam   Constitutional: He is oriented to person, place, and time  He appears well-developed and well-nourished  No distress  HENT:   Head: Normocephalic and atraumatic  Eyes: Pupils are equal, round, and reactive to light  Conjunctivae and EOM are normal  Right eye exhibits no discharge  Left eye exhibits no discharge  No scleral icterus  Neck: Normal range of motion  Neck supple  No JVD present  No thyromegaly present  Cardiovascular: Normal rate, regular rhythm, normal heart sounds and intact distal pulses  Exam reveals no gallop and no friction rub  No murmur heard  Pulmonary/Chest: Effort normal and breath sounds normal  No respiratory distress  He has no wheezes  He has no rales  He exhibits no tenderness  Abdominal: Soft  Bowel sounds are normal  He exhibits no distension and no mass  There is no tenderness  There is no rebound and no guarding  Musculoskeletal: Normal range of motion  He exhibits no edema or deformity  Lumbar back: He exhibits tenderness  He exhibits no bony tenderness, no pain, no spasm and normal pulse  Lymphadenopathy:     He has no cervical adenopathy  Neurological: He is alert and oriented to person, place, and time  He has normal reflexes  No cranial nerve deficit  Coordination normal    Skin: Skin is warm and dry  No rash noted  He is not diaphoretic  No erythema  No pallor  Psychiatric: He has a normal mood and affect  His behavior is normal  Judgment and thought content normal    Nursing note and vitals reviewed

## 2020-01-24 ENCOUNTER — TELEPHONE (OUTPATIENT)
Dept: GASTROENTEROLOGY | Facility: MEDICAL CENTER | Age: 40
End: 2020-01-24

## 2020-01-24 NOTE — TELEPHONE ENCOUNTER
I spoke to Alli Jorge from [facility] who was following up on a medication prior auth request that as sent from the pharmacy  I stated we have not received this  She had the wrong fax number so I gave her [phone] and she stated she would re fax it and follow up in 2 days to make sure we received it

## 2020-02-03 LAB
ALBUMIN SERPL-MCNC: 4.5 G/DL (ref 3.6–5.1)
ALBUMIN/GLOB SERPL: 1.8 (CALC) (ref 1–2.5)
ALP SERPL-CCNC: 71 U/L (ref 40–115)
ALT SERPL-CCNC: 43 U/L (ref 9–46)
AST SERPL-CCNC: 22 U/L (ref 10–40)
BASOPHILS # BLD AUTO: 113 CELLS/UL (ref 0–200)
BASOPHILS NFR BLD AUTO: 1.2 %
BILIRUB SERPL-MCNC: 1.1 MG/DL (ref 0.2–1.2)
BUN SERPL-MCNC: 13 MG/DL (ref 7–25)
BUN/CREAT SERPL: NORMAL (CALC) (ref 6–22)
CALCIUM SERPL-MCNC: 9.5 MG/DL (ref 8.6–10.3)
CHLORIDE SERPL-SCNC: 101 MMOL/L (ref 98–110)
CHOLEST SERPL-MCNC: 184 MG/DL
CHOLEST/HDLC SERPL: 3.3 (CALC)
CO2 SERPL-SCNC: 29 MMOL/L (ref 20–32)
CREAT SERPL-MCNC: 1.07 MG/DL (ref 0.6–1.35)
CRP SERPL-MCNC: 2.7 MG/L
EOSINOPHIL # BLD AUTO: 310 CELLS/UL (ref 15–500)
EOSINOPHIL NFR BLD AUTO: 3.3 %
ERYTHROCYTE [DISTWIDTH] IN BLOOD BY AUTOMATED COUNT: 13 % (ref 11–15)
GLOBULIN SER CALC-MCNC: 2.5 G/DL (CALC) (ref 1.9–3.7)
GLUCOSE SERPL-MCNC: 96 MG/DL (ref 65–99)
HCT VFR BLD AUTO: 47 % (ref 38.5–50)
HDLC SERPL-MCNC: 55 MG/DL
HGB BLD-MCNC: 16.3 G/DL (ref 13.2–17.1)
LDLC SERPL CALC-MCNC: 113 MG/DL (CALC)
LYMPHOCYTES # BLD AUTO: 1795 CELLS/UL (ref 850–3900)
LYMPHOCYTES NFR BLD AUTO: 19.1 %
MCH RBC QN AUTO: 32.3 PG (ref 27–33)
MCHC RBC AUTO-ENTMCNC: 34.7 G/DL (ref 32–36)
MCV RBC AUTO: 93.1 FL (ref 80–100)
MONOCYTES # BLD AUTO: 714 CELLS/UL (ref 200–950)
MONOCYTES NFR BLD AUTO: 7.6 %
NEUTROPHILS # BLD AUTO: 6467 CELLS/UL (ref 1500–7800)
NEUTROPHILS NFR BLD AUTO: 68.8 %
NONHDLC SERPL-MCNC: 129 MG/DL (CALC)
PLATELET # BLD AUTO: 220 THOUSAND/UL (ref 140–400)
PMV BLD REES-ECKER: 10 FL (ref 7.5–12.5)
POTASSIUM SERPL-SCNC: 5 MMOL/L (ref 3.5–5.3)
PROT SERPL-MCNC: 7 G/DL (ref 6.1–8.1)
RBC # BLD AUTO: 5.05 MILLION/UL (ref 4.2–5.8)
SL AMB EGFR AFRICAN AMERICAN: 102 ML/MIN/1.73M2
SL AMB EGFR NON AFRICAN AMERICAN: 88 ML/MIN/1.73M2
SODIUM SERPL-SCNC: 137 MMOL/L (ref 135–146)
TRIGL SERPL-MCNC: 74 MG/DL
WBC # BLD AUTO: 9.4 THOUSAND/UL (ref 3.8–10.8)

## 2020-02-07 ENCOUNTER — OFFICE VISIT (OUTPATIENT)
Dept: PAIN MEDICINE | Facility: MEDICAL CENTER | Age: 40
End: 2020-02-07
Payer: COMMERCIAL

## 2020-02-07 VITALS
HEART RATE: 76 BPM | SYSTOLIC BLOOD PRESSURE: 144 MMHG | WEIGHT: 167 LBS | DIASTOLIC BLOOD PRESSURE: 86 MMHG | HEIGHT: 68 IN | BODY MASS INDEX: 25.31 KG/M2

## 2020-02-07 DIAGNOSIS — M54.50 CHRONIC MIDLINE LOW BACK PAIN WITHOUT SCIATICA: Primary | ICD-10-CM

## 2020-02-07 DIAGNOSIS — M51.36 DEGENERATIVE DISC DISEASE, LUMBAR: ICD-10-CM

## 2020-02-07 DIAGNOSIS — M47.816 LUMBAR SPONDYLOSIS: ICD-10-CM

## 2020-02-07 DIAGNOSIS — G89.29 CHRONIC MIDLINE LOW BACK PAIN WITHOUT SCIATICA: Primary | ICD-10-CM

## 2020-02-07 DIAGNOSIS — M51.26 LUMBAR DISC HERNIATION: ICD-10-CM

## 2020-02-07 PROBLEM — M54.42 ACUTE LEFT-SIDED LOW BACK PAIN WITH LEFT-SIDED SCIATICA: Status: RESOLVED | Noted: 2019-10-31 | Resolved: 2020-02-07

## 2020-02-07 PROCEDURE — 99213 OFFICE O/P EST LOW 20 MIN: CPT | Performed by: NURSE PRACTITIONER

## 2020-02-07 PROCEDURE — 3008F BODY MASS INDEX DOCD: CPT | Performed by: NURSE PRACTITIONER

## 2020-02-07 PROCEDURE — 4004F PT TOBACCO SCREEN RCVD TLK: CPT | Performed by: NURSE PRACTITIONER

## 2020-02-07 NOTE — PROGRESS NOTES
Assessment  1  Chronic midline low back pain without sciatica    2  Degenerative disc disease, lumbar    3  Lumbar spondylosis    4  Lumbar disc herniation      Plan   While the patient was in the office today, I did have a thorough conversation regarding their chronic pain syndrome, medication management, and treatment plan options  Overall, patient's symptoms have improved  The left lower extremity pain has resolved, however he still experiencing some tingling in the left great toe  His biggest complaint at this time is midline low back pain  He attended physical therapy sessions for back and leg pain and was given stretching and strengthening exercises to do at home  He admits that he has not been doing the exercises at home, mainly out of fear of aggravating his back pain  I strongly encouraged him to start performing lumbar strengthening and stretching exercises on a daily basis  I also discussed the importance of a lifelong commitment to lumbar stretching /strengthening  The patient was agreeable and verbalized an understanding  Due to patient's history of Crohn's disease, he has been intolerant to a lot of oral medications  In fact he could not tolerate Neurontin due to increased abdominal pain  As such, will not prescribe anything at this time  He was advised to use Tylenol 1000 mg up to 3 times daily if needed for pain  If the midline axial lumbar pain persists, we could consider lumbar medial branch blocks and possibly radiofrequency ablation  I provided him with brochures regarding both procedures  Follow-up in 1 month or sooner if needed  My impressions and treatment recommendations were discussed in detail with the patient who verbalized understanding and had no further questions  Discharge instructions were provided  I personally saw and examined the patient and I agree with the above discussed plan of care      No orders of the defined types were placed in this encounter  No orders of the defined types were placed in this encounter  History of Present Illness    Sapphire Correa is a 46 y o  male   Seen in the office today for follow-up appointment  He was initially seen here on 01/09/2020 for complaints of low back and left leg pain  An MRI of the lumbar spine performed on 12/24/2019 revealed mild spondylosis centered at L4-5 level where there is a small disc herniation  During his initial visit, he was started on gabapentin titrating to 300 mg 3 times daily  Unfortunately, he could not tolerate gabapentin due to Crohn's disease  He discontinued it due to abdominal pain  He returns the office today stating that, overall, his pain is improving  He is no longer experiencing pain in the left leg  As far as radicular symptoms, the only remaining symptom is tingling in the left great toe  He denies lower extremity weakness  His biggest complaint at this time is midline low back pain  Current pain level is a 5/10  Pain is described as dull aching, throbbing, pressure-like pain  I have personally reviewed and/or updated the patient's past medical history, past surgical history, family history, social history, current medications, allergies, and vital signs today  Review of Systems   Respiratory: Negative for shortness of breath  Cardiovascular: Negative for chest pain  Gastrointestinal: Positive for constipation and diarrhea  Negative for nausea and vomiting  Musculoskeletal: Positive for back pain (lower back pain  ), gait problem and joint swelling  Negative for arthralgias and myalgias  Skin: Negative for rash  Neurological: Negative for dizziness, seizures and weakness  All other systems reviewed and are negative        Patient Active Problem List   Diagnosis    Anxiety    Allergic rhinitis    Deviated septum    Crohn's disease involving terminal ileum ([address] )    Retention cyst of nasal sinus    Neck pain    Chronic midline thoracic back pain    Palpitations    Allergic asthma    S/P repair of ventral hernia    Postoperative ileus (HCC)    Functional abdominal pain syndrome    BMI 25 0-25 9,adult    Right upper quadrant abdominal pain    Right lower quadrant abdominal pain    White coat syndrome without diagnosis of hypertension    Chronic headaches    Neurological deficit present    Degenerative disc disease, lumbar    Chronic midline low back pain with left-sided sciatica       Past Medical History:   Diagnosis Date    Abdominal pain     Abnormal liver function test     last assessed: Oct 16, 2013     Abnormal weight loss     last assessed: Yung 15, 2014     Acute left-sided low back pain with left-sided sciatica 10/31/2019    Allergic rhinitis due to pollen     last assessed: Yung 15, 2014     Anxiety     Anxiety     last assessed/resolved:  Aug 5, 2015     Asthma     last assessed: Yung 15, 2014     Benign essential HTN     last assessed/resolved: Feb 23, 2017     Cervical lymphadenopathy     last assessed/resolved: Feb 23, 2017     Chronic sinusitis     last assessed: Yung 15, 2014     Constipation     Crohn's disease ([facility] [address] ) 01/01/2011    last assessed: Yung 15, 2014     Dysuria     last assessed: April 29, 2016     Elevated BP without diagnosis of hypertension     last assessed/resolved: Feb 23, 2017     Esophageal reflux     last assessed/resolved: Feb 23, 2017     Eustachian tube anomaly     Resolved: Nov 9, 2017     External hemorrhoids     last assessed: Yung 15, 2014     Hypertension     borderline    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     last assessed/resolved: July 19, 2017     Inflammatory bowel disease     Pancreatic neoplasm     last assessed: Yung 15, 2014     PONV (postoperative nausea and vomiting)     Positive depression screening     resolved: July 24, 2017     Psoriasis     Seasonal allergies     [facility] 11/11/2018    Vitamin B12 deficiency     last assessed/resolved: Feb 23, 2017        Past Surgical History:   Procedure Laterality Date    CHOLECYSTECTOMY      resolved: 2011     COLONOSCOPY N/A 11/18/2016    Procedure: COLONOSCOPY;  Surgeon: Waylon Mcnamara MD;  Location:  GI LAB; Service:     COLONOSCOPY N/A 4/28/2016    Procedure: COLONOSCOPY;  Surgeon: Waylon Mcnamara MD;  Location: [facility] GI LAB; Service:     ESOPHAGOGASTRODUODENOSCOPY N/A 4/28/2016    Procedure: ESOPHAGOGASTRODUODENOSCOPY (EGD); Surgeon: Waylon Mcnamara MD;  Location: [facility] GI LAB; Service:     FRONTAL SINUSOTOMY      resolved: April 2009     PANCREAS SURGERY      KS COLONOSCOPY FLX DX W/[facility] WHEN PFRMD N/A 10/12/2018    Procedure: EGD AND COLONOSCOPY;  Surgeon: Sara Main MD;  Location: [facility] GI LAB;   Service: Gastroenterology    KS LAP, INCISIONAL HERNIA REPAIR,REDUCIBLE N/A 11/8/2018    Procedure: REPAIR HERNIA INCISIONAL LAPAROSCOPIC;  Surgeon: Porter Paz MD;  [facility];  Service: General    KS REPAIR OF NASAL SEPTUM N/A 7/28/2017    Procedure: REVISION SEPTOPLASTY; TURBINOPLASTY; BILATERAL  GRAFTS; ALAR GRAFT; POSSIBLE AURICULAR CARTILAGE GRAFT;  Surgeon: Atif Wood MD;  [facility];  Service: ENT    TONSILLECTOMY AND ADENOIDECTOMY         Family History   Problem Relation Age of Onset    Diabetes Mother         mellitus     Hypertension Mother     Thyroid disease Mother     Fibromyalgia Mother    Edward Spinner Hypertension Father     Hypertension Sister     No Known Problems Brother     No Known Problems Maternal Grandmother     No Known Problems Maternal Grandfather     Diabetes Paternal Grandmother     Diabetes Paternal Grandfather     Diabetes Other         mellitus     Rheum arthritis Cousin        Social History     Occupational History    Occupation:     Tobacco Use    Smoking status: Current Every Day Smoker     Packs/day: 0 50     Types: Cigarettes    Smokeless tobacco: Never Used    Tobacco comment: Dependence on nicotine in cigarettes - 1/2 PPD x 20 years    Substance and Sexual Activity    Alcohol use: Yes     Frequency: Monthly or less     Comment: Edie Vela consumes alcohol noted in "allscripts"     Drug use: Yes     Types: Marijuana     Comment: uses medical marijuana via vaping  last use 2/8/19    Sexual activity: Yes       Current Outpatient Medications on File Prior to Visit   Medication Sig    acetaminophen (TYLENOL) 500 mg tablet Take 1,000 mg by mouth every 4 (four) hours as needed     cetirizine (ZyrTEC) 10 mg tablet Take 1 tablet (10 mg total) by mouth daily as needed for allergies    clonazePAM (KlonoPIN) 0 5 mg tablet Take 1 tablet (0 5 mg total) by mouth every 12 (twelve) hours as needed for anxiety    diphenhydrAMINE (BENADRYL) 25 mg tablet Take 25 mg by mouth as needed      fluticasone (FLONASE) 50 mcg/act nasal spray 1 spray into each nostril daily as needed for rhinitis    Linaclotide (LINZESS) 145 MCG CAPS Take 1 capsule (145 mcg total) by mouth daily    NON FORMULARY     gabapentin (NEURONTIN) 300 mg capsule Take 1 capsule (300 mg total) by mouth 3 (three) times a day (Patient not taking: Reported on 2/7/2020)     No current facility-administered medications on file prior to visit  Allergies   Allergen Reactions    Apple Anaphylaxis    Aspirin Anaphylaxis and Hives     Category: Allergy;     Eggs Or Egg-Derived Products Anaphylaxis     Category: Allergy;     Ibuprofen Anaphylaxis and Hives     Category: Allergy; aspirin    Nsaids Anaphylaxis     Category: Allergy;     Other Anaphylaxis     Category: Allergy;  Annotation - [id number]: cherries, grapes , and kiwis; pt states all fruits    Peanuts [Peanut Oil] Anaphylaxis     nuts    Penicillins Shortness Of Breath    Pollen Extract     Codeine Anxiety       Physical Exam    /86   Pulse 76   Ht 5' 8" (1 727 m)   Wt 75 8 kg (167 lb)   BMI 25 39 kg/m²     Constitutional: normal, well developed, well nourished, alert, in no distress and non-toxic and no overt pain behavior  Eyes: anicteric  HEENT: grossly intact  Neck: supple, symmetric, trachea midline and no masses   Pulmonary:even and unlabored  Cardiovascular:No edema or pitting edema present  Skin:Normal without rashes or lesions and well hydrated  Psychiatric:Mood and affect appropriate  Neurologic:Cranial Nerves II-XII grossly intact  Musculoskeletal:normal    Imaging    MRI LUMBAR SPINE WITHOUT CONTRAST     INDICATION: M54 42: Lumbago with sciatica, left side  M54 16: Radiculopathy, lumbar region  46year old active male with low back pain with numbness and pain in left lower extremity with decreased sensation to light touch L5-dermatome and weakness with great toe extension and ankle dorsiflexion, and   positive straight leg raise on the left     COMPARISON:  11/1/2019     TECHNIQUE:  Sagittal T1, sagittal T2, sagittal inversion recovery, axial T1 and axial T2, coronal T2     IMAGE QUALITY:  Diagnostic     FINDINGS:     VERTEBRAL BODIES:  There are 5 lumbar type vertebral bodies  Minimal levoscoliosis mid lumbar spine  Normal lordosis  Normal marrow signal is identified within the visualized bony structures  No discrete marrow lesion      SACRUM:  Normal signal within the sacrum  No evidence of insufficiency or stress fracture      DISTAL CORD AND CONUS:  Normal size and signal within the distal cord and conus  The conus medullaris terminates at the L1-2 intervertebral disc space      PARASPINAL SOFT TISSUES:  Paraspinal soft tissues are unremarkable      LOWER THORACIC DISC SPACES:  Normal disc height and signal   No disc herniation, canal stenosis or foraminal narrowing      LUMBAR DISC SPACES:     L1-L2:  Normal      L2-L3:  Normal      L3-L4:  Normal      L4-L5:  There is loss of disc height and signal   There is a central/left paracentral disc herniation, protrusion type  Mild central canal and left neural foraminal narrowing    Right neural foramen patent      L5-S1: Normal      IMPRESSION:     Mild spondylosis centered at the L4-5 level where there is a small disc herniation

## 2020-02-18 LAB — CALPROTECTIN STL-MCNT: 71.6 MCG/G

## 2020-03-10 ENCOUNTER — OFFICE VISIT (OUTPATIENT)
Dept: PAIN MEDICINE | Facility: MEDICAL CENTER | Age: 40
End: 2020-03-10
Payer: COMMERCIAL

## 2020-03-10 VITALS
BODY MASS INDEX: 24.86 KG/M2 | DIASTOLIC BLOOD PRESSURE: 84 MMHG | WEIGHT: 164 LBS | HEART RATE: 63 BPM | HEIGHT: 68 IN | SYSTOLIC BLOOD PRESSURE: 128 MMHG

## 2020-03-10 DIAGNOSIS — G89.29 CHRONIC MIDLINE LOW BACK PAIN WITHOUT SCIATICA: Primary | ICD-10-CM

## 2020-03-10 DIAGNOSIS — M51.36 DEGENERATIVE DISC DISEASE, LUMBAR: ICD-10-CM

## 2020-03-10 DIAGNOSIS — M54.50 CHRONIC MIDLINE LOW BACK PAIN WITHOUT SCIATICA: Primary | ICD-10-CM

## 2020-03-10 PROCEDURE — 3008F BODY MASS INDEX DOCD: CPT | Performed by: NURSE PRACTITIONER

## 2020-03-10 PROCEDURE — 99213 OFFICE O/P EST LOW 20 MIN: CPT | Performed by: NURSE PRACTITIONER

## 2020-03-10 PROCEDURE — 4004F PT TOBACCO SCREEN RCVD TLK: CPT | Performed by: NURSE PRACTITIONER

## 2020-03-10 NOTE — PROGRESS NOTES
Assessment  1  Chronic midline low back pain without sciatica    2  Degenerative disc disease, lumbar        Plan    While the patient was in the office today, I did have a thorough conversation regarding their chronic pain syndrome, medication management, and treatment plan options  Suzy Boone is a 44-year-old male who was initially seen here on 01/09/2020 with complaints of low back and left leg pain  He was last seen here on 02/07/2020 and at that point was reporting almost complete resolution of his left leg pain  We discussed possibility of doing diagnostic medial branch blocks with the goal of moving forward with radiofrequency ablation  I gave him information to take home and read about the procedures  He returns to the office today with complaints of nonradiating, midline low back pain  We discussed medial branch blocks again  He still not sure if he wants to proceed in this manner  I advised him to continue with the home exercise program involving lumbar stretching and strengthening  We discussed the importance of a lifelong commitment to doing lumbar exercises geared toward core strengthening and stretching  The patient was agreeable and verbalized an understanding  I discussed with the patient that at this point time he can follow up with our office on an as-needed basis  He will call us if he decides he wants to proceed with the diagnostic lumbar medial branch blocks in the future  I did review the patient that if his pain symptoms should change, worsen, and/or if he would experience any new symptoms he would like to be evaluated for, he should give our office a call  The patient was agreeable and verbalized an understanding  My impressions and treatment recommendations were discussed in detail with the patient who verbalized understanding and had no further questions  Discharge instructions were provided   I personally saw and examined the patient and I agree with the above discussed plan of care  No orders of the defined types were placed in this encounter  No orders of the defined types were placed in this encounter  History of Present Illness    Oumar Chin is a 46 y o  male  With a chief complaint of midline, nonradiating low back pain  Current pain level is 3/10  Pain is described as dull, aching, sharp  He experiences intermittent tingling in his right great toe  He no longer experiences leg pain  He has is Tylenol as needed without significant improvement  I have personally reviewed and/or updated the patient's past medical history, past surgical history, family history, social history, current medications, allergies, and vital signs today  Review of Systems   Respiratory: Negative for shortness of breath  Cardiovascular: Negative for chest pain  Gastrointestinal: Positive for constipation, diarrhea and nausea  Negative for vomiting  Musculoskeletal: Positive for back pain, gait problem and joint swelling  Negative for arthralgias and myalgias  Skin: Negative for rash  Neurological: Negative for dizziness, seizures and weakness  All other systems reviewed and are negative        Patient Active Problem List   Diagnosis    Anxiety    Allergic rhinitis    Deviated septum    Crohn's disease involving terminal ileum ([address] )    Retention cyst of nasal sinus    Neck pain    Chronic midline thoracic back pain    Palpitations    Allergic asthma    S/P repair of ventral hernia    Postoperative ileus ([address]    Functional abdominal pain syndrome    BMI 25 0-25 9,adult    Right upper quadrant abdominal pain    Right lower quadrant abdominal pain    White coat syndrome without diagnosis of hypertension    Chronic headaches    Neurological deficit present    Degenerative disc disease, lumbar    Chronic midline low back pain without sciatica       Past Medical History:   Diagnosis Date    Abdominal pain     Abnormal liver function test     last assessed: Oct 16, 2013     Abnormal weight loss     last assessed: Yung 15, 2014     Acute left-sided low back pain with left-sided sciatica 10/31/2019    Allergic rhinitis due to pollen     last assessed: Yung 15, 2014     Anxiety     Anxiety     last assessed/resolved: Aug 5, 2015     Asthma     last assessed: Yung 15, 2014     Benign essential HTN     last assessed/resolved: Feb 23, 2017     Cervical lymphadenopathy     last assessed/resolved: Feb 23, 2017     Chronic sinusitis     last assessed: Yung 15, 2014     Constipation     Crohn's disease ([facility] ) 01/01/2011    last assessed: Yung 15, 2014     Dysuria     last assessed: April 29, 2016     Elevated BP without diagnosis of hypertension     last assessed/resolved: Feb 23, 2017     Esophageal reflux     last assessed/resolved: Feb 23, 2017     Eustachian tube anomaly     Resolved: Nov 9, 2017     External hemorrhoids     last assessed: Yung 15, 2014     Hypertension     borderline    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     last assessed/resolved: July 19, 2017     Inflammatory bowel disease     Pancreatic neoplasm     last assessed: Yung 15, 2014     PONV (postoperative nausea and vomiting)     Positive depression screening     resolved: July 24, 2017     Psoriasis     Seasonal allergies     Small bowel obstruction ([facility] ) 11/11/2018    Vitamin B12 deficiency     last assessed/resolved: Feb 23, 2017        Past Surgical History:   Procedure Laterality Date    CHOLECYSTECTOMY      resolved: 2011     COLONOSCOPY N/A 11/18/2016    Procedure: COLONOSCOPY;  Surgeon: Waylon Mcnamara MD;  Location:  GI LAB; Service:     COLONOSCOPY N/A 4/28/2016    Procedure: COLONOSCOPY;  Surgeon: Waylon Mcnamara MD;  Location: [facility] GI LAB; Service:     ESOPHAGOGASTRODUODENOSCOPY N/A 4/28/2016    Procedure: ESOPHAGOGASTRODUODENOSCOPY (EGD); Surgeon: Waylon Mcnamara MD;  Location: [facility] GI LAB;   Service:     FRONTAL SINUSOTOMY      resolved: April 2009     PANCREAS SURGERY      [place] COLONOSCOPY FLX DX W/COLLJ SPEC WHEN PFRMD N/A 10/12/2018    Procedure: EGD AND COLONOSCOPY;  Surgeon: Chuy Solis MD;  Location: [facility] GI LAB; Service: Gastroenterology    [place] LAP, INCISIONAL HERNIA REPAIR,REDUCIBLE N/A 11/8/2018    Procedure: REPAIR HERNIA INCISIONAL LAPAROSCOPIC;  Surgeon: Mirtha Guerrero MD;  [facility];  Service: General    [place] REPAIR OF NASAL SEPTUM N/A 7/28/2017    Procedure: REVISION SEPTOPLASTY; TURBINOPLASTY; BILATERAL  GRAFTS; ALAR GRAFT; POSSIBLE AURICULAR CARTILAGE GRAFT;  Surgeon: Vika Jorge MD;  [facility];  Service: ENT    TONSILLECTOMY AND ADENOIDECTOMY         Family History   Problem Relation Age of Onset    Diabetes Mother         mellitus     Hypertension Mother     Thyroid disease Mother    Valaria Reil Fibromyalgia Mother     Hypertension Father     Hypertension Sister     No Known Problems Brother     No Known Problems Maternal Grandmother     No Known Problems Maternal Grandfather     Diabetes Paternal Grandmother     Diabetes Paternal Grandfather     Diabetes Other         mellitus     Rheum arthritis Cousin        Social History     Occupational History    Occupation:     Tobacco Use    Smoking status: Current Every Day Smoker     Packs/day: 0 50     Types: Cigarettes    Smokeless tobacco: Never Used    Tobacco comment: Dependence on nicotine in cigarettes - 1/2 PPD x 20 years    Substance and Sexual Activity    Alcohol use: Yes     Frequency: Monthly or less     Comment: John consumes alcohol noted in "allscripts"     Drug use: Yes     Types: Marijuana     Comment: uses medical marijuana via vaping   last use 2/8/19    Sexual activity: Yes       Current Outpatient Medications on File Prior to Visit   Medication Sig    acetaminophen (TYLENOL) 500 mg tablet Take 1,000 mg by mouth every 4 (four) hours as needed     cetirizine (ZyrTEC) 10 mg tablet Take 1 tablet (10 mg total) by mouth daily as needed for allergies    clonazePAM (KlonoPIN) 0 5 mg tablet Take 1 tablet (0 5 mg total) by mouth every 12 (twelve) hours as needed for anxiety    diphenhydrAMINE (BENADRYL) 25 mg tablet Take 25 mg by mouth as needed      fluticasone (FLONASE) 50 mcg/act nasal spray 1 spray into each nostril daily as needed for rhinitis    Linaclotide (LINZESS) 145 MCG CAPS Take 1 capsule (145 mcg total) by mouth daily    NON FORMULARY     gabapentin (NEURONTIN) 300 mg capsule Take 1 capsule (300 mg total) by mouth 3 (three) times a day (Patient not taking: Reported on 2/7/2020)     No current facility-administered medications on file prior to visit  Allergies   Allergen Reactions    Apple Anaphylaxis    Aspirin Anaphylaxis and Hives     Category: Allergy;     Eggs Or Egg-Derived Products Anaphylaxis     Category: Allergy;     Ibuprofen Anaphylaxis and Hives     Category: Allergy; aspirin    Nsaids Anaphylaxis     Category: Allergy;     Other Anaphylaxis     Category: Allergy; Annotation - [id number]: cherries, grapes , and kiwis; pt states all fruits    Peanuts [Peanut Oil] Anaphylaxis     nuts    Penicillins Shortness Of Breath    Pollen Extract     Codeine Anxiety       Physical Exam    /84   Pulse 63   Ht 5' 8" (1 727 m)   Wt 74 4 kg (164 lb)   BMI 24 94 kg/m²     Constitutional: normal, well developed, well nourished, alert, in no distress and non-toxic and no overt pain behavior    Eyes: anicteric  HEENT: grossly intact  Neck: supple, symmetric, trachea midline and no masses   Pulmonary:even and unlabored  Cardiovascular:No edema or pitting edema present  Skin:Normal without rashes or lesions and well hydrated  Psychiatric:Mood and affect appropriate  Neurologic:Cranial Nerves II-XII grossly intact  Musculoskeletal:normal    Imaging

## 2020-04-13 DIAGNOSIS — F41.9 ANXIETY: ICD-10-CM

## 2020-04-16 RX ORDER — CLONAZEPAM 0.5 MG/1
0.5 TABLET ORAL EVERY 12 HOURS PRN
Qty: 60 TABLET | Refills: 1 | Status: SHIPPED | OUTPATIENT
Start: 2020-04-16 | End: 2020-06-16 | Stop reason: SDUPTHER

## 2020-05-20 ENCOUNTER — OFFICE VISIT (OUTPATIENT)
Dept: URGENT CARE | Facility: CLINIC | Age: 40
End: 2020-05-20
Payer: COMMERCIAL

## 2020-05-20 ENCOUNTER — APPOINTMENT (OUTPATIENT)
Dept: RADIOLOGY | Facility: CLINIC | Age: 40
End: 2020-05-20
Payer: COMMERCIAL

## 2020-05-20 VITALS
RESPIRATION RATE: 18 BRPM | OXYGEN SATURATION: 100 % | HEIGHT: 68 IN | SYSTOLIC BLOOD PRESSURE: 144 MMHG | HEART RATE: 58 BPM | WEIGHT: 164.02 LBS | BODY MASS INDEX: 24.86 KG/M2 | DIASTOLIC BLOOD PRESSURE: 97 MMHG | TEMPERATURE: 98.6 F

## 2020-05-20 DIAGNOSIS — S59.901A ELBOW INJURY, RIGHT, INITIAL ENCOUNTER: ICD-10-CM

## 2020-05-20 DIAGNOSIS — S59.901A ELBOW INJURY, RIGHT, INITIAL ENCOUNTER: Primary | ICD-10-CM

## 2020-05-20 PROCEDURE — 99203 OFFICE O/P NEW LOW 30 MIN: CPT | Performed by: PHYSICIAN ASSISTANT

## 2020-05-20 PROCEDURE — 73080 X-RAY EXAM OF ELBOW: CPT

## 2020-06-16 DIAGNOSIS — F41.9 ANXIETY: ICD-10-CM

## 2020-06-16 RX ORDER — CLONAZEPAM 0.5 MG/1
0.5 TABLET ORAL EVERY 12 HOURS PRN
Qty: 60 TABLET | Refills: 1 | Status: SHIPPED | OUTPATIENT
Start: 2020-06-16 | End: 2020-08-17 | Stop reason: SDUPTHER

## 2020-07-15 ENCOUNTER — OFFICE VISIT (OUTPATIENT)
Dept: INTERNAL MEDICINE CLINIC | Facility: CLINIC | Age: 40
End: 2020-07-15
Payer: MEDICARE

## 2020-07-15 VITALS
HEART RATE: 64 BPM | WEIGHT: 157.4 LBS | OXYGEN SATURATION: 98 % | HEIGHT: 68 IN | TEMPERATURE: 98.8 F | BODY MASS INDEX: 23.86 KG/M2 | SYSTOLIC BLOOD PRESSURE: 128 MMHG | DIASTOLIC BLOOD PRESSURE: 88 MMHG

## 2020-07-15 DIAGNOSIS — F41.9 ANXIETY: ICD-10-CM

## 2020-07-15 DIAGNOSIS — Z13.1 SCREENING FOR DIABETES MELLITUS: ICD-10-CM

## 2020-07-15 DIAGNOSIS — J30.2 SEASONAL ALLERGIC RHINITIS, UNSPECIFIED TRIGGER: ICD-10-CM

## 2020-07-15 DIAGNOSIS — Z00.00 WELCOME TO MEDICARE PREVENTIVE VISIT: Primary | ICD-10-CM

## 2020-07-15 DIAGNOSIS — R10.9 FUNCTIONAL ABDOMINAL PAIN SYNDROME: ICD-10-CM

## 2020-07-15 DIAGNOSIS — M51.36 DEGENERATIVE DISC DISEASE, LUMBAR: ICD-10-CM

## 2020-07-15 DIAGNOSIS — Z13.6 SCREENING FOR CARDIOVASCULAR CONDITION: ICD-10-CM

## 2020-07-15 PROBLEM — R10.11 RIGHT UPPER QUADRANT ABDOMINAL PAIN: Status: RESOLVED | Noted: 2019-09-13 | Resolved: 2020-07-15

## 2020-07-15 PROBLEM — R03.0 WHITE COAT SYNDROME WITHOUT DIAGNOSIS OF HYPERTENSION: Status: RESOLVED | Noted: 2019-10-16 | Resolved: 2020-07-15

## 2020-07-15 PROBLEM — R00.2 PALPITATIONS: Status: RESOLVED | Noted: 2018-08-09 | Resolved: 2020-07-15

## 2020-07-15 PROBLEM — R10.31 RIGHT LOWER QUADRANT ABDOMINAL PAIN: Status: RESOLVED | Noted: 2019-09-13 | Resolved: 2020-07-15

## 2020-07-15 PROCEDURE — 3008F BODY MASS INDEX DOCD: CPT | Performed by: NURSE PRACTITIONER

## 2020-07-15 PROCEDURE — G0402 INITIAL PREVENTIVE EXAM: HCPCS | Performed by: INTERNAL MEDICINE

## 2020-07-15 PROCEDURE — 3008F BODY MASS INDEX DOCD: CPT | Performed by: INTERNAL MEDICINE

## 2020-07-15 PROCEDURE — 4004F PT TOBACCO SCREEN RCVD TLK: CPT | Performed by: INTERNAL MEDICINE

## 2020-07-15 PROCEDURE — G0403 EKG FOR INITIAL PREVENT EXAM: HCPCS | Performed by: INTERNAL MEDICINE

## 2020-07-15 PROCEDURE — 99214 OFFICE O/P EST MOD 30 MIN: CPT | Performed by: INTERNAL MEDICINE

## 2020-07-15 NOTE — PROGRESS NOTES
Assessment/Plan:    Allergic rhinitis  Continue zyrtec and flonase  Degenerative disc disease, lumbar  continue tylenol and discussed stretching exercises  Anxiety  continue klonopin 0 5 mg BID  Discussed starting Effexor to which he would like to do little more research prior to starting  Functional abdominal pain syndrome  Follow-up with Gastroenterology  Diagnoses and all orders for this visit:    Welcome to Medicare preventive visit  -     POCT ECG    Screening for diabetes mellitus    Screening for cardiovascular condition    Seasonal allergic rhinitis, unspecified trigger    Degenerative disc disease, lumbar    Anxiety    Functional abdominal pain syndrome    BMI 25 0-25 9,adult                Time spent during encounter: 25 minutes (counseling, reviewing medications, and discussing treatment and plan)    Subjective:      Patient ID: Huan Hall is a 45 y o  male  Chief Complaint   Patient presents with    Follow-up     Patient is here for 6 months f/u chronic conditions and review blood work  Pt does not have any new medical concerns   Medicare Wellness Visit     Welcome to Medicare  45year old male is seen today for follow up of chronic conditions  Labs from earlier this year reviewed, CBC, CMP, and lipid panel and within acceptable range  His IBS symptoms have essentially remained stable  He is currently in the process of rescheduling his GI follow up appointment  He takes Linzess as needed  He continues to have abdominal pain  He is to schedule appointment with Neurology to establish care regarding headaches  He takes Tylenol for his headaches which provides some relief  Regarding anxiety, he is on Klonopin 0 5 mg twice a day to which he takes twice a day  He is also on medical marijuana for pain and anxiety  He is no longer on gabapentin due to side effects  Anxiety   Presents for follow-up visit   Symptoms include decreased concentration, irritability and restlessness  Patient reports no chest pain, compulsions, confusion, depressed mood, dizziness, dry mouth, excessive worry, feeling of choking, hyperventilation, impotence, insomnia, malaise, muscle tension, nausea, nervous/anxious behavior, obsessions, palpitations, panic, shortness of breath or suicidal ideas  Symptoms occur occasionally  The severity of symptoms is mild  The patient sleeps 8 hours per night  Compliance with medications is %  The following portions of the patient's history were reviewed and updated as appropriate: allergies, current medications, past family history, past medical history, past social history, past surgical history and problem list     Review of Systems   Constitutional: Positive for irritability  Negative for activity change, appetite change, chills, diaphoresis, fatigue and fever  HENT: Negative for congestion, postnasal drip, rhinorrhea, sinus pressure, sinus pain, sneezing and sore throat  Eyes: Negative for visual disturbance  Respiratory: Negative for apnea, cough, choking, chest tightness, shortness of breath and wheezing  Cardiovascular: Negative for chest pain, palpitations and leg swelling  Gastrointestinal: Negative for abdominal distention, abdominal pain, anal bleeding, blood in stool, constipation, diarrhea, nausea and vomiting  Endocrine: Negative for cold intolerance and heat intolerance  Genitourinary: Negative for difficulty urinating, dysuria, hematuria and impotence  Musculoskeletal: Negative  Skin: Negative  Neurological: Negative for dizziness, weakness, light-headedness, numbness and headaches  Hematological: Negative for adenopathy  Psychiatric/Behavioral: Positive for decreased concentration  Negative for agitation, confusion, sleep disturbance and suicidal ideas  The patient is not nervous/anxious and does not have insomnia  All other systems reviewed and are negative          Past Medical History: Diagnosis Date    Abdominal pain     Abnormal liver function test     last assessed: Oct 16, 2013     Abnormal weight loss     last assessed: Yung 15, 2014     Acute left-sided low back pain with left-sided sciatica 10/31/2019    Allergic rhinitis due to pollen     last assessed: Yung 15, 2014     Anxiety     Anxiety     last assessed/resolved:  Aug 5, 2015     Asthma     last assessed: Yung 15, 2014     Benign essential HTN     last assessed/resolved: Feb 23, 2017     Cervical lymphadenopathy     last assessed/resolved: Feb 23, 2017     Chronic sinusitis     last assessed: Yung 15, 2014     Constipation     Cro[facility]'s disease ([facility] ) 01/01/2011    last assessed: Yung 15, 2014     Dysuria     last assessed: April 29, 2016     Elevated BP without diagnosis of hypertension     last assessed/resolved: Feb 23, 2017     Esophageal reflux     last assessed/resolved: Feb 23, 2017     Eustachian tube anomaly     Resolved: Nov 9, 2017     External hemorrhoids     last assessed: Yung 15, 2014     Hypertension     borderline    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     last assessed/resolved: July 19, 2017     Inflammatory bowel disease     Pancreatic neoplasm     last assessed: Yung 15, 2014     PONV (postoperative nausea and vomiting)     Positive depression screening     resolved: July 24, 2017     Psoriasis     Seasonal allergies     Small bowel obstruction ([facility] ) 11/11/2018    Vitamin B12 deficiency     last assessed/resolved: Feb 23, 2017          Current Outpatient Medications:     acetaminophen (TYLENOL) 500 mg tablet, Take 1,000 mg by mouth every 4 (four) hours as needed , Disp: , Rfl:     cetirizine (ZyrTEC) 10 mg tablet, Take 1 tablet (10 mg total) by mouth daily as needed for allergies, Disp: 30 tablet, Rfl: 5    clonazePAM (KlonoPIN) 0 5 mg tablet, Take 1 tablet (0 5 mg total) by mouth every 12 (twelve) hours as needed for anxiety, Disp: 60 tablet, Rfl: 1    diphenhydrAMINE (BENADRYL) 25 mg tablet, Take 25 mg by mouth as needed  , Disp: , Rfl:     fluticasone (FLONASE) 50 mcg/act nasal spray, 1 spray into each nostril daily as needed for rhinitis, Disp: 3 Bottle, Rfl: 1    Linaclotide (LINZESS) 145 MCG CAPS, Take 1 capsule (145 mcg total) by mouth daily, Disp: 90 capsule, Rfl: 1    NON FORMULARY, , Disp: , Rfl:     Allergies   Allergen Reactions    Apple Anaphylaxis    Aspirin Anaphylaxis and Hives     Category: Allergy;     Eggs Or Egg-Derived Products Anaphylaxis     Category: Allergy;     Ibuprofen Anaphylaxis and Hives     Category: Allergy; aspirin    Nsaids Anaphylaxis     Category: Allergy;     Other Anaphylaxis     Category: Allergy; Annotation - [id number]: cherries, grapes , and kiwis; pt states all fruits    Peanuts [Peanut Oil] Anaphylaxis     nuts    Penicillins Shortness Of Breath    Pollen Extract     Codeine Anxiety       Social History   Past Surgical History:   Procedure Laterality Date    CHOLECYSTECTOMY      resolved: 2011     COLONOSCOPY N/A 11/18/2016    Procedure: COLONOSCOPY;  Surgeon: Christopher Herrera MD;  Location:  GI LAB; Service:     COLONOSCOPY N/A 4/28/2016    Procedure: COLONOSCOPY;  Surgeon: Christopher Herrera MD;  Location: [facility] GI LAB; Service:     ESOPHAGOGASTRODUODENOSCOPY N/A 4/28/2016    Procedure: ESOPHAGOGASTRODUODENOSCOPY (EGD); Surgeon: Christopher Herrera MD;  Location: [facility] GI LAB; Service:     FRONTAL SINUSOTOMY      resolved: April 2009     PANCREAS SURGERY      [place] COLONOSCOPY FLX DX W/[facility] WHEN PFRMD N/A 10/12/2018    Procedure: EGD AND COLONOSCOPY;  Surgeon: Jelly Valentino MD;  Location: [facility] GI LAB;   Service: Gastroenterology    [place] LAP, INCISIONAL HERNIA REPAIR,REDUCIBLE N/A 11/8/2018    Procedure: REPAIR HERNIA INCISIONAL LAPAROSCOPIC;  Surgeon: Iliana Mcfadden MD;  [facility];  Service: General    [place] REPAIR OF NASAL SEPTUM N/A 7/28/2017    Procedure: REVISION SEPTOPLASTY; TURBINOPLASTY; BILATERAL  GRAFTS; ALAR GRAFT; POSSIBLE AURICULAR CARTILAGE GRAFT;  Surgeon: Thelma Payton MD;  [facility]: [facility];  Service: ENT    TONSILLECTOMY AND ADENOIDECTOMY       Family History   Problem Relation Age of Onset    Diabetes Mother         mellitus     Hypertension Mother     Thyroid disease Mother     Fibromyalgia Mother     Hypertension Father     Hypertension Sister     No Known Problems Brother     No Known Problems Maternal Grandmother     No Known Problems Maternal Grandfather     Diabetes Paternal Grandmother     Diabetes Paternal Grandfather     Diabetes Other         mellitus     Rheum arthritis Cousin        Objective:  /88 (BP Location: Left arm, Patient Position: Sitting, Cuff Size: Standard)   Pulse 64   Temp 98 8 °F (37 1 °C) (Oral)   Ht 5' 8" (1 727 m)   Wt 71 4 kg (157 lb 6 4 oz)   SpO2 98%   BMI 23 93 kg/m²     No results found for this or any previous visit (from the past 1344 hour(s))  Physical Exam   Constitutional: He is oriented to person, place, and time  He appears well-developed and well-nourished  No distress  HENT:   Head: Normocephalic and atraumatic  Eyes: Pupils are equal, round, and reactive to light  Conjunctivae and EOM are normal  Right eye exhibits no discharge  Left eye exhibits no discharge  No scleral icterus  Neck: Normal range of motion  Neck supple  No JVD present  No thyromegaly present  Cardiovascular: Normal rate, regular rhythm, normal heart sounds and intact distal pulses  Exam reveals no gallop and no friction rub  No murmur heard  Pulmonary/Chest: Effort normal and breath sounds normal  No respiratory distress  He has no wheezes  He has no rales  He exhibits no tenderness  Abdominal: Soft  Bowel sounds are normal  He exhibits no distension and no mass  There is no tenderness  There is no rebound and no guarding  Musculoskeletal: Normal range of motion  He exhibits no edema, tenderness or deformity     Lymphadenopathy:     He has no cervical adenopathy  Neurological: He is alert and oriented to person, place, and time  He has normal reflexes  No cranial nerve deficit  Coordination normal    Skin: Skin is warm and dry  No rash noted  He is not diaphoretic  No erythema  No pallor  Psychiatric: He has a normal mood and affect  His behavior is normal  Judgment and thought content normal    Nursing note and vitals reviewed

## 2020-07-15 NOTE — PROGRESS NOTES
Assessment and Plan:     Problem List Items Addressed This Visit     None      Visit Diagnoses     Welcome to Medicare preventive visit    -  Primary    Relevant Orders    POCT ECG (Completed)    Screening for diabetes mellitus        Screening for cardiovascular condition            BMI Counseling: Body mass index is 23 93 kg/m²  The BMI is above normal  Nutrition recommendations include encouraging healthy choices of fruits and vegetables, decreasing fast food intake, consuming healthier snacks, limiting drinks that contain sugar, moderation in carbohydrate intake, increasing intake of lean protein, reducing intake of saturated and trans fat and reducing intake of cholesterol  Exercise recommendations include moderate physical activity 150 minutes/week and exercising 3-5 times per week  No pharmacotherapy was ordered  Patient referred to PCP due to patient being overweight  Depression Screening and Follow-up Plan: Patient's depression screening was positive with a PHQ-2 score of 0  Patient advised to follow-up with PCP for further management  Tobacco Cessation Counseling: Tobacco cessation counseling was provided  The patient is sincerely urged to quit consumption of tobacco  He is not ready to quit tobacco  Medication options and side effects of medication discussed  Patient refused medication  Currently smoking 1/2 ppd (10 pack years)  Preventive health issues were discussed with patient, and age appropriate screening tests were ordered as noted in patient's After Visit Summary  Personalized health advice and appropriate referrals for health education or preventive services given if needed, as noted in patient's After Visit Summary  History of Present Illness:     Patient presents for Welcome to Medicare visit       Patient Care Team:  Deena Hooper MD as PCP - General (Internal Medicine)  MD Alex العلي PA-C as Advanced Practitioner  SUHAIL Mckeon MD as Endoscopist     Review of Systems:     Review of Systems   Constitutional: Negative for activity change, appetite change, chills, diaphoresis, fatigue and fever  HENT: Negative for congestion, postnasal drip, rhinorrhea, sinus pressure, sinus pain, sneezing and sore throat  Eyes: Negative for visual disturbance  Respiratory: Negative for apnea, cough, choking, chest tightness, shortness of breath and wheezing  Cardiovascular: Negative for chest pain, palpitations and leg swelling  Gastrointestinal: Negative for abdominal distention, abdominal pain, anal bleeding, blood in stool, constipation, diarrhea, nausea and vomiting  Endocrine: Negative for cold intolerance and heat intolerance  Genitourinary: Negative for difficulty urinating, dysuria and hematuria  Musculoskeletal: Negative  Skin: Negative  Neurological: Negative for dizziness, weakness, light-headedness, numbness and headaches  Hematological: Negative for adenopathy  Psychiatric/Behavioral: Negative for agitation, sleep disturbance and suicidal ideas  All other systems reviewed and are negative       Problem List:     Patient Active Problem List   Diagnosis    Anxiety    Allergic rhinitis    Deviated septum    Crohn's disease involving terminal ileum ([facility] [address] )    Retention cyst of nasal sinus    Neck pain    Chronic midline thoracic back pain    Palpitations    Allergic asthma    S/P repair of ventral hernia    Postoperative ileus ([facility] [address] )    Functional abdominal pain syndrome    BMI 25 0-25 9,adult    Right upper quadrant abdominal pain    Right lower quadrant abdominal pain    White coat syndrome without diagnosis of hypertension    Chronic headaches    Neurological deficit present    Degenerative disc disease, lumbar    Chronic midline low back pain without sciatica      Past Medical and Surgical History:     Past Medical History:   Diagnosis Date    Abdominal pain     Abnormal liver function test     last assessed: Oct 16, 2013     Abnormal weight loss     last assessed: Yung 15, 2014     Acute left-sided low back pain with left-sided sciatica 10/31/2019    Allergic rhinitis due to pollen     last assessed: Yung 15, 2014     Anxiety     Anxiety     last assessed/resolved: Aug 5, 2015     Asthma     last assessed: Yung 15, 2014     Benign essential HTN     last assessed/resolved: Feb 23, 2017     Cervical lymphadenopathy     last assessed/resolved: Feb 23, 2017     Chronic sinusitis     last assessed: Yung 15, 2014     Constipation     Crohn's disease ([facility] ) 01/01/2011    last assessed: Yung 15, 2014     Dysuria     last assessed: April 29, 2016     Elevated BP without diagnosis of hypertension     last assessed/resolved: Feb 23, 2017     Esophageal reflux     last assessed/resolved: Feb 23, 2017     Eustachian tube anomaly     Resolved: Nov 9, 2017     External hemorrhoids     last assessed: Yung 15, 2014     Hypertension     borderline    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     last assessed/resolved: July 19, 2017     Inflammatory bowel disease     Pancreatic neoplasm     last assessed: Yung 15, 2014     PONV (postoperative nausea and vomiting)     Positive depression screening     resolved: July 24, 2017     Psoriasis     Seasonal allergies     Small bowel obstruction ([facility] ) 11/11/2018    Vitamin B12 deficiency     last assessed/resolved: Feb 23, 2017      Past Surgical History:   Procedure Laterality Date    CHOLECYSTECTOMY      resolved: 2011     COLONOSCOPY N/A 11/18/2016    Procedure: COLONOSCOPY;  Surgeon: Mick Vallecillo MD;  Location:  GI LAB; Service:     COLONOSCOPY N/A 4/28/2016    Procedure: COLONOSCOPY;  Surgeon: Mick Vallecillo MD;  Location: [facility] GI LAB; Service:     ESOPHAGOGASTRODUODENOSCOPY N/A 4/28/2016    Procedure: ESOPHAGOGASTRODUODENOSCOPY (EGD); Surgeon: Mick Vallecillo MD;  Location: [facility] GI LAB;   Service:     FRONTAL SINUSOTOMY      resolved: April 2009     PANCREAS SURGERY      SC COLONOSCOPY FLX DX W/COLLJ SPEC WHEN PFRMD N/A 10/12/2018    Procedure: EGD AND COLONOSCOPY;  Surgeon: Alejandrina Moreau MD;  Location: [facility] GI LAB;   Service: Gastroenterology    SC LAP, INCISIONAL HERNIA REPAIR,REDUCIBLE N/A 11/8/2018    Procedure: REPAIR HERNIA INCISIONAL LAPAROSCOPIC;  Surgeon: Marge Head MD;  Location:  MAIN OR;  Service: General    SC REPAIR OF NASAL SEPTUM N/A 7/28/2017    Procedure: REVISION SEPTOPLASTY; TURBINOPLASTY; BILATERAL  GRAFTS; ALAR GRAFT; POSSIBLE AURICULAR CARTILAGE GRAFT;  Surgeon: Brenda Bartholomew MD;  Location: [facility];  Service: ENT    TONSILLECTOMY AND ADENOIDECTOMY        Family History:     Family History   Problem Relation Age of Onset    Diabetes Mother         mellitus     Hypertension Mother     Thyroid disease Mother     Fibromyalgia Mother     Hypertension Father     Hypertension Sister     No Known Problems Brother     No Known Problems Maternal Grandmother     No Known Problems Maternal Grandfather     Diabetes Paternal Grandmother     Diabetes Paternal Grandfather     Diabetes Other         mellitus     Rheum arthritis Cousin       Social History:        Social History     Socioeconomic History    Marital status: /Civil Union     Spouse name: None    Number of children: None    Years of education: None    Highest education level: None   Occupational History    Occupation:     Social Needs    Financial resource strain: None    Food insecurity:     Worry: None     Inability: None    Transportation needs:     Medical: None     Non-medical: None   Tobacco Use    Smoking status: Current Every Day Smoker     Packs/day: 0 50     Types: Cigarettes    Smokeless tobacco: Never Used    Tobacco comment: Dependence on nicotine in cigarettes - 1/2 PPD x 20 years    Substance and Sexual Activity    Alcohol use: Yes     Frequency: Monthly or less     Comment: John consumes alcohol noted in "allscripts"  Drug use: Yes     Types: Marijuana     Comment: uses medical marijuana via vaping  last use 2/8/19    Sexual activity: Yes   Lifestyle    Physical activity:     Days per week: None     Minutes per session: None    Stress: None   Relationships    Social connections:     Talks on phone: None     Gets together: None     Attends [employer] service: None     Active member of club or organization: None     Attends meetings of clubs or organizations: None     Relationship status: None    Intimate partner violence:     Fear of current or ex partner: None     Emotionally abused: None     Physically abused: None     Forced sexual activity: None   Other Topics Concern    None   Social History Narrative    Single noted in "allscripts"       Medications and Allergies:     Current Outpatient Medications   Medication Sig Dispense Refill    acetaminophen (TYLENOL) 500 mg tablet Take 1,000 mg by mouth every 4 (four) hours as needed       cetirizine (ZyrTEC) 10 mg tablet Take 1 tablet (10 mg total) by mouth daily as needed for allergies 30 tablet 5    clonazePAM (KlonoPIN) 0 5 mg tablet Take 1 tablet (0 5 mg total) by mouth every 12 (twelve) hours as needed for anxiety 60 tablet 1    diphenhydrAMINE (BENADRYL) 25 mg tablet Take 25 mg by mouth as needed        fluticasone (FLONASE) 50 mcg/act nasal spray 1 spray into each nostril daily as needed for rhinitis 3 Bottle 1    Linaclotide (LINZESS) 145 MCG CAPS Take 1 capsule (145 mcg total) by mouth daily 90 capsule 1    NON FORMULARY        No current facility-administered medications for this visit  Allergies   Allergen Reactions    Apple Anaphylaxis    Aspirin Anaphylaxis and Hives     Category: Allergy;     Eggs Or Egg-Derived Products Anaphylaxis     Category: Allergy;     Ibuprofen Anaphylaxis and Hives     Category: Allergy; aspirin    Nsaids Anaphylaxis     Category: Allergy;     Other Anaphylaxis     Category: Allergy;  Annotation - [id number]: krista grapes , and kiwis; pt states all fruits    Peanuts [Peanut Oil] Anaphylaxis     nuts    Penicillins Shortness Of Breath    Pollen Extract     Codeine Anxiety      Immunizations:     Immunization History   Administered Date(s) Administered    DTaP 5 1981      Health Maintenance: There are no preventive care reminders to display for this patient  Topic Date Due    Pneumococcal Vaccine: Pediatrics (0 to 5 Years) and At-Risk Patients (6 to 59 Years) (1 of 1 - PPSV23) 04/11/1987    DTaP,Tdap,and Td Vaccines (1 - Tdap) 04/11/1992      Medicare Screening Tests and Risk Assessments:     Iza Brannon is here for his Welcome to Medicare visit  Health Risk Assessment:   Patient rates overall health as fair  Patient feels that their physical health rating is same  Eyesight was rated as same  Hearing was rated as same  Patient feels that their emotional and mental health rating is same  Pain experienced in the last 7 days has been some  Patient's pain rating has been 7/10  Depression Screening:   PHQ-2 Score: 0      Fall Risk Screening: In the past year, patient has experienced: no history of falling in past year      Home Safety:  Patient has trouble with stairs inside or outside of their home  Patient has working smoke alarms and has working carbon monoxide detector  Home safety hazards include: none  Nutrition:   Current diet is Regular  Medications:   Patient is currently taking over-the-counter supplements  OTC medications include: see medication list  Patient is able to manage medications  Activities of Daily Living (ADLs)/Instrumental Activities of Daily Living (IADLs):   Walk and transfer into and out of bed and chair?: Yes  Dress and groom yourself?: Yes    Bathe or shower yourself?: Yes    Feed yourself?  Yes  Do your laundry/housekeeping?: No  Manage your money, pay your bills and track your expenses?: Yes  Make your own meals?: No    Do your own shopping?: Yes    Previous Hospitalizations:   Any hospitalizations or ED visits within the last 12 months?: No      Advance Care Planning:   Living will: No    Durable POA for healthcare: No    Advanced directive counseling given: Yes    End of Life Decisions reviewed with patient: Yes    Provider agrees with end of life decisions: Yes      Comments: Discussed with patient, full code  Cognitive Screening:   Provider or family/friend/caregiver concerned regarding cognition?: No    PREVENTIVE SCREENINGS      Cardiovascular Screening:    General: Screening Current and Risks and Benefits Discussed      Diabetes Screening:     General: Screening Current and Risks and Benefits Discussed      Colorectal Cancer Screening:     General: Screening Not Indicated      Prostate Cancer Screening:    General: Screening Not Indicated      Osteoporosis Screening:    General: Screening Not Indicated      Abdominal Aortic Aneurysm (AAA) Screening:    Risk factors include: tobacco use        Lung Cancer Screening:     General: Screening Not Indicated      Hepatitis C Screening:    General: Screening Not Indicated    Other Counseling Topics:   Alcohol use counseling, car/seat belt/driving safety, skin self-exam, sunscreen and calcium and vitamin D intake and regular weightbearing exercise  Visual Acuity Screening    Right eye Left eye Both eyes   Without correction:      With correction: 20/16 20/16 20/16        Physical Exam:     /88 (BP Location: Left arm, Patient Position: Sitting, Cuff Size: Standard)   Pulse 64   Temp 98 8 °F (37 1 °C) (Oral)   Ht 5' 8" (1 727 m)   Wt 71 4 kg (157 lb 6 4 oz)   SpO2 98%   BMI 23 93 kg/m²     Physical Exam   Constitutional: He is oriented to person, place, and time  He appears well-developed and well-nourished  No distress  HENT:   Head: Normocephalic and atraumatic  Eyes: Pupils are equal, round, and reactive to light  Conjunctivae and EOM are normal  Right eye exhibits no discharge   Left eye exhibits no discharge  No scleral icterus  Neck: Normal range of motion  Neck supple  No JVD present  No thyromegaly present  Cardiovascular: Normal rate, regular rhythm, normal heart sounds and intact distal pulses  Exam reveals no gallop and no friction rub  No murmur heard  Pulmonary/Chest: Effort normal and breath sounds normal  No respiratory distress  He has no wheezes  He has no rales  He exhibits no tenderness  Abdominal: Soft  Bowel sounds are normal  He exhibits no distension and no mass  There is no tenderness  There is no rebound and no guarding  Musculoskeletal: Normal range of motion  He exhibits no edema, tenderness or deformity  Lymphadenopathy:     He has no cervical adenopathy  Neurological: He is alert and oriented to person, place, and time  He has normal reflexes  No cranial nerve deficit  Coordination normal    Skin: Skin is warm and dry  No rash noted  He is not diaphoretic  No erythema  No pallor  Psychiatric: He has a normal mood and affect  His behavior is normal  Judgment and thought content normal    Nursing note and vitals reviewed        Cameron Wan MD

## 2020-07-15 NOTE — PATIENT INSTRUCTIONS
Medicare Preventive Visit Patient Instructions  Thank you for completing your Welcome to Medicare Visit or Medicare Annual Wellness Visit today  Your next wellness visit will be due in one year (7/15/2021)  The screening/preventive services that you may require over the next 5-10 years are detailed below  Some tests may not apply to you based off risk factors and/or age  Screening tests ordered at today's visit but not completed yet may show as past due  Also, please note that scanned in results may not display below  Preventive Screenings:  Service Recommendations Previous Testing/Comments   Colorectal Cancer Screening  · Colonoscopy    · Fecal Occult Blood Test (FOBT)/Fecal Immunochemical Test (FIT)  · Fecal DNA/Cologuard Test  · Flexible Sigmoidoscopy Age: 54-65 years old   Colonoscopy: every 10 years (May be performed more frequently if at higher risk)  OR  FOBT/FIT: every 1 year  OR  Cologuard: every 3 years  OR  Sigmoidoscopy: every 5 years  Screening may be recommended earlier than age 48 if at higher risk for colorectal cancer  Also, an individualized decision between you and your healthcare provider will decide whether screening between the ages of 74-80 would be appropriate   Colonoscopy: Not on file  FOBT/FIT: 06/24/2018  Cologuard: Not on file  Sigmoidoscopy: Not on file         Prostate Cancer Screening Individualized decision between patient and health care provider in men between ages of 53-78   Medicare will cover every 12 months beginning on the day after your 50th birthday PSA: No results in last 5 years     Screening Not Indicated     Hepatitis C Screening Once for adults born between [birth year] and 1965  More frequently in patients at high risk for Hepatitis C Hep C Antibody: Not on file       Diabetes Screening 1-2 times per year if you're at risk for diabetes or have pre-diabetes Fasting glucose: 105 mg/dL   A1C: No results in last 5 years    Screening Current   Cholesterol Screening Once every 5 years if you don't have a lipid disorder  May order more often based on risk factors  Lipid panel: 01/31/2020    Screening Current      Other Preventive Screenings Covered by Medicare:  1  Abdominal Aortic Aneurysm (AAA) Screening: covered once if your at risk  You're considered to be at risk if you have a family history of AAA or a male between the age of 73-68 who smoking at least 100 cigarettes in your lifetime  2  Lung Cancer Screening: covers low dose CT scan once per year if you meet all of the following conditions: (1) Age 50-69; (2) No signs or symptoms of lung cancer; (3) Current smoker or have quit smoking within the last 15 years; (4) You have a tobacco smoking history of at least 30 pack years (packs per day x number of years you smoked); (5) You get a written order from a healthcare provider  3  Glaucoma Screening: covered annually if you're considered high risk: (1) You have diabetes OR (2) Family history of glaucoma OR (3)  aged 48 and older OR (3)  American aged 72 and older  3  Osteoporosis Screening: covered every 2 years if you meet one of the following conditions: (1) Have a vertebral abnormality; (2) On glucocorticoid therapy for more than 3 months; (3) Have primary hyperparathyroidism; (4) On osteoporosis medications and need to assess response to drug therapy  5  HIV Screening: covered annually if you're between the age of 12-76  Also covered annually if you are younger than 13 and older than 72 with risk factors for HIV infection  For pregnant patients, it is covered up to 3 times per pregnancy      Immunizations:  Immunization Recommendations   Influenza Vaccine Annual influenza vaccination during flu season is recommended for all persons aged >= 6 months who do not have contraindications   Pneumococcal Vaccine (Prevnar and Pneumovax)  * Prevnar = PCV13  * Pneumovax = PPSV23 Adults 25-60 years old: 1-3 doses may be recommended based on certain risk factors  Adults 72 years old: Prevnar (PCV13) vaccine recommended followed by Pneumovax (PPSV23) vaccine  If already received PPSV23 since turning 65, then PCV13 recommended at least one year after PPSV23 dose  Hepatitis B Vaccine 3 dose series if at intermediate or high risk (ex: diabetes, end stage renal disease, liver disease)   Tetanus (Td) Vaccine - COST NOT COVERED BY MEDICARE PART B Following completion of primary series, a booster dose should be given every 10 years to maintain immunity against tetanus  Td may also be given as tetanus wound prophylaxis  Tdap Vaccine - COST NOT COVERED BY MEDICARE PART B Recommended at least once for all adults  For pregnant patients, recommended with each pregnancy  Shingles Vaccine (Shingrix) - COST NOT COVERED BY MEDICARE PART B  2 shot series recommended in those aged 48 and above     Health Maintenance Due:  There are no preventive care reminders to display for this patient  Immunizations Due:      Topic Date Due    Pneumococcal Vaccine: Pediatrics (0 to 5 Years) and At-Risk Patients (6 to 59 Years) (1 of 1 - PPSV23) 04/11/1987    DTaP,Tdap,and Td Vaccines (1 - Tdap) 04/11/1992     Advance Directives   What are advance directives? Advance directives are legal documents that state your wishes and plans for medical care  These plans are made ahead of time in case you lose your ability to make decisions for yourself  Advance directives can apply to any medical decision, such as the treatments you want, and if you want to donate organs  What are the types of advance directives? There are many types of advance directives, and each state has rules about how to use them  You may choose a combination of any of the following:  · Living will: This is a written record of the treatment you want  You can also choose which treatments you do not want, which to limit, and which to stop at a certain time  This includes surgery, medicine, IV fluid, and tube feedings     · Durable power of  for [facility]): This is a written record that states who you want to make healthcare choices for you when you are unable to make them for yourself  This person, called a proxy, is usually a family member or a friend  You may choose more than 1 proxy  · Do not resuscitate (DNR) order:  A DNR order is used in case your heart stops beating or you stop breathing  It is a request not to have certain forms of treatment, such as CPR  A DNR order may be included in other types of advance directives  · Medical directive: This covers the care that you want if you are in a coma, near death, or unable to make decisions for yourself  You can list the treatments you want for each condition  Treatment may include pain medicine, surgery, blood transfusions, dialysis, IV or tube feedings, and a ventilator (breathing machine)  · Values history: This document has questions about your views, beliefs, and how you feel and think about life  This information can help others choose the care that you would choose  Why are advance directives important? An advance directive helps you control your care  Although spoken wishes may be used, it is better to have your wishes written down  Spoken wishes can be misunderstood, or not followed  Treatments may be given even if you do not want them  An advance directive may make it easier for your family to make difficult choices about your care  Cigarette Smoking and Your Health   Risks to your health if you smoke:  Nicotine and other chemicals found in tobacco damage every cell in your body  Even if you are a light smoker, you have an increased risk for cancer, heart disease, and lung disease  If you are pregnant or have diabetes, smoking increases your risk for complications  Benefits to your health if you stop smoking:   · You decrease respiratory symptoms such as coughing, wheezing, and shortness of breath     · You reduce your risk for cancers of the lung, mouth, throat, kidney, bladder, pancreas, stomach, and cervix  If you already have cancer, you increase the benefits of chemotherapy  You also reduce your risk for cancer returning or a second cancer from developing  · You reduce your risk for heart disease, blood clots, heart attack, and stroke  · You reduce your risk for lung infections, and diseases such as pneumonia, asthma, chronic bronchitis, and emphysema  · Your circulation improves  More oxygen can be delivered to your body  If you have diabetes, you lower your risk for complications, such as kidney, artery, and eye diseases  You also lower your risk for nerve damage  Nerve damage can lead to amputations, poor vision, and blindness  · You improve your body's ability to heal and to fight infections  For more information and support to stop smoking:   · [employer]  Phone: 6- 208 - 911-1631  Web Address: www [employer]   [address] 2018 Information is for End User's use only and may not be sold, redistributed or otherwise used for commercial purposes  All illustrations and images included in CareNotes® are the copyrighted property of [employer]  or [facility] Preventive Visit Patient Instructions  Thank you for completing your Welcome to Medicare Visit or Medicare Annual Wellness Visit today  Your next wellness visit will be due in one year (7/15/2021)  The screening/preventive services that you may require over the next 5-10 years are detailed below  Some tests may not apply to you based off risk factors and/or age  Screening tests ordered at today's visit but not completed yet may show as past due  Also, please note that scanned in results may not display below    Preventive Screenings:  Service Recommendations Previous Testing/Comments   Colorectal Cancer Screening  · Colonoscopy    · Fecal Occult Blood Test (FOBT)/Fecal Immunochemical Test (FIT)  · Fecal DNA/Cologuard Test  · Flexible Sigmoidoscopy Age: 54-65 years old   Colonoscopy: every 10 years (May be performed more frequently if at higher risk)  OR  FOBT/FIT: every 1 year  OR  Cologuard: every 3 years  OR  Sigmoidoscopy: every 5 years  Screening may be recommended earlier than age 48 if at higher risk for colorectal cancer  Also, an individualized decision between you and your healthcare provider will decide whether screening between the ages of 74-80 would be appropriate  Colonoscopy: Not on file  FOBT/FIT: 06/24/2018  Cologuard: Not on file  Sigmoidoscopy: Not on file         Prostate Cancer Screening Individualized decision between patient and health care provider in men between ages of 53-78   Medicare will cover every 12 months beginning on the day after your 50th birthday PSA: No results in last 5 years     Screening Not Indicated     Hepatitis C Screening Once for adults born between 80 and 1965  More frequently in patients at high risk for Hepatitis C Hep C Antibody: Not on file       Diabetes Screening 1-2 times per year if you're at risk for diabetes or have pre-diabetes Fasting glucose: 105 mg/dL   A1C: No results in last 5 years    Screening Current   Cholesterol Screening Once every 5 years if you don't have a lipid disorder  May order more often based on risk factors  Lipid panel: 01/31/2020    Screening Current      Other Preventive Screenings Covered by Medicare:  6  Abdominal Aortic Aneurysm (AAA) Screening: covered once if your at risk  You're considered to be at risk if you have a family history of AAA or a male between the age of 73-68 who smoking at least 100 cigarettes in your lifetime    7  Lung Cancer Screening: covers low dose CT scan once per year if you meet all of the following conditions: (1) Age 50-69; (2) No signs or symptoms of lung cancer; (3) Current smoker or have quit smoking within the last 15 years; (4) You have a tobacco smoking history of at least 30 pack years (packs per day x number of years you smoked); (5) You get a written order from a healthcare provider  8  Glaucoma Screening: covered annually if you're considered high risk: (1) You have diabetes OR (2) Family history of glaucoma OR (3)  aged 48 and older OR (3)  American aged 72 and older  5  Osteoporosis Screening: covered every 2 years if you meet one of the following conditions: (1) Have a vertebral abnormality; (2) On glucocorticoid therapy for more than 3 months; (3) Have primary hyperparathyroidism; (4) On osteoporosis medications and need to assess response to drug therapy  10  HIV Screening: covered annually if you're between the age of 12-76  Also covered annually if you are younger than 13 and older than 72 with risk factors for HIV infection  For pregnant patients, it is covered up to 3 times per pregnancy  Immunizations:  Immunization Recommendations   Influenza Vaccine Annual influenza vaccination during flu season is recommended for all persons aged >= 6 months who do not have contraindications   Pneumococcal Vaccine (Prevnar and Pneumovax)  * Prevnar = PCV13  * Pneumovax = PPSV23 Adults 25-60 years old: 1-3 doses may be recommended based on certain risk factors  Adults 72 years old: Prevnar (PCV13) vaccine recommended followed by Pneumovax (PPSV23) vaccine  If already received PPSV23 since turning 65, then PCV13 recommended at least one year after PPSV23 dose  Hepatitis B Vaccine 3 dose series if at intermediate or high risk (ex: diabetes, end stage renal disease, liver disease)   Tetanus (Td) Vaccine - COST NOT COVERED BY MEDICARE PART B Following completion of primary series, a booster dose should be given every 10 years to maintain immunity against tetanus  Td may also be given as tetanus wound prophylaxis  Tdap Vaccine - COST NOT COVERED BY MEDICARE PART B Recommended at least once for all adults  For pregnant patients, recommended with each pregnancy     Shingles Vaccine (Shingrix) - COST NOT COVERED BY MEDICARE PART B  2 shot series recommended in those aged 48 and above     Health Maintenance Due:  There are no preventive care reminders to display for this patient  Immunizations Due:      Topic Date Due    Pneumococcal Vaccine: Pediatrics (0 to 5 Years) and At-Risk Patients (6 to 59 Years) (1 of 1 - PPSV23) 04/11/1987    DTaP,Tdap,and Td Vaccines (1 - Tdap) 04/11/1992     Advance Directives   What are advance directives? Advance directives are legal documents that state your wishes and plans for medical care  These plans are made ahead of time in case you lose your ability to make decisions for yourself  Advance directives can apply to any medical decision, such as the treatments you want, and if you want to donate organs  What are the types of advance directives? There are many types of advance directives, and each state has rules about how to use them  You may choose a combination of any of the following:  · Living will: This is a written record of the treatment you want  You can also choose which treatments you do not want, which to limit, and which to stop at a certain time  This includes surgery, medicine, IV fluid, and tube feedings  · Durable power of  for healthcare [facility]): This is a written record that states who you want to make healthcare choices for you when you are unable to make them for yourself  This person, called a proxy, is usually a family member or a friend  You may choose more than 1 proxy  · Do not resuscitate (DNR) order:  A DNR order is used in case your heart stops beating or you stop breathing  It is a request not to have certain forms of treatment, such as CPR  A DNR order may be included in other types of advance directives  · Medical directive: This covers the care that you want if you are in a coma, near death, or unable to make decisions for yourself  You can list the treatments you want for each condition   Treatment may include pain medicine, surgery, blood transfusions, dialysis, IV or tube feedings, and a ventilator (breathing machine)  · Values history: This document has questions about your views, beliefs, and how you feel and think about life  This information can help others choose the care that you would choose  Why are advance directives important? An advance directive helps you control your care  Although spoken wishes may be used, it is better to have your wishes written down  Spoken wishes can be misunderstood, or not followed  Treatments may be given even if you do not want them  An advance directive may make it easier for your family to make difficult choices about your care  Cigarette Smoking and Your Health   Risks to your health if you smoke:  Nicotine and other chemicals found in tobacco damage every cell in your body  Even if you are a light smoker, you have an increased risk for cancer, heart disease, and lung disease  If you are pregnant or have diabetes, smoking increases your risk for complications  Benefits to your health if you stop smoking:   · You decrease respiratory symptoms such as coughing, wheezing, and shortness of breath  · You reduce your risk for cancers of the lung, mouth, throat, kidney, bladder, pancreas, stomach, and cervix  If you already have cancer, you increase the benefits of chemotherapy  You also reduce your risk for cancer returning or a second cancer from developing  · You reduce your risk for heart disease, blood clots, heart attack, and stroke  · You reduce your risk for lung infections, and diseases such as pneumonia, asthma, chronic bronchitis, and emphysema  · Your circulation improves  More oxygen can be delivered to your body  If you have diabetes, you lower your risk for complications, such as kidney, artery, and eye diseases  You also lower your risk for nerve damage  Nerve damage can lead to amputations, poor vision, and blindness  · You improve your body's ability to heal and to fight infections    For more information and support to stop smoking:   · [employer]  Phone: 7- 085 - 023-8416  Web Address: www [employer]   [address] 2018 Information is for End User's use only and may not be sold, redistributed or otherwise used for commercial purposes   All illustrations and images included in CareNotes® are the copyrighted property of A D A M [employer]  or [address] [place] [address]

## 2020-07-15 NOTE — ASSESSMENT & PLAN NOTE
continue klonopin 0 5 mg BID  Discussed starting Effexor to which he would like to do little more research prior to starting

## 2020-08-17 DIAGNOSIS — F41.9 ANXIETY: ICD-10-CM

## 2020-08-17 NOTE — TELEPHONE ENCOUNTER
Patient requests a refill of klonopin be sent to [facility] in [facility] as they have closed

## 2020-08-18 RX ORDER — CLONAZEPAM 0.5 MG/1
0.5 TABLET ORAL EVERY 12 HOURS PRN
Qty: 60 TABLET | Refills: 0 | Status: SHIPPED | OUTPATIENT
Start: 2020-08-18 | End: 2020-09-17 | Stop reason: SDUPTHER

## 2020-09-14 DIAGNOSIS — F41.9 ANXIETY: ICD-10-CM

## 2020-09-14 NOTE — TELEPHONE ENCOUNTER
Pt called for Clonazepam refill but not due until Friday   Pt will call on Thursday from refill  PDMP:

## 2020-09-17 RX ORDER — CLONAZEPAM 0.5 MG/1
0.5 TABLET ORAL EVERY 12 HOURS PRN
Qty: 60 TABLET | Refills: 0 | Status: SHIPPED | OUTPATIENT
Start: 2020-09-17 | End: 2020-10-22 | Stop reason: SDUPTHER

## 2020-10-22 DIAGNOSIS — F41.9 ANXIETY: ICD-10-CM

## 2020-10-22 RX ORDER — CLONAZEPAM 0.5 MG/1
0.5 TABLET ORAL EVERY 12 HOURS PRN
Qty: 60 TABLET | Refills: 0 | Status: SHIPPED | OUTPATIENT
Start: 2020-10-22 | End: 2020-11-18 | Stop reason: SDUPTHER

## 2020-11-18 ENCOUNTER — OFFICE VISIT (OUTPATIENT)
Dept: INTERNAL MEDICINE CLINIC | Facility: CLINIC | Age: 40
End: 2020-11-18
Payer: MEDICARE

## 2020-11-18 VITALS
TEMPERATURE: 98 F | HEIGHT: 68 IN | DIASTOLIC BLOOD PRESSURE: 88 MMHG | WEIGHT: 160.6 LBS | OXYGEN SATURATION: 98 % | HEART RATE: 81 BPM | SYSTOLIC BLOOD PRESSURE: 130 MMHG | BODY MASS INDEX: 24.34 KG/M2

## 2020-11-18 DIAGNOSIS — G89.29 CHRONIC MIDLINE THORACIC BACK PAIN: ICD-10-CM

## 2020-11-18 DIAGNOSIS — F41.9 ANXIETY: Primary | ICD-10-CM

## 2020-11-18 DIAGNOSIS — Z82.49 FAMILY HISTORY OF HEART DISEASE: ICD-10-CM

## 2020-11-18 DIAGNOSIS — J30.2 SEASONAL ALLERGIC RHINITIS, UNSPECIFIED TRIGGER: ICD-10-CM

## 2020-11-18 DIAGNOSIS — M54.50 CHRONIC MIDLINE LOW BACK PAIN WITHOUT SCIATICA: ICD-10-CM

## 2020-11-18 DIAGNOSIS — G89.29 CHRONIC MIDLINE LOW BACK PAIN WITHOUT SCIATICA: ICD-10-CM

## 2020-11-18 DIAGNOSIS — Z13.220 LIPID SCREENING: ICD-10-CM

## 2020-11-18 DIAGNOSIS — M54.6 CHRONIC MIDLINE THORACIC BACK PAIN: ICD-10-CM

## 2020-11-18 DIAGNOSIS — J45.20 MILD INTERMITTENT EXTRINSIC ASTHMA WITHOUT COMPLICATION: ICD-10-CM

## 2020-11-18 DIAGNOSIS — R10.9 FUNCTIONAL ABDOMINAL PAIN SYNDROME: ICD-10-CM

## 2020-11-18 PROBLEM — K56.7 POSTOPERATIVE ILEUS (HCC): Status: RESOLVED | Noted: 2018-11-12 | Resolved: 2020-11-18

## 2020-11-18 PROBLEM — K91.89 POSTOPERATIVE ILEUS (HCC): Status: RESOLVED | Noted: 2018-11-12 | Resolved: 2020-11-18

## 2020-11-18 PROCEDURE — 99214 OFFICE O/P EST MOD 30 MIN: CPT | Performed by: INTERNAL MEDICINE

## 2020-11-18 RX ORDER — CLONAZEPAM 0.5 MG/1
0.5 TABLET ORAL EVERY 12 HOURS PRN
Qty: 60 TABLET | Refills: 1 | Status: SHIPPED | OUTPATIENT
Start: 2020-11-18 | End: 2021-01-20 | Stop reason: SDUPTHER

## 2020-12-20 ENCOUNTER — HOSPITAL ENCOUNTER (EMERGENCY)
Facility: HOSPITAL | Age: 40
Discharge: HOME | End: 2020-12-20
Attending: EMERGENCY MEDICINE | Admitting: EMERGENCY MEDICINE
Payer: MEDICARE

## 2020-12-20 ENCOUNTER — APPOINTMENT (EMERGENCY)
Dept: CT IMAGING | Facility: HOSPITAL | Age: 40
End: 2020-12-20
Payer: MEDICARE

## 2020-12-20 VITALS
WEIGHT: 160.72 LBS | SYSTOLIC BLOOD PRESSURE: 135 MMHG | BODY MASS INDEX: 24.44 KG/M2 | OXYGEN SATURATION: 97 % | HEART RATE: 62 BPM | DIASTOLIC BLOOD PRESSURE: 84 MMHG | TEMPERATURE: 99.2 F | RESPIRATION RATE: 18 BRPM

## 2020-12-20 DIAGNOSIS — R10.9 NONSPECIFIC ABDOMINAL PAIN: Primary | ICD-10-CM

## 2020-12-20 LAB
ALBUMIN SERPL BCP-MCNC: 3.7 G/DL (ref 3.5–5)
ALP SERPL-CCNC: 79 U/L (ref 46–116)
ALT SERPL W P-5'-P-CCNC: 34 U/L (ref 12–78)
ANION GAP SERPL CALCULATED.3IONS-SCNC: 9 MMOL/L (ref 4–13)
AST SERPL W P-5'-P-CCNC: 20 U/L (ref 5–45)
BASOPHILS # BLD AUTO: 0.09 THOUSANDS/ΜL (ref 0–0.1)
BASOPHILS NFR BLD AUTO: 1 % (ref 0–1)
BILIRUB SERPL-MCNC: 0.48 MG/DL (ref 0.2–1)
BILIRUB UR QL STRIP: NEGATIVE
BUN SERPL-MCNC: 14 MG/DL (ref 5–25)
CALCIUM SERPL-MCNC: 9.3 MG/DL (ref 8.3–10.1)
CHLORIDE SERPL-SCNC: 105 MMOL/L (ref 100–108)
CLARITY UR: CLEAR
CO2 SERPL-SCNC: 27 MMOL/L (ref 21–32)
COLOR UR: YELLOW
COLOR, POC: YELLOW
CREAT SERPL-MCNC: 1.04 MG/DL (ref 0.6–1.3)
EOSINOPHIL # BLD AUTO: 0.19 THOUSAND/ΜL (ref 0–0.61)
EOSINOPHIL NFR BLD AUTO: 3 % (ref 0–6)
ERYTHROCYTE [DISTWIDTH] IN BLOOD BY AUTOMATED COUNT: 12.6 % (ref 11.6–15.1)
GFR SERPL CREATININE-BSD FRML MDRD: 90 ML/MIN/1.73SQ M
GLUCOSE SERPL-MCNC: 91 MG/DL (ref 65–140)
GLUCOSE UR STRIP-MCNC: NEGATIVE MG/DL
HCT VFR BLD AUTO: 45.7 % (ref 36.5–49.3)
HGB BLD-MCNC: 15.4 G/DL (ref 12–17)
HGB UR QL STRIP.AUTO: NEGATIVE
IMM GRANULOCYTES # BLD AUTO: 0.03 THOUSAND/UL (ref 0–0.2)
IMM GRANULOCYTES NFR BLD AUTO: 0 % (ref 0–2)
KETONES UR STRIP-MCNC: NEGATIVE MG/DL
LEUKOCYTE ESTERASE UR QL STRIP: NEGATIVE
LIPASE SERPL-CCNC: 154 U/L (ref 73–393)
LYMPHOCYTES # BLD AUTO: 2.69 THOUSANDS/ΜL (ref 0.6–4.47)
LYMPHOCYTES NFR BLD AUTO: 39 % (ref 14–44)
MCH RBC QN AUTO: 31.8 PG (ref 26.8–34.3)
MCHC RBC AUTO-ENTMCNC: 33.7 G/DL (ref 31.4–37.4)
MCV RBC AUTO: 94 FL (ref 82–98)
MONOCYTES # BLD AUTO: 0.62 THOUSAND/ΜL (ref 0.17–1.22)
MONOCYTES NFR BLD AUTO: 9 % (ref 4–12)
NEUTROPHILS # BLD AUTO: 3.35 THOUSANDS/ΜL (ref 1.85–7.62)
NEUTS SEG NFR BLD AUTO: 48 % (ref 43–75)
NITRITE UR QL STRIP: NEGATIVE
NRBC BLD AUTO-RTO: 0 /100 WBCS
PH UR STRIP.AUTO: 6 [PH] (ref 4.5–8)
PLATELET # BLD AUTO: 229 THOUSANDS/UL (ref 149–390)
PMV BLD AUTO: 9.9 FL (ref 8.9–12.7)
POTASSIUM SERPL-SCNC: 3.9 MMOL/L (ref 3.5–5.3)
PROT SERPL-MCNC: 7.4 G/DL (ref 6.4–8.2)
PROT UR STRIP-MCNC: NEGATIVE MG/DL
RBC # BLD AUTO: 4.84 MILLION/UL (ref 3.88–5.62)
SODIUM SERPL-SCNC: 141 MMOL/L (ref 136–145)
SP GR UR STRIP.AUTO: 1.01 (ref 1–1.03)
UROBILINOGEN UR QL STRIP.AUTO: 0.2 E.U./DL
WBC # BLD AUTO: 6.97 THOUSAND/UL (ref 4.31–10.16)

## 2020-12-20 PROCEDURE — 85025 COMPLETE CBC W/AUTO DIFF WBC: CPT | Performed by: EMERGENCY MEDICINE

## 2020-12-20 PROCEDURE — 36415 COLL VENOUS BLD VENIPUNCTURE: CPT | Performed by: EMERGENCY MEDICINE

## 2020-12-20 PROCEDURE — 96361 HYDRATE IV INFUSION ADD-ON: CPT

## 2020-12-20 PROCEDURE — 80053 COMPREHEN METABOLIC PANEL: CPT | Performed by: EMERGENCY MEDICINE

## 2020-12-20 PROCEDURE — G1004 CDSM NDSC: HCPCS

## 2020-12-20 PROCEDURE — 99285 EMERGENCY DEPT VISIT HI MDM: CPT | Performed by: EMERGENCY MEDICINE

## 2020-12-20 PROCEDURE — 74177 CT ABD & PELVIS W/CONTRAST: CPT

## 2020-12-20 PROCEDURE — 81003 URINALYSIS AUTO W/O SCOPE: CPT

## 2020-12-20 PROCEDURE — 96374 THER/PROPH/DIAG INJ IV PUSH: CPT

## 2020-12-20 PROCEDURE — 83690 ASSAY OF LIPASE: CPT | Performed by: EMERGENCY MEDICINE

## 2020-12-20 PROCEDURE — 99284 EMERGENCY DEPT VISIT MOD MDM: CPT

## 2020-12-20 RX ORDER — ONDANSETRON 2 MG/ML
4 INJECTION INTRAMUSCULAR; INTRAVENOUS ONCE
Status: DISCONTINUED | OUTPATIENT
Start: 2020-12-20 | End: 2020-12-21 | Stop reason: HOSPADM

## 2020-12-20 RX ORDER — MORPHINE SULFATE 4 MG/ML
4 INJECTION, SOLUTION INTRAMUSCULAR; INTRAVENOUS ONCE
Status: DISCONTINUED | OUTPATIENT
Start: 2020-12-20 | End: 2020-12-21 | Stop reason: HOSPADM

## 2020-12-20 RX ORDER — LORAZEPAM 2 MG/ML
1 INJECTION INTRAMUSCULAR ONCE
Status: COMPLETED | OUTPATIENT
Start: 2020-12-20 | End: 2020-12-20

## 2020-12-20 RX ADMIN — LORAZEPAM 1 MG: 2 INJECTION INTRAMUSCULAR; INTRAVENOUS at 22:15

## 2020-12-20 RX ADMIN — SODIUM CHLORIDE 1000 ML: 0.9 INJECTION, SOLUTION INTRAVENOUS at 20:43

## 2020-12-20 RX ADMIN — IOHEXOL 100 ML: 350 INJECTION, SOLUTION INTRAVENOUS at 21:48

## 2020-12-21 ENCOUNTER — OFFICE VISIT (OUTPATIENT)
Dept: GASTROENTEROLOGY | Facility: MEDICAL CENTER | Age: 40
End: 2020-12-21
Payer: MEDICARE

## 2020-12-21 VITALS
BODY MASS INDEX: 24.66 KG/M2 | WEIGHT: 162.2 LBS | SYSTOLIC BLOOD PRESSURE: 114 MMHG | DIASTOLIC BLOOD PRESSURE: 82 MMHG | TEMPERATURE: 99.1 F | HEART RATE: 84 BPM

## 2020-12-21 DIAGNOSIS — K50.00 CROHN'S DISEASE INVOLVING TERMINAL ILEUM (HCC): Primary | ICD-10-CM

## 2020-12-21 DIAGNOSIS — R10.9 FUNCTIONAL ABDOMINAL PAIN SYNDROME: ICD-10-CM

## 2020-12-21 DIAGNOSIS — R10.9 ABDOMINAL PAIN, UNSPECIFIED ABDOMINAL LOCATION: ICD-10-CM

## 2020-12-21 DIAGNOSIS — R19.8 ABNORMAL BOWEL MOVEMENT: ICD-10-CM

## 2020-12-21 DIAGNOSIS — K58.0 IRRITABLE BOWEL SYNDROME WITH DIARRHEA: ICD-10-CM

## 2020-12-21 PROCEDURE — 99214 OFFICE O/P EST MOD 30 MIN: CPT | Performed by: INTERNAL MEDICINE

## 2021-01-06 DIAGNOSIS — Z82.49 FAMILY HISTORY OF BRUGADA SYNDROME: ICD-10-CM

## 2021-01-06 DIAGNOSIS — Z82.49 FAMILY HISTORY OF HEART DISEASE: Primary | ICD-10-CM

## 2021-01-20 DIAGNOSIS — F41.9 ANXIETY: ICD-10-CM

## 2021-01-20 RX ORDER — CLONAZEPAM 0.5 MG/1
0.5 TABLET ORAL EVERY 12 HOURS PRN
Qty: 60 TABLET | Refills: 1 | Status: SHIPPED | OUTPATIENT
Start: 2021-01-20 | End: 2021-03-22 | Stop reason: SDUPTHER

## 2021-02-04 ENCOUNTER — APPOINTMENT (OUTPATIENT)
Dept: LAB | Facility: CLINIC | Age: 41
End: 2021-02-04
Payer: MEDICARE

## 2021-02-04 DIAGNOSIS — J45.20 MILD INTERMITTENT EXTRINSIC ASTHMA WITHOUT COMPLICATION: ICD-10-CM

## 2021-02-04 DIAGNOSIS — J30.2 SEASONAL ALLERGIC RHINITIS, UNSPECIFIED TRIGGER: ICD-10-CM

## 2021-02-04 DIAGNOSIS — Z13.220 LIPID SCREENING: ICD-10-CM

## 2021-02-04 DIAGNOSIS — Z82.49 FAMILY HISTORY OF BRUGADA SYNDROME: ICD-10-CM

## 2021-02-04 DIAGNOSIS — R10.9 FUNCTIONAL ABDOMINAL PAIN SYNDROME: ICD-10-CM

## 2021-02-04 DIAGNOSIS — F41.9 ANXIETY: ICD-10-CM

## 2021-02-04 DIAGNOSIS — Z82.49 FAMILY HISTORY OF HEART DISEASE: ICD-10-CM

## 2021-02-04 LAB
ALBUMIN SERPL BCP-MCNC: 3.9 G/DL (ref 3.5–5)
ALP SERPL-CCNC: 77 U/L (ref 46–116)
ALT SERPL W P-5'-P-CCNC: 37 U/L (ref 12–78)
ANION GAP SERPL CALCULATED.3IONS-SCNC: 2 MMOL/L (ref 4–13)
AST SERPL W P-5'-P-CCNC: 23 U/L (ref 5–45)
BILIRUB SERPL-MCNC: 0.6 MG/DL (ref 0.2–1)
BUN SERPL-MCNC: 12 MG/DL (ref 5–25)
CALCIUM SERPL-MCNC: 9.2 MG/DL (ref 8.3–10.1)
CHLORIDE SERPL-SCNC: 108 MMOL/L (ref 100–108)
CHOLEST SERPL-MCNC: 197 MG/DL (ref 50–200)
CO2 SERPL-SCNC: 28 MMOL/L (ref 21–32)
CREAT SERPL-MCNC: 1 MG/DL (ref 0.6–1.3)
ERYTHROCYTE [DISTWIDTH] IN BLOOD BY AUTOMATED COUNT: 13 % (ref 11.6–15.1)
GFR SERPL CREATININE-BSD FRML MDRD: 94 ML/MIN/1.73SQ M
GLUCOSE P FAST SERPL-MCNC: 100 MG/DL (ref 65–99)
HCT VFR BLD AUTO: 47.7 % (ref 36.5–49.3)
HDLC SERPL-MCNC: 65 MG/DL
HGB BLD-MCNC: 16 G/DL (ref 12–17)
LDLC SERPL CALC-MCNC: 119 MG/DL (ref 0–100)
MCH RBC QN AUTO: 31.2 PG (ref 26.8–34.3)
MCHC RBC AUTO-ENTMCNC: 33.5 G/DL (ref 31.4–37.4)
MCV RBC AUTO: 93 FL (ref 82–98)
NONHDLC SERPL-MCNC: 132 MG/DL
PLATELET # BLD AUTO: 244 THOUSANDS/UL (ref 149–390)
PMV BLD AUTO: 10 FL (ref 8.9–12.7)
POTASSIUM SERPL-SCNC: 5.1 MMOL/L (ref 3.5–5.3)
PROT SERPL-MCNC: 7.5 G/DL (ref 6.4–8.2)
RBC # BLD AUTO: 5.13 MILLION/UL (ref 3.88–5.62)
SODIUM SERPL-SCNC: 138 MMOL/L (ref 136–145)
TRIGL SERPL-MCNC: 65 MG/DL
WBC # BLD AUTO: 7.3 THOUSAND/UL (ref 4.31–10.16)

## 2021-02-04 PROCEDURE — 80053 COMPREHEN METABOLIC PANEL: CPT

## 2021-02-04 PROCEDURE — 85027 COMPLETE CBC AUTOMATED: CPT

## 2021-02-04 PROCEDURE — 80061 LIPID PANEL: CPT

## 2021-02-04 PROCEDURE — 36415 COLL VENOUS BLD VENIPUNCTURE: CPT

## 2021-02-08 ENCOUNTER — ANESTHESIA EVENT (OUTPATIENT)
Dept: GASTROENTEROLOGY | Facility: MEDICAL CENTER | Age: 41
End: 2021-02-08

## 2021-02-09 ENCOUNTER — ANESTHESIA (OUTPATIENT)
Dept: GASTROENTEROLOGY | Facility: MEDICAL CENTER | Age: 41
End: 2021-02-09

## 2021-02-09 ENCOUNTER — HOSPITAL ENCOUNTER (OUTPATIENT)
Dept: GASTROENTEROLOGY | Facility: MEDICAL CENTER | Age: 41
Setting detail: OUTPATIENT SURGERY
Discharge: HOME | End: 2021-02-09
Attending: INTERNAL MEDICINE | Admitting: INTERNAL MEDICINE
Payer: MEDICARE

## 2021-02-09 VITALS
TEMPERATURE: 99.9 F | RESPIRATION RATE: 20 BRPM | BODY MASS INDEX: 25.01 KG/M2 | HEART RATE: 67 BPM | SYSTOLIC BLOOD PRESSURE: 122 MMHG | OXYGEN SATURATION: 99 % | WEIGHT: 165 LBS | DIASTOLIC BLOOD PRESSURE: 75 MMHG | HEIGHT: 68 IN

## 2021-02-09 VITALS — HEART RATE: 78 BPM

## 2021-02-09 DIAGNOSIS — K50.00 CROHN'S DISEASE INVOLVING TERMINAL ILEUM (HCC): ICD-10-CM

## 2021-02-09 PROBLEM — IMO0001 SMOKING: Status: ACTIVE | Noted: 2021-02-09

## 2021-02-09 PROBLEM — F17.200 SMOKING: Status: ACTIVE | Noted: 2021-02-09

## 2021-02-09 PROCEDURE — 88305 TISSUE EXAM BY PATHOLOGIST: CPT | Performed by: PATHOLOGY

## 2021-02-09 PROCEDURE — 43239 EGD BIOPSY SINGLE/MULTIPLE: CPT | Performed by: INTERNAL MEDICINE

## 2021-02-09 PROCEDURE — 45380 COLONOSCOPY AND BIOPSY: CPT | Performed by: INTERNAL MEDICINE

## 2021-02-09 RX ORDER — PROPOFOL 10 MG/ML
INJECTION, EMULSION INTRAVENOUS AS NEEDED
Status: DISCONTINUED | OUTPATIENT
Start: 2021-02-09 | End: 2021-02-09

## 2021-02-09 RX ORDER — LIDOCAINE HYDROCHLORIDE 20 MG/ML
INJECTION, SOLUTION EPIDURAL; INFILTRATION; INTRACAUDAL; PERINEURAL AS NEEDED
Status: DISCONTINUED | OUTPATIENT
Start: 2021-02-09 | End: 2021-02-09

## 2021-02-09 RX ORDER — SODIUM CHLORIDE 9 MG/ML
125 INJECTION, SOLUTION INTRAVENOUS CONTINUOUS
Status: DISCONTINUED | OUTPATIENT
Start: 2021-02-09 | End: 2021-02-13 | Stop reason: HOSPADM

## 2021-02-09 RX ORDER — ONDANSETRON 2 MG/ML
4 INJECTION INTRAMUSCULAR; INTRAVENOUS ONCE AS NEEDED
Status: DISCONTINUED | OUTPATIENT
Start: 2021-02-09 | End: 2021-02-13 | Stop reason: HOSPADM

## 2021-02-09 RX ADMIN — PROPOFOL 50 MG: 10 INJECTION, EMULSION INTRAVENOUS at 13:10

## 2021-02-09 RX ADMIN — SODIUM CHLORIDE 125 ML/HR: 0.9 INJECTION, SOLUTION INTRAVENOUS at 12:37

## 2021-02-09 RX ADMIN — PROPOFOL 200 MG: 10 INJECTION, EMULSION INTRAVENOUS at 12:53

## 2021-02-09 RX ADMIN — LIDOCAINE HYDROCHLORIDE 2 ML: 20 INJECTION, SOLUTION EPIDURAL; INFILTRATION; INTRACAUDAL; PERINEURAL at 12:50

## 2021-02-09 RX ADMIN — PROPOFOL 50 MG: 10 INJECTION, EMULSION INTRAVENOUS at 12:59

## 2021-02-09 RX ADMIN — PROPOFOL 50 MG: 10 INJECTION, EMULSION INTRAVENOUS at 13:04

## 2021-02-09 RX ADMIN — PROPOFOL 50 MG: 10 INJECTION, EMULSION INTRAVENOUS at 13:08

## 2021-02-09 RX ADMIN — PROPOFOL 50 MG: 10 INJECTION, EMULSION INTRAVENOUS at 13:12

## 2021-02-09 RX ADMIN — PROPOFOL 50 MG: 10 INJECTION, EMULSION INTRAVENOUS at 12:55

## 2021-02-09 RX ADMIN — PROPOFOL 50 MG: 10 INJECTION, EMULSION INTRAVENOUS at 13:01

## 2021-02-09 RX ADMIN — PROPOFOL 50 MG: 10 INJECTION, EMULSION INTRAVENOUS at 12:57

## 2021-02-09 NOTE — H&P
History and Physical - [facility]  Nathan Zazueta 45 y o  male MRN: [id number]                  HPI: Nathan Zazueta is a 45y o  year old male who presents for Crohn's and abdominal pain      REVIEW OF SYSTEMS: Per the HPI, and otherwise unremarkable  Historical Information   Past Medical History:   Diagnosis Date    Abdominal pain     Abnormal liver function test     last assessed: Oct 16, 2013     Abnormal weight loss     last assessed: Yung 15, 2014     Acute left-sided low back pain with left-sided sciatica 10/31/2019    Allergic rhinitis due to pollen     last assessed: Yung 15, 2014     Anxiety     Anxiety     last assessed/resolved:  Aug 5, 2015     Asthma     last assessed: Yung 15, 2014     Benign essential HTN     last assessed/resolved: Feb 23, 2017     Cervical lymphadenopathy     last assessed/resolved: Feb 23, 2017     Chronic sinusitis     last assessed: Yung 15, 2014     Constipation     Crohn's disease ([facility] [address] ) 01/01/2011    last assessed: Yung 15, 2014     Dysuria     last assessed: April 29, 2016     Elevated BP without diagnosis of hypertension     last assessed/resolved: Feb 23, 2017     Esophageal reflux     last assessed/resolved: Feb 23, 2017     Eustachian tube anomaly     Resolved: Nov 9, 2017     External hemorrhoids     last assessed: Yung 15, 2014     Hypertension     borderline    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     last assessed/resolved: July 19, 2017     Inflammatory bowel disease     Pancreatic neoplasm     last assessed: Yung 15, 2014     PONV (postoperative nausea and vomiting)     Positive depression screening     resolved: July 24, 2017     Postoperative ileus ([facility] [address] ) 11/12/2018    Psoriasis     Seasonal allergies     Small bowel obstruction ([facility] [address] ) 11/11/2018    Vitamin B12 deficiency     last assessed/resolved: Feb 23, 2017      Past Surgical History:   Procedure Laterality Date    CHOLECYSTECTOMY      resolved: 2011     COLONOSCOPY N/A 11/18/2016    Procedure: COLONOSCOPY;  Surgeon: Deanna العلي MD;  Location:  GI LAB; Service:     COLONOSCOPY N/A 4/28/2016    Procedure: COLONOSCOPY;  Surgeon: Deanna العلي MD;  Location: [facility] GI LAB; Service:     ESOPHAGOGASTRODUODENOSCOPY N/A 4/28/2016    Procedure: ESOPHAGOGASTRODUODENOSCOPY (EGD); Surgeon: Deanna العلي MD;  Location: [facility] GI LAB; Service:     FRONTAL SINUSOTOMY      resolved: April 2009     MS COLONOSCOPY FLX DX W/COLLJ [facility] WHEN PFRMD N/A 10/12/2018    Procedure: EGD AND COLONOSCOPY;  Surgeon: Kady Ness MD;  Location: [facility] GI LAB; Service: Gastroenterology    MS LAP, INCISIONAL HERNIA REPAIR,REDUCIBLE N/A 11/8/2018    Procedure: REPAIR HERNIA INCISIONAL LAPAROSCOPIC;  Surgeon: Jean Mak MD;  [facility];  Service: General    MS REPAIR OF NASAL SEPTUM N/A 7/28/2017    Procedure: REVISION SEPTOPLASTY; TURBINOPLASTY; BILATERAL  GRAFTS; ALAR GRAFT; POSSIBLE AURICULAR CARTILAGE GRAFT;  Surgeon: Sis Escalante MD;  Location: [facility];  Service: ENT    TONSILLECTOMY AND ADENOIDECTOMY       Social History   Social History     Substance and Sexual Activity   Alcohol Use Yes    Frequency: Monthly or less    Comment: Chema Wolfe consumes alcohol noted in "allscripts"      Social History     Substance and Sexual Activity   Drug Use Yes    Types: Marijuana    Comment: uses medical marijuana via vaping   last use 2/8/19     Social History     Tobacco Use   Smoking Status Current Every Day Smoker    Packs/day: 0 50    Types: Cigarettes   Smokeless Tobacco Never Used   Tobacco Comment    Dependence on nicotine in cigarettes - 1/2 PPD x 20 years      Family History   Problem Relation Age of Onset    Diabetes Mother         mellitus     Hypertension Mother     Thyroid disease Mother     Fibromyalgia Mother     Hypertension Father     Hypertension Sister     No Known Problems Brother     No Known Problems Maternal Grandmother     No Known Problems Maternal Grandfather     Diabetes Paternal Grandmother     Diabetes Paternal Grandfather     Diabetes Other         mellitus     Rheum arthritis Cousin        Meds/Allergies     (Not in a hospital admission)      Allergies   Allergen Reactions    Apple Anaphylaxis    Aspirin Anaphylaxis and Hives     Category: Allergy;     Eggs Or Egg-Derived Products GI Intolerance     GI upset    Ibuprofen Anaphylaxis and Hives     Category: Allergy; aspirin    Nsaids Anaphylaxis     Category: Allergy;     Other Anaphylaxis     Category: Allergy; Annotation - [id number]: cherries, grapes , and kiwis; pt states all fruits    Peanuts [Peanut Oil] Anaphylaxis     nuts    Penicillins Shortness Of Breath    Pollen Extract     Codeine Anxiety       Objective     Blood pressure 126/91, pulse 84, temperature 99 9 °F (37 7 °C), temperature source Temporal, resp  rate 18, height 5' 8" (1 727 m), weight 74 8 kg (165 lb), SpO2 98 %  PHYSICAL EXAMINATION:    General Appearance:   Alert, cooperative, no distress   HEENT:  Normocephalic, atraumatic, anicteric  Neck supple, symmetrical, trachea midline  Lungs:   Equal chest rise and unlabored breathing, normal effort, no coughing  Cardiovascular:   No visualized JVD  Abdomen:   No abdominal distension  Skin:   No jaundice, rashes, or lesions  Musculoskeletal:   Normal range of motion visualized  Psych:  Normal affect and normal insight  Neuro:  Alert and appropriate  ASSESSMENT/PLAN:  This is a 45y o  year old male here for EGD and colonoscopy, and he is stable and optimized for his procedure

## 2021-02-09 NOTE — ANESTHESIA PREPROCEDURE EVALUATION
Procedure:  COLONOSCOPY  EGD    Relevant Problems   ANESTHESIA (within normal limits)      CARDIO (within normal limits)      MUSCULOSKELETAL   (+) Chronic midline low back pain without sciatica   (+) Chronic midline thoracic back pain   (+) Degenerative disc disease, lumbar      NEURO/PSYCH   (+) Anxiety   (+) Chronic headaches   (+) Chronic midline thoracic back pain      PULMONARY   (+) Allergic asthma   (+) Smoking      Other   (+) Crohn's disease involving terminal ileum (HCC)        Physical Exam    Airway    Mallampati score: I  TM Distance: >3 FB  Neck ROM: full     Dental   Comment: Poor dentition,     Cardiovascular  Cardiovascular exam normal    Pulmonary  Pulmonary exam normal     Other Findings        Anesthesia Plan  ASA Score- 2     Anesthesia Type- IV sedation with anesthesia with ASA Monitors  Additional Monitors:   Airway Plan:           Plan Factors-    Chart reviewed  Patient is a current smoker  Patient instructed to abstain from smoking on day of procedure  Patient smoked on day of surgery  Induction- intravenous  Postoperative Plan-     Informed Consent- Anesthetic plan and risks discussed with patient

## 2021-02-09 NOTE — DISCHARGE INSTRUCTIONS
Upper Endoscopy   WHAT YOU NEED TO KNOW:   An upper endoscopy is also called an upper gastrointestinal (GI) endoscopy, or an esophagogastroduodenoscopy (EGD)  You may feel bloated, gassy, or have some abdominal discomfort after your procedure  Your throat may be sore for 24 to 36 hours  You may burp or pass gas from air that is still inside your body  DISCHARGE INSTRUCTIONS:   Call 911 for any of the following:   · You have sudden chest pain or trouble breathing  Seek care immediately if:   · You feel dizzy or faint  · You have trouble swallowing  · Your bowel movements are very dark or black  · Your abdomen is hard and firm and you have severe pain  · You vomit blood  Contact your healthcare provider if:   · You feel full or bloated and cannot burp or pass gas  · You have not had a bowel movement for 3 days after your procedure  · You have neck pain  · You have a fever or chills  · You have nausea or are vomiting  · You have a rash or hives  · You have questions or concerns about your endoscopy  Relieve a sore throat:  Suck on throat lozenges or crushed ice  Gargle with a small amount of warm salt water  Mix 1 teaspoon of salt and 1 cup of warm water to make salt water  Relieve gas and discomfort from bloating:  Lie on your right side with a heating pad on your abdomen  Take short walks to help pass gas  Eat small meals until bloating is relieved  Rest after your procedure: You have been given medicine to relax you  Do not  drive or make important decisions until the day after your procedure  Return to your normal activity as directed  You can usually return to work the day after your procedure  Follow up with your healthcare provider as directed:  Write down your questions so you remember to ask them during your visits     © 2017 [address] is for End User's use only and may not be sold, redistributed or otherwise used for commercial purposes  All illustrations and images included in CareNotes® are the copyrighted property of A [employer]  or Gerald Bazzi  The above information is an  only  It is not intended as medical advice for individual conditions or treatments  Talk to your doctor, nurse or pharmacist before following any medical regimen to see if it is safe and effective for you  Colonoscopy   WHAT YOU NEED TO KNOW:   A colonoscopy is a procedure to examine the inside of your colon (intestine) with a scope  Polyps or tissue growths may have been removed during your colonoscopy  It is normal to feel bloated and to have some abdominal discomfort  You should be passing gas  If you have hemorrhoids or you had polyps removed, you may have a small amount of bleeding  DISCHARGE INSTRUCTIONS:   Seek care immediately if:   · You have a large amount of bright red blood in your bowel movements  · Your abdomen is hard and firm and you have severe pain  · You have sudden trouble breathing  Contact your healthcare provider if:   · You develop a rash or hives  · You have a fever within 24 hours of your procedure       · You have not had a bowel movement for 3 days after your procedure  · You have questions or concerns about your condition or care  Activity:   · Do not lift, strain, or run  for 3 days after your procedure  · Rest after your procedure  You have been given medicine to relax you  Do not  drive or make important decisions until the day after your procedure  Return to your normal activity as directed  · Relieve gas and discomfort from bloating  by lying on your right side with a heating pad on your abdomen  You may need to take short walks to help the gas move out  Eat small meals until bloating is relieved  If you had polyps removed: For 7 days after your procedure:  · Do not  take aspirin  · Do not  go on long car rides    Follow up with your healthcare provider as directed: Write down your questions so you remember to ask them during your visits  © 2017 [address] is for End User's use only and may not be sold, redistributed or otherwise used for commercial purposes  All illustrations and images included in CareNotes® are the copyrighted property of A [employer]  or Gerald Bazzi  The above information is an  only  It is not intended as medical advice for individual conditions or treatments  Talk to your doctor, nurse or pharmacist before following any medical regimen to see if it is safe and effective for you

## 2021-02-15 DIAGNOSIS — K29.80 DUODENITIS: Primary | ICD-10-CM

## 2021-02-15 RX ORDER — OMEPRAZOLE 40 MG/1
40 CAPSULE, DELAYED RELEASE ORAL
Qty: 30 CAPSULE | Refills: 3 | Status: SHIPPED | OUTPATIENT
Start: 2021-02-15 | End: 2021-06-14

## 2021-03-22 DIAGNOSIS — F41.9 ANXIETY: ICD-10-CM

## 2021-03-23 RX ORDER — CLONAZEPAM 0.5 MG/1
0.5 TABLET ORAL EVERY 12 HOURS PRN
Qty: 60 TABLET | Refills: 1 | Status: SHIPPED | OUTPATIENT
Start: 2021-03-23 | End: 2021-06-01 | Stop reason: SDUPTHER

## 2021-04-26 ENCOUNTER — OFFICE VISIT (OUTPATIENT)
Dept: GASTROENTEROLOGY | Facility: MEDICAL CENTER | Age: 41
End: 2021-04-26
Payer: MEDICARE

## 2021-04-26 VITALS
HEART RATE: 78 BPM | TEMPERATURE: 98 F | BODY MASS INDEX: 25.16 KG/M2 | DIASTOLIC BLOOD PRESSURE: 74 MMHG | SYSTOLIC BLOOD PRESSURE: 120 MMHG | WEIGHT: 166 LBS | HEIGHT: 68 IN

## 2021-04-26 DIAGNOSIS — Z90.49 HISTORY OF CHOLECYSTECTOMY: ICD-10-CM

## 2021-04-26 DIAGNOSIS — R10.11 RIGHT UPPER QUADRANT ABDOMINAL PAIN: ICD-10-CM

## 2021-04-26 DIAGNOSIS — R19.5 ABNORMAL STOOLS: ICD-10-CM

## 2021-04-26 DIAGNOSIS — K92.9 FUNCTIONAL GASTROINTESTINAL DISORDER: ICD-10-CM

## 2021-04-26 DIAGNOSIS — K50.00 CROHN'S DISEASE INVOLVING TERMINAL ILEUM (HCC): Primary | ICD-10-CM

## 2021-04-26 PROCEDURE — 99214 OFFICE O/P EST MOD 30 MIN: CPT | Performed by: INTERNAL MEDICINE

## 2021-04-26 NOTE — PROGRESS NOTES
[facility] - Outpatient Follow-up Note  Greg Hobbs 37 y o  male MRN: [id number]  Encounter: [id number]          ASSESSMENT AND PLAN:    Greg Hobbs is a 37 y o  male who presents with ongoing abdominal pain and abnormal stools of unclear etiology  He was given a prior diagnosis of Crohn's based on a VCE with report of ileitis  Recent colonoscopy with 1 sample with rare cryptitis but overall not consistent with IBD  MRE reviewed  Prior blood work normal  He previously tried Elavil, Beano, peppermint oil, Linzess (PRN), budesonide, prednisone, rifaximin  Suspect functional pathology such as IBS but unclear at this time  1  Crohn's disease involving terminal ileum ([address] )    2  Functional gastrointestinal disorder    3  History of cholecystectomy    4  Abnormal stools    5  Right upper quadrant abdominal pain        Orders Placed This Encounter   Procedures    Capsule endoscopy     VCE to revaluate for ileitis  IBGard is too expensive so will hold off  He declines prilosec (for duodenitis seen on imaging  He will get another opinion with Dr Lorri [facility]    I have spent 35 minutes with Patient  today in which greater than 50% of this time was spent in counseling/coordination of care regarding Diagnostic results, Intructions for management and Impressions  ______________________________________________________________________    SUBJECTIVE:    Greg Hobbs is a 37 y o  male who presents with complaint of GI symptoms    Rare indigestion  He has RUQ abdominal pain, feels like a darlene horse pain, and it is severe  It is in that one area and sharp  It can last for 5 minutes  Not always when he has to have a BM  He can have 3-4 BMs per day  Occasionally formed or loose or slime  No BRBPR or black stools  Occasional nausea but no vomiting      Answers for HPI/ROS submitted by the patient on 4/19/2021   When you are not experiencing symptoms of your inflammatory bowel disease, how many bowel movements do you typically have each day?: 4  Over the last 3 days, what is the maximum number of bowel movements that you had in a single day?: 4  Over the last 3 days, have you had any bowel movements where you passed blood without stool?: No  Since your last visit, have you received any vaccinations?: No  Since the last visit, have you had an infection?: No  In the past three months, have you used tobacco in any form?: Yes  What form(s) of tobacco have you used?: cigarettes  During the last year, how many days have you visited a hospital emergency department because of your inflammatory bowel disease?: 1  During the last month, have you taken narcotic pain medications (such as Percocet, oxycodone, Oxycontin, morphine, Vicodin, Dilaudid, MS Contin) for your inflammatory bowel disease?: No  During the last month, have you awoken at night to move your bowels?: Yes  During the last month, have you had leakage of your stool while sleeping?: No  During the last month, have you had incontinence of stool while you were awake?: No  During the last month have you unintentionally lost weight?: No  During the last 3 days, have you had a fever?: No  During the last 3 days, have you had eye irritation?: No  During the last 3 days, have you had mouth sores?: No  During the last 3 days, have you had a sore throat?: No  During the last 3 days, have you had chest pain?: No  During the last 3 days, have you had shortness of breath?: No  During the last 3 days, have you had numbness or tingling in your hands or feet?: No  During the last 3 days, have you had a skin rash?: No  During the last 3 days, have you had pain or swelling in your joints?: Yes  During the last 3 days, have you had bruising or bleeding?: No  During the last 3 days, have you felt depressed or blue?: No      REVIEW OF SYSTEMS IS OTHERWISE NEGATIVE        Historical Information   Past Medical History:   Diagnosis Date    Abdominal pain     Abnormal liver function test     last assessed: Oct 16, 2013     Abnormal weight loss     last assessed: Yung 15, 2014     Acute left-sided low back pain with left-sided sciatica 10/31/2019    Allergic rhinitis due to pollen     last assessed: Yung 15, 2014     Anxiety     Anxiety     last assessed/resolved: Aug 5, 2015     Asthma     last assessed: Yung 15, 2014     Benign essential HTN     last assessed/resolved: Feb 23, 2017     Cervical lymphadenopathy     last assessed/resolved: Feb 23, 2017     Chronic sinusitis     last assessed: Yung 15, 2014     Constipation     Crohn's disease ([facility] [address] [facility] 01/01/2011    last assessed: Yung 15, 2014     Dysuria     last assessed: April 29, 2016     Elevated BP without diagnosis of hypertension     last assessed/resolved: Feb 23, 2017     Esophageal reflux     last assessed/resolved: Feb 23, 2017     Eustachian tube anomaly     Resolved: Nov 9, 2017     External hemorrhoids     last assessed: Yung 15, 2014     Hypertension     borderline    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     last assessed/resolved: July 19, 2017     Inflammatory bowel disease     Pancreatic neoplasm     last assessed: Yung 15, 2014     PONV (postoperative nausea and vomiting)     Positive depression screening     resolved: July 24, 2017     Postoperative ileus ([facility] [address] ) 11/12/2018    Psoriasis     Seasonal allergies     Small bowel obstruction ([facility] [address] ) 11/11/2018    Vitamin B12 deficiency     last assessed/resolved: Feb 23, 2017      Past Surgical History:   Procedure Laterality Date    CHOLECYSTECTOMY      resolved: 2011     COLONOSCOPY N/A 11/18/2016    Procedure: COLONOSCOPY;  Surgeon: Kg Harris MD;  Location:  GI LAB; Service:     COLONOSCOPY N/A 4/28/2016    Procedure: COLONOSCOPY;  Surgeon: Kg Harris MD;  Location: [facility] GI LAB;   Service:     COLONOSCOPY  02/09/2021    ESOPHAGOGASTRODUODENOSCOPY N/A 4/28/2016    Procedure: ESOPHAGOGASTRODUODENOSCOPY (EGD); Surgeon: Arminda Elias MD;  Location: [facility] GI LAB; Service:     FRONTAL SINUSOTOMY      resolved: April 2009     LA COLONOSCOPY FLX DX W/COLLJ [facility] WHEN PFRMD N/A 10/12/2018    Procedure: EGD AND COLONOSCOPY;  Surgeon: Kendall Henderson MD;  Location: [facility] GI LAB; Service: Gastroenterology    LA LAP, INCISIONAL HERNIA REPAIR,REDUCIBLE N/A 11/8/2018    Procedure: REPAIR HERNIA INCISIONAL LAPAROSCOPIC;  Surgeon: Angel Zarco MD;  [facility];  Service: General    LA REPAIR OF NASAL SEPTUM N/A 7/28/2017    Procedure: REVISION SEPTOPLASTY; TURBINOPLASTY; BILATERAL  GRAFTS; ALAR GRAFT; POSSIBLE AURICULAR CARTILAGE GRAFT;  Surgeon: Lester Ribeiro MD;  Location: [facility];  Service: ENT    TONSILLECTOMY AND ADENOIDECTOMY      UPPER GASTROINTESTINAL ENDOSCOPY  02/09/2021     Social History   Social History     Substance and Sexual Activity   Alcohol Use Yes    Frequency: Monthly or less    Comment: Pj Jacobs consumes alcohol noted in "allscripts"      Social History     Substance and Sexual Activity   Drug Use Yes    Types: Marijuana    Comment: uses medical marijuana via vaping   last use 2/8/19     Social History     Tobacco Use   Smoking Status Current Every Day Smoker    Packs/day: 0 50    Types: Cigarettes   Smokeless Tobacco Never Used   Tobacco Comment    Dependence on nicotine in cigarettes - 1/2 PPD x 20 years      Family History   Problem Relation Age of Onset    Diabetes Mother         mellitus     Hypertension Mother     Thyroid disease Mother     Fibromyalgia Mother     Hypertension Father     Hypertension Sister     No Known Problems Brother     No Known Problems Maternal Grandmother     No Known Problems Maternal Grandfather     Diabetes Paternal Grandmother     Diabetes Paternal Grandfather     Diabetes Other         mellitus     Rheum arthritis Cousin        Meds/Allergies       Current Outpatient Medications:     acetaminophen (TYLENOL) 500 mg tablet   cetirizine (ZyrTEC) 10 mg tablet    clonazePAM (KlonoPIN) 0 5 mg tablet    diphenhydrAMINE (BENADRYL) 25 mg tablet    fluticasone (FLONASE) 50 mcg/act nasal spray    Linaclotide (LINZESS) 145 MCG CAPS    NON FORMULARY    omeprazole (PriLOSEC) 40 MG capsule    Allergies   Allergen Reactions    Apple - Food Allergy Anaphylaxis    Aspirin Anaphylaxis and Hives     Category: Allergy;     Eggs Or Egg-Derived Products - Food Allergy GI Intolerance     GI upset    Ibuprofen Anaphylaxis and Hives     Category: Allergy; aspirin    Nsaids Anaphylaxis     Category: Allergy;     Other Anaphylaxis     Category: Allergy; Annotation - [id number]: cherries, grapes , and kiwis; pt states all fruits    Peanuts [Peanut Oil - Food Allergy] Anaphylaxis     nuts    Penicillins Shortness Of Breath    Pollen Extract     Codeine Anxiety           Objective     Blood pressure 120/74, pulse 78, temperature 98 °F (36 7 °C), temperature source Tympanic, height 5' 8" (1 727 m), weight 75 3 kg (166 lb)  Body mass index is 25 24 kg/m²  PHYSICAL EXAMINATION:    General Appearance:   Alert, cooperative, no distress   HEENT:  Normocephalic, atraumatic, anicteric  Neck supple, symmetrical, trachea midline  Lungs:   Equal chest rise and unlabored breathing, normal effort, no coughing  Cardiovascular:   No visualized JVD  Abdomen:   No abdominal distension  Skin:   No jaundice, rashes, or lesions  Musculoskeletal:   Normal range of motion visualized  Psych:  Normal affect and normal insight  Neuro:  Alert and appropriate  Lab Results:   No visits with results within 1 Day(s) from this visit     Latest known visit with results is:   Hospital Outpatient Visit on 02/09/2021   Component Date Value    Case Report 02/09/2021                      Value:Surgical Pathology Report                         Case: [id number]                                   Authorizing Provider:  Vikki Duncan MD    Collected: 02/09/2021 1256              Ordering Location:     [address] [place] [address] End        Received:            02/09/2021 [facility] Endoscopy                                                     Pathologist:           Josseline Stuart MD                                                          Specimens:   A) - Duodenum, duodenal bx r/o Celiac Disease possible hx of Chron's                                B) - Stomach, gastric bx r/o H  Pylori                                                              C) - Esophagus, lower esophageal bx r/o EOE                                                         D) - Terminal Ileum, terminal ileum bx hx of enteritis                                              E) - Large Intestine, Cecum, cecal bx r/o colitis                                                                             F) - Large Intestine, Right/Ascending Colon, ascending colon bx r/o colitis                         G) - Large Intestine, Transverse Colon, transverse colon bx r/o colitis                             H) - Large Intestine, Left/Descending Colon, descending colon bx r/o colitis                        I) - Large Intestine, Sigmoid Colon, sigmoid colon bx r/o colitis                                   J) - Rectum, rectum bx r/o colitis                                                         Final Diagnosis 02/09/2021                      Value: This result contains rich text formatting which cannot be displayed here   Additional Information 02/09/2021                      Value: This result contains rich text formatting which cannot be displayed here  Emmanuel Puri Gross Description 02/09/2021                      Value: This result contains rich text formatting which cannot be displayed here         Lab Results   Component Value Date    WBC 7 30 02/04/2021    HGB 16 0 02/04/2021    HCT 47 7 02/04/2021    MCV 93 02/04/2021     02/04/2021       Lab Results   Component Value Date    SODIUM 138 02/04/2021    K 5 1 02/04/2021     02/04/2021    CO2 28 02/04/2021    AGAP 2 (L) 02/04/2021    BUN 12 02/04/2021    CREATININE 1 00 02/04/2021    GLUC 91 12/20/2020    GLUF 100 (H) 02/04/2021    CALCIUM 9 2 02/04/2021    AST 23 02/04/2021    ALT 37 02/04/2021    ALKPHOS 77 02/04/2021    TP 7 5 02/04/2021    TBILI 0 60 02/04/2021    EGFR 94 02/04/2021       Lab Results   Component Value Date    CRP 2 7 01/31/2020       Lab Results   Component Value Date    NPG9CGTKIFWX 1 370 07/24/2017       No results found for: IRON, TIBC, FERRITIN    Radiology Results:   No results found

## 2021-04-30 ENCOUNTER — HOSPITAL ENCOUNTER (OUTPATIENT)
Dept: GASTROENTEROLOGY | Facility: HOSPITAL | Age: 41
Discharge: HOME | End: 2021-04-30
Attending: INTERNAL MEDICINE
Payer: MEDICARE

## 2021-04-30 DIAGNOSIS — K50.00 CROHN'S DISEASE INVOLVING TERMINAL ILEUM (HCC): ICD-10-CM

## 2021-04-30 PROCEDURE — 91110 GI TRC IMG INTRAL ESOPH-ILE: CPT

## 2021-05-09 PROCEDURE — 91110 GI TRC IMG INTRAL ESOPH-ILE: CPT | Performed by: INTERNAL MEDICINE

## 2021-06-01 DIAGNOSIS — F41.9 ANXIETY: ICD-10-CM

## 2021-06-01 RX ORDER — CLONAZEPAM 0.5 MG/1
0.5 TABLET ORAL EVERY 12 HOURS PRN
Qty: 60 TABLET | Refills: 1 | Status: SHIPPED | OUTPATIENT
Start: 2021-06-01 | End: 2021-08-20 | Stop reason: SDUPTHER

## 2021-06-01 NOTE — TELEPHONE ENCOUNTER
Patient needs refill on his klonopin 0 5 mg to be sent to [address] [place] [address] in [facility]

## 2021-06-14 ENCOUNTER — OFFICE VISIT (OUTPATIENT)
Dept: INTERNAL MEDICINE CLINIC | Facility: CLINIC | Age: 41
End: 2021-06-14
Payer: MEDICARE

## 2021-06-14 VITALS
RESPIRATION RATE: 18 BRPM | HEIGHT: 68 IN | WEIGHT: 168.8 LBS | HEART RATE: 70 BPM | SYSTOLIC BLOOD PRESSURE: 128 MMHG | DIASTOLIC BLOOD PRESSURE: 82 MMHG | TEMPERATURE: 98.2 F | OXYGEN SATURATION: 99 % | BODY MASS INDEX: 25.58 KG/M2

## 2021-06-14 DIAGNOSIS — F41.9 ANXIETY: ICD-10-CM

## 2021-06-14 DIAGNOSIS — Z82.49 FAMILY HISTORY OF HEART DISEASE: ICD-10-CM

## 2021-06-14 DIAGNOSIS — J30.2 SEASONAL ALLERGIC RHINITIS, UNSPECIFIED TRIGGER: Primary | ICD-10-CM

## 2021-06-14 DIAGNOSIS — R10.9 FUNCTIONAL ABDOMINAL PAIN SYNDROME: ICD-10-CM

## 2021-06-14 DIAGNOSIS — Z82.49 FH: BRUGADA SYNDROME: ICD-10-CM

## 2021-06-14 DIAGNOSIS — K29.90 GASTRITIS AND DUODENITIS: ICD-10-CM

## 2021-06-14 DIAGNOSIS — F17.200 SMOKING: ICD-10-CM

## 2021-06-14 DIAGNOSIS — J45.20 MILD INTERMITTENT EXTRINSIC ASTHMA WITHOUT COMPLICATION: ICD-10-CM

## 2021-06-14 PROBLEM — M54.2 NECK PAIN: Status: RESOLVED | Noted: 2018-06-13 | Resolved: 2021-06-14

## 2021-06-14 PROCEDURE — 99214 OFFICE O/P EST MOD 30 MIN: CPT | Performed by: INTERNAL MEDICINE

## 2021-06-14 RX ORDER — OMEPRAZOLE 20 MG/1
20 CAPSULE, DELAYED RELEASE ORAL DAILY
Qty: 90 CAPSULE | Refills: 0 | Status: SHIPPED | OUTPATIENT
Start: 2021-06-14 | End: 2021-12-29

## 2021-06-14 NOTE — PROGRESS NOTES
Assessment/Plan:    Allergic rhinitis  Stable, controlled  Continue Zyrtec 10 mg daily as needed and Flonase as needed  Anxiety  Stable, controlled  Continue Klonopin 0 5 mg twice a day as needed  Functional abdominal pain syndrome  Continue follow-up with Gastroenterology  BMI 25 0-25 9,adult  Discussed lifestyle modification with diet exercise  Smoking  Counseled patient on smoking cessation  Gastritis and duodenitis  Will start omeprazole 20 mg daily  Diagnoses and all orders for this visit:    Seasonal allergic rhinitis, unspecified trigger    Family history of heart disease  -     MISCELLANEOUS LAB TEST; Future    FH: Brugada syndrome  -     MISCELLANEOUS LAB TEST; Future    Gastritis and duodenitis  -     omeprazole (PriLOSEC) 20 mg delayed release capsule; Take 1 capsule (20 mg total) by mouth daily    Mild intermittent extrinsic asthma without complication    Anxiety    Functional abdominal pain syndrome    BMI 25 0-25 9,adult    Smoking          BMI Counseling: Body mass index is 25 67 kg/m²  The BMI is above normal  Nutrition recommendations include decreasing portion sizes, encouraging healthy choices of fruits and vegetables, decreasing fast food intake, consuming healthier snacks, limiting drinks that contain sugar, moderation in carbohydrate intake, increasing intake of lean protein, reducing intake of saturated and trans fat and reducing intake of cholesterol  Exercise recommendations include moderate physical activity 150 minutes/week and exercising 3-5 times per week  No pharmacotherapy was ordered  Patient referred to PCP due to patient being overweight  Depression Screening and Follow-up Plan: Patient's depression screening was positive with a PHQ-2 score of 0  Clincally patient does not have depression  No treatment is required  Patient advised to follow-up with PCP for further management  Tobacco Cessation Counseling: Tobacco cessation counseling was provided  The patient is sincerely urged to quit consumption of tobacco  He is not ready to quit tobacco  Medication options and side effects of medication discussed  Patient refused medication  Subjective:      Patient ID: Arianna Cook is a 37 y o  male  Chief Complaint   Patient presents with    Follow-up     6 month, BW done 2/4/21  states nothing else going on just the same stuff    health maintenance     bmi f/u plan, phq, AWV after 7/15/21    genetic testing     brought paper work showing what his sister tested positive for        52-year-old male seen today for follow-up of chronic conditions  Laboratory studies reviewed today, CBC, CMP, lipid panel within acceptable range  He recently underwent a pill endoscopy which showed gastritis of the stomach and duodenum  He follows up with his gastroenterologist regularly who recommended he start omeprazole 40 mg daily however patient deferred and wanted to start at 20 mg daily  Otherwise, he has no active complaints or concerns at this time  Anxiety:  Well controlled  He has Klonopin 0 5 mg prescribed as twice a day as needed to which she tries to rarely use  Anxiety  Presents for follow-up visit  Patient reports no chest pain, dizziness, nausea, palpitations, shortness of breath or suicidal ideas  Symptoms occur occasionally  The severity of symptoms is mild  The patient sleeps 8 hours per night  The quality of sleep is fair  Compliance with medications is %  The following portions of the patient's history were reviewed and updated as appropriate: allergies, current medications, past family history, past medical history, past social history, past surgical history and problem list     Review of Systems   Constitutional: Negative for activity change, appetite change, chills, diaphoresis, fatigue and fever  HENT: Negative for congestion, postnasal drip, rhinorrhea, sinus pressure, sinus pain, sneezing and sore throat      Eyes: Negative for visual disturbance  Respiratory: Negative for apnea, cough, choking, chest tightness, shortness of breath and wheezing  Cardiovascular: Negative for chest pain, palpitations and leg swelling  Gastrointestinal: Negative for abdominal distention, abdominal pain, anal bleeding, blood in stool, constipation, diarrhea, nausea and vomiting  Endocrine: Negative for cold intolerance and heat intolerance  Genitourinary: Negative for difficulty urinating, dysuria and hematuria  Musculoskeletal: Negative  Skin: Negative  Neurological: Negative for dizziness, weakness, light-headedness, numbness and headaches  Hematological: Negative for adenopathy  Psychiatric/Behavioral: Negative for agitation, sleep disturbance and suicidal ideas  All other systems reviewed and are negative  Past Medical History:   Diagnosis Date    Abdominal pain     Abnormal liver function test     last assessed: Oct 16, 2013     Abnormal weight loss     last assessed: Yung 15, 2014     Acute left-sided low back pain with left-sided sciatica 10/31/2019    Allergic rhinitis due to pollen     last assessed: Yung 15, 2014     Anxiety     Anxiety     last assessed/resolved:  Aug 5, 2015     Asthma     last assessed: Yung 15, 2014     Benign essential HTN     last assessed/resolved: Feb 23, 2017     Cervical lymphadenopathy     last assessed/resolved: Feb 23, 2017     Chronic sinusitis     last assessed: Yung 15, 2014     Constipation     Crohn's disease ([facility] [address] ) 01/01/2011    last assessed: Yung 15, 2014     Dysuria     last assessed: April 29, 2016     Elevated BP without diagnosis of hypertension     last assessed/resolved: Feb 23, 2017     Esophageal reflux     last assessed/resolved: Feb 23, 2017     Eustachian tube anomaly     Resolved: Nov 9, 2017     External hemorrhoids     last assessed: Yung 15, 2014     Hypertension     borderline    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     last assessed/resolved: July 19, 2017     Inflammatory bowel disease     Pancreatic neoplasm     last assessed: Yung 15, 2014     PONV (postoperative nausea and vomiting)     Positive depression screening     resolved: July 24, 2017     Postoperative ileus ([facility] ) 11/12/2018    Psoriasis     Seasonal allergies     Small bowel obstruction ([facility] ) 11/11/2018    Vitamin B12 deficiency     last assessed/resolved: Feb 23, 2017          Current Outpatient Medications:     acetaminophen (TYLENOL) 500 mg tablet, Take 1,000 mg by mouth every 4 (four) hours as needed , Disp: , Rfl:     cetirizine (ZyrTEC) 10 mg tablet, Take 1 tablet (10 mg total) by mouth daily as needed for allergies, Disp: 30 tablet, Rfl: 5    clonazePAM (KlonoPIN) 0 5 mg tablet, Take 1 tablet (0 5 mg total) by mouth every 12 (twelve) hours as needed for anxiety, Disp: 60 tablet, Rfl: 1    diphenhydrAMINE (BENADRYL) 25 mg tablet, Take 25 mg by mouth as needed  , Disp: , Rfl:     fluticasone (FLONASE) 50 mcg/act nasal spray, 1 spray into each nostril daily as needed for rhinitis, Disp: 3 Bottle, Rfl: 1    Linaclotide (LINZESS) 145 MCG CAPS, Take 1 capsule (145 mcg total) by mouth daily (Patient taking differently: Take 145 mcg by mouth as needed ), Disp: 90 capsule, Rfl: 1    NON FORMULARY, , Disp: , Rfl:     omeprazole (PriLOSEC) 20 mg delayed release capsule, Take 1 capsule (20 mg total) by mouth daily, Disp: 90 capsule, Rfl: 0    Allergies   Allergen Reactions    Apple - Food Allergy Anaphylaxis    Aspirin Anaphylaxis and Hives     Category: Allergy;     Eggs Or Egg-Derived Products - Food Allergy GI Intolerance     GI upset    Ibuprofen Anaphylaxis and Hives     Category: Allergy; aspirin    Nsaids Anaphylaxis     Category: Allergy;     Other Anaphylaxis     Category: Allergy;  Annotation - [id number]: cherries, grapes , and kiwis; pt states all fruits    Peanuts [Peanut Oil - Food Allergy] Anaphylaxis     nuts    Penicillins Shortness Of Breath    Pollen Extract     Codeine Anxiety       Social History   Past Surgical History:   Procedure Laterality Date    CHOLECYSTECTOMY      resolved: 2011     COLONOSCOPY N/A 11/18/2016    Procedure: COLONOSCOPY;  Surgeon: Chacorta Lane MD;  Location:  GI LAB; Service:     COLONOSCOPY N/A 4/28/2016    Procedure: COLONOSCOPY;  Surgeon: Chacorta Lane MD;  Location: [facility] GI LAB; Service:     COLONOSCOPY  02/09/2021    ESOPHAGOGASTRODUODENOSCOPY N/A 4/28/2016    Procedure: ESOPHAGOGASTRODUODENOSCOPY (EGD); Surgeon: Chacorta Lane MD;  Location: [facility] GI LAB; Service:     FRONTAL SINUSOTOMY      resolved: April 2009     [facility] WHEN PFR[facility] N/A 10/12/2018    Procedure: EGD AND COLONOSCOPY;  Surgeon: Pastor Dubon MD;  Location: [facility] GI LAB;   Service: Gastroenterology    NC LAP, INCISIONAL HERNIA REPAIR,REDUCIBLE N/A 11/8/2018    Procedure: REPAIR HERNIA INCISIONAL LAPAROSCOPIC;  Surgeon: Mary Ann Shore MD;  [facility];  Service: General    NC REPAIR OF NASAL SEPTUM N/A 7/28/2017    Procedure: REVISION SEPTOPLASTY; TURBINOPLASTY; BILATERAL  GRAFTS; ALAR GRAFT; POSSIBLE AURICULAR CARTILAGE GRAFT;  Surgeon: Tamika Williamson MD;  [facility]: [facility];  Service: ENT    TONSILLECTOMY AND ADENOIDECTOMY      UPPER GASTROINTESTINAL ENDOSCOPY  02/09/2021     Family History   Problem Relation Age of Onset    Diabetes Mother         mellitus     Hypertension Mother     Thyroid disease Mother     Fibromyalgia Mother     Hypertension Father     Hypertension Sister     Heart disease Sister         had defibulator put due to late beats    No Known Problems Maternal Grandmother     No Known Problems Maternal Grandfather     Diabetes Paternal Grandmother     Diabetes Paternal Grandfather     Diabetes Other         mellitus     Rheum arthritis Cousin        Objective:  /82 (BP Location: Left arm, Patient Position: Sitting, Cuff Size: Standard)   Pulse 70 Temp 98 2 °F (36 8 °C) (Temporal)   Resp 18   Ht 5' 8" (1 727 m)   Wt 76 6 kg (168 lb 12 8 oz)   SpO2 99%   BMI 25 67 kg/m²     No results found for this or any previous visit (from the past 1344 hour(s))  Physical Exam  Vitals and nursing note reviewed  Constitutional:       General: He is not in acute distress  Appearance: He is well-developed  He is not diaphoretic  HENT:      Head: Normocephalic and atraumatic  Eyes:      General: No scleral icterus  Right eye: No discharge  Left eye: No discharge  Conjunctiva/sclera: Conjunctivae normal       Pupils: Pupils are equal, round, and reactive to light  Neck:      Thyroid: No thyromegaly  Vascular: No JVD  Cardiovascular:      Rate and Rhythm: Normal rate and regular rhythm  Heart sounds: Normal heart sounds  No murmur heard  No friction rub  No gallop  Pulmonary:      Effort: Pulmonary effort is normal  No respiratory distress  Breath sounds: Normal breath sounds  No wheezing or rales  Chest:      Chest wall: No tenderness  Abdominal:      General: Bowel sounds are normal  There is no distension  Palpations: Abdomen is soft  There is no mass  Tenderness: There is no abdominal tenderness  There is no guarding or rebound  Musculoskeletal:         General: No tenderness or deformity  Normal range of motion  Cervical back: Normal range of motion and neck supple  Lymphadenopathy:      Cervical: No cervical adenopathy  Skin:     General: Skin is warm and dry  Coloration: Skin is not pale  Findings: No erythema or rash  Neurological:      Mental Status: He is alert and oriented to person, place, and time  Cranial Nerves: No cranial nerve deficit  Coordination: Coordination normal       Deep Tendon Reflexes: Reflexes are normal and symmetric  Psychiatric:         Behavior: Behavior normal          Thought Content:  Thought content normal          Judgment: Judgment normal

## 2021-06-24 ENCOUNTER — APPOINTMENT (OUTPATIENT)
Dept: LAB | Facility: HOSPITAL | Age: 41
End: 2021-06-24
Payer: MEDICARE

## 2021-06-24 DIAGNOSIS — Z82.49 FH: BRUGADA SYNDROME: ICD-10-CM

## 2021-06-24 DIAGNOSIS — Z82.49 FAMILY HISTORY OF HEART DISEASE: ICD-10-CM

## 2021-06-24 PROCEDURE — 36415 COLL VENOUS BLD VENIPUNCTURE: CPT

## 2021-07-06 ENCOUNTER — OFFICE VISIT (OUTPATIENT)
Dept: URGENT CARE | Facility: CLINIC | Age: 41
End: 2021-07-06
Payer: MEDICARE

## 2021-07-06 VITALS
HEIGHT: 68 IN | DIASTOLIC BLOOD PRESSURE: 94 MMHG | WEIGHT: 165 LBS | RESPIRATION RATE: 18 BRPM | BODY MASS INDEX: 25.01 KG/M2 | OXYGEN SATURATION: 99 % | SYSTOLIC BLOOD PRESSURE: 128 MMHG | TEMPERATURE: 98.7 F | HEART RATE: 77 BPM

## 2021-07-06 DIAGNOSIS — H65.03 NON-RECURRENT ACUTE SEROUS OTITIS MEDIA OF BOTH EARS: Primary | ICD-10-CM

## 2021-07-06 PROCEDURE — 99213 OFFICE O/P EST LOW 20 MIN: CPT | Performed by: NURSE PRACTITIONER

## 2021-07-06 PROCEDURE — G0463 HOSPITAL OUTPT CLINIC VISIT: HCPCS | Performed by: NURSE PRACTITIONER

## 2021-07-06 RX ORDER — AZITHROMYCIN 250 MG/1
TABLET, FILM COATED ORAL
Qty: 6 TABLET | Refills: 0 | Status: SHIPPED | OUTPATIENT
Start: 2021-07-06 | End: 2021-07-10

## 2021-07-06 NOTE — PROGRESS NOTES
[address] [facility]        NAME: Shayna Bethea is a 37 y o  male  : 1980    MRN: [id number]  DATE: 2021  TIME: 7:43 PM    Assessment and Plan   Non-recurrent acute serous otitis media of both ears [H65 03]  1  Non-recurrent acute serous otitis media of both ears  azithromycin (ZITHROMAX) 250 mg tablet         Patient Instructions     Patient Instructions   Take medication as directed  Recommend flonase  If you develop any increased pain, swelling, prolonged high fever, dizziness, or any new or concerning symptoms please return or proceed ER  Advised follow-up with PCP in 3 to 5 days  Otitis Media, Ambulatory Care   GENERAL INFORMATION:   Otitis media  is an ear infection  Common symptoms include the following:   · Fever or a headache    · Ear pain    · Trouble hearing    · Ear feels plugged or full or you have ringing or buzzing in your ear    · Dizziness or you lose your balance    · Nausea or vomiting  Seek immediate care for the following symptoms:   · Seizure    · Fever and a stiff neck  Treatment for otitis media  may include any of the following:  · NSAIDs  help decrease swelling and pain or fever  This medicine is available with or without a doctor's order  NSAIDs can cause stomach bleeding or kidney problems in certain people  If you take blood thinner medicine, always ask your healthcare provider if NSAIDs are safe for you  Always read the medicine label and follow directions  · Ear drops  to help treat your ear pain  · Antibiotics  to help kill the germs that caused your ear infection  Care for otitis media:   · Use heat  Place a warm, moist washcloth on your ear to decrease pain  Apply for 15 to 20 minutes, 3 to 4 times a day    · Use ice  Ice helps decrease swelling and pain  Use an ice pack or put crushed ice in a plastic bag  Cover the ice pack with a towel and place it on your ear for 15 to 20 minutes, 3 to 4 times a day for 2 days    Prevent otitis media:   · Wash your hands often  This will help prevent the spread of germs  Encourage everyone in your house to wash their hands with soap and water after they use the bathroom  Everyone should also wash their hands after they change a child's diaper and before they prepare or eat food  · Stay away from people who are ill  Germs are easily and quickly spread through contact  Follow up with your healthcare provider as directed:  Write down your questions so you remember to ask them during your visits  CARE AGREEMENT:   You have the right to help plan your care  Learn about your health condition and how it may be treated  Discuss treatment options with your caregivers to decide what care you want to receive  You always have the right to refuse treatment  The above information is an  only  It is not intended as medical advice for individual conditions or treatments  Talk to your doctor, nurse or pharmacist before following any medical regimen to see if it is safe and effective for you  © 2014 [address] is for End User's use only and may not be sold, redistributed or otherwise used for commercial purposes  All illustrations and images included in CareNotes® are the copyrighted property of [employer]  or Geraldrené Bazzi  Follow up with PCP in 3-5 days  Proceed to  ER if symptoms worsen  Chief Complaint     Chief Complaint   Patient presents with    Facial Pain     Pain in  his Right cheek bone and ear area started 3 days ago  History of Present Illness         Patient is a 52-year-old male that presents with a 3 day history of right-sided face and right ear pain  Patient notes mild congestion  Denies any fever, chills, or body aches  Denies any headache, dizziness, feeling lightheaded  Denies any sore throat or cough  Denies ear drainage,   Tinnitus, or decreased hearing  Denies any recent sick contacts or known exposure to COVID-19    Has not tried any over-the-counter medication  Review of Systems   Review of Systems   Constitutional: Negative for chills, diaphoresis, fatigue and fever  HENT: Positive for ear pain and sinus pressure  Negative for congestion, ear discharge, facial swelling, mouth sores, postnasal drip, rhinorrhea, sinus pain, sore throat and trouble swallowing  Eyes: Negative for photophobia and visual disturbance  Respiratory: Negative for cough, chest tightness and shortness of breath  Cardiovascular: Negative for chest pain  Gastrointestinal: Negative for abdominal pain, diarrhea, nausea and vomiting  Genitourinary: Negative  Musculoskeletal: Negative for arthralgias, back pain, joint swelling, myalgias, neck pain and neck stiffness  Skin: Negative for rash  Neurological: Negative for dizziness, facial asymmetry, weakness, light-headedness, numbness and headaches           Current Medications       Current Outpatient Medications:     acetaminophen (TYLENOL) 500 mg tablet, Take 1,000 mg by mouth every 4 (four) hours as needed , Disp: , Rfl:     cetirizine (ZyrTEC) 10 mg tablet, Take 1 tablet (10 mg total) by mouth daily as needed for allergies, Disp: 30 tablet, Rfl: 5    clonazePAM (KlonoPIN) 0 5 mg tablet, Take 1 tablet (0 5 mg total) by mouth every 12 (twelve) hours as needed for anxiety, Disp: 60 tablet, Rfl: 1    diphenhydrAMINE (BENADRYL) 25 mg tablet, Take 25 mg by mouth as needed  , Disp: , Rfl:     fluticasone (FLONASE) 50 mcg/act nasal spray, 1 spray into each nostril daily as needed for rhinitis, Disp: 3 Bottle, Rfl: 1    NON FORMULARY, , Disp: , Rfl:     omeprazole (PriLOSEC) 20 mg delayed release capsule, Take 1 capsule (20 mg total) by mouth daily, Disp: 90 capsule, Rfl: 0    azithromycin (ZITHROMAX) 250 mg tablet, Take 2 tablets today then 1 tablet daily x 4 days, Disp: 6 tablet, Rfl: 0    Linaclotide (LINZESS) 145 MCG CAPS, Take 1 capsule (145 mcg total) by mouth daily (Patient not taking: Reported on 7/6/2021), Disp: 90 capsule, Rfl: 1    Current Allergies     Allergies as of 07/06/2021 - Reviewed 07/06/2021   Allergen Reaction Noted    Apple - food allergy Anaphylaxis 04/27/2016    Aspirin Anaphylaxis and Hives 10/16/2013    Eggs or egg-derived products - food allergy GI Intolerance 06/08/2016    Ibuprofen Anaphylaxis and Hives 10/16/2013    Nsaids Anaphylaxis 07/24/2017    Other Anaphylaxis 04/04/2016    Peanuts [peanut oil - food allergy] Anaphylaxis 04/27/2016    Penicillins Shortness Of Breath 10/12/2018    Pollen extract  08/05/2015    Codeine Anxiety 04/27/2016            The following portions of the patient's history were reviewed and updated as appropriate: allergies, current medications, past family history, past medical history, past social history, past surgical history and problem list      Past Medical History:   Diagnosis Date    Abdominal pain     Abnormal liver function test     last assessed: Oct 16, 2013     Abnormal weight loss     last assessed: Yung 15, 2014     Acute left-sided low back pain with left-sided sciatica 10/31/2019    Allergic rhinitis due to pollen     last assessed: Yung 15, 2014     Anxiety     Anxiety     last assessed/resolved:  Aug 5, 2015     Asthma     last assessed: Yung 15, 2014     Benign essential HTN     last assessed/resolved: Feb 23, 2017     Cervical lymphadenopathy     last assessed/resolved: Feb 23, 2017     Chronic sinusitis     last assessed: Yung 15, 2014     Constipation     Crohn's disease ([facility] [address] ) 01/01/2011    last assessed: Yung 15, 2014     Dysuria     last assessed: April 29, 2016     Elevated BP without diagnosis of hypertension     last assessed/resolved: Feb 23, 2017     Esophageal reflux     last assessed/resolved: Feb 23, 2017     Eustachian tube anomaly     Resolved: Nov 9, 2017     External hemorrhoids     last assessed: Yung 15, 2014     Hypertension     borderline    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     last assessed/resolved: July 19, 2017     Inflammatory bowel disease     Pancreatic neoplasm     last assessed: Yung 15, 2014     PONV (postoperative nausea and vomiting)     Positive depression screening     resolved: July 24, 2017     Postoperative ileus ([facility] [address] ) 11/12/2018    Psoriasis     Seasonal allergies     Small bowel obstruction ([facility] [address] ) 11/11/2018    Vitamin B12 deficiency     last assessed/resolved: Feb 23, 2017        Past Surgical History:   Procedure Laterality Date    CHOLECYSTECTOMY      resolved: 2011     COLONOSCOPY N/A 11/18/2016    Procedure: COLONOSCOPY;  Surgeon: Betty Roberts MD;  Location:  GI LAB; Service:     COLONOSCOPY N/A 4/28/2016    Procedure: COLONOSCOPY;  Surgeon: Betty Roberts MD;  Location: [facility] GI LAB; Service:     COLONOSCOPY  02/09/2021    ESOPHAGOGASTRODUODENOSCOPY N/A 4/28/2016    Procedure: ESOPHAGOGASTRODUODENOSCOPY (EGD); Surgeon: Betty Roberts MD;  Location: [facility] GI LAB; Service:     FRONTAL SINUSOTOMY      resolved: April 2009     NV COLONOSCOPY FLX DX W/[facility] REHABILITATION WHEN PFRMD N/A 10/12/2018    Procedure: EGD AND COLONOSCOPY;  Surgeon: Christopher Grimes MD;  Location: [facility] GI LAB;   Service: Gastroenterology    NV LAP, INCISIONAL HERNIA REPAIR,REDUCIBLE N/A 11/8/2018    Procedure: REPAIR HERNIA INCISIONAL LAPAROSCOPIC;  Surgeon: Jessica Caldwell MD;  [facility];  Service: General    NV REPAIR OF NASAL SEPTUM N/A 7/28/2017    Procedure: REVISION SEPTOPLASTY; TURBINOPLASTY; BILATERAL  GRAFTS; ALAR GRAFT; POSSIBLE AURICULAR CARTILAGE GRAFT;  Surgeon: Manjula Hawthorne MD;  Location: [facility];  Service: ENT    TONSILLECTOMY AND ADENOIDECTOMY      UPPER GASTROINTESTINAL ENDOSCOPY  02/09/2021       Family History   Problem Relation Age of Onset    Diabetes Mother         mellitus     Hypertension Mother     Thyroid disease Mother     Fibromyalgia Mother     Hypertension Father     Hypertension Sister     Heart disease Sister had defibulator put due to late beats    No Known Problems Maternal Grandmother     No Known Problems Maternal Grandfather     Diabetes Paternal Grandmother     Diabetes Paternal Grandfather     Diabetes Other         mellitus     Rheum arthritis Cousin          Medications have been verified  Objective   /94   Pulse 77   Temp 98 7 °F (37 1 °C)   Resp 18   Ht 5' 8" (1 727 m)   Wt 74 8 kg (165 lb)   SpO2 99%   BMI 25 09 kg/m²   No LMP for male patient  Physical Exam     Physical Exam  Constitutional:       General: He is not in acute distress  Appearance: Normal appearance  He is well-developed  HENT:      Head: Normocephalic and atraumatic  Right Ear: Hearing, ear canal and external ear normal  Tympanic membrane is erythematous and bulging  Left Ear: Hearing, tympanic membrane, ear canal and external ear normal       Nose:      Right Sinus: Maxillary sinus tenderness present  No frontal sinus tenderness  Left Sinus: No maxillary sinus tenderness or frontal sinus tenderness  Mouth/Throat:      Mouth: Mucous membranes are moist       Pharynx: Oropharynx is clear  Uvula midline  No oropharyngeal exudate or posterior oropharyngeal erythema  Tonsils: No tonsillar exudate or tonsillar abscesses  1+ on the right  1+ on the left  Cardiovascular:      Rate and Rhythm: Normal rate and regular rhythm  Heart sounds: Normal heart sounds, S1 normal and S2 normal    Pulmonary:      Effort: Pulmonary effort is normal       Breath sounds: Normal breath sounds and air entry  Lymphadenopathy:      Cervical: No cervical adenopathy  Skin:     General: Skin is warm and dry  Capillary Refill: Capillary refill takes less than 2 seconds  Neurological:      Mental Status: He is alert and oriented to person, place, and time

## 2021-07-06 NOTE — PATIENT INSTRUCTIONS
Take medication as directed  Recommend flonase  If you develop any increased pain, swelling, prolonged high fever, dizziness, or any new or concerning symptoms please return or proceed ER  Advised follow-up with PCP in 3 to 5 days  Otitis Media, Ambulatory Care   GENERAL INFORMATION:   Otitis media  is an ear infection  Common symptoms include the following:   · Fever or a headache    · Ear pain    · Trouble hearing    · Ear feels plugged or full or you have ringing or buzzing in your ear    · Dizziness or you lose your balance    · Nausea or vomiting  Seek immediate care for the following symptoms:   · Seizure    · Fever and a stiff neck  Treatment for otitis media  may include any of the following:  · NSAIDs  help decrease swelling and pain or fever  This medicine is available with or without a doctor's order  NSAIDs can cause stomach bleeding or kidney problems in certain people  If you take blood thinner medicine, always ask your healthcare provider if NSAIDs are safe for you  Always read the medicine label and follow directions  · Ear drops  to help treat your ear pain  · Antibiotics  to help kill the germs that caused your ear infection  Care for otitis media:   · Use heat  Place a warm, moist washcloth on your ear to decrease pain  Apply for 15 to 20 minutes, 3 to 4 times a day    · Use ice  Ice helps decrease swelling and pain  Use an ice pack or put crushed ice in a plastic bag  Cover the ice pack with a towel and place it on your ear for 15 to 20 minutes, 3 to 4 times a day for 2 days  Prevent otitis media:   · Wash your hands often  This will help prevent the spread of germs  Encourage everyone in your house to wash their hands with soap and water after they use the bathroom  Everyone should also wash their hands after they change a child's diaper and before they prepare or eat food  · Stay away from people who are ill  Germs are easily and quickly spread through contact    Follow up with your healthcare provider as directed:  Write down your questions so you remember to ask them during your visits  CARE AGREEMENT:   You have the right to help plan your care  Learn about your health condition and how it may be treated  Discuss treatment options with your caregivers to decide what care you want to receive  You always have the right to refuse treatment  The above information is an  only  It is not intended as medical advice for individual conditions or treatments  Talk to your doctor, nurse or pharmacist before following any medical regimen to see if it is safe and effective for you  © 2014 [address] is for End User's use only and may not be sold, redistributed or otherwise used for commercial purposes  All illustrations and images included in CareNotes® are the copyrighted property of [employer]  or Gerald Bazzi

## 2021-07-19 ENCOUNTER — OFFICE VISIT (OUTPATIENT)
Dept: INTERNAL MEDICINE CLINIC | Facility: CLINIC | Age: 41
End: 2021-07-19
Payer: MEDICARE

## 2021-07-19 VITALS
BODY MASS INDEX: 25.1 KG/M2 | DIASTOLIC BLOOD PRESSURE: 86 MMHG | SYSTOLIC BLOOD PRESSURE: 124 MMHG | OXYGEN SATURATION: 97 % | TEMPERATURE: 98.8 F | WEIGHT: 165.6 LBS | HEIGHT: 68 IN | RESPIRATION RATE: 18 BRPM | HEART RATE: 70 BPM

## 2021-07-19 DIAGNOSIS — J30.2 SEASONAL ALLERGIC RHINITIS, UNSPECIFIED TRIGGER: ICD-10-CM

## 2021-07-19 DIAGNOSIS — N40.0 ENLARGED PROSTATE: Primary | ICD-10-CM

## 2021-07-19 DIAGNOSIS — F17.200 SMOKING: ICD-10-CM

## 2021-07-19 DIAGNOSIS — Z00.00 MEDICARE ANNUAL WELLNESS VISIT, SUBSEQUENT: ICD-10-CM

## 2021-07-19 DIAGNOSIS — F41.9 ANXIETY: ICD-10-CM

## 2021-07-19 DIAGNOSIS — Z13.6 SCREENING FOR CARDIOVASCULAR CONDITION: ICD-10-CM

## 2021-07-19 DIAGNOSIS — J45.20 MILD INTERMITTENT EXTRINSIC ASTHMA WITHOUT COMPLICATION: ICD-10-CM

## 2021-07-19 DIAGNOSIS — K50.00 CROHN'S DISEASE INVOLVING TERMINAL ILEUM (HCC): ICD-10-CM

## 2021-07-19 DIAGNOSIS — Z13.1 SCREENING FOR DIABETES MELLITUS: ICD-10-CM

## 2021-07-19 PROBLEM — K29.90 GASTRITIS AND DUODENITIS: Status: RESOLVED | Noted: 2021-06-14 | Resolved: 2021-07-19

## 2021-07-19 PROCEDURE — G0439 PPPS, SUBSEQ VISIT: HCPCS | Performed by: INTERNAL MEDICINE

## 2021-07-19 PROCEDURE — 99214 OFFICE O/P EST MOD 30 MIN: CPT | Performed by: INTERNAL MEDICINE

## 2021-07-19 NOTE — PROGRESS NOTES
Assessment and Plan:     Problem List Items Addressed This Visit     None      Visit Diagnoses     Medicare annual wellness visit, subsequent        Screening for diabetes mellitus        Screening for cardiovascular condition            BMI Counseling: Body mass index is 25 18 kg/m²  The BMI is above normal  Nutrition recommendations include encouraging healthy choices of fruits and vegetables, decreasing fast food intake, consuming healthier snacks, limiting drinks that contain sugar, moderation in carbohydrate intake, increasing intake of lean protein, reducing intake of saturated and trans fat and reducing intake of cholesterol  Exercise recommendations include moderate physical activity 150 minutes/week and exercising 3-5 times per week  No pharmacotherapy was ordered  Patient referred to PCP       Depression Screening and Follow-up Plan: Patient's depression screening was positive with a PHQ-2 score of 0  Clincally patient does not have depression  No treatment is required  Patient advised to follow-up with PCP for further management  Tobacco Cessation Counseling: Tobacco cessation counseling was provided  The patient is sincerely urged to quit consumption of tobacco  He is not ready to quit tobacco  Medication options and side effects of medication discussed  Patient refused medication  Preventive health issues were discussed with patient, and age appropriate screening tests were ordered as noted in patient's After Visit Summary  Personalized health advice and appropriate referrals for health education or preventive services given if needed, as noted in patient's After Visit Summary       History of Present Illness:     Patient presents for Medicare Annual Wellness visit    Patient Care Team:  Sabino Ugarte MD as PCP - General (Internal Medicine)  Nicole Scheuermann, PA-C as Advanced Practitioner  SUHAIL Da Silva MD as Endoscopist     Problem List:     Patient Active Problem List Diagnosis    Anxiety    Allergic rhinitis    Deviated septum    Crohn's disease involving terminal ileum ([address] )    Retention cyst of nasal sinus    Chronic midline thoracic back pain    Allergic asthma    S/P repair of ventral hernia    Functional abdominal pain syndrome    BMI 25 0-25 9,adult    Chronic headaches    Neurological deficit present    Degenerative disc disease, lumbar    Chronic midline low back pain without sciatica    Family history of heart disease    Smoking    Gastritis and duodenitis      Past Medical and Surgical History:     Past Medical History:   Diagnosis Date    Abdominal pain     Abnormal liver function test     last assessed: Oct 16, 2013     Abnormal weight loss     last assessed: Yung 15, 2014     Acute left-sided low back pain with left-sided sciatica 10/31/2019    Allergic rhinitis due to pollen     last assessed: Yung 15, 2014     Anxiety     Anxiety     last assessed/resolved:  Aug 5, 2015     Asthma     last assessed: Yung 15, 2014     Benign essential HTN     last assessed/resolved: Feb 23, 2017     Cervical lymphadenopathy     last assessed/resolved: Feb 23, 2017     Chronic sinusitis     last assessed: Yung 15, 2014     Constipation     [address]'s disease ([facility] [address] ) 01/01/2011    last assessed: Yung 15, 2014     Dysuria     last assessed: April 29, 2016     Elevated BP without diagnosis of hypertension     last assessed/resolved: Feb 23, 2017     Esophageal reflux     last assessed/resolved: Feb 23, 2017     Eustachian tube anomaly     Resolved: Nov 9, 2017     External hemorrhoids     last assessed: Yung 15, 2014     Hypertension     borderline    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     last assessed/resolved: July 19, 2017     Inflammatory bowel disease     Pancreatic neoplasm     last assessed: Yung 15, 2014     PONV (postoperative nausea and vomiting)     Positive depression screening     resolved: July 24, 2017     Postoperative ileus ([facility] [address] ) 11/12/2018    Psoriasis     Seasonal allergies     Small bowel obstruction ([facility] [address] ) 11/11/2018    Vitamin B12 deficiency     last assessed/resolved: Feb 23, 2017      Past Surgical History:   Procedure Laterality Date    CHOLECYSTECTOMY      resolved: 2011     COLONOSCOPY N/A 11/18/2016    Procedure: COLONOSCOPY;  Surgeon: Hermes Casey MD;  [facility]: [facility]; Service:     COLONOSCOPY N/A 4/28/2016    Procedure: COLONOSCOPY;  Surgeon: Hermes Casey MD;  Location: [facility]; Service:     COLONOSCOPY  02/09/2021    ESOPHAGOGASTRODUODENOSCOPY N/A 4/28/2016    Procedure: ESOPHAGOGASTRODUODENOSCOPY (EGD); Surgeon: Hermes Casey MD;  Location: [facility]; Service:     FRONTAL SINUSOTOMY      resolved: April 2009     NV COLONOSCOPY FLX DX W/[facility] WHEN PFRMD N/A 10/12/2018    Procedure: EGD AND COLONOSCOPY;  Surgeon: José Luis Beauchamp MD;  Location: [facility];   Service: Gastroenterology    NV LAP, INCISIONAL HERNIA REPAIR,REDUCIBLE N/A 11/8/2018    Procedure: REPAIR HERNIA INCISIONAL LAPAROSCOPIC;  Surgeon: Pierre Johnson MD;  [facility];  Service: General    NV REPAIR OF NASAL SEPTUM N/A 7/28/2017    Procedure: REVISION SEPTOPLASTY; TURBINOPLASTY; BILATERAL  GRAFTS; ALAR GRAFT; POSSIBLE AURICULAR CARTILAGE GRAFT;  Surgeon: Adi Fletcher MD;  [facility];  Service: ENT    TONSILLECTOMY AND ADENOIDECTOMY      UPPER GASTROINTESTINAL ENDOSCOPY  02/09/2021      Family History:     Family History   Problem Relation Age of Onset    Diabetes Mother         mellitus     Hypertension Mother     Thyroid disease Mother     Fibromyalgia Mother     Hypertension Father     Hypertension Sister     Heart disease Sister         had defibulator put due to late beats    No Known Problems Maternal Grandmother     No Known Problems Maternal Grandfather     Diabetes Paternal Grandmother     Diabetes Paternal Grandfather     Kidney failure Paternal Grandfather     Diabetes Other         mellitus     Rheum arthritis Cousin       Social History:     Social History     Socioeconomic History    Marital status: /Civil Union     Spouse name: None    Number of children: None    Years of education: None    Highest education level: None   Occupational History    Occupation:     Tobacco Use    Smoking status: Current Every Day Smoker     Packs/day: 0 50     Types: Cigarettes    Smokeless tobacco: Never Used    Tobacco comment: Dependence on nicotine in cigarettes - 1/2 PPD x 20 years    Vaping Use    Vaping Use: Some days    Substances: THC, CBD   Substance and Sexual Activity    Alcohol use: Not Currently     Comment: John consumes alcohol noted in "allscripts"     Drug use: Yes     Types: Marijuana     Comment: uses medical marijuana via vaping  last use 2/8/19    Sexual activity: Yes   Other Topics Concern    None   Social History Narrative    Single noted in "allscripts"      Social Determinants of Health     Financial Resource Strain:     Difficulty of Paying Living Expenses:    Food Insecurity:     Worried About Running Out of Food in the Last Year:     [address] in the Last Year:    Transportation Needs:     Lack of Transportation (Medical):      Lack of Transportation (Non-Medical):    Physical Activity:     Days of Exercise per Week:     Minutes of Exercise per Session:    Stress:     Feeling of Stress :    Social Connections:     Frequency of Communication with Friends and Family:     Frequency of Social Gatherings with Friends and Family:     Attends Zoroastrianism Services:     Active Member of Clubs or Organizations:     Attends Club or Organization Meetings:     Marital Status:    Intimate Partner Violence:     Fear of Current or Ex-Partner:     Emotionally Abused:     Physically Abused:     Sexually Abused:       Medications and Allergies:     Current Outpatient Medications   Medication Sig Dispense Refill    acetaminophen (TYLENOL) 500 mg tablet Take 1,000 mg by mouth every 4 (four) hours as needed       cetirizine (ZyrTEC) 10 mg tablet Take 1 tablet (10 mg total) by mouth daily as needed for allergies 30 tablet 5    clonazePAM (KlonoPIN) 0 5 mg tablet Take 1 tablet (0 5 mg total) by mouth every 12 (twelve) hours as needed for anxiety 60 tablet 1    diphenhydrAMINE (BENADRYL) 25 mg tablet Take 25 mg by mouth as needed        fluticasone (FLONASE) 50 mcg/act nasal spray 1 spray into each nostril daily as needed for rhinitis 3 Bottle 1    Linaclotide (LINZESS) 145 MCG CAPS Take 1 capsule (145 mcg total) by mouth daily (Patient taking differently: Take 145 mcg by mouth as needed ) 90 capsule 1    NON FORMULARY       omeprazole (PriLOSEC) 20 mg delayed release capsule Take 1 capsule (20 mg total) by mouth daily 90 capsule 0     No current facility-administered medications for this visit  Allergies   Allergen Reactions    Apple - Food Allergy Anaphylaxis    Aspirin Anaphylaxis and Hives     Category: Allergy;     Eggs Or Egg-Derived Products - Food Allergy GI Intolerance     GI upset    Ibuprofen Anaphylaxis and Hives     Category: Allergy; aspirin    Nsaids Anaphylaxis     Category: Allergy;     Other Anaphylaxis     Category: Allergy;  Annotation - [id number]: cherries, grapes , and kiwis; pt states all fruits    Peanuts [Peanut Oil - Food Allergy] Anaphylaxis     nuts    Penicillins Shortness Of Breath    Pollen Extract     Codeine Anxiety      Immunizations:     Immunization History   Administered Date(s) Administered    DTaP 5 1981      Health Maintenance:         Topic Date Due    Colorectal Cancer Screening  02/09/2026    HIV Screening  Completed    Hepatitis C Screening  Completed         Topic Date Due    Pneumococcal Vaccine: Pediatrics (0 to 5 Years) and At-Risk Patients (6 to 59 Years) (1 of 2 - PPSV23) Never done    COVID-19 Vaccine (1) Never done    DTaP,Tdap,and Td Vaccines (1 - Tdap) 04/11/2002    Influenza Vaccine (1) 09/01/2021      Medicare Health Risk Assessment:     /86 (BP Location: Left arm, Patient Position: Sitting, Cuff Size: Standard)   Pulse 70   Temp 98 8 °F (37 1 °C) (Temporal)   Resp 18   Ht 5' 8" (1 727 m)   Wt 75 1 kg (165 lb 9 6 oz)   SpO2 97%   BMI 25 18 kg/m²      Jay Roy is here for his Subsequent Wellness visit  Last Medicare Wellness visit information reviewed, patient interviewed and updates made to the record today  Health Risk Assessment:   Patient rates overall health as fair  Patient feels that their physical health rating is same  Patient is satisfied with their life  Eyesight was rated as same  Hearing was rated as same  Patient feels that their emotional and mental health rating is same  Patients states they are sometimes angry  Patient states they are sometimes unusually tired/fatigued  Pain experienced in the last 7 days has been a lot  Patient's pain rating has been 6/10  Patient states that he has experienced no weight loss or gain in last 6 months  Depression Screening:   PHQ-2 Score: 0      Fall Risk Screening: In the past year, patient has experienced: no history of falling in past year      Home Safety:  Patient does not have trouble with stairs inside or outside of their home  Patient has working smoke alarms and has working carbon monoxide detector  Home safety hazards include: none  Nutrition:   Current diet is Regular  Medications:   Patient is currently taking over-the-counter supplements  OTC medications include: see medication list  Patient is able to manage medications  Activities of Daily Living (ADLs)/Instrumental Activities of Daily Living (IADLs):   Walk and transfer into and out of bed and chair?: Yes  Dress and groom yourself?: Yes    Bathe or shower yourself?: Yes    Feed yourself?  Yes  Do your laundry/housekeeping?: Yes  Manage your money, pay your bills and track your expenses?: Yes  Make your own meals?: No    Do your own shopping?: Yes    Previous Hospitalizations:   Any hospitalizations or ED visits within the last 12 months?: Yes    How many hospitalizations have you had in the last year?: 1-2    Advance Care Planning:   Living will: No    Advanced directive: No    Advanced directive counseling given: Yes    End of Life Decisions reviewed with patient: Yes    Provider agrees with end of life decisions: Yes      Comments: Discussed with patient: FULL CODE  PREVENTIVE SCREENINGS      Cardiovascular Screening:    General: Screening Current and Risks and Benefits Discussed      Diabetes Screening:     General: Screening Current and Risks and Benefits Discussed      Colorectal Cancer Screening:     General: Screening Current and Risks and Benefits Discussed      Prostate Cancer Screening:    General: Screening Not Indicated      Osteoporosis Screening:    General: Screening Not Indicated      Abdominal Aortic Aneurysm (AAA) Screening:    Risk factors include: tobacco use        General: Screening Not Indicated      Lung Cancer Screening:     General: Screening Not Indicated      Hepatitis C Screening:    General: Screening Current    Screening, Brief Intervention, and Referral to Treatment (SBIRT)    Screening  Typical number of drinks in a day: 0  Typical number of drinks in a week: 0  Interpretation: Low risk drinking behavior  Single Item Drug Screening:  How often have you used an illegal drug (including marijuana) or a prescription medication for non-medical reasons in the past year? never    Single Item Drug Screen Score: 0  Interpretation: Negative screen for possible drug use disorder    Brief Intervention  Alcohol & drug use screenings were reviewed  No concerns regarding substance use disorder identified  Healthy alcohol use/limits discussed  Other Counseling Topics:   Car/seat belt/driving safety, skin self-exam, sunscreen and calcium and vitamin D intake and regular weightbearing exercise  Matilda Hodge MD

## 2021-07-19 NOTE — PROGRESS NOTES
Assessment/Plan:    Crohn's disease involving terminal ileum ([facility] )  Continue follow-up with Gastroenterology  Continue Linzess as as needed for constipation  Discussed dietary modifications  Continue omeprazole 20 mg daily  Allergic rhinitis  Stable, controlled  Continue Zyrtec and Flonase as needed  Allergic asthma  No recent exacerbations  Anxiety  Controlled  Continue Klonopin 0 5 mg twice a day as needed  Smoking  Counseled patient smoking cessation  Currently not ready to quit  Diagnoses and all orders for this visit:    Enlarged prostate    Medicare annual wellness visit, subsequent    Screening for diabetes mellitus    Screening for cardiovascular condition    Seasonal allergic rhinitis, unspecified trigger    Crohn's disease involving terminal ileum ([facility] )    Mild intermittent extrinsic asthma without complication    Anxiety    BMI 25 0-25 9,adult    Smoking                    Subjective:      Patient ID: Mellissa Rios is a 37 y o  male  Chief Complaint   Patient presents with    Follow-up     1 month,  right ear still feeling funny was at urgent care on 7/6/21 and had ear infection    Medicare Wellness Visit     no HM due    blood work     had BW done 6/24/21 and still in process? ?       32-year-old male seen today for follow-up of chronic conditions  No laboratory studies to review today  He recently underwent colonoscopy with gastroenterologist, biopsies did show gastritis  He continues to experience right lower quadrant abdominal discomfort  He denies any a melena or hematochezia  He has upcoming appointment with his gastroenterologist   Anxiety:  Controlled  He takes Klonopin 0 5 mg twice a day as needed to which she often needs daily  Otherwise, he has no active complaints or concerns at this time  Anxiety  Presents for follow-up visit  Patient reports no chest pain, dizziness, nausea, palpitations, shortness of breath or suicidal ideas   Symptoms occur occasionally  The severity of symptoms is mild  The patient sleeps 8 hours per night  The quality of sleep is fair  Compliance with medications is %  Abdominal Pain  This is a chronic problem  The current episode started more than 1 year ago  The onset quality is sudden  The problem occurs intermittently  The problem has been unchanged  The pain is located in the RLQ  Pertinent negatives include no constipation, diarrhea, dysuria, fever, headaches, hematuria, nausea or vomiting  The following portions of the patient's history were reviewed and updated as appropriate: allergies, current medications, past family history, past medical history, past social history, past surgical history and problem list     Review of Systems   Constitutional: Negative for activity change, appetite change, chills, diaphoresis, fatigue and fever  HENT: Negative for congestion, postnasal drip, rhinorrhea, sinus pressure, sinus pain, sneezing and sore throat  Eyes: Negative for visual disturbance  Respiratory: Negative for apnea, cough, choking, chest tightness, shortness of breath and wheezing  Cardiovascular: Negative for chest pain, palpitations and leg swelling  Gastrointestinal: Negative for abdominal distention, abdominal pain, anal bleeding, blood in stool, constipation, diarrhea, nausea and vomiting  Endocrine: Negative for cold intolerance and heat intolerance  Genitourinary: Negative for difficulty urinating, dysuria and hematuria  Musculoskeletal: Negative  Skin: Negative  Neurological: Negative for dizziness, weakness, light-headedness, numbness and headaches  Hematological: Negative for adenopathy  Psychiatric/Behavioral: Negative for agitation, sleep disturbance and suicidal ideas  All other systems reviewed and are negative  Past Medical History:   Diagnosis Date    Abdominal pain     Abnormal liver function test     last assessed:  Oct 16, 2013     Abnormal weight loss last assessed: Yung 15, 2014     Acute left-sided low back pain with left-sided sciatica 10/31/2019    Allergic rhinitis due to pollen     last assessed: Yung 15, 2014     Anxiety     Anxiety     last assessed/resolved:  Aug 5, 2015     Asthma     last assessed: Yung 15, 2014     Benign essential HTN     last assessed/resolved: Feb 23, 2017     Cervical lymphadenopathy     last assessed/resolved: Feb 23, 2017     Chronic sinusitis     last assessed: Yung 15, 2014     Constipation     Crohn's disease ([facility] ) 01/01/2011    last assessed: Yung 15, 2014     Dysuria     last assessed: April 29, 2016     Elevated BP without diagnosis of hypertension     last assessed/resolved: Feb 23, 2017     Esophageal reflux     last assessed/resolved: Feb 23, 2017     Eustachian tube anomaly     Resolved: Nov 9, 2017     External hemorrhoids     last assessed: Yung 15, 2014     Gastritis and duodenitis 6/14/2021    Hypertension     borderline    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     last assessed/resolved: July 19, 2017     Inflammatory bowel disease     Pancreatic neoplasm     last assessed: Yung 15, 2014     PONV (postoperative nausea and vomiting)     Positive depression screening     resolved: July 24, 2017     Postoperative ileus ([place] [address] ) 11/12/2018    Psoriasis     Seasonal allergies     Small bowel obstruction ([place] [address] ) 11/11/2018    Vitamin B12 deficiency     last assessed/resolved: Feb 23, 2017          Current Outpatient Medications:     acetaminophen (TYLENOL) 500 mg tablet, Take 1,000 mg by mouth every 4 (four) hours as needed , Disp: , Rfl:     cetirizine (ZyrTEC) 10 mg tablet, Take 1 tablet (10 mg total) by mouth daily as needed for allergies, Disp: 30 tablet, Rfl: 5    clonazePAM (KlonoPIN) 0 5 mg tablet, Take 1 tablet (0 5 mg total) by mouth every 12 (twelve) hours as needed for anxiety, Disp: 60 tablet, Rfl: 1    diphenhydrAMINE (BENADRYL) 25 mg tablet, Take 25 mg by mouth as needed  , Disp: , Rfl:   fluticasone (FLONASE) 50 mcg/act nasal spray, 1 spray into each nostril daily as needed for rhinitis, Disp: 3 Bottle, Rfl: 1    Linaclotide (LINZESS) 145 MCG CAPS, Take 1 capsule (145 mcg total) by mouth daily (Patient taking differently: Take 145 mcg by mouth as needed ), Disp: 90 capsule, Rfl: 1    NON FORMULARY, , Disp: , Rfl:     omeprazole (PriLOSEC) 20 mg delayed release capsule, Take 1 capsule (20 mg total) by mouth daily, Disp: 90 capsule, Rfl: 0    Allergies   Allergen Reactions    Apple - Food Allergy Anaphylaxis    Aspirin Anaphylaxis and Hives     Category: Allergy;     Eggs Or Egg-Derived Products - Food Allergy GI Intolerance     GI upset    Ibuprofen Anaphylaxis and Hives     Category: Allergy; aspirin    Nsaids Anaphylaxis     Category: Allergy;     Other Anaphylaxis     Category: Allergy; Annotation - [id number]: cherries, grapes , and kiwis; pt states all fruits    Peanuts [Peanut Oil - Food Allergy] Anaphylaxis     nuts    Penicillins Shortness Of Breath    Pollen Extract     Codeine Anxiety       Social History   Past Surgical History:   Procedure Laterality Date    CHOLECYSTECTOMY      resolved: 2011     COLONOSCOPY N/A 11/18/2016    Procedure: COLONOSCOPY;  Surgeon: Liyah Baker MD;  Location:  GI LAB; Service:     COLONOSCOPY N/A 4/28/2016    Procedure: COLONOSCOPY;  Surgeon: Liyah Baker MD;  Location: [facility]; Service:     COLONOSCOPY  02/09/2021    ESOPHAGOGASTRODUODENOSCOPY N/A 4/28/2016    Procedure: ESOPHAGOGASTRODUODENOSCOPY (EGD); Surgeon: Liyah Baker MD;  Location: [facility] GI LAB; Service:     FRONTAL SINUSOTOMY      resolved: April 2009     NH COLONOSCOPY FLX DX W/COLLJ [facility] WHEN PFRMD N/A 10/12/2018    Procedure: EGD AND COLONOSCOPY;  Surgeon: Zaida Cristobal MD;  Location: [facility];   Service: Gastroenterology    NH LAP, INCISIONAL HERNIA REPAIR,REDUCIBLE N/A 11/8/2018    Procedure: REPAIR HERNIA INCISIONAL LAPAROSCOPIC;  Surgeon: Marisela Marte Ori Zheng MD;  Location: [facility];  Service: General    AL REPAIR OF NASAL SEPTUM N/A 7/28/2017    Procedure: REVISION SEPTOPLASTY; TURBINOPLASTY; BILATERAL  GRAFTS; ALAR GRAFT; POSSIBLE AURICULAR CARTILAGE GRAFT;  Surgeon: Erik Carreno MD;  Location: [facility];  Service: ENT    TONSILLECTOMY AND ADENOIDECTOMY      UPPER GASTROINTESTINAL ENDOSCOPY  02/09/2021     Family History   Problem Relation Age of Onset    Diabetes Mother         mellitus     Hypertension Mother     Thyroid disease Mother     Fibromyalgia Mother     Hypertension Father     Hypertension Sister     Heart disease Sister         had defibulator put due to late beats    No Known Problems Maternal Grandmother     No Known Problems Maternal Grandfather     Diabetes Paternal Grandmother     Diabetes Paternal Grandfather     Kidney failure Paternal Grandfather     Diabetes Other         mellitus     Rheum arthritis Cousin        Objective:  /86 (BP Location: Left arm, Patient Position: Sitting, Cuff Size: Standard)   Pulse 70   Temp 98 8 °F (37 1 °C) (Temporal)   Resp 18   Ht 5' 8" (1 727 m)   Wt 75 1 kg (165 lb 9 6 oz)   SpO2 97%   BMI 25 18 kg/m²     No results found for this or any previous visit (from the past 1344 hour(s))  Physical Exam  Vitals and nursing note reviewed  Constitutional:       General: He is not in acute distress  Appearance: He is well-developed  He is not diaphoretic  HENT:      Head: Normocephalic and atraumatic  Eyes:      General: No scleral icterus  Right eye: No discharge  Left eye: No discharge  Conjunctiva/sclera: Conjunctivae normal       Pupils: Pupils are equal, round, and reactive to light  Neck:      Thyroid: No thyromegaly  Vascular: No JVD  Cardiovascular:      Rate and Rhythm: Normal rate and regular rhythm  Heart sounds: Normal heart sounds  No murmur heard  No friction rub  No gallop      Pulmonary:      Effort: Pulmonary effort is normal  No respiratory distress  Breath sounds: Normal breath sounds  No wheezing or rales  Chest:      Chest wall: No tenderness  Abdominal:      General: Bowel sounds are normal  There is no distension  Palpations: Abdomen is soft  There is no mass  Tenderness: There is no abdominal tenderness  There is no guarding or rebound  Musculoskeletal:         General: No tenderness or deformity  Normal range of motion  Cervical back: Normal range of motion and neck supple  Lymphadenopathy:      Cervical: No cervical adenopathy  Skin:     General: Skin is warm and dry  Coloration: Skin is not pale  Findings: No erythema or rash  Neurological:      Mental Status: He is alert and oriented to person, place, and time  Cranial Nerves: No cranial nerve deficit  Coordination: Coordination normal       Deep Tendon Reflexes: Reflexes are normal and symmetric  Psychiatric:         Behavior: Behavior normal          Thought Content:  Thought content normal          Judgment: Judgment normal

## 2021-07-19 NOTE — ASSESSMENT & PLAN NOTE
Continue follow-up with Gastroenterology  Continue Linzess as as needed for constipation  Discussed dietary modifications  Continue omeprazole 20 mg daily

## 2021-07-19 NOTE — PATIENT INSTRUCTIONS
Medicare Preventive Visit Patient Instructions  Thank you for completing your Welcome to Medicare Visit or Medicare Annual Wellness Visit today  Your next wellness visit will be due in one year (7/20/2022)  The screening/preventive services that you may require over the next 5-10 years are detailed below  Some tests may not apply to you based off risk factors and/or age  Screening tests ordered at today's visit but not completed yet may show as past due  Also, please note that scanned in results may not display below  Preventive Screenings:  Service Recommendations Previous Testing/Comments   Colorectal Cancer Screening  · Colonoscopy    · Fecal Occult Blood Test (FOBT)/Fecal Immunochemical Test (FIT)  · Fecal DNA/Cologuard Test  · Flexible Sigmoidoscopy Age: 54-65 years old   Colonoscopy: every 10 years (May be performed more frequently if at higher risk)  OR  FOBT/FIT: every 1 year  OR  Cologuard: every 3 years  OR  Sigmoidoscopy: every 5 years  Screening may be recommended earlier than age 48 if at higher risk for colorectal cancer  Also, an individualized decision between you and your healthcare provider will decide whether screening between the ages of 74-80 would be appropriate   Colonoscopy: 02/09/2021  FOBT/FIT: Not on file  Cologuard: Not on file  Sigmoidoscopy: Not on file    Screening Current     Prostate Cancer Screening Individualized decision between patient and health care provider in men between ages of 53-78   Medicare will cover every 12 months beginning on the day after your 50th birthday PSA: No results in last 5 years     Screening Not Indicated     Hepatitis C Screening Once for adults born between [birth year] and 1965  More frequently in patients at high risk for Hepatitis C Hep C Antibody: Not on file    Screening Current   Diabetes Screening 1-2 times per year if you're at risk for diabetes or have pre-diabetes Fasting glucose: 100 mg/dL   A1C: No results in last 5 years    Screening Current Cholesterol Screening Once every 5 years if you don't have a lipid disorder  May order more often based on risk factors  Lipid panel: 02/04/2021    Screening Current      Other Preventive Screenings Covered by Medicare:  1  Abdominal Aortic Aneurysm (AAA) Screening: covered once if your at risk  You're considered to be at risk if you have a family history of AAA or a male between the age of 73-68 who smoking at least 100 cigarettes in your lifetime  2  Lung Cancer Screening: covers low dose CT scan once per year if you meet all of the following conditions: (1) Age 50-69; (2) No signs or symptoms of lung cancer; (3) Current smoker or have quit smoking within the last 15 years; (4) You have a tobacco smoking history of at least 30 pack years (packs per day x number of years you smoked); (5) You get a written order from a healthcare provider  3  Glaucoma Screening: covered annually if you're considered high risk: (1) You have diabetes OR (2) Family history of glaucoma OR (3)  aged 48 and older OR (3)  American aged 72 and older  3  Osteoporosis Screening: covered every 2 years if you meet one of the following conditions: (1) Have a vertebral abnormality; (2) On glucocorticoid therapy for more than 3 months; (3) Have primary hyperparathyroidism; (4) On osteoporosis medications and need to assess response to drug therapy  5  HIV Screening: covered annually if you're between the age of 12-76  Also covered annually if you are younger than 13 and older than 72 with risk factors for HIV infection  For pregnant patients, it is covered up to 3 times per pregnancy      Immunizations:  Immunization Recommendations   Influenza Vaccine Annual influenza vaccination during flu season is recommended for all persons aged >= 6 months who do not have contraindications   Pneumococcal Vaccine (Prevnar and Pneumovax)  * Prevnar = PCV13  * Pneumovax = PPSV23 Adults 25-60 years old: 1-3 doses may be recommended based on certain risk factors  Adults 72 years old: Prevnar (PCV13) vaccine recommended followed by Pneumovax (PPSV23) vaccine  If already received PPSV23 since turning 65, then PCV13 recommended at least one year after PPSV23 dose  Hepatitis B Vaccine 3 dose series if at intermediate or high risk (ex: diabetes, end stage renal disease, liver disease)   Tetanus (Td) Vaccine - COST NOT COVERED BY MEDICARE PART B Following completion of primary series, a booster dose should be given every 10 years to maintain immunity against tetanus  Td may also be given as tetanus wound prophylaxis  Tdap Vaccine - COST NOT COVERED BY MEDICARE PART B Recommended at least once for all adults  For pregnant patients, recommended with each pregnancy  Shingles Vaccine (Shingrix) - COST NOT COVERED BY MEDICARE PART B  2 shot series recommended in those aged 48 and above     Health Maintenance Due:      Topic Date Due    Colorectal Cancer Screening  02/09/2026    HIV Screening  Completed    Hepatitis C Screening  Completed     Immunizations Due:      Topic Date Due    Pneumococcal Vaccine: Pediatrics (0 to 5 Years) and At-Risk Patients (6 to 59 Years) (1 of 2 - PPSV23) Never done    COVID-19 Vaccine (1) Never done    DTaP,Tdap,and Td Vaccines (1 - Tdap) 04/11/2002    Influenza Vaccine (1) 09/01/2021     Advance Directives   What are advance directives? Advance directives are legal documents that state your wishes and plans for medical care  These plans are made ahead of time in case you lose your ability to make decisions for yourself  Advance directives can apply to any medical decision, such as the treatments you want, and if you want to donate organs  What are the types of advance directives? There are many types of advance directives, and each state has rules about how to use them  You may choose a combination of any of the following:  · Living will: This is a written record of the treatment you want   You can also choose which treatments you do not want, which to limit, and which to stop at a certain time  This includes surgery, medicine, IV fluid, and tube feedings  · Durable power of  for healthcare [place] [facility]): This is a written record that states who you want to make healthcare choices for you when you are unable to make them for yourself  This person, called a proxy, is usually a family member or a friend  You may choose more than 1 proxy  · Do not resuscitate (DNR) order:  A DNR order is used in case your heart stops beating or you stop breathing  It is a request not to have certain forms of treatment, such as CPR  A DNR order may be included in other types of advance directives  · Medical directive: This covers the care that you want if you are in a coma, near death, or unable to make decisions for yourself  You can list the treatments you want for each condition  Treatment may include pain medicine, surgery, blood transfusions, dialysis, IV or tube feedings, and a ventilator (breathing machine)  · Values history: This document has questions about your views, beliefs, and how you feel and think about life  This information can help others choose the care that you would choose  Why are advance directives important? An advance directive helps you control your care  Although spoken wishes may be used, it is better to have your wishes written down  Spoken wishes can be misunderstood, or not followed  Treatments may be given even if you do not want them  An advance directive may make it easier for your family to make difficult choices about your care  Cigarette Smoking and Your Health   Risks to your health if you smoke:  Nicotine and other chemicals found in tobacco damage every cell in your body  Even if you are a light smoker, you have an increased risk for cancer, heart disease, and lung disease  If you are pregnant or have diabetes, smoking increases your risk for complications     Benefits to your health if you stop smoking:   · You decrease respiratory symptoms such as coughing, wheezing, and shortness of breath  · You reduce your risk for cancers of the lung, mouth, throat, kidney, bladder, pancreas, stomach, and cervix  If you already have cancer, you increase the benefits of chemotherapy  You also reduce your risk for cancer returning or a second cancer from developing  · You reduce your risk for heart disease, blood clots, heart attack, and stroke  · You reduce your risk for lung infections, and diseases such as pneumonia, asthma, chronic bronchitis, and emphysema  · Your circulation improves  More oxygen can be delivered to your body  If you have diabetes, you lower your risk for complications, such as kidney, artery, and eye diseases  You also lower your risk for nerve damage  Nerve damage can lead to amputations, poor vision, and blindness  · You improve your body's ability to heal and to fight infections  For more information and support to stop smoking:   · [employer]  Phone: 6- 020 - 687-8703  Web Address: [employer]  Weight Management   Why it is important to manage your weight:  Being overweight increases your risk of health conditions such as heart disease, high blood pressure, type 2 diabetes, and certain types of cancer  It can also increase your risk for osteoarthritis, sleep apnea, and other respiratory problems  Aim for a slow, steady weight loss  Even a small amount of weight loss can lower your risk of health problems  How to lose weight safely:  A safe and healthy way to lose weight is to eat fewer calories and get regular exercise  You can lose up about 1 pound a week by decreasing the number of calories you eat by 500 calories each day  Healthy meal plan for weight management:  A healthy meal plan includes a variety of foods, contains fewer calories, and helps you stay healthy  A healthy meal plan includes the following:  · Eat whole-grain foods more often    A healthy meal plan should contain fiber  Fiber is the part of grains, fruits, and vegetables that is not broken down by your body  Whole-grain foods are healthy and provide extra fiber in your diet  Some examples of whole-grain foods are whole-wheat breads and pastas, oatmeal, brown rice, and bulgur  · Eat a variety of vegetables every day  Include dark, leafy greens such as spinach, kale, aakash greens, and mustard greens  Eat yellow and orange vegetables such as carrots, sweet potatoes, and winter squash  · Eat a variety of fruits every day  Choose fresh or canned fruit (canned in its own juice or light syrup) instead of juice  Fruit juice has very little or no fiber  · Eat low-fat dairy foods  Drink fat-free (skim) milk or 1% milk  Eat fat-free yogurt and low-fat cottage cheese  Try low-fat cheeses such as mozzarella and other reduced-fat cheeses  · Choose meat and other protein foods that are low in fat  Choose beans or other legumes such as split peas or lentils  Choose fish, skinless poultry (chicken or turkey), or lean cuts of red meat (beef or pork)  Before you cook meat or poultry, cut off any visible fat  · Use less fat and oil  Try baking foods instead of frying them  Add less fat, such as margarine, sour cream, regular salad dressing and mayonnaise to foods  Eat fewer high-fat foods  Some examples of high-fat foods include french fries, doughnuts, ice cream, and cakes  · Eat fewer sweets  Limit foods and drinks that are high in sugar  This includes candy, cookies, regular soda, and sweetened drinks  Exercise:  Exercise at least 30 minutes per day on most days of the week  Some examples of exercise include walking, biking, dancing, and swimming  You can also fit in more physical activity by taking the stairs instead of the elevator or parking farther away from stores  Ask your healthcare provider about the best exercise plan for you        © Copyright [employer] 2018 Information is for End User's use only and may not be sold, redistributed or otherwise used for commercial purposes   All illustrations and images included in CareNotes® are the copyrighted property of [employer]  or [employer] Lazo

## 2021-07-27 LAB — MISCELLANEOUS LAB TEST RESULT: NORMAL

## 2021-08-14 ENCOUNTER — APPOINTMENT (EMERGENCY)
Dept: CT IMAGING | Facility: HOSPITAL | Age: 41
End: 2021-08-14
Payer: MEDICARE

## 2021-08-14 ENCOUNTER — HOSPITAL ENCOUNTER (EMERGENCY)
Facility: HOSPITAL | Age: 41
Discharge: HOME | End: 2021-08-14
Attending: EMERGENCY MEDICINE | Admitting: EMERGENCY MEDICINE
Payer: MEDICARE

## 2021-08-14 ENCOUNTER — APPOINTMENT (EMERGENCY)
Dept: RADIOLOGY | Facility: HOSPITAL | Age: 41
End: 2021-08-14
Payer: MEDICARE

## 2021-08-14 VITALS
HEART RATE: 62 BPM | BODY MASS INDEX: 25.01 KG/M2 | WEIGHT: 165 LBS | HEIGHT: 68 IN | OXYGEN SATURATION: 98 % | DIASTOLIC BLOOD PRESSURE: 81 MMHG | SYSTOLIC BLOOD PRESSURE: 121 MMHG | TEMPERATURE: 97 F | RESPIRATION RATE: 21 BRPM

## 2021-08-14 DIAGNOSIS — K52.9 COLITIS: ICD-10-CM

## 2021-08-14 DIAGNOSIS — R10.31 RIGHT LOWER QUADRANT ABDOMINAL PAIN: Primary | ICD-10-CM

## 2021-08-14 LAB
ALBUMIN SERPL BCP-MCNC: 4.5 G/DL (ref 3.5–5.7)
ALP SERPL-CCNC: 63 U/L (ref 40–150)
ALT SERPL W P-5'-P-CCNC: 31 U/L (ref 7–52)
ANION GAP SERPL CALCULATED.3IONS-SCNC: 11 MMOL/L (ref 4–13)
APTT PPP: 30 SECONDS (ref 23–37)
AST SERPL W P-5'-P-CCNC: 26 U/L (ref 13–39)
BASOPHILS # BLD AUTO: 0 THOUSANDS/ΜL (ref 0–0.1)
BASOPHILS NFR BLD AUTO: 1 % (ref 0–2)
BILIRUB SERPL-MCNC: 0.5 MG/DL (ref 0.2–1)
BILIRUB UR QL STRIP: NEGATIVE
BUN SERPL-MCNC: 10 MG/DL (ref 7–25)
CALCIUM SERPL-MCNC: 9 MG/DL (ref 8.6–10.5)
CHLORIDE SERPL-SCNC: 101 MMOL/L (ref 98–107)
CLARITY UR: CLEAR
CO2 SERPL-SCNC: 24 MMOL/L (ref 21–31)
COLOR UR: YELLOW
CREAT SERPL-MCNC: 1.19 MG/DL (ref 0.7–1.3)
EOSINOPHIL # BLD AUTO: 0 THOUSAND/ΜL (ref 0–0.61)
EOSINOPHIL NFR BLD AUTO: 0 % (ref 0–5)
ERYTHROCYTE [DISTWIDTH] IN BLOOD BY AUTOMATED COUNT: 13.9 % (ref 11.5–14.5)
GFR SERPL CREATININE-BSD FRML MDRD: 76 ML/MIN/1.73SQ M
GLUCOSE SERPL-MCNC: 77 MG/DL (ref 65–99)
GLUCOSE UR STRIP-MCNC: NEGATIVE MG/DL
HCT VFR BLD AUTO: 46.6 % (ref 42–47)
HGB BLD-MCNC: 16 G/DL (ref 14–18)
HGB UR QL STRIP.AUTO: NEGATIVE
INR PPP: 0.97 (ref 0.84–1.19)
KETONES UR STRIP-MCNC: NEGATIVE MG/DL
LACTATE SERPL-SCNC: 1.5 MMOL/L (ref 0.5–2)
LEUKOCYTE ESTERASE UR QL STRIP: NEGATIVE
LIPASE SERPL-CCNC: 13 U/L (ref 11–82)
LYMPHOCYTES # BLD AUTO: 0.9 THOUSANDS/ΜL (ref 0.6–4.47)
LYMPHOCYTES NFR BLD AUTO: 22 % (ref 21–51)
MCH RBC QN AUTO: 32.2 PG (ref 26–34)
MCHC RBC AUTO-ENTMCNC: 34.4 G/DL (ref 31–37)
MCV RBC AUTO: 94 FL (ref 81–99)
MONOCYTES # BLD AUTO: 0.5 THOUSAND/ΜL (ref 0.17–1.22)
MONOCYTES NFR BLD AUTO: 11 % (ref 2–12)
NEUTROPHILS # BLD AUTO: 2.7 THOUSANDS/ΜL (ref 1.4–6.5)
NEUTS SEG NFR BLD AUTO: 65 % (ref 42–75)
NITRITE UR QL STRIP: NEGATIVE
PH UR STRIP.AUTO: 6 [PH]
PLATELET # BLD AUTO: 184 THOUSANDS/UL (ref 149–390)
PMV BLD AUTO: 8.2 FL (ref 8.6–11.7)
POTASSIUM SERPL-SCNC: 3.6 MMOL/L (ref 3.5–5.5)
PROT SERPL-MCNC: 7.3 G/DL (ref 6.4–8.9)
PROT UR STRIP-MCNC: NEGATIVE MG/DL
PROTHROMBIN TIME: 12.8 SECONDS (ref 11.6–14.5)
RBC # BLD AUTO: 4.98 MILLION/UL (ref 4.3–5.9)
SARS-COV-2 RNA RESP QL NAA+PROBE: NEGATIVE
SODIUM SERPL-SCNC: 136 MMOL/L (ref 134–143)
SP GR UR STRIP.AUTO: <=1.005 (ref 1–1.03)
TROPONIN I SERPL-MCNC: <0.03 NG/ML
UROBILINOGEN UR QL STRIP.AUTO: 0.2 E.U./DL
WBC # BLD AUTO: 4.1 THOUSAND/UL (ref 4.8–10.8)

## 2021-08-14 PROCEDURE — G1004 CDSM NDSC: HCPCS

## 2021-08-14 PROCEDURE — 85610 PROTHROMBIN TIME: CPT | Performed by: EMERGENCY MEDICINE

## 2021-08-14 PROCEDURE — 74177 CT ABD & PELVIS W/CONTRAST: CPT

## 2021-08-14 PROCEDURE — U0005 INFEC AGEN DETEC AMPLI PROBE: HCPCS | Performed by: EMERGENCY MEDICINE

## 2021-08-14 PROCEDURE — 96374 THER/PROPH/DIAG INJ IV PUSH: CPT

## 2021-08-14 PROCEDURE — 85025 COMPLETE CBC W/AUTO DIFF WBC: CPT | Performed by: EMERGENCY MEDICINE

## 2021-08-14 PROCEDURE — 99285 EMERGENCY DEPT VISIT HI MDM: CPT | Performed by: EMERGENCY MEDICINE

## 2021-08-14 PROCEDURE — 83690 ASSAY OF LIPASE: CPT | Performed by: EMERGENCY MEDICINE

## 2021-08-14 PROCEDURE — 96361 HYDRATE IV INFUSION ADD-ON: CPT

## 2021-08-14 PROCEDURE — 84484 ASSAY OF TROPONIN QUANT: CPT | Performed by: EMERGENCY MEDICINE

## 2021-08-14 PROCEDURE — 36415 COLL VENOUS BLD VENIPUNCTURE: CPT | Performed by: EMERGENCY MEDICINE

## 2021-08-14 PROCEDURE — 71045 X-RAY EXAM CHEST 1 VIEW: CPT

## 2021-08-14 PROCEDURE — 99285 EMERGENCY DEPT VISIT HI MDM: CPT

## 2021-08-14 PROCEDURE — U0003 INFECTIOUS AGENT DETECTION BY NUCLEIC ACID (DNA OR RNA); SEVERE ACUTE RESPIRATORY SYNDROME CORONAVIRUS 2 (SARS-COV-2) (CORONAVIRUS DISEASE [COVID-19]), AMPLIFIED PROBE TECHNIQUE, MAKING USE OF HIGH THROUGHPUT TECHNOLOGIES AS DESCRIBED BY CMS-2020-01-R: HCPCS | Performed by: EMERGENCY MEDICINE

## 2021-08-14 PROCEDURE — 93005 ELECTROCARDIOGRAM TRACING: CPT

## 2021-08-14 PROCEDURE — 96376 TX/PRO/DX INJ SAME DRUG ADON: CPT

## 2021-08-14 PROCEDURE — 87040 BLOOD CULTURE FOR BACTERIA: CPT | Performed by: EMERGENCY MEDICINE

## 2021-08-14 PROCEDURE — 85730 THROMBOPLASTIN TIME PARTIAL: CPT | Performed by: EMERGENCY MEDICINE

## 2021-08-14 PROCEDURE — 80053 COMPREHEN METABOLIC PANEL: CPT | Performed by: EMERGENCY MEDICINE

## 2021-08-14 PROCEDURE — 83605 ASSAY OF LACTIC ACID: CPT | Performed by: EMERGENCY MEDICINE

## 2021-08-14 PROCEDURE — 84145 PROCALCITONIN (PCT): CPT | Performed by: EMERGENCY MEDICINE

## 2021-08-14 PROCEDURE — 81003 URINALYSIS AUTO W/O SCOPE: CPT | Performed by: EMERGENCY MEDICINE

## 2021-08-14 RX ORDER — METRONIDAZOLE 500 MG/1
500 TABLET ORAL ONCE
Status: COMPLETED | OUTPATIENT
Start: 2021-08-14 | End: 2021-08-14

## 2021-08-14 RX ORDER — LORAZEPAM 2 MG/ML
0.5 INJECTION INTRAMUSCULAR ONCE
Status: COMPLETED | OUTPATIENT
Start: 2021-08-14 | End: 2021-08-14

## 2021-08-14 RX ORDER — CIPROFLOXACIN 500 MG/1
500 TABLET, FILM COATED ORAL 2 TIMES DAILY
Qty: 14 TABLET | Refills: 0 | Status: SHIPPED | OUTPATIENT
Start: 2021-08-14 | End: 2021-08-20

## 2021-08-14 RX ORDER — METRONIDAZOLE 500 MG/1
500 TABLET ORAL EVERY 8 HOURS SCHEDULED
Qty: 21 TABLET | Refills: 0 | Status: SHIPPED | OUTPATIENT
Start: 2021-08-14 | End: 2021-08-20

## 2021-08-14 RX ORDER — CIPROFLOXACIN 500 MG/1
500 TABLET, FILM COATED ORAL ONCE
Status: COMPLETED | OUTPATIENT
Start: 2021-08-14 | End: 2021-08-14

## 2021-08-14 RX ORDER — ACETAMINOPHEN 325 MG/1
975 TABLET ORAL ONCE
Status: COMPLETED | OUTPATIENT
Start: 2021-08-14 | End: 2021-08-14

## 2021-08-14 RX ADMIN — CIPROFLOXACIN 500 MG: 500 TABLET, FILM COATED ORAL at 22:19

## 2021-08-14 RX ADMIN — SODIUM CHLORIDE 1000 ML: 0.9 INJECTION, SOLUTION INTRAVENOUS at 21:49

## 2021-08-14 RX ADMIN — IOHEXOL 100 ML: 350 INJECTION, SOLUTION INTRAVENOUS at 20:49

## 2021-08-14 RX ADMIN — SODIUM CHLORIDE 1000 ML: 0.9 INJECTION, SOLUTION INTRAVENOUS at 19:35

## 2021-08-14 RX ADMIN — LORAZEPAM 0.5 MG: 2 INJECTION INTRAMUSCULAR; INTRAVENOUS at 20:03

## 2021-08-14 RX ADMIN — ACETAMINOPHEN 975 MG: 325 TABLET ORAL at 19:35

## 2021-08-14 RX ADMIN — LORAZEPAM 0.5 MG: 2 INJECTION INTRAMUSCULAR; INTRAVENOUS at 20:38

## 2021-08-14 RX ADMIN — METRONIDAZOLE 500 MG: 500 TABLET ORAL at 22:19

## 2021-08-14 NOTE — ED PROVIDER NOTES
History  Chief Complaint   Patient presents with    Abdominal Pain     Right sided with associated intermittent fevers and neck pain that started Wednesday, hx of crohns, states he has diarrhea but similiar to diarrhea with crohns     38 yo male with hx of crohn's disease presenting for approximately 3 days of intermittent fevers going as high as 104 at home 2 days ago for which he took tylenol most recently last night, along with upper back pain and RLQ and RUQ abdominal pain  Denies headache, sore throat, cough, CP, SOB, N/V, diarrhea/constipation, dysuria/hematuria  Has had prior abdominal surgeries  Is not covid vaccinated, no obvious sick contacts at home  Prior to Admission Medications   Prescriptions Last Dose Informant Patient Reported? Taking? Linaclotide (LINZESS) 145 MCG CAPS  Self No No   Sig: Take 1 capsule (145 mcg total) by mouth daily   Patient taking differently: Take 145 mcg by mouth as needed    NON FORMULARY  Self Yes No   acetaminophen (TYLENOL) 500 mg tablet  Self Yes No   Sig: Take 1,000 mg by mouth every 4 (four) hours as needed    cetirizine (ZyrTEC) 10 mg tablet  Self No No   Sig: Take 1 tablet (10 mg total) by mouth daily as needed for allergies   clonazePAM (KlonoPIN) 0 5 mg tablet 2021 at Unknown time Self No Yes   Sig: Take 1 tablet (0 5 mg total) by mouth every 12 (twelve) hours as needed for anxiety   diphenhydrAMINE (BENADRYL) 25 mg tablet  Self Yes No   Sig: Take 25 mg by mouth as needed     fluticasone (FLONASE) 50 mcg/act nasal spray  Self No No   Si spray into each nostril daily as needed for rhinitis   omeprazole (PriLOSEC) 20 mg delayed release capsule  Self No No   Sig: Take 1 capsule (20 mg total) by mouth daily      Facility-Administered Medications: None       Past Medical History:   Diagnosis Date    Abdominal pain     Abnormal liver function test     last assessed:  Oct 16, 2013     Abnormal weight loss     last assessed: Yung 15, 2014     Acute left-sided low back pain with left-sided sciatica 10/31/2019    Allergic rhinitis due to pollen     last assessed: Yung 15, 2014     Anxiety     Anxiety     last assessed/resolved: Aug 5, 2015     Asthma     last assessed: Yung 15, 2014     Benign essential HTN     last assessed/resolved: Feb 23, 2017     Cervical lymphadenopathy     last assessed/resolved: Feb 23, 2017     Chronic sinusitis     last assessed: Yung 15, 2014     Constipation     [facility] 01/01/2011    last assessed: Yung 15, 2014     Dysuria     last assessed: April 29, 2016     Elevated BP without diagnosis of hypertension     last assessed/resolved: Feb 23, 2017     Esophageal reflux     last assessed/resolved: Feb 23, 2017     Eustachian tube anomaly     Resolved: Nov 9, 2017     External hemorrhoids     last assessed: Yung 15, 2014     Gastritis and duodenitis 6/14/2021    Hypertension     borderline    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     last assessed/resolved: July 19, 2017     Inflammatory bowel disease     Pancreatic neoplasm     last assessed: Yung 15, 2014     PONV (postoperative nausea and vomiting)     Positive depression screening     resolved: July 24, 2017     Postoperative ileus ([facility] ) 11/12/2018    Psoriasis     Seasonal allergies     Small bowel obstruction ([facility] ) 11/11/2018    Vitamin B12 deficiency     last assessed/resolved: Feb 23, 2017        Past Surgical History:   Procedure Laterality Date    CHOLECYSTECTOMY      resolved: 2011     COLONOSCOPY N/A 11/18/2016    Procedure: COLONOSCOPY;  Surgeon: Shanda rL MD;  Location:  GI LAB; Service:     COLONOSCOPY N/A 4/28/2016    Procedure: COLONOSCOPY;  Surgeon: Shanda Lr MD;  Location: [facility] GI LAB; Service:     COLONOSCOPY  02/09/2021    ESOPHAGOGASTRODUODENOSCOPY N/A 4/28/2016    Procedure: ESOPHAGOGASTRODUODENOSCOPY (EGD); Surgeon: Shanda Lr MD;  Location: [facility] GI LAB;   Service:     FRONTAL SINUSOTOMY      resolved: April 2009     [place] COLONOSCOPY FLX DX W[facility] WHEN PFRMD N/A 10/12/2018    Procedure: EGD AND COLONOSCOPY;  Surgeon: Addie Okeefe MD;  Location: [facility] GI LAB; Service: Gastroenterology    NJ LAP, INCISIONAL HERNIA REPAIR,REDUCIBLE N/A 11/8/2018    Procedure: REPAIR HERNIA INCISIONAL LAPAROSCOPIC;  Surgeon: Jackie Colorado MD;  Location:  MAIN OR;  Service: General    NJ REPAIR OF NASAL SEPTUM N/A 7/28/2017    Procedure: REVISION SEPTOPLASTY; TURBINOPLASTY; BILATERAL  GRAFTS; ALAR GRAFT; POSSIBLE AURICULAR CARTILAGE GRAFT;  Surgeon: Lei Vergara MD;  Location: [facility];  Service: ENT    TONSILLECTOMY AND ADENOIDECTOMY      UPPER GASTROINTESTINAL ENDOSCOPY  02/09/2021       Family History   Problem Relation Age of Onset    Diabetes Mother         mellitus     Hypertension Mother     Thyroid disease Mother     Fibromyalgia Mother     Hypertension Father     Hypertension Sister     Heart disease Sister         had defibulator put due to late beats    No Known Problems Maternal Grandmother     No Known Problems Maternal Grandfather     Diabetes Paternal Grandmother     Diabetes Paternal Grandfather     Kidney failure Paternal Grandfather     Diabetes Other         mellitus     Rheum arthritis Cousin      I have reviewed and agree with the history as documented      E-Cigarette/Vaping    E-Cigarette Use Current Some Day User     Comments vapes medical [facility]      E-Cigarette/Vaping Substances    THC Yes     CBD Yes      Social History     Tobacco Use    Smoking status: Current Every Day Smoker     Packs/day: 0 50     Types: Cigarettes    Smokeless tobacco: Never Used    Tobacco comment: Dependence on nicotine in cigarettes - 1/2 PPD x 20 years    Vaping Use    Vaping Use: Some days    Substances: THC, CBD   Substance Use Topics    Alcohol use: Not Currently     Comment: John consumes alcohol noted in "allscripts"     Drug use: Yes     Types: Marijuana     Comment: uses medical marijuana via vaping  last use 2/8/19       Review of Systems   Constitutional: Positive for fatigue and fever  Respiratory: Negative for cough, chest tightness and shortness of breath  Cardiovascular: Negative for chest pain, palpitations and leg swelling  Gastrointestinal: Positive for abdominal pain  Negative for constipation, diarrhea, nausea and vomiting  Musculoskeletal: Positive for back pain, myalgias and neck pain  Negative for neck stiffness  All other systems reviewed and are negative  Physical Exam  Physical Exam  Vitals and nursing note reviewed  Constitutional:       General: He is not in acute distress  Appearance: He is well-developed  He is not diaphoretic  HENT:      Head: Normocephalic and atraumatic  Right Ear: External ear normal       Left Ear: External ear normal       Nose: Nose normal    Eyes:      General: No scleral icterus  Right eye: No discharge  Left eye: No discharge  Conjunctiva/sclera: Conjunctivae normal    Neck:      Trachea: Trachea and phonation normal  No tracheal tenderness, tracheostomy, abnormal tracheal secretions or tracheal deviation  Meningeal: Brudzinski's sign and Kernig's sign absent  Cardiovascular:      Rate and Rhythm: Normal rate and regular rhythm  Heart sounds: Normal heart sounds  No murmur heard  No friction rub  No gallop  Pulmonary:      Effort: Pulmonary effort is normal  No respiratory distress  Breath sounds: Normal breath sounds  No wheezing or rales  Abdominal:      General: Bowel sounds are normal  There is no distension  Palpations: Abdomen is soft  There is no mass  Tenderness: There is abdominal tenderness in the right upper quadrant and right lower quadrant  There is no right CVA tenderness, left CVA tenderness, guarding or rebound  Positive signs include McBurney's sign  Negative signs include Coleman's sign, Rovsing's sign, psoas sign and obturator sign  Hernia: No hernia is present  Musculoskeletal:         General: No tenderness or deformity  Normal range of motion  Cervical back: Full passive range of motion without pain, normal range of motion and neck supple  No edema, erythema, signs of trauma, rigidity, torticollis or crepitus  Muscular tenderness present  No pain with movement or spinous process tenderness  Normal range of motion  Skin:     General: Skin is warm and dry  Coloration: Skin is not pale  Findings: No erythema or rash  Neurological:      Mental Status: He is alert and oriented to person, place, and time  Psychiatric:         Behavior: Behavior normal          Thought Content:  Thought content normal          Judgment: Judgment normal          Vital Signs  ED Triage Vitals [08/14/21 1857]   Temperature Pulse Respirations Blood Pressure SpO2   100 4 °F (38 °C) 85 16 143/97 97 %      Temp Source Heart Rate Source Patient Position - Orthostatic VS BP Location FiO2 (%)   Temporal Monitor Sitting Left arm --      Pain Score       6           Vitals:    08/14/21 1857 08/14/21 2000 08/14/21 2145 08/14/21 2200   BP: 143/97  119/75 121/81   Pulse: 85 67 65 62   Patient Position - Orthostatic VS: Sitting            Visual Acuity      ED Medications  Medications   sodium chloride 0 9 % bolus 1,000 mL (0 mL Intravenous Stopped 8/14/21 2148)   acetaminophen (TYLENOL) tablet 975 mg (975 mg Oral Given 8/14/21 1935)   LORazepam (ATIVAN) injection 0 5 mg (0 5 mg Intravenous Given 8/14/21 2003)   LORazepam (ATIVAN) injection 0 5 mg (0 5 mg Intravenous Given 8/14/21 2038)   iohexol (OMNIPAQUE) 350 MG/ML injection (SINGLE-DOSE) 100 mL (100 mL Intravenous Given 8/14/21 2049)   sodium chloride 0 9 % bolus 1,000 mL (0 mL Intravenous Stopped 8/14/21 2226)   ciprofloxacin (CIPRO) tablet 500 mg (500 mg Oral Given 8/14/21 2219)   metroNIDAZOLE (FLAGYL) tablet 500 mg (500 mg Oral Given 8/14/21 2219)       Diagnostic Studies  Results Reviewed Procedure Component Value Units Date/Time    [facility] [[id number]]  (Normal) Collected: 08/14/21 1934    Lab Status: Final result Specimen: Nares from Nose Updated: 08/14/21 2102     SARS-CoV-2 Negative    Narrative: The specimen collection materials, transport medium, and/or testing methodology utilized in the production of these test results have been proven to be reliable in a limited validation with an abbreviated program under the Emergency Utilization Authorization provided by the FDA  Testing reported as "Presumptive positive" will be confirmed with secondary testing to ensure result accuracy  Clinical caution and judgement should be used with the interpretation of these results with consideration of the clinical impression and other laboratory testing  Testing reported as "Positive" or "Negative" has been proven to be accurate according to standard laboratory validation requirements  All testing is performed with control materials showing appropriate reactivity at standard intervals        Troponin I [[id number]]  (Normal) Collected: 08/14/21 1934    Lab Status: Final result Specimen: Blood from Arm, Right Updated: 08/14/21 2023     Troponin I <0 03 ng/mL     Lipase [[id number]]  (Normal) Collected: 08/14/21 1934    Lab Status: Final result Specimen: Blood from Arm, Right Updated: 08/14/21 2021     Lipase 13 u/L     Comprehensive metabolic panel [[id number]] Collected: 08/14/21 1934    Lab Status: Final result Specimen: Blood from Arm, Right Updated: 08/14/21 2021     Sodium 136 mmol/L      Potassium 3 6 mmol/L      Chloride 101 mmol/L      CO2 24 mmol/L      ANION GAP 11 mmol/L      BUN 10 mg/dL      Creatinine 1 19 mg/dL      Glucose 77 mg/dL      Calcium 9 0 mg/dL      AST 26 U/L      ALT 31 U/L      Alkaline Phosphatase 63 U/L      Total Protein 7 3 g/dL      Albumin 4 5 g/dL      Total Bilirubin 0 50 mg/dL      eGFR 76 ml/min/1 73sq m     Narrative: [employer] guidelines for Chronic Kidney Disease (CKD):     Stage 1 with normal or high GFR (GFR > 90 mL/min/1 73 square meters)    Stage 2 Mild CKD (GFR = 60-89 mL/min/1 73 square meters)    Stage 3A Moderate CKD (GFR = 45-59 mL/min/1 73 square meters)    Stage 3B Moderate CKD (GFR = 30-44 mL/min/1 73 square meters)    Stage 4 Severe CKD (GFR = 15-29 mL/min/1 73 square meters)    Stage 5 End Stage CKD (GFR <15 mL/min/1 73 square meters)  Note: GFR calculation is accurate only with a steady state creatinine    Lactic acid [[id number]]  (Normal) Collected: 08/14/21 1934    Lab Status: Final result Specimen: Blood from Arm, Right Updated: 08/14/21 2012     LACTIC ACID 1 5 mmol/L     Narrative:      Result may be elevated if tourniquet was used during collection      Protime-INR [[id number]]  (Normal) Collected: 08/14/21 1934    Lab Status: Final result Specimen: Blood from Arm, Right Updated: 08/14/21 2009     Protime 12 8 seconds      INR 0 97    APTT [[id number]]  (Normal) Collected: 08/14/21 1934    Lab Status: Final result Specimen: Blood from Arm, Right Updated: 08/14/21 2009     PTT 30 seconds     CBC and differential [[id number]]  (Abnormal) Collected: 08/14/21 1934    Lab Status: Final result Specimen: Blood from Arm, Right Updated: 08/14/21 2000     WBC 4 10 Thousand/uL      RBC 4 98 Million/uL      Hemoglobin 16 0 g/dL      Hematocrit 46 6 %      MCV 94 fL      MCH 32 2 pg      MCHC 34 4 g/dL      RDW 13 9 %      MPV 8 2 fL      Platelets 766 Thousands/uL      Neutrophils Relative 65 %      Lymphocytes Relative 22 %      Monocytes Relative 11 %      Eosinophils Relative 0 %      Basophils Relative 1 %      Neutrophils Absolute 2 70 Thousands/µL      Lymphocytes Absolute 0 90 Thousands/µL      Monocytes Absolute 0 50 Thousand/µL      Eosinophils Absolute 0 00 Thousand/µL      Basophils Absolute 0 00 Thousands/µL     Blood culture #1 [[id number]] Collected: 08/14/21 1934    Lab Status: In process Specimen: Blood from Arm, Right Updated: 08/14/21 1958    Blood culture #2 [[id number]] Collected: 08/14/21 1934    Lab Status: In process Specimen: Blood from Arm, Right Updated: 08/14/21 1958    UA w Reflex to Microscopic w Reflex to Culture [[id number]]  (Abnormal) Collected: 08/14/21 1939    Lab Status: Final result Specimen: Urine, Clean Catch Updated: 08/14/21 1953     Color, UA Yellow     Clarity, UA Clear     Specific Gravity, UA <=1 005     pH, UA 6 0     Leukocytes, UA Negative     Nitrite, UA Negative     Protein, UA Negative mg/dl      Glucose, UA Negative mg/dl      Ketones, UA Negative mg/dl      Urobilinogen, UA 0 2 E U /dl      Bilirubin, UA Negative     Blood, UA Negative    Procalcitonin with AM Reflex [[id number]] Collected: 08/14/21 1934    Lab Status: In process Specimen: Blood from Arm, Right Updated: 08/14/21 1953                 CT abdomen pelvis with contrast   Final Result by Basilia Davis MD (08/14 2202)      Findings consistent with colitis at the ascending colon, likely of infectious or inflammatory etiology            Workstation performed: [id number]         XR chest portable   Final Result by Salvador Roberts MD (08/15 1139)      No acute cardiopulmonary disease                    Workstation performed: [id number]                    Procedures  ECG 12 Lead Documentation Only    Date/Time: 8/14/2021 7:51 PM  Performed by: Arvin Sher DO  Authorized by: Arvin Sher DO     Indications / Diagnosis:  Septic workup  Patient location:  ED  Previous ECG:     Previous ECG:  Compared to current    Similarity:  No change  Interpretation:     Interpretation: normal    Rate:     ECG rate:  81    ECG rate assessment: normal    Rhythm:     Rhythm: sinus rhythm    Ectopy:     Ectopy: none    QRS:     QRS axis:  Normal    QRS intervals:  Normal  Conduction:     Conduction: normal    ST segments:     ST segments:  Normal  T waves:     T waves: normal    Comments:      No signs of prolonged QT noted              ED Course  ED Course as of Aug 15 1147   Sat Aug 14, 2021   2018 Pt stating that he needs medication for when he receives CT scan --> given ativan 2023 Troponin I: <0 03 2102 SARS-COV-2: Negative   2137 Patient does follow with GI down in the [facility]                HEART Risk Score      Most Recent Value   Heart Score Risk Calculator   History  0 Filed at: 08/15/2021 1147   ECG  0 Filed at: 08/15/2021 1147   Age  0 Filed at: 08/15/2021 1147   Risk Factors  0 Filed at: 08/15/2021 1147   Troponin  0 Filed at: 08/15/2021 1147   HEART Score  0 Filed at: 08/15/2021 1147                                    [facility]  Number of Diagnoses or Management Options  Colitis  Right lower quadrant abdominal pain  Diagnosis management comments: Septic workup as patient with HR over 90 in the room and febrile with reports of fever at home  CT abdomen pelvis  Patient ambulatory and looking around the room without issues, negative Kernig/brudzinski no meningismus signs on exam   Pain in neck an upper back within B/L trapezius muscle bodies with reproduction of pain with palpation of muscle bodies  Ct showing ascending colitis  Treat with abx  Recommend patient calling his GI doctor  Strict return precautions        Disposition  Final diagnoses:   Right lower quadrant abdominal pain   Colitis     Time reflects when diagnosis was documented in both MDM as applicable and the Disposition within this note     Time User Action Codes Description Comment    8/14/2021 10:01 PM Cali Sleet Add [R10 31] Right lower quadrant abdominal pain     8/14/2021 10:01 PM Cali Sleet Add [B34 9] Viral syndrome     8/14/2021 10:14 PM Mack Fought [B34 9] Viral syndrome     8/14/2021 10:14 PM Cali Sleet Add [K52 9] Colitis       ED Disposition     ED Disposition Condition Date/Time Comment    Discharge Stable Sat Aug 14, 2021 10:13 PM Abdirashid discharge to home/self care              Follow-up Information     Follow up With Specialties Details Why Contact Info Additional Information    Gayatri Gallego MD Internal Medicine   [address] [place] [address]  [phone]       Your GI doctor  Go to        [place] [address]  Emergency Department Emergency Medicine Go to  As needed, If symptoms worsen Philipp   [phone] [place] [address]  Emergency Department          Discharge Medication List as of 8/14/2021 10:14 PM      START taking these medications    Details   ciprofloxacin (CIPRO) 500 mg tablet Take 1 tablet (500 mg total) by mouth 2 (two) times a day for 7 days, Starting Sat 8/14/2021, Until Sat 8/21/2021, Normal      metroNIDAZOLE (FLAGYL) 500 mg tablet Take 1 tablet (500 mg total) by mouth every 8 (eight) hours for 7 days, Starting Sat 8/14/2021, Until Sat 8/21/2021, Normal         CONTINUE these medications which have NOT CHANGED    Details   clonazePAM (KlonoPIN) 0 5 mg tablet Take 1 tablet (0 5 mg total) by mouth every 12 (twelve) hours as needed for anxiety, Starting Tue 6/1/2021, Until Mon 10/18/2021 at 2359, Normal      acetaminophen (TYLENOL) 500 mg tablet Take 1,000 mg by mouth every 4 (four) hours as needed , Historical Med      cetirizine (ZyrTEC) 10 mg tablet Take 1 tablet (10 mg total) by mouth daily as needed for allergies, Starting Wed 4/4/2018, Normal      diphenhydrAMINE (BENADRYL) 25 mg tablet Take 25 mg by mouth as needed  , Historical Med      fluticasone (FLONASE) 50 mcg/act nasal spray 1 spray into each nostril daily as needed for rhinitis, Starting Fri 11/30/2018, Normal      Linaclotide (LINZESS) 145 MCG CAPS Take 1 capsule (145 mcg total) by mouth daily, Starting Fri 11/30/2018, Normal      NON FORMULARY Historical Med      omeprazole (PriLOSEC) 20 mg delayed release capsule Take 1 capsule (20 mg total) by mouth daily, Starting Mon 6/14/2021, Normal           No discharge procedures on file      PDMP Review     None          ED Provider  Electronically Signed by           London Beltran DO  08/15/21 3131

## 2021-08-15 LAB — PROCALCITONIN SERPL-MCNC: <0.05 NG/ML

## 2021-08-17 ENCOUNTER — HOSPITAL ENCOUNTER (EMERGENCY)
Facility: HOSPITAL | Age: 41
Discharge: HOME | End: 2021-08-17
Attending: FAMILY MEDICINE | Admitting: FAMILY MEDICINE
Payer: MEDICARE

## 2021-08-17 ENCOUNTER — TELEPHONE (OUTPATIENT)
Dept: GASTROENTEROLOGY | Facility: MEDICAL CENTER | Age: 41
End: 2021-08-17

## 2021-08-17 VITALS
RESPIRATION RATE: 24 BRPM | HEART RATE: 57 BPM | BODY MASS INDEX: 25.01 KG/M2 | WEIGHT: 165 LBS | DIASTOLIC BLOOD PRESSURE: 87 MMHG | HEIGHT: 68 IN | SYSTOLIC BLOOD PRESSURE: 125 MMHG | TEMPERATURE: 98.1 F | OXYGEN SATURATION: 98 %

## 2021-08-17 DIAGNOSIS — R42 DIZZINESS: Primary | ICD-10-CM

## 2021-08-17 DIAGNOSIS — E86.0 DEHYDRATION: ICD-10-CM

## 2021-08-17 LAB
ANION GAP SERPL CALCULATED.3IONS-SCNC: 5 MMOL/L (ref 4–13)
ATRIAL RATE: 52 BPM
ATRIAL RATE: 81 BPM
BASOPHILS # BLD AUTO: 0 THOUSANDS/ΜL (ref 0–0.1)
BASOPHILS NFR BLD AUTO: 1 % (ref 0–2)
BUN SERPL-MCNC: 8 MG/DL (ref 7–25)
CALCIUM SERPL-MCNC: 8.5 MG/DL (ref 8.6–10.5)
CHLORIDE SERPL-SCNC: 108 MMOL/L (ref 98–107)
CO2 SERPL-SCNC: 27 MMOL/L (ref 21–31)
CREAT SERPL-MCNC: 1.18 MG/DL (ref 0.7–1.3)
EOSINOPHIL # BLD AUTO: 0.1 THOUSAND/ΜL (ref 0–0.61)
EOSINOPHIL NFR BLD AUTO: 2 % (ref 0–5)
ERYTHROCYTE [DISTWIDTH] IN BLOOD BY AUTOMATED COUNT: 13.6 % (ref 11.5–14.5)
GFR SERPL CREATININE-BSD FRML MDRD: 77 ML/MIN/1.73SQ M
GLUCOSE SERPL-MCNC: 90 MG/DL (ref 65–99)
HCT VFR BLD AUTO: 43.3 % (ref 42–47)
HGB BLD-MCNC: 15.1 G/DL (ref 14–18)
LYMPHOCYTES # BLD AUTO: 2 THOUSANDS/ΜL (ref 0.6–4.47)
LYMPHOCYTES NFR BLD AUTO: 42 % (ref 21–51)
MAGNESIUM SERPL-MCNC: 1.9 MG/DL (ref 1.9–2.7)
MCH RBC QN AUTO: 32.2 PG (ref 26–34)
MCHC RBC AUTO-ENTMCNC: 34.8 G/DL (ref 31–37)
MCV RBC AUTO: 93 FL (ref 81–99)
MONOCYTES # BLD AUTO: 0.4 THOUSAND/ΜL (ref 0.17–1.22)
MONOCYTES NFR BLD AUTO: 9 % (ref 2–12)
NEUTROPHILS # BLD AUTO: 2.2 THOUSANDS/ΜL (ref 1.4–6.5)
NEUTS SEG NFR BLD AUTO: 47 % (ref 42–75)
P AXIS: 57 DEGREES
P AXIS: 71 DEGREES
PLATELET # BLD AUTO: 158 THOUSANDS/UL (ref 149–390)
PMV BLD AUTO: 8.1 FL (ref 8.6–11.7)
POTASSIUM SERPL-SCNC: 4.2 MMOL/L (ref 3.5–5.5)
PR INTERVAL: 152 MS
PR INTERVAL: 160 MS
QRS AXIS: 79 DEGREES
QRS AXIS: 80 DEGREES
QRSD INTERVAL: 74 MS
QRSD INTERVAL: 84 MS
QT INTERVAL: 376 MS
QT INTERVAL: 462 MS
QTC INTERVAL: 429 MS
QTC INTERVAL: 436 MS
RBC # BLD AUTO: 4.68 MILLION/UL (ref 4.3–5.9)
SODIUM SERPL-SCNC: 140 MMOL/L (ref 134–143)
T WAVE AXIS: 48 DEGREES
T WAVE AXIS: 70 DEGREES
TROPONIN I SERPL-MCNC: <0.03 NG/ML
VENTRICULAR RATE: 52 BPM
VENTRICULAR RATE: 81 BPM
WBC # BLD AUTO: 4.8 THOUSAND/UL (ref 4.8–10.8)

## 2021-08-17 PROCEDURE — 36415 COLL VENOUS BLD VENIPUNCTURE: CPT | Performed by: FAMILY MEDICINE

## 2021-08-17 PROCEDURE — 83735 ASSAY OF MAGNESIUM: CPT | Performed by: FAMILY MEDICINE

## 2021-08-17 PROCEDURE — 99284 EMERGENCY DEPT VISIT MOD MDM: CPT | Performed by: FAMILY MEDICINE

## 2021-08-17 PROCEDURE — 85025 COMPLETE CBC W/AUTO DIFF WBC: CPT | Performed by: FAMILY MEDICINE

## 2021-08-17 PROCEDURE — 99285 EMERGENCY DEPT VISIT HI MDM: CPT

## 2021-08-17 PROCEDURE — 80048 BASIC METABOLIC PNL TOTAL CA: CPT | Performed by: FAMILY MEDICINE

## 2021-08-17 PROCEDURE — 84484 ASSAY OF TROPONIN QUANT: CPT | Performed by: FAMILY MEDICINE

## 2021-08-17 PROCEDURE — 93010 ELECTROCARDIOGRAM REPORT: CPT | Performed by: INTERNAL MEDICINE

## 2021-08-17 PROCEDURE — 93005 ELECTROCARDIOGRAM TRACING: CPT

## 2021-08-17 PROCEDURE — 96360 HYDRATION IV INFUSION INIT: CPT

## 2021-08-17 RX ADMIN — SODIUM CHLORIDE 1000 ML: 0.9 INJECTION, SOLUTION INTRAVENOUS at 13:10

## 2021-08-17 NOTE — TELEPHONE ENCOUNTER
Hi    Can we squeeze him in to see me sometime within the next 2 weeks? Happy to see him at the end of one of my days       Thank you

## 2021-08-17 NOTE — ED PROVIDER NOTES
History  Chief Complaint   Patient presents with    Dizziness    Chest Pain     HPI  This is a 16-year-old male with history of Crohn disease recently diagnosed the colitis presented to ED with complain of sudden onset of dizziness and chest tightness  Patient states that he was sitting outside the working on his lawnmower when started feeling anxious had an episode of chest tightness and dizziness  He states that his symptoms resolved by the time he came to the hospital   Patient is chest pain-free at this time denies any dizziness  Patient denies any abdominal pain nausea vomiting or diarrhea  He states that he has recently diagnosed  Prior to Admission Medications   Prescriptions Last Dose Informant Patient Reported? Taking?    Linaclotide (LINZESS) 145 MCG CAPS  Self No No   Sig: Take 1 capsule (145 mcg total) by mouth daily   Patient taking differently: Take 145 mcg by mouth as needed    NON FORMULARY  Self Yes No   acetaminophen (TYLENOL) 500 mg tablet  Self Yes No   Sig: Take 1,000 mg by mouth every 4 (four) hours as needed    cetirizine (ZyrTEC) 10 mg tablet  Self No No   Sig: Take 1 tablet (10 mg total) by mouth daily as needed for allergies   ciprofloxacin (CIPRO) 500 mg tablet   No No   Sig: Take 1 tablet (500 mg total) by mouth 2 (two) times a day for 7 days   clonazePAM (KlonoPIN) 0 5 mg tablet  Self No No   Sig: Take 1 tablet (0 5 mg total) by mouth every 12 (twelve) hours as needed for anxiety   diphenhydrAMINE (BENADRYL) 25 mg tablet  Self Yes No   Sig: Take 25 mg by mouth as needed     fluticasone (FLONASE) 50 mcg/act nasal spray  Self No No   Si spray into each nostril daily as needed for rhinitis   metroNIDAZOLE (FLAGYL) 500 mg tablet   No No   Sig: Take 1 tablet (500 mg total) by mouth every 8 (eight) hours for 7 days   omeprazole (PriLOSEC) 20 mg delayed release capsule  Self No No   Sig: Take 1 capsule (20 mg total) by mouth daily      Facility-Administered Medications: None       Past Medical History:   Diagnosis Date    Abdominal pain     Abnormal liver function test     last assessed: Oct 16, 2013     Abnormal weight loss     last assessed: Yung 15, 2014     Acute left-sided low back pain with left-sided sciatica 10/31/2019    Allergic rhinitis due to pollen     last assessed: Yung 15, 2014     Anxiety     Anxiety     last assessed/resolved: Aug 5, 2015     Asthma     last assessed: Yung 15, 2014     Benign essential HTN     last assessed/resolved: Feb 23, 2017     Cervical lymphadenopathy     last assessed/resolved: Feb 23, 2017     Chronic sinusitis     last assessed: Yung 15, 2014     Constipation     Crohn's disease ([facility] [address] ) 01/01/2011    last assessed: Yung 15, 2014     Dysuria     last assessed: April 29, 2016     Elevated BP without diagnosis of hypertension     last assessed/resolved: Feb 23, 2017     Esophageal reflux     last assessed/resolved: Feb 23, 2017     Eustachian tube anomaly     Resolved: Nov 9, 2017     External hemorrhoids     last assessed: Yung 15, 2014     Gastritis and duodenitis 6/14/2021    Hypertension     borderline    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     last assessed/resolved: July 19, 2017     Inflammatory bowel disease     Pancreatic neoplasm     last assessed: Yung 15, 2014     PONV (postoperative nausea and vomiting)     Positive depression screening     resolved: July 24, 2017     Postoperative ileus ([facility] [address] ) 11/12/2018    Psoriasis     Seasonal allergies     Small bowel obstruction ([facility] [address] ) 11/11/2018    Vitamin B12 deficiency     last assessed/resolved: Feb 23, 2017        Past Surgical History:   Procedure Laterality Date    CHOLECYSTECTOMY      resolved: 2011     COLONOSCOPY N/A 11/18/2016    Procedure: COLONOSCOPY;  Surgeon: Yennifer Cheng MD;  Location:  GI LAB; Service:     COLONOSCOPY N/A 4/28/2016    Procedure: COLONOSCOPY;  Surgeon: Yennifer Cheng MD;  Location: [facility] GI LAB;   Service:     COLONOSCOPY  02/09/2021    ESOPHAGOGASTRODUODENOSCOPY N/A 4/28/2016    Procedure: ESOPHAGOGASTRODUODENOSCOPY (EGD); Surgeon: Reid Aguila MD;  Location: [facility] GI LAB; Service:     FRONTAL SINUSOTOMY      resolved: April 2009     UT COLONOSCOPY FLX DX W/COLLJ [facility] WHEN PFRMD N/A 10/12/2018    Procedure: EGD AND COLONOSCOPY;  Surgeon: Addie Okeefe MD;  Location: [facility] GI LAB; Service: Gastroenterology    UT LAP, INCISIONAL HERNIA REPAIR,REDUCIBLE N/A 11/8/2018    Procedure: REPAIR HERNIA INCISIONAL LAPAROSCOPIC;  Surgeon: Jackie Colorado MD;  [facility];  Service: General    UT REPAIR OF NASAL SEPTUM N/A 7/28/2017    Procedure: REVISION SEPTOPLASTY; TURBINOPLASTY; BILATERAL  GRAFTS; ALAR GRAFT; POSSIBLE AURICULAR CARTILAGE GRAFT;  Surgeon: Lei Vergara MD;  [facility]: [facility];  Service: ENT    TONSILLECTOMY AND ADENOIDECTOMY      UPPER GASTROINTESTINAL ENDOSCOPY  02/09/2021       Family History   Problem Relation Age of Onset    Diabetes Mother         mellitus     Hypertension Mother     Thyroid disease Mother     Fibromyalgia Mother     Hypertension Father     Hypertension Sister     Heart disease Sister         had defibulator put due to late beats    No Known Problems Maternal Grandmother     No Known Problems Maternal Grandfather     Diabetes Paternal Grandmother     Diabetes Paternal Grandfather     Kidney failure Paternal Grandfather     Diabetes Other         mellitus     Rheum arthritis Cousin      I have reviewed and agree with the history as documented      E-Cigarette/Vaping    E-Cigarette Use Current Some Day User     Comments vapes medical marijuanna      E-Cigarette/Vaping Substances    THC Yes     CBD Yes      Social History     Tobacco Use    Smoking status: Current Every Day Smoker     Packs/day: 0 50     Types: Cigarettes    Smokeless tobacco: Never Used    Tobacco comment: Dependence on nicotine in cigarettes - 1/2 PPD x 20 years    Vaping Use    Vaping Use: Some days    Substances: THC, CBD   Substance Use Topics    Alcohol use: Not Currently     Comment: John consumes alcohol noted in "allscripts"     Drug use: Yes     Types: Marijuana     Comment: uses medical marijuana via vaping  last use 2/8/19       Review of Systems   Constitutional: Negative  HENT: Negative  Eyes: Negative  Respiratory: Negative  Cardiovascular: Positive for chest pain  Gastrointestinal: Negative  Endocrine: Negative  Genitourinary: Negative  Musculoskeletal: Negative  Skin: Negative  Neurological: Positive for dizziness  Psychiatric/Behavioral: The patient is nervous/anxious  Physical Exam  Physical Exam  Vitals and nursing note reviewed  Constitutional:       General: He is not in acute distress  Appearance: He is well-developed  He is not diaphoretic  HENT:      Head: Normocephalic and atraumatic  Right Ear: External ear normal       Left Ear: External ear normal       Nose: Nose normal    Eyes:      Conjunctiva/sclera: Conjunctivae normal       Pupils: Pupils are equal, round, and reactive to light  Cardiovascular:      Rate and Rhythm: Normal rate and regular rhythm  Pulmonary:      Effort: Pulmonary effort is normal  No respiratory distress  Breath sounds: Normal breath sounds  No wheezing  Abdominal:      General: Bowel sounds are normal  There is no distension  Palpations: Abdomen is soft  Tenderness: There is no abdominal tenderness  Musculoskeletal:      Cervical back: Normal range of motion and neck supple  Lymphadenopathy:      Cervical: No cervical adenopathy  Skin:     General: Skin is warm and dry  Capillary Refill: Capillary refill takes less than 2 seconds  Neurological:      General: No focal deficit present  Mental Status: He is alert and oriented to person, place, and time     Psychiatric:         Behavior: Behavior normal          Vital Signs  ED Triage Vitals [08/17/21 1227]   Temperature Pulse Respirations Blood Pressure SpO2   98 1 °F (36 7 °C) 64 18 148/95 100 %      Temp Source Heart Rate Source Patient Position - Orthostatic VS BP Location FiO2 (%)   Tympanic Monitor Sitting Left arm --      Pain Score       5           Vitals:    08/17/21 1302 08/17/21 1303 08/17/21 1330 08/17/21 1400   BP: 133/90 125/86 134/83 125/87   Pulse: 60 97 60 57   Patient Position - Orthostatic VS: Sitting - Orthostatic VS Standing - Orthostatic VS Lying Lying         Visual Acuity      ED Medications  Medications   sodium chloride 0 9 % bolus 1,000 mL (0 mL Intravenous Stopped 8/17/21 1400)       Diagnostic Studies  Results Reviewed     Procedure Component Value Units Date/Time    Basic metabolic panel [[id number]]  (Abnormal) Collected: 08/17/21 1310    Lab Status: Final result Specimen: Blood from Arm, Left Updated: 08/17/21 1353     Sodium 140 mmol/L      Potassium 4 2 mmol/L      Chloride 108 mmol/L      CO2 27 mmol/L      ANION GAP 5 mmol/L      BUN 8 mg/dL      Creatinine 1 18 mg/dL      Glucose 90 mg/dL      Calcium 8 5 mg/dL      eGFR 77 ml/min/1 73sq m     Narrative:      [place] guidelines for Chronic Kidney Disease (CKD):     Stage 1 with normal or high GFR (GFR > 90 mL/min/1 73 square meters)    Stage 2 Mild CKD (GFR = 60-89 mL/min/1 73 square meters)    Stage 3A Moderate CKD (GFR = 45-59 mL/min/1 73 square meters)    Stage 3B Moderate CKD (GFR = 30-44 mL/min/1 73 square meters)    Stage 4 Severe CKD (GFR = 15-29 mL/min/1 73 square meters)    Stage 5 End Stage CKD (GFR <15 mL/min/1 73 square meters)  Note: GFR calculation is accurate only with a steady state creatinine    Troponin I [[id number]]  (Normal) Collected: 08/17/21 1310    Lab Status: Final result Specimen: Blood from Arm, Left Updated: 08/17/21 1346     Troponin I <0 03 ng/mL     Magnesium [[id number]]  (Normal) Collected: 08/17/21 1310    Lab Status: Final result Specimen: Blood from Arm, Left Updated: 08/17/21 1341 Magnesium 1 9 mg/dL     CBC and differential [[id number]]  (Abnormal) Collected: 08/17/21 1310    Lab Status: Final result Specimen: Blood from Arm, Left Updated: 08/17/21 1331     WBC 4 80 Thousand/uL      RBC 4 68 Million/uL      Hemoglobin 15 1 g/dL      Hematocrit 43 3 %      MCV 93 fL      MCH 32 2 pg      MCHC 34 8 g/dL      RDW 13 6 %      MPV 8 1 fL      Platelets 862 Thousands/uL      Neutrophils Relative 47 %      Lymphocytes Relative 42 %      Monocytes Relative 9 %      Eosinophils Relative 2 %      Basophils Relative 1 %      Neutrophils Absolute 2 20 Thousands/µL      Lymphocytes Absolute 2 00 Thousands/µL      Monocytes Absolute 0 40 Thousand/µL      Eosinophils Absolute 0 10 Thousand/µL      Basophils Absolute 0 00 Thousands/µL                  No orders to display              Procedures  Procedures         ED Course             HEART Risk Score      Most Recent Value   Heart Score Risk Calculator   History  0 Filed at: 08/17/2021 1412   ECG  1 Filed at: 08/17/2021 1412   Age  0 Filed at: 08/17/2021 1412   Risk Factors  0 Filed at: 08/17/2021 1412   Troponin  0 Filed at: 08/17/2021 1412   HEART Score  1 Filed at: 08/17/2021 1412                                    [facility]  Number of Diagnoses or Management Options  Dehydration  Dizziness  Diagnosis management comments: Patient states he is feeling much better after IV fluid  Blood work within normal limits  He states he is going to stop taking ciprofloxacin since that can prolong QT  He states he has an appointment with Cardiology on Thursday  Patient was ambulatory in the ED without any difficulty        Disposition  Final diagnoses:   Dizziness   Dehydration     Time reflects when diagnosis was documented in both MDM as applicable and the Disposition within this note     Time User Action Codes Description Comment    8/17/2021  3:25 PM Sherel Pipe Add [R42] Dizziness     8/17/2021  4:02 PM Sherel Pipe Add [E86 0] Dehydration       ED Disposition ED Disposition Condition Date/Time Comment    Discharge Stable Tue Aug 17, 2021  3:25 PM Abdirashid discharge to home/self care  Follow-up Information     Follow up With Specialties Details Why Contact Info    Ann Art MD Internal Medicine Schedule an appointment as soon as possible for a visit in 2 days If symptoms worsen [address] [place] [address]  [phone]            Discharge Medication List as of 8/17/2021  3:27 PM      CONTINUE these medications which have NOT CHANGED    Details   acetaminophen (TYLENOL) 500 mg tablet Take 1,000 mg by mouth every 4 (four) hours as needed , Historical Med      cetirizine (ZyrTEC) 10 mg tablet Take 1 tablet (10 mg total) by mouth daily as needed for allergies, Starting Wed 4/4/2018, Normal      ciprofloxacin (CIPRO) 500 mg tablet Take 1 tablet (500 mg total) by mouth 2 (two) times a day for 7 days, Starting Sat 8/14/2021, Until Sat 8/21/2021, Normal      clonazePAM (KlonoPIN) 0 5 mg tablet Take 1 tablet (0 5 mg total) by mouth every 12 (twelve) hours as needed for anxiety, Starting Tue 6/1/2021, Until Mon 10/18/2021 at 2359, Normal      diphenhydrAMINE (BENADRYL) 25 mg tablet Take 25 mg by mouth as needed  , Historical Med      fluticasone (FLONASE) 50 mcg/act nasal spray 1 spray into each nostril daily as needed for rhinitis, Starting Fri 11/30/2018, Normal      Linaclotide (LINZESS) 145 MCG CAPS Take 1 capsule (145 mcg total) by mouth daily, Starting Fri 11/30/2018, Normal      metroNIDAZOLE (FLAGYL) 500 mg tablet Take 1 tablet (500 mg total) by mouth every 8 (eight) hours for 7 days, Starting Sat 8/14/2021, Until Sat 8/21/2021, Normal      NON FORMULARY Historical Med      omeprazole (PriLOSEC) 20 mg delayed release capsule Take 1 capsule (20 mg total) by mouth daily, Starting Mon 6/14/2021, Normal           No discharge procedures on file      PDMP Review     None          ED Provider  Electronically Signed by           [facility] Chele Menezes MD  08/17/21 9223

## 2021-08-20 ENCOUNTER — OFFICE VISIT (OUTPATIENT)
Dept: INTERNAL MEDICINE CLINIC | Age: 41
End: 2021-08-20
Payer: MEDICARE

## 2021-08-20 VITALS — TEMPERATURE: 98.1 F

## 2021-08-20 DIAGNOSIS — F41.9 ANXIETY: ICD-10-CM

## 2021-08-20 DIAGNOSIS — B34.9 VIRAL INFECTION, UNSPECIFIED: ICD-10-CM

## 2021-08-20 DIAGNOSIS — J06.9 VIRAL UPPER RESPIRATORY TRACT INFECTION: Primary | ICD-10-CM

## 2021-08-20 PROBLEM — R94.31 PROLONGED QT INTERVAL: Status: ACTIVE | Noted: 2021-08-20

## 2021-08-20 LAB
BACTERIA BLD CULT: NORMAL
BACTERIA BLD CULT: NORMAL

## 2021-08-20 PROCEDURE — U0003 INFECTIOUS AGENT DETECTION BY NUCLEIC ACID (DNA OR RNA); SEVERE ACUTE RESPIRATORY SYNDROME CORONAVIRUS 2 (SARS-COV-2) (CORONAVIRUS DISEASE [COVID-19]), AMPLIFIED PROBE TECHNIQUE, MAKING USE OF HIGH THROUGHPUT TECHNOLOGIES AS DESCRIBED BY CMS-2020-01-R: HCPCS | Performed by: NURSE PRACTITIONER

## 2021-08-20 PROCEDURE — U0005 INFEC AGEN DETEC AMPLI PROBE: HCPCS | Performed by: NURSE PRACTITIONER

## 2021-08-20 PROCEDURE — 99441 PR PHYS/QHP TELEPHONE EVALUATION 5-10 MIN: CPT | Performed by: NURSE PRACTITIONER

## 2021-08-20 RX ORDER — ALBUTEROL SULFATE 90 UG/1
2 AEROSOL, METERED RESPIRATORY (INHALATION) EVERY 6 HOURS PRN
Qty: 6.7 G | Refills: 5 | Status: SHIPPED | OUTPATIENT
Start: 2021-08-20 | End: 2021-12-29

## 2021-08-20 RX ORDER — CLONAZEPAM 0.5 MG/1
0.5 TABLET ORAL EVERY 12 HOURS PRN
Qty: 60 TABLET | Refills: 1 | Status: SHIPPED | OUTPATIENT
Start: 2021-08-20 | End: 2021-11-05 | Stop reason: SDUPTHER

## 2021-08-20 RX ORDER — MONTELUKAST SODIUM 10 MG/1
10 TABLET ORAL
Qty: 30 TABLET | Refills: 0 | Status: SHIPPED | OUTPATIENT
Start: 2021-08-20 | End: 2021-12-29

## 2021-08-20 NOTE — PROGRESS NOTES
COVID-19 Outpatient Progress Note    Assessment/Plan:    Problem List Items Addressed This Visit        Respiratory    Viral upper respiratory tract infection - Primary     Illness appears to be viral in nature  Rest and fluids advised  Educated that the course of this illness could be 2-4 weeks  Discussed symptomatic relief, such as warm steam inhalations, tylenol/ibuprofen for fevers and body aches, rest, and drink plenty of fluids  Warm salt gargles for sore throat  Discussed red flag signs to go to the ER, such as chest pain or shortness of breath  Return to the office for reevaluation if symptoms worsen or do not improve in 1-2 weeks           Relevant Medications    montelukast (SINGULAIR) 10 mg tablet    albuterol (Proventil HFA) 90 mcg/act inhaler         Disposition:     I have spent 15 minutes directly with the patient  Verification of patient location:    Patient is located in the following state in which I hold an active license [place]    Encounter provider NAIF Mariano    Provider located at [address] [id number]    Recent Visits  No visits were found meeting these conditions  Showing recent visits within past 7 days and meeting all other requirements  Today's Visits  Date Type Provider Dept   08/20/21 Office Visit [facility]   Showing today's visits and meeting all other requirements  Future Appointments  No visits were found meeting these conditions  Showing future appointments within next 150 days and meeting all other requirements         Subjective:   Ana Luisa Copeland is a 37 y o  male who is concerned about COVID-19  Patient's symptoms include cough (productive) and headache   Patient denies fever, chills, congestion, rhinorrhea, sore throat, shortness of breath, chest tightness, abdominal pain, nausea, vomiting and diarrhea  COVID-19 vaccination status: Not vaccinated    Lab Results   Component Value Date    SARSCOV2 Negative 08/14/2021     Past Medical History:   Diagnosis Date    Abdominal pain     Abnormal liver function test     last assessed: Oct 16, 2013     Abnormal weight loss     last assessed: Yung 15, 2014     Acute left-sided low back pain with left-sided sciatica 10/31/2019    Allergic rhinitis due to pollen     last assessed: Yung 15, 2014     Anxiety     Anxiety     last assessed/resolved: Aug 5, 2015     Asthma     last assessed: Yung 15, 2014     Benign essential HTN     last assessed/resolved: Feb 23, 2017     Cervical lymphadenopathy     last assessed/resolved: Feb 23, 2017     Chronic sinusitis     last assessed: Yung 15, 2014     Constipation     Crohn's disease ([address] ) 01/01/2011    last assessed: Yung 15, 2014     Dysuria     last assessed: April 29, 2016     Elevated BP without diagnosis of hypertension     last assessed/resolved: Feb 23, 2017     Esophageal reflux     last assessed/resolved: Feb 23, 2017     Eustachian tube anomaly     Resolved: Nov 9, 2017     External hemorrhoids     last assessed: Yung 15, 2014     Gastritis and duodenitis 6/14/2021    Hypertension     borderline    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     last assessed/resolved: July 19, 2017     Inflammatory bowel disease     Pancreatic neoplasm     last assessed: Yung 15, 2014     PONV (postoperative nausea and vomiting)     Positive depression screening     resolved: July 24, 2017     Postoperative ileus ([facility] [address] ) 11/12/2018    Psoriasis     Seasonal allergies     Small bowel obstruction ([facility] [address] ) 11/11/2018    Vitamin B12 deficiency     last assessed/resolved: Feb 23, 2017      Past Surgical History:   Procedure Laterality Date    CHOLECYSTECTOMY      resolved: 2011     COLONOSCOPY N/A 11/18/2016    Procedure: COLONOSCOPY;  Surgeon: Judit Boucher MD;  [facility];   Service:     COLONOSCOPY N/A 4/28/2016    Procedure: COLONOSCOPY;  Surgeon: Susan Gilmore MD;  Location: [facility] GI LAB; Service:     COLONOSCOPY  02/09/2021    ESOPHAGOGASTRODUODENOSCOPY N/A 4/28/2016    Procedure: ESOPHAGOGASTRODUODENOSCOPY (EGD); Surgeon: Susan Gilmore MD;  Location: [facility] GI LAB; Service:     FRONTAL SINUSOTOMY      resolved: April 2009     LA COLONOSCOPY FLX DX W/COLLJ [facility]HABILIT[facility] WHEN PFRMD N/A 10/12/2018    Procedure: EGD AND COLONOSCOPY;  Surgeon: Yanick Manuel MD;  Location: [facility] GI LAB;   Service: Gastroenterology    [place] LAP, INCISIONAL HERNIA REPAIR,REDUCIBLE N/A 11/8/2018    Procedure: REPAIR HERNIA INCISIONAL LAPAROSCOPIC;  Surgeon: Kun Barbour MD;  [facility];  Service: General    LA REPAIR OF NASAL SEPTUM N/A 7/28/2017    Procedure: REVISION SEPTOPLASTY; TURBINOPLASTY; BILATERAL  GRAFTS; ALAR GRAFT; POSSIBLE AURICULAR CARTILAGE GRAFT;  Surgeon: Tracy Cantu MD;  [facility];  Service: ENT    TONSILLECTOMY AND ADENOIDECTOMY      UPPER GASTROINTESTINAL ENDOSCOPY  02/09/2021     Current Outpatient Medications   Medication Sig Dispense Refill    acetaminophen (TYLENOL) 500 mg tablet Take 1,000 mg by mouth every 4 (four) hours as needed       cetirizine (ZyrTEC) 10 mg tablet Take 1 tablet (10 mg total) by mouth daily as needed for allergies 30 tablet 5    clonazePAM (KlonoPIN) 0 5 mg tablet Take 1 tablet (0 5 mg total) by mouth every 12 (twelve) hours as needed for anxiety 60 tablet 1    diphenhydrAMINE (BENADRYL) 25 mg tablet Take 25 mg by mouth as needed        fluticasone (FLONASE) 50 mcg/act nasal spray 1 spray into each nostril daily as needed for rhinitis 3 Bottle 1    Linaclotide (LINZESS) 145 MCG CAPS Take 1 capsule (145 mcg total) by mouth daily (Patient taking differently: Take 145 mcg by mouth as needed ) 90 capsule 1    NON FORMULARY       omeprazole (PriLOSEC) 20 mg delayed release capsule Take 1 capsule (20 mg total) by mouth daily 90 capsule 0    albuterol (Proventil HFA) 90 mcg/act inhaler Inhale 2 puffs every 6 (six) hours as needed for wheezing 6 7 g 5    montelukast (SINGULAIR) 10 mg tablet Take 1 tablet (10 mg total) by mouth daily at bedtime 30 tablet 0     No current facility-administered medications for this visit  Allergies   Allergen Reactions    Apple - Food Allergy Anaphylaxis    Aspirin Anaphylaxis and Hives     Category: Allergy;     Eggs Or Egg-Derived Products - Food Allergy GI Intolerance     GI upset    Ibuprofen Anaphylaxis and Hives     Category: Allergy; aspirin    Nsaids Anaphylaxis     Category: Allergy;     Other Anaphylaxis     Category: Allergy; Annotation - [id number]: cherries, grapes , and kiwis; pt states all fruits    Peanuts [Peanut Oil - Food Allergy] Anaphylaxis     nuts    Penicillins Shortness Of Breath    Ciprofloxacin Other (See Comments)     heart    Pollen Extract     Codeine Anxiety       Review of Systems   Constitutional: Positive for appetite change (decreased)  Negative for activity change, chills, diaphoresis and fever  HENT: Positive for postnasal drip  Negative for congestion, ear discharge, ear pain, rhinorrhea, sinus pressure, sinus pain and sore throat  Eyes: Negative for pain, discharge, itching and visual disturbance  Respiratory: Positive for cough (productive) and wheezing  Negative for chest tightness and shortness of breath  Cardiovascular: Negative for chest pain, palpitations and leg swelling  Gastrointestinal: Negative for abdominal pain, constipation, diarrhea, nausea and vomiting  Endocrine: Negative for polydipsia, polyphagia and polyuria  Genitourinary: Negative for difficulty urinating, dysuria and urgency  Musculoskeletal: Negative for arthralgias, back pain and neck pain  Skin: Negative for rash and wound  Allergic/Immunologic: Positive for environmental allergies  Neurological: Positive for headaches  Negative for dizziness, weakness and numbness  Objective:    Vitals:    08/20/21 1457   Temp: 98 1 °F (36 7 °C)   TempSrc: Temporal       Physical Exam  Vitals reviewed  Constitutional:       Appearance: Normal appearance  Pulmonary:      Effort: No respiratory distress  Neurological:      General: No focal deficit present  Mental Status: He is alert and oriented to person, place, and time  Psychiatric:         Mood and Affect: Mood normal          Behavior: Behavior normal          VIRTUAL VISIT DISCLAIMER    Suzy Wallace verbally agrees to participate in [employer]  Pt is aware that [employer] could be limited without vital signs or the ability to perform a full hands-on physical exam  Torito Kwok understands he or the provider may request at any time to terminate the video visit and request the patient to seek care or treatment in person

## 2021-08-25 NOTE — ASSESSMENT & PLAN NOTE
Stable, controlled  Continue Zyrtec and Flonase as needed  Detail Level: Detailed Introduction Text (Please End With A Colon): The following procedure was deferred: Mohs surgery:

## 2021-09-07 NOTE — PROGRESS NOTES
Assessment/Plan:    No problem-specific Assessment & Plan notes found for this encounter  Diagnoses and all orders for this visit:    Generalized abdominal pain  -     dicyclomine (BENTYL) 20 mg tablet; Take 1 tablet (20 mg total) by mouth every 6 (six) hours as needed (pain)    Anxiety  -     clonazePAM (KlonoPIN) 0 5 mg tablet; Take 1 tablet (0 5 mg total) by mouth every 12 (twelve) hours as needed for anxiety    Chronic midline thoracic back pain  -     Ambulatory referral to Pain Management; Future  -     Ambulatory referral to Pain Management; Future          Subjective:      Patient ID: Zach Prieto is a 41 y o  male  Chronic upper back pain: patient is frustrated with his situation  Continues to have upper back pain radiating to the neck  Gets recurrent flares, sometimes severe  Any lifting greater than 10 lb exacerbates it  Imaging so far shows djd of disc at T7  Does not get relief from chiropractic manipulation  Inflammatory Bowel Disease  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  Diagnosis was made by colonoscopy  Onset was a few months ago    The patient has had rx with Linzess and Bentyl for treatment of their IBD  The patient is currently having variable bowel movements per day which are mucous-laden  The patient currently denies significant abdominal pain or discomfort at present     The patient does not have fever  The patient has not weight loss  The patient does not have extraintestinal symptoms at present         Anxiety   Presents for follow-up visit  Symptoms include muscle tension, nervous/anxious behavior and restlessness  Patient reports no nausea  Symptoms occur most days  The quality of sleep is fair                The following portions of the patient's history were reviewed and updated as appropriate: past family history, past medical history, past social history, past surgical history and problem list     Review of Systems   Constitutional: [facility]    Progress Note    Hamzah Smoker Patient Status:  Inpatient    1956 MRN [id number]   Location [facility] 4W/SW/SE Attending Brandon Capps MD   Hosp Day # 1 PCP Elvis Juarez is a a(n) 6 fatigue  HENT: Positive for rhinorrhea, sinus pressure and sore throat  Negative for postnasal drip and sinus pain  Respiratory: Negative for chest tightness and wheezing  Gastrointestinal: Positive for constipation and diarrhea  Negative for blood in stool and nausea  Musculoskeletal: Positive for back pain, myalgias and neck pain  Allergic/Immunologic: Positive for environmental allergies  Neurological: Positive for headaches  Psychiatric/Behavioral: The patient is nervous/anxious  All other systems reviewed and are negative  Objective:      /88 (BP Location: Left arm, Patient Position: Sitting, Cuff Size: Standard)   Pulse 84   Temp 98 6 °F (37 °C) (Tympanic)   Ht 5' 9" (1 753 m)   Wt 75 8 kg (167 lb 3 2 oz)   SpO2 98%   BMI 24 69 kg/m²          Physical Exam      /88 (BP Location: Left arm, Patient Position: Sitting, Cuff Size: Standard)   Pulse 84   Temp 98 6 °F (37 °C) (Tympanic)   Ht 5' 9" (1 753 m)   Wt 75 8 kg (167 lb 3 2 oz)   SpO2 98%   BMI 24 69 kg/m²     General Appearance:    Alert, cooperative, no distress, appears stated age   Head:    Normocephalic, without obvious abnormality, atraumatic   Eyes:    PERRL, conjunctiva/corneas clear, EOM's intact, fundi     benign, both eyes            Nose:   Nares normal, septum midline, mucosa boggy, clear drainage mild sinus tenderness   Throat:   Lips, mucosa, and tongue normal; teeth and gums normal   Neck:   Supple, symmetrical, trachea midline, no adenopathy;        thyroid:  No enlargement/tenderness/nodules; no carotid    bruit or JVD  Mid thoracic tenderness present  Paraspinal muscle tenderness mild     Back:     Symmetric, no curvature, ROM normal, no CVA tenderness   Lungs:     Clear to auscultation bilaterally, respirations unlabored   Chest wall:    No tenderness or deformity   Heart:    Regular rate and rhythm, S1 and S2 normal, no murmur, rub    or gallop   Abdomen:     Soft, non-tender, bowel sclera anicteric, conjunctiva normal  RESPIRATORY: clear to auscultation  CARDIOVASCULAR: S1, S2   ABDOMEN: normal active BS+, soft, nondistended;some mild tenderness lower down suprapubic and slightly to the left at the palpation.   No guarding tenderness, antibiotics outpatient  Call into IR, if not do spoke with him. Most likely will just treat with antibiotics. We will slowly increase diet.           Hope Mccracken MD [facility]  General Surgery  [facility]  9/7/2021  4:26 PM sounds active all four quadrants,     no masses, no organomegaly           Extremities:   Extremities normal, atraumatic, no cyanosis or edema   Pulses:   2+ and symmetric all extremities   Skin:   Skin color, texture, turgor normal, no rashes or lesions   Lymph nodes:   Cervical, supraclavicular, and axillary nodes normal   Neurologic:   CNII-XII intact   Normal strength, sensation and reflexes       throughout

## 2021-10-18 NOTE — PROGRESS NOTES
Office Visit - General Surgery  Africa Chinchilla MRN: [id number]  Encounter: [id number]    Assessment and Plan    Problem List Items Addressed This Visit        Other    S/P repair of ventral hernia - Primary     Still having some abdominal complaints  I reviewed the pictures from surgery which showed fairly normal intestines  I told there were no adhesions found at the time of surgery  I had the hernia was fairly small in repaired primarily and then with the mesh  I told him I do not have anything else to offer him surgically at this time  See back if needed  Activity as he feels comfortable  Chief Complaint:  Africa Chinchilla is a 41 y o  male who presents for Post-op (Incisional Hernia)    Subjective  [de-identified] year male status post laparoscopic incisional hernia repair he was readmitted a couple days after surgery for presumed ileus  His bowels have still not recovered completely  He is mostly on liquid diet and still has abdominal pain  No fever or chills  Past Medical History  Past Medical History:   Diagnosis Date    Abdominal pain     Abnormal liver function test     last assessed: Oct 16, 2013     Abnormal weight loss     last assessed: Yung 15, 2014     Allergic rhinitis due to pollen     last assessed: Yung 15, 2014     Anxiety     Anxiety     last assessed/resolved:  Aug 5, 2015     Asthma     last assessed: Yung 15, 2014     Benign essential HTN     last assessed/resolved: Feb 23, 2017     Cervical lymphadenopathy     last assessed/resolved: Feb 23, 2017     Chronic sinusitis     last assessed: Yung 15, 2014     Constipation     Crohn's disease [facility]) 01/01/2011    last assessed: Yung 15, 2014     Dysuria     last assessed: April 29, 2016     Elevated BP without diagnosis of hypertension     last assessed/resolved: Feb 23, 2017     Esophageal reflux     last assessed/resolved: Feb 23, 2017     Eustachian tube anomaly     Resolved: Nov 9, 2017    Shadmarco antonio Irizarry External 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 on uneven concrete on around a pool hemorrhoids     last assessed: Yung 15, 2014     Hypertension     borderline    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     last assessed/resolved: July 19, 2017     Inflammatory bowel disease     Pancreatic neoplasm     last assessed: Yung 15, 2014     PONV (postoperative nausea and vomiting)     Positive depression screening     resolved: July 24, 2017     Psoriasis     Seasonal allergies     Small bowel obstruction ([facility] [address] ) 11/11/2018    Vitamin B12 deficiency     last assessed/resolved: Feb 23, 2017        Past Surgical History  Past Surgical History:   Procedure Laterality Date    CHOLECYSTECTOMY      resolved: 2011     COLONOSCOPY N/A 11/18/2016    Procedure: COLONOSCOPY;  Surgeon: Christopher Herrera MD;  Location:  GI LAB; Service:     COLONOSCOPY N/A 4/28/2016    Procedure: COLONOSCOPY;  Surgeon: Christopher Herrera MD;  Location: [facility] GI LAB; Service:     ESOPHAGOGASTRODUODENOSCOPY N/A 4/28/2016    Procedure: ESOPHAGOGASTRODUODENOSCOPY (EGD); Surgeon: Christopher Herrera MD;  Location: [facility] GI LAB; Service:     FRONTAL SINUSOTOMY      resolved: April 2009     PANCREAS SURGERY      SD COLONOSCOPY FLX DX W/[facility] REHABILITATION WHEN PFRMD N/A 10/12/2018    Procedure: EGD AND COLONOSCOPY;  Surgeon: Jelly Valentino MD;  Location: [facility] GI LAB;   Service: Gastroenterology    SD LAP, INCISIONAL HERNIA REPAIR,REDUCIBLE N/A 11/8/2018    Procedure: REPAIR HERNIA INCISIONAL LAPAROSCOPIC;  Surgeon: Iliana Mcfadden MD;  [facility]IN [facility];  Service: General    SD REPAIR OF NASAL SEPTUM N/A 7/28/2017    Procedure: REVISION SEPTOPLASTY; TURBINOPLASTY; BILATERAL  GRAFTS; ALAR GRAFT; POSSIBLE AURICULAR CARTILAGE GRAFT;  Surgeon: Thelma Payton MD;  [facility];  Service: ENT    TONSILLECTOMY AND ADENOIDECTOMY         Family History  Family History   Problem Relation Age of Onset    Diabetes Mother         mellitus     Hypertension Mother     Thyroid disease Mother     Fibromyalgia Mother     Hypertension Father     Hypertension Sister     No Known Problems Brother     No Known Problems Maternal Grandmother     No Known Problems Maternal Grandfather     Diabetes Paternal Grandmother     Diabetes Paternal Grandfather     Diabetes Other         mellitus     Rheum arthritis Cousin        Medications  Current Outpatient Prescriptions on File Prior to Visit   Medication Sig Dispense Refill    acetaminophen (TYLENOL) 500 mg tablet Take 10 mg/kg by mouth every 4 (four) hours as needed        cetirizine (ZyrTEC) 10 mg tablet Take 1 tablet (10 mg total) by mouth daily as needed for allergies 30 tablet 5    clonazePAM (KlonoPIN) 0 5 mg tablet Take 1 tablet (0 5 mg total) by mouth every 12 (twelve) hours as needed for anxiety 90 tablet 1    diphenhydrAMINE (BENADRYL) 25 mg tablet Take 25 mg by mouth as needed        gabapentin (NEURONTIN) 100 mg capsule TAKE ONE CAPSULE BY MOUTH 3 TIMES DAILY 90 capsule 0    polyethylene glycol (MIRALAX) 17 g packet Take 17 g by mouth daily 14 each 0    metoclopramide (REGLAN) 5 mg tablet Take 1 tablet (5 mg total) by mouth 4 (four) times a day (Patient not taking: Reported on 11/21/2018 ) 60 tablet 0     No current facility-administered medications on file prior to visit  Allergies  Allergies   Allergen Reactions    Advil [Ibuprofen] Anaphylaxis and Hives     Category: Allergy; aspirin    Apple Anaphylaxis    Aspirin Anaphylaxis and Hives     Category: Allergy;     Eggs Or Egg-Derived Products Anaphylaxis     Category: Allergy;     Nsaids Anaphylaxis     Category: Allergy;     Other Anaphylaxis     Category: Allergy;  Annotation - [id number]: cherries, grapes , and kiwis; pt states all fruits    Peanuts [Peanut Oil] Anaphylaxis    Penicillins Shortness Of Breath    Pollen Extract     Codeine Anxiety       Review of Systems    Objective  Vitals:    11/21/18 1032   BP: 116/74   Temp: 98 1 °F (36 7 °C)       Physical Exam   Abdomen:  Laparoscopic incisions healing well   Abdomen soft

## 2021-10-28 ENCOUNTER — OFFICE VISIT (OUTPATIENT)
Dept: GASTROENTEROLOGY | Facility: MEDICAL CENTER | Age: 41
End: 2021-10-28
Payer: MEDICARE

## 2021-10-28 ENCOUNTER — APPOINTMENT (OUTPATIENT)
Dept: LAB | Facility: MEDICAL CENTER | Age: 41
End: 2021-10-28
Attending: INTERNAL MEDICINE
Payer: MEDICARE

## 2021-10-28 VITALS — SYSTOLIC BLOOD PRESSURE: 145 MMHG | HEART RATE: 61 BPM | DIASTOLIC BLOOD PRESSURE: 87 MMHG | TEMPERATURE: 97.9 F

## 2021-10-28 DIAGNOSIS — K29.80 DUODENITIS: ICD-10-CM

## 2021-10-28 DIAGNOSIS — K52.9 COLITIS: Primary | ICD-10-CM

## 2021-10-28 DIAGNOSIS — R10.11 RIGHT UPPER QUADRANT ABDOMINAL PAIN: ICD-10-CM

## 2021-10-28 DIAGNOSIS — K52.9 COLITIS: ICD-10-CM

## 2021-10-28 LAB — CRP SERPL QL: <3 MG/L

## 2021-10-28 PROCEDURE — 86140 C-REACTIVE PROTEIN: CPT

## 2021-10-28 PROCEDURE — 36415 COLL VENOUS BLD VENIPUNCTURE: CPT

## 2021-10-28 PROCEDURE — 99213 OFFICE O/P EST LOW 20 MIN: CPT | Performed by: INTERNAL MEDICINE

## 2021-10-28 RX ORDER — FAMOTIDINE 20 MG/1
20 TABLET, FILM COATED ORAL 2 TIMES DAILY
Qty: 60 TABLET | Refills: 1 | Status: SHIPPED | OUTPATIENT
Start: 2021-10-28 | End: 2022-07-20

## 2021-11-02 ENCOUNTER — APPOINTMENT (OUTPATIENT)
Dept: RADIOLOGY | Facility: CLINIC | Age: 41
End: 2021-11-02
Payer: COMMERCIAL

## 2021-11-02 ENCOUNTER — OFFICE VISIT (OUTPATIENT)
Dept: URGENT CARE | Facility: CLINIC | Age: 41
End: 2021-11-02
Payer: COMMERCIAL

## 2021-11-02 VITALS
BODY MASS INDEX: 25.01 KG/M2 | WEIGHT: 165 LBS | DIASTOLIC BLOOD PRESSURE: 86 MMHG | TEMPERATURE: 98.7 F | SYSTOLIC BLOOD PRESSURE: 122 MMHG | OXYGEN SATURATION: 98 % | RESPIRATION RATE: 18 BRPM | HEIGHT: 68 IN | HEART RATE: 64 BPM

## 2021-11-02 DIAGNOSIS — S23.9XXA THORACIC BACK SPRAIN, INITIAL ENCOUNTER: Primary | ICD-10-CM

## 2021-11-02 DIAGNOSIS — M54.2 NECK PAIN, ACUTE: ICD-10-CM

## 2021-11-02 DIAGNOSIS — S16.1XXA CERVICAL STRAIN, ACUTE, INITIAL ENCOUNTER: ICD-10-CM

## 2021-11-02 PROCEDURE — 72070 X-RAY EXAM THORAC SPINE 2VWS: CPT

## 2021-11-02 PROCEDURE — 72040 X-RAY EXAM NECK SPINE 2-3 VW: CPT

## 2021-11-02 PROCEDURE — G0383 LEV 4 HOSP TYPE B ED VISIT: HCPCS | Performed by: FAMILY MEDICINE

## 2021-11-05 DIAGNOSIS — F41.9 ANXIETY: ICD-10-CM

## 2021-11-05 RX ORDER — CLONAZEPAM 0.5 MG/1
0.5 TABLET ORAL EVERY 12 HOURS PRN
Qty: 60 TABLET | Refills: 1 | Status: SHIPPED | OUTPATIENT
Start: 2021-11-05 | End: 2022-01-17 | Stop reason: SDUPTHER

## 2021-11-09 ENCOUNTER — APPOINTMENT (OUTPATIENT)
Dept: LAB | Facility: CLINIC | Age: 41
End: 2021-11-09
Payer: MEDICARE

## 2021-11-09 DIAGNOSIS — K52.9 COLITIS: ICD-10-CM

## 2021-11-09 PROCEDURE — 83993 ASSAY FOR CALPROTECTIN FECAL: CPT

## 2021-11-11 LAB — CALPROTECTIN STL-MCNT: <16 UG/G (ref 0–120)

## 2021-11-15 ENCOUNTER — OFFICE VISIT (OUTPATIENT)
Dept: INTERNAL MEDICINE CLINIC | Facility: CLINIC | Age: 41
End: 2021-11-15
Payer: MEDICARE

## 2021-11-15 ENCOUNTER — TELEPHONE (OUTPATIENT)
Dept: PHYSICAL THERAPY | Facility: OTHER | Age: 41
End: 2021-11-15

## 2021-11-15 VITALS
BODY MASS INDEX: 24.07 KG/M2 | HEIGHT: 68 IN | RESPIRATION RATE: 18 BRPM | DIASTOLIC BLOOD PRESSURE: 82 MMHG | HEART RATE: 88 BPM | OXYGEN SATURATION: 98 % | TEMPERATURE: 98.5 F | SYSTOLIC BLOOD PRESSURE: 118 MMHG | WEIGHT: 158.8 LBS
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
## 2021-11-15 DIAGNOSIS — M62.838 CERVICAL PARASPINAL MUSCLE SPASM: ICD-10-CM

## 2021-11-15 DIAGNOSIS — F07.81 POSTCONCUSSION SYNDROME: ICD-10-CM
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
## 2021-11-15 PROCEDURE — 99213 OFFICE O/P EST LOW 20 MIN: CPT | Performed by: INTERNAL MEDICINE

## 2021-11-15 RX ORDER — CYCLOBENZAPRINE HCL 5 MG
TABLET ORAL
Qty: 10 TABLET | Refills: 0 | Status: SHIPPED | OUTPATIENT
Start: 2021-11-15 | End: 2021-12-29

## 2021-11-16 ENCOUNTER — EVALUATION (OUTPATIENT)
Dept: PHYSICAL THERAPY | Facility: CLINIC | Age: 41
End: 2021-11-16
Payer: COMMERCIAL

## 2021-11-16 DIAGNOSIS — F07.81 POSTCONCUSSION SYNDROME: Primary | ICD-10-CM

## 2021-11-16 DIAGNOSIS — H81.11 BENIGN PAROXYSMAL POSITIONAL VERTIGO OF RIGHT EAR: ICD-10-CM
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
## 2021-11-16 DIAGNOSIS — S13.4XXD WHIPLASH INJURIES, SUBSEQUENT ENCOUNTER: ICD-10-CM

## 2021-11-16 PROCEDURE — 97162 PT EVAL MOD COMPLEX 30 MIN: CPT | Performed by: PHYSICAL MEDICINE & REHABILITATION

## 2021-11-16 PROCEDURE — 97140 MANUAL THERAPY 1/> REGIONS: CPT | Performed by: PHYSICAL MEDICINE & REHABILITATION

## 2021-11-18 ENCOUNTER — OFFICE VISIT (OUTPATIENT)
Dept: PHYSICAL THERAPY | Facility: CLINIC | Age: 41
End: 2021-11-18
Payer: COMMERCIAL

## 2021-11-18 DIAGNOSIS — S13.4XXD WHIPLASH INJURIES, SUBSEQUENT ENCOUNTER: ICD-10-CM

## 2021-11-18 DIAGNOSIS — F07.81 POSTCONCUSSION SYNDROME: Primary | ICD-10-CM
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
## 2021-11-18 DIAGNOSIS — H81.11 BENIGN PAROXYSMAL POSITIONAL VERTIGO OF RIGHT EAR: ICD-10-CM

## 2021-11-18 PROCEDURE — 97140 MANUAL THERAPY 1/> REGIONS: CPT | Performed by: PHYSICAL MEDICINE & REHABILITATION

## 2021-11-18 PROCEDURE — 97110 THERAPEUTIC EXERCISES: CPT | Performed by: PHYSICAL MEDICINE & REHABILITATION

## 2021-11-23 ENCOUNTER — OFFICE VISIT (OUTPATIENT)
Dept: PHYSICAL THERAPY | Facility: CLINIC | Age: 41
End: 2021-11-23
Payer: COMMERCIAL

## 2021-11-23 DIAGNOSIS — F07.81 POSTCONCUSSION SYNDROME: Primary | ICD-10-CM

## 2021-11-23 DIAGNOSIS — H81.11 BENIGN PAROXYSMAL POSITIONAL VERTIGO OF RIGHT EAR: ICD-10-CM
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
## 2021-11-23 DIAGNOSIS — S13.4XXD WHIPLASH INJURIES, SUBSEQUENT ENCOUNTER: ICD-10-CM

## 2021-11-23 PROCEDURE — 97110 THERAPEUTIC EXERCISES: CPT

## 2021-11-23 PROCEDURE — 97140 MANUAL THERAPY 1/> REGIONS: CPT

## 2021-11-29 ENCOUNTER — APPOINTMENT (OUTPATIENT)
Dept: PHYSICAL THERAPY | Facility: CLINIC | Age: 41
End: 2021-11-29
Payer: COMMERCIAL

## 2021-12-02 ENCOUNTER — OFFICE VISIT (OUTPATIENT)
Dept: PHYSICAL THERAPY | Facility: CLINIC | Age: 41
End: 2021-12-02
Payer: COMMERCIAL

## 2021-12-02 DIAGNOSIS — S13.4XXD WHIPLASH INJURIES, SUBSEQUENT ENCOUNTER: ICD-10-CM

## 2021-12-02 DIAGNOSIS — F07.81 POSTCONCUSSION SYNDROME: Primary | ICD-10-CM
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
## 2021-12-02 DIAGNOSIS — H81.11 BENIGN PAROXYSMAL POSITIONAL VERTIGO OF RIGHT EAR: ICD-10-CM

## 2021-12-02 PROCEDURE — 97112 NEUROMUSCULAR REEDUCATION: CPT

## 2021-12-02 PROCEDURE — 97110 THERAPEUTIC EXERCISES: CPT

## 2021-12-02 PROCEDURE — 97140 MANUAL THERAPY 1/> REGIONS: CPT

## 2021-12-06 ENCOUNTER — OFFICE VISIT (OUTPATIENT)
Dept: PHYSICAL THERAPY | Facility: CLINIC | Age: 41
End: 2021-12-06
Payer: COMMERCIAL

## 2021-12-06 DIAGNOSIS — H81.11 BENIGN PAROXYSMAL POSITIONAL VERTIGO OF RIGHT EAR: ICD-10-CM
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
## 2021-12-06 DIAGNOSIS — S13.4XXD WHIPLASH INJURIES, SUBSEQUENT ENCOUNTER: ICD-10-CM

## 2021-12-06 DIAGNOSIS — F07.81 POSTCONCUSSION SYNDROME: Primary | ICD-10-CM

## 2021-12-06 PROCEDURE — 97110 THERAPEUTIC EXERCISES: CPT

## 2021-12-06 PROCEDURE — 97140 MANUAL THERAPY 1/> REGIONS: CPT

## 2021-12-06 PROCEDURE — 97112 NEUROMUSCULAR REEDUCATION: CPT

## 2021-12-09 ENCOUNTER — APPOINTMENT (OUTPATIENT)
Dept: PHYSICAL THERAPY | Facility: CLINIC | Age: 41
End: 2021-12-09
Payer: COMMERCIAL

## 2021-12-13 ENCOUNTER — OFFICE VISIT (OUTPATIENT)
Dept: PHYSICAL THERAPY | Facility: CLINIC | Age: 41
End: 2021-12-13
Payer: COMMERCIAL
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
## 2021-12-13 DIAGNOSIS — H81.11 BENIGN PAROXYSMAL POSITIONAL VERTIGO OF RIGHT EAR: ICD-10-CM

## 2021-12-13 DIAGNOSIS — F07.81 POSTCONCUSSION SYNDROME: Primary | ICD-10-CM

## 2021-12-13 DIAGNOSIS — S13.4XXD WHIPLASH INJURIES, SUBSEQUENT ENCOUNTER: ICD-10-CM

## 2021-12-13 PROCEDURE — 97112 NEUROMUSCULAR REEDUCATION: CPT

## 2021-12-13 PROCEDURE — 97110 THERAPEUTIC EXERCISES: CPT

## 2021-12-13 PROCEDURE — 97140 MANUAL THERAPY 1/> REGIONS: CPT

## 2021-12-16 ENCOUNTER — OFFICE VISIT (OUTPATIENT)
Dept: PHYSICAL THERAPY | Facility: CLINIC | Age: 41
End: 2021-12-16
Payer: COMMERCIAL
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
## 2021-12-16 DIAGNOSIS — H81.11 BENIGN PAROXYSMAL POSITIONAL VERTIGO OF RIGHT EAR: ICD-10-CM

## 2021-12-16 DIAGNOSIS — S13.4XXD WHIPLASH INJURIES, SUBSEQUENT ENCOUNTER: ICD-10-CM

## 2021-12-16 DIAGNOSIS — F07.81 POSTCONCUSSION SYNDROME: Primary | ICD-10-CM

## 2021-12-16 PROCEDURE — 97110 THERAPEUTIC EXERCISES: CPT

## 2021-12-16 PROCEDURE — 97112 NEUROMUSCULAR REEDUCATION: CPT

## 2021-12-16 PROCEDURE — 97140 MANUAL THERAPY 1/> REGIONS: CPT

## 2021-12-20 ENCOUNTER — OFFICE VISIT (OUTPATIENT)
Dept: PHYSICAL THERAPY | Facility: CLINIC | Age: 41
End: 2021-12-20
Payer: COMMERCIAL
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
## 2021-12-20 DIAGNOSIS — H81.11 BENIGN PAROXYSMAL POSITIONAL VERTIGO OF RIGHT EAR: ICD-10-CM

## 2021-12-20 DIAGNOSIS — S13.4XXD WHIPLASH INJURIES, SUBSEQUENT ENCOUNTER: ICD-10-CM

## 2021-12-20 DIAGNOSIS — F07.81 POSTCONCUSSION SYNDROME: Primary | ICD-10-CM

## 2021-12-20 PROCEDURE — 97110 THERAPEUTIC EXERCISES: CPT

## 2021-12-20 PROCEDURE — 97140 MANUAL THERAPY 1/> REGIONS: CPT

## 2021-12-23 ENCOUNTER — EVALUATION (OUTPATIENT)
Dept: PHYSICAL THERAPY | Facility: CLINIC | Age: 41
End: 2021-12-23
Payer: COMMERCIAL

## 2021-12-23 DIAGNOSIS — S13.4XXD WHIPLASH INJURIES, SUBSEQUENT ENCOUNTER: ICD-10-CM

## 2021-12-23 DIAGNOSIS — F07.81 POSTCONCUSSION SYNDROME: Primary | ICD-10-CM

## 2021-12-23 DIAGNOSIS — H81.11 BENIGN PAROXYSMAL POSITIONAL VERTIGO OF RIGHT EAR: ICD-10-CM
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
## 2021-12-23 PROCEDURE — 97110 THERAPEUTIC EXERCISES: CPT | Performed by: PHYSICAL MEDICINE & REHABILITATION

## 2021-12-27 ENCOUNTER — APPOINTMENT (OUTPATIENT)
Dept: PHYSICAL THERAPY | Facility: CLINIC | Age: 41
End: 2021-12-27
Payer: COMMERCIAL

## 2021-12-29 ENCOUNTER — TELEMEDICINE (OUTPATIENT)
Dept: INTERNAL MEDICINE CLINIC | Facility: OTHER | Age: 41
End: 2021-12-29
Payer: MEDICARE

## 2021-12-29 VITALS — TEMPERATURE: 97.5 F | BODY MASS INDEX: 23.95 KG/M2 | WEIGHT: 158 LBS | HEIGHT: 68 IN

## 2021-12-29 DIAGNOSIS — F07.81 POSTCONCUSSION SYNDROME: ICD-10-CM

## 2021-12-29 DIAGNOSIS — J06.9 VIRAL UPPER RESPIRATORY TRACT INFECTION: ICD-10-CM

## 2021-12-29 DIAGNOSIS — F41.9 ANXIETY: ICD-10-CM

## 2021-12-29 DIAGNOSIS — J45.20 MILD INTERMITTENT EXTRINSIC ASTHMA WITHOUT COMPLICATION: ICD-10-CM

## 2021-12-29 DIAGNOSIS — J30.2 SEASONAL ALLERGIC RHINITIS, UNSPECIFIED TRIGGER: ICD-10-CM

## 2021-12-29 DIAGNOSIS — K50.00 CROHN'S DISEASE INVOLVING TERMINAL ILEUM (HCC): Primary | ICD-10-CM

## 2021-12-29 PROCEDURE — 99214 OFFICE O/P EST MOD 30 MIN: CPT | Performed by: INTERNAL MEDICINE

## 2022-01-17 DIAGNOSIS — F41.9 ANXIETY: ICD-10-CM

## 2022-01-18 RX ORDER — CLONAZEPAM 0.5 MG/1
0.5 TABLET ORAL EVERY 12 HOURS PRN
Qty: 60 TABLET | Refills: 1 | Status: SHIPPED | OUTPATIENT
Start: 2022-01-18 | End: 2022-04-04 | Stop reason: SDUPTHER

## 2022-01-26 ENCOUNTER — OFFICE VISIT (OUTPATIENT)
Dept: INTERNAL MEDICINE CLINIC | Facility: OTHER | Age: 42
End: 2022-01-26
Payer: COMMERCIAL

## 2022-01-26 VITALS
DIASTOLIC BLOOD PRESSURE: 82 MMHG | TEMPERATURE: 98.5 F | WEIGHT: 158 LBS | OXYGEN SATURATION: 99 % | HEIGHT: 68 IN | SYSTOLIC BLOOD PRESSURE: 130 MMHG | HEART RATE: 80 BPM | BODY MASS INDEX: 23.95 KG/M2

## 2022-01-26 DIAGNOSIS — M54.6 MIDLINE THORACIC BACK PAIN, UNSPECIFIED CHRONICITY: Primary | ICD-10-CM

## 2022-01-26 PROCEDURE — 99213 OFFICE O/P EST LOW 20 MIN: CPT | Performed by: INTERNAL MEDICINE

## 2022-01-26 RX ORDER — LIDOCAINE 50 MG/G
1 PATCH TOPICAL DAILY
Qty: 10 PATCH | Refills: 2 | Status: SHIPPED | OUTPATIENT
Start: 2022-01-26 | End: 2022-05-18
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
## 2022-02-04 ENCOUNTER — OFFICE VISIT (OUTPATIENT)
Dept: PAIN MEDICINE | Facility: MEDICAL CENTER | Age: 42
End: 2022-02-04
Payer: COMMERCIAL

## 2022-02-04 VITALS
WEIGHT: 159 LBS | TEMPERATURE: 99.5 F | BODY MASS INDEX: 24.1 KG/M2 | HEIGHT: 68 IN | DIASTOLIC BLOOD PRESSURE: 74 MMHG | HEART RATE: 78 BPM | OXYGEN SATURATION: 99 % | SYSTOLIC BLOOD PRESSURE: 124 MMHG

## 2022-02-04 DIAGNOSIS — M54.2 NECK PAIN: ICD-10-CM

## 2022-02-04 DIAGNOSIS — M54.6 ACUTE BILATERAL THORACIC BACK PAIN: Primary | ICD-10-CM

## 2022-02-04 DIAGNOSIS — M79.18 MYOFASCIAL PAIN SYNDROME: ICD-10-CM

## 2022-02-04 PROCEDURE — 99214 OFFICE O/P EST MOD 30 MIN: CPT | Performed by: NURSE PRACTITIONER
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
## 2022-02-09 RX ORDER — TRIAMCINOLONE ACETONIDE 40 MG/ML
40 INJECTION, SUSPENSION INTRA-ARTICULAR; INTRAMUSCULAR ONCE
Status: CANCELLED | OUTPATIENT
Start: 2022-02-10

## 2022-02-10 ENCOUNTER — PROCEDURE VISIT (OUTPATIENT)
Dept: PAIN MEDICINE | Facility: MEDICAL CENTER | Age: 42
End: 2022-02-10
Payer: COMMERCIAL

## 2022-02-10 DIAGNOSIS — M79.18 MYOFASCIAL PAIN SYNDROME: Primary | ICD-10-CM

## 2022-02-10 PROCEDURE — 20552 NJX 1/MLT TRIGGER POINT 1/2: CPT | Performed by: PHYSICAL MEDICINE & REHABILITATION

## 2022-02-10 PROCEDURE — 76942 ECHO GUIDE FOR BIOPSY: CPT | Performed by: PHYSICAL MEDICINE & REHABILITATION

## 2022-02-10 RX ORDER — BUPIVACAINE HYDROCHLORIDE 2.5 MG/ML
10 INJECTION, SOLUTION EPIDURAL; INFILTRATION; INTRACAUDAL ONCE
Status: COMPLETED | OUTPATIENT
Start: 2022-02-10 | End: 2022-02-10

## 2022-02-10 RX ADMIN — BUPIVACAINE HYDROCHLORIDE 10 ML: 2.5 INJECTION, SOLUTION EPIDURAL; INFILTRATION; INTRACAUDAL at 16:21

## 2022-02-10 NOTE — PROGRESS NOTES
Indication:  Muscular pain  Preprocedure diagnosis:  1  Myofascial pain syndrome  Postprocedure diagnosis:  1  Myofascial pain syndrome    Procedure:  Ultrasound-guided bilateral thoracic muscle trigger point injection(s)  After discussing the risks, benefits, and alternatives to the procedure, the patient expressed understanding and wished to proceed  The patient was brought to the procedure suite and placed in the prone position  A procedural pause was conducted to verify:  correct patient identity, procedure to be performed and as applicable, correct side and site, correct patient position, and availability of implants, special equipment or special requirements  A simple surgical tray was used  Prior to the procedure, the areas of maximal tenderness was examined with a 12 MHz linear transducer to visualize the rhomboid muscle region of maximal tenderness and determine the optimal needle path  Following this, the area was prepared with a ChloraPrep scrub, then re-examined using the same transducer, a sterile ultrasound transducer cover, and sterile ultrasound transducer gel  Thereafter, using ultrasound guidance, a 2 5-inch 25-gauge needle was advanced into the area of maximal tenderness  After visualization of the tip and negative aspiration for blood, 2 cc of 0 25% bupivacaine was injected into the 2 trigger points and the needle was fanned in the region to break up the muscular tension  Following the injection, the needle was withdrawn  The patient tolerated the procedure well and there were no apparent complications  After an appropriate amount of observation, the patient was dismissed from the clinic in good condition under their own power      [ ]

## 2022-02-17 ENCOUNTER — TELEPHONE (OUTPATIENT)
Dept: PAIN MEDICINE | Facility: CLINIC | Age: 42
End: 2022-02-17

## 2022-02-23 ENCOUNTER — OFFICE VISIT (OUTPATIENT)
Dept: INTERNAL MEDICINE CLINIC | Facility: OTHER | Age: 42
End: 2022-02-23
Payer: MEDICARE

## 2022-02-23 VITALS
RESPIRATION RATE: 18 BRPM | BODY MASS INDEX: 24.98 KG/M2 | OXYGEN SATURATION: 98 % | WEIGHT: 164.8 LBS | HEART RATE: 72 BPM | SYSTOLIC BLOOD PRESSURE: 134 MMHG | HEIGHT: 68 IN | TEMPERATURE: 98.4 F | DIASTOLIC BLOOD PRESSURE: 78 MMHG

## 2022-02-23 DIAGNOSIS — R51.9 CHRONIC INTRACTABLE HEADACHE, UNSPECIFIED HEADACHE TYPE: ICD-10-CM

## 2022-02-23 DIAGNOSIS — G89.29 CHRONIC MIDLINE LOW BACK PAIN WITHOUT SCIATICA: ICD-10-CM

## 2022-02-23 DIAGNOSIS — I45.81 PROLONGED QT INTERVAL SYNDROME: ICD-10-CM

## 2022-02-23 DIAGNOSIS — F17.200 SMOKING: ICD-10-CM

## 2022-02-23 DIAGNOSIS — J30.9 ALLERGIC RHINITIS, UNSPECIFIED SEASONALITY, UNSPECIFIED TRIGGER: ICD-10-CM

## 2022-02-23 DIAGNOSIS — G89.29 CHRONIC INTRACTABLE HEADACHE, UNSPECIFIED HEADACHE TYPE: ICD-10-CM

## 2022-02-23 DIAGNOSIS — F41.9 ANXIETY: ICD-10-CM

## 2022-02-23 DIAGNOSIS — M54.50 CHRONIC MIDLINE LOW BACK PAIN WITHOUT SCIATICA: ICD-10-CM

## 2022-02-23 DIAGNOSIS — K50.00 CROHN'S DISEASE INVOLVING TERMINAL ILEUM (HCC): Primary | ICD-10-CM

## 2022-02-23 PROBLEM — R29.818 NEUROLOGICAL DEFICIT PRESENT: Status: RESOLVED | Noted: 2019-10-31 | Resolved: 2022-02-23

## 2022-02-23 PROBLEM — F07.81 POSTCONCUSSION SYNDROME: Status: RESOLVED | Noted: 2021-11-15 | Resolved: 2022-02-23

## 2022-02-23 PROBLEM — J06.9 VIRAL UPPER RESPIRATORY TRACT INFECTION: Status: RESOLVED | Noted: 2021-08-20 | Resolved: 2022-02-23
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
## 2022-02-23 PROBLEM — R94.31 PROLONGED QT INTERVAL: Status: RESOLVED | Noted: 2021-08-20 | Resolved: 2022-02-23

## 2022-02-23 PROCEDURE — 99214 OFFICE O/P EST MOD 30 MIN: CPT | Performed by: INTERNAL MEDICINE

## 2022-02-23 RX ORDER — FLUTICASONE PROPIONATE 50 MCG
1 SPRAY, SUSPENSION (ML) NASAL DAILY PRN
Qty: 16 G | Refills: 1 | Status: SHIPPED | OUTPATIENT
Start: 2022-02-23

## 2022-02-23 NOTE — PROGRESS NOTES
Assessment/Plan:    Crohn's disease involving terminal ileum ([facility] )  continue follow up with GI  Allergic rhinitis  Continue zyrtec and Flonase as needed  Prolonged QT interval syndrome  Loop recorder in place  Continue follow up with cardiology  Anxiety  Stable  Continue klonopin 0 5 mg BID PRN for increased agitation/jenelle/insomnia  Chronic headaches  Continue tylenol as needed for acute breakthrough headaches  Continue ROM and stretching exercises  Continue follow up with chiropractor and pain management  Smoking  Counseled patient on smoking cessation  Diagnoses and all orders for this visit:    Crohn's disease involving terminal ileum ([facility] )    Allergic rhinitis, unspecified seasonality, unspecified trigger  -     fluticasone (FLONASE) 50 mcg/act nasal spray; 1 spray into each nostril daily as needed for rhinitis    Prolonged QT interval syndrome    Anxiety    Chronic intractable headache, unspecified headache type    Chronic midline low back pain without sciatica    Smoking                  Subjective:      Patient ID: Anthony Chang is a 37 y o  male  Chief Complaint   Patient presents with    Follow-up     4 week back pain, sore today was at chiropractor yesterday  can lift neck but has more movement and felt amazing after shots pain managment gave him did not help     health maintenance     AWV after 7/19/22, PHQ       37year old male is seen today for follow up of chronic conditions  No labs to review today  He has been compliant with medication regimen  He follows up with his pain management doctor regularly  He denies any recent Crohn's flares although did go to the emergency department abdominal pain  Symptoms have since subsided  He continues to experience intermittent episodes of occasional lightheadedness and dizziness  Loop recorder in place  Anxiety  Presents for follow-up visit   Patient reports no chest pain, dizziness, nausea, palpitations, shortness of breath or suicidal ideas  Symptoms occur occasionally  The severity of symptoms is mild  The patient sleeps 8 hours per night  The quality of sleep is good  Nighttime awakenings: none  Compliance with medications is %  The following portions of the patient's history were reviewed and updated as appropriate: allergies, current medications, past family history, past medical history, past social history, past surgical history and problem list     Review of Systems   Constitutional: Negative for activity change, appetite change, chills, diaphoresis, fatigue and fever  HENT: Negative for congestion, postnasal drip, rhinorrhea, sinus pressure, sinus pain, sneezing and sore throat  Eyes: Negative for visual disturbance  Respiratory: Negative for apnea, cough, choking, chest tightness, shortness of breath and wheezing  Cardiovascular: Negative for chest pain, palpitations and leg swelling  Gastrointestinal: Negative for abdominal distention, abdominal pain, anal bleeding, blood in stool, constipation, diarrhea, nausea and vomiting  Endocrine: Negative for cold intolerance and heat intolerance  Genitourinary: Negative for difficulty urinating, dysuria and hematuria  Musculoskeletal: Negative  Skin: Negative  Neurological: Negative for dizziness, weakness, light-headedness, numbness and headaches  Hematological: Negative for adenopathy  Psychiatric/Behavioral: Negative for agitation, sleep disturbance and suicidal ideas  All other systems reviewed and are negative  Past Medical History:   Diagnosis Date    Abdominal pain     Abnormal liver function test     last assessed: Oct 16, 2013     Abnormal weight loss     last assessed: Yung 15, 2014     Acute left-sided low back pain with left-sided sciatica 10/31/2019    Allergic rhinitis due to pollen     last assessed: Yung 15, 2014     Anxiety     Anxiety     last assessed/resolved:  Aug 5, 2015     Asthma last assessed: Yung 15, 2014     Benign essential HTN     last assessed/resolved: Feb 23, 2017     Cervical lymphadenopathy     last assessed/resolved: Feb 23, 2017     Chronic sinusitis     last assessed: Yung 15, 2014     Constipation     Crohn's disease ([facility] ) 01/01/2011    last assessed: Yung 15, 2014     Dysuria     last assessed: April 29, 2016     Elevated BP without diagnosis of hypertension     last assessed/resolved: Feb 23, 2017     Esophageal reflux     last assessed/resolved: Feb 23, 2017     Eustachian tube anomaly     Resolved: Nov 9, 2017     External hemorrhoids     last assessed: Yung 15, 2014     Gastritis and duodenitis 6/14/2021    Hypertension     borderline    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     last assessed/resolved: July 19, 2017     Inflammatory bowel disease     Pancreatic neoplasm     last assessed: Yung 15, 2014     PONV (postoperative nausea and vomiting)     Positive depression screening     resolved: July 24, 2017     Postconcussion syndrome 11/15/2021    Postoperative ileus ([facility] ) 11/12/2018    Psoriasis     Seasonal allergies     Small bowel obstruction ([place] [id number] ) 11/11/2018    Vitamin B12 deficiency     last assessed/resolved: Feb 23, 2017          Current Outpatient Medications:     acetaminophen (TYLENOL) 500 mg tablet, Take 1,000 mg by mouth every 4 (four) hours as needed , Disp: , Rfl:     cetirizine (ZyrTEC) 10 mg tablet, Take 1 tablet (10 mg total) by mouth daily as needed for allergies, Disp: 30 tablet, Rfl: 5    clonazePAM (KlonoPIN) 0 5 mg tablet, Take 1 tablet (0 5 mg total) by mouth every 12 (twelve) hours as needed for anxiety, Disp: 60 tablet, Rfl: 1    famotidine (PEPCID) 20 mg tablet, Take 1 tablet (20 mg total) by mouth 2 (two) times a day (Patient taking differently: Take 20 mg by mouth as needed  ), Disp: 60 tablet, Rfl: 1    fluticasone (FLONASE) 50 mcg/act nasal spray, 1 spray into each nostril daily as needed for rhinitis, Disp: 16 g, Rfl: 1    lidocaine (Lidoderm) 5 %, Apply 1 patch topically daily Remove & Discard patch within 12 hours or as directed by MD, Disp: 10 patch, Rfl: 2    Linaclotide (LINZESS) 145 MCG CAPS, Take 1 capsule (145 mcg total) by mouth daily (Patient taking differently: Take 145 mcg by mouth as needed ), Disp: 90 capsule, Rfl: 1    NON FORMULARY, , Disp: , Rfl:     Allergies   Allergen Reactions    Apple - Food Allergy Anaphylaxis    Aspirin Anaphylaxis and Hives     Category: Allergy;     Eggs Or Egg-Derived Products - Food Allergy GI Intolerance     GI upset    Ibuprofen Anaphylaxis and Hives     Category: Allergy; aspirin    Nsaids Anaphylaxis     Category: Allergy;     Other Anaphylaxis     Category: Allergy; Annotation - [id number]: cherries, grapes , and kiwis; pt states all fruits    Peanuts [Peanut Oil - Food Allergy] Anaphylaxis     nuts    Penicillins Shortness Of Breath    Methocarbamol Other (See Comments)     Patient reports not feeling well   Ciprofloxacin Other (See Comments)     Prolongs QT interval due to heart conditon, cannot take   Pollen Extract     Codeine Anxiety       Social History   Past Surgical History:   Procedure Laterality Date    CARDIAC LOOP RECORDER      Late 2021    CHOLECYSTECTOMY      resolved: 2011     COLONOSCOPY N/A 11/18/2016    Procedure: COLONOSCOPY;  Surgeon: Ryder Taylor MD;  Location:  GI LAB; Service:     COLONOSCOPY N/A 4/28/2016    Procedure: COLONOSCOPY;  Surgeon: Ryder Taylor MD;  Location: [facility] GI LAB; Service:     COLONOSCOPY  02/09/2021    ESOPHAGOGASTRODUODENOSCOPY N/A 4/28/2016    Procedure: ESOPHAGOGASTRODUODENOSCOPY (EGD); Surgeon: Ryder Taylor MD;  Location: [facility] GI LAB; Service:     FRONTAL SINUSOTOMY      resolved: April 2009     AR COLONOSCOPY FLX DX W/COLLJ [facility] REHABILITATION WHEN PFRMD N/A 10/12/2018    Procedure: EGD AND COLONOSCOPY;  Surgeon: Ahmed Galeazzi, MD;  Location: [facility] GI LAB;   Service: Gastroenterology    AR LAP, INCISIONAL HERNIA REPAIR,REDUCIBLE N/A 11/8/2018    Procedure: REPAIR HERNIA INCISIONAL LAPAROSCOPIC;  Surgeon: Maribeth Nance MD;  [facility];  Service: General    ID REPAIR OF NASAL SEPTUM N/A 7/28/2017    Procedure: REVISION SEPTOPLASTY; TURBINOPLASTY; BILATERAL  GRAFTS; ALAR GRAFT; POSSIBLE AURICULAR CARTILAGE GRAFT;  Surgeon: Kylee Gray MD;  [facility];  Service: ENT    TONSILLECTOMY AND ADENOIDECTOMY      UPPER GASTROINTESTINAL ENDOSCOPY  02/09/2021     Family History   Problem Relation Age of Onset    Diabetes Mother         mellitus     Hypertension Mother     Thyroid disease Mother     Fibromyalgia Mother     Hypertension Father     Hypertension Sister     Heart disease Sister         had defibulator put due to late beats    No Known Problems Maternal Grandmother     No Known Problems Maternal Grandfather     Diabetes Paternal Grandmother     Diabetes Paternal Grandfather     Kidney failure Paternal Grandfather     Diabetes Other         mellitus     Rheum arthritis Cousin        Objective:  /78 (BP Location: Left arm, Patient Position: Sitting, Cuff Size: Standard)   Pulse 72   Temp 98 4 °F (36 9 °C) (Temporal)   Resp 18   Ht 5' 8" (1 727 m)   Wt 74 8 kg (164 lb 12 8 oz)   SpO2 98%   BMI 25 06 kg/m²     No results found for this or any previous visit (from the past 1344 hour(s))  Physical Exam  Vitals and nursing note reviewed  Constitutional:       General: He is not in acute distress  Appearance: He is well-developed  He is not diaphoretic  HENT:      Head: Normocephalic and atraumatic  Eyes:      General: No scleral icterus  Right eye: No discharge  Left eye: No discharge  Conjunctiva/sclera: Conjunctivae normal       Pupils: Pupils are equal, round, and reactive to light  Neck:      Thyroid: No thyromegaly  Vascular: No JVD  Cardiovascular:      Rate and Rhythm: Normal rate and regular rhythm        Heart sounds: Normal heart sounds  No murmur heard  No friction rub  No gallop  Pulmonary:      Effort: Pulmonary effort is normal  No respiratory distress  Breath sounds: Normal breath sounds  No wheezing or rales  Chest:      Chest wall: No tenderness  Abdominal:      General: Bowel sounds are normal  There is no distension  Palpations: Abdomen is soft  There is no mass  Tenderness: There is no abdominal tenderness  There is no guarding or rebound  Musculoskeletal:         General: No tenderness or deformity  Normal range of motion  Cervical back: Normal range of motion and neck supple  Lymphadenopathy:      Cervical: No cervical adenopathy  Skin:     General: Skin is warm and dry  Coloration: Skin is not pale  Findings: No erythema or rash  Neurological:      Mental Status: He is alert and oriented to person, place, and time  Cranial Nerves: No cranial nerve deficit  Coordination: Coordination normal       Deep Tendon Reflexes: Reflexes are normal and symmetric  Psychiatric:         Behavior: Behavior normal          Thought Content:  Thought content normal          Judgment: Judgment normal

## 2022-02-23 NOTE — ASSESSMENT & PLAN NOTE
Continue tylenol as needed for acute breakthrough headaches  Continue ROM and stretching exercises  Continue follow up with chiropractor and pain management

## 2022-03-14 ENCOUNTER — OFFICE VISIT (OUTPATIENT)
Dept: PAIN MEDICINE | Facility: MEDICAL CENTER | Age: 42
End: 2022-03-14
Payer: MEDICARE

## 2022-03-14 VITALS
TEMPERATURE: 98.2 F | SYSTOLIC BLOOD PRESSURE: 148 MMHG | WEIGHT: 168 LBS | BODY MASS INDEX: 25.46 KG/M2 | HEART RATE: 85 BPM | DIASTOLIC BLOOD PRESSURE: 95 MMHG | HEIGHT: 68 IN

## 2022-03-14 DIAGNOSIS — M54.12 CERVICAL RADICULOPATHY: ICD-10-CM

## 2022-03-14 DIAGNOSIS — M79.18 MYOFASCIAL PAIN SYNDROME: ICD-10-CM

## 2022-03-14 DIAGNOSIS — M54.6 CHRONIC MIDLINE THORACIC BACK PAIN: Primary | ICD-10-CM

## 2022-03-14 DIAGNOSIS — M54.2 NECK PAIN: ICD-10-CM

## 2022-03-14 DIAGNOSIS — G89.29 CHRONIC MIDLINE THORACIC BACK PAIN: Primary | ICD-10-CM

## 2022-03-14 PROCEDURE — 99213 OFFICE O/P EST LOW 20 MIN: CPT | Performed by: NURSE PRACTITIONER

## 2022-03-14 NOTE — PROGRESS NOTES
Assessment:  1  Chronic midline thoracic back pain    2  Myofascial pain syndrome    3  Neck pain    4  Cervical radiculopathy        Plan: At this time I did discuss with the patient moving forward with another set of bilateral thoracic muscle trigger point injections above his last injection site is he tells me that is where his pain is at this time  He would like to hold off and if he decides to move forward he will call to schedule  He did bring a cervical spine MRI with him today that was ordered by his chiropractor  We did review it and there is a left-sided disc protrusion at C5-6 causing severe narrowing of the left C5 neural foramen  We discussed the option of a cervical epidural steroid injection however he tells me he does get adverse reactions to oral steroids and he is concerned about having a injection that require steroids  He is going to return to his chiropractor to see if there is anything that they can do for him  Follow-up as needed  No orders of the defined types were placed in this encounter  No orders of the defined types were placed in this encounter  My impressions and treatment recommendations were discussed in detail with the patient, who verbalized understanding and had no further questions  History of Present Illness:    Juan Ramon Juan is a 37 y o  male who presents for follow-up related to chronic thoracic myofascial pain symptoms  As well as left-sided neck and arm pain symptoms  Today he reports he is doing worsened rates his pain 4 to 10/10  He describes his pain as burning, dull, aching, and sharp and he localizes this to his left arm with numbness and tingling into his left hand  Patient does participate in chiropractic therapy with Dr Baldev Abarca every other week  He did just order him a new cervical spine MRI and the patient did bring this with him today      He is status post ultrasound-guided bilateral thoracic muscle trigger point injections on February 10, 2022 with Dr Priya Rebollar and he tells me that he may have gotten slight relief  He also says that the pain he is experiencing is above the area that was injected  Patient uses Tylenol as needed  Review of Systems:    Review of Systems   Constitutional: Negative for chills, fatigue and unexpected weight change  HENT: Negative for ear pain, mouth sores, nosebleeds, sinus pain and sore throat  Eyes: Negative for pain and visual disturbance  Respiratory: Negative for choking and wheezing  Cardiovascular: Negative for chest pain and palpitations  Gastrointestinal: Negative for abdominal pain and nausea  Endocrine: Negative for cold intolerance and heat intolerance  Genitourinary: Negative for decreased urine volume, enuresis and hematuria  Musculoskeletal: Negative for arthralgias, joint swelling and myalgias  Skin: Negative for rash  Allergic/Immunologic: Negative for environmental allergies  Neurological: Negative for dizziness, weakness and numbness  Hematological: Does not bruise/bleed easily  Psychiatric/Behavioral: Negative for behavioral problems and self-injury  The patient is not hyperactive  Patient Active Problem List   Diagnosis    Anxiety    Allergic rhinitis    Deviated septum    [facility]'s disease involving terminal ileum ([facility] )    Retention cyst of nasal sinus    Chronic midline thoracic back pain    Allergic asthma    S/P repair of ventral hernia    Functional abdominal pain syndrome    BMI 25 0-25 9,adult    Chronic headaches    Degenerative disc disease, lumbar    Chronic midline low back pain without sciatica    Family history of heart disease    Smoking    Cervical paraspinal muscle spasm    Prolonged QT interval syndrome       Past Medical History:   Diagnosis Date    Abdominal pain     Abnormal liver function test     last assessed:  Oct 16, 2013     Abnormal weight loss     last assessed: Yung 15, 2014     Acute left-sided low back pain with left-sided sciatica 10/31/2019    Allergic rhinitis due to pollen     last assessed: Yung 15, 2014     Anxiety     Anxiety     last assessed/resolved: Aug 5, 2015     Asthma     last assessed: Yung 15, 2014     Benign essential HTN     last assessed/resolved: Feb 23, 2017     Cervical lymphadenopathy     last assessed/resolved: Feb 23, 2017     Chronic sinusitis     last assessed: Yung 15, 2014     Constipation     Crohn's disease ([facility] ) 01/01/2011    last assessed: Yung 15, 2014     Dysuria     last assessed: April 29, 2016     Elevated BP without diagnosis of hypertension     last assessed/resolved: Feb 23, 2017     Esophageal reflux     last assessed/resolved: Feb 23, 2017     Eustachian tube anomaly     Resolved: Nov 9, 2017     External hemorrhoids     last assessed: Yung 15, 2014     Gastritis and duodenitis 6/14/2021    Hypertension     borderline    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     last assessed/resolved: July 19, 2017     Inflammatory bowel disease     Pancreatic neoplasm     last assessed: Yung 15, 2014     PONV (postoperative nausea and vomiting)     Positive depression screening     resolved: July 24, 2017     Postconcussion syndrome 11/15/2021    [facility]us ([facility] ) 11/12/2018    Psoriasis     Seasonal allergies     Small bowel obstruction ([facility] ) 11/11/2018    Vitamin B12 deficiency     last assessed/resolved: Feb 23, 2017        Past Surgical History:   Procedure Laterality Date    CARDIAC LOOP RECORDER      Late 2021    CHOLECYSTECTOMY      resolved: 2011     COLONOSCOPY N/A 11/18/2016    Procedure: COLONOSCOPY;  Surgeon: Christopher Herrera MD;  Location:  GI LAB; Service:     COLONOSCOPY N/A 4/28/2016    Procedure: COLONOSCOPY;  Surgeon: Christopher Herrera MD;  Location: [facility] GI LAB; Service:     COLONOSCOPY  02/09/2021    ESOPHAGOGASTRODUODENOSCOPY N/A 4/28/2016    Procedure: ESOPHAGOGASTRODUODENOSCOPY (EGD);   Surgeon: Christopher Herrera MD;  Location: [facility] GI LAB; Service:     FRONTAL SINUSOTOMY      resolved: April 2009     IA COLONOSCOPY FLX DX W/COLLJ [facility] REHABILITATION WHEN PFRMD N/A 10/12/2018    Procedure: EGD AND COLONOSCOPY;  Surgeon: Mona Morrell MD;  Location: [facility] GI LAB; Service: Gastroenterology    IA LAP, INCISIONAL HERNIA REPAIR,REDUCIBLE N/A 11/8/2018    Procedure: REPAIR HERNIA INCISIONAL LAPAROSCOPIC;  Surgeon: Juan Antonio Thompson MD;  [facility];  Service: General    IA REPAIR OF NASAL SEPTUM N/A 7/28/2017    Procedure: REVISION SEPTOPLASTY; TURBINOPLASTY; BILATERAL  GRAFTS; ALAR GRAFT; POSSIBLE AURICULAR CARTILAGE GRAFT;  Surgeon: Isabel Dawson MD;  [facility];  Service: ENT    TONSILLECTOMY AND ADENOIDECTOMY      UPPER GASTROINTESTINAL ENDOSCOPY  02/09/2021       Family History   Problem Relation Age of Onset    Diabetes Mother         mellitus     Hypertension Mother     Thyroid disease Mother     Fibromyalgia Mother    Annmarie Che Hypertension Father     Hypertension Sister     Heart disease Sister         had defibulator put due to late beats    No Known Problems Maternal Grandmother     No Known Problems Maternal Grandfather     Diabetes Paternal Grandmother     Diabetes Paternal Grandfather     Kidney failure Paternal Grandfather     Diabetes Other         mellitus     Rheum arthritis Cousin        Social History     Occupational History    Occupation:     Tobacco Use    Smoking status: Current Every Day Smoker     Packs/day: 0 50     Types: Cigarettes    Smokeless tobacco: Never Used    Tobacco comment: Dependence on nicotine in cigarettes - 1/2 PPD x 20 years    Vaping Use    Vaping Use: Some days    Substances: THC, CBD   Substance and Sexual Activity    Alcohol use: Yes     Comment: rare - once a year    Drug use: Yes     Types: Marijuana     Comment: uses medical marijuana via vaping   last use 2/8/19    Sexual activity: Yes         Current Outpatient Medications:     acetaminophen (TYLENOL) 500 mg tablet, Take 1,000 mg by mouth every 4 (four) hours as needed , Disp: , Rfl:     cetirizine (ZyrTEC) 10 mg tablet, Take 1 tablet (10 mg total) by mouth daily as needed for allergies, Disp: 30 tablet, Rfl: 5    clonazePAM (KlonoPIN) 0 5 mg tablet, Take 1 tablet (0 5 mg total) by mouth every 12 (twelve) hours as needed for anxiety, Disp: 60 tablet, Rfl: 1    famotidine (PEPCID) 20 mg tablet, Take 1 tablet (20 mg total) by mouth 2 (two) times a day (Patient taking differently: Take 20 mg by mouth as needed  ), Disp: 60 tablet, Rfl: 1    fluticasone (FLONASE) 50 mcg/act nasal spray, 1 spray into each nostril daily as needed for rhinitis, Disp: 16 g, Rfl: 1    lidocaine (Lidoderm) 5 %, Apply 1 patch topically daily Remove & Discard patch within 12 hours or as directed by MD, Disp: 10 patch, Rfl: 2    Linaclotide (LINZESS) 145 MCG CAPS, Take 1 capsule (145 mcg total) by mouth daily (Patient taking differently: Take 145 mcg by mouth as needed ), Disp: 90 capsule, Rfl: 1    NON FORMULARY, , Disp: , Rfl:     Allergies   Allergen Reactions    Apple - Food Allergy Anaphylaxis    Aspirin Anaphylaxis and Hives     Category: Allergy;     Eggs Or Egg-Derived Products - Food Allergy GI Intolerance     GI upset    Ibuprofen Anaphylaxis and Hives     Category: Allergy; aspirin    Nsaids Anaphylaxis     Category: Allergy;     Other Anaphylaxis     Category: Allergy; Annotation - [id number]: cherries, grapes , and kiwis; pt states all fruits    Peanuts [Peanut Oil - Food Allergy] Anaphylaxis     nuts    Penicillins Shortness Of Breath    Methocarbamol Other (See Comments)     Patient reports not feeling well   Ciprofloxacin Other (See Comments)     Prolongs QT interval due to heart conditon, cannot take      Pollen Extract     Codeine Anxiety       Physical Exam:    /95   Pulse 85   Temp 98 2 °F (36 8 °C)   Ht 5' 8" (1 727 m)   Wt 76 2 kg (168 lb)   BMI 25 54 kg/m²     Constitutional: normal, well developed, well nourished, alert, in no distress and non-toxic and no overt pain behavior  Eyes: anicteric  HEENT: grossly intact  Neck: supple, symmetric, trachea midline and no masses   Pulmonary:even and unlabored  Cardiovascular:No edema or pitting edema present  Skin:Normal without rashes or lesions and well hydrated  Psychiatric:Mood and affect appropriate  Neurologic:Cranial Nerves II-XII grossly intact  Musculoskeletal:normal       Imaging  No orders to display       No orders of the defined types were placed in this encounter

## 2022-04-04 DIAGNOSIS — F41.9 ANXIETY: ICD-10-CM

## 2022-04-04 RX ORDER — CLONAZEPAM 0.5 MG/1
0.5 TABLET ORAL EVERY 12 HOURS PRN
Qty: 60 TABLET | Refills: 1 | Status: SHIPPED | OUTPATIENT
Start: 2022-04-04 | End: 2022-06-17 | Stop reason: SDUPTHER

## 2022-04-27 ENCOUNTER — OFFICE VISIT (OUTPATIENT)
Dept: PAIN MEDICINE | Facility: MEDICAL CENTER | Age: 42
End: 2022-04-27
Payer: MEDICARE

## 2022-04-27 VITALS
SYSTOLIC BLOOD PRESSURE: 134 MMHG | OXYGEN SATURATION: 98 % | BODY MASS INDEX: 25.31 KG/M2 | TEMPERATURE: 98.3 F | HEART RATE: 70 BPM | DIASTOLIC BLOOD PRESSURE: 72 MMHG | HEIGHT: 68 IN | WEIGHT: 167 LBS

## 2022-04-27 DIAGNOSIS — M54.12 CERVICAL RADICULOPATHY: ICD-10-CM

## 2022-04-27 DIAGNOSIS — M54.6 CHRONIC LEFT-SIDED THORACIC BACK PAIN: ICD-10-CM

## 2022-04-27 DIAGNOSIS — M54.2 NECK PAIN: ICD-10-CM

## 2022-04-27 DIAGNOSIS — G89.4 CHRONIC PAIN SYNDROME: Primary | ICD-10-CM

## 2022-04-27 DIAGNOSIS — G89.29 CHRONIC LEFT-SIDED THORACIC BACK PAIN: ICD-10-CM

## 2022-04-27 PROCEDURE — 99213 OFFICE O/P EST LOW 20 MIN: CPT | Performed by: NURSE PRACTITIONER

## 2022-04-27 NOTE — PROGRESS NOTES
Assessment  1  Chronic pain syndrome    2  Chronic left-sided thoracic back pain    3  Neck pain    4  Cervical radiculopathy        Plan  1  Schedule appointment with Dr Koby Goldsmith  Please see HPI for more information      My impressions and treatment recommendations were discussed in detail with the patient who verbalized understanding and had no further questions  Discharge instructions were provided  I personally saw and examined the patient and I agree with the above discussed plan of care  No orders of the defined types were placed in this encounter  No orders of the defined types were placed in this encounter  History of Present Illness    Lyssa Ruiz is a 40 y o  male the patient was seen in the office for follow-up of his chronic pain secondary to left-sided thoracic back pain and neck pain  He had thoracic trigger point injections on February 10, 2022 and his last office visit was on 03/14/2022  He has been seeing a chiropractor since early February and completed physical therapy within the past 6 months  He had an MRI of the cervical spine that showed evidence of a left-sided disc protrusion at C5-C6 which causes mass effect on the ventral thecal sac and spinal cord and extends into the left C5 neural foramina with moderate to severe narrowing of the left C5 neural foramen  He tells me that the chiropractor suggested he get an MRI of his thoracic spine  I explained to him that his symptoms do not warrant an MRI of the thoracic spine because the cervical MRI show the area of the thoracic spine that would be causing his pain symptoms  The patient has Crohn's disease in a serious allergic reaction to oral steroids so is not comfortable moving forward with a cervical epidural steroid injection at this time  He is also limited in the types of medication that he can take due to his Crohn's disease as well as his prolonged QT interval syndrome    I did discuss starting Lyrica with him as this was a medication that would not cause any issues with his prolonged QT interval syndrome, but he stated he did not want to try any new medications at this time because he felt like that was just putting a Band-Aid on the issue  He will schedule an appointment with Dr Dean Guillen for further evaluation and to discuss the possibility of trying an epidural injection  I have personally reviewed and/or updated the patient's past medical history, past surgical history, family history, social history, current medications, allergies, and vital signs today  Review of Systems   Respiratory: Negative for shortness of breath  Cardiovascular: Negative for chest pain  Gastrointestinal: Negative for constipation, diarrhea, nausea and vomiting  Musculoskeletal: Positive for back pain and myalgias  Negative for arthralgias, gait problem and joint swelling  Skin: Negative for rash  Neurological: Negative for dizziness, seizures and weakness  All other systems reviewed and are negative  Patient Active Problem List   Diagnosis    Anxiety    Allergic rhinitis    Deviated septum    Crohn's disease involving terminal ileum ([address] )    Retention cyst of nasal sinus    Chronic midline thoracic back pain    Allergic asthma    S/P repair of ventral hernia    Functional abdominal pain syndrome    BMI 25 0-25 9,adult    Chronic headaches    Degenerative disc disease, lumbar    Chronic midline low back pain without sciatica    Family history of heart disease    Smoking    Cervical paraspinal muscle spasm    Prolonged QT interval syndrome       Past Medical History:   Diagnosis Date    Abdominal pain     Abnormal liver function test     last assessed:  Oct 16, 2013     Abnormal weight loss     last assessed: Yung 15, 2014     Acute left-sided low back pain with left-sided sciatica 10/31/2019    Allergic rhinitis due to pollen     last assessed: Yung 15, 2014     Anxiety     Anxiety     last assessed/resolved: Aug 5, 2015     Asthma     last assessed: Yung 15, 2014     Benign essential HTN     last assessed/resolved: Feb 23, 2017     Cervical lymphadenopathy     last assessed/resolved: Feb 23, 2017     Chronic sinusitis     last assessed: Yung 15, 2014     Constipation     Cro[address]'s [address] [address] ) 01/01/2011    last assessed: Yung 15, 2014     Dysuria     last assessed: April 29, 2016     Elevated BP without diagnosis of hypertension     last assessed/resolved: Feb 23, 2017     Esophageal reflux     last assessed/resolved: Feb 23, 2017     Eustachian tube anomaly     Resolved: Nov 9, 2017     External hemorrhoids     last assessed: Yung 15, 2014     Gastritis and duodenitis 6/14/2021    Hypertension     borderline    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     last assessed/resolved: July 19, 2017     Inflammatory bowel disease     Pancreatic neoplasm     last assessed: Yung 15, 2014     PONV (postoperative nausea and vomiting)     Positive depression screening     resolved: July 24, 2017     Postconcussion syndrome 11/15/2021    [facility] ) 11/12/2018    Psoriasis     Seasonal allergies     Small bowel obstruction ([facility] ) 11/11/2018    Vitamin B12 deficiency     last assessed/resolved: Feb 23, 2017        Past Surgical History:   Procedure Laterality Date    CARDIAC LOOP RECORDER      Late 2021    CHOLECYSTECTOMY      resolved: 2011     COLONOSCOPY N/A 11/18/2016    Procedure: COLONOSCOPY;  Surgeon: Dilia Murphy MD;  Location:  GI LAB; Service:     COLONOSCOPY N/A 4/28/2016    Procedure: COLONOSCOPY;  Surgeon: Dilia Murphy MD;  Location: [facility] GI LAB; Service:     COLONOSCOPY  02/09/2021    ESOPHAGOGASTRODUODENOSCOPY N/A 4/28/2016    Procedure: ESOPHAGOGASTRODUODENOSCOPY (EGD); Surgeon: Dilia Murphy MD;  Location: [facility] GI LAB;   Service:     FRONTAL SINUSOTOMY      resolved: April 2009     [place] COLONOSCOPY FLX DX W[facility] WHEN PFRMD N/A 10/12/2018 Procedure: EGD AND COLONOSCOPY;  Surgeon: Elisha Celaya MD;  Location: [facility] GI LAB; Service: Gastroenterology    ID LAP, INCISIONAL HERNIA REPAIR,REDUCIBLE N/A 11/8/2018    Procedure: REPAIR HERNIA INCISIONAL LAPAROSCOPIC;  Surgeon: Dillan Thomason MD;  Location:  MAIN OR;  Service: General    ID REPAIR OF NASAL SEPTUM N/A 7/28/2017    Procedure: REVISION SEPTOPLASTY; TURBINOPLASTY; BILATERAL  GRAFTS; ALAR GRAFT; POSSIBLE AURICULAR CARTILAGE GRAFT;  Surgeon: Daphne Ibrahim MD;  [facility];  Service: ENT    TONSILLECTOMY AND ADENOIDECTOMY      UPPER GASTROINTESTINAL ENDOSCOPY  02/09/2021       Family History   Problem Relation Age of Onset    Diabetes Mother         mellitus     Hypertension Mother     Thyroid disease Mother     Fibromyalgia Mother    [place] Aiden Hypertension Father     Hypertension Sister     Heart disease Sister         had defibulator put due to late beats    No Known Problems Maternal Grandmother     No Known Problems Maternal Grandfather     Diabetes Paternal Grandmother     Diabetes Paternal Grandfather     Kidney failure Paternal Grandfather     Diabetes Other         mellitus     Rheum arthritis Cousin        Social History     Occupational History    Occupation:     Tobacco Use    Smoking status: Current Every Day Smoker     Packs/day: 0 50     Types: Cigarettes    Smokeless tobacco: Never Used    Tobacco comment: Dependence on nicotine in cigarettes - 1/2 PPD x 20 years    Vaping Use    Vaping Use: Some days    Substances: THC, CBD   Substance and Sexual Activity    Alcohol use: Yes     Comment: rare - once a year    Drug use: Yes     Types: Marijuana     Comment: uses medical marijuana via vaping   last use 2/8/19    Sexual activity: Yes       Current Outpatient Medications on File Prior to Visit   Medication Sig    acetaminophen (TYLENOL) 500 mg tablet Take 1,000 mg by mouth every 4 (four) hours as needed     cetirizine (ZyrTEC) 10 mg tablet Take 1 tablet (10 mg total) by mouth daily as needed for allergies    clonazePAM (KlonoPIN) 0 5 mg tablet Take 1 tablet (0 5 mg total) by mouth every 12 (twelve) hours as needed for anxiety    famotidine (PEPCID) 20 mg tablet Take 1 tablet (20 mg total) by mouth 2 (two) times a day (Patient taking differently: Take 20 mg by mouth as needed  )    fluticasone (FLONASE) 50 mcg/act nasal spray 1 spray into each nostril daily as needed for rhinitis    Linaclotide (LINZESS) 145 MCG CAPS Take 1 capsule (145 mcg total) by mouth daily (Patient taking differently: Take 145 mcg by mouth as needed )    NON FORMULARY     lidocaine (Lidoderm) 5 % Apply 1 patch topically daily Remove & Discard patch within 12 hours or as directed by MD (Patient not taking: Reported on 4/27/2022 )     No current facility-administered medications on file prior to visit  Allergies   Allergen Reactions    Apple - Food Allergy Anaphylaxis    Aspirin Anaphylaxis and Hives     Category: Allergy;     Corticosteroids Anaphylaxis and Drowsiness    Eggs Or Egg-Derived Products - Food Allergy GI Intolerance     GI upset    Ibuprofen Anaphylaxis and Hives     Category: Allergy; aspirin    Nsaids Anaphylaxis     Category: Allergy;     Other Anaphylaxis     Category: Allergy; Annotation - [id number]: cherries, grapes , and kiwis; pt states all fruits    Peanuts [Peanut Oil - Food Allergy] Anaphylaxis     nuts    Penicillins Shortness Of Breath    Methocarbamol Other (See Comments)     Patient reports not feeling well   Ciprofloxacin Other (See Comments)     Prolongs QT interval due to heart conditon, cannot take   Pollen Extract     Codeine Anxiety       Physical Exam    /72   Pulse 70   Temp 98 3 °F (36 8 °C)   Ht 5' 8" (1 727 m)   Wt 75 8 kg (167 lb)   SpO2 98%   BMI 25 39 kg/m²     Constitutional: normal, well developed, well nourished, alert, in no distress and non-toxic and no overt pain behavior    Eyes: anicteric  HEENT: grossly intact  Neck: supple, symmetric, trachea midline and no masses   Pulmonary:even and unlabored  Cardiovascular:No edema or pitting edema present  Skin:Normal without rashes or lesions and well hydrated  Psychiatric:Mood and affect appropriate  Neurologic:Cranial Nerves II-XII grossly intact  Musculoskeletal:Tenderness with palpation over left thoracic paraspinal muscle causing radiating symptoms down left arm    Imaging

## 2022-05-09 ENCOUNTER — TELEPHONE (OUTPATIENT)
Dept: INTERNAL MEDICINE CLINIC | Facility: OTHER | Age: 42
End: 2022-05-09

## 2022-05-18 ENCOUNTER — OFFICE VISIT (OUTPATIENT)
Dept: INTERNAL MEDICINE CLINIC | Facility: OTHER | Age: 42
End: 2022-05-18
Payer: COMMERCIAL

## 2022-05-18 VITALS
WEIGHT: 165.2 LBS | DIASTOLIC BLOOD PRESSURE: 84 MMHG | TEMPERATURE: 99.4 F | BODY MASS INDEX: 25.04 KG/M2 | OXYGEN SATURATION: 99 % | RESPIRATION RATE: 18 BRPM | HEIGHT: 68 IN | SYSTOLIC BLOOD PRESSURE: 122 MMHG | HEART RATE: 76 BPM

## 2022-05-18 DIAGNOSIS — M51.36 DEGENERATIVE DISC DISEASE, LUMBAR: Primary | ICD-10-CM

## 2022-05-18 DIAGNOSIS — M54.6 CHRONIC MIDLINE THORACIC BACK PAIN: ICD-10-CM

## 2022-05-18 DIAGNOSIS — G89.29 CHRONIC MIDLINE LOW BACK PAIN WITHOUT SCIATICA: ICD-10-CM

## 2022-05-18 DIAGNOSIS — M50.20 CERVICAL DISC HERNIATION: ICD-10-CM

## 2022-05-18 DIAGNOSIS — G89.29 CHRONIC MIDLINE THORACIC BACK PAIN: ICD-10-CM

## 2022-05-18 DIAGNOSIS — M62.838 CERVICAL PARASPINAL MUSCLE SPASM: ICD-10-CM

## 2022-05-18 DIAGNOSIS — M51.24 THORACIC DISC HERNIATION: ICD-10-CM

## 2022-05-18 DIAGNOSIS — M54.50 CHRONIC MIDLINE LOW BACK PAIN WITHOUT SCIATICA: ICD-10-CM

## 2022-05-18 PROCEDURE — 99214 OFFICE O/P EST MOD 30 MIN: CPT | Performed by: INTERNAL MEDICINE

## 2022-05-18 RX ORDER — LIDOCAINE 4 G/G
1 PATCH TOPICAL DAILY PRN
COMMUNITY

## 2022-05-18 RX ORDER — OXYCODONE HYDROCHLORIDE 5 MG/1
TABLET ORAL
COMMUNITY
Start: 2022-05-07 | End: 2022-07-20
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
## 2022-05-18 NOTE — ASSESSMENT & PLAN NOTE
Discussed range of motion and stretching exercises  Continue Tylenol and lidocaine patches as needed  Will refer to Orthopedic surgery for further evaluation  Continue follow-up with chiropractic medicine and spine and pain management
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
## 2022-05-20 NOTE — ASSESSMENT & PLAN NOTE
Myrna Jensen is a 40 y o  male who presents with complaint of chronic abdominal pain abnormal stools of unclear etiology, possibly irritable bowel syndrome  He was previously diagnosed with Crohn's but it appears less likely as as colonoscopy is not consistent with Crohn's  Capsule of possible erosions higher up in the small bowel but nothing significant to explain his symptoms even if this was true inflammation  Colonoscopy did not show ileitis and biopsy of the colon only with 1 focal area of colitis with granuloma but overall clinical picture not consistent with Crohn's  Prior MR enterography was normal   He has previously tried Elavil, gas medication, peppermint oil, Linzess, budesonide, prednisone, rifaximin without any significant relief  His symptoms are likely functional in nature  Recent CBC normal   CMP with total bili of 1 3 but otherwise normal   Calprotectin and CRP both normal   Pancreatic elastase, fecal fat, celiac blood work all normal     No diagnosis found  No orders of the defined types were placed in this encounter      Inflammatory markers every 6-10 months  If markers are elevated can repeat colonoscopy  Consider Lyrica  Pepcid twice daily

## 2022-05-23 ENCOUNTER — TELEPHONE (OUTPATIENT)
Dept: GASTROENTEROLOGY | Facility: MEDICAL CENTER | Age: 42
End: 2022-05-23

## 2022-05-23 ENCOUNTER — APPOINTMENT (OUTPATIENT)
Dept: LAB | Facility: MEDICAL CENTER | Age: 42
End: 2022-05-23
Attending: INTERNAL MEDICINE
Payer: MEDICARE

## 2022-05-23 ENCOUNTER — OFFICE VISIT (OUTPATIENT)
Dept: GASTROENTEROLOGY | Facility: MEDICAL CENTER | Age: 42
End: 2022-05-23
Payer: MEDICARE

## 2022-05-23 VITALS
WEIGHT: 163.6 LBS | SYSTOLIC BLOOD PRESSURE: 166 MMHG | HEIGHT: 68 IN | BODY MASS INDEX: 24.8 KG/M2 | DIASTOLIC BLOOD PRESSURE: 90 MMHG | HEART RATE: 89 BPM | OXYGEN SATURATION: 99 %

## 2022-05-23 DIAGNOSIS — R19.5 ABNORMAL STOOLS: ICD-10-CM

## 2022-05-23 DIAGNOSIS — K21.00 GASTROESOPHAGEAL REFLUX DISEASE WITH ESOPHAGITIS, UNSPECIFIED WHETHER HEMORRHAGE: ICD-10-CM

## 2022-05-23 DIAGNOSIS — R10.11 RIGHT UPPER QUADRANT ABDOMINAL PAIN: ICD-10-CM

## 2022-05-23 DIAGNOSIS — K58.0 IRRITABLE BOWEL SYNDROME WITH DIARRHEA: ICD-10-CM

## 2022-05-23 DIAGNOSIS — K52.9 COLITIS: Primary | ICD-10-CM

## 2022-05-23 DIAGNOSIS — R10.9 ABDOMINAL PAIN, UNSPECIFIED ABDOMINAL LOCATION: ICD-10-CM

## 2022-05-23 DIAGNOSIS — R14.0 GASSINESS: ICD-10-CM

## 2022-05-23 LAB — CRP SERPL QL: <3 MG/L

## 2022-05-23 PROCEDURE — 99214 OFFICE O/P EST MOD 30 MIN: CPT | Performed by: INTERNAL MEDICINE

## 2022-05-23 PROCEDURE — 36415 COLL VENOUS BLD VENIPUNCTURE: CPT

## 2022-05-23 PROCEDURE — 86140 C-REACTIVE PROTEIN: CPT

## 2022-05-23 RX ORDER — OMEPRAZOLE 20 MG/1
20 CAPSULE, DELAYED RELEASE ORAL DAILY
Qty: 30 CAPSULE | Refills: 3 | Status: SHIPPED | OUTPATIENT
Start: 2022-05-23

## 2022-05-23 NOTE — TELEPHONE ENCOUNTER
SIBO Test given to patient and went over prep instructions  LOT [id number], [id number] EXP 2023-02-28    Drop off appt scheduled

## 2022-05-27 ENCOUNTER — APPOINTMENT (OUTPATIENT)
Dept: LAB | Facility: MEDICAL CENTER | Age: 42
End: 2022-05-27
Attending: INTERNAL MEDICINE
Payer: MEDICARE

## 2022-05-27 DIAGNOSIS — R10.11 RIGHT UPPER QUADRANT ABDOMINAL PAIN: ICD-10-CM

## 2022-05-27 DIAGNOSIS — K21.00 GASTROESOPHAGEAL REFLUX DISEASE WITH ESOPHAGITIS, UNSPECIFIED WHETHER HEMORRHAGE: ICD-10-CM

## 2022-05-27 DIAGNOSIS — R14.0 GASSINESS: ICD-10-CM

## 2022-05-27 DIAGNOSIS — R10.9 ABDOMINAL PAIN, UNSPECIFIED ABDOMINAL LOCATION: ICD-10-CM

## 2022-05-27 DIAGNOSIS — K58.0 IRRITABLE BOWEL SYNDROME WITH DIARRHEA: ICD-10-CM

## 2022-05-27 DIAGNOSIS — R19.5 ABNORMAL STOOLS: ICD-10-CM

## 2022-05-27 PROCEDURE — 87209 SMEAR COMPLEX STAIN: CPT

## 2022-05-27 PROCEDURE — 87493 C DIFF AMPLIFIED PROBE: CPT

## 2022-05-27 PROCEDURE — 82653 EL-1 FECAL QUANTITATIVE: CPT

## 2022-05-27 PROCEDURE — 87177 OVA AND PARASITES SMEARS: CPT

## 2022-05-27 PROCEDURE — 83993 ASSAY FOR CALPROTECTIN FECAL: CPT

## 2022-05-27 PROCEDURE — 82705 FATS/LIPIDS FECES QUAL: CPT

## 2022-05-27 PROCEDURE — 87329 GIARDIA AG IA: CPT

## 2022-05-28 LAB — C DIFF TOX GENS STL QL NAA+PROBE: NEGATIVE

## 2022-06-01 LAB — CALPROTECTIN STL-MCNT: 76 UG/G (ref 0–120)

## 2022-06-02 LAB
FAT STL QL: NORMAL
G LAMBLIA AG STL QL IA: NEGATIVE
NEUTRAL FAT STL QL: NORMAL
O+P STL CONC: NORMAL

## 2022-06-03 LAB — ELASTASE PANC STL-MCNT: 406 UG ELAST./G

## 2022-06-13 ENCOUNTER — OFFICE VISIT (OUTPATIENT)
Dept: PAIN MEDICINE | Facility: MEDICAL CENTER | Age: 42
End: 2022-06-13
Payer: MEDICARE

## 2022-06-13 VITALS
HEIGHT: 68 IN | BODY MASS INDEX: 24.95 KG/M2 | SYSTOLIC BLOOD PRESSURE: 139 MMHG | HEART RATE: 68 BPM | DIASTOLIC BLOOD PRESSURE: 87 MMHG | WEIGHT: 164.6 LBS | OXYGEN SATURATION: 98 %

## 2022-06-13 DIAGNOSIS — M54.6 THORACIC SPINE PAIN: Primary | ICD-10-CM

## 2022-06-13 PROCEDURE — 99214 OFFICE O/P EST MOD 30 MIN: CPT | Performed by: PHYSICAL MEDICINE & REHABILITATION

## 2022-06-13 NOTE — PROGRESS NOTES
Assessment  1  Thoracic spine pain        Plan  1  At this time we will schedule patient for a spinous process trigger point injection under ultrasound guidance  This will be done with local anesthetic only  2  He would also be a candidate for cervical medial branch nerve blocks at C4-5 and C5-6  He will decide if he would like to pursue that depending on his response to the injection above  My impressions and treatment recommendations were discussed in detail with the patient who verbalized understanding and had no further questions  Discharge instructions were provided  I personally saw and examined the patient and I agree with the above discussed plan of care  No orders of the defined types were placed in this encounter  No orders of the defined types were placed in this encounter  History of Present Illness    Nathan Zazueta is a 40 y o  male who returns in follow-up after completion of bilateral thoracic paraspinal muscle trigger point injections  Unfortunately this failed to provide significant benefit and felt that the injections were slightly lower than where he needed them to be performed and were more lateral than where his pain is today  He rates this as a 5 to 6/10  The most significant pain is over the spinous process region in the upper thoracic region  MRI of the cervical spine as well as thoracic spine was reviewed today  He does have left C5-6 disc herniation and some radicular features but these are not his most significant area of complaint at this point  I have personally reviewed and/or updated the patient's past medical history, past surgical history, family history, social history, current medications, allergies, and vital signs today  Review of Systems   Constitutional: Negative for chills, fatigue and unexpected weight change  HENT: Negative for ear pain, mouth sores, nosebleeds, sinus pain and sore throat      Eyes: Negative for pain and visual disturbance  Respiratory: Negative for choking and wheezing  Cardiovascular: Negative for chest pain and palpitations  Gastrointestinal: Negative for abdominal pain and nausea  Endocrine: Negative for cold intolerance and heat intolerance  Genitourinary: Negative for decreased urine volume, enuresis and hematuria  Musculoskeletal: Positive for myalgias and neck pain (Arm pain)  Negative for arthralgias and joint swelling  Skin: Negative for rash  Allergic/Immunologic: Negative for environmental allergies  Neurological: Negative for dizziness, weakness and numbness  Hematological: Does not bruise/bleed easily  Psychiatric/Behavioral: Negative for behavioral problems and self-injury  The patient is not hyperactive  Patient Active Problem List   Diagnosis    Anxiety    Allergic rhinitis    Deviated septum    Crohn's disease involving terminal ileum ([address] )    Retention cyst of nasal sinus    Chronic midline thoracic back pain    Allergic asthma    S/P repair of ventral hernia    Functional abdominal pain syndrome    BMI 25 0-25 9,adult    Chronic headaches    Degenerative disc disease, lumbar    Chronic midline low back pain without sciatica    Family history of heart disease    Smoking    Cervical paraspinal muscle spasm    Prolonged QT interval syndrome    Cervical disc herniation    Thoracic disc herniation       Past Medical History:   Diagnosis Date    Abdominal pain     Abnormal liver function test     last assessed: Oct 16, 2013     Abnormal weight loss     last assessed: Yung 15, 2014     Acute left-sided low back pain with left-sided sciatica 10/31/2019    Allergic rhinitis due to pollen     last assessed: Yung 15, 2014     Anxiety     Anxiety     last assessed/resolved:  Aug 5, 2015     Asthma     last assessed: Yung 15, 2014     Benign essential HTN     last assessed/resolved: Feb 23, 2017     Cervical lymphadenopathy     last assessed/resolved: Feb 23, 2017     Chronic sinusitis     last assessed: Yung 15, 2014     Constipation     Crohn's disease [facility]) 01/01/2011    last assessed: Yung 15, 2014     Dysuria     last assessed: April 29, 2016     Elevated BP without diagnosis of hypertension     last assessed/resolved: Feb 23, 2017     Esophageal reflux     last assessed/resolved: Feb 23, 2017     Eustachian tube anomaly     Resolved: Nov 9, 2017     External hemorrhoids     last assessed: Yung 15, 2014     Gastritis and duodenitis 6/14/2021    Hypertension     borderline    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     last assessed/resolved: July 19, 2017     Inflammatory bowel disease     Pancreatic neoplasm     last assessed: Yung 15, 2014     PONV (postoperative nausea and vomiting)     Positive depression screening     resolved: July 24, 2017     Postconcussion syndrome 11/15/2021    Postoperative ileus ([facility] [address] ) 11/12/2018    Psoriasis     Seasonal allergies     Small bowel obstruction ([facility] [address] ) 11/11/2018    Vitamin B12 deficiency     last assessed/resolved: Feb 23, 2017        Past Surgical History:   Procedure Laterality Date    CARDIAC LOOP RECORDER      Late 2021    CHOLECYSTECTOMY      resolved: 2011     COLONOSCOPY N/A 11/18/2016    Procedure: COLONOSCOPY;  Surgeon: Hammad Dumont MD;  Location:  GI LAB; Service:     COLONOSCOPY N/A 4/28/2016    Procedure: COLONOSCOPY;  Surgeon: Hammad Dumont MD;  Location: [facility] GI LAB; Service:     COLONOSCOPY  02/09/2021    ESOPHAGOGASTRODUODENOSCOPY N/A 4/28/2016    Procedure: ESOPHAGOGASTRODUODENOSCOPY (EGD); Surgeon: Hammad Dumont MD;  Location: [facility] GI LAB; Service:     FRONTAL SINUSOTOMY      resolved: April 2009     [facility] N/A 10/12/2018    Procedure: EGD AND COLONOSCOPY;  Surgeon: Darrick Carreon MD;  Location: [facility] GI LAB;   Service: Gastroenterology    AR LAP, INCISIONAL HERNIA REPAIR,REDUCIBLE N/A 11/8/2018    Procedure: REPAIR HERNIA INCISIONAL LAPAROSCOPIC;  Surgeon: Suri Anders MD;  Location: [facility];  Service: General    NM REPAIR OF NASAL SEPTUM N/A 7/28/2017    Procedure: REVISION SEPTOPLASTY; TURBINOPLASTY; BILATERAL  GRAFTS; ALAR GRAFT; POSSIBLE AURICULAR CARTILAGE GRAFT;  Surgeon: Larry Medrano MD;  Location: [facility];  Service: ENT    TONSILLECTOMY AND ADENOIDECTOMY      UPPER GASTROINTESTINAL ENDOSCOPY  02/09/2021       Family History   Problem Relation Age of Onset    Diabetes Mother         mellitus     Hypertension Mother     Thyroid disease Mother     Fibromyalgia Mother    Lukas Renault Hypertension Father     Hypertension Sister     Heart disease Sister         had defibulator put due to late beats    No Known Problems Maternal Grandmother     No Known Problems Maternal Grandfather     Diabetes Paternal Grandmother     Diabetes Paternal Grandfather     Kidney failure Paternal Grandfather     Diabetes Other         mellitus     Rheum arthritis Cousin        Social History     Occupational History    Occupation:     Tobacco Use    Smoking status: Current Every Day Smoker     Packs/day: 0 50     Types: Cigarettes    Smokeless tobacco: Never Used    Tobacco comment: Dependence on nicotine in cigarettes - 1/2 PPD x 20 years    Vaping Use    Vaping Use: Some days    Substances: THC, CBD   Substance and Sexual Activity    Alcohol use: Yes     Comment: rare - once a year    Drug use: Yes     Types: Marijuana     Comment: uses medical marijuana via vaping   last use 2/8/19    Sexual activity: Yes       Current Outpatient Medications on File Prior to Visit   Medication Sig    acetaminophen (TYLENOL) 500 mg tablet Take 1,000 mg by mouth every 4 (four) hours as needed     cetirizine (ZyrTEC) 10 mg tablet Take 1 tablet (10 mg total) by mouth daily as needed for allergies    clonazePAM (KlonoPIN) 0 5 mg tablet Take 1 tablet (0 5 mg total) by mouth every 12 (twelve) hours as needed for anxiety    famotidine (PEPCID) 20 mg tablet Take 1 tablet (20 mg total) by mouth 2 (two) times a day (Patient taking differently: Take 20 mg by mouth as needed)    fluticasone (FLONASE) 50 mcg/act nasal spray 1 spray into each nostril daily as needed for rhinitis    Lidocaine 4 % PTCH Apply 1 patch topically daily as needed    Linaclotide (LINZESS) 145 MCG CAPS Take 1 capsule (145 mcg total) by mouth daily (Patient taking differently: Take 145 mcg by mouth as needed)    NON FORMULARY Medical marijuana    omeprazole (PriLOSEC) 20 mg delayed release capsule Take 1 capsule (20 mg total) by mouth in the morning   oxyCODONE (ROXICODONE) 5 immediate release tablet for MODERATE TO SEVERE PAIN take 1 tablet by mouth if needed ever     (REFER TO PRESCRIPTION NOTES)  No current facility-administered medications on file prior to visit  Allergies   Allergen Reactions    Apple - Food Allergy Anaphylaxis    Aspirin Anaphylaxis and Hives     Category: Allergy;     Corticosteroids Anaphylaxis and Drowsiness     Other reaction(s): Drowsiness    Eggs Or Egg-Derived Products - Food Allergy GI Intolerance     GI upset    Ibuprofen Anaphylaxis and Hives     Category: Allergy; aspirin    Nsaids Anaphylaxis     Category: Allergy; Category: Allergy;     Other Anaphylaxis     Category: Allergy; Annotation - [id number]: cherries, grapes , and kiwis; pt states all fruits    Peanuts [Peanut Oil - Food Allergy] Anaphylaxis     nuts    Penicillins Shortness Of Breath    Methocarbamol Other (See Comments)     Patient reports not feeling well   Ciprofloxacin Other (See Comments)     Prolongs QT interval due to heart conditon, cannot take      Pollen Extract     Codeine Anxiety       Physical Exam    /87   Pulse 68   Ht 5' 8" (1 727 m) Comment: Verbal  Wt 74 7 kg (164 lb 9 6 oz)   SpO2 98%   BMI 25 03 kg/m²     Constitutional: normal, well developed, well nourished, alert, in no distress and non-toxic and no overt pain behavior    Eyes: anicteric  HEENT: grossly intact  Neck: supple, symmetric, trachea midline and no masses   Pulmonary:even and unlabored  Cardiovascular:No edema or pitting edema present  Skin:Normal without rashes or lesions and well hydrated  Psychiatric:Mood and affect appropriate  Neurologic:Cranial Nerves II-XII grossly intact  Musculoskeletal:normal , except for tenderness to palpation over the upper thoracic spinous process region reproducing his pain complaint, crepitus sensation is noted with cervical spine range of motion      Imaging

## 2022-06-17 DIAGNOSIS — F41.9 ANXIETY: ICD-10-CM

## 2022-06-18 RX ORDER — CLONAZEPAM 0.5 MG/1
0.5 TABLET ORAL EVERY 12 HOURS PRN
Qty: 60 TABLET | Refills: 1 | Status: SHIPPED | OUTPATIENT
Start: 2022-06-18 | End: 2022-11-04

## 2022-06-21 ENCOUNTER — OFFICE VISIT (OUTPATIENT)
Dept: GASTROENTEROLOGY | Facility: CLINIC | Age: 42
End: 2022-06-21
Payer: MEDICARE

## 2022-06-21 DIAGNOSIS — R10.10 PAIN OF UPPER ABDOMEN: Primary | ICD-10-CM

## 2022-06-21 DIAGNOSIS — R19.7 DIARRHEA, UNSPECIFIED TYPE: ICD-10-CM

## 2022-06-21 PROCEDURE — 91065 BREATH HYDROGEN/METHANE TEST: CPT | Performed by: INTERNAL MEDICINE

## 2022-06-21 PROCEDURE — 99211 OFF/OP EST MAY X REQ PHY/QHP: CPT | Performed by: INTERNAL MEDICINE

## 2022-06-21 NOTE — PROGRESS NOTES
[facility]       Bacterial Overgrowth Analytical Record    Sonia Luna 40 y o  male MRN: [id number]      Date of Test:06/08/2022    Substrate Given: Lactulose    Ordering Provider: Dr Rachel Amaral Assistant: Justus Campos    Symptoms: diarrhea, Abdominal Pain    The patient presents for bacterial overgrowth testing  Patient fasted overnight  Baseline readings obtained     Breath test performed every 20 min for a total of 3 hr    Sample Clock Time ppmH2 ppmCH4 Co2% Clifford   Baseline   9:30am 6 3 3 6 1 52   #1  20 minutes 9:50am 4 4 3 9 1 14   #2  40 minutes 10:10am 4 4 3 9 1 41   #3  60 minutes 10:30am 4 3 3 9 1 41   #4  80 minutes 10:50am 4 3 4 4 1 25   #5  100 minutes 11:10am 4 3 3 7 1 48   #6  120 minutes 11:30am 5 3 3 2 1 17   #7  140 minutes 11:50am 7 5 4 1 1 34   #8  160 minutes 12:10pm 14 5 3 6 1 52   #9  180 minutes 12:30pm 16 6 3 5 1 57       Physician interpretation: Negative for SIBO

## 2022-06-24 ENCOUNTER — RA CDI HCC (OUTPATIENT)
Dept: OTHER | Facility: HOSPITAL | Age: 42
End: 2022-06-24

## 2022-06-24 NOTE — PROGRESS NOTES
[address]  coding opportunities       Chart reviewed, no opportunity found: CHART REVIEWED, NO OPPORTUNITY FOUND        Patients Insurance     Medicare Insurance: Medicare

## 2022-07-13 ENCOUNTER — TELEPHONE (OUTPATIENT)
Dept: OTHER | Facility: OTHER | Age: 42
End: 2022-07-13

## 2022-07-13 NOTE — TELEPHONE ENCOUNTER
Pt called for results of his small bacterial test  He states he dropped the sample off in the office back in June  Lab is still showing active in system  Please contact pt

## 2022-07-14 ENCOUNTER — PROCEDURE VISIT (OUTPATIENT)
Dept: PAIN MEDICINE | Facility: MEDICAL CENTER | Age: 42
End: 2022-07-14
Payer: MEDICARE

## 2022-07-14 DIAGNOSIS — M79.18 MYOFASCIAL PAIN SYNDROME: Primary | ICD-10-CM

## 2022-07-14 PROCEDURE — 76942 ECHO GUIDE FOR BIOPSY: CPT | Performed by: PHYSICAL MEDICINE & REHABILITATION

## 2022-07-14 PROCEDURE — 20552 NJX 1/MLT TRIGGER POINT 1/2: CPT | Performed by: PHYSICAL MEDICINE & REHABILITATION

## 2022-07-14 RX ORDER — BUPIVACAINE HYDROCHLORIDE 2.5 MG/ML
10 INJECTION, SOLUTION EPIDURAL; INFILTRATION; INTRACAUDAL ONCE
Status: COMPLETED | OUTPATIENT
Start: 2022-07-14 | End: 2022-07-14

## 2022-07-14 RX ORDER — METHYLPREDNISOLONE ACETATE 40 MG/ML
40 INJECTION, SUSPENSION INTRA-ARTICULAR; INTRALESIONAL; INTRAMUSCULAR; SOFT TISSUE ONCE
Status: CANCELLED | OUTPATIENT
Start: 2022-07-14

## 2022-07-14 RX ADMIN — BUPIVACAINE HYDROCHLORIDE 10 ML: 2.5 INJECTION, SOLUTION EPIDURAL; INFILTRATION; INTRACAUDAL at 15:14

## 2022-07-14 NOTE — PROGRESS NOTES
Indication:  spinous process pain  Preprocedure diagnosis:  1   thoracic spinous process pain   Postprocedure diagnosis:  1   thoracic spinous process pain    Procedure:  Ultrasound-guided thoracic spinous process trigger point injection(s)  After discussing the risks, benefits, and alternatives to the procedure, the patient expressed understanding and wished to proceed  The patient was brought to the procedure suite and placed in the prone position  A procedural pause was conducted to verify:  correct patient identity, procedure to be performed and as applicable, correct side and site, correct patient position, and availability of implants, special equipment or special requirements  A simple surgical tray was used  Prior to the procedure, the area of tenderness was examined with a 12 MHz linear transducer to visualize the thoracic spinous process and determine the optimal needle path  Following this, the area was prepared with a ChloraPrep scrub, then re-examined using the same transducer, a sterile ultrasound transducer cover, and sterile ultrasound transducer gel  Thereafter, using ultrasound guidance, a 2 5-inch 25-gauge needle was advanced into the area of maximal tenderness  After visualization of the tip and negative aspiration for blood, 2 cc of 0 25% bupivacaine was injected into the area of maximal tenderness  Following the injection, the needle was withdrawn  The patient tolerated the procedure well and there were no apparent complications  After an appropriate amount of observation, the patient was dismissed from the clinic in good condition under their own power

## 2022-07-14 NOTE — TELEPHONE ENCOUNTER
Sent staff message a few weeks back, sibo test was negative, Dr Fowler Hidden still has the encounter   Please advise

## 2022-07-14 NOTE — TELEPHONE ENCOUNTER
I just read it  Can you see if I did it correctly?  I am also not sure how to properly bill for it    Thank you

## 2022-07-20 ENCOUNTER — OFFICE VISIT (OUTPATIENT)
Dept: INTERNAL MEDICINE CLINIC | Facility: OTHER | Age: 42
End: 2022-07-20
Payer: MEDICARE

## 2022-07-20 VITALS
RESPIRATION RATE: 18 BRPM | HEIGHT: 68 IN | DIASTOLIC BLOOD PRESSURE: 88 MMHG | SYSTOLIC BLOOD PRESSURE: 128 MMHG | BODY MASS INDEX: 25.19 KG/M2 | TEMPERATURE: 99 F | OXYGEN SATURATION: 98 % | WEIGHT: 166.2 LBS | HEART RATE: 81 BPM

## 2022-07-20 DIAGNOSIS — Z00.00 MEDICARE ANNUAL WELLNESS VISIT, SUBSEQUENT: ICD-10-CM

## 2022-07-20 DIAGNOSIS — J30.2 SEASONAL ALLERGIC RHINITIS, UNSPECIFIED TRIGGER: ICD-10-CM

## 2022-07-20 DIAGNOSIS — N40.0 ENLARGED PROSTATE: ICD-10-CM

## 2022-07-20 DIAGNOSIS — F41.9 ANXIETY: Primary | ICD-10-CM

## 2022-07-20 DIAGNOSIS — K50.00 CROHN'S DISEASE INVOLVING TERMINAL ILEUM (HCC): ICD-10-CM

## 2022-07-20 DIAGNOSIS — Z13.1 SCREENING FOR DIABETES MELLITUS: ICD-10-CM

## 2022-07-20 DIAGNOSIS — Z00.00 MEDICARE ANNUAL WELLNESS VISIT, INITIAL: ICD-10-CM

## 2022-07-20 DIAGNOSIS — F11.20 CONTINUOUS OPIOID DEPENDENCE (HCC): ICD-10-CM

## 2022-07-20 DIAGNOSIS — Z13.6 SCREENING FOR CARDIOVASCULAR CONDITION: ICD-10-CM

## 2022-07-20 PROCEDURE — 99214 OFFICE O/P EST MOD 30 MIN: CPT | Performed by: INTERNAL MEDICINE

## 2022-07-20 PROCEDURE — G0438 PPPS, INITIAL VISIT: HCPCS | Performed by: INTERNAL MEDICINE

## 2022-07-20 NOTE — PATIENT INSTRUCTIONS
Medicare Preventive Visit Patient Instructions  Thank you for completing your Welcome to Medicare Visit or Medicare Annual Wellness Visit today  Your next wellness visit will be due in one year (7/21/2023)  The screening/preventive services that you may require over the next 5-10 years are detailed below  Some tests may not apply to you based off risk factors and/or age  Screening tests ordered at today's visit but not completed yet may show as past due  Also, please note that scanned in results may not display below  Preventive Screenings:  Service Recommendations Previous Testing/Comments   Colorectal Cancer Screening  · Colonoscopy    · Fecal Occult Blood Test (FOBT)/Fecal Immunochemical Test (FIT)  · Fecal DNA/Cologuard Test  · Flexible Sigmoidoscopy Age: 54-65 years old   Colonoscopy: every 10 years (May be performed more frequently if at higher risk)  OR  FOBT/FIT: every 1 year  OR  Cologuard: every 3 years  OR  Sigmoidoscopy: every 5 years  Screening may be recommended earlier than age 48 if at higher risk for colorectal cancer  Also, an individualized decision between you and your healthcare provider will decide whether screening between the ages of 74-80 would be appropriate   Colonoscopy: 02/09/2021  FOBT/FIT: Not on file  Cologuard: Not on file  Sigmoidoscopy: Not on file    Screening Current     Prostate Cancer Screening Individualized decision between patient and health care provider in men between ages of 53-78   Medicare will cover every 12 months beginning on the day after your 50th birthday PSA: No results in last 5 years     Screening Not Indicated     Hepatitis C Screening Once for adults born between [birth year] and 1965  More frequently in patients at high risk for Hepatitis C Hep C Antibody: Not on file    Screening Current   Diabetes Screening 1-2 times per year if you're at risk for diabetes or have pre-diabetes Fasting glucose: 100 mg/dL   A1C: No results in last 5 years    Screening Current Cholesterol Screening Once every 5 years if you don't have a lipid disorder  May order more often based on risk factors  Lipid panel: 02/04/2021    Screening Current      Other Preventive Screenings Covered by Medicare:  1  Abdominal Aortic Aneurysm (AAA) Screening: covered once if your at risk  You're considered to be at risk if you have a family history of AAA or a male between the age of 73-68 who smoking at least 100 cigarettes in your lifetime  2  Lung Cancer Screening: covers low dose CT scan once per year if you meet all of the following conditions: (1) Age 50-69; (2) No signs or symptoms of lung cancer; (3) Current smoker or have quit smoking within the last 15 years; (4) You have a tobacco smoking history of at least 30 pack years (packs per day x number of years you smoked); (5) You get a written order from a healthcare provider  3  Glaucoma Screening: covered annually if you're considered high risk: (1) You have diabetes OR (2) Family history of glaucoma OR (3)  aged 48 and older OR (3)  American aged 72 and older  3  Osteoporosis Screening: covered every 2 years if you meet one of the following conditions: (1) Have a vertebral abnormality; (2) On glucocorticoid therapy for more than 3 months; (3) Have primary hyperparathyroidism; (4) On osteoporosis medications and need to assess response to drug therapy  5  HIV Screening: covered annually if you're between the age of 12-76  Also covered annually if you are younger than 13 and older than 72 with risk factors for HIV infection  For pregnant patients, it is covered up to 3 times per pregnancy      Immunizations:  Immunization Recommendations   Influenza Vaccine Annual influenza vaccination during flu season is recommended for all persons aged >= 6 months who do not have contraindications   Pneumococcal Vaccine (Prevnar and Pneumovax)  * Prevnar = PCV13  * Pneumovax = PPSV23 Adults 25-60 years old: 1-3 doses may be recommended based on certain risk factors  Adults 72 years old: Prevnar (PCV13) vaccine recommended followed by Pneumovax (PPSV23) vaccine  If already received PPSV23 since turning 65, then PCV13 recommended at least one year after PPSV23 dose  Hepatitis B Vaccine 3 dose series if at intermediate or high risk (ex: diabetes, end stage renal disease, liver disease)   Tetanus (Td) Vaccine - COST NOT COVERED BY MEDICARE PART B Following completion of primary series, a booster dose should be given every 10 years to maintain immunity against tetanus  Td may also be given as tetanus wound prophylaxis  Tdap Vaccine - COST NOT COVERED BY MEDICARE PART B Recommended at least once for all adults  For pregnant patients, recommended with each pregnancy  Shingles Vaccine (Shingrix) - COST NOT COVERED BY MEDICARE PART B  2 shot series recommended in those aged 48 and above     Health Maintenance Due:      Topic Date Due    Colorectal Cancer Screening  02/09/2026    HIV Screening  Completed    Hepatitis C Screening  Completed     Immunizations Due:      Topic Date Due    COVID-19 Vaccine (1) Never done    Pneumococcal Vaccine: Pediatrics (0 to 5 Years) and At-Risk Patients (6 to 59 Years) (1 - PCV) Never done    Influenza Vaccine (1) 09/01/2022     Advance Directives   What are advance directives? Advance directives are legal documents that state your wishes and plans for medical care  These plans are made ahead of time in case you lose your ability to make decisions for yourself  Advance directives can apply to any medical decision, such as the treatments you want, and if you want to donate organs  What are the types of advance directives? There are many types of advance directives, and each state has rules about how to use them  You may choose a combination of any of the following:  · Living will: This is a written record of the treatment you want   You can also choose which treatments you do not want, which to limit, and which to stop at a certain time  This includes surgery, medicine, IV fluid, and tube feedings  · Durable power of  for healthcare [place]): This is a written record that states who you want to make healthcare choices for you when you are unable to make them for yourself  This person, called a proxy, is usually a family member or a friend  You may choose more than 1 proxy  · Do not resuscitate (DNR) order:  A DNR order is used in case your heart stops beating or you stop breathing  It is a request not to have certain forms of treatment, such as CPR  A DNR order may be included in other types of advance directives  · Medical directive: This covers the care that you want if you are in a coma, near death, or unable to make decisions for yourself  You can list the treatments you want for each condition  Treatment may include pain medicine, surgery, blood transfusions, dialysis, IV or tube feedings, and a ventilator (breathing machine)  · Values history: This document has questions about your views, beliefs, and how you feel and think about life  This information can help others choose the care that you would choose  Why are advance directives important? An advance directive helps you control your care  Although spoken wishes may be used, it is better to have your wishes written down  Spoken wishes can be misunderstood, or not followed  Treatments may be given even if you do not want them  An advance directive may make it easier for your family to make difficult choices about your care  Cigarette Smoking and Your Health   Risks to your health if you smoke:  Nicotine and other chemicals found in tobacco damage every cell in your body  Even if you are a light smoker, you have an increased risk for cancer, heart disease, and lung disease  If you are pregnant or have diabetes, smoking increases your risk for complications     Benefits to your health if you stop smoking:   · You decrease respiratory symptoms such as coughing, wheezing, and shortness of breath  · You reduce your risk for cancers of the lung, mouth, throat, kidney, bladder, pancreas, stomach, and cervix  If you already have cancer, you increase the benefits of chemotherapy  You also reduce your risk for cancer returning or a second cancer from developing  · You reduce your risk for heart disease, blood clots, heart attack, and stroke  · You reduce your risk for lung infections, and diseases such as pneumonia, asthma, chronic bronchitis, and emphysema  · Your circulation improves  More oxygen can be delivered to your body  If you have diabetes, you lower your risk for complications, such as kidney, artery, and eye diseases  You also lower your risk for nerve damage  Nerve damage can lead to amputations, poor vision, and blindness  · You improve your body's ability to heal and to fight infections  For more information and support to stop smoking:   · [employer]  Phone: 9- 738 - 753-4162  Web Address: [employer]  Weight Management   Why it is important to manage your weight:  Being overweight increases your risk of health conditions such as heart disease, high blood pressure, type 2 diabetes, and certain types of cancer  It can also increase your risk for osteoarthritis, sleep apnea, and other respiratory problems  Aim for a slow, steady weight loss  Even a small amount of weight loss can lower your risk of health problems  How to lose weight safely:  A safe and healthy way to lose weight is to eat fewer calories and get regular exercise  You can lose up about 1 pound a week by decreasing the number of calories you eat by 500 calories each day  Healthy meal plan for weight management:  A healthy meal plan includes a variety of foods, contains fewer calories, and helps you stay healthy  A healthy meal plan includes the following:  · Eat whole-grain foods more often  A healthy meal plan should contain fiber   Fiber is the part of grains, fruits, and vegetables that is not broken down by your body  Whole-grain foods are healthy and provide extra fiber in your diet  Some examples of whole-grain foods are whole-wheat breads and pastas, oatmeal, brown rice, and bulgur  · Eat a variety of vegetables every day  Include dark, leafy greens such as spinach, kale, aakash greens, and mustard greens  Eat yellow and orange vegetables such as carrots, sweet potatoes, and winter squash  · Eat a variety of fruits every day  Choose fresh or canned fruit (canned in its own juice or light syrup) instead of juice  Fruit juice has very little or no fiber  · Eat low-fat dairy foods  Drink fat-free (skim) milk or 1% milk  Eat fat-free yogurt and low-fat cottage cheese  Try low-fat cheeses such as mozzarella and other reduced-fat cheeses  · Choose meat and other protein foods that are low in fat  Choose beans or other legumes such as split peas or lentils  Choose fish, skinless poultry (chicken or turkey), or lean cuts of red meat (beef or pork)  Before you cook meat or poultry, cut off any visible fat  · Use less fat and oil  Try baking foods instead of frying them  Add less fat, such as margarine, sour cream, regular salad dressing and mayonnaise to foods  Eat fewer high-fat foods  Some examples of high-fat foods include french fries, doughnuts, ice cream, and cakes  · Eat fewer sweets  Limit foods and drinks that are high in sugar  This includes candy, cookies, regular soda, and sweetened drinks  Exercise:  Exercise at least 30 minutes per day on most days of the week  Some examples of exercise include walking, biking, dancing, and swimming  You can also fit in more physical activity by taking the stairs instead of the elevator or parking farther away from stores  Ask your healthcare provider about the best exercise plan for you        © Copyright [facility] 2018 Information is for End User's use only and may not be sold, redistributed or otherwise used for commercial purposes  All illustrations and images included in CareNotes® are the copyrighted property of Micah [employer]TE[employer]  or [facility] Preventive Visit Patient Instructions  Thank you for completing your Welcome to Medicare Visit or Medicare Annual Wellness Visit today  Your next wellness visit will be due in one year (7/21/2023)  The screening/preventive services that you may require over the next 5-10 years are detailed below  Some tests may not apply to you based off risk factors and/or age  Screening tests ordered at today's visit but not completed yet may show as past due  Also, please note that scanned in results may not display below  Preventive Screenings:  Service Recommendations Previous Testing/Comments   Colorectal Cancer Screening  · Colonoscopy    · Fecal Occult Blood Test (FOBT)/Fecal Immunochemical Test (FIT)  · Fecal DNA/Cologuard Test  · Flexible Sigmoidoscopy Age: 54-65 years old   Colonoscopy: every 10 years (May be performed more frequently if at higher risk)  OR  FOBT/FIT: every 1 year  OR  Cologuard: every 3 years  OR  Sigmoidoscopy: every 5 years  Screening may be recommended earlier than age 48 if at higher risk for colorectal cancer  Also, an individualized decision between you and your healthcare provider will decide whether screening between the ages of 74-80 would be appropriate   Colonoscopy: 02/09/2021  FOBT/FIT: Not on file  Cologuard: Not on file  Sigmoidoscopy: Not on file    Screening Current     Prostate Cancer Screening Individualized decision between patient and health care provider in men between ages of 53-78   Medicare will cover every 12 months beginning on the day after your 50th birthday PSA: No results in last 5 years     Screening Not Indicated     Hepatitis C Screening Once for adults born between [facility]  More frequently in patients at high risk for Hepatitis C Hep C Antibody: Not on file    Screening Current   Diabetes Screening 1-2 times per year if you're at risk for diabetes or have pre-diabetes Fasting glucose: 100 mg/dL   A1C: No results in last 5 years    Screening Current   Cholesterol Screening Once every 5 years if you don't have a lipid disorder  May order more often based on risk factors  Lipid panel: 02/04/2021    Screening Current      Other Preventive Screenings Covered by Medicare:  6  Abdominal Aortic Aneurysm (AAA) Screening: covered once if your at risk  You're considered to be at risk if you have a family history of AAA or a male between the age of 73-68 who smoking at least 100 cigarettes in your lifetime  7  Lung Cancer Screening: covers low dose CT scan once per year if you meet all of the following conditions: (1) Age 50-69; (2) No signs or symptoms of lung cancer; (3) Current smoker or have quit smoking within the last 15 years; (4) You have a tobacco smoking history of at least 30 pack years (packs per day x number of years you smoked); (5) You get a written order from a healthcare provider  8  Glaucoma Screening: covered annually if you're considered high risk: (1) You have diabetes OR (2) Family history of glaucoma OR (3)  aged 48 and older OR (3)  American aged 72 and older  5  Osteoporosis Screening: covered every 2 years if you meet one of the following conditions: (1) Have a vertebral abnormality; (2) On glucocorticoid therapy for more than 3 months; (3) Have primary hyperparathyroidism; (4) On osteoporosis medications and need to assess response to drug therapy  10  HIV Screening: covered annually if you're between the age of 12-76  Also covered annually if you are younger than 13 and older than 72 with risk factors for HIV infection  For pregnant patients, it is covered up to 3 times per pregnancy      Immunizations:  Immunization Recommendations   Influenza Vaccine Annual influenza vaccination during flu season is recommended for all persons aged >= 6 months who do not have contraindications   Pneumococcal Vaccine (Prevnar and Pneumovax)  * Prevnar = PCV13  * Pneumovax = PPSV23 Adults 25-60 years old: 1-3 doses may be recommended based on certain risk factors  Adults 72 years old: Prevnar (PCV13) vaccine recommended followed by Pneumovax (PPSV23) vaccine  If already received PPSV23 since turning 65, then PCV13 recommended at least one year after PPSV23 dose  Hepatitis B Vaccine 3 dose series if at intermediate or high risk (ex: diabetes, end stage renal disease, liver disease)   Tetanus (Td) Vaccine - COST NOT COVERED BY MEDICARE PART B Following completion of primary series, a booster dose should be given every 10 years to maintain immunity against tetanus  Td may also be given as tetanus wound prophylaxis  Tdap Vaccine - COST NOT COVERED BY MEDICARE PART B Recommended at least once for all adults  For pregnant patients, recommended with each pregnancy  Shingles Vaccine (Shingrix) - COST NOT COVERED BY MEDICARE PART B  2 shot series recommended in those aged 48 and above     Health Maintenance Due:      Topic Date Due    Colorectal Cancer Screening  02/09/2026    HIV Screening  Completed    Hepatitis C Screening  Completed     Immunizations Due:      Topic Date Due    COVID-19 Vaccine (1) Never done    Pneumococcal Vaccine: Pediatrics (0 to 5 Years) and At-Risk Patients (6 to 59 Years) (1 - PCV) Never done    Influenza Vaccine (1) 09/01/2022     Advance Directives   What are advance directives? Advance directives are legal documents that state your wishes and plans for medical care  These plans are made ahead of time in case you lose your ability to make decisions for yourself  Advance directives can apply to any medical decision, such as the treatments you want, and if you want to donate organs  What are the types of advance directives? There are many types of advance directives, and each state has rules about how to use them   You may choose a combination of any of the following:  · Living will: This is a written record of the treatment you want  You can also choose which treatments you do not want, which to limit, and which to stop at a certain time  This includes surgery, medicine, IV fluid, and tube feedings  · Durable power of  for healthcare [facility]): This is a written record that states who you want to make healthcare choices for you when you are unable to make them for yourself  This person, called a proxy, is usually a family member or a friend  You may choose more than 1 proxy  · Do not resuscitate (DNR) order:  A DNR order is used in case your heart stops beating or you stop breathing  It is a request not to have certain forms of treatment, such as CPR  A DNR order may be included in other types of advance directives  · Medical directive: This covers the care that you want if you are in a coma, near death, or unable to make decisions for yourself  You can list the treatments you want for each condition  Treatment may include pain medicine, surgery, blood transfusions, dialysis, IV or tube feedings, and a ventilator (breathing machine)  · Values history: This document has questions about your views, beliefs, and how you feel and think about life  This information can help others choose the care that you would choose  Why are advance directives important? An advance directive helps you control your care  Although spoken wishes may be used, it is better to have your wishes written down  Spoken wishes can be misunderstood, or not followed  Treatments may be given even if you do not want them  An advance directive may make it easier for your family to make difficult choices about your care  Cigarette Smoking and Your Health   Risks to your health if you smoke:  Nicotine and other chemicals found in tobacco damage every cell in your body  Even if you are a light smoker, you have an increased risk for cancer, heart disease, and lung disease   If you are pregnant or have diabetes, smoking increases your risk for complications  Benefits to your health if you stop smoking:   · You decrease respiratory symptoms such as coughing, wheezing, and shortness of breath  · You reduce your risk for cancers of the lung, mouth, throat, kidney, bladder, pancreas, stomach, and cervix  If you already have cancer, you increase the benefits of chemotherapy  You also reduce your risk for cancer returning or a second cancer from developing  · You reduce your risk for heart disease, blood clots, heart attack, and stroke  · You reduce your risk for lung infections, and diseases such as pneumonia, asthma, chronic bronchitis, and emphysema  · Your circulation improves  More oxygen can be delivered to your body  If you have diabetes, you lower your risk for complications, such as kidney, artery, and eye diseases  You also lower your risk for nerve damage  Nerve damage can lead to amputations, poor vision, and blindness  · You improve your body's ability to heal and to fight infections  For more information and support to stop smoking:   · [employer]  Phone: 7- 235 - 973-7834  Web Address: [employer]  Weight Management   Why it is important to manage your weight:  Being overweight increases your risk of health conditions such as heart disease, high blood pressure, type 2 diabetes, and certain types of cancer  It can also increase your risk for osteoarthritis, sleep apnea, and other respiratory problems  Aim for a slow, steady weight loss  Even a small amount of weight loss can lower your risk of health problems  How to lose weight safely:  A safe and healthy way to lose weight is to eat fewer calories and get regular exercise  You can lose up about 1 pound a week by decreasing the number of calories you eat by 500 calories each day     Healthy meal plan for weight management:  A healthy meal plan includes a variety of foods, contains fewer calories, and helps you stay healthy  A healthy meal plan includes the following:  · Eat whole-grain foods more often  A healthy meal plan should contain fiber  Fiber is the part of grains, fruits, and vegetables that is not broken down by your body  Whole-grain foods are healthy and provide extra fiber in your diet  Some examples of whole-grain foods are whole-wheat breads and pastas, oatmeal, brown rice, and bulgur  · Eat a variety of vegetables every day  Include dark, leafy greens such as spinach, kale, aakash greens, and mustard greens  Eat yellow and orange vegetables such as carrots, sweet potatoes, and winter squash  · Eat a variety of fruits every day  Choose fresh or canned fruit (canned in its own juice or light syrup) instead of juice  Fruit juice has very little or no fiber  · Eat low-fat dairy foods  Drink fat-free (skim) milk or 1% milk  Eat fat-free yogurt and low-fat cottage cheese  Try low-fat cheeses such as mozzarella and other reduced-fat cheeses  · Choose meat and other protein foods that are low in fat  Choose beans or other legumes such as split peas or lentils  Choose fish, skinless poultry (chicken or turkey), or lean cuts of red meat (beef or pork)  Before you cook meat or poultry, cut off any visible fat  · Use less fat and oil  Try baking foods instead of frying them  Add less fat, such as margarine, sour cream, regular salad dressing and mayonnaise to foods  Eat fewer high-fat foods  Some examples of high-fat foods include french fries, doughnuts, ice cream, and cakes  · Eat fewer sweets  Limit foods and drinks that are high in sugar  This includes candy, cookies, regular soda, and sweetened drinks  Exercise:  Exercise at least 30 minutes per day on most days of the week  Some examples of exercise include walking, biking, dancing, and swimming  You can also fit in more physical activity by taking the stairs instead of the elevator or parking farther away from stores   Ask your healthcare provider about the best exercise plan for you  © Copyright [employer] 2018 Information is for End User's use only and may not be sold, redistributed or otherwise used for commercial purposes   All illustrations and images included in CareNotes® are the copyrighted property of [employer]  or [address] [place], [place] [address]cb [employer]
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
## 2022-07-21 ENCOUNTER — TELEPHONE (OUTPATIENT)
Dept: PAIN MEDICINE | Facility: CLINIC | Age: 42
End: 2022-07-21

## 2022-07-21 NOTE — TELEPHONE ENCOUNTER
Pt reports 0% improvement post inj  Pain level 5/10    He felt good for 1 day and now he's experiencing pain like he did prior to the injection,    Patient is asking when can he get the neck injection

## 2022-07-21 NOTE — TELEPHONE ENCOUNTER
I'm not sure what neck injection he's talking about  He should follow up in office to re-evaluate and determine appropriate c spine intervention  NP/PA or me

## 2022-07-21 NOTE — TELEPHONE ENCOUNTER
Max Foy pt seen in sovs with you on 6/13 and offered #1TPI and #2 you said that the pt could be a candidate for C4-5 and C5-6 MBB's  Did you want him to f/u with DG on 8/10 first or can we schedule the MBB's as per the 6/13 sovs plan?

## 2022-07-22 ENCOUNTER — APPOINTMENT (OUTPATIENT)
Dept: LAB | Facility: CLINIC | Age: 42
End: 2022-07-22
Payer: MEDICARE

## 2022-07-22 DIAGNOSIS — N40.0 ENLARGED PROSTATE: ICD-10-CM

## 2022-07-22 LAB — PSA SERPL-MCNC: 0.8 NG/ML (ref 0–4)

## 2022-07-22 PROCEDURE — 84153 ASSAY OF PSA TOTAL: CPT

## 2022-08-10 ENCOUNTER — OFFICE VISIT (OUTPATIENT)
Dept: PAIN MEDICINE | Facility: MEDICAL CENTER | Age: 42
End: 2022-08-10
Payer: MEDICARE

## 2022-08-10 VITALS
HEIGHT: 68 IN | DIASTOLIC BLOOD PRESSURE: 82 MMHG | WEIGHT: 168 LBS | HEART RATE: 70 BPM | TEMPERATURE: 97.8 F | BODY MASS INDEX: 25.46 KG/M2 | SYSTOLIC BLOOD PRESSURE: 118 MMHG | OXYGEN SATURATION: 98 %

## 2022-08-10 DIAGNOSIS — M54.6 CHRONIC MIDLINE THORACIC BACK PAIN: ICD-10-CM

## 2022-08-10 DIAGNOSIS — M48.02 CERVICAL SPINAL STENOSIS: ICD-10-CM

## 2022-08-10 DIAGNOSIS — M79.18 MYOFASCIAL PAIN SYNDROME: ICD-10-CM

## 2022-08-10 DIAGNOSIS — M50.222 HERNIATION OF INTERVERTEBRAL DISC AT C5-C6 LEVEL: ICD-10-CM

## 2022-08-10 DIAGNOSIS — G89.29 CHRONIC MIDLINE THORACIC BACK PAIN: ICD-10-CM

## 2022-08-10 DIAGNOSIS — M54.2 NECK PAIN: ICD-10-CM

## 2022-08-10 DIAGNOSIS — G89.4 CHRONIC PAIN SYNDROME: Primary | ICD-10-CM

## 2022-08-10 DIAGNOSIS — M54.12 CERVICAL RADICULOPATHY: ICD-10-CM

## 2022-08-10 PROCEDURE — 99214 OFFICE O/P EST MOD 30 MIN: CPT | Performed by: NURSE PRACTITIONER

## 2022-08-10 NOTE — PROGRESS NOTES
Assessment:  1  Chronic pain syndrome    2  Neck pain    3  Chronic midline thoracic back pain    4  Cervical radiculopathy    5  Myofascial pain syndrome    6  Herniation of intervertebral disc at C5-C6 level    7  Cervical spinal stenosis        Plan:  While the patient was in the office today, I did have a thorough conversation with the patient regarding their chronic pain syndrome, symptoms, medication regimen, and treatment plan  I discussed with the patient at this point in time, 1 option because he does have the herniated cervical disc with the left-sided stenosis and upper extremity radicular symptoms, would be to consider a cervical epidural steroid injection directed towards the left with Dr Marylen Begun  However, the patient reports that he does not tolerate any kind of steroids well, even if they are injected and was not interested in the cervical epidural steroid injection  I also discussed with the patient that because there is left-sided cervical pain, we could consider proceeding with a left C3 through C5 diagnostic medial branch block to see if he would be an eventual candidate for radiofrequency ablation procedure which Dr Marylen Begun had previously discussed  The patient's cervical pain persists despite time, relative rest, activity modification and therapy  Based on the patient's symptoms & examination, I suspect that the pain is being generated by the cervical facet joints  The facet joints are only one of many possible neck pain generators  Unfortunately, studies have demonstrated that history and examination alone are unreliable  If the patient demonstrates appropriate response to medial branch blockade we will schedule for radiofrequency ablation to provide long-term relief  Complete risks and benefits including bleeding, infection, tissue reaction, nerve injury and allergic reaction were discussed  The approach was demonstrated using models and literature was provided   Verbal and written consent was obtained  We also discussed the possibility of considering a surgical consultation to see if there is any other possible surgical options because the patient is not able to even consider any kind of neuropathic medications or muscle relaxers because of his chronic abdominal pain and gastrointestinal issues and he has not done well with these medications in the past   I did put in a referral to neuro surgery for the patient to schedule a consultation to see if there are any surgical options that may provide relief of his pain and radicular symptoms  The patient was agreeable and verbalized an understanding  We will proceed with the cervical medial branch blocks as discussed and await the results of his medial branch block diary and proceed from there as appropriate  The patient was agreeable and verbalized an understanding  History of Present Illness: The patient is a 40 y o  male last seen on 7/14/22 who presents for a follow up office visit in regards to chronic pain syndrome, as the patient's pain has been ongoing for greater than a year, secondary to herniated cervical discs with stenosis and radiculopathy as well as thoracic and diffuse myofascial pain  The patient currently reports that this point time as he is status post thoracic paravertebral musculature trigger point injections under ultrasound guidance with Dr Timothy Garcia, that unfortunately he did not see any significant or long-lasting relief as a result of the most recent trigger point injections and presents today for his continued and worsening cervical pain and left upper extremity radicular symptoms  The patient presents today for regular postprocedure follow-up visit and to discuss any other treatment plan options  I have personally reviewed and/or updated the patient's past medical history, past surgical history, family history, social history, current medications, allergies, and vital signs today  Review of Systems:    Review of Systems   Respiratory: Negative for shortness of breath  Cardiovascular: Negative for chest pain  Gastrointestinal: Negative for constipation, diarrhea, nausea and vomiting  Musculoskeletal: Negative for arthralgias, gait problem, joint swelling and myalgias  Skin: Negative for rash  Neurological: Negative for dizziness, seizures and weakness  All other systems reviewed and are negative  Past Medical History:   Diagnosis Date    Abdominal pain     Abnormal liver function test     last assessed: Oct 16, 2013     Abnormal weight loss     last assessed: Yung 15, 2014     Acute left-sided low back pain with left-sided sciatica 10/31/2019    Allergic rhinitis due to pollen     last assessed: Yung 15, 2014     Anxiety     Anxiety     last assessed/resolved:  Aug 5, 2015     Asthma     last assessed: Yung 15, 2014     Benign essential HTN     last assessed/resolved: Feb 23, 2017     Cervical lymphadenopathy     last assessed/resolved: Feb 23, 2017     Chronic sinusitis     last assessed: Yung 15, 2014     Constipation     Crohn's disease ([facility] [address] ) 01/01/2011    last assessed: Yung 15, 2014     Dysuria     last assessed: April 29, 2016     Elevated BP without diagnosis of hypertension     last assessed/resolved: Feb 23, 2017     Esophageal reflux     last assessed/resolved: Feb 23, 2017     Eustachian tube anomaly     Resolved: Nov 9, 2017     External hemorrhoids     last assessed: Yung 15, 2014     Gastritis and duodenitis 6/14/2021    Hypertension     borderline    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     last assessed/resolved: July 19, 2017     Inflammatory bowel disease     Pancreatic neoplasm     last assessed: Yung 15, 2014     PONV (postoperative nausea and vomiting)     Positive depression screening     resolved: July 24, 2017     Postconcussion syndrome 11/15/2021    Postoperative ileus ([facility] [address] ) 11/12/2018    Psoriasis     Seasonal allergies  Small bowel obstruction ([facility] [address] ) 11/11/2018    Vitamin B12 deficiency     last assessed/resolved: Feb 23, 2017        Past Surgical History:   Procedure Laterality Date    CARDIAC LOOP RECORDER      Late 2021    CHOLECYSTECTOMY      resolved: 2011     COLONOSCOPY N/A 11/18/2016    Procedure: COLONOSCOPY;  Surgeon: Pal Michel MD;  [facility]; Service:     COLONOSCOPY N/A 4/28/2016    Procedure: COLONOSCOPY;  Surgeon: Pal Michel MD;  Location: [facility] GI LAB; Service:     COLONOSCOPY  02/09/2021    ESOPHAGOGASTRODUODENOSCOPY N/A 4/28/2016    Procedure: ESOPHAGOGASTRODUODENOSCOPY (EGD); Surgeon: Pal Michel MD;  Location: [facility] GI LAB; Service:     FRONTAL SINUSOTOMY      resolved: April 2009     [place] COLONOSCOPY FLX DX W/[facility] WHEN PFRMD N/A 10/12/2018    Procedure: EGD AND COLONOSCOPY;  Surgeon: Dalila Rao MD;  Location: [facility] GI LAB;   Service: Gastroenterology    TN LAP, INCISIONAL HERNIA REPAIR,REDUCIBLE N/A 11/8/2018    Procedure: REPAIR HERNIA INCISIONAL LAPAROSCOPIC;  Surgeon: Jenae Burch MD;  [facility];  Service: General    TN REPAIR OF NASAL SEPTUM N/A 7/28/2017    Procedure: REVISION SEPTOPLASTY; TURBINOPLASTY; BILATERAL  GRAFTS; ALAR GRAFT; POSSIBLE AURICULAR CARTILAGE GRAFT;  Surgeon: Ashley Monaco MD;  [facility];  Service: ENT    TONSILLECTOMY AND ADENOIDECTOMY      UPPER GASTROINTESTINAL ENDOSCOPY  02/09/2021       Family History   Problem Relation Age of Onset    Diabetes Mother         mellitus     Hypertension Mother     Thyroid disease Mother     Fibromyalgia Mother     Hypertension Father     Hypertension Sister     Heart disease Sister         had defibulator put due to late beats    No Known Problems Maternal Grandmother     No Known Problems Maternal Grandfather     Diabetes Paternal Grandmother     Diabetes Paternal Grandfather     Kidney failure Paternal Grandfather     Diabetes Other         mellitus     Rheum arthritis Cousin        Social History     Occupational History    Occupation:     Tobacco Use    Smoking status: Current Every Day Smoker     Packs/day: 0 50     Types: Cigarettes    Smokeless tobacco: Never Used    Tobacco comment: Dependence on nicotine in cigarettes - 1/2 PPD x 20 years    Vaping Use    Vaping Use: Some days    Substances: THC, CBD   Substance and Sexual Activity    Alcohol use: Yes     Comment: rare - once a year    Drug use: Yes     Types: Marijuana     Comment: uses medical marijuana via vaping  last use 2/8/19    Sexual activity: Yes         Current Outpatient Medications:     acetaminophen (TYLENOL) 500 mg tablet, Take 1,000 mg by mouth every 4 (four) hours as needed , Disp: , Rfl:     cetirizine (ZyrTEC) 10 mg tablet, Take 1 tablet (10 mg total) by mouth daily as needed for allergies, Disp: 30 tablet, Rfl: 5    clonazePAM (KlonoPIN) 0 5 mg tablet, Take 1 tablet (0 5 mg total) by mouth every 12 (twelve) hours as needed for anxiety, Disp: 60 tablet, Rfl: 1    fluticasone (FLONASE) 50 mcg/act nasal spray, 1 spray into each nostril daily as needed for rhinitis, Disp: 16 g, Rfl: 1    Lidocaine 4 % PTCH, Apply 1 patch topically daily as needed, Disp: , Rfl:     Linaclotide (LINZESS) 145 MCG CAPS, Take 1 capsule (145 mcg total) by mouth daily (Patient taking differently: Take 145 mcg by mouth as needed), Disp: 90 capsule, Rfl: 1    NON FORMULARY, Medical marijuana, Disp: , Rfl:     omeprazole (PriLOSEC) 20 mg delayed release capsule, Take 1 capsule (20 mg total) by mouth in the morning  (Patient taking differently: Take 20 mg by mouth every other day), Disp: 30 capsule, Rfl: 3    Allergies   Allergen Reactions    Apple - Food Allergy Anaphylaxis    Aspirin Anaphylaxis and Hives     Category:  Allergy;     Corticosteroids Anaphylaxis and Drowsiness     Other reaction(s): Drowsiness    Eggs Or Egg-Derived Products - Food Allergy GI Intolerance     GI upset    Ibuprofen Anaphylaxis and Hives     Category: Allergy; aspirin    Nsaids Anaphylaxis     Category: Allergy; Category: Allergy;     Other Anaphylaxis     Category: Allergy; Annotation - [id number]: cherries, grapes , and kiwis; pt states all fruits    Peanuts [Peanut Oil - Food Allergy] Anaphylaxis     nuts    Penicillins Shortness Of Breath    Methocarbamol Other (See Comments)     Patient reports not feeling well   Ciprofloxacin Other (See Comments)     Prolongs QT interval due to heart conditon, cannot take   Pollen Extract     Codeine Anxiety       Physical Exam:    /82   Pulse 70   Temp 97 8 °F (36 6 °C)   Ht 5' 8" (1 727 m)   Wt 76 2 kg (168 lb)   SpO2 98%   BMI 25 54 kg/m²     Constitutional:normal, well developed, well nourished, alert, in no distress and non-toxic and no overt pain behavior    Eyes:anicteric  HEENT:grossly intact  Neck:supple, symmetric, trachea midline and no masses   Pulmonary:even and unlabored  Cardiovascular:No edema or pitting edema present  Skin:Normal without rashes or lesions and well hydrated  Psychiatric:Mood and affect appropriate  Neurologic:Cranial Nerves II-XII grossly intact  Musculoskeletal:normal      Imaging  FL spine and pain procedure    (Results Pending)         Orders Placed This Encounter   Procedures    FL spine and pain procedure    Ambulatory referral to Neurosurgery

## 2022-08-26 ENCOUNTER — OFFICE VISIT (OUTPATIENT)
Dept: GASTROENTEROLOGY | Facility: CLINIC | Age: 42
End: 2022-08-26
Payer: MEDICARE

## 2022-08-26 ENCOUNTER — TELEPHONE (OUTPATIENT)
Dept: OTHER | Facility: OTHER | Age: 42
End: 2022-08-26

## 2022-08-26 VITALS
BODY MASS INDEX: 25.19 KG/M2 | SYSTOLIC BLOOD PRESSURE: 120 MMHG | TEMPERATURE: 98.1 F | HEIGHT: 68 IN | HEART RATE: 73 BPM | WEIGHT: 166.2 LBS | DIASTOLIC BLOOD PRESSURE: 82 MMHG | OXYGEN SATURATION: 96 %

## 2022-08-26 DIAGNOSIS — R19.7 DIARRHEA, UNSPECIFIED TYPE: ICD-10-CM

## 2022-08-26 DIAGNOSIS — R10.10 PAIN OF UPPER ABDOMEN: ICD-10-CM

## 2022-08-26 DIAGNOSIS — K52.9 ENTERITIS: ICD-10-CM

## 2022-08-26 DIAGNOSIS — K50.00 CROHN'S DISEASE INVOLVING TERMINAL ILEUM (HCC): ICD-10-CM

## 2022-08-26 DIAGNOSIS — R19.5 ABNORMAL STOOLS: ICD-10-CM

## 2022-08-26 DIAGNOSIS — K58.0 IRRITABLE BOWEL SYNDROME WITH DIARRHEA: Primary | ICD-10-CM

## 2022-08-26 PROCEDURE — 99213 OFFICE O/P EST LOW 20 MIN: CPT | Performed by: INTERNAL MEDICINE
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
## 2022-08-26 NOTE — TELEPHONE ENCOUNTER
Patient is calling to report that his endo capsule is being flagged by insurance due to having a similar procedure within a certain time frame  Please call back to assist patient in resolving this

## 2022-08-29 NOTE — TELEPHONE ENCOUNTER
Hi,    Is someone able to call him and his insurance to clarify what might be needed for approval?    Thank you

## 2022-09-01 ENCOUNTER — TELEPHONE (OUTPATIENT)
Dept: GASTROENTEROLOGY | Facility: CLINIC | Age: 42
End: 2022-09-01

## 2022-09-02 NOTE — TELEPHONE ENCOUNTER
Scheduled date of Colon/EGD (as of today): 11/17/2022  Physician performing: Dr Elba Mtz  Location of procedure: [facility]  Bowel prep reviewed with patient: Miralax/Dulcolax  Instructions reviewed with patient by: Leatha - emailed to patient   Clearances: n/a    Pt is aware that he will need a  to the procedure and the Gi lab will reach out to him the day prior with his arrival time  Emailed prep instructions - direct phone number given to patient for any questions or concerns

## 2022-09-06 ENCOUNTER — HOSPITAL ENCOUNTER (OUTPATIENT)
Dept: RADIOLOGY | Facility: MEDICAL CENTER | Age: 42
Discharge: HOME | End: 2022-09-06
Payer: COMMERCIAL

## 2022-09-06 VITALS
SYSTOLIC BLOOD PRESSURE: 133 MMHG | HEART RATE: 61 BPM | RESPIRATION RATE: 20 BRPM | TEMPERATURE: 97.8 F | OXYGEN SATURATION: 99 % | DIASTOLIC BLOOD PRESSURE: 88 MMHG

## 2022-09-06 DIAGNOSIS — M54.2 NECK PAIN: ICD-10-CM

## 2022-09-06 PROCEDURE — 64491 INJ PARAVERT F JNT C/T 2 LEV: CPT | Performed by: PHYSICAL MEDICINE & REHABILITATION

## 2022-09-06 PROCEDURE — 64490 INJ PARAVERT F JNT C/T 1 LEV: CPT | Performed by: PHYSICAL MEDICINE & REHABILITATION

## 2022-09-06 RX ADMIN — Medication 1.5 ML: at 11:17

## 2022-09-06 NOTE — H&P
History of Present Illness: The patient is a 40 y o  male who presents with complaints of left sided neck pain    Patient Active Problem List   Diagnosis    Anxiety    Allergic rhinitis    Deviated septum    [address]'s disease involving terminal ileum ([facility] [address] )    Retention cyst of nasal sinus    Neck pain    Chronic midline thoracic back pain    Allergic asthma    S/P repair of ventral hernia    Functional abdominal pain syndrome    BMI 25 0-25 9,adult    Chronic headaches    Degenerative disc disease, lumbar    Chronic midline low back pain without sciatica    Family history of heart disease    Smoking    Cervical paraspinal muscle spasm    Prolonged QT interval syndrome    Herniation of intervertebral disc at C5-C6 level    Thoracic disc herniation    Enlarged prostate    Chronic pain syndrome    Myofascial pain syndrome    Cervical spinal stenosis    Cervical radiculopathy       Past Medical History:   Diagnosis Date    Abdominal pain     Abnormal liver function test     last assessed: Oct 16, 2013     Abnormal weight loss     last assessed: Yung 15, 2014     Acute left-sided low back pain with left-sided sciatica 10/31/2019    Allergic rhinitis due to pollen     last assessed: Yung 15, 2014     Anxiety     Anxiety     last assessed/resolved:  Aug 5, 2015     Asthma     last assessed: Yung 15, 2014     Benign essential HTN     last assessed/resolved: Feb 23, 2017     Cervical lymphadenopathy     last assessed/resolved: Feb 23, 2017     Chronic sinusitis     last assessed: Yung 15, 2014     Constipation     Crohn's disease ([facility] [address] ) 01/01/2011    last assessed: Yung 15, 2014     Dysuria     last assessed: April 29, 2016     Elevated BP without diagnosis of hypertension     last assessed/resolved: Feb 23, 2017     Esophageal reflux     last assessed/resolved: Feb 23, 2017     Eustachian tube anomaly     Resolved: Nov 9, 2017     External hemorrhoids     last assessed: Yung 15, 2014    Tabatha Aguilar Gastritis and duodenitis 6/14/2021    Hypertension     borderline    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     last assessed/resolved: July 19, 2017     Inflammatory bowel disease     Pancreatic neoplasm     last assessed: Yung 15, 2014     PONV (postoperative nausea and vomiting)     Positive depression screening     resolved: July 24, 2017     Postconcussion syndrome 11/15/2021    Postoperative ileus [facility] [address] ) 11/12/2018    Psoriasis     Seasonal allergies     Small bowel obstruction ([facility] [address] ) 11/11/2018    Vitamin B12 deficiency     last assessed/resolved: Feb 23, 2017        Past Surgical History:   Procedure Laterality Date    CARDIAC LOOP RECORDER      Late 2021    CHOLECYSTECTOMY      resolved: 2011     COLONOSCOPY N/A 11/18/2016    Procedure: COLONOSCOPY;  Surgeon: Keyanna Gregory MD;  Location:  GI LAB; Service:     COLONOSCOPY N/A 4/28/2016    Procedure: COLONOSCOPY;  Surgeon: Keyanna Gregory MD;  Location: [facility] GI LAB; Service:     COLONOSCOPY  02/09/2021    ESOPHAGOGASTRODUODENOSCOPY N/A 4/28/2016    Procedure: ESOPHAGOGASTRODUODENOSCOPY (EGD); Surgeon: Keyanna Gregory MD;  Location: [facility] GI LAB; Service:     FRONTAL SINUSOTOMY      resolved: April 2009     [place] COLONOSCOPY FLX DX W/[facility] WHEN PFRMD N/A 10/12/2018    Procedure: EGD AND COLONOSCOPY;  Surgeon: Geeta Dutta MD;  Location: [facility] GI LAB;   Service: Gastroenterology    [place] LAP, INCISIONAL HERNIA REPAIR,REDUCIBLE N/A 11/8/2018    Procedure: REPAIR HERNIA INCISIONAL LAPAROSCOPIC;  Surgeon: Sheyla Mukherjee MD;  [facility];  Service: General    [place] REPAIR OF NASAL SEPTUM N/A 7/28/2017    Procedure: REVISION SEPTOPLASTY; TURBINOPLASTY; BILATERAL  GRAFTS; ALAR GRAFT; POSSIBLE AURICULAR CARTILAGE GRAFT;  Surgeon: Tushar Balderas MD;  Location: [facility];  Service: ENT    TONSILLECTOMY AND ADENOIDECTOMY      UPPER GASTROINTESTINAL ENDOSCOPY  02/09/2021         Current Outpatient Medications:     acetaminophen (TYLENOL) 500 mg tablet, Take 1,000 mg by mouth every 4 (four) hours as needed , Disp: , Rfl:     cetirizine (ZyrTEC) 10 mg tablet, Take 1 tablet (10 mg total) by mouth daily as needed for allergies, Disp: 30 tablet, Rfl: 5    clonazePAM (KlonoPIN) 0 5 mg tablet, Take 1 tablet (0 5 mg total) by mouth every 12 (twelve) hours as needed for anxiety, Disp: 60 tablet, Rfl: 1    fluticasone (FLONASE) 50 mcg/act nasal spray, 1 spray into each nostril daily as needed for rhinitis, Disp: 16 g, Rfl: 1    Lidocaine 4 % PTCH, Apply 1 patch topically daily as needed, Disp: , Rfl:     Linaclotide (LINZESS) 145 MCG CAPS, Take 1 capsule (145 mcg total) by mouth daily, Disp: 90 capsule, Rfl: 1    NON FORMULARY, Medical marijuana, Disp: , Rfl:     omeprazole (PriLOSEC) 20 mg delayed release capsule, Take 1 capsule (20 mg total) by mouth in the morning  (Patient taking differently: Take 20 mg by mouth every other day), Disp: 30 capsule, Rfl: 3    Current Facility-Administered Medications:     lidocaine (PF) (XYLOCAINE-MPF) 2 % injection 1 5 mL, 1 5 mL, Perineural, Once, Leonarda Railing, DO    Allergies   Allergen Reactions    Apple - Food Allergy Anaphylaxis    Aspirin Anaphylaxis and Hives     Category: Allergy;     Corticosteroids Anaphylaxis and Drowsiness     Other reaction(s): Drowsiness    Eggs Or Egg-Derived Products - Food Allergy GI Intolerance     GI upset    Ibuprofen Anaphylaxis and Hives     Category: Allergy; aspirin    Nsaids Anaphylaxis     Category: Allergy; Category: Allergy;     Other Anaphylaxis     Category: Allergy; Annotation - [id number]: cherries, grapes , and kiwis; pt states all fruits    Peanuts [Peanut Oil - Food Allergy] Anaphylaxis     nuts    Penicillins Shortness Of Breath    Methocarbamol Other (See Comments)     Patient reports not feeling well   Ciprofloxacin Other (See Comments)     Prolongs QT interval due to heart conditon, cannot take      Pollen Extract     Codeine Anxiety       Physical Exam:   Vitals:    09/06/22 1104   BP: 145/87   Pulse: 72   Resp: 20   Temp: 97 8 °F (36 6 °C)   SpO2: 99%     General: Awake, Alert, Oriented x 3, Mood and affect appropriate  Respiratory: Respirations even and unlabored  Cardiovascular: Peripheral pulses intact; no edema  Musculoskeletal Exam: left sided neck pain    ASA Score: 2    Patient/Chart Verification  Patient ID Verified: Verbal  ID Band Applied: No  Consents Confirmed: Procedural, To be obtained in the Pre-Procedure area  H&P( within 30 days) Verified: To be obtained in the Pre-Procedure area  Interval H&P(within 24 hr) Complete (required for Outpatients and Surgery Admit only): To be obtained in the Pre-Procedure area  Allergies Reviewed: Yes  Anticoag/NSAID held?: NA  Currently on antibiotics?: No    Assessment:   1   Neck pain        Plan: Left C3-5 MBB #1

## 2022-09-06 NOTE — DISCHARGE INSTRUCTIONS

## 2022-09-08 DIAGNOSIS — F41.9 ANXIETY: ICD-10-CM

## 2022-09-08 RX ORDER — CLONAZEPAM 0.5 MG/1
0.5 TABLET ORAL EVERY 12 HOURS PRN
Qty: 60 TABLET | Refills: 1 | Status: SHIPPED | OUTPATIENT
Start: 2022-09-08 | End: 2023-01-25

## 2022-09-19 ENCOUNTER — TELEPHONE (OUTPATIENT)
Dept: RADIOLOGY | Facility: MEDICAL CENTER | Age: 42
End: 2022-09-19

## 2022-09-19 NOTE — TELEPHONE ENCOUNTER
Pain diary reviewed  JE out of office; per guidelines, patient with poor response to left C3-5 MBB #1  Schedule f/u with NP: review cervical MBB response

## 2022-09-29 ENCOUNTER — OFFICE VISIT (OUTPATIENT)
Dept: PAIN MEDICINE | Facility: MEDICAL CENTER | Age: 42
End: 2022-09-29
Payer: COMMERCIAL

## 2022-09-29 VITALS
DIASTOLIC BLOOD PRESSURE: 77 MMHG | HEART RATE: 68 BPM | BODY MASS INDEX: 25.31 KG/M2 | WEIGHT: 167 LBS | SYSTOLIC BLOOD PRESSURE: 132 MMHG | HEIGHT: 68 IN

## 2022-09-29 DIAGNOSIS — M50.222 HERNIATION OF INTERVERTEBRAL DISC AT C5-C6 LEVEL: ICD-10-CM

## 2022-09-29 DIAGNOSIS — M54.12 CERVICAL RADICULOPATHY: Primary | ICD-10-CM

## 2022-09-29 DIAGNOSIS — M79.18 MYOFASCIAL PAIN SYNDROME: ICD-10-CM

## 2022-09-29 PROCEDURE — 99214 OFFICE O/P EST MOD 30 MIN: CPT | Performed by: PHYSICIAN ASSISTANT

## 2022-09-29 NOTE — PROGRESS NOTES
Assessment:  1  Cervical radiculopathy    2  Herniation of intervertebral disc at C5-C6 level    3  Myofascial pain syndrome        Plan:  While the patient was in the office today, I did have a thorough conversation regarding their chronic pain syndrome, medication management, and treatment plan options  The patient is status post diagnostic cervical medial branch block with no positive outcome  He continues with neck pain and upper extremity radicular symptoms  He is unable to tolerate steroids which is why we have not performed a cervical epidural steroid injection  He found mild and temporary relief from trigger point injections done in the thoracic area  He is attending chiropractic care and begin traction which seems to be helping some  He recently was evaluated by Neurosurgery who did not recommend surgery at this time but advised him to follow-up with then in November if he fails to respond to all other conservative measures of treatment  At this point I am not recommending any further injection therapy  I will try to prescribe him a cervical home traction device to use since he is noticing benefit from traction performed at the chiropractor  He will follow-up in our office on a p r n  basis if the pain changes or worsens  My impressions and treatment recommendations were discussed in detail with the patient who verbalized understanding and had no further questions  Discharge instructions were provided  I personally saw and examined the patient and I agree with the above discussed plan of care  No orders of the defined types were placed in this encounter  New Medications Ordered This Visit   Medications    Misc  Devices (Cervical Traction) KIT     Sig: Use 2 (two) times a day     Dispense:  1 kit     Refill:  0       History of Present Illness:  Olviia Sage is a 40 y o  male who presents for a follow up office visit in regards to Neck Pain     The patients current symptoms include neck pain rated as a 5/10 on the pain scale described as a constant burning sharp dull and aching in nature with right upper extremity radicular pain  He most recently underwent diagnostic cervical medial branch blocks with no improvement  Prior to that he underwent thoracic paraspinal trigger point injections with relief for a few hours followed by return of the same pain pattern  Cervical epidural steroid injections have been discussed with him however he states he is unable to tolerate steroids  Recent consultation in August with Neurosurgery recommended against surgery at this time  He recently began traction as chiropractors office and that is providing some relief  I have personally reviewed and/or updated the patient's past medical history, past surgical history, family history, social history, current medications, allergies, and vital signs today  Review of Systems   Musculoskeletal: Positive for neck pain  Patient Active Problem List   Diagnosis    Anxiety    Allergic rhinitis    Deviated septum    Crohn's disease involving terminal ileum ([address] )    Retention cyst of nasal sinus    Neck pain    Chronic midline thoracic back pain    Allergic asthma    S/P repair of ventral hernia    Functional abdominal pain syndrome    BMI 25 0-25 9,adult    Chronic headaches    Degenerative disc disease, lumbar    Chronic midline low back pain without sciatica    Family history of heart disease    Smoking    Cervical paraspinal muscle spasm    Prolonged QT interval syndrome    Herniation of intervertebral disc at C5-C6 level    Thoracic disc herniation    Enlarged prostate    Chronic pain syndrome    Myofascial pain syndrome    Cervical spinal stenosis    Cervical radiculopathy       Past Medical History:   Diagnosis Date    Abdominal pain     Abnormal liver function test     last assessed:  Oct 16, 2013     Abnormal weight loss     last assessed: Yung 15, 2014     Acute left-sided low back pain with left-sided sciatica 10/31/2019    Allergic rhinitis due to pollen     last assessed: Yung 15, 2014     Anxiety     Anxiety     last assessed/resolved: Aug 5, 2015     Asthma     last assessed: Yung 15, 2014     Benign essential HTN     last assessed/resolved: Feb 23, 2017     Cervical lymphadenopathy     last assessed/resolved: Feb 23, 2017     Chronic sinusitis     last assessed: Yung 15, 2014     Constipation     Crohn's disease ([facility] [address] ) 01/01/2011    last assessed: Yugn 15, 2014     Dysuria     last assessed: April 29, 2016     Elevated BP without diagnosis of hypertension     last assessed/resolved: Feb 23, 2017     Esophageal reflux     last assessed/resolved: Feb 23, 2017     Eustachian tube anomaly     Resolved: Nov 9, 2017     External hemorrhoids     last assessed: Yung 15, 2014     Gastritis and duodenitis 6/14/2021    Hypertension     borderline    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     last assessed/resolved: July 19, 2017     Inflammatory bowel disease     Pancreatic neoplasm     last assessed: Yung 15, 2014     PONV (postoperative nausea and vomiting)     Positive depression screening     resolved: July 24, 2017     Postconcussion syndrome 11/15/2021    Postoperative ileus ([facility] [address] ) 11/12/2018    Psoriasis     Seasonal allergies     Small bowel obstruction ([facility] [address] ) 11/11/2018    Vitamin B12 deficiency     last assessed/resolved: Feb 23, 2017        Past Surgical History:   Procedure Laterality Date    CARDIAC LOOP RECORDER      Late 2021    CHOLECYSTECTOMY      resolved: 2011     COLONOSCOPY N/A 11/18/2016    Procedure: COLONOSCOPY;  Surgeon: Austen Andersen MD;  Location:  GI LAB; Service:     COLONOSCOPY N/A 4/28/2016    Procedure: COLONOSCOPY;  Surgeon: Austen Andersen MD;  Location: [facility] GI LAB; Service:     COLONOSCOPY  02/09/2021    ESOPHAGOGASTRODUODENOSCOPY N/A 4/28/2016    Procedure: ESOPHAGOGASTRODUODENOSCOPY (EGD);   Surgeon: Austen Andersen, MD;  Location: [facility] GI LAB; Service:     FRONTAL SINUSOTOMY      resolved: April 2009     [place] COLONOSCOPY FLX DX W/[address] WHEN PFRMD N/A 10/12/2018    Procedure: EGD AND COLONOSCOPY;  Surgeon: Agapito Bates MD;  Location: [facility] GI LAB; Service: Gastroenterology    [place] LAP, INCISIONAL HERNIA REPAIR,REDUCIBLE N/A 11/8/2018    Procedure: REPAIR HERNIA INCISIONAL LAPAROSCOPIC;  Surgeon: Eboni Fernandes MD;  [facility]: [facility];  Service: General    [place] REPAIR OF NASAL SEPTUM N/A 7/28/2017    Procedure: REVISION SEPTOPLASTY; TURBINOPLASTY; BILATERAL  GRAFTS; ALAR GRAFT; POSSIBLE AURICULAR CARTILAGE GRAFT;  Surgeon: Hansa Coker MD;  Location: [facility];  Service: ENT    TONSILLECTOMY AND ADENOIDECTOMY      UPPER GASTROINTESTINAL ENDOSCOPY  02/09/2021       Family History   Problem Relation Age of Onset    Diabetes Mother         mellitus     Hypertension Mother     Thyroid disease Mother     Fibromyalgia Mother    Lyudmila Mare Hypertension Father     Hypertension Sister     Heart disease Sister         had defibulator put due to late beats    No Known Problems Maternal Grandmother     No Known Problems Maternal Grandfather     Diabetes Paternal Grandmother     Diabetes Paternal Grandfather     Kidney failure Paternal Grandfather     Diabetes Other         mellitus     Rheum arthritis Cousin        Social History     Occupational History    Occupation:     Tobacco Use    Smoking status: Current Every Day Smoker     Packs/day: 0 50     Types: Cigarettes    Smokeless tobacco: Never Used    Tobacco comment: Dependence on nicotine in cigarettes - 1/2 PPD x 20 years    Vaping Use    Vaping Use: Some days    Substances: THC, CBD   Substance and Sexual Activity    Alcohol use: Yes     Comment: rare - once a year    Drug use: Yes     Types: Marijuana     Comment: uses medical marijuana via vaping   last use 2/8/19    Sexual activity: Yes       Current Outpatient Medications on File Prior to Visit   Medication Sig    acetaminophen (TYLENOL) 500 mg tablet Take 1,000 mg by mouth every 4 (four) hours as needed     cetirizine (ZyrTEC) 10 mg tablet Take 1 tablet (10 mg total) by mouth daily as needed for allergies    clonazePAM (KlonoPIN) 0 5 mg tablet Take 1 tablet (0 5 mg total) by mouth every 12 (twelve) hours as needed for anxiety    fluticasone (FLONASE) 50 mcg/act nasal spray 1 spray into each nostril daily as needed for rhinitis    Lidocaine 4 % PTCH Apply 1 patch topically daily as needed    Linaclotide (LINZESS) 145 MCG CAPS Take 1 capsule (145 mcg total) by mouth daily    NON FORMULARY Medical marijuana    omeprazole (PriLOSEC) 20 mg delayed release capsule Take 1 capsule (20 mg total) by mouth in the morning  (Patient taking differently: Take 20 mg by mouth every other day)     No current facility-administered medications on file prior to visit  Allergies   Allergen Reactions    Apple - Food Allergy Anaphylaxis    Aspirin Anaphylaxis and Hives     Category: Allergy;     Corticosteroids Anaphylaxis and Drowsiness     Other reaction(s): Drowsiness    Eggs Or Egg-Derived Products - Food Allergy GI Intolerance     GI upset    Ibuprofen Anaphylaxis and Hives     Category: Allergy; aspirin    Nsaids Anaphylaxis     Category: Allergy; Category: Allergy;     Other Anaphylaxis     Category: Allergy; Annotation - [id number]: cherries, grapes , and kiwis; pt states all fruits    Peanuts [Peanut Oil - Food Allergy] Anaphylaxis     nuts    Penicillins Shortness Of Breath    Methocarbamol Other (See Comments)     Patient reports not feeling well   Ciprofloxacin Other (See Comments)     Prolongs QT interval due to heart conditon, cannot take      Pollen Extract     Codeine Anxiety       Physical Exam:    /77   Pulse 68   Ht 5' 8" (1 727 m)   Wt 75 8 kg (167 lb)   BMI 25 39 kg/m²     Constitutional:normal, well developed, well nourished, alert, in no distress and non-toxic and no overt pain behavior  Eyes:anicteric  HEENT:grossly intact  Neck:supple, symmetric, trachea midline and no masses   Pulmonary:even and unlabored  Cardiovascular:No edema or pitting edema present  Skin:Normal without rashes or lesions and well hydrated  Psychiatric:Mood and affect appropriate  Neurologic:Cranial Nerves II-XII grossly intact  Musculoskeletal:  Tender right paraspinal muscles of the cervical and upper thoracic spine, full range of motion of the cervical spine, negative Spurling's test bilaterally      Imaging

## 2022-10-05 ENCOUNTER — TELEPHONE (OUTPATIENT)
Dept: PAIN MEDICINE | Facility: MEDICAL CENTER | Age: 42
End: 2022-10-05

## 2022-10-05 DIAGNOSIS — M50.222 HERNIATION OF INTERVERTEBRAL DISC AT C5-C6 LEVEL: Primary | ICD-10-CM

## 2022-10-05 NOTE — TELEPHONE ENCOUNTER
RN s/w pt to let him know that we are in the process of placing these orders and will let him know when this is completed    Pt appreciative and was not sure that the over the door cervical traction was something that [facility] was talking about    --please advise thank you--

## 2022-10-05 NOTE — TELEPHONE ENCOUNTER
RN reviewed last sovs note per [facility]  Noted an at home cervical device was going to be ordered  RN did not note the order in the chart  RN s/w Jacqulyn Claude from [facility] and asked if we offer these devices? Per Masood Mahan they do offer an over the door cervical traction device, is this something that you would like to order? If so can you place the order under OTHER ORDERS and if you are unable to find cervical traction just place it under DME and in the notes place cervical traction unit        --please advise thank you--

## 2022-10-05 NOTE — TELEPHONE ENCOUNTER
Patient calling for update on device that is being ordered for him he has not heard back from anybody      Please advise

## 2022-10-07 NOTE — TELEPHONE ENCOUNTER
Dora     S/w pt and advised that both [facility] and [address] [place] [address] do not carry the floor cervical traction device [facility] wrote script for  Advised pt to ask chiro if they are able to order it for pt  RN also advised that [employer] does have floor cervical traction that can be covered by insurances, FSA/HSA or other medical plans depending on the device  Pt states the equip would be billed through [employer] and RN advised to check w/ [employer] what billing options would be avail, if any, to try and get this equipment covered  Pt verbalized understanding and appreciative of info provided

## 2022-10-20 NOTE — TELEPHONE ENCOUNTER
Caller: [facility]    Doctor: Yuli Fiore    Reason for call: She would like to know if the form she sent yesterday has been received from our office  Medical necessity form   This form with medical documentation must be provided & faxed to fax # [phone], x    Call back#: [phone] ext [phone]

## 2022-11-07 NOTE — PROGRESS NOTES
Assessment/Plan:    Palpitations  48-hour Holter was negative for arrythmias, however he continues to have daily palpitations  Palpitations may be from anxiety, however will order an echocardiogram for further cardiac evaluation  Abscess of umbilicus  Complete keflex  No need for further antibiotic therapy at this time  Umbilical pain  Has resolution of umbilical abscess with antibiotic therapy  CT A/P was negative on 8/8 for hernias or fistulas (given h/o of Crohn's disease)  He has an appointment with a new gastroenterologist on 9/7/2018  Will defer on ordering US of the umbilical region at this time until evaluation from GI  Last colonoscopy was in 2016  Diagnoses and all orders for this visit:    Palpitations  -     Echo complete with contrast if indicated; Future    Dizziness  -     Echo complete with contrast if indicated; Future    Abscess of umbilicus    Umbilical pain          Subjective:      Patient ID: Oliva Siu is a 41 y o  male  41year old male is seen today for follow up for umbilical abscess/cellulitis  He was started on keflex 500 mg Q8H for 7-days, of which he will complete today  He reports mild improvement of abscess however still has sharp pain of umbilical region, particularly whenever he bears down or lifts any weighted objects  Results of CT A/P from 8/8/2018 showed s/p cholecystectomy with no acute findings, however umbilical pain/tenderness started after CT was performed  He also underwent Holter monitor evaluation for palpitations, which occur with exertion  Holter monitor showed predominant sinus rhythm with occasional PACs  No arrhythmias appreciated  Extensive blood work up for etiology of palpitations has been negative           The following portions of the patient's history were reviewed and updated as appropriate: allergies, current medications, past family history, past medical history, past social history, past surgical history and problem list     Review of Systems   Constitutional: Negative for activity change, appetite change, chills, diaphoresis, fatigue and fever  HENT: Negative for congestion, postnasal drip, rhinorrhea, sinus pain, sinus pressure, sneezing and sore throat  Eyes: Negative for visual disturbance  Respiratory: Negative for apnea, cough, choking, chest tightness, shortness of breath and wheezing  Cardiovascular: Positive for palpitations  Negative for chest pain and leg swelling  Gastrointestinal: Positive for abdominal pain  Negative for abdominal distention, anal bleeding, blood in stool, constipation, diarrhea, nausea and vomiting  Endocrine: Negative for cold intolerance and heat intolerance  Genitourinary: Negative for difficulty urinating, dysuria and hematuria  Musculoskeletal: Negative  Skin: Negative  Neurological: Negative for dizziness, weakness, light-headedness, numbness and headaches  Hematological: Negative for adenopathy  Psychiatric/Behavioral: Negative for agitation, sleep disturbance and suicidal ideas  All other systems reviewed and are negative  Past Medical History:   Diagnosis Date    Abdominal pain     Abnormal liver function test     last assessed: Oct 16, 2013     Abnormal weight loss     last assessed: Yung 15, 2014     Allergic rhinitis due to pollen     last assessed: Yung 15, 2014     Anxiety     Anxiety     last assessed/resolved:  Aug 5, 2015     Asthma     last assessed: Yung 15, 2014     Benign essential HTN     last assessed/resolved: Feb 23, 2017     Cervical lymphadenopathy     last assessed/resolved: Feb 23, 2017     Chronic sinusitis     last assessed: Yung 15, 2014     Constipation     Crohn's disease [facility]) 01/01/2011    last assessed: Yung 15, 2014     Dysuria     last assessed: April 29, 2016     Elevated BP without diagnosis of hypertension     last assessed/resolved: Feb 23, 2017     Esophageal reflux     last assessed/resolved: Feb 23, 2017  Eustachian tube anomaly     Resolved: Nov 9, 2017     External hemorrhoids     last assessed: Yung 15, 2014     Hypertension     borderline    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     last assessed/resolved: July 19, 2017     Pancreatic neoplasm     last assessed: Yung 15, 2014     Positive depression screening     resolved: July 24, 2017     Psoriasis     Seasonal allergies     Vitamin B12 deficiency     last assessed/resolved: Feb 23, 2017          Current Outpatient Prescriptions:     acetaminophen (TYLENOL) 500 mg tablet, Take 10 mg/kg by mouth every 4 (four) hours as needed  , Disp: , Rfl:     cetirizine (ZyrTEC) 10 mg tablet, Take 1 tablet (10 mg total) by mouth daily as needed for allergies, Disp: 30 tablet, Rfl: 5    clonazePAM (KlonoPIN) 0 5 mg tablet, Take 1 tablet (0 5 mg total) by mouth every 12 (twelve) hours as needed for anxiety, Disp: 90 tablet, Rfl: 1    dicyclomine (BENTYL) 20 mg tablet, Take 1 tablet (20 mg total) by mouth every 6 (six) hours as needed (pain), Disp: 60 tablet, Rfl: 1    diphenhydrAMINE (BENADRYL) 25 mg tablet, Take 25 mg by mouth as needed  , Disp: , Rfl:     fluticasone (FLONASE) 50 mcg/act nasal spray, 2 sprays into each nostril as needed  , Disp: , Rfl:     Linaclotide (LINZESS) 145 MCG CAPS, Take 145 mcg by mouth as needed  , Disp: , Rfl:     Allergies   Allergen Reactions    Advil [Ibuprofen] Anaphylaxis and Hives     Category: Allergy;     Apple Anaphylaxis    Aspirin Anaphylaxis and Hives     Category: Allergy;     Eggs Or Egg-Derived Products Anaphylaxis     Category: Allergy;     Nsaids Anaphylaxis     Category: Allergy;     Other Anaphylaxis     Category: Allergy;  Annotation - [id number]: cherries, grapes , and kiwis    Peanuts [Peanut Oil] Anaphylaxis    Pollen Extract     Codeine Anxiety       Social History   Past Surgical History:   Procedure Laterality Date    CHOLECYSTECTOMY      resolved: 2011     COLONOSCOPY N/A 11/18/2016    Procedure: COLONOSCOPY;  Surgeon: Mahogany Young MD;  Location:  GI LAB; Service:     COLONOSCOPY N/A 4/28/2016    Procedure: COLONOSCOPY;  Surgeon: Mahogany Young MD;  Location: [facility] GI LAB; Service:     ESOPHAGOGASTRODUODENOSCOPY N/A 4/28/2016    Procedure: ESOPHAGOGASTRODUODENOSCOPY (EGD); Surgeon: Mahogany Young MD;  Location: [facility] GI LAB;   Service:     FRONTAL SINUSOTOMY      resolved: April 2009     PANCREAS SURGERY      MS REPAIR OF NASAL SEPTUM N/A 7/28/2017    Procedure: REVISION SEPTOPLASTY; TURBINOPLASTY; BILATERAL  GRAFTS; ALAR GRAFT; POSSIBLE AURICULAR CARTILAGE GRAFT;  Surgeon: Dorcas Curiel MD;  Location:  MAIN OR;  Service: ENT    TONSILLECTOMY AND ADENOIDECTOMY       Family History   Problem Relation Age of Onset    Diabetes Mother         mellitus     Hypertension Mother    [facility] Anxiety disorder Mother     Thyroid disease Mother     Fibromyalgia Mother     Diabetes Father     Hypertension Father     Hypertension Sister     No Known Problems Brother     No Known Problems Maternal Grandmother     No Known Problems Maternal Grandfather     No Known Problems Paternal Grandmother     No Known Problems Paternal Grandfather     Diabetes Other         mellitus     Rheum arthritis Cousin        Objective:  /88 (BP Location: Left arm, Patient Position: Sitting, Cuff Size: Adult)   Pulse 84   Temp 98 3 °F (36 8 °C) (Oral)   Ht 5' 8" (1 727 m)   Wt 70 9 kg (156 lb 6 4 oz)   SpO2 98% Comment: room air  BMI 23 78 kg/m²     Recent Results (from the past 1344 hour(s))   ECG 12 lead    Collection Time: 07/30/18  2:04 PM   Result Value Ref Range    Ventricular Rate 56 BPM    Atrial Rate 56 BPM    MS Interval 142 ms    QRSD Interval 82 ms    QT Interval 428 ms    QTC Interval 413 ms    P [place] 151 degrees    QRS Axis 136 degrees    T Wave Axis 147 degrees   ECG 12 lead    Collection Time: 07/30/18  2:05 PM   Result Value Ref Range    Ventricular Rate 56 BPM    Atrial Rate 56 BPM    MS Interval 134 ms    QRSD Interval 80 ms    QT Interval 426 ms    QTC Interval 411 ms    P Axis  degrees    QRS Axis 46 degrees    T Wave Axis 138 degrees   ECG 12 lead    Collection Time: 07/30/18  2:05 PM   Result Value Ref Range    Ventricular Rate 55 BPM    Atrial Rate 55 BPM    GA Interval 144 ms    QRSD Interval 84 ms    QT Interval 430 ms    QTC Interval 411 ms    P [place] 137 degrees    QRS Axis 139 degrees    T Wave Axis 145 degrees   ECG 12 lead    Collection Time: 07/30/18  2:06 PM   Result Value Ref Range    Ventricular Rate 56 BPM    Atrial Rate 56 BPM    GA Interval 140 ms    QRSD Interval 84 ms    QT Interval 430 ms    QTC Interval 414 ms    P Axis 150 degrees    QRS Axis 139 degrees    T Wave Axis 144 degrees   ECG 12 lead    Collection Time: 07/30/18  2:09 PM   Result Value Ref Range    Ventricular Rate 56 BPM    Atrial Rate 56 BPM    GA Interval 140 ms    QRSD Interval 86 ms    QT Interval 424 ms    QTC Interval 409 ms    P Axis 50 degrees    QRS Axis 44 degrees    T Wave Axis 47 degrees   CBC and differential    Collection Time: 07/30/18  2:15 PM   Result Value Ref Range    WBC 9 30 4 31 - 10 16 Thousand/uL    RBC 5 04 3 88 - 5 62 Million/uL    Hemoglobin 16 4 12 0 - 17 0 g/dL    Hematocrit 47 3 42 0 - 52 0 %    MCV 94 82 - 98 fL    MCH 32 5 26 8 - 34 3 pg    MCHC 34 6 31 4 - 37 4 g/dL    RDW 13 5 11 6 - 15 1 %    MPV 8 3 (L) 8 9 - 12 7 fL    Platelets 602 696 - 816 Thousands/uL    nRBC 0 /100 WBCs    Neutrophils Relative 69 43 - 75 %    Lymphocytes Relative 22 14 - 44 %    Monocytes Relative 7 4 - 12 %    Eosinophils Relative 1 0 - 6 %    Basophils Relative 1 0 - 1 %    Neutrophils Absolute 6 40 1 85 - 7 62 Thousands/µL    Lymphocytes Absolute 2 00 0 60 - 4 47 Thousands/µL    Monocytes Absolute 0 70 0 17 - 1 22 Thousand/µL    Eosinophils Absolute 0 10 0 00 - 0 61 Thousand/µL    Basophils Absolute 0 10 0 00 - 0 10 Thousands/µL   Comprehensive metabolic panel    Collection Time: 07/30/18  2:15 PM Result Value Ref Range    Sodium 135 134 - 143 mmol/L    Potassium 3 8 3 5 - 5 5 mmol/L    Chloride 103 98 - 107 mmol/L    CO2 25 21 - 31 mmol/L    ANION GAP 7 4 - 13 mmol/L    BUN 10 7 - 25 mg/dL    Creatinine 1 11 0 70 - 1 30 mg/dL    Glucose 94 65 - 140 mg/dL    Calcium 9 8 8 6 - 10 5 mg/dL    AST 16 13 - 39 U/L    ALT 16 7 - 52 U/L    Alkaline Phosphatase 62 40 - 150 U/L    Total Protein 7 3 6 4 - 8 9 g/dL    Albumin 4 6 3 5 - 5 7 g/dL    Total Bilirubin 0 90 0 20 - 1 00 mg/dL    eGFR 84 ml/min/1 73sq m   Troponin I    Collection Time: 07/30/18  2:15 PM   Result Value Ref Range    Troponin I <0 03 <=0 03 ng/mL   Rapid drug screen, urine    Collection Time: 07/30/18  3:14 PM   Result Value Ref Range    Amph/Meth UR Negative Negative    Barbiturate Ur Negative Negative    Benzodiazepine Urine Negative Negative    Cocaine Urine Negative Negative    Methadone Urine Negative Negative    Opiate Urine Negative Negative    PCP Ur Negative Negative    THC Urine Negative Negative   UA w Reflex to Microscopic w Reflex to Culture    Collection Time: 07/30/18  3:14 PM   Result Value Ref Range    Color, UA Yellow Yellow, Straw    Clarity, UA Clear Hazy, Clear    Specific Gravity, UA <=1 005 1 005 - 1 030    pH, UA 6 0 5 0-<8 0    Leukocytes, UA Negative Negative    Nitrite, UA Negative Negative    Protein, UA Negative Negative, Interference- unable to analyze mg/dl    Glucose, UA Negative Negative mg/dl    Ketones, UA Negative Negative mg/dl    Urobilinogen, UA 0 2 0 2, 1 0 E U /dl E U /dl    Bilirubin, UA Negative Negative    Blood, UA Negative Negative   CBC and differential    Collection Time: 08/08/18  6:17 PM   Result Value Ref Range    WBC 9 30 4 31 - 10 16 Thousand/uL    RBC 5 18 3 88 - 5 62 Million/uL    Hemoglobin 16 6 12 0 - 17 0 g/dL    Hematocrit 48 6 42 0 - 52 0 %    MCV 94 82 - 98 fL    MCH 32 1 26 8 - 34 3 pg    MCHC 34 2 31 4 - 37 4 g/dL    RDW 13 2 11 6 - 15 1 %    MPV 8 5 (L) 8 9 - 12 7 fL    Platelets 113 149 - 390 Thousands/uL    nRBC 0 /100 WBCs    Neutrophils Relative 62 43 - 75 %    Lymphocytes Relative 28 14 - 44 %    Monocytes Relative 8 4 - 12 %    Eosinophils Relative 1 0 - 6 %    Basophils Relative 1 0 - 1 %    Neutrophils Absolute 5 80 1 85 - 7 62 Thousands/µL    Lymphocytes Absolute 2 60 0 60 - 4 47 Thousands/µL    Monocytes Absolute 0 80 0 17 - 1 22 Thousand/µL    Eosinophils Absolute 0 10 0 00 - 0 61 Thousand/µL    Basophils Absolute 0 10 0 00 - 0 10 Thousands/µL   CMP    Collection Time: 08/08/18  6:17 PM   Result Value Ref Range    Sodium 136 134 - 143 mmol/L    Potassium 4 1 3 5 - 5 5 mmol/L    Chloride 101 98 - 107 mmol/L    CO2 21 21 - 31 mmol/L    ANION GAP 14 (H) 4 - 13 mmol/L    BUN 12 7 - 25 mg/dL    Creatinine 1 02 0 70 - 1 30 mg/dL    Glucose 70 65 - 140 mg/dL    Calcium 9 6 8 6 - 10 5 mg/dL    AST 20 13 - 39 U/L    ALT 33 7 - 52 U/L    Alkaline Phosphatase 67 40 - 150 U/L    Total Protein 7 5 6 4 - 8 9 g/dL    Albumin 4 8 3 5 - 5 7 g/dL    Total Bilirubin 1 00 0 20 - 1 00 mg/dL    eGFR 93 ml/min/1 73sq m   Lipase    Collection Time: 08/08/18  6:17 PM   Result Value Ref Range    Lipase 11 11 - 82 u/L   Amylase    Collection Time: 08/08/18  6:17 PM   Result Value Ref Range    Amylase 26 (L) 29 - 103 IU/L   UA w Reflex to Microscopic w Reflex to Culture    Collection Time: 08/08/18  6:19 PM   Result Value Ref Range    Color, UA Yellow Yellow, Straw    Clarity, UA Clear Hazy, Clear    Specific Gravity, UA 1 015 1 005 - 1 030    pH, UA 5 5 5 0-<8 0    Leukocytes, UA Negative Negative    Nitrite, UA Negative Negative    Protein, UA Negative Negative, Interference- unable to analyze mg/dl    Glucose, UA Negative Negative mg/dl    Ketones, UA 80 (3+) (A) Negative mg/dl    Urobilinogen, UA 0 2 0 2, 1 0 E U /dl E U /dl    Bilirubin, UA 1+ (A) Negative    Blood, UA Negative Negative            Physical Exam   Constitutional: He is oriented to person, place, and time   He appears well-developed and well-nourished  No distress  HENT:   Head: Normocephalic and atraumatic  Eyes: Conjunctivae and EOM are normal  Pupils are equal, round, and reactive to light  Right eye exhibits no discharge  Left eye exhibits no discharge  No scleral icterus  Neck: Normal range of motion  Neck supple  No JVD present  No thyromegaly present  Cardiovascular: Normal rate, regular rhythm, normal heart sounds and intact distal pulses  Exam reveals no gallop and no friction rub  No murmur heard  Pulmonary/Chest: Effort normal and breath sounds normal  No respiratory distress  He has no wheezes  He has no rales  He exhibits no tenderness  Abdominal: Soft  Bowel sounds are normal  He exhibits no distension and no mass  There is tenderness in the periumbilical area  There is no rebound and no guarding  No hernias/mass appreciated on palpation  Musculoskeletal: Normal range of motion  He exhibits no edema, tenderness or deformity  Lymphadenopathy:     He has no cervical adenopathy  Neurological: He is alert and oriented to person, place, and time  He has normal reflexes  No cranial nerve deficit  Coordination normal    Skin: Skin is warm and dry  No rash noted  He is not diaphoretic  No erythema  No pallor  Psychiatric: He has a normal mood and affect  His behavior is normal  Judgment and thought content normal    Nursing note and vitals reviewed  [Fatigue] : fatigue [Recent Change In Weight] : ~T recent weight change [Cough] : cough [Dizziness] : dizziness [Fever] : no fever [Chills] : no chills [Discharge] : no discharge [Pain] : no pain [Vision Problems] : no vision problems [Earache] : no earache [Hearing Loss] : no hearing loss [Nasal Discharge] : no nasal discharge [Sore Throat] : no sore throat [Chest Pain] : no chest pain [Palpitations] : no palpitations [Lower Ext Edema] : no lower extremity edema [Shortness Of Breath] : no shortness of breath [Wheezing] : no wheezing [Dyspnea on Exertion] : not dyspnea on exertion [Abdominal Pain] : no abdominal pain [Nausea] : no nausea [Constipation] : no constipation [Vomiting] : no vomiting [Dysuria] : no dysuria [Incontinence] : no incontinence [Hesitancy] : no hesitancy [Joint Pain] : no joint pain [Joint Stiffness] : no joint stiffness [Itching] : no itching [Mole Changes] : no mole changes [Headache] : no headache [Suicidal] : not suicidal [Anxiety] : no anxiety [FreeTextEntry2] : 5 pouns weight loss, sweats  [FreeTextEntry9] : back pain [de-identified] : dizziness upon awakining

## 2022-11-16 RX ORDER — SODIUM CHLORIDE 9 MG/ML
125 INJECTION, SOLUTION INTRAVENOUS CONTINUOUS
Status: CANCELLED | OUTPATIENT
Start: 2022-11-16

## 2022-11-17 ENCOUNTER — HOSPITAL ENCOUNTER (OUTPATIENT)
Dept: GASTROENTEROLOGY | Facility: MEDICAL CENTER | Age: 42
Setting detail: OUTPATIENT SURGERY
End: 2022-11-17
Attending: INTERNAL MEDICINE

## 2022-11-17 ENCOUNTER — ANESTHESIA (OUTPATIENT)
Dept: GASTROENTEROLOGY | Facility: MEDICAL CENTER | Age: 42
End: 2022-11-17

## 2022-11-17 ENCOUNTER — ANESTHESIA EVENT (OUTPATIENT)
Dept: GASTROENTEROLOGY | Facility: MEDICAL CENTER | Age: 42
End: 2022-11-17

## 2022-11-17 VITALS
HEIGHT: 68 IN | WEIGHT: 167 LBS | RESPIRATION RATE: 16 BRPM | SYSTOLIC BLOOD PRESSURE: 116 MMHG | TEMPERATURE: 97.5 F | HEART RATE: 70 BPM | OXYGEN SATURATION: 95 % | DIASTOLIC BLOOD PRESSURE: 84 MMHG | BODY MASS INDEX: 25.31 KG/M2

## 2022-11-17 DIAGNOSIS — K50.00 CROHN'S DISEASE INVOLVING TERMINAL ILEUM (HCC): ICD-10-CM

## 2022-11-17 RX ORDER — PROPOFOL 10 MG/ML
INJECTION, EMULSION INTRAVENOUS CONTINUOUS PRN
Status: DISCONTINUED | OUTPATIENT
Start: 2022-11-17 | End: 2022-11-17

## 2022-11-17 RX ORDER — PROPOFOL 10 MG/ML
INJECTION, EMULSION INTRAVENOUS AS NEEDED
Status: DISCONTINUED | OUTPATIENT
Start: 2022-11-17 | End: 2022-11-17

## 2022-11-17 RX ORDER — SODIUM CHLORIDE 9 MG/ML
125 INJECTION, SOLUTION INTRAVENOUS CONTINUOUS
Status: DISCONTINUED | OUTPATIENT
Start: 2022-11-17 | End: 2022-11-21 | Stop reason: HOSPADM

## 2022-11-17 RX ORDER — LIDOCAINE HYDROCHLORIDE 20 MG/ML
INJECTION, SOLUTION EPIDURAL; INFILTRATION; INTRACAUDAL; PERINEURAL AS NEEDED
Status: DISCONTINUED | OUTPATIENT
Start: 2022-11-17 | End: 2022-11-17

## 2022-11-17 RX ADMIN — PROPOFOL 150 MG: 10 INJECTION, EMULSION INTRAVENOUS at 12:43

## 2022-11-17 RX ADMIN — PROPOFOL 160 MCG/KG/MIN: 10 INJECTION, EMULSION INTRAVENOUS at 12:45

## 2022-11-17 RX ADMIN — PROPOFOL 20 MG: 10 INJECTION, EMULSION INTRAVENOUS at 12:54

## 2022-11-17 RX ADMIN — LIDOCAINE HYDROCHLORIDE 80 MG: 20 INJECTION, SOLUTION EPIDURAL; INFILTRATION; INTRACAUDAL; PERINEURAL at 12:43

## 2022-11-17 RX ADMIN — Medication 40 MG: at 12:54

## 2022-11-17 RX ADMIN — PROPOFOL 50 MG: 10 INJECTION, EMULSION INTRAVENOUS at 12:51

## 2022-11-17 RX ADMIN — SODIUM CHLORIDE 125 ML/HR: 0.9 INJECTION, SOLUTION INTRAVENOUS at 12:07

## 2022-11-17 NOTE — ANESTHESIA POSTPROCEDURE EVALUATION
Post-Op Assessment Note    CV Status:  Stable    Pain management: adequate     Mental Status:  Alert and awake   Hydration Status:  Euvolemic   PONV Controlled:  Controlled   Airway Patency:  Patent      Post Op Vitals Reviewed: Yes      Staff: Anesthesiologist         No notable events documented      BP      Temp      Pulse     Resp      SpO2      /84   Pulse 70   Temp 97 5 °F (36 4 °C) (Temporal)   Resp 16   Ht 5' 8" (1 727 m)   Wt 75 8 kg (167 lb)   SpO2 95%   BMI 25 39 kg/m²

## 2022-11-17 NOTE — H&P
History and Physical - [facility]  Karlene De Leon 40 y o  male MRN: [id number]                  HPI: Karlene De Leon is a 40y o  year old male who presents for ileitis, diarrhea, pain      REVIEW OF SYSTEMS: Per the HPI, and otherwise unremarkable  Historical Information   Past Medical History:   Diagnosis Date   • Abdominal pain    • Abnormal liver function test     last assessed: Oct 16, 2013    • Abnormal weight loss     last assessed: Yung 15, 2014    • Acute left-sided low back pain with left-sided sciatica 10/31/2019   • Allergic rhinitis due to pollen     last assessed: Yung 15, 2014    • Anxiety    • Anxiety     last assessed/resolved:  Aug 5, 2015    • Asthma     last assessed: Yung 15, 2014    • Benign essential HTN     last assessed/resolved: Feb 23, 2017    • Cervical lymphadenopathy     last assessed/resolved: Feb 23, 2017    • Chronic sinusitis     last assessed: Yung 15, 2014    • Constipation    • Crohn's disease ([facility] [address] ) 01/01/2011    last assessed: Yung 15, 2014    • Dysuria     last assessed: April 29, 2016    • Elevated BP without diagnosis of hypertension     last assessed/resolved: Feb 23, 2017    • Esophageal reflux     last assessed/resolved: Feb 23, 2017    • Eustachian tube anomaly     Resolved: Nov 9, 2017    • External hemorrhoids     last assessed: Yung 15, 2014    • Gastritis and duodenitis 06/14/2021   • Hypertension     borderline   • 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     last assessed/resolved: July 19, 2017    • Inflammatory bowel disease    • Pancreatic neoplasm     last assessed: Yung 15, 2014    • Positive depression screening     resolved: July 24, 2017    • Postconcussion syndrome 11/15/2021   • Postoperative ileus ([facility] [address] ) 11/12/2018   • Psoriasis    • Seasonal allergies    • Small bowel obstruction ([address] ) 11/11/2018   • Vitamin B12 deficiency     last assessed/resolved: Feb 23, 2017      Past Surgical History:   Procedure Laterality Date   • CARDIAC LOOP RECORDER      Late 2021   • CHOLECYSTECTOMY      resolved: 2011    • COLONOSCOPY N/A 11/18/2016    Procedure: COLONOSCOPY;  Surgeon: Lauryn Torres MD;  Location:  GI LAB; Service:    • COLONOSCOPY N/A 4/28/2016    Procedure: COLONOSCOPY;  Surgeon: Lauryn Torres MD;  Location: [facility] GI LAB; Service:    • COLONOSCOPY  02/09/2021   • ESOPHAGOGASTRODUODENOSCOPY N/A 4/28/2016    Procedure: ESOPHAGOGASTRODUODENOSCOPY (EGD); Surgeon: Lauryn Torres MD;  Location: [facility] GI LAB; Service:    • FRONTAL SINUSOTOMY      resolved: April 2009    • CA COLONOSCOPY FLX DX W/COLLJ SPEC WHEN PFRMD N/A 10/12/2018    Procedure: EGD AND COLONOSCOPY;  Surgeon: Dev Stack MD;  Location: [facility] GI LAB; Service: Gastroenterology   • CA LAP, INCISIONAL HERNIA REPAIR,REDUCIBLE N/A 11/8/2018    Procedure: REPAIR HERNIA INCISIONAL LAPAROSCOPIC;  Surgeon: Eunice Goel MD;  Location: [facility] MAIN [facility];  Service: General   • CA REPAIR OF NASAL SEPTUM N/A 7/28/2017    Procedure: REVISION SEPTOPLASTY; TURBINOPLASTY; BILATERAL  GRAFTS; ALAR GRAFT; POSSIBLE AURICULAR CARTILAGE GRAFT;  Surgeon: Jr Peng MD;  Location: [facility] MAIN [facility];  Service: ENT   • TONSILLECTOMY AND ADENOIDECTOMY     • UPPER GASTROINTESTINAL ENDOSCOPY  02/09/2021     Social History   Social History     Substance and Sexual Activity   Alcohol Use Yes    Comment: rare - once a year     Social History     Substance and Sexual Activity   Drug Use Yes   • Types: Marijuana    Comment: uses medical marijuana via vaping   last use 2/8/19     Social History     Tobacco Use   Smoking Status Every Day   • Packs/day: 0 50   • Types: Cigarettes   Smokeless Tobacco Never   Tobacco Comments    Dependence on nicotine in cigarettes - 1/2 PPD x 20 years      Family History   Problem Relation Age of Onset   • Diabetes Mother         mellitus    • Hypertension Mother    • Thyroid disease Mother    • Fibromyalgia Mother    • Hypertension Father    • Hypertension Sister    • Heart disease Sister         had defibulator put due to late beats   • No Known Problems Maternal Grandmother    • No Known Problems Maternal Grandfather    • Diabetes Paternal Grandmother    • Diabetes Paternal Grandfather    • Kidney failure Paternal Grandfather    • Diabetes Other         mellitus    • Rheum arthritis Cousin        Meds/Allergies     (Not in a hospital admission)      Allergies   Allergen Reactions   • Apple - Food Allergy Anaphylaxis   • Aspirin Anaphylaxis and Hives     Category: Allergy;    • Corticosteroids Anaphylaxis and Drowsiness     Other reaction(s): Drowsiness   • Eggs Or Egg-Derived Products - Food Allergy GI Intolerance     GI upset   • Ibuprofen Anaphylaxis and Hives     Category: Allergy; aspirin   • Nsaids Anaphylaxis     Category: Allergy; Category: Allergy;    • Other Anaphylaxis     Category: Allergy; Annotation - [id number]: cherries, grapes , and kiwis; pt states all fruits   • Peanuts [Peanut Oil - Food Allergy] Anaphylaxis     nuts   • Penicillins Shortness Of Breath   • Methocarbamol Other (See Comments)     Patient reports not feeling well  • Ciprofloxacin Other (See Comments)     Prolongs QT interval due to heart conditon, cannot take  • Pollen Extract    • Zofran [Ondansetron] Other (See Comments)     Prolonged QT interval   • Codeine Anxiety       Objective     Blood pressure 136/98, pulse 69, temperature 97 5 °F (36 4 °C), temperature source Temporal, resp  rate 18, height 5' 8" (1 727 m), weight 75 8 kg (167 lb), SpO2 98 %  PHYSICAL EXAMINATION:    General Appearance:   Alert, cooperative, no distress   HEENT:  Normocephalic, atraumatic, anicteric  Neck supple, symmetrical, trachea midline  Lungs:   Equal chest rise and unlabored breathing, normal effort, no coughing  Cardiovascular:   No visualized JVD  Abdomen:   No abdominal distension  Skin:   No jaundice, rashes, or lesions  Musculoskeletal:   Normal range of motion visualized     Psych:  Normal affect and normal insight  Neuro:  Alert and appropriate  ASSESSMENT/PLAN:  This is a 40y o  year old male here for endoscopy and colonoscopy, and he is stable and optimized for his procedure

## 2022-11-17 NOTE — ANESTHESIA PREPROCEDURE EVALUATION
Procedure:  COLONOSCOPY  EGD    Relevant Problems   CARDIO   (+) Chronic midline thoracic back pain   (+) Prolonged QT interval syndrome      MUSCULOSKELETAL   (+) Chronic midline low back pain without sciatica   (+) Chronic midline thoracic back pain   (+) Degenerative disc disease, lumbar   (+) Myofascial pain syndrome      NEURO/PSYCH   (+) Anxiety   (+) Chronic headaches   (+) Chronic midline low back pain without sciatica   (+) Chronic midline thoracic back pain   (+) Chronic pain syndrome   (+) Myofascial pain syndrome      PULMONARY   (+) Smoking      Digestive   (+) Crohn's disease involving terminal ileum (HCC)        Physical Exam    Airway    Mallampati score: I  TM Distance: >3 FB  Neck ROM: full     Dental   No notable dental hx     Cardiovascular  Rhythm: regular, Rate: normal, Cardiovascular exam normal    Pulmonary  Pulmonary exam normal Breath sounds clear to auscultation,     Other Findings        Anesthesia Plan  ASA Score- 2     Anesthesia Type- IV sedation with anesthesia with ASA Monitors  Additional Monitors:   Airway Plan:           Plan Factors-Exercise tolerance (METS): >4 METS  Chart reviewed  Patient summary reviewed  Patient is a current smoker (cigarettes and nightly Marijuana)  Patient instructed to abstain from smoking on day of procedure  Patient smoked on day of surgery          Induction-     Postoperative Plan-     Informed Consent-

## 2022-11-28 DIAGNOSIS — R19.5 ABNORMAL STOOLS: ICD-10-CM

## 2022-11-28 DIAGNOSIS — F41.9 ANXIETY: ICD-10-CM

## 2022-11-28 DIAGNOSIS — K21.00 GASTROESOPHAGEAL REFLUX DISEASE WITH ESOPHAGITIS, UNSPECIFIED WHETHER HEMORRHAGE: ICD-10-CM

## 2022-11-28 DIAGNOSIS — K58.0 IRRITABLE BOWEL SYNDROME WITH DIARRHEA: ICD-10-CM

## 2022-11-28 DIAGNOSIS — R10.9 ABDOMINAL PAIN, UNSPECIFIED ABDOMINAL LOCATION: ICD-10-CM

## 2022-11-28 DIAGNOSIS — R14.0 GASSINESS: ICD-10-CM

## 2022-11-28 DIAGNOSIS — R10.11 RIGHT UPPER QUADRANT ABDOMINAL PAIN: ICD-10-CM

## 2022-11-29 RX ORDER — CLONAZEPAM 0.5 MG/1
0.5 TABLET ORAL EVERY 12 HOURS PRN
Qty: 60 TABLET | Refills: 1 | Status: SHIPPED | OUTPATIENT
Start: 2022-11-29 | End: 2023-04-17

## 2022-11-29 RX ORDER — OMEPRAZOLE 20 MG/1
20 CAPSULE, DELAYED RELEASE ORAL DAILY
Qty: 90 CAPSULE | Refills: 1 | Status: SHIPPED | OUTPATIENT
Start: 2022-11-29

## 2022-12-21 ENCOUNTER — OFFICE VISIT (OUTPATIENT)
Dept: GASTROENTEROLOGY | Facility: CLINIC | Age: 42
End: 2022-12-21

## 2022-12-21 VITALS
SYSTOLIC BLOOD PRESSURE: 123 MMHG | WEIGHT: 169.4 LBS | HEIGHT: 68 IN | TEMPERATURE: 98.1 F | OXYGEN SATURATION: 99 % | BODY MASS INDEX: 25.67 KG/M2 | HEART RATE: 73 BPM | DIASTOLIC BLOOD PRESSURE: 86 MMHG

## 2022-12-21 DIAGNOSIS — K58.0 IRRITABLE BOWEL SYNDROME WITH DIARRHEA: ICD-10-CM

## 2022-12-21 DIAGNOSIS — R19.5 ABNORMAL STOOLS: ICD-10-CM

## 2022-12-21 DIAGNOSIS — K52.9 ENTERITIS: ICD-10-CM

## 2022-12-21 DIAGNOSIS — K92.9 FUNCTIONAL GASTROINTESTINAL DISORDER: ICD-10-CM

## 2022-12-21 DIAGNOSIS — K64.9 HEMORRHOIDS, UNSPECIFIED HEMORRHOID TYPE: ICD-10-CM

## 2022-12-21 DIAGNOSIS — R10.10 PAIN OF UPPER ABDOMEN: ICD-10-CM

## 2022-12-21 DIAGNOSIS — K50.00 CROHN'S DISEASE INVOLVING TERMINAL ILEUM (HCC): Primary | ICD-10-CM

## 2022-12-21 NOTE — PATIENT INSTRUCTIONS
Repeat capsule  Repeat colonoscopy age 47  Consider a trial of budesonide slurry in the future  Continue prilosec daily    Can trial low histamine diet  Preparation H suppositories and cream as needed

## 2022-12-21 NOTE — PROGRESS NOTES
[facility] - Outpatient Follow-up Note  Ernesto Arce 40 y o  male MRN: [id number]  Encounter: [id number]          ASSESSMENT AND PLAN:    Ernesto Arce is a 40 y o  male with chronic abdominal pain and abnormal stools of unclear etiology with prior diagnosis of possible Crohn's but not consistently seen on subsequent colonoscopies, prior VCE with possible small bowel erosions  Currently he feels the same  EGD and colonoscopy 11/2022 with mld concentric esophageal rings, small hiatal hernia, normal TI and colon but + external hemorrhoids  Biopsies with mild chronic duodenal inflammation suggestive of peptic duodenitis, inactive gastritis w/p H pylori, increased intraepithelial eosinophils, normal TI and colon  Prior CT AP from 8/2021 with c/f ascending colitis  SIBO testing negative  Prior MRE normal  CMP with T bili 1 3 but otherwise normal  Normal CBC, Lipase  CRP Normal  Normal O&P, C diff, elastase, fecal fats  Calprotectin 76  Diff includes mild Crohn's given prior ileitis vs eosinophilic gastroenteritis (based on possible EoE) vs functional pathology vs dysmotility vs visceral hypersensitivity  He previously tried Elavil, gas medication, peppermint oil, Linzess, budesonide, prednisone, rifaxamin without significant relief  He has not been able to take medications with prolonged QT    1  Crohn's disease involving terminal ileum ([address] )    2  Hemorrhoids, unspecified hemorrhoid type    3  Pain of upper abdomen    4  Abnormal stools    5  Irritable bowel syndrome with diarrhea    6  Enteritis    7  Functional gastrointestinal disorder        Orders Placed This Encounter   Procedures   • Capsule endoscopy     Can try to obtain repeat capsule  Repeat colonoscopy age 47  Consider a trial of budesonide slurry in the future  Continue prilosec daily    Repeat EGD in 3-6 months to reassess EoE   Consider trial of Lyrica vs Cymbalta but possible prolonged qt  Can trial low histamine diet  Preparation H suppositories and cream  ______________________________________________________________________    SUBJECTIVE:    Adeline Salinas is a 40 y o  male who presents with complaint of abdominal pain  Pain is the same  No difference  No triggers  He uses marijuana at nighttime to help with his pain, but he does not have a tolerance for it  It helps him sleep and somewhat for the pain  BMs vary between none and 4-5 times per day  He has some blood in the water after a BM and believes it is hemorrhoids  No heartburn, dysphagia, odynophagia, nausea, vomiting, weight loss  REVIEW OF SYSTEMS IS OTHERWISE NEGATIVE  10 point ROS reviewed and negative, except as above      Historical Information   Past Medical History:   Diagnosis Date   • Abdominal pain    • Abnormal liver function test     last assessed: Oct 16, 2013    • Abnormal weight loss     last assessed: Yung 15, 2014    • Acute left-sided low back pain with left-sided sciatica 10/31/2019   • Allergic rhinitis due to pollen     last assessed: Yung 15, 2014    • Anxiety    • Anxiety     last assessed/resolved:  Aug 5, 2015    • Asthma     last assessed: Yung 15, 2014    • Benign essential HTN     last assessed/resolved: Feb 23, 2017    • Cervical lymphadenopathy     last assessed/resolved: Feb 23, 2017    • Chronic sinusitis     last assessed: Yung 15, 2014    • Constipation    • Crohn's disease ([facility] [address] ) 01/01/2011    last assessed: Yung 15, 2014    • Dysuria     last assessed: April 29, 2016    • Elevated BP without diagnosis of hypertension     last assessed/resolved: Feb 23, 2017    • Esophageal reflux     last assessed/resolved: Feb 23, 2017    • Eustachian tube anomaly     Resolved: Nov 9, 2017    • External hemorrhoids     last assessed: Yung 15, 2014    • Gastritis and duodenitis 06/14/2021   • Hypertension     borderline   • 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     last assessed/resolved: July 19, 2017    • Inflammatory bowel disease    • Pancreatic neoplasm     last assessed: Yung 15, 2014    • Positive depression screening     resolved: July 24, 2017    • Postconcussion syndrome 11/15/2021   • Postoperative ileus ([facility] [address] ) 11/12/2018   • Psoriasis    • Seasonal allergies    • Small bowel obstruction ([facility] [address] ) 11/11/2018   • Vitamin B12 deficiency     last assessed/resolved: Feb 23, 2017      Past Surgical History:   Procedure Laterality Date   • CARDIAC LOOP RECORDER      Late 2021   • CHOLECYSTECTOMY      resolved: 2011    • COLONOSCOPY N/A 11/18/2016    Procedure: COLONOSCOPY;  Surgeon: Chong Cooper MD;  Location: [facility]; Service:    • COLONOSCOPY N/A 4/28/2016    Procedure: COLONOSCOPY;  Surgeon: Chong Cooper MD;  Location: [facility]; Service:    • COLONOSCOPY  02/09/2021   • ESOPHAGOGASTRODUODENOSCOPY N/A 4/28/2016    Procedure: ESOPHAGOGASTRODUODENOSCOPY (EGD); Surgeon: Chong Cooper MD;  Location: [facility]; Service:    • FRONTAL SINUSOTOMY      resolved: April 2009    • MO COLONOSCOPY FLX DX W/COLLJ SPEC WHEN PFRMD N/A 10/12/2018    Procedure: EGD AND COLONOSCOPY;  Surgeon: Jay Gaucher, MD;  Location: [facility]; Service: Gastroenterology   • MO LAP, INCISIONAL HERNIA REPAIR,REDUCIBLE N/A 11/8/2018    Procedure: REPAIR HERNIA INCISIONAL LAPAROSCOPIC;  Surgeon: Sabra Zambrano MD;  Location: [facility];  Service: General   • MO REPAIR OF NASAL SEPTUM N/A 7/28/2017    Procedure: REVISION SEPTOPLASTY; TURBINOPLASTY; BILATERAL  GRAFTS; ALAR GRAFT; POSSIBLE AURICULAR CARTILAGE GRAFT;  Surgeon: Tamiko Juarez MD;  Location: [facility];  Service: ENT   • TONSILLECTOMY AND ADENOIDECTOMY     • UPPER GASTROINTESTINAL ENDOSCOPY  02/09/2021     Social History   Social History     Substance and Sexual Activity   Alcohol Use Yes    Comment: rare - once a year     Social History     Substance and Sexual Activity   Drug Use Yes   • Types: Marijuana    Comment: uses medical marijuana via vaping   last use 2/8/19     Social History Tobacco Use   Smoking Status Every Day   • Packs/day: 0 50   • Types: Cigarettes   Smokeless Tobacco Never   Tobacco Comments    Dependence on nicotine in cigarettes - 1/2 PPD x 20 years      Family History   Problem Relation Age of Onset   • Diabetes Mother         mellitus    • Hypertension Mother    • Thyroid disease Mother    • Fibromyalgia Mother    • Hypertension Father    • Hypertension Sister    • Heart disease Sister         had defibulator put due to late beats   • No Known Problems Maternal Grandmother    • No Known Problems Maternal Grandfather    • Diabetes Paternal Grandmother    • Diabetes Paternal Grandfather    • Kidney failure Paternal Grandfather    • Diabetes Other         mellitus    • Rheum arthritis Cousin        Meds/Allergies       Current Outpatient Medications:   •  acetaminophen (TYLENOL) 500 mg tablet  •  cetirizine (ZyrTEC) 10 mg tablet  •  clonazePAM (KlonoPIN) 0 5 mg tablet  •  fluticasone (FLONASE) 50 mcg/act nasal spray  •  Lidocaine 4 % PTCH  •  Linaclotide (LINZESS) 145 MCG CAPS  •  Misc  Devices (Cervical Traction) KIT  •  Misc  Devices (Cervical Traction) KIT  •  NON FORMULARY  •  omeprazole (PriLOSEC) 20 mg delayed release capsule    Allergies   Allergen Reactions   • Apple - Food Allergy Anaphylaxis   • Aspirin Anaphylaxis and Hives     Category: Allergy;    • Corticosteroids Anaphylaxis and Drowsiness     Other reaction(s): Drowsiness   • Eggs Or Egg-Derived Products - Food Allergy GI Intolerance     GI upset   • Ibuprofen Anaphylaxis and Hives     Category: Allergy; aspirin   • Nsaids Anaphylaxis     Category: Allergy; Category: Allergy;    • Other Anaphylaxis     Category: Allergy; Annotation - 04Apr2016: cherries, grapes , and kiwis; pt states all fruits   • Peanuts [Peanut Oil - Food Allergy] Anaphylaxis     nuts   • Penicillins Shortness Of Breath   • Methocarbamol Other (See Comments)     Patient reports not feeling well      • Ciprofloxacin Other (See Comments) Prolongs QT interval due to heart conditon, cannot take  • Pollen Extract    • Zofran [Ondansetron] Other (See Comments)     Prolonged QT interval   • Codeine Anxiety           Objective     Blood pressure 123/86, pulse 73, temperature 98 1 °F (36 7 °C), temperature source Tympanic, height 5' 8" (1 727 m), weight 76 8 kg (169 lb 6 4 oz), SpO2 99 %  Body mass index is 25 76 kg/m²  PHYSICAL EXAMINATION:    General Appearance:   Alert, cooperative, no distress   HEENT:  Normocephalic, atraumatic, anicteric  Neck supple, symmetrical, trachea midline  Lungs:   Equal chest rise and unlabored breathing, normal effort, no coughing  Cardiovascular:   No visualized JVD  Abdomen:   No abdominal distension  Skin:   No jaundice, rashes, or lesions  Musculoskeletal:   Normal range of motion visualized  Psych:  Normal affect and normal insight  Neuro:  Alert and appropriate  Lab Results:   No visits with results within 1 Day(s) from this visit  Latest known visit with results is:   Hospital Outpatient Visit on 11/17/2022   Component Date Value   • Case Report 11/17/2022                      Value:Surgical Pathology Report                         Case: [id number]                                   Authorizing Provider:  Randall Cotter MD    Collected:           11/17/2022 1244              Ordering Location:     [facility]        Received:            11/17/2022 4876                                     [address] Endoscopy                                                     Pathologist:           Sushil Hawk MD                                                          Specimens:   A) - Duodenum, random duodenal bx r/o Celiac                                                        B) - Stomach, random gastric bx r/o H   Pylori                                                       C) - Esophagus, esophageal bx r/o EOE                                                               D) - Terminal Ileum, terminal ileum bx r/o Crohn's                                                  E) - Colon, random colon bx r/o microscopic colitis                                       • Final Diagnosis 11/17/2022                      Value: This result contains rich text formatting which cannot be displayed here  • Note 11/17/2022                      Value: This result contains rich text formatting which cannot be displayed here  • Additional Information 11/17/2022                      Value: This result contains rich text formatting which cannot be displayed here  • Gross Description 11/17/2022                      Value: This result contains rich text formatting which cannot be displayed here  Lab Results   Component Value Date    WBC 4 80 08/17/2021    HGB 15 1 08/17/2021    HCT 43 3 08/17/2021    MCV 93 08/17/2021     08/17/2021       Lab Results   Component Value Date    SODIUM 140 08/17/2021    K 4 2 08/17/2021     (H) 08/17/2021    CO2 27 08/17/2021    AGAP 5 08/17/2021    BUN 8 08/17/2021    CREATININE 1 18 08/17/2021    GLUC 90 08/17/2021    GLUF 100 (H) 02/04/2021    CALCIUM 8 5 (L) 08/17/2021    AST 26 08/14/2021    ALT 31 08/14/2021    ALKPHOS 63 08/14/2021    TP 7 3 08/14/2021    TBILI 0 50 08/14/2021    EGFR 77 08/17/2021       Lab Results   Component Value Date    CRP <3 0 05/23/2022       Lab Results   Component Value Date    RVD1JLGBEBXR 1 370 07/24/2017       No results found for: IRON, TIBC, FERRITIN    Radiology Results:   No results found  9

## 2023-01-04 ENCOUNTER — HOSPITAL ENCOUNTER (OUTPATIENT)
Dept: GASTROENTEROLOGY | Facility: HOSPITAL | Age: 43
Discharge: HOME | End: 2023-01-04
Attending: INTERNAL MEDICINE

## 2023-01-04 DIAGNOSIS — K52.9 ENTERITIS: ICD-10-CM

## 2023-01-04 DIAGNOSIS — K50.00 CROHN'S DISEASE INVOLVING TERMINAL ILEUM (HCC): ICD-10-CM

## 2023-01-04 NOTE — PERIOPERATIVE NURSING NOTE
Patient swallowed pill without difficulty  Capsule visualized in stomach  Patient aware to return to facility at 1500

## 2023-01-11 ENCOUNTER — APPOINTMENT (OUTPATIENT)
Dept: LAB | Facility: CLINIC | Age: 43
End: 2023-01-11

## 2023-01-11 DIAGNOSIS — K50.00 CROHN'S DISEASE INVOLVING TERMINAL ILEUM (HCC): ICD-10-CM

## 2023-01-11 DIAGNOSIS — Z13.6 SCREENING FOR CARDIOVASCULAR CONDITION: ICD-10-CM

## 2023-01-11 DIAGNOSIS — Z13.1 SCREENING FOR DIABETES MELLITUS: ICD-10-CM

## 2023-01-11 LAB
ALBUMIN SERPL BCP-MCNC: 3.7 G/DL (ref 3.5–5)
ALP SERPL-CCNC: 74 U/L (ref 46–116)
ALT SERPL W P-5'-P-CCNC: 35 U/L (ref 12–78)
ANION GAP SERPL CALCULATED.3IONS-SCNC: 5 MMOL/L (ref 4–13)
AST SERPL W P-5'-P-CCNC: 19 U/L (ref 5–45)
BILIRUB SERPL-MCNC: 0.52 MG/DL (ref 0.2–1)
BUN SERPL-MCNC: 14 MG/DL (ref 5–25)
CALCIUM SERPL-MCNC: 9.1 MG/DL (ref 8.3–10.1)
CHLORIDE SERPL-SCNC: 108 MMOL/L (ref 96–108)
CHOLEST SERPL-MCNC: 172 MG/DL
CO2 SERPL-SCNC: 26 MMOL/L (ref 21–32)
CREAT SERPL-MCNC: 1.09 MG/DL (ref 0.6–1.3)
ERYTHROCYTE [DISTWIDTH] IN BLOOD BY AUTOMATED COUNT: 12.8 % (ref 11.6–15.1)
GFR SERPL CREATININE-BSD FRML MDRD: 83 ML/MIN/1.73SQ M
GLUCOSE P FAST SERPL-MCNC: 105 MG/DL (ref 65–99)
HCT VFR BLD AUTO: 45.1 % (ref 36.5–49.3)
HDLC SERPL-MCNC: 46 MG/DL
HGB BLD-MCNC: 15 G/DL (ref 12–17)
LDLC SERPL CALC-MCNC: 108 MG/DL (ref 0–100)
MCH RBC QN AUTO: 31.4 PG (ref 26.8–34.3)
MCHC RBC AUTO-ENTMCNC: 33.3 G/DL (ref 31.4–37.4)
MCV RBC AUTO: 94 FL (ref 82–98)
NONHDLC SERPL-MCNC: 126 MG/DL
PLATELET # BLD AUTO: 297 THOUSANDS/UL (ref 149–390)
PMV BLD AUTO: 9.8 FL (ref 8.9–12.7)
POTASSIUM SERPL-SCNC: 4.6 MMOL/L (ref 3.5–5.3)
PROT SERPL-MCNC: 7 G/DL (ref 6.4–8.4)
RBC # BLD AUTO: 4.78 MILLION/UL (ref 3.88–5.62)
SODIUM SERPL-SCNC: 139 MMOL/L (ref 135–147)
TRIGL SERPL-MCNC: 88 MG/DL
WBC # BLD AUTO: 8.36 THOUSAND/UL (ref 4.31–10.16)

## 2023-01-16 ENCOUNTER — RA CDI HCC (OUTPATIENT)
Dept: OTHER | Facility: HOSPITAL | Age: 43
End: 2023-01-16

## 2023-01-23 ENCOUNTER — OFFICE VISIT (OUTPATIENT)
Dept: INTERNAL MEDICINE CLINIC | Facility: OTHER | Age: 43
End: 2023-01-23

## 2023-01-23 VITALS
SYSTOLIC BLOOD PRESSURE: 108 MMHG | TEMPERATURE: 98.4 F | RESPIRATION RATE: 20 BRPM | HEIGHT: 68 IN | DIASTOLIC BLOOD PRESSURE: 72 MMHG | WEIGHT: 168.8 LBS | OXYGEN SATURATION: 100 % | BODY MASS INDEX: 25.58 KG/M2 | HEART RATE: 67 BPM

## 2023-01-23 DIAGNOSIS — M50.222 HERNIATION OF INTERVERTEBRAL DISC AT C5-C6 LEVEL: ICD-10-CM

## 2023-01-23 DIAGNOSIS — J45.20 MILD INTERMITTENT EXTRINSIC ASTHMA WITHOUT COMPLICATION: ICD-10-CM

## 2023-01-23 DIAGNOSIS — M54.12 CERVICAL RADICULOPATHY: ICD-10-CM

## 2023-01-23 DIAGNOSIS — M48.02 CERVICAL SPINAL STENOSIS: ICD-10-CM

## 2023-01-23 DIAGNOSIS — J30.2 SEASONAL ALLERGIC RHINITIS, UNSPECIFIED TRIGGER: ICD-10-CM

## 2023-01-23 DIAGNOSIS — K50.00 CROHN'S DISEASE INVOLVING TERMINAL ILEUM (HCC): ICD-10-CM

## 2023-01-23 DIAGNOSIS — F41.9 ANXIETY: Primary | ICD-10-CM

## 2023-01-23 PROBLEM — M54.2 NECK PAIN: Status: RESOLVED | Noted: 2018-06-13 | Resolved: 2023-01-23

## 2023-01-23 NOTE — PROGRESS NOTES
Assessment/Plan:    Crohn's disease involving terminal ileum ([address] )  continue follow up with GI  Allergic rhinitis  Continue zyrtec and flonase  Allergic asthma  No recent exacerbations  Cervical radiculopathy  continue follow up with neurosurgery and pain management  Anxiety  Continue clonazepam 0 5 mg BID PRN  Discussed starting Cymbalta for both anxiety and chronic pain  Diagnoses and all orders for this visit:    Anxiety    Crohn's disease involving terminal ileum (HCC)    Seasonal allergic rhinitis, unspecified trigger    Mild intermittent extrinsic asthma without complication    Cervical radiculopathy    Herniation of intervertebral disc at C5-C6 level    Cervical spinal stenosis                Subjective:      Patient ID: Adilia Zheng is a 40 y o  male  Chief Complaint   Patient presents with   • Follow-up     6 month, labs done 1/11/23, no issues " same stuff "    • hm     Bmi f/u plan, PHQ and AWV due after 7/20/23       40year-old male seen today for follow-up of chronic conditions  Laboratory studies reviewed today, CBC, CMP, lipid panel are stable and within acceptable range  He has been compliant with his medication regimen  He continues to have left sided neck pain with radiation to the right upper extremity  He has undergone cervical nerve block in 9/2022  He has follow up scheduled with neurosurgery and pain management  Otherwise, he has no concerns or complaints today  Anxiety  Presents for follow-up visit  Symptoms include excessive worry and nervous/anxious behavior  Patient reports no chest pain, dizziness, nausea, palpitations, shortness of breath or suicidal ideas  Symptoms occur occasionally  The severity of symptoms is mild  The patient sleeps 8 hours per night  The quality of sleep is fair  Nighttime awakenings: occasional      Compliance with medications is %         The following portions of the patient's history were reviewed and updated as appropriate: allergies, current medications, past family history, past medical history, past social history, past surgical history and problem list     Review of Systems   Constitutional: Negative for activity change, appetite change, chills, diaphoresis, fatigue and fever  HENT: Negative for congestion, postnasal drip, rhinorrhea, sinus pressure, sinus pain, sneezing and sore throat  Eyes: Negative for visual disturbance  Respiratory: Negative for apnea, cough, choking, chest tightness, shortness of breath and wheezing  Cardiovascular: Negative for chest pain, palpitations and leg swelling  Gastrointestinal: Negative for abdominal distention, abdominal pain, anal bleeding, blood in stool, constipation, diarrhea, nausea and vomiting  Endocrine: Negative for cold intolerance and heat intolerance  Genitourinary: Negative for difficulty urinating, dysuria and hematuria  Musculoskeletal: Positive for neck pain  Skin: Negative  Neurological: Negative for dizziness, weakness, light-headedness, numbness and headaches  Hematological: Negative for adenopathy  Psychiatric/Behavioral: Negative for agitation, sleep disturbance and suicidal ideas  The patient is nervous/anxious  All other systems reviewed and are negative  Past Medical History:   Diagnosis Date   • Abdominal pain    • Abnormal liver function test     last assessed: Oct 16, 2013    • Abnormal weight loss     last assessed: Yung 15, 2014    • Acute left-sided low back pain with left-sided sciatica 10/31/2019   • Allergic rhinitis due to pollen     last assessed: Yung 15, 2014    • Anxiety    • Anxiety     last assessed/resolved:  Aug 5, 2015    • Asthma     last assessed: Yung 15, 2014    • Benign essential HTN     last assessed/resolved: Feb 23, 2017    • Cervical lymphadenopathy     last assessed/resolved: Feb 23, 2017    • Chronic sinusitis     last assessed: Yung 15, 2014    • Constipation    • Crohn's disease ([facility] ) 01/01/2011 last assessed: Yung 15, 2014    • Dysuria     last assessed: April 29, 2016    • Elevated BP without diagnosis of hypertension     last assessed/resolved: Feb 23, 2017    • Esophageal reflux     last assessed/resolved: Feb 23, 2017    • Eustachian tube anomaly     Resolved: Nov 9, 2017    • External hemorrhoids     last assessed: Yung 15, 2014    • Gastritis and duodenitis 06/14/2021   • Hypertension     borderline   • 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     last assessed/resolved: July 19, 2017    • Inflammatory bowel disease    • Pancreatic neoplasm     last assessed: Yung 15, 2014    • Positive depression screening     resolved: July 24, 2017    • Postconcussion syndrome 11/15/2021   • Postoperative ileus ([facility] ) 11/12/2018   • Psoriasis    • Seasonal allergies    • Small bowel obstruction ([facility] ) 11/11/2018   • Vitamin B12 deficiency     last assessed/resolved: Feb 23, 2017          Current Outpatient Medications:   •  acetaminophen (TYLENOL) 500 mg tablet, Take 1,000 mg by mouth every 4 (four) hours as needed , Disp: , Rfl:   •  cetirizine (ZyrTEC) 10 mg tablet, Take 1 tablet (10 mg total) by mouth daily as needed for allergies, Disp: 30 tablet, Rfl: 5  •  clonazePAM (KlonoPIN) 0 5 mg tablet, Take 1 tablet (0 5 mg total) by mouth every 12 (twelve) hours as needed for anxiety, Disp: 60 tablet, Rfl: 1  •  fluticasone (FLONASE) 50 mcg/act nasal spray, 1 spray into each nostril daily as needed for rhinitis, Disp: 16 g, Rfl: 1  •  Lidocaine 4 % PTCH, Apply 1 patch topically daily as needed, Disp: , Rfl:   •  Linaclotide (LINZESS) 145 MCG CAPS, Take 1 capsule (145 mcg total) by mouth daily (Patient taking differently: Take 145 mcg by mouth if needed), Disp: 90 capsule, Rfl: 1  •  NON FORMULARY, Medical marijuana, Disp: , Rfl:   •  omeprazole (PriLOSEC) 20 mg delayed release capsule, Take 1 capsule (20 mg total) by mouth daily, Disp: 90 capsule, Rfl: 1    Allergies   Allergen Reactions   • Apple - Food Allergy Anaphylaxis • Aspirin Anaphylaxis and Hives     Category: Allergy;    • Corticosteroids Anaphylaxis and Drowsiness     Other reaction(s): Drowsiness   • Eggs Or Egg-Derived Products - Food Allergy GI Intolerance     GI upset   • Ibuprofen Anaphylaxis and Hives     Category: Allergy; aspirin   • Nsaids Anaphylaxis     Category: Allergy; Category: Allergy;    • Other Anaphylaxis     Category: Allergy; Annotation - [id number]: cherries, grapes , and kiwis; pt states all fruits   • Peanuts [Peanut Oil - Food Allergy] Anaphylaxis     nuts   • Penicillins Shortness Of Breath   • Methocarbamol Other (See Comments)     Patient reports not feeling well  • Ciprofloxacin Other (See Comments)     Prolongs QT interval due to heart conditon, cannot take  • Pollen Extract    • Zofran [Ondansetron] Other (See Comments)     Prolonged QT interval   • Codeine Anxiety       Social History   Past Surgical History:   Procedure Laterality Date   • CARDIAC LOOP RECORDER      Late 2021   • CHOLECYSTECTOMY      resolved: 2011    • COLONOSCOPY N/A 11/18/2016    Procedure: COLONOSCOPY;  Surgeon: Migel Shields MD;  Location:  GI LAB; Service:    • COLONOSCOPY N/A 4/28/2016    Procedure: COLONOSCOPY;  Surgeon: Migel Shields MD;  Location: [facility] GI LAB; Service:    • COLONOSCOPY  02/09/2021   • ESOPHAGOGASTRODUODENOSCOPY N/A 4/28/2016    Procedure: ESOPHAGOGASTRODUODENOSCOPY (EGD); Surgeon: Migel Shields MD;  Location: [facility] GI LAB; Service:    • FRONTAL SINUSOTOMY      resolved: April 2009    • NH COLONOSCOPY FLX DX W/COLLJ SPEC WHEN PFRMD N/A 10/12/2018    Procedure: EGD AND COLONOSCOPY;  Surgeon: Jenae Ag MD;  Location: [facility] GI LAB;   Service: Gastroenterology   • NH LAP RPR HRNA XCPT INCAL/INGUN NCRC8/STRANGULATED N/A 11/8/2018    Procedure: REPAIR HERNIA INCISIONAL LAPAROSCOPIC;  Surgeon: Eve Braun MD;  Location:  MAIN OR;  Service: General   • NH SEPTOPLASTY/SUBMUCOUS RESECJ W/WO CARTILAGE GRF N/A 7/28/2017    Procedure: REVISION SEPTOPLASTY; TURBINOPLASTY; BILATERAL  GRAFTS; ALAR GRAFT; POSSIBLE AURICULAR CARTILAGE GRAFT;  Surgeon: Parisa Desai MD;  Location: [facility];  Service: ENT   • TONSILLECTOMY AND ADENOIDECTOMY     • UPPER GASTROINTESTINAL ENDOSCOPY  02/09/2021     Family History   Problem Relation Age of Onset   • Diabetes Mother         mellitus    • Hypertension Mother    • Thyroid disease Mother    • Fibromyalgia Mother    • Hypertension Father    • Hypertension Sister    • Heart disease Sister         had defibulator put due to late beats   • No Known Problems Maternal Grandmother    • No Known Problems Maternal Grandfather    • Diabetes Paternal Grandmother    • Diabetes Paternal Grandfather    • Kidney failure Paternal Grandfather    • Diabetes Other         mellitus    • Rheum arthritis Cousin        Objective:  /72 (BP Location: Left arm, Patient Position: Sitting, Cuff Size: Standard)   Pulse 67   Temp 98 4 °F (36 9 °C) (Temporal)   Resp 20   Ht 5' 8" (1 727 m)   Wt 76 6 kg (168 lb 12 8 oz)   SpO2 100%   BMI 25 67 kg/m²     Recent Results (from the past 1344 hour(s))   CBC    Collection Time: 01/11/23 10:12 AM   Result Value Ref Range    WBC 8 36 4 31 - 10 16 Thousand/uL    RBC 4 78 3 88 - 5 62 Million/uL    Hemoglobin 15 0 12 0 - 17 0 g/dL    Hematocrit 45 1 36 5 - 49 3 %    MCV 94 82 - 98 fL    MCH 31 4 26 8 - 34 3 pg    MCHC 33 3 31 4 - 37 4 g/dL    RDW 12 8 11 6 - 15 1 %    Platelets 344 045 - 543 Thousands/uL    MPV 9 8 8 9 - 12 7 fL   Comprehensive metabolic panel    Collection Time: 01/11/23 10:12 AM   Result Value Ref Range    Sodium 139 135 - 147 mmol/L    Potassium 4 6 3 5 - 5 3 mmol/L    Chloride 108 96 - 108 mmol/L    CO2 26 21 - 32 mmol/L    ANION GAP 5 4 - 13 mmol/L    BUN 14 5 - 25 mg/dL    Creatinine 1 09 0 60 - 1 30 mg/dL    Glucose, Fasting 105 (H) 65 - 99 mg/dL    Calcium 9 1 8 3 - 10 1 mg/dL    AST 19 5 - 45 U/L    ALT 35 12 - 78 U/L    Alkaline Phosphatase 74 46 - 116 U/L Total Protein 7 0 6 4 - 8 4 g/dL    Albumin 3 7 3 5 - 5 0 g/dL    Total Bilirubin 0 52 0 20 - 1 00 mg/dL    eGFR 83 ml/min/1 73sq m   Lipid panel    Collection Time: 01/11/23 10:12 AM   Result Value Ref Range    Cholesterol 172 See Comment mg/dL    Triglycerides 88 See Comment mg/dL    HDL, Direct 46 >=40 mg/dL    LDL Calculated 108 (H) 0 - 100 mg/dL    Non-HDL-Chol (CHOL-HDL) 126 mg/dl            Physical Exam  Vitals and nursing note reviewed  Constitutional:       General: He is not in acute distress  Appearance: He is well-developed  He is not diaphoretic  HENT:      Head: Normocephalic and atraumatic  Eyes:      General: No scleral icterus  Right eye: No discharge  Left eye: No discharge  Conjunctiva/sclera: Conjunctivae normal       Pupils: Pupils are equal, round, and reactive to light  Neck:      Thyroid: No thyromegaly  Vascular: No JVD  Cardiovascular:      Rate and Rhythm: Normal rate and regular rhythm  Heart sounds: Normal heart sounds  No murmur heard  No friction rub  No gallop  Pulmonary:      Effort: Pulmonary effort is normal  No respiratory distress  Breath sounds: Normal breath sounds  No wheezing or rales  Chest:      Chest wall: No tenderness  Abdominal:      General: Bowel sounds are normal  There is no distension  Palpations: Abdomen is soft  There is no mass  Tenderness: There is no abdominal tenderness  There is no guarding or rebound  Musculoskeletal:         General: No tenderness or deformity  Normal range of motion  Cervical back: Normal range of motion and neck supple  Lymphadenopathy:      Cervical: No cervical adenopathy  Skin:     General: Skin is warm and dry  Coloration: Skin is not pale  Findings: No erythema or rash  Neurological:      Mental Status: He is alert and oriented to person, place, and time  Cranial Nerves: No cranial nerve deficit        Coordination: Coordination normal       Deep Tendon Reflexes: Reflexes are normal and symmetric  Psychiatric:         Behavior: Behavior normal          Thought Content:  Thought content normal          Judgment: Judgment normal

## 2023-01-31 ENCOUNTER — OFFICE VISIT (OUTPATIENT)
Dept: INTERNAL MEDICINE CLINIC | Age: 43
End: 2023-01-31

## 2023-01-31 VITALS
HEART RATE: 81 BPM | TEMPERATURE: 98.7 F | SYSTOLIC BLOOD PRESSURE: 130 MMHG | OXYGEN SATURATION: 98 % | WEIGHT: 170 LBS | BODY MASS INDEX: 25.76 KG/M2 | HEIGHT: 68 IN | DIASTOLIC BLOOD PRESSURE: 90 MMHG

## 2023-01-31 DIAGNOSIS — R20.0 LEFT FACIAL NUMBNESS: Primary | ICD-10-CM

## 2023-01-31 DIAGNOSIS — M54.12 CERVICAL RADICULOPATHY: ICD-10-CM

## 2023-01-31 DIAGNOSIS — M48.02 CERVICAL SPINAL STENOSIS: ICD-10-CM

## 2023-01-31 DIAGNOSIS — M50.20 HERNIATED DISC, CERVICAL: ICD-10-CM

## 2023-01-31 DIAGNOSIS — R51.9 BILATERAL HEADACHES: ICD-10-CM
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
## 2023-02-02 ENCOUNTER — TELEPHONE (OUTPATIENT)
Dept: INTERNAL MEDICINE CLINIC | Age: 43
End: 2023-02-02

## 2023-02-02 NOTE — TELEPHONE ENCOUNTER
Patient called to state that he is scheduled for the MRI of the Cervical Spine and MRI of the Brain w/o contrast   He is scheduled for 02/07/2023 at [facility]  Patient was told that his secondary insurance would need a prior authorization for these tests  I called the insurance and was transferred to [facility] to start the authorization process  The insurance is pending verification of insurance and facility      Pending Case # [id number]

## 2023-02-06 DIAGNOSIS — F41.9 ANXIETY: ICD-10-CM

## 2023-02-06 RX ORDER — CLONAZEPAM 0.5 MG/1
0.5 TABLET ORAL EVERY 12 HOURS PRN
Qty: 60 TABLET | Refills: 1 | Status: SHIPPED | OUTPATIENT
Start: 2023-02-06 | End: 2023-06-25

## 2023-02-06 NOTE — TELEPHONE ENCOUNTER
[facility] called today to see if pt was approved with the secondary insurance   They are aware that you are not in the office until 11am Tuesday

## 2023-02-07 ENCOUNTER — HOSPITAL ENCOUNTER (EMERGENCY)
Facility: HOSPITAL | Age: 43
Discharge: HOME | End: 2023-02-07
Attending: EMERGENCY MEDICINE | Admitting: EMERGENCY MEDICINE

## 2023-02-07 ENCOUNTER — APPOINTMENT (EMERGENCY)
Dept: CT IMAGING | Facility: HOSPITAL | Age: 43
End: 2023-02-07

## 2023-02-07 VITALS
RESPIRATION RATE: 18 BRPM | HEIGHT: 68 IN | SYSTOLIC BLOOD PRESSURE: 142 MMHG | WEIGHT: 170 LBS | DIASTOLIC BLOOD PRESSURE: 92 MMHG | OXYGEN SATURATION: 92 % | BODY MASS INDEX: 25.76 KG/M2 | HEART RATE: 59 BPM | TEMPERATURE: 97.2 F

## 2023-02-07 DIAGNOSIS — R20.0 FACIAL NUMBNESS: Primary | ICD-10-CM

## 2023-02-07 DIAGNOSIS — M54.2 CHRONIC NECK PAIN: ICD-10-CM

## 2023-02-07 DIAGNOSIS — G89.29 CHRONIC NECK PAIN: ICD-10-CM

## 2023-02-07 LAB
ANION GAP SERPL CALCULATED.3IONS-SCNC: 6 MMOL/L (ref 4–13)
BASOPHILS # BLD AUTO: 0.08 THOUSANDS/ÂΜL (ref 0–0.1)
BASOPHILS NFR BLD AUTO: 1 % (ref 0–1)
BUN SERPL-MCNC: 12 MG/DL (ref 5–25)
CALCIUM SERPL-MCNC: 9.4 MG/DL (ref 8.4–10.2)
CHLORIDE SERPL-SCNC: 105 MMOL/L (ref 96–108)
CO2 SERPL-SCNC: 26 MMOL/L (ref 21–32)
CREAT SERPL-MCNC: 1 MG/DL (ref 0.6–1.3)
EOSINOPHIL # BLD AUTO: 0.14 THOUSAND/ÂΜL (ref 0–0.61)
EOSINOPHIL NFR BLD AUTO: 2 % (ref 0–6)
ERYTHROCYTE [DISTWIDTH] IN BLOOD BY AUTOMATED COUNT: 12.6 % (ref 11.6–15.1)
GFR SERPL CREATININE-BSD FRML MDRD: 93 ML/MIN/1.73SQ M
GLUCOSE SERPL-MCNC: 97 MG/DL (ref 65–140)
HCT VFR BLD AUTO: 45.9 % (ref 36.5–49.3)
HGB BLD-MCNC: 16.1 G/DL (ref 12–17)
IMM GRANULOCYTES # BLD AUTO: 0.04 THOUSAND/UL (ref 0–0.2)
IMM GRANULOCYTES NFR BLD AUTO: 1 % (ref 0–2)
LYMPHOCYTES # BLD AUTO: 2.23 THOUSANDS/ÂΜL (ref 0.6–4.47)
LYMPHOCYTES NFR BLD AUTO: 28 % (ref 14–44)
MCH RBC QN AUTO: 32.3 PG (ref 26.8–34.3)
MCHC RBC AUTO-ENTMCNC: 35.1 G/DL (ref 31.4–37.4)
MCV RBC AUTO: 92 FL (ref 82–98)
MONOCYTES # BLD AUTO: 0.53 THOUSAND/ÂΜL (ref 0.17–1.22)
MONOCYTES NFR BLD AUTO: 7 % (ref 4–12)
NEUTROPHILS # BLD AUTO: 5.01 THOUSANDS/ÂΜL (ref 1.85–7.62)
NEUTS SEG NFR BLD AUTO: 61 % (ref 43–75)
NRBC BLD AUTO-RTO: 0 /100 WBCS
PLATELET # BLD AUTO: 243 THOUSANDS/UL (ref 149–390)
PMV BLD AUTO: 9.3 FL (ref 8.9–12.7)
POTASSIUM SERPL-SCNC: 4.1 MMOL/L (ref 3.5–5.3)
RBC # BLD AUTO: 4.98 MILLION/UL (ref 3.88–5.62)
SODIUM SERPL-SCNC: 137 MMOL/L (ref 135–147)
WBC # BLD AUTO: 8.03 THOUSAND/UL (ref 4.31–10.16)

## 2023-02-07 RX ORDER — ACETAMINOPHEN 325 MG/1
975 TABLET ORAL ONCE
Status: COMPLETED | OUTPATIENT
Start: 2023-02-07 | End: 2023-02-07

## 2023-02-07 RX ORDER — LORAZEPAM 2 MG/ML
1 INJECTION INTRAMUSCULAR ONCE
Status: COMPLETED | OUTPATIENT
Start: 2023-02-07 | End: 2023-02-07

## 2023-02-07 RX ADMIN — IOHEXOL 85 ML: 350 INJECTION, SOLUTION INTRAVENOUS at 13:16

## 2023-02-07 RX ADMIN — LORAZEPAM 1 MG: 2 INJECTION INTRAMUSCULAR; INTRAVENOUS at 12:57

## 2023-02-07 RX ADMIN — ACETAMINOPHEN 975 MG: 325 TABLET ORAL at 13:49
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
## 2023-02-07 NOTE — TELEPHONE ENCOUNTER
Spoke with [facility] and made them aware that they insurance doesn't need authorization since Medicare is primary

## 2023-02-13 ENCOUNTER — TELEPHONE (OUTPATIENT)
Dept: INTERNAL MEDICINE CLINIC | Facility: OTHER | Age: 43
End: 2023-02-13

## 2023-02-15 NOTE — TELEPHONE ENCOUNTER
Patient had appointment with neurosurgeon yesterday and is getting an MRI tomorrow, so he didn't feel the need to schedule an appointment with us.

## 2023-03-27 ENCOUNTER — CONSULT (OUTPATIENT)
Dept: INTERNAL MEDICINE CLINIC | Facility: OTHER | Age: 43
End: 2023-03-27

## 2023-03-27 VITALS
WEIGHT: 176.2 LBS | DIASTOLIC BLOOD PRESSURE: 92 MMHG | SYSTOLIC BLOOD PRESSURE: 148 MMHG | BODY MASS INDEX: 26.7 KG/M2 | HEART RATE: 82 BPM | TEMPERATURE: 99.3 F | OXYGEN SATURATION: 98 % | HEIGHT: 68 IN | RESPIRATION RATE: 18 BRPM

## 2023-03-27 DIAGNOSIS — J45.20 MILD INTERMITTENT EXTRINSIC ASTHMA WITHOUT COMPLICATION: ICD-10-CM

## 2023-03-27 DIAGNOSIS — M54.12 CERVICAL RADICULOPATHY: ICD-10-CM

## 2023-03-27 DIAGNOSIS — M48.02 CERVICAL SPINAL STENOSIS: ICD-10-CM

## 2023-03-27 DIAGNOSIS — M51.36 DEGENERATIVE DISC DISEASE, LUMBAR: ICD-10-CM

## 2023-03-27 DIAGNOSIS — M79.18 MYOFASCIAL PAIN SYNDROME: ICD-10-CM

## 2023-03-27 DIAGNOSIS — Z82.49 FAMILY HISTORY OF HEART DISEASE: ICD-10-CM

## 2023-03-27 DIAGNOSIS — M54.50 CHRONIC MIDLINE LOW BACK PAIN WITHOUT SCIATICA: ICD-10-CM

## 2023-03-27 DIAGNOSIS — J30.2 SEASONAL ALLERGIC RHINITIS, UNSPECIFIED TRIGGER: ICD-10-CM

## 2023-03-27 DIAGNOSIS — K50.00 CROHN'S DISEASE INVOLVING TERMINAL ILEUM (HCC): ICD-10-CM

## 2023-03-27 DIAGNOSIS — R51.9 CHRONIC INTRACTABLE HEADACHE, UNSPECIFIED HEADACHE TYPE: ICD-10-CM

## 2023-03-27 DIAGNOSIS — G89.4 CHRONIC PAIN SYNDROME: ICD-10-CM

## 2023-03-27 DIAGNOSIS — I45.81 PROLONGED QT INTERVAL SYNDROME: ICD-10-CM

## 2023-03-27 DIAGNOSIS — G89.29 CHRONIC INTRACTABLE HEADACHE, UNSPECIFIED HEADACHE TYPE: ICD-10-CM

## 2023-03-27 DIAGNOSIS — Z01.818 PREOP EXAM FOR INTERNAL MEDICINE: Primary | ICD-10-CM

## 2023-03-27 DIAGNOSIS — Z87.19 S/P REPAIR OF VENTRAL HERNIA: ICD-10-CM

## 2023-03-27 DIAGNOSIS — M51.24 THORACIC DISC HERNIATION: ICD-10-CM

## 2023-03-27 DIAGNOSIS — F17.200 SMOKING: ICD-10-CM

## 2023-03-27 DIAGNOSIS — N40.0 ENLARGED PROSTATE: ICD-10-CM

## 2023-03-27 DIAGNOSIS — G89.29 CHRONIC MIDLINE THORACIC BACK PAIN: ICD-10-CM

## 2023-03-27 DIAGNOSIS — M62.838 CERVICAL PARASPINAL MUSCLE SPASM: ICD-10-CM

## 2023-03-27 DIAGNOSIS — M50.222 HERNIATION OF INTERVERTEBRAL DISC AT C5-C6 LEVEL: ICD-10-CM

## 2023-03-27 DIAGNOSIS — Z98.890 S/P REPAIR OF VENTRAL HERNIA: ICD-10-CM

## 2023-03-27 DIAGNOSIS — M54.6 CHRONIC MIDLINE THORACIC BACK PAIN: ICD-10-CM

## 2023-03-27 DIAGNOSIS — F41.9 ANXIETY: ICD-10-CM

## 2023-03-27 DIAGNOSIS — R10.9 FUNCTIONAL ABDOMINAL PAIN SYNDROME: ICD-10-CM

## 2023-03-27 DIAGNOSIS — G89.29 CHRONIC MIDLINE LOW BACK PAIN WITHOUT SCIATICA: ICD-10-CM

## 2023-03-27 NOTE — PROGRESS NOTES
Subjective:  Chief Complaint   Patient presents with   • Pre-op Exam      Neurosurgery 3/30/23 Dr Brenden Perry, Small Form to be Filled         Christina Casper is a 40y o  year old male who presents to the office today for a preoperative consultation at the request of surgeon Dr Brenden Perry Who plans on performing C5-6 anterior cervical discectomy and fusion on March 30  This consultation is requested for the specific conditions prompting preoperative evaluation (i e  because of potential affect on operative risk)  Planned anesthesia: general  The patient has the following known anesthesia issues: none  Patients bleeding risk: no recent abnormal bleeding, no remote history of abnormal bleeding and no use of Ca-channel blockers  Patient does not have objections to receiving blood products if needed  Patient is able to walk 4 blocks without symptoms  Patient is able to walk up 2 flights of stairs without symptoms  Significant past medical history includes Crohn's disease, allergic rhinitis, asthma, cervical radiculopathy, Long QT syndrome  Tobacco use: current (20 pack years)  Alcohol use: socially/rarely  Illicit drug use: negative    Symptoms:   Easy bleeding: no  Easy bruising: no  Frequent nose bleeds: no  Chest pain: no  Cough: no  Dyspnea on exertion:no  Edema: no  Palpitations: no  Wheezing: no    Living situation: Patient lives with his wife and mother in a two story home  His wife and mother will be caring for him after surgery  hedoes not have post-op concerns  The following portions of the patient's history were reviewed and updated as appropriate: allergies, current medications, past family history, past medical history, past social history, past surgical history and problem list     Review of Systems  Review of Systems   Constitutional: Negative for activity change, appetite change, chills, diaphoresis, fatigue and fever     HENT: Negative for congestion, postnasal drip, rhinorrhea, sinus pressure, sinus pain, sneezing and sore throat  Eyes: Negative for visual disturbance  Respiratory: Negative for apnea, cough, choking, chest tightness, shortness of breath and wheezing  Cardiovascular: Negative for chest pain, palpitations and leg swelling  Gastrointestinal: Negative for abdominal distention, abdominal pain, anal bleeding, blood in stool, constipation, diarrhea, nausea and vomiting  Endocrine: Negative for cold intolerance and heat intolerance  Genitourinary: Negative for difficulty urinating, dysuria and hematuria  Musculoskeletal: Negative  Skin: Negative  Neurological: Negative for dizziness, weakness, light-headedness, numbness and headaches  Hematological: Negative for adenopathy  Psychiatric/Behavioral: Negative for agitation, sleep disturbance and suicidal ideas  All other systems reviewed and are negative  Past Medical History:   Diagnosis Date   • Abdominal pain    • Abnormal liver function test     last assessed: Oct 16, 2013    • Abnormal weight loss     last assessed: Yung 15, 2014    • Acute left-sided low back pain with left-sided sciatica 10/31/2019   • Allergic rhinitis due to pollen     last assessed: Yung 15, 2014    • Anxiety    • Anxiety     last assessed/resolved:  Aug 5, 2015    • Asthma     last assessed: Yung 15, 2014    • Benign essential HTN     last assessed/resolved: Feb 23, 2017    • Cervical lymphadenopathy     last assessed/resolved: Feb 23, 2017    • Chronic sinusitis     last assessed: Yung 15, 2014    • Constipation    • Crohn's disease ([facility] [address] ) 01/01/2011    last assessed: Yung 15, 2014    • Dysuria     last assessed: April 29, 2016    • Elevated BP without diagnosis of hypertension     last assessed/resolved: Feb 23, 2017    • Esophageal reflux     last assessed/resolved: Feb 23, 2017    • Eustachian tube anomaly     Resolved: Nov 9, 2017    • External hemorrhoids     last assessed: Yung 15, 2014    • Gastritis and duodenitis 06/14/2021 • Hypertension     borderline   • 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     last assessed/resolved: July 19, 2017    • Inflammatory bowel disease    • Pancreatic neoplasm     last assessed: Yung 15, 2014    • Positive depression screening     resolved: July 24, 2017    • Postconcussion syndrome 11/15/2021   • Postoperative ileus ([facility] [address] ) 11/12/2018   • Psoriasis    • Seasonal allergies    • Small bowel obstruction ([facility] [address] ) 11/11/2018   • Vitamin B12 deficiency     last assessed/resolved: Feb 23, 2017      Past Surgical History:   Procedure Laterality Date   • CARDIAC LOOP RECORDER      Late 2021   • CHOLECYSTECTOMY      resolved: 2011    • COLONOSCOPY N/A 11/18/2016    Procedure: COLONOSCOPY;  Surgeon: Deon Martin MD;  Location:  GI LAB; Service:    • COLONOSCOPY N/A 4/28/2016    Procedure: COLONOSCOPY;  Surgeon: Deon Martin MD;  Location: [facility] GI LAB; Service:    • COLONOSCOPY  02/09/2021   • ESOPHAGOGASTRODUODENOSCOPY N/A 4/28/2016    Procedure: ESOPHAGOGASTRODUODENOSCOPY (EGD); Surgeon: Deon Martin MD;  Location: [facility] GI LAB; Service:    • FRONTAL SINUSOTOMY      resolved: April 2009    • MN COLONOSCOPY FLX DX W/COLLJ SPEC WHEN PFRMD N/A 10/12/2018    Procedure: EGD AND COLONOSCOPY;  Surgeon: Liz Painter MD;  Location: [facility] GI LAB;   Service: Gastroenterology   • MN LAP RPR HRNA XCPT INCAL/INGUN NCRC8/STRANGULATED N/A 11/8/2018    Procedure: REPAIR HERNIA INCISIONAL LAPAROSCOPIC;  Surgeon: Andree Birch MD;  Location: [facility];  Service: General   • MN SEPTOPLASTY/SUBMUCOUS RESECJ W/WO CARTILAGE GRF N/A 7/28/2017    Procedure: REVISION SEPTOPLASTY; TURBINOPLASTY; BILATERAL  GRAFTS; ALAR GRAFT; POSSIBLE AURICULAR CARTILAGE GRAFT;  Surgeon: Pio Daniels MD;  Location: [facility];  Service: ENT   • TONSILLECTOMY AND ADENOIDECTOMY     • UPPER GASTROINTESTINAL ENDOSCOPY  02/09/2021     Social History     Tobacco Use   • Smoking status: Every Day     Packs/day: 0 50     Years: 20 00     Pack years: 10 00     Types: Cigarettes     Start date: 2003   • Smokeless tobacco: Never   • Tobacco comments:     Dependence on nicotine in cigarettes - 1/2 PPD x 20 years    Vaping Use   • Vaping Use: Some days   • Substances: THC, CBD   Substance Use Topics   • Alcohol use: Yes     Comment: rare - once a year   • Drug use: Yes     Types: Marijuana     Comment: uses medical marijuana via vaping   last use 2/8/19     Family History   Problem Relation Age of Onset   • Diabetes Mother         mellitus    • Hypertension Mother    • Thyroid disease Mother    • Fibromyalgia Mother    • Hypertension Father    • Hypertension Sister    • Heart disease Sister         had defibulator put due to late beats   • No Known Problems Maternal Grandmother    • No Known Problems Maternal Grandfather    • Diabetes Paternal Grandmother    • Diabetes Paternal Grandfather    • Kidney failure Paternal Grandfather    • Diabetes Other         mellitus    • Rheum arthritis Cousin      Apple - food allergy, Aspirin, Corticosteroids, Eggs or egg-derived products - food allergy, Ibuprofen, Nsaids, Other, Peanuts [peanut oil - food allergy], Penicillins, Methocarbamol, Ciprofloxacin, Pollen extract, Zofran [ondansetron], and Codeine  Current Outpatient Medications   Medication Sig Dispense Refill   • acetaminophen (TYLENOL) 500 mg tablet Take 1,000 mg by mouth every 4 (four) hours as needed      • cetirizine (ZyrTEC) 10 mg tablet Take 1 tablet (10 mg total) by mouth daily as needed for allergies 30 tablet 5   • clonazePAM (KlonoPIN) 0 5 mg tablet Take 1 tablet (0 5 mg total) by mouth every 12 (twelve) hours as needed for anxiety 60 tablet 1   • fluticasone (FLONASE) 50 mcg/act nasal spray 1 spray into each nostril daily as needed for rhinitis 16 g 1   • Linaclotide (LINZESS) 145 MCG CAPS Take 1 capsule (145 mcg total) by mouth daily (Patient taking differently: Take 145 mcg by mouth if needed) 90 capsule 1   • NON FORMULARY Medical marijuana     • "omeprazole (PriLOSEC) 20 mg delayed release capsule Take 1 capsule (20 mg total) by mouth daily 90 capsule 1     No current facility-administered medications for this visit  Objective:       /92 (BP Location: Left arm, Patient Position: Sitting, Cuff Size: Standard)   Pulse 82   Temp 99 3 °F (37 4 °C) (Temporal)   Resp 18   Ht 5' 8\" (1 727 m)   Wt 79 9 kg (176 lb 3 2 oz)   SpO2 98%   BMI 26 79 kg/m²   Physical Exam  Vitals and nursing note reviewed  Constitutional:       General: He is not in acute distress  Appearance: He is well-developed  He is not diaphoretic  HENT:      Head: Normocephalic and atraumatic  Eyes:      General: No scleral icterus  Right eye: No discharge  Left eye: No discharge  Conjunctiva/sclera: Conjunctivae normal       Pupils: Pupils are equal, round, and reactive to light  Neck:      Thyroid: No thyromegaly  Vascular: No JVD  Cardiovascular:      Rate and Rhythm: Normal rate and regular rhythm  Heart sounds: Normal heart sounds  No murmur heard  No friction rub  No gallop  Pulmonary:      Effort: Pulmonary effort is normal  No respiratory distress  Breath sounds: Normal breath sounds  No wheezing or rales  Chest:      Chest wall: No tenderness  Abdominal:      General: Bowel sounds are normal  There is no distension  Palpations: Abdomen is soft  There is no mass  Tenderness: There is no abdominal tenderness  There is no guarding or rebound  Musculoskeletal:         General: No tenderness or deformity  Normal range of motion  Cervical back: Normal range of motion and neck supple  Lymphadenopathy:      Cervical: No cervical adenopathy  Skin:     General: Skin is warm and dry  Coloration: Skin is not pale  Findings: No erythema or rash  Neurological:      Mental Status: He is alert and oriented to person, place, and time  Cranial Nerves: No cranial nerve deficit        Coordination: " Coordination normal       Deep Tendon Reflexes: Reflexes are normal and symmetric  Psychiatric:         Behavior: Behavior normal          Thought Content: Thought content normal          Judgment: Judgment normal               Cardiographics  ECG: sinus bradycardia (57 bpm)  Lab Review   Admission on 02/07/2023, Discharged on 02/07/2023   Component Date Value   • WBC 02/07/2023 8 03    • RBC 02/07/2023 4 98    • Hemoglobin 02/07/2023 16 1    • Hematocrit 02/07/2023 45 9    • MCV 02/07/2023 92    • MCH 02/07/2023 32 3    • MCHC 02/07/2023 35 1    • RDW 02/07/2023 12 6    • MPV 02/07/2023 9 3    • Platelets 91/82/9629 243    • nRBC 02/07/2023 0    • Neutrophils Relative 02/07/2023 61    • Immat GRANS % 02/07/2023 1    • Lymphocytes Relative 02/07/2023 28    • Monocytes Relative 02/07/2023 7    • Eosinophils Relative 02/07/2023 2    • Basophils Relative 02/07/2023 1    • Neutrophils Absolute 02/07/2023 5 01    • Immature Grans Absolute 02/07/2023 0 04    • Lymphocytes Absolute 02/07/2023 2 23    • Monocytes Absolute 02/07/2023 0 53    • Eosinophils Absolute 02/07/2023 0 14    • Basophils Absolute 02/07/2023 0 08    • Sodium 02/07/2023 137    • Potassium 02/07/2023 4 1    • Chloride 02/07/2023 105    • CO2 02/07/2023 26    • ANION GAP 02/07/2023 6    • BUN 02/07/2023 12    • Creatinine 02/07/2023 1 00    • Glucose 02/07/2023 97    • Calcium 02/07/2023 9 4    • eGFR 02/07/2023 93         Assessment:     40 y o  male with planned surgery as above  Known risk factors for perioperative complications: None      Cardiac Risk Estimation: intermediate     Titokellen Nogueira is cleared from a cardiovascular standpoint to proceed with surgery  he is at a intermediate risk from a cardiovascular standpoint at this time without any additional cardiac testing  Reevaluation needed if he should present with symptoms prior to surgery  Plan:  Preoperative workup as follows none    Change in medication regimen before surgery: none, continue medication regimen including morning of surgery, with sip of water  Assessment/Plan:    Problem List Items Addressed This Visit        Digestive    Crohn's disease involving terminal ileum ([address] )       Respiratory    Allergic rhinitis    Allergic asthma       Cardiovascular and Mediastinum    Prolonged QT interval syndrome       Nervous and Auditory    Cervical radiculopathy       Musculoskeletal and Integument    Degenerative disc disease, lumbar    Herniation of intervertebral disc at C5-C6 level    Thoracic disc herniation    Myofascial pain syndrome       Other    Anxiety    Chronic midline thoracic back pain    S/P repair of ventral hernia    Functional abdominal pain syndrome    Chronic headaches    Chronic midline low back pain without sciatica    Family history of heart disease    Smoking    Cervical paraspinal muscle spasm    Enlarged prostate    Chronic pain syndrome    Cervical spinal stenosis    Preop exam for internal medicine - Primary       BMI Counseling: Body mass index is 26 79 kg/m²  The BMI is above normal  Nutrition recommendations include decreasing portion sizes, encouraging healthy choices of fruits and vegetables, decreasing fast food intake, consuming healthier snacks, limiting drinks that contain sugar, moderation in carbohydrate intake, increasing intake of lean protein, reducing intake of saturated and trans fat and reducing intake of cholesterol  Exercise recommendations include exercising 3-5 times per week  No pharmacotherapy was ordered  Patient referred to PCP  Rationale for BMI follow-up plan is due to patient being overweight or obese

## 2023-05-15 ENCOUNTER — TELEPHONE (OUTPATIENT)
Dept: GASTROENTEROLOGY | Facility: CLINIC | Age: 43
End: 2023-05-15

## 2023-05-15 DIAGNOSIS — K50.00 CROHN'S DISEASE INVOLVING TERMINAL ILEUM (HCC): Primary | ICD-10-CM

## 2023-05-15 DIAGNOSIS — K63.89 SMALL INTESTINAL BACTERIAL OVERGROWTH (SIBO): ICD-10-CM

## 2023-05-15 DIAGNOSIS — R14.0 GASSINESS: ICD-10-CM

## 2023-05-15 NOTE — TELEPHONE ENCOUNTER
Patients GI provider:  Dr Raghav Minor    Number to return call: [phone]    Reason for call: Pt calling stating he was told to get a sooner appointment by Dr Raghav Minor if extreme bloating and abd pain symptoms comeback       Scheduled procedure/appointment date if applicable: N/A

## 2023-05-15 NOTE — TELEPHONE ENCOUNTER
Pt scheduled 6/19 with Dr Jackie Reynoso  Placed on Wait list      Pt reports for the past week noticing increase in bloating  Moderate gas pain and pressure under rib cage and with deep breaths- pain relieved after bowel movement for a few hours and returns  Taking gas-x with minimal relief  Pt having small bowel movements daily or every other day  Last bowel movement 5/15

## 2023-05-15 NOTE — TELEPHONE ENCOUNTER
Spoke with patient, reviewed provider recommendations  Patient verbalized understanding, Advised patient to call office in 48 hours with updates or if symptoms worsen

## 2023-05-15 NOTE — TELEPHONE ENCOUNTER
Jenae Brown MD  You 26 minutes ago (1:05 PM)     Hi,     Can we have him take 4 doses of miralax to clear out his colon and also have him do a liquid diet for 48 hours?      Thank you

## 2023-05-17 NOTE — TELEPHONE ENCOUNTER
Patient is only feeling slightly better after taking the Miralax and doing a liquid only diet for 48 hrs  Please contact Sarika Akhtar @ [phone]

## 2023-05-18 RX ORDER — DOXYCYCLINE 100 MG/1
100 TABLET ORAL 2 TIMES DAILY
Qty: 28 TABLET | Refills: 0 | Status: SHIPPED | OUTPATIENT
Start: 2023-05-18 | End: 2023-06-01

## 2023-05-18 NOTE — TELEPHONE ENCOUNTER
Spoke to the patient  He feels like he has trapped gas  He is passing stool  He did miralax and liquid diet  Will do gas-x, low FODMAP diet, Miralax prn and trial of antibiotics (doxycycline sent in)

## 2023-06-13 NOTE — PROGRESS NOTES
[facility] - Outpatient Follow-up Note  Fernanda Sanches 44 y o  male MRN: [id number]  Encounter: [id number]          ASSESSMENT AND PLAN:    Fernanda Sanches is a 44 y o  male with chronic abdominal pain, abnormal stools of unclear etiology but possible mild Crohn's of the small bowel based on prior and most recent video capsule endoscopy with small bowel erosions, who presents for follow-up  It has been difficult to manage his pain and its unclear if this is related to small bowel inflammation or from another pathology or functional pathology  He previously tried Elavil, anti-gas medication, peppermint oil, budesonide, prednisone, rifaximin, Linzess without significant relief  He overall feels the same  He did have some episodes where it was worse  Endoscopy and colonoscopy from November 2022 with concentric esophageal rings, small hiatal hernia, normal TI and colon, positive external hemorrhoids; biopsies with chronic duodenal inflammation suggestive of peptic duodenitis, inactive gastritis, increased intraepithelial eosinophils, normal TI and colon  Prior CT abdomen pelvis from August 2021 with concern for ascending colitis  Prior SIBO testing negative  Prior MR E normal     Most recent capsule endoscopy from January 2023 with possible aphthous erosions/ulcerations in the mid to distal small bowel  Most recent BMP normal   CMP with glucose 105 but otherwise normal   CBC normal   Prior calprotectin from May 20 2276 with normal fecal fats, pancreatic elastase, negative Giardia, negative O&P, negative C  difficile  CRP previously undetectable  1  Abdominal pain, unspecified abdominal location    2  Prolonged QT interval syndrome    3  Irritable bowel syndrome with diarrhea    4  Constipation, unspecified constipation type    5  Functional gastrointestinal disorder    6  Gastroesophageal reflux disease with esophagitis, unspecified whether hemorrhage    7   Abnormal stools        No orders of the defined types were placed in this encounter  Repeat EGD to assess EOE in the future but timing to be determined  Continue Prilosec daily in the meantime  Consider repeat trial of prednisone or budesonide  Continue current diet  Will see if we can do a trial of motegrity  Preparation H as needed  Consider medication for functional pain  ______________________________________________________________________    SUBJECTIVE:    Horace Bernstein is a 44 y o  male who presents with complaint of abdominal pain  He had a few episodes of significant pain more than normal  One day all under his rib cage  He did not  the antibiotic and he passed gas and that helped  He has the regular pain on the right side  No pain free day but variable amounts pain  BMs unchaged  He struggles to have a BM  Kelle [place] helps if he has pain at the time of a BM  He had his neck fused 2 months ago  He had an ileus after his surgery  Muscle relaxers also slow things down  Heartburn better  No dysphagia, odynophagia, nausea, vomiting  No weight loss  REVIEW OF SYSTEMS IS OTHERWISE NEGATIVE  10 point ROS reviewed and negative, except as above      Historical Information   Past Medical History:   Diagnosis Date   • Abdominal pain    • Abnormal liver function test     last assessed: Oct 16, 2013    • Abnormal weight loss     last assessed: Yung 15, 2014    • Acute left-sided low back pain with left-sided sciatica 10/31/2019   • Allergic rhinitis due to pollen     last assessed: Yung 15, 2014    • Anxiety    • Anxiety     last assessed/resolved:  Aug 5, 2015    • Asthma     last assessed: Yung 15, 2014    • Benign essential HTN     last assessed/resolved: Feb 23, 2017    • Cervical lymphadenopathy     last assessed/resolved: Feb 23, 2017    • Chronic sinusitis     last assessed: Yung 15, 2014    • Constipation    • [facility] 01/01/2011    last assessed: Yung 15, 2014    • Dysuria     last assessed: April 29, 2016    • Elevated BP without diagnosis of hypertension     last assessed/resolved: Feb 23, 2017    • Esophageal reflux     last assessed/resolved: Feb 23, 2017    • Eustachian tube anomaly     Resolved: Nov 9, 2017    • External hemorrhoids     last assessed: Yung 15, 2014    • Gastritis and duodenitis 06/14/2021   • Hypertension     borderline   • 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     last assessed/resolved: July 19, 2017    • Inflammatory bowel disease    • Pancreatic neoplasm     last assessed: Yung 15, 2014    • Positive depression screening     resolved: July 24, 2017    • Postconcussion syndrome 11/15/2021   • Postoperative ileus ([facility] ) 11/12/2018   • Psoriasis    • Seasonal allergies    • Small bowel obstruction ([facility] ) 11/11/2018   • Vitamin B12 deficiency     last assessed/resolved: Feb 23, 2017      Past Surgical History:   Procedure Laterality Date   • CARDIAC LOOP RECORDER      Late 2021   • CERVICAL FUSION  05/2023   • CHOLECYSTECTOMY      resolved: 2011    • COLONOSCOPY N/A 11/18/2016    Procedure: COLONOSCOPY;  Surgeon: Blayne Flores MD;  Location:  GI LAB; Service:    • COLONOSCOPY N/A 04/28/2016    Procedure: COLONOSCOPY;  Surgeon: Blayne Flores MD;  Location: [facility] GI LAB; Service:    • COLONOSCOPY  02/09/2021   • ESOPHAGOGASTRODUODENOSCOPY N/A 04/28/2016    Procedure: ESOPHAGOGASTRODUODENOSCOPY (EGD); Surgeon: Blayne Flores MD;  Location: [facility] GI LAB; Service:    • FRONTAL SINUSOTOMY      resolved: April 2009    • SD COLONOSCOPY FLX DX W/COLLJ SPEC WHEN PFRMD N/A 10/12/2018    Procedure: EGD AND COLONOSCOPY;  Surgeon: Jacinta Blackman MD;  Location: [facility] GI LAB;   Service: Gastroenterology   • SD LAP RPR HRNA XCPT INCAL/INGUN NCRC8/STRANGULATED N/A 11/08/2018    Procedure: REPAIR HERNIA INCISIONAL LAPAROSCOPIC;  Surgeon: Dorita Martin MD;  Location: [facility];  Service: General   • SD SEPTOPLASTY/SUBMUCOUS RESECJ W/WO CARTILAGE GRF N/A 07/28/2017    Procedure: [place] SEPTOPLASTY; TURBINOPLASTY; BILATERAL  GRAFTS; ALAR GRAFT; POSSIBLE AURICULAR CARTILAGE GRAFT;  Surgeon: Kat Foy MD;  Location: [facility];  Service: ENT   • TONSILLECTOMY AND ADENOIDECTOMY     • UPPER GASTROINTESTINAL ENDOSCOPY  02/09/2021     Social History   Social History     Substance and Sexual Activity   Alcohol Use Yes    Comment: rare - once a year     Social History     Substance and Sexual Activity   Drug Use Yes   • Types: Marijuana    Comment: uses medical marijuana via vaping  last use 2/8/19     Social History     Tobacco Use   Smoking Status Every Day   • Packs/day: 0 50   • Years: 20 00   • Total pack years: 10 00   • Types: Cigarettes   • Start date: 2003   Smokeless Tobacco Never   Tobacco Comments    Dependence on nicotine in cigarettes - 1/2 PPD x 20 years      Family History   Problem Relation Age of Onset   • Diabetes Mother         mellitus    • Hypertension Mother    • Thyroid disease Mother    • Fibromyalgia Mother    • Hypertension Father    • Hypertension Sister    • Heart disease Sister         had defibulator put due to late beats   • No Known Problems Maternal Grandmother    • No Known Problems Maternal Grandfather    • Diabetes Paternal Grandmother    • Diabetes Paternal Grandfather    • Kidney failure Paternal Grandfather    • Diabetes Other         mellitus    • Rheum arthritis Cousin        Meds/Allergies       Current Outpatient Medications:   •  acetaminophen (TYLENOL) 500 mg tablet  •  cetirizine (ZyrTEC) 10 mg tablet  •  clonazePAM (KlonoPIN) 0 5 mg tablet  •  fluticasone (FLONASE) 50 mcg/act nasal spray  •  Linaclotide (LINZESS) 145 MCG CAPS  •  NON FORMULARY  •  omeprazole (PriLOSEC) 20 mg delayed release capsule  •  Prucalopride Succinate (Motegrity) 1 MG TABS    Allergies   Allergen Reactions   • Apple - Food Allergy Anaphylaxis   • Aspirin Anaphylaxis and Hives     Category:  Allergy;    • Corticosteroids Anaphylaxis and Drowsiness     Other reaction(s): "Drowsiness   • Eggs Or Egg-Derived Products - Food Allergy GI Intolerance     GI upset   • Ibuprofen Anaphylaxis and Hives     Category: Allergy; aspirin   • Nsaids Anaphylaxis     Category: Allergy; Category: Allergy;    • Other Anaphylaxis     Category: Allergy; Annotation - 04Apr2016: cherries, grapes , and kiwis; pt states all fruits   • Peanuts [Peanut Oil - Food Allergy] Anaphylaxis     nuts   • Penicillins Shortness Of Breath   • Methocarbamol Other (See Comments)     Patient reports not feeling well  ( all muscle relaxer's )    • Ciprofloxacin Other (See Comments)     Prolongs QT interval due to heart conditon, cannot take  • Pollen Extract    • Zofran [Ondansetron] Other (See Comments)     Prolonged QT interval   • Codeine Anxiety           Objective     Blood pressure 135/88, pulse 70, temperature 98 °F (36 7 °C), temperature source Tympanic, height 5' 8\" (1 727 m), weight 79 4 kg (175 lb), SpO2 99 %  Body mass index is 26 61 kg/m²  PHYSICAL EXAMINATION:    General Appearance:   Alert, cooperative, no distress   HEENT:  Normocephalic, atraumatic, anicteric  Neck supple, symmetrical, trachea midline  Lungs:   Equal chest rise and unlabored breathing, normal effort, no coughing  Cardiovascular:   No visualized JVD  Abdomen:   No abdominal distension  Skin:   No jaundice, rashes, or lesions  Musculoskeletal:   Normal range of motion visualized  Psych:  Normal affect and normal insight  Neuro:  Alert and appropriate  Lab Results:   No visits with results within 1 Day(s) from this visit     Latest known visit with results is:   Admission on 02/07/2023, Discharged on 02/07/2023   Component Date Value   • WBC 02/07/2023 8 03    • RBC 02/07/2023 4 98    • Hemoglobin 02/07/2023 16 1    • Hematocrit 02/07/2023 45 9    • MCV 02/07/2023 92    • MCH 02/07/2023 32 3    • MCHC 02/07/2023 35 1    • RDW 02/07/2023 12 6    • MPV 02/07/2023 9 3    • Platelets 19/96/8402 243    • nRBC 02/07/2023 0 " "   • Neutrophils Relative 02/07/2023 61    • Immat GRANS % 02/07/2023 1    • Lymphocytes Relative 02/07/2023 28    • Monocytes Relative 02/07/2023 7    • Eosinophils Relative 02/07/2023 2    • Basophils Relative 02/07/2023 1    • Neutrophils Absolute 02/07/2023 5 01    • Immature Grans Absolute 02/07/2023 0 04    • Lymphocytes Absolute 02/07/2023 2 23    • Monocytes Absolute 02/07/2023 0 53    • Eosinophils Absolute 02/07/2023 0 14    • Basophils Absolute 02/07/2023 0 08    • Sodium 02/07/2023 137    • Potassium 02/07/2023 4 1    • Chloride 02/07/2023 105    • CO2 02/07/2023 26    • ANION GAP 02/07/2023 6    • BUN 02/07/2023 12    • Creatinine 02/07/2023 1 00    • Glucose 02/07/2023 97    • Calcium 02/07/2023 9 4    • eGFR 02/07/2023 93        Lab Results   Component Value Date    WBC 8 03 02/07/2023    HGB 16 1 02/07/2023    HCT 45 9 02/07/2023    MCV 92 02/07/2023     02/07/2023       Lab Results   Component Value Date    SODIUM 137 02/07/2023    K 4 1 02/07/2023     02/07/2023    CO2 26 02/07/2023    AGAP 6 02/07/2023    BUN 12 02/07/2023    CREATININE 1 00 02/07/2023    GLUC 97 02/07/2023    GLUF 105 (H) 01/11/2023    CALCIUM 9 4 02/07/2023    AST 19 01/11/2023    ALT 35 01/11/2023    ALKPHOS 74 01/11/2023    TP 7 0 01/11/2023    TBILI 0 52 01/11/2023    EGFR 93 02/07/2023       Lab Results   Component Value Date    CRP <3 0 05/23/2022       Lab Results   Component Value Date    [id number] 1 370 07/24/2017       No results found for: \"IRON\", \"TIBC\", \"FERRITIN\"    Radiology Results:   No results found    "

## 2023-06-19 ENCOUNTER — OFFICE VISIT (OUTPATIENT)
Dept: GASTROENTEROLOGY | Facility: CLINIC | Age: 43
End: 2023-06-19
Payer: MEDICARE

## 2023-06-19 VITALS
BODY MASS INDEX: 26.52 KG/M2 | DIASTOLIC BLOOD PRESSURE: 88 MMHG | TEMPERATURE: 98 F | WEIGHT: 175 LBS | OXYGEN SATURATION: 99 % | HEIGHT: 68 IN | SYSTOLIC BLOOD PRESSURE: 135 MMHG | HEART RATE: 70 BPM

## 2023-06-19 DIAGNOSIS — R19.5 ABNORMAL STOOLS: ICD-10-CM

## 2023-06-19 DIAGNOSIS — K21.00 GASTROESOPHAGEAL REFLUX DISEASE WITH ESOPHAGITIS, UNSPECIFIED WHETHER HEMORRHAGE: ICD-10-CM

## 2023-06-19 DIAGNOSIS — K58.0 IRRITABLE BOWEL SYNDROME WITH DIARRHEA: ICD-10-CM

## 2023-06-19 DIAGNOSIS — R10.9 ABDOMINAL PAIN, UNSPECIFIED ABDOMINAL LOCATION: Primary | ICD-10-CM

## 2023-06-19 DIAGNOSIS — K59.00 CONSTIPATION, UNSPECIFIED CONSTIPATION TYPE: ICD-10-CM

## 2023-06-19 DIAGNOSIS — K92.9 FUNCTIONAL GASTROINTESTINAL DISORDER: ICD-10-CM

## 2023-06-19 DIAGNOSIS — I45.81 PROLONGED QT INTERVAL SYNDROME: ICD-10-CM

## 2023-06-19 PROCEDURE — 99214 OFFICE O/P EST MOD 30 MIN: CPT | Performed by: INTERNAL MEDICINE

## 2023-06-19 RX ORDER — PRUCALOPRIDE 1 MG/1
1 TABLET, FILM COATED ORAL DAILY
Qty: 30 TABLET | Refills: 3 | Status: SHIPPED | OUTPATIENT
Start: 2023-06-19

## 2023-06-27 ENCOUNTER — TELEPHONE (OUTPATIENT)
Age: 43
End: 2023-06-27

## 2023-06-27 NOTE — TELEPHONE ENCOUNTER
Response from Katheryn from [employer]  We apologize, but the patient listed on this Prior Authorization request is eligible for Medicare  Please discuss this alternative pharmacy benefit coverage with your patient and submit your request to the patients Medicare Plan  If the patient feels that this is in error, please have them call Member Services   Thank You

## 2023-06-27 NOTE — TELEPHONE ENCOUNTER
----- Message from Deon Halsted sent at 6/26/2023  4:14 PM EDT -----  Regarding: medication needs PA  This medication will need prior auth  form in  medication motegrity 1mg tablet can you please assit with prior auth thank you    [employer]    ----- Message -----  From: Denisse Castillo  Sent: 6/26/2023  11:40 AM EDT  To: Gastroenterology Pod Clinical

## 2023-06-27 NOTE — TELEPHONE ENCOUNTER
Message from [employer]  We apologize, but the patient listed on this Prior Authorization request is eligible for Medicare  Please discuss this alternative pharmacy benefit coverage with your patient and submit your request to the patients Medicare Plan  If the patient feels that this is in error, please have them call Member Services   Thank You

## 2023-06-27 NOTE — TELEPHONE ENCOUNTER
Resent motegrity through [employer] with new insurance , sent with clinicals and questions answered    ID [id number]  [facility] [id number]  [facility]  [address]

## 2023-07-10 DIAGNOSIS — F41.9 ANXIETY: ICD-10-CM

## 2023-07-10 RX ORDER — CLONAZEPAM 0.5 MG/1
0.5 TABLET ORAL EVERY 12 HOURS PRN
Qty: 60 TABLET | Refills: 1 | Status: SHIPPED | OUTPATIENT
Start: 2023-07-10 | End: 2023-11-26

## 2023-07-17 ENCOUNTER — RA CDI HCC (OUTPATIENT)
Dept: OTHER | Facility: HOSPITAL | Age: 43
End: 2023-07-17

## 2023-07-17 NOTE — PROGRESS NOTES
[address] [facility] coding opportunities       Chart reviewed, no opportunity found: CHART REVIEWED, NO OPPORTUNITY FOUND        Patients Insurance     Medicare Insurance: Medicare

## 2023-07-24 ENCOUNTER — OFFICE VISIT (OUTPATIENT)
Dept: INTERNAL MEDICINE CLINIC | Facility: OTHER | Age: 43
End: 2023-07-24
Payer: MEDICARE

## 2023-07-24 VITALS
SYSTOLIC BLOOD PRESSURE: 124 MMHG | TEMPERATURE: 98.7 F | DIASTOLIC BLOOD PRESSURE: 88 MMHG | BODY MASS INDEX: 26.13 KG/M2 | HEIGHT: 68 IN | WEIGHT: 172.4 LBS | OXYGEN SATURATION: 99 % | HEART RATE: 69 BPM | RESPIRATION RATE: 18 BRPM

## 2023-07-24 DIAGNOSIS — Z00.00 MEDICARE ANNUAL WELLNESS VISIT, SUBSEQUENT: ICD-10-CM

## 2023-07-24 DIAGNOSIS — E55.9 VITAMIN D DEFICIENCY: ICD-10-CM

## 2023-07-24 DIAGNOSIS — Z13.6 SCREENING FOR CARDIOVASCULAR CONDITION: ICD-10-CM

## 2023-07-24 DIAGNOSIS — F41.9 ANXIETY: Primary | ICD-10-CM

## 2023-07-24 DIAGNOSIS — I45.81 PROLONGED QT INTERVAL SYNDROME: ICD-10-CM

## 2023-07-24 DIAGNOSIS — K50.00 CROHN'S DISEASE INVOLVING TERMINAL ILEUM (HCC): ICD-10-CM

## 2023-07-24 DIAGNOSIS — J45.20 MILD INTERMITTENT EXTRINSIC ASTHMA WITHOUT COMPLICATION: ICD-10-CM

## 2023-07-24 DIAGNOSIS — Z13.1 SCREENING FOR DIABETES MELLITUS: ICD-10-CM

## 2023-07-24 DIAGNOSIS — Z13.220 ENCOUNTER FOR LIPID SCREENING FOR CARDIOVASCULAR DISEASE: ICD-10-CM

## 2023-07-24 DIAGNOSIS — F17.200 SMOKING: ICD-10-CM

## 2023-07-24 DIAGNOSIS — Z13.6 ENCOUNTER FOR LIPID SCREENING FOR CARDIOVASCULAR DISEASE: ICD-10-CM

## 2023-07-24 PROBLEM — Z01.818 PREOP EXAM FOR INTERNAL MEDICINE: Status: RESOLVED | Noted: 2023-03-27 | Resolved: 2023-07-24

## 2023-07-24 PROCEDURE — 99214 OFFICE O/P EST MOD 30 MIN: CPT | Performed by: INTERNAL MEDICINE

## 2023-07-24 PROCEDURE — G0439 PPPS, SUBSEQ VISIT: HCPCS | Performed by: INTERNAL MEDICINE

## 2023-07-24 NOTE — PATIENT INSTRUCTIONS
Medicare Preventive Visit Patient Instructions  Thank you for completing your Welcome to Medicare Visit or Medicare Annual Wellness Visit today. Your next wellness visit will be due in one year (7/24/2024). The screening/preventive services that you may require over the next 5-10 years are detailed below. Some tests may not apply to you based off risk factors and/or age. Screening tests ordered at today's visit but not completed yet may show as past due. Also, please note that scanned in results may not display below. Preventive Screenings:  Service Recommendations Previous Testing/Comments   Colorectal Cancer Screening  · Colonoscopy    · Fecal Occult Blood Test (FOBT)/Fecal Immunochemical Test (FIT)  · Fecal DNA/Cologuard Test  · Flexible Sigmoidoscopy Age: 43-73 years old   Colonoscopy: every 10 years (May be performed more frequently if at higher risk)  OR  FOBT/FIT: every 1 year  OR  Cologuard: every 3 years  OR  Sigmoidoscopy: every 5 years  Screening may be recommended earlier than age 39 if at higher risk for colorectal cancer. Also, an individualized decision between you and your healthcare provider will decide whether screening between the ages of 77-80 would be appropriate.  Colonoscopy: 11/17/2022  FOBT/FIT: Not on file  Cologuard: Not on file  Sigmoidoscopy: Not on file    Screening Current     Prostate Cancer Screening Individualized decision between patient and health care provider in men between ages of 53-66   Medicare will cover every 12 months beginning on the day after your 50th birthday PSA: 0.8 ng/mL     Screening Not Indicated     Hepatitis C Screening Once for adults born between 1945 and 1965  More frequently in patients at high risk for Hepatitis C Hep C Antibody: Not on file    Screening Current   Diabetes Screening 1-2 times per year if you're at risk for diabetes or have pre-diabetes Fasting glucose: 105 mg/dL (1/11/2023)  A1C: No results in last 5 years (No results in last 5 years)  Screening Current   Cholesterol Screening Once every 5 years if you don't have a lipid disorder. May order more often based on risk factors. Lipid panel: 01/11/2023  Screening Current      Other Preventive Screenings Covered by Medicare:  1. Abdominal Aortic Aneurysm (AAA) Screening: covered once if your at risk. You're considered to be at risk if you have a family history of AAA or a male between the age of 70-76 who smoking at least 100 cigarettes in your lifetime. 2. Lung Cancer Screening: covers low dose CT scan once per year if you meet all of the following conditions: (1) Age 48-67; (2) No signs or symptoms of lung cancer; (3) Current smoker or have quit smoking within the last 15 years; (4) You have a tobacco smoking history of at least 20 pack years (packs per day x number of years you smoked); (5) You get a written order from a healthcare provider. 3. Glaucoma Screening: covered annually if you're considered high risk: (1) You have diabetes OR (2) Family history of glaucoma OR (3)  aged 48 and older OR (3)  American aged 72 and older  3. Osteoporosis Screening: covered every 2 years if you meet one of the following conditions: (1) Have a vertebral abnormality; (2) On glucocorticoid therapy for more than 3 months; (3) Have primary hyperparathyroidism; (4) On osteoporosis medications and need to assess response to drug therapy. 5. HIV Screening: covered annually if you're between the age of 14-79. Also covered annually if you are younger than 13 and older than 72 with risk factors for HIV infection. For pregnant patients, it is covered up to 3 times per pregnancy.     Immunizations:  Immunization Recommendations   Influenza Vaccine Annual influenza vaccination during flu season is recommended for all persons aged >= 6 months who do not have contraindications   Pneumococcal Vaccine   * Pneumococcal conjugate vaccine = PCV13 (Prevnar 13), PCV15 (Vaxneuvance), PCV20 (Prevnar 20)  * Pneumococcal polysaccharide vaccine = PPSV23 (Pneumovax) Adults 2364 years old: 1-3 doses may be recommended based on certain risk factors  Adults 72 years old: 1-2 doses may be recommended based off what pneumonia vaccine you previously received   Hepatitis B Vaccine 3 dose series if at intermediate or high risk (ex: diabetes, end stage renal disease, liver disease)   Tetanus (Td) Vaccine - COST NOT COVERED BY MEDICARE PART B Following completion of primary series, a booster dose should be given every 10 years to maintain immunity against tetanus. Td may also be given as tetanus wound prophylaxis. Tdap Vaccine - COST NOT COVERED BY MEDICARE PART B Recommended at least once for all adults. For pregnant patients, recommended with each pregnancy. Shingles Vaccine (Shingrix) - COST NOT COVERED BY MEDICARE PART B  2 shot series recommended in those aged 48 and above     Health Maintenance Due:      Topic Date Due   • Colorectal Cancer Screening  11/14/2032   • HIV Screening  Completed   • Hepatitis C Screening  Completed     Immunizations Due:      Topic Date Due   • COVID-19 Vaccine (1) Never done   • Pneumococcal Vaccine: Pediatrics (0 to 5 Years) and At-Risk Patients (6 to 59 Years) (1 - PCV) Never done   • Influenza Vaccine (1) 09/01/2023     Advance Directives   What are advance directives? Advance directives are legal documents that state your wishes and plans for medical care. These plans are made ahead of time in case you lose your ability to make decisions for yourself. Advance directives can apply to any medical decision, such as the treatments you want, and if you want to donate organs. What are the types of advance directives? There are many types of advance directives, and each state has rules about how to use them. You may choose a combination of any of the following:  · Living will: This is a written record of the treatment you want.  You can also choose which treatments you do not want, which to limit, and which to stop at a certain time. This includes surgery, medicine, IV fluid, and tube feedings. · Durable power of  for [facility]): This is a written record that states who you want to make healthcare choices for you when you are unable to make them for yourself. This person, called a proxy, is usually a family member or a friend. You may choose more than 1 proxy. · Do not resuscitate (DNR) order:  A DNR order is used in case your heart stops beating or you stop breathing. It is a request not to have certain forms of treatment, such as CPR. A DNR order may be included in other types of advance directives. · Medical directive: This covers the care that you want if you are in a coma, near death, or unable to make decisions for yourself. You can list the treatments you want for each condition. Treatment may include pain medicine, surgery, blood transfusions, dialysis, IV or tube feedings, and a ventilator (breathing machine). · Values history: This document has questions about your views, beliefs, and how you feel and think about life. This information can help others choose the care that you would choose. Why are advance directives important? An advance directive helps you control your care. Although spoken wishes may be used, it is better to have your wishes written down. Spoken wishes can be misunderstood, or not followed. Treatments may be given even if you do not want them. An advance directive may make it easier for your family to make difficult choices about your care. Cigarette Smoking and Your Health   Risks to your health if you smoke:  Nicotine and other chemicals found in tobacco damage every cell in your body. Even if you are a light smoker, you have an increased risk for cancer, heart disease, and lung disease. If you are pregnant or have diabetes, smoking increases your risk for complications.    Benefits to your health if you stop smoking:   · You decrease respiratory symptoms such as coughing, wheezing, and shortness of breath. · You reduce your risk for cancers of the lung, mouth, throat, kidney, bladder, pancreas, stomach, and cervix. If you already have cancer, you increase the benefits of chemotherapy. You also reduce your risk for cancer returning or a second cancer from developing. · You reduce your risk for heart disease, blood clots, heart attack, and stroke. · You reduce your risk for lung infections, and diseases such as pneumonia, asthma, chronic bronchitis, and emphysema. · Your circulation improves. More oxygen can be delivered to your body. If you have diabetes, you lower your risk for complications, such as kidney, artery, and eye diseases. You also lower your risk for nerve damage. Nerve damage can lead to amputations, poor vision, and blindness. · You improve your body's ability to heal and to fight infections. For more information and support to stop smoking:   · [employer]  Phone: 8- 269 - [phone]  Web Address: www.[address]  Weight Management   Why it is important to manage your weight:  Being overweight increases your risk of health conditions such as heart disease, high blood pressure, type 2 diabetes, and certain types of cancer. It can also increase your risk for osteoarthritis, sleep apnea, and other respiratory problems. Aim for a slow, steady weight loss. Even a small amount of weight loss can lower your risk of health problems. How to lose weight safely:  A safe and healthy way to lose weight is to eat fewer calories and get regular exercise. You can lose up about 1 pound a week by decreasing the number of calories you eat by 500 calories each day. Healthy meal plan for weight management:  A healthy meal plan includes a variety of foods, contains fewer calories, and helps you stay healthy. A healthy meal plan includes the following:  · Eat whole-grain foods more often. A healthy meal plan should contain fiber.  Fiber is the part of grains, fruits, and vegetables that is not broken down by your body. Whole-grain foods are healthy and provide extra fiber in your diet. Some examples of whole-grain foods are whole-wheat breads and pastas, oatmeal, brown rice, and bulgur. · Eat a variety of vegetables every day. Include dark, leafy greens such as spinach, kale, aakash greens, and mustard greens. Eat yellow and orange vegetables such as carrots, sweet potatoes, and winter squash. · Eat a variety of fruits every day. Choose fresh or canned fruit (canned in its own juice or light syrup) instead of juice. Fruit juice has very little or no fiber. · Eat low-fat dairy foods. Drink fat-free (skim) milk or 1% milk. Eat fat-free yogurt and low-fat cottage cheese. Try low-fat cheeses such as mozzarella and other reduced-fat cheeses. · Choose meat and other protein foods that are low in fat. Choose beans or other legumes such as split peas or lentils. Choose fish, skinless poultry (chicken or turkey), or lean cuts of red meat (beef or pork). Before you cook meat or poultry, cut off any visible fat. · Use less fat and oil. Try baking foods instead of frying them. Add less fat, such as margarine, sour cream, regular salad dressing and mayonnaise to foods. Eat fewer high-fat foods. Some examples of high-fat foods include french fries, doughnuts, ice cream, and cakes. · Eat fewer sweets. Limit foods and drinks that are high in sugar. This includes candy, cookies, regular soda, and sweetened drinks. Exercise:  Exercise at least 30 minutes per day on most days of the week. Some examples of exercise include walking, biking, dancing, and swimming. You can also fit in more physical activity by taking the stairs instead of the elevator or parking farther away from stores. Ask your healthcare provider about the best exercise plan for you.       © Copyright [employer] 2018 Information is for End User's use only and may not be sold, redistributed or otherwise used for commercial purposes. All illustrations and images included in CareNotes® are the copyrighted property of [employer].D.A.M., Inc. or [facility] & [facility] Preventive Visit Patient Instructions  Thank you for completing your Welcome to Medicare Visit or Medicare Annual Wellness Visit today. Your next wellness visit will be due in one year (7/24/2024). The screening/preventive services that you may require over the next 5-10 years are detailed below. Some tests may not apply to you based off risk factors and/or age. Screening tests ordered at today's visit but not completed yet may show as past due. Also, please note that scanned in results may not display below. Preventive Screenings:  Service Recommendations Previous Testing/Comments   Colorectal Cancer Screening  · Colonoscopy    · Fecal Occult Blood Test (FOBT)/Fecal Immunochemical Test (FIT)  · Fecal DNA/Cologuard Test  · Flexible Sigmoidoscopy Age: 43-73 years old   Colonoscopy: every 10 years (May be performed more frequently if at higher risk)  OR  FOBT/FIT: every 1 year  OR  Cologuard: every 3 years  OR  Sigmoidoscopy: every 5 years  Screening may be recommended earlier than age 39 if at higher risk for colorectal cancer. Also, an individualized decision between you and your healthcare provider will decide whether screening between the ages of 77-80 would be appropriate.  Colonoscopy: 11/17/2022  FOBT/FIT: Not on file  Cologuard: Not on file  Sigmoidoscopy: Not on file    Screening Current     Prostate Cancer Screening Individualized decision between patient and health care provider in men between ages of 53-66   Medicare will cover every 12 months beginning on the day after your 50th birthday PSA: 0.8 ng/mL     Screening Not Indicated  Risks and Benefits Discussed     Hepatitis C Screening Once for adults born between [address] [place] [address]  More frequently in patients at high risk for Hepatitis C Hep C Antibody: Not on file    Screening Current  Risks and Benefits Discussed   Diabetes Screening 1-2 times per year if you're at risk for diabetes or have pre-diabetes Fasting glucose: 105 mg/dL (1/11/2023)  A1C: No results in last 5 years (No results in last 5 years)  Screening Current  Risks and Benefits Discussed   Cholesterol Screening Once every 5 years if you don't have a lipid disorder. May order more often based on risk factors. Lipid panel: 01/11/2023  Screening Current  Risks and Benefits Discussed      Other Preventive Screenings Covered by Medicare:  6. Abdominal Aortic Aneurysm (AAA) Screening: covered once if your at risk. You're considered to be at risk if you have a family history of AAA or a male between the age of 70-76 who smoking at least 100 cigarettes in your lifetime. 7. Lung Cancer Screening: covers low dose CT scan once per year if you meet all of the following conditions: (1) Age 48-67; (2) No signs or symptoms of lung cancer; (3) Current smoker or have quit smoking within the last 15 years; (4) You have a tobacco smoking history of at least 20 pack years (packs per day x number of years you smoked); (5) You get a written order from a healthcare provider. 8. Glaucoma Screening: covered annually if you're considered high risk: (1) You have diabetes OR (2) Family history of glaucoma OR (3)  aged 48 and older OR (3)  American aged 72 and older  5. Osteoporosis Screening: covered every 2 years if you meet one of the following conditions: (1) Have a vertebral abnormality; (2) On glucocorticoid therapy for more than 3 months; (3) Have primary hyperparathyroidism; (4) On osteoporosis medications and need to assess response to drug therapy. 10. HIV Screening: covered annually if you're between the age of 14-79. Also covered annually if you are younger than 13 and older than 72 with risk factors for HIV infection.  For pregnant patients, it is covered up to 3 times per pregnancy. Immunizations:  Immunization Recommendations   Influenza Vaccine Annual influenza vaccination during flu season is recommended for all persons aged >= 6 months who do not have contraindications   Pneumococcal Vaccine   * Pneumococcal conjugate vaccine = PCV13 (Prevnar 13), PCV15 (Vaxneuvance), PCV20 (Prevnar 20)  * Pneumococcal polysaccharide vaccine = PPSV23 (Pneumovax) Adults 20-63 years old: 1-3 doses may be recommended based on certain risk factors  Adults 72 years old: 1-2 doses may be recommended based off what pneumonia vaccine you previously received   Hepatitis B Vaccine 3 dose series if at intermediate or high risk (ex: diabetes, end stage renal disease, liver disease)   Tetanus (Td) Vaccine - COST NOT COVERED BY MEDICARE PART B Following completion of primary series, a booster dose should be given every 10 years to maintain immunity against tetanus. Td may also be given as tetanus wound prophylaxis. Tdap Vaccine - COST NOT COVERED BY MEDICARE PART B Recommended at least once for all adults. For pregnant patients, recommended with each pregnancy. Shingles Vaccine (Shingrix) - COST NOT COVERED BY MEDICARE PART B  2 shot series recommended in those aged 48 and above     Health Maintenance Due:      Topic Date Due   • Colorectal Cancer Screening  11/14/2032   • HIV Screening  Completed   • Hepatitis C Screening  Completed     Immunizations Due:      Topic Date Due   • COVID-19 Vaccine (1) Never done   • Pneumococcal Vaccine: Pediatrics (0 to 5 Years) and At-Risk Patients (6 to 59 Years) (1 - PCV) Never done   • Influenza Vaccine (1) 09/01/2023     Advance Directives   What are advance directives? Advance directives are legal documents that state your wishes and plans for medical care. These plans are made ahead of time in case you lose your ability to make decisions for yourself.  Advance directives can apply to any medical decision, such as the treatments you want, and if you want to donate organs. What are the types of advance directives? There are many types of advance directives, and each state has rules about how to use them. You may choose a combination of any of the following:  · Living will: This is a written record of the treatment you want. You can also choose which treatments you do not want, which to limit, and which to stop at a certain time. This includes surgery, medicine, IV fluid, and tube feedings. · Durable power of  for healthcare [facility]): This is a written record that states who you want to make healthcare choices for you when you are unable to make them for yourself. This person, called a proxy, is usually a family member or a friend. You may choose more than 1 proxy. · Do not resuscitate (DNR) order:  A DNR order is used in case your heart stops beating or you stop breathing. It is a request not to have certain forms of treatment, such as CPR. A DNR order may be included in other types of advance directives. · Medical directive: This covers the care that you want if you are in a coma, near death, or unable to make decisions for yourself. You can list the treatments you want for each condition. Treatment may include pain medicine, surgery, blood transfusions, dialysis, IV or tube feedings, and a ventilator (breathing machine). · Values history: This document has questions about your views, beliefs, and how you feel and think about life. This information can help others choose the care that you would choose. Why are advance directives important? An advance directive helps you control your care. Although spoken wishes may be used, it is better to have your wishes written down. Spoken wishes can be misunderstood, or not followed. Treatments may be given even if you do not want them. An advance directive may make it easier for your family to make difficult choices about your care.    Cigarette Smoking and Your Health   Risks to your health if you smoke: Nicotine and other chemicals found in tobacco damage every cell in your body. Even if you are a light smoker, you have an increased risk for cancer, heart disease, and lung disease. If you are pregnant or have diabetes, smoking increases your risk for complications. Benefits to your health if you stop smoking:   · You decrease respiratory symptoms such as coughing, wheezing, and shortness of breath. · You reduce your risk for cancers of the lung, mouth, throat, kidney, bladder, pancreas, stomach, and cervix. If you already have cancer, you increase the benefits of chemotherapy. You also reduce your risk for cancer returning or a second cancer from developing. · You reduce your risk for heart disease, blood clots, heart attack, and stroke. · You reduce your risk for lung infections, and diseases such as pneumonia, asthma, chronic bronchitis, and emphysema. · Your circulation improves. More oxygen can be delivered to your body. If you have diabetes, you lower your risk for complications, such as kidney, artery, and eye diseases. You also lower your risk for nerve damage. Nerve damage can lead to amputations, poor vision, and blindness. · You improve your body's ability to heal and to fight infections. For more information and support to stop smoking:   · [employer]  Phone: 8- 392 - [phone]  Web Address: www.[email or web address]  Weight Management   Why it is important to manage your weight:  Being overweight increases your risk of health conditions such as heart disease, high blood pressure, type 2 diabetes, and certain types of cancer. It can also increase your risk for osteoarthritis, sleep apnea, and other respiratory problems. Aim for a slow, steady weight loss. Even a small amount of weight loss can lower your risk of health problems. How to lose weight safely:  A safe and healthy way to lose weight is to eat fewer calories and get regular exercise.  You can lose up about 1 pound a week by decreasing the number of calories you eat by 500 calories each day. Healthy meal plan for weight management:  A healthy meal plan includes a variety of foods, contains fewer calories, and helps you stay healthy. A healthy meal plan includes the following:  · Eat whole-grain foods more often. A healthy meal plan should contain fiber. Fiber is the part of grains, fruits, and vegetables that is not broken down by your body. Whole-grain foods are healthy and provide extra fiber in your diet. Some examples of whole-grain foods are whole-wheat breads and pastas, oatmeal, brown rice, and bulgur. · Eat a variety of vegetables every day. Include dark, leafy greens such as spinach, kale, aakash greens, and mustard greens. Eat yellow and orange vegetables such as carrots, sweet potatoes, and winter squash. · Eat a variety of fruits every day. Choose fresh or canned fruit (canned in its own juice or light syrup) instead of juice. Fruit juice has very little or no fiber. · Eat low-fat dairy foods. Drink fat-free (skim) milk or 1% milk. Eat fat-free yogurt and low-fat cottage cheese. Try low-fat cheeses such as mozzarella and other reduced-fat cheeses. · Choose meat and other protein foods that are low in fat. Choose beans or other legumes such as split peas or lentils. Choose fish, skinless poultry (chicken or turkey), or lean cuts of red meat (beef or pork). Before you cook meat or poultry, cut off any visible fat. · Use less fat and oil. Try baking foods instead of frying them. Add less fat, such as margarine, sour cream, regular salad dressing and mayonnaise to foods. Eat fewer high-fat foods. Some examples of high-fat foods include french fries, doughnuts, ice cream, and cakes. · Eat fewer sweets. Limit foods and drinks that are high in sugar. This includes candy, cookies, regular soda, and sweetened drinks. Exercise:  Exercise at least 30 minutes per day on most days of the week.  Some examples of exercise include walking, biking, dancing, and swimming. You can also fit in more physical activity by taking the stairs instead of the elevator or parking farther away from stores. Ask your healthcare provider about the best exercise plan for you. © Copyright [employer] 2018 Information is for End User's use only and may not be sold, redistributed or otherwise used for commercial purposes.  All illustrations and images included in CareNotes® are the copyrighted property of [employer]. or [address] [place] [address]

## 2023-07-24 NOTE — PROGRESS NOTES
Assessment and Plan:     Problem List Items Addressed This Visit        Digestive    Crohn's disease involving terminal ileum ([address])     Stable. Continue follow-up with gastroenterology. Discussed continuing dietary modifications. Relevant Orders    CBC    Comprehensive metabolic panel       Respiratory    Allergic asthma     Stable. Continue Flonase and Zyrtec. Cardiovascular and Mediastinum    Prolonged QT interval syndrome     Loop recorder in place. Continue follow-up with cardiology. Other    Anxiety - Primary     Stable. Continue Klonopin 0.5 mg twice daily. BMI 25.0-25.9,adult    Smoking     Discussed smoking cessation, currently not ready to quit. Other Visit Diagnoses     Medicare annual wellness visit, subsequent        Vitamin D deficiency        Relevant Orders    Vitamin D 25 hydroxy    Encounter for lipid screening for cardiovascular disease        Relevant Orders    Lipid panel    Screening for diabetes mellitus        Screening for cardiovascular condition              Depression Screening and Follow-up Plan: Patient was screened for depression during today's encounter. They screened negative with a PHQ-2 score of 0. Tobacco Cessation Counseling: Tobacco cessation counseling was provided. The patient is sincerely urged to quit consumption of tobacco. He is not ready to quit tobacco. Medication options and side effects of medication discussed. Patient refused medication. Preventive health issues were discussed with patient, and age appropriate screening tests were ordered as noted in patient's After Visit Summary. Personalized health advice and appropriate referrals for health education or preventive services given if needed, as noted in patient's After Visit Summary. History of Present Illness:     Patient presents for a Medicare Wellness Visit    43 year     Anxiety  Presents for follow-up visit.  Patient reports no chest pain, dizziness, nausea, palpitations, shortness of breath or suicidal ideas. Symptoms occur occasionally. The severity of symptoms is mild. The patient sleeps 8 hours per night. The quality of sleep is good. Nighttime awakenings: none. Compliance with medications is %. Heartburn  He reports no abdominal pain, no chest pain, no choking, no coughing, no nausea, no sore throat or no wheezing. This is a chronic problem. The current episode started more than 1 year ago. The problem occurs occasionally. Pertinent negatives include no fatigue. He has tried a PPI for the symptoms. The treatment provided moderate relief. Patient Care Team:  Karina Villalobos MD as PCP - General (Internal Medicine)  Lazarus Irani, PA-C as Advanced Practitioner  Zula Olszewski, PA-C Medford Emery, MD as Endoscopist     Review of Systems:     Review of Systems   Constitutional: Negative for activity change, appetite change, chills, diaphoresis, fatigue and fever. HENT: Negative for congestion, postnasal drip, rhinorrhea, sinus pressure, sinus pain, sneezing and sore throat. Eyes: Negative for visual disturbance. Respiratory: Negative for apnea, cough, choking, chest tightness, shortness of breath and wheezing. Cardiovascular: Negative for chest pain, palpitations and leg swelling. Gastrointestinal: Negative for abdominal distention, abdominal pain, anal bleeding, blood in stool, constipation, diarrhea, nausea and vomiting. Endocrine: Negative for cold intolerance and heat intolerance. Genitourinary: Negative for difficulty urinating, dysuria and hematuria. Musculoskeletal: Negative. Skin: Negative. Neurological: Negative for dizziness, weakness, light-headedness, numbness and headaches. Hematological: Negative for adenopathy. Psychiatric/Behavioral: Negative for agitation, sleep disturbance and suicidal ideas. All other systems reviewed and are negative.        Problem List:     Patient Active Problem List Diagnosis   • Anxiety   • Allergic rhinitis   • Deviated septum   • Crohn's disease involving terminal ileum ([address])   • Retention cyst of nasal sinus   • Chronic midline thoracic back pain   • Allergic asthma   • S/P repair of ventral hernia   • Functional abdominal pain syndrome   • BMI 25.0-25.9,adult   • Chronic headaches   • Degenerative disc disease, lumbar   • Chronic midline low back pain without sciatica   • Family history of heart disease   • Smoking   • Cervical paraspinal muscle spasm   • Prolonged QT interval syndrome   • Herniation of intervertebral disc at C5-C6 level   • Thoracic disc herniation   • Enlarged prostate   • Chronic pain syndrome   • Myofascial pain syndrome   • Cervical spinal stenosis   • Cervical radiculopathy      Past Medical and Surgical History:     Past Medical History:   Diagnosis Date   • Abdominal pain    • Abnormal liver function test     last assessed: Oct 16, 2013    • Abnormal weight loss     last assessed: Yung 15, 2014    • Acute left-sided low back pain with left-sided sciatica 10/31/2019   • Allergic rhinitis due to pollen     last assessed: Yung 15, 2014    • Anxiety    • Anxiety     last assessed/resolved:  Aug 5, 2015    • Asthma     last assessed: Yung 15, 2014    • Benign essential HTN     last assessed/resolved: Feb 23, 2017    • Cervical lymphadenopathy     last assessed/resolved: Feb 23, 2017    • Chronic sinusitis     last assessed: Yung 15, 2014    • Constipation    • Crohn's disease ([address]) 01/01/2011    last assessed: Yung 15, 2014    • Dysuria     last assessed: April 29, 2016    • Elevated BP without diagnosis of hypertension     last assessed/resolved: Feb 23, 2017    • Esophageal reflux     last assessed/resolved: Feb 23, 2017    • Eustachian tube anomaly     Resolved: Nov 9, 2017    • External hemorrhoids     last assessed: Yung 15, 2014    • Gastritis and duodenitis 06/14/2021   • Hypertension     borderline   • 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     last assessed/resolved: July 19, 2017    • Inflammatory bowel disease    • Pancreatic neoplasm     last assessed: Yung 15, 2014    • Positive depression screening     resolved: July 24, 2017    • Postconcussion syndrome 11/15/2021   • Postoperative ileus ([address]) 11/12/2018   • Psoriasis    • Seasonal allergies    • Small bowel obstruction ([address]) 11/11/2018   • Vitamin B12 deficiency     last assessed/resolved: Feb 23, 2017      Past Surgical History:   Procedure Laterality Date   • CARDIAC LOOP RECORDER      Late 2021   • CERVICAL FUSION  05/2023   • CHOLECYSTECTOMY      resolved: 2011    • COLONOSCOPY N/A 11/18/2016    Procedure: COLONOSCOPY;  Surgeon: Brody Rod MD;  Location:  GI LAB; Service:    • COLONOSCOPY N/A 04/28/2016    Procedure: COLONOSCOPY;  Surgeon: Brody Rod MD;  Location: [facility]; Service:    • COLONOSCOPY  02/09/2021   • ESOPHAGOGASTRODUODENOSCOPY N/A 04/28/2016    Procedure: ESOPHAGOGASTRODUODENOSCOPY (EGD); Surgeon: Brody Rod MD;  Location: [facility]; Service:    • FRONTAL SINUSOTOMY      resolved: April 2009    • TN COLONOSCOPY FLX DX W/COLLJ SPEC WHEN PFRMD N/A 10/12/2018    Procedure: EGD AND COLONOSCOPY;  Surgeon: Emy Jason MD;  Location: [facility];   Service: Gastroenterology   • TN LAP RPR HRNA XCPT INCAL/INGUN NCRC8/STRANGULATED N/A 11/08/2018    Procedure: REPAIR HERNIA INCISIONAL LAPAROSCOPIC;  Surgeon: Baltazar Lowe MD;  Location: [facility];  Service: General   • TN SEPTOPLASTY/SUBMUCOUS RESECJ W/WO CARTILAGE GRF N/A 07/28/2017    Procedure: REVISION SEPTOPLASTY; TURBINOPLASTY; BILATERAL  GRAFTS; ALAR GRAFT; POSSIBLE AURICULAR CARTILAGE GRAFT;  Surgeon: Uvaldo Moreland MD;  Location: [facility];  Service: ENT   • TONSILLECTOMY AND ADENOIDECTOMY     • UPPER GASTROINTESTINAL ENDOSCOPY  02/09/2021      Family History:     Family History   Problem Relation Age of Onset   • Diabetes Mother         mellitus    • Hypertension Mother    • Thyroid disease Mother • Fibromyalgia Mother    • Hypertension Father    • Hypertension Sister    • Heart disease Sister         had defibulator put due to late beats   • No Known Problems Maternal Grandmother    • No Known Problems Maternal Grandfather    • Diabetes Paternal Grandmother    • Diabetes Paternal Grandfather    • Kidney failure Paternal Grandfather    • Diabetes Other         mellitus    • Rheum arthritis Cousin       Social History:     Social History     Socioeconomic History   • Marital status: /Civil Union     Spouse name: None   • Number of children: None   • Years of education: None   • Highest education level: None   Occupational History   • Occupation:     Tobacco Use   • Smoking status: Every Day     Packs/day: 0.50     Years: 20.00     Total pack years: 10.00     Types: Cigarettes     Start date: 2003   • Smokeless tobacco: Never   • Tobacco comments:     Dependence on nicotine in cigarettes - 1/2 PPD x 20 years    Vaping Use   • Vaping Use: Some days   • Substances: THC, CBD   Substance and Sexual Activity   • Alcohol use: Yes     Comment: rare - once a year   • Drug use: Yes     Types: Marijuana     Comment: uses medical marijuana via vaping. last use 2/8/19   • Sexual activity: Yes   Other Topics Concern   • None   Social History Narrative    Single noted in "[employer]"      Social Determinants of Health     Financial Resource Strain: Medium Risk (7/24/2023)    Overall Financial Resource Strain (CARDIA)    • Difficulty of Paying Living Expenses: Somewhat hard   Food Insecurity: Not on file   Transportation Needs: No Transportation Needs (7/24/2023)    PRAPARE - Transportation    • Lack of Transportation (Medical): No    • Lack of Transportation (Non-Medical):  No   Physical Activity: Not on file   Stress: Not on file   Social Connections: Not on file   Intimate Partner Violence: Not on file   Housing Stability: Not on file      Medications and Allergies:     Current Outpatient Medications Medication Sig Dispense Refill   • acetaminophen (TYLENOL) 500 mg tablet Take 1,000 mg by mouth every 4 (four) hours as needed      • cetirizine (ZyrTEC) 10 mg tablet Take 1 tablet (10 mg total) by mouth daily as needed for allergies 30 tablet 5   • clonazePAM (KlonoPIN) 0.5 mg tablet Take 1 tablet (0.5 mg total) by mouth every 12 (twelve) hours as needed for anxiety 60 tablet 1   • fluticasone (FLONASE) 50 mcg/act nasal spray 1 spray into each nostril daily as needed for rhinitis 16 g 1   • Linaclotide (LINZESS) 145 MCG CAPS Take 1 capsule (145 mcg total) by mouth daily (Patient taking differently: Take 145 mcg by mouth if needed) 90 capsule 1   • NON FORMULARY Medical marijuana     • omeprazole (PriLOSEC) 20 mg delayed release capsule Take 1 capsule (20 mg total) by mouth daily (Patient taking differently: Take 20 mg by mouth if needed) 90 capsule 1   • Prucalopride Succinate (Motegrity) 1 MG TABS Take 1 mg by mouth in the morning 30 tablet 3     No current facility-administered medications for this visit. Allergies   Allergen Reactions   • Apple - Food Allergy Anaphylaxis   • Aspirin Anaphylaxis and Hives     Category: Allergy;    • Corticosteroids Anaphylaxis and Drowsiness     Other reaction(s): Drowsiness   • Eggs Or Egg-Derived Products - Food Allergy GI Intolerance     GI upset   • Ibuprofen Anaphylaxis and Hives     Category: Allergy; aspirin   • Nsaids Anaphylaxis     Category: Allergy; Category: Allergy;    • Other Anaphylaxis     Category: Allergy; Annotation - [id number]: cherries, grapes , and kiwis; pt states all fruits   • Peanuts [Peanut Oil - Food Allergy] Anaphylaxis     nuts   • Penicillins Shortness Of Breath   • Methocarbamol Other (See Comments)     Patient reports not feeling well. ( all muscle relaxer's )    • Ciprofloxacin Other (See Comments)     Prolongs QT interval due to heart conditon, cannot take.    • Pollen Extract    • Zofran [Ondansetron] Other (See Comments)     Prolonged QT interval   • Codeine Anxiety      Immunizations:     Immunization History   Administered Date(s) Administered   • DTaP 5 1981      Health Maintenance:         Topic Date Due   • Colorectal Cancer Screening  11/14/2032   • HIV Screening  Completed   • Hepatitis C Screening  Completed         Topic Date Due   • COVID-19 Vaccine (1) Never done   • Pneumococcal Vaccine: Pediatrics (0 to 5 Years) and At-Risk Patients (6 to 59 Years) (1 - PCV) Never done   • Influenza Vaccine (1) 09/01/2023      Medicare Screening Tests and Risk Assessments:     Reba Warren is here for his Subsequent Wellness visit. Last Medicare Wellness visit information reviewed, patient interviewed and updates made to the record today. Health Risk Assessment:   Patient rates overall health as fair. Patient feels that their physical health rating is slightly better. Patient is satisfied with their life. Eyesight was rated as same. Hearing was rated as same. Patient feels that their emotional and mental health rating is same. Patients states they are sometimes angry. Patient states they are sometimes unusually tired/fatigued. Pain experienced in the last 7 days has been a lot. Patient states that he has experienced no weight loss or gain in last 6 months. Pain scale varies and also varies in some to a lot     Depression Screening:   PHQ-2 Score: 0      Fall Risk Screening: In the past year, patient has experienced: history of falling in past year    Number of falls: 1  Injured during fall?: Yes    Feels unsteady when standing or walking?: No    Worried about falling?: No      Home Safety:  Patient has trouble with stairs inside or outside of their home. Patient has working smoke alarms and has working carbon monoxide detector. Home safety hazards include: none. Nutrition:   Current diet is Limited junk food. Medications:   Patient is currently taking over-the-counter supplements.  OTC medications include: see medication list. Patient is able to manage medications. Activities of Daily Living (ADLs)/Instrumental Activities of Daily Living (IADLs):   Walk and transfer into and out of bed and chair?: Yes  Dress and groom yourself?: Yes    Bathe or shower yourself?: Yes    Feed yourself? Yes  Do your laundry/housekeeping?: Yes  Manage your money, pay your bills and track your expenses?: Yes  Make your own meals?: Yes    Do your own shopping?: Yes    Previous Hospitalizations:   Any hospitalizations or ED visits within the last 12 months?: Yes    How many hospitalizations have you had in the last year?: 1-2    Advance Care Planning:   Living will: No    Durable POA for healthcare: No    Advanced directive: No    Advanced directive counseling given: Yes    End of Life Decisions reviewed with patient: Yes    Provider agrees with end of life decisions: Yes      Comments: FULL CODE.      Cognitive Screening:   Provider or family/friend/caregiver concerned regarding cognition?: No    PREVENTIVE SCREENINGS      Cardiovascular Screening:    General: Screening Current and Risks and Benefits Discussed      Diabetes Screening:     General: Screening Current and Risks and Benefits Discussed      Colorectal Cancer Screening:     General: Screening Current      Prostate Cancer Screening:    General: Screening Not Indicated and Risks and Benefits Discussed      Osteoporosis Screening:    General: Screening Not Indicated      Abdominal Aortic Aneurysm (AAA) Screening:    Risk factors include: tobacco use        General: Screening Not Indicated and Risks and Benefits Discussed      Lung Cancer Screening:     General: Screening Not Indicated and Risks and Benefits Discussed      Hepatitis C Screening:    General: Screening Current and Risks and Benefits Discussed    Screening, Brief Intervention, and Referral to Treatment (SBIRT)    Screening  Typical number of drinks in a day: 0  Typical number of drinks in a week: 0  Interpretation: Low risk drinking behavior. Single Item Drug Screening:  How often have you used an illegal drug (including marijuana) or a prescription medication for non-medical reasons in the past year? daily or almost daily    Single Item Drug Screen Score: 4  Interpretation: POSITIVE screen for possible drug use disorder    Drug Abuse Screening Test (DAST-10):  1) Have you used drugs other than those required for medical reasons? Yes  2) Do you abuse more than one drug at a time? No  3) Are you always able to stop using drugs when you want to? Yes  4) Have you had "blackouts" or "flashbacks" as a result of drug use? No  5) Do you ever feel bad or guilty about your drug use? No  6) Does your spouse (or parents) ever complain about your involvement with drugs? No  7) Have you neglected your family because of your use of drugs? No  8) Have you engaged in illegal activities in order to obtain drugs? No  9) Have you ever experienced withdrawal symptoms (felt sick) when you stopped taking drugs? No  10) Have you had medical problems as a result of your drug use (e.g., memory loss, hepatitis, convulsions, bleeding, etc.)? No    DAST-10 Score: 1  Interpretation: Low level problems related to drug abuse    Brief Intervention  Alcohol & drug use screenings were reviewed. No concerns regarding substance use disorder identified. Other Counseling Topics:   Car/seat belt/driving safety, skin self-exam, sunscreen and calcium and vitamin D intake and regular weightbearing exercise. No results found. Physical Exam:     /88 (BP Location: Left arm, Patient Position: Sitting, Cuff Size: Standard)   Pulse 69   Temp 98.7 °F (37.1 °C) (Temporal)   Resp 18   Ht 5' 8" (1.727 m)   Wt 78.2 kg (172 lb 6.4 oz)   SpO2 99%   BMI 26.21 kg/m²     Physical Exam  Vitals and nursing note reviewed. Constitutional:       General: He is not in acute distress. Appearance: Normal appearance. He is normal weight.  He is not ill-appearing, toxic-appearing or diaphoretic. HENT:      Head: Normocephalic and atraumatic. Right Ear: Tympanic membrane, ear canal and external ear normal. There is no impacted cerumen. Left Ear: Tympanic membrane, ear canal and external ear normal. There is no impacted cerumen. Nose: Nose normal. No congestion or rhinorrhea. Mouth/Throat:      Mouth: Mucous membranes are moist.      Pharynx: Oropharynx is clear. No oropharyngeal exudate or posterior oropharyngeal erythema. Eyes:      General: No scleral icterus. Right eye: No discharge. Left eye: No discharge. Extraocular Movements: Extraocular movements intact. Conjunctiva/sclera: Conjunctivae normal.      Pupils: Pupils are equal, round, and reactive to light. Neck:      Vascular: No carotid bruit. Cardiovascular:      Rate and Rhythm: Normal rate and regular rhythm. Pulses: Normal pulses. Heart sounds: Normal heart sounds. No murmur heard. No friction rub. No gallop. Pulmonary:      Effort: Pulmonary effort is normal. No respiratory distress. Breath sounds: Normal breath sounds. No wheezing or rales. Abdominal:      General: Abdomen is flat. Bowel sounds are normal. There is no distension. Palpations: Abdomen is soft. There is no mass. Tenderness: There is no abdominal tenderness. There is no guarding. Hernia: No hernia is present. Musculoskeletal:         General: No swelling, tenderness, deformity or signs of injury. Normal range of motion. Cervical back: Normal range of motion and neck supple. No rigidity. No muscular tenderness. Right lower leg: No edema. Left lower leg: No edema. Lymphadenopathy:      Cervical: No cervical adenopathy. Skin:     General: Skin is warm and dry. Capillary Refill: Capillary refill takes less than 2 seconds. Coloration: Skin is not jaundiced or pale. Findings: No bruising, erythema, lesion or rash.    Neurological: General: No focal deficit present. Mental Status: He is alert and oriented to person, place, and time. Mental status is at baseline. Cranial Nerves: No cranial nerve deficit. Sensory: No sensory deficit. Motor: No weakness. Coordination: Coordination normal.      Gait: Gait normal.      Deep Tendon Reflexes: Reflexes normal.   Psychiatric:         Mood and Affect: Mood normal.         Behavior: Behavior normal.         Thought Content:  Thought content normal.         Judgment: Judgment normal.          Malia Phan MD

## 2023-09-07 ENCOUNTER — APPOINTMENT (OUTPATIENT)
Dept: LAB | Facility: CLINIC | Age: 43
End: 2023-09-07
Payer: MEDICARE

## 2023-09-07 DIAGNOSIS — E55.9 VITAMIN D DEFICIENCY: ICD-10-CM

## 2023-09-07 DIAGNOSIS — Z13.220 ENCOUNTER FOR LIPID SCREENING FOR CARDIOVASCULAR DISEASE: ICD-10-CM

## 2023-09-07 DIAGNOSIS — K50.00 CROHN'S DISEASE INVOLVING TERMINAL ILEUM (HCC): ICD-10-CM

## 2023-09-07 DIAGNOSIS — Z13.6 ENCOUNTER FOR LIPID SCREENING FOR CARDIOVASCULAR DISEASE: ICD-10-CM

## 2023-09-07 LAB
25(OH)D3 SERPL-MCNC: 26.4 NG/ML (ref 30–100)
ALBUMIN SERPL BCP-MCNC: 4.1 G/DL (ref 3.5–5)
ALP SERPL-CCNC: 66 U/L (ref 34–104)
ALT SERPL W P-5'-P-CCNC: 18 U/L (ref 7–52)
ANION GAP SERPL CALCULATED.3IONS-SCNC: 8 MMOL/L
AST SERPL W P-5'-P-CCNC: 18 U/L (ref 13–39)
BILIRUB SERPL-MCNC: 0.69 MG/DL (ref 0.2–1)
BUN SERPL-MCNC: 8 MG/DL (ref 5–25)
CALCIUM SERPL-MCNC: 9.6 MG/DL (ref 8.4–10.2)
CHLORIDE SERPL-SCNC: 107 MMOL/L (ref 96–108)
CHOLEST SERPL-MCNC: 156 MG/DL
CO2 SERPL-SCNC: 26 MMOL/L (ref 21–32)
CREAT SERPL-MCNC: 1.07 MG/DL (ref 0.6–1.3)
ERYTHROCYTE [DISTWIDTH] IN BLOOD BY AUTOMATED COUNT: 12.8 % (ref 11.6–15.1)
GFR SERPL CREATININE-BSD FRML MDRD: 85 ML/MIN/1.73SQ M
GLUCOSE P FAST SERPL-MCNC: 93 MG/DL (ref 65–99)
HCT VFR BLD AUTO: 45.7 % (ref 36.5–49.3)
HDLC SERPL-MCNC: 45 MG/DL
HGB BLD-MCNC: 15.7 G/DL (ref 12–17)
LDLC SERPL CALC-MCNC: 97 MG/DL (ref 0–100)
MCH RBC QN AUTO: 32.1 PG (ref 26.8–34.3)
MCHC RBC AUTO-ENTMCNC: 34.4 G/DL (ref 31.4–37.4)
MCV RBC AUTO: 94 FL (ref 82–98)
NONHDLC SERPL-MCNC: 111 MG/DL
PLATELET # BLD AUTO: 259 THOUSANDS/UL (ref 149–390)
PMV BLD AUTO: 10.4 FL (ref 8.9–12.7)
POTASSIUM SERPL-SCNC: 5.2 MMOL/L (ref 3.5–5.3)
PROT SERPL-MCNC: 6.9 G/DL (ref 6.4–8.4)
RBC # BLD AUTO: 4.89 MILLION/UL (ref 3.88–5.62)
SODIUM SERPL-SCNC: 141 MMOL/L (ref 135–147)
TRIGL SERPL-MCNC: 71 MG/DL
WBC # BLD AUTO: 8.12 THOUSAND/UL (ref 4.31–10.16)

## 2023-09-07 PROCEDURE — 36415 COLL VENOUS BLD VENIPUNCTURE: CPT

## 2023-09-07 PROCEDURE — 80053 COMPREHEN METABOLIC PANEL: CPT

## 2023-09-07 PROCEDURE — 82306 VITAMIN D 25 HYDROXY: CPT

## 2023-09-07 PROCEDURE — 85027 COMPLETE CBC AUTOMATED: CPT

## 2023-09-07 PROCEDURE — 80061 LIPID PANEL: CPT

## 2023-09-21 DIAGNOSIS — F41.9 ANXIETY: ICD-10-CM

## 2023-09-21 RX ORDER — CLONAZEPAM 0.5 MG/1
0.5 TABLET ORAL EVERY 12 HOURS PRN
Qty: 60 TABLET | Refills: 1 | Status: SHIPPED | OUTPATIENT
Start: 2023-09-21 | End: 2024-02-07

## 2023-11-12 ENCOUNTER — TELEPHONE (OUTPATIENT)
Dept: INTERNAL MEDICINE CLINIC | Facility: OTHER | Age: 43
End: 2023-11-12

## 2023-12-07 DIAGNOSIS — F41.9 ANXIETY: ICD-10-CM

## 2023-12-07 RX ORDER — CLONAZEPAM 0.5 MG/1
0.5 TABLET ORAL EVERY 12 HOURS PRN
Qty: 60 TABLET | Refills: 0 | Status: SHIPPED | OUTPATIENT
Start: 2023-12-07 | End: 2024-04-24

## 2024-01-20 DIAGNOSIS — F41.9 ANXIETY: ICD-10-CM

## 2024-01-22 ENCOUNTER — RA CDI HCC (OUTPATIENT)
Dept: OTHER | Facility: HOSPITAL | Age: 44
End: 2024-01-22

## 2024-01-22 RX ORDER — CLONAZEPAM 0.5 MG/1
0.5 TABLET ORAL EVERY 12 HOURS PRN
Qty: 60 TABLET | Refills: 0 | Status: SHIPPED | OUTPATIENT
Start: 2024-01-22 | End: 2024-01-29

## 2024-01-29 ENCOUNTER — OFFICE VISIT (OUTPATIENT)
Dept: INTERNAL MEDICINE CLINIC | Facility: OTHER | Age: 44
End: 2024-01-29
Payer: MEDICARE

## 2024-01-29 VITALS
HEIGHT: 68 IN | SYSTOLIC BLOOD PRESSURE: 124 MMHG | WEIGHT: 166 LBS | OXYGEN SATURATION: 99 % | HEART RATE: 68 BPM | TEMPERATURE: 98 F | BODY MASS INDEX: 25.16 KG/M2 | DIASTOLIC BLOOD PRESSURE: 82 MMHG

## 2024-01-29 DIAGNOSIS — K50.00 CROHN'S DISEASE INVOLVING TERMINAL ILEUM (HCC): ICD-10-CM

## 2024-01-29 DIAGNOSIS — J30.2 SEASONAL ALLERGIC RHINITIS, UNSPECIFIED TRIGGER: ICD-10-CM

## 2024-01-29 DIAGNOSIS — Z13.220 ENCOUNTER FOR LIPID SCREENING FOR CARDIOVASCULAR DISEASE: ICD-10-CM

## 2024-01-29 DIAGNOSIS — M54.12 CERVICAL RADICULOPATHY: ICD-10-CM

## 2024-01-29 DIAGNOSIS — F41.9 ANXIETY: Primary | ICD-10-CM

## 2024-01-29 DIAGNOSIS — K52.9 IBD (INFLAMMATORY BOWEL DISEASE): ICD-10-CM

## 2024-01-29 DIAGNOSIS — Z13.6 ENCOUNTER FOR LIPID SCREENING FOR CARDIOVASCULAR DISEASE: ICD-10-CM

## 2024-01-29 PROCEDURE — 99214 OFFICE O/P EST MOD 30 MIN: CPT | Performed by: INTERNAL MEDICINE

## 2024-01-29 RX ORDER — CLONAZEPAM 0.5 MG/1
0.5 TABLET ORAL EVERY 12 HOURS PRN
Qty: 60 TABLET | Refills: 3 | Status: SHIPPED | OUTPATIENT
Start: 2024-01-29 | End: 2024-06-16

## 2024-01-29 RX ORDER — LINACLOTIDE 145 UG/1
145 CAPSULE, GELATIN COATED ORAL DAILY
Qty: 90 CAPSULE | Refills: 1 | Status: SHIPPED | OUTPATIENT
Start: 2024-01-29

## 2024-01-29 NOTE — PROGRESS NOTES
Assessment/Plan:    Crohn's disease involving terminal ileum (HCC)  Continue follow up with GI.     Allergic rhinitis  Controlled, continue flonase and zyrtec.     Cervical radiculopathy  Continue ROM and stretching exercises.     Anxiety  Controlled, continuing Clonazepam 0.5 mg BID PRN.        Diagnoses and all orders for this visit:    Anxiety  -     clonazePAM (KlonoPIN) 0.5 mg tablet; Take 1 tablet (0.5 mg total) by mouth every 12 (twelve) hours as needed for anxiety  -     CBC; Future  -     Comprehensive metabolic panel; Future    IBD (inflammatory bowel disease)  -     linaCLOtide (Linzess) 145 MCG CAPS; Take 1 capsule (145 mcg total) by mouth daily  -     CBC; Future  -     Comprehensive metabolic panel; Future    Crohn's disease involving terminal ileum (HCC)  -     CBC; Future  -     Comprehensive metabolic panel; Future    Seasonal allergic rhinitis, unspecified trigger  -     CBC; Future  -     Comprehensive metabolic panel; Future    Cervical radiculopathy  -     CBC; Future  -     Comprehensive metabolic panel; Future    Encounter for lipid screening for cardiovascular disease  -     Lipid panel; Future            Tobacco Cessation Counseling: Tobacco cessation counseling was provided. The patient is sincerely urged to quit consumption of tobacco. He is not ready to quit tobacco. Medication options and side effects of medication discussed. Patient refused medication.            Subjective:      Patient ID: Torito Redd is a 43 y.o. male.    Chief Complaint   Patient presents with   • Follow-up     6 month    •      PHQ 7.24.24         Torito Redd is seen today for follow up of chronic conditions.   Recent laboratory studies reviewed today with the patient.   he has been compliant with his medication regimen.     he has no complaints or concerns at this time.       Heartburn  He reports no abdominal pain (RECURRENT), no chest pain, no choking, no coughing, no nausea, no sore throat or no  wheezing. This is a chronic problem. The current episode started more than 1 year ago. The problem occurs occasionally. The problem has been unchanged. The symptoms are aggravated by certain foods. Pertinent negatives include no fatigue. He has tried a PPI for the symptoms. The treatment provided moderate relief.   Anxiety  Presents for follow-up visit. Patient reports no chest pain, dizziness, nausea, palpitations, shortness of breath or suicidal ideas. Symptoms occur occasionally. The severity of symptoms is mild. The patient sleeps 8 hours per night. The quality of sleep is good. Nighttime awakenings: none.     Compliance with medications is %.       The following portions of the patient's history were reviewed and updated as appropriate: allergies, current medications, past family history, past medical history, past social history, past surgical history, and problem list.    Review of Systems   Constitutional:  Negative for activity change, appetite change, chills, diaphoresis, fatigue and fever.   HENT:  Negative for congestion, postnasal drip, rhinorrhea, sinus pressure, sinus pain, sneezing and sore throat.    Eyes:  Negative for visual disturbance.   Respiratory:  Negative for apnea, cough, choking, chest tightness, shortness of breath and wheezing.    Cardiovascular:  Negative for chest pain, palpitations and leg swelling.   Gastrointestinal:  Negative for abdominal distention, abdominal pain (RECURRENT), anal bleeding, blood in stool, constipation, diarrhea, nausea and vomiting.   Endocrine: Negative for cold intolerance and heat intolerance.   Genitourinary:  Negative for difficulty urinating, dysuria and hematuria.   Musculoskeletal: Negative.    Skin: Negative.    Neurological:  Negative for dizziness, weakness, light-headedness, numbness and headaches.   Hematological:  Negative for adenopathy.   Psychiatric/Behavioral:  Negative for agitation, sleep disturbance and suicidal ideas.    All other  systems reviewed and are negative.        Past Medical History:   Diagnosis Date   • Abdominal pain    • Abnormal liver function test     last assessed: Oct 16, 2013    • Abnormal weight loss     last assessed: Yung 15, 2014    • Acute left-sided low back pain with left-sided sciatica 10/31/2019   • Allergic rhinitis due to pollen     last assessed: Yung 15, 2014    • Anxiety    • Anxiety     last assessed/resolved: Aug 5, 2015    • Asthma     last assessed: Yung 15, 2014    • Benign essential HTN     last assessed/resolved: Feb 23, 2017    • Cervical lymphadenopathy     last assessed/resolved: Feb 23, 2017    • Chronic sinusitis     last assessed: Yung 15, 2014    • Constipation    • Crohn's disease (HCC) 01/01/2011    last assessed: Yung 15, 2014    • Dysuria     last assessed: April 29, 2016    • Elevated BP without diagnosis of hypertension     last assessed/resolved: Feb 23, 2017    • Esophageal reflux     last assessed/resolved: Feb 23, 2017    • Eustachian tube anomaly     Resolved: Nov 9, 2017    • External hemorrhoids     last assessed: Yung 15, 2014    • Gastritis and duodenitis 06/14/2021   • Hypertension     borderline   • 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     last assessed/resolved: July 19, 2017    • Inflammatory bowel disease    • Pancreatic neoplasm     last assessed: Yung 15, 2014    • Positive depression screening     resolved: July 24, 2017    • Postconcussion syndrome 11/15/2021   • Postoperative ileus (HCC) 11/12/2018   • Psoriasis    • Seasonal allergies    • Small bowel obstruction (HCC) 11/11/2018   • Vitamin B12 deficiency     last assessed/resolved: Feb 23, 2017          Current Outpatient Medications:   •  acetaminophen (TYLENOL) 500 mg tablet, Take 1,000 mg by mouth every 4 (four) hours as needed , Disp: , Rfl:   •  cetirizine (ZyrTEC) 10 mg tablet, Take 1 tablet (10 mg total) by mouth daily as needed for allergies, Disp: 30 tablet, Rfl: 5  •  clonazePAM (KlonoPIN) 0.5 mg tablet, Take 1 tablet  (0.5 mg total) by mouth every 12 (twelve) hours as needed for anxiety, Disp: 60 tablet, Rfl: 3  •  fluticasone (FLONASE) 50 mcg/act nasal spray, 1 spray into each nostril daily as needed for rhinitis, Disp: 16 g, Rfl: 1  •  linaCLOtide (Linzess) 145 MCG CAPS, Take 1 capsule (145 mcg total) by mouth daily, Disp: 90 capsule, Rfl: 1  •  NON FORMULARY, Medical marijuana, Disp: , Rfl:   •  omeprazole (PriLOSEC) 20 mg delayed release capsule, Take 1 capsule (20 mg total) by mouth daily (Patient taking differently: Take 20 mg by mouth if needed), Disp: 90 capsule, Rfl: 1    Allergies   Allergen Reactions   • Apple - Food Allergy Anaphylaxis   • Aspirin Anaphylaxis and Hives     Category: Allergy;    • Corticosteroids Anaphylaxis and Drowsiness     Other reaction(s): Drowsiness   • Eggs Or Egg-Derived Products - Food Allergy GI Intolerance     GI upset   • Ibuprofen Anaphylaxis and Hives     Category: Allergy; aspirin   • Nsaids Anaphylaxis     Category: Allergy;   Category: Allergy;    • Other Anaphylaxis     Category: Allergy; Annotation - [id number]: cherries, grapes , and kiwis; pt states all fruits   • Peanuts [Peanut Oil - Food Allergy] Anaphylaxis     nuts   • Penicillins Shortness Of Breath   • Methocarbamol Other (See Comments)     Patient reports not feeling well. ( all muscle relaxer's )    • Ciprofloxacin Other (See Comments)     Prolongs QT interval due to heart conditon, cannot take.   • Pollen Extract    • Zofran [Ondansetron] Other (See Comments)     Prolonged QT interval   • Codeine Anxiety       Social History   Past Surgical History:   Procedure Laterality Date   • CARDIAC LOOP RECORDER      Late 2021   • CERVICAL FUSION  05/2023   • CHOLECYSTECTOMY      resolved: 2011    • COLONOSCOPY N/A 11/18/2016    Procedure: COLONOSCOPY;  Surgeon: Job Fowler MD;  Location:  GI LAB;  Service:    • COLONOSCOPY N/A 04/28/2016    Procedure: COLONOSCOPY;  Surgeon: Job Fowler MD;  Location: [facility] GI LAB;   "Service:    • COLONOSCOPY  02/09/2021   • ESOPHAGOGASTRODUODENOSCOPY N/A 04/28/2016    Procedure: ESOPHAGOGASTRODUODENOSCOPY (EGD);  Surgeon: Job Fowler MD;  Location: [facility] GI LAB;  Service:    • FRONTAL SINUSOTOMY      resolved: April 2009    • WI COLONOSCOPY FLX DX W/COLLJ SPEC WHEN PFRMD N/A 10/12/2018    Procedure: EGD AND COLONOSCOPY;  Surgeon: Alpesh Ceja MD;  Location: [facility] GI LAB;  Service: Gastroenterology   • WI LAP RPR HRNA XCPT INCAL/INGUN NCRC8/STRANGULATED N/A 11/08/2018    Procedure: REPAIR HERNIA INCISIONAL LAPAROSCOPIC;  Surgeon: Corey Reid MD;  Location: [facility];  Service: General   • WI SEPTOPLASTY/SUBMUCOUS RESECJ W/WO CARTILAGE GRF N/A 07/28/2017    Procedure: REVISION SEPTOPLASTY; TURBINOPLASTY; BILATERAL  GRAFTS; ALAR GRAFT; POSSIBLE AURICULAR CARTILAGE GRAFT;  Surgeon: Silverio Malhotra MD;  Location: [facility];  Service: ENT   • TONSILLECTOMY AND ADENOIDECTOMY     • UPPER GASTROINTESTINAL ENDOSCOPY  02/09/2021     Family History   Problem Relation Age of Onset   • Diabetes Mother         mellitus    • Hypertension Mother    • Thyroid disease Mother    • Fibromyalgia Mother    • Hypertension Father    • Hypertension Sister    • Heart disease Sister         had defibulator put due to late beats   • No Known Problems Maternal Grandmother    • No Known Problems Maternal Grandfather    • Diabetes Paternal Grandmother    • Diabetes Paternal Grandfather    • Kidney failure Paternal Grandfather    • Diabetes Other         mellitus    • Rheum arthritis Cousin        Objective:  /82 (BP Location: Left arm, Patient Position: Sitting, Cuff Size: Standard)   Pulse 68   Temp 98 °F (36.7 °C) (Temporal)   Ht 5' 8\" (1.727 m)   Wt 75.3 kg (166 lb)   SpO2 99%   BMI 25.24 kg/m²     No results found for this or any previous visit (from the past 1344 hour(s)).         Physical Exam  Vitals and nursing note reviewed.   Constitutional:       General: He is not in acute distress.     " Appearance: He is well-developed. He is not diaphoretic.   HENT:      Head: Normocephalic and atraumatic.   Eyes:      General: No scleral icterus.        Right eye: No discharge.         Left eye: No discharge.      Conjunctiva/sclera: Conjunctivae normal.      Pupils: Pupils are equal, round, and reactive to light.   Neck:      Thyroid: No thyromegaly.      Vascular: No JVD.   Cardiovascular:      Rate and Rhythm: Normal rate and regular rhythm.      Heart sounds: Normal heart sounds. No murmur heard.     No friction rub. No gallop.   Pulmonary:      Effort: Pulmonary effort is normal. No respiratory distress.      Breath sounds: Normal breath sounds. No wheezing or rales.   Chest:      Chest wall: No tenderness.   Abdominal:      General: Bowel sounds are normal. There is no distension.      Palpations: Abdomen is soft. There is no mass.      Tenderness: There is no abdominal tenderness. There is no guarding or rebound.   Musculoskeletal:         General: No tenderness or deformity. Normal range of motion.      Cervical back: Normal range of motion and neck supple.   Lymphadenopathy:      Cervical: No cervical adenopathy.   Skin:     General: Skin is warm and dry.      Coloration: Skin is not pale.      Findings: No erythema or rash.   Neurological:      Mental Status: He is alert and oriented to person, place, and time.      Cranial Nerves: No cranial nerve deficit.      Coordination: Coordination normal.      Deep Tendon Reflexes: Reflexes are normal and symmetric.   Psychiatric:         Behavior: Behavior normal.         Thought Content: Thought content normal.         Judgment: Judgment normal.

## 2024-02-25 DIAGNOSIS — F41.9 ANXIETY: ICD-10-CM

## 2024-02-26 RX ORDER — CLONAZEPAM 0.5 MG/1
0.5 TABLET ORAL EVERY 12 HOURS PRN
Qty: 60 TABLET | Refills: 0 | Status: SHIPPED | OUTPATIENT
Start: 2024-02-26 | End: 2024-07-14

## 2024-03-04 ENCOUNTER — OFFICE VISIT (OUTPATIENT)
Dept: GASTROENTEROLOGY | Facility: CLINIC | Age: 44
End: 2024-03-04
Payer: COMMERCIAL

## 2024-03-04 ENCOUNTER — ANESTHESIA EVENT (OUTPATIENT)
Dept: ANESTHESIOLOGY | Facility: HOSPITAL | Age: 44
End: 2024-03-04

## 2024-03-04 ENCOUNTER — TELEPHONE (OUTPATIENT)
Dept: GASTROENTEROLOGY | Facility: CLINIC | Age: 44
End: 2024-03-04

## 2024-03-04 ENCOUNTER — ANESTHESIA (OUTPATIENT)
Dept: ANESTHESIOLOGY | Facility: HOSPITAL | Age: 44
End: 2024-03-04

## 2024-03-04 VITALS
HEIGHT: 68 IN | TEMPERATURE: 97.8 F | BODY MASS INDEX: 25.34 KG/M2 | SYSTOLIC BLOOD PRESSURE: 130 MMHG | WEIGHT: 167.2 LBS | HEART RATE: 72 BPM | OXYGEN SATURATION: 99 % | DIASTOLIC BLOOD PRESSURE: 79 MMHG

## 2024-03-04 DIAGNOSIS — K20.0 EOSINOPHILIC ESOPHAGITIS: ICD-10-CM

## 2024-03-04 DIAGNOSIS — K62.89 ANAL PAIN: ICD-10-CM

## 2024-03-04 DIAGNOSIS — K62.3 RECTAL PROLAPSE: ICD-10-CM

## 2024-03-04 DIAGNOSIS — R19.5 ABNORMAL STOOLS: ICD-10-CM

## 2024-03-04 DIAGNOSIS — K52.9 IBD (INFLAMMATORY BOWEL DISEASE): ICD-10-CM

## 2024-03-04 DIAGNOSIS — R10.31 RIGHT LOWER QUADRANT ABDOMINAL PAIN: Primary | ICD-10-CM

## 2024-03-04 PROCEDURE — 99214 OFFICE O/P EST MOD 30 MIN: CPT | Performed by: INTERNAL MEDICINE

## 2024-03-04 RX ORDER — HYDROCORTISONE ACETATE 25 MG/1
25 SUPPOSITORY RECTAL 2 TIMES DAILY
Qty: 60 SUPPOSITORY | Refills: 0 | Status: SHIPPED | OUTPATIENT
Start: 2024-03-04

## 2024-03-04 RX ORDER — HYDROCORTISONE 25 MG/G
CREAM TOPICAL 2 TIMES DAILY
Qty: 56 G | Refills: 0 | Status: SHIPPED | OUTPATIENT
Start: 2024-03-04

## 2024-03-04 RX ORDER — LINACLOTIDE 145 UG/1
145 CAPSULE, GELATIN COATED ORAL DAILY
Qty: 90 CAPSULE | Refills: 1 | Status: SHIPPED | OUTPATIENT
Start: 2024-03-04

## 2024-03-04 NOTE — TELEPHONE ENCOUNTER
Procedure: EGD  Date: March 13  Physician performing: Dr. Stiles  Location of procedure:  [place]  Instructions given to patient: EGD  Diabetic: n/a  Clearances: n/a

## 2024-03-04 NOTE — PATIENT INSTRUCTIONS
Can repeat endoscopy to assess for eosinophilic esophagitis  Anusol suppository and cream to help with prolapse  Referral to colorectal surgery  Linzess Monday, Wednesday and Friday  Trial of Enteragam

## 2024-03-04 NOTE — H&P (VIEW-ONLY)
[facility] - Outpatient Follow-up Note  Torito Redd 43 y.o. male MRN: [id number]  Encounter: [id number]          ASSESSMENT AND PLAN:    Torito Redd is a 43 y.o. male with chronic abdominal pain, abnormal stools, possible mild Crohn's disease of the small bowel based on prior and recent video capsule endoscopy with small bowel erosions but unclear if degree of findings match with symptomatology, last seen June 2023 who now presents for follow-up.  Overall his symptoms, in particular his pain, has been difficult to manage given the unclear diagnosis.  It has been thought that there may be an inflammatory component versus functional pathology versus a combination of the above.  His previously tried multiple different treatments including prednisone, budesonide, peppermint oil, anti-gas medication, Elavil, rifaximin, Linzess without significant relief from many of these medication options.  Have significant symptoms of pain as well as abnormal stools and bowel noises.    Endoscopy and colonoscopy from November 2022 with concentric esophageal rings, hiatal hernia, normal TI and colon in appearance, external hemorrhoids; biopsies revealed chronic duodenal inflammation with suggestion of peptic duodenitis, inactive gastritis, increased intraepithelial eosinophils, normal terminal ileum, normal colon.  Prior CT abdomen pelvis from August 2021 with concern for ascending colitis.  Prior MR enterography normal.  Prior SIBO testing negative.  Capsule endoscopy from January 2023 with possible aphthous ulcerations and erosions in the mid to distal small bowel.    Chest x-ray from November with no acute cardiopulmonary abnormalities.    Most recent blood work from November with CMP showing normal electrolytes, creatinine, liver test.  Vitamin D from September was slightly low at 26.4.  CBC with normal white blood cell count, hemoglobin, MCV, platelets.  Prior fecal fats, pancreatic  elastase, calprotectin normal.  Prior CRP normal.    1. Right lower quadrant abdominal pain    2. IBD (inflammatory bowel disease)    3. Rectal prolapse    4. Eosinophilic esophagitis    5. Anal pain    6. Abnormal stools        Orders Placed This Encounter   Procedures    Ambulatory Referral to Colorectal Surgery    EGD     Can repeat endoscopy to assess for eosinophilic esophagitis  Anusol suppository and cream to help with prolapse  Referral to colorectal surgery  Linzess Monday, Wednesday and Friday  Trial of Enteragam  Consider medications to help with functional pain  ______________________________________________________________________    SUBJECTIVE:    Torito Redd is a 43 y.o. male who presents with complaint of abdominal pain.     He was doing somewhat better until December. Then the pain restarted. He felt sick. It would come and go until 1 week ago. Until 1 week ago he was still feeling unwell. He would eat soup and it took time to heal. He was leaning more towards constipation. He has 1-3 stools per day.  He has anal pain and hemorrhoids. He has rectal prolapse. He sees blood 50% of the time. No laxatives. He took Linzess in the past few weeks.   He has abdominal pain and he hears noises when he presses on his abdomen in the RLQ. Only occasional indigestion. Prilosec causes bloating. No prilosec. Swallowing is okay. Sometimes he has throat tightness. Some nausea but no vomiting.       REVIEW OF SYSTEMS IS OTHERWISE NEGATIVE.  10 point ROS reviewed and negative, except as above      Historical Information   Past Medical History:   Diagnosis Date    Abdominal pain     Abnormal liver function test     last assessed: Oct 16, 2013     Abnormal weight loss     last assessed: Yung 15, 2014     Acute left-sided low back pain with left-sided sciatica 10/31/2019    Allergic rhinitis due to pollen     last assessed: Yung 15, 2014     Anxiety     Anxiety     last assessed/resolved: Aug 5, 2015     Asthma      last assessed: Yung 15, 2014     Benign essential HTN     last assessed/resolved: Feb 23, 2017     Cervical lymphadenopathy     last assessed/resolved: Feb 23, 2017     Chronic sinusitis     last assessed: Yung 15, 2014     Constipation     Crohn's disease (HCC) 01/01/2011    last assessed: Yung 15, 2014     Dysuria     last assessed: April 29, 2016     Elevated BP without diagnosis of hypertension     last assessed/resolved: Feb 23, 2017     Esophageal reflux     last assessed/resolved: Feb 23, 2017     Eustachian tube anomaly     Resolved: Nov 9, 2017     External hemorrhoids     last assessed: Yung 15, 2014     Gastritis and duodenitis 06/14/2021    Hypertension     borderline    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     last assessed/resolved: July 19, 2017     Inflammatory bowel disease     Pancreatic neoplasm     last assessed: Yung 15, 2014     Positive depression screening     resolved: July 24, 2017     Postconcussion syndrome 11/15/2021    Postoperative ileus ([facility]) 11/12/2018    Psoriasis     Seasonal allergies     Small bowel obstruction ([facility]) 11/11/2018    Vitamin B12 deficiency     last assessed/resolved: Feb 23, 2017      Past Surgical History:   Procedure Laterality Date    CARDIAC LOOP RECORDER      Late 2021    CERVICAL FUSION  05/2023    CHOLECYSTECTOMY      resolved: 2011     COLONOSCOPY N/A 11/18/2016    Procedure: COLONOSCOPY;  Surgeon: Job Fowler MD;  Location:  GI LAB;  Service:     COLONOSCOPY N/A 04/28/2016    Procedure: COLONOSCOPY;  Surgeon: Job Fowler MD;  Location: [facility] GI LAB;  Service:     COLONOSCOPY  02/09/2021    ESOPHAGOGASTRODUODENOSCOPY N/A 04/28/2016    Procedure: ESOPHAGOGASTRODUODENOSCOPY (EGD);  Surgeon: Job Fowler MD;  Location: [facility] GI LAB;  Service:     FRONTAL SINUSOTOMY      resolved: April 2009     OH COLONOSCOPY FLX DX W/COLLJ SPEC WHEN PFRMD N/A 10/12/2018    Procedure: EGD AND COLONOSCOPY;  Surgeon: Alpesh Ceja MD;  Location: [facility] GI LAB;  Service:  Gastroenterology    KS LAP RPR HRNA XCPT INCAL/INGUN NCRC8/STRANGULATED N/A 11/08/2018    Procedure: REPAIR HERNIA INCISIONAL LAPAROSCOPIC;  Surgeon: Corey Reid MD;  [facility];  Service: General    KS SEPTOPLASTY/SUBMUCOUS RESECJ W/WO CARTILAGE GRF N/A 07/28/2017    Procedure: REVISION SEPTOPLASTY; TURBINOPLASTY; BILATERAL  GRAFTS; ALAR GRAFT; POSSIBLE AURICULAR CARTILAGE GRAFT;  Surgeon: Silverio Malhotra MD;  [facility];  Service: ENT    TONSILLECTOMY AND ADENOIDECTOMY      UPPER GASTROINTESTINAL ENDOSCOPY  02/09/2021     Social History   Social History     Substance and Sexual Activity   Alcohol Use Yes    Comment: rare - once a year     Social History     Substance and Sexual Activity   Drug Use Yes    Types: Marijuana    Comment: uses medical marijuana via vaping. last use 2/8/19     Social History     Tobacco Use   Smoking Status Every Day    Current packs/day: 0.50    Average packs/day: 0.5 packs/day for 21.2 years (10.6 ttl pk-yrs)    Types: Cigarettes    Start date: 2003   Smokeless Tobacco Never   Tobacco Comments    Dependence on nicotine in cigarettes - 1/2 PPD x 20 years      Family History   Problem Relation Age of Onset    Diabetes Mother         mellitus     Hypertension Mother     Thyroid disease Mother     Fibromyalgia Mother     Hypertension Father     Hypertension Sister     Heart disease Sister         had defibulator put due to late beats    No Known Problems Maternal Grandmother     No Known Problems Maternal Grandfather     Diabetes Paternal Grandmother     Diabetes Paternal Grandfather     Kidney failure Paternal Grandfather     Diabetes Other         mellitus     Rheum arthritis Cousin        Meds/Allergies       Current Outpatient Medications:     acetaminophen (TYLENOL) 500 mg tablet    cetirizine (ZyrTEC) 10 mg tablet    clonazePAM (KlonoPIN) 0.5 mg tablet    fluticasone (FLONASE) 50 mcg/act nasal spray    hydrocortisone (ANUSOL-HC) 2.5 % rectal cream     "hydrocortisone (ANUSOL-HC) 25 mg suppository    linaCLOtide (Linzess) 145 MCG CAPS    NON FORMULARY    omeprazole (PriLOSEC) 20 mg delayed release capsule    Allergies   Allergen Reactions    Apple - Food Allergy Anaphylaxis    Aspirin Anaphylaxis and Hives     Category: Allergy;     Corticosteroids Anaphylaxis and Drowsiness     Other reaction(s): Drowsiness    Eggs Or Egg-Derived Products - Food Allergy GI Intolerance     GI upset    Ibuprofen Anaphylaxis and Hives     Category: Allergy; aspirin    Nsaids Anaphylaxis     Category: Allergy;   Category: Allergy;     Other Anaphylaxis     Category: Allergy; Annotation - [id number]: cherries, grapes , and kiwis; pt states all fruits    Peanuts [Peanut Oil - Food Allergy] Anaphylaxis     nuts    Penicillins Shortness Of Breath    Methocarbamol Other (See Comments)     Patient reports not feeling well. ( all muscle relaxer's )     Ciprofloxacin Other (See Comments)     Prolongs QT interval due to heart conditon, cannot take.    Pollen Extract     Zofran [Ondansetron] Other (See Comments)     Prolonged QT interval    Codeine Anxiety           Objective     Blood pressure 130/79, pulse 72, temperature 97.8 °F (36.6 °C), temperature source Tympanic, height 5' 8\" (1.727 m), weight 75.8 kg (167 lb 3.2 oz), SpO2 99%. Body mass index is 25.42 kg/m².      PHYSICAL EXAMINATION:    General Appearance:   Alert, cooperative, no distress   HEENT:  Normocephalic, atraumatic, anicteric. Neck supple, symmetrical, trachea midline.   Lungs:   Equal chest rise and unlabored breathing, normal effort, no coughing.   Cardiovascular:   No visualized JVD.   Abdomen:   No abdominal distension.   Skin:   No jaundice, rashes, or lesions.    Musculoskeletal:   Normal range of motion visualized.   Psych:  Normal affect and normal insight.   Neuro:  Alert and appropriate.         Lab Results:   No visits with results within 1 Day(s) from this visit.   Latest known visit with results is:   Appointment " "on 09/07/2023   Component Date Value    Vit D, 25-Hydroxy 09/07/2023 26.4 (L)     WBC 09/07/2023 8.12     RBC 09/07/2023 4.89     Hemoglobin 09/07/2023 15.7     Hematocrit 09/07/2023 45.7     MCV 09/07/2023 94     MCH 09/07/2023 32.1     MCHC 09/07/2023 34.4     RDW 09/07/2023 12.8     Platelets 09/07/2023 259     MPV 09/07/2023 10.4     Sodium 09/07/2023 141     Potassium 09/07/2023 5.2     Chloride 09/07/2023 107     CO2 09/07/2023 26     ANION GAP 09/07/2023 8     BUN 09/07/2023 8     Creatinine 09/07/2023 1.07     Glucose, Fasting 09/07/2023 93     Calcium 09/07/2023 9.6     AST 09/07/2023 18     ALT 09/07/2023 18     Alkaline Phosphatase 09/07/2023 66     Total Protein 09/07/2023 6.9     Albumin 09/07/2023 4.1     Total Bilirubin 09/07/2023 0.69     eGFR 09/07/2023 85     Cholesterol 09/07/2023 156     Triglycerides 09/07/2023 71     HDL, Direct 09/07/2023 45     LDL Calculated 09/07/2023 97     Non-HDL-Chol (CHOL-HDL) 09/07/2023 111        Lab Results   Component Value Date    WBC 8.12 09/07/2023    HGB 15.7 09/07/2023    HCT 45.7 09/07/2023    MCV 94 09/07/2023     09/07/2023       Lab Results   Component Value Date    SODIUM 138 11/11/2023    K 3.9 11/11/2023     11/11/2023    CO2 27 11/11/2023    AGAP 3 11/11/2023    BUN 11 11/11/2023    CREATININE 0.92 11/11/2023    GLUC 84 11/11/2023    GLUF 93 09/07/2023    CALCIUM 9.4 11/11/2023    AST 16 11/11/2023    ALT 16 11/11/2023    ALKPHOS 63 11/11/2023    TP 7.0 11/11/2023    TBILI 0.5 11/11/2023    EGFR 106 11/11/2023       Lab Results   Component Value Date    CRP <3.0 05/23/2022       Lab Results   Component Value Date    [id number] 1.370 07/24/2017       No results found for: \"IRON\", \"TIBC\", \"FERRITIN\"    Radiology Results:   No results found.  "

## 2024-03-04 NOTE — PROGRESS NOTES
[facility] - Outpatient Follow-up Note  Torito Redd 43 y.o. male MRN: [id number]  Encounter: [id number]          ASSESSMENT AND PLAN:    Torito Redd is a 43 y.o. male with chronic abdominal pain, abnormal stools, possible mild Crohn's disease of the small bowel based on prior and recent video capsule endoscopy with small bowel erosions but unclear if degree of findings match with symptomatology, last seen June 2023 who now presents for follow-up.  Overall his symptoms, in particular his pain, has been difficult to manage given the unclear diagnosis.  It has been thought that there may be an inflammatory component versus functional pathology versus a combination of the above.  His previously tried multiple different treatments including prednisone, budesonide, peppermint oil, anti-gas medication, Elavil, rifaximin, Linzess without significant relief from many of these medication options.  Have significant symptoms of pain as well as abnormal stools and bowel noises.    Endoscopy and colonoscopy from November 2022 with concentric esophageal rings, hiatal hernia, normal TI and colon in appearance, external hemorrhoids; biopsies revealed chronic duodenal inflammation with suggestion of peptic duodenitis, inactive gastritis, increased intraepithelial eosinophils, normal terminal ileum, normal colon.  Prior CT abdomen pelvis from August 2021 with concern for ascending colitis.  Prior MR enterography normal.  Prior SIBO testing negative.  Capsule endoscopy from January 2023 with possible aphthous ulcerations and erosions in the mid to distal small bowel.    Chest x-ray from November with no acute cardiopulmonary abnormalities.    Most recent blood work from November with CMP showing normal electrolytes, creatinine, liver test.  Vitamin D from September was slightly low at 26.4.  CBC with normal white blood cell count, hemoglobin, MCV, platelets.  Prior fecal fats, pancreatic  elastase, calprotectin normal.  Prior CRP normal.    1. Right lower quadrant abdominal pain    2. IBD (inflammatory bowel disease)    3. Rectal prolapse    4. Eosinophilic esophagitis    5. Anal pain    6. Abnormal stools        Orders Placed This Encounter   Procedures    Ambulatory Referral to Colorectal Surgery    EGD     Can repeat endoscopy to assess for eosinophilic esophagitis  Anusol suppository and cream to help with prolapse  Referral to colorectal surgery  Linzess Monday, Wednesday and Friday  Trial of Enteragam  Consider medications to help with functional pain  ______________________________________________________________________    SUBJECTIVE:    Torito Redd is a 43 y.o. male who presents with complaint of abdominal pain.     He was doing somewhat better until December. Then the pain restarted. He felt sick. It would come and go until 1 week ago. Until 1 week ago he was still feeling unwell. He would eat soup and it took time to heal. He was leaning more towards constipation. He has 1-3 stools per day.  He has anal pain and hemorrhoids. He has rectal prolapse. He sees blood 50% of the time. No laxatives. He took Linzess in the past few weeks.   He has abdominal pain and he hears noises when he presses on his abdomen in the RLQ. Only occasional indigestion. Prilosec causes bloating. No prilosec. Swallowing is okay. Sometimes he has throat tightness. Some nausea but no vomiting.       REVIEW OF SYSTEMS IS OTHERWISE NEGATIVE.  10 point ROS reviewed and negative, except as above      Historical Information   Past Medical History:   Diagnosis Date    Abdominal pain     Abnormal liver function test     last assessed: Oct 16, 2013     Abnormal weight loss     last assessed: Yung 15, 2014     Acute left-sided low back pain with left-sided sciatica 10/31/2019    Allergic rhinitis due to pollen     last assessed: Yung 15, 2014     Anxiety     Anxiety     last assessed/resolved: Aug 5, 2015     Asthma      last assessed: Yung 15, 2014     Benign essential HTN     last assessed/resolved: Feb 23, 2017     Cervical lymphadenopathy     last assessed/resolved: Feb 23, 2017     Chronic sinusitis     last assessed: Yung 15, 2014     Constipation     Crohn's disease (HCC) 01/01/2011    last assessed: Yung 15, 2014     Dysuria     last assessed: April 29, 2016     Elevated BP without diagnosis of hypertension     last assessed/resolved: Feb 23, 2017     Esophageal reflux     last assessed/resolved: Feb 23, 2017     Eustachian tube anomaly     Resolved: Nov 9, 2017     External hemorrhoids     last assessed: Yung 15, 2014     Gastritis and duodenitis 06/14/2021    Hypertension     borderline    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     last assessed/resolved: July 19, 2017     Inflammatory bowel disease     Pancreatic neoplasm     last assessed: Yung 15, 2014     Positive depression screening     resolved: July 24, 2017     Postconcussion syndrome 11/15/2021    Postoperative ileus ([facility]) 11/12/2018    Psoriasis     Seasonal allergies     Small bowel obstruction ([facility]) 11/11/2018    Vitamin B12 deficiency     last assessed/resolved: Feb 23, 2017      Past Surgical History:   Procedure Laterality Date    CARDIAC LOOP RECORDER      Late 2021    CERVICAL FUSION  05/2023    CHOLECYSTECTOMY      resolved: 2011     COLONOSCOPY N/A 11/18/2016    Procedure: COLONOSCOPY;  Surgeon: Job Fowler MD;  Location:  GI LAB;  Service:     COLONOSCOPY N/A 04/28/2016    Procedure: COLONOSCOPY;  Surgeon: Job Fowler MD;  Location: [facility] GI LAB;  Service:     COLONOSCOPY  02/09/2021    ESOPHAGOGASTRODUODENOSCOPY N/A 04/28/2016    Procedure: ESOPHAGOGASTRODUODENOSCOPY (EGD);  Surgeon: Job Fowler MD;  Location: [facility] GI LAB;  Service:     FRONTAL SINUSOTOMY      resolved: April 2009     CA COLONOSCOPY FLX DX W/COLLJ SPEC WHEN PFRMD N/A 10/12/2018    Procedure: EGD AND COLONOSCOPY;  Surgeon: Alpesh Ceaj MD;  Location: [facility] GI LAB;  Service:  Gastroenterology    HI LAP RPR HRNA XCPT INCAL/INGUN NCRC8/STRANGULATED N/A 11/08/2018    Procedure: REPAIR HERNIA INCISIONAL LAPAROSCOPIC;  Surgeon: Corey Reid MD;  Location: [facility];  Service: General    HI SEPTOPLASTY/SUBMUCOUS RESECJ W/WO CARTILAGE GRF N/A 07/28/2017    Procedure: REVISION SEPTOPLASTY; TURBINOPLASTY; BILATERAL  GRAFTS; ALAR GRAFT; POSSIBLE AURICULAR CARTILAGE GRAFT;  Surgeon: Silverio Malhotra MD;  Location: [facility];  Service: ENT    TONSILLECTOMY AND ADENOIDECTOMY      UPPER GASTROINTESTINAL ENDOSCOPY  02/09/2021     Social History   Social History     Substance and Sexual Activity   Alcohol Use Yes    Comment: rare - once a year     Social History     Substance and Sexual Activity   Drug Use Yes    Types: Marijuana    Comment: uses medical marijuana via vaping. last use 2/8/19     Social History     Tobacco Use   Smoking Status Every Day    Current packs/day: 0.50    Average packs/day: 0.5 packs/day for 21.2 years (10.6 ttl pk-yrs)    Types: Cigarettes    Start date: 2003   Smokeless Tobacco Never   Tobacco Comments    Dependence on nicotine in cigarettes - 1/2 PPD x 20 years      Family History   Problem Relation Age of Onset    Diabetes Mother         mellitus     Hypertension Mother     Thyroid disease Mother     Fibromyalgia Mother     Hypertension Father     Hypertension Sister     Heart disease Sister         had defibulator put due to late beats    No Known Problems Maternal Grandmother     No Known Problems Maternal Grandfather     Diabetes Paternal Grandmother     Diabetes Paternal Grandfather     Kidney failure Paternal Grandfather     Diabetes Other         mellitus     Rheum arthritis Cousin        Meds/Allergies       Current Outpatient Medications:     acetaminophen (TYLENOL) 500 mg tablet    cetirizine (ZyrTEC) 10 mg tablet    clonazePAM (KlonoPIN) 0.5 mg tablet    fluticasone (FLONASE) 50 mcg/act nasal spray    hydrocortisone (ANUSOL-HC) 2.5 % rectal cream     "hydrocortisone (ANUSOL-HC) 25 mg suppository    linaCLOtide (Linzess) 145 MCG CAPS    NON FORMULARY    omeprazole (PriLOSEC) 20 mg delayed release capsule    Allergies   Allergen Reactions    Apple - Food Allergy Anaphylaxis    Aspirin Anaphylaxis and Hives     Category: Allergy;     Corticosteroids Anaphylaxis and Drowsiness     Other reaction(s): Drowsiness    Eggs Or Egg-Derived Products - Food Allergy GI Intolerance     GI upset    Ibuprofen Anaphylaxis and Hives     Category: Allergy; aspirin    Nsaids Anaphylaxis     Category: Allergy;   Category: Allergy;     Other Anaphylaxis     Category: Allergy; Annotation - [id number]: cherries, grapes , and kiwis; pt states all fruits    Peanuts [Peanut Oil - Food Allergy] Anaphylaxis     nuts    Penicillins Shortness Of Breath    Methocarbamol Other (See Comments)     Patient reports not feeling well. ( all muscle relaxer's )     Ciprofloxacin Other (See Comments)     Prolongs QT interval due to heart conditon, cannot take.    Pollen Extract     Zofran [Ondansetron] Other (See Comments)     Prolonged QT interval    Codeine Anxiety           Objective     Blood pressure 130/79, pulse 72, temperature 97.8 °F (36.6 °C), temperature source Tympanic, height 5' 8\" (1.727 m), weight 75.8 kg (167 lb 3.2 oz), SpO2 99%. Body mass index is 25.42 kg/m².      PHYSICAL EXAMINATION:    General Appearance:   Alert, cooperative, no distress   HEENT:  Normocephalic, atraumatic, anicteric. Neck supple, symmetrical, trachea midline.   Lungs:   Equal chest rise and unlabored breathing, normal effort, no coughing.   Cardiovascular:   No visualized JVD.   Abdomen:   No abdominal distension.   Skin:   No jaundice, rashes, or lesions.    Musculoskeletal:   Normal range of motion visualized.   Psych:  Normal affect and normal insight.   Neuro:  Alert and appropriate.         Lab Results:   No visits with results within 1 Day(s) from this visit.   Latest known visit with results is:   Appointment " "on 09/07/2023   Component Date Value    Vit D, 25-Hydroxy 09/07/2023 26.4 (L)     WBC 09/07/2023 8.12     RBC 09/07/2023 4.89     Hemoglobin 09/07/2023 15.7     Hematocrit 09/07/2023 45.7     MCV 09/07/2023 94     MCH 09/07/2023 32.1     MCHC 09/07/2023 34.4     RDW 09/07/2023 12.8     Platelets 09/07/2023 259     MPV 09/07/2023 10.4     Sodium 09/07/2023 141     Potassium 09/07/2023 5.2     Chloride 09/07/2023 107     CO2 09/07/2023 26     ANION GAP 09/07/2023 8     BUN 09/07/2023 8     Creatinine 09/07/2023 1.07     Glucose, Fasting 09/07/2023 93     Calcium 09/07/2023 9.6     AST 09/07/2023 18     ALT 09/07/2023 18     Alkaline Phosphatase 09/07/2023 66     Total Protein 09/07/2023 6.9     Albumin 09/07/2023 4.1     Total Bilirubin 09/07/2023 0.69     eGFR 09/07/2023 85     Cholesterol 09/07/2023 156     Triglycerides 09/07/2023 71     HDL, Direct 09/07/2023 45     LDL Calculated 09/07/2023 97     Non-HDL-Chol (CHOL-HDL) 09/07/2023 111        Lab Results   Component Value Date    WBC 8.12 09/07/2023    HGB 15.7 09/07/2023    HCT 45.7 09/07/2023    MCV 94 09/07/2023     09/07/2023       Lab Results   Component Value Date    SODIUM 138 11/11/2023    K 3.9 11/11/2023     11/11/2023    CO2 27 11/11/2023    AGAP 3 11/11/2023    BUN 11 11/11/2023    CREATININE 0.92 11/11/2023    GLUC 84 11/11/2023    GLUF 93 09/07/2023    CALCIUM 9.4 11/11/2023    AST 16 11/11/2023    ALT 16 11/11/2023    ALKPHOS 63 11/11/2023    TP 7.0 11/11/2023    TBILI 0.5 11/11/2023    EGFR 106 11/11/2023       Lab Results   Component Value Date    CRP <3.0 05/23/2022       Lab Results   Component Value Date    [id number] 1.370 07/24/2017       No results found for: \"IRON\", \"TIBC\", \"FERRITIN\"    Radiology Results:   No results found.  "

## 2024-03-06 DIAGNOSIS — K62.3 RECTAL PROLAPSE: Primary | ICD-10-CM

## 2024-03-06 DIAGNOSIS — K52.9 IBD (INFLAMMATORY BOWEL DISEASE): ICD-10-CM

## 2024-03-06 RX ORDER — HYDROCORTISONE ACETATE 1500 MG/15G
1 AEROSOL, FOAM TOPICAL 2 TIMES DAILY
Qty: 150 G | Refills: 0 | Status: SHIPPED | OUTPATIENT
Start: 2024-03-06 | End: 2024-03-20

## 2024-03-08 ENCOUNTER — TELEPHONE (OUTPATIENT)
Dept: GASTROENTEROLOGY | Facility: CLINIC | Age: 44
End: 2024-03-08

## 2024-03-08 NOTE — TELEPHONE ENCOUNTER
Confirming Upcoming Procedure: EGD on March 13  Physician performing: Dr. Stiles  [facility] of procedure[place]  [facility]  Prep: EGD

## 2024-03-11 ENCOUNTER — PATIENT MESSAGE (OUTPATIENT)
Dept: INTERNAL MEDICINE CLINIC | Facility: OTHER | Age: 44
End: 2024-03-11
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
## 2024-03-12 ENCOUNTER — APPOINTMENT (OUTPATIENT)
Dept: LAB | Facility: CLINIC | Age: 44
End: 2024-03-12
Payer: COMMERCIAL

## 2024-03-12 DIAGNOSIS — K52.9 IBD (INFLAMMATORY BOWEL DISEASE): ICD-10-CM

## 2024-03-12 DIAGNOSIS — K50.00 CROHN'S DISEASE INVOLVING TERMINAL ILEUM (HCC): ICD-10-CM

## 2024-03-12 DIAGNOSIS — M54.12 CERVICAL RADICULOPATHY: ICD-10-CM

## 2024-03-12 DIAGNOSIS — Z13.220 ENCOUNTER FOR LIPID SCREENING FOR CARDIOVASCULAR DISEASE: ICD-10-CM
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
## 2024-03-12 DIAGNOSIS — J30.2 SEASONAL ALLERGIC RHINITIS, UNSPECIFIED TRIGGER: ICD-10-CM

## 2024-03-12 DIAGNOSIS — Z13.6 ENCOUNTER FOR LIPID SCREENING FOR CARDIOVASCULAR DISEASE: ICD-10-CM

## 2024-03-12 DIAGNOSIS — F41.9 ANXIETY: ICD-10-CM

## 2024-03-12 LAB
ALBUMIN SERPL BCP-MCNC: 4.3 G/DL (ref 3.5–5)
ALP SERPL-CCNC: 63 U/L (ref 34–104)
ALT SERPL W P-5'-P-CCNC: 25 U/L (ref 7–52)
ANION GAP SERPL CALCULATED.3IONS-SCNC: 9 MMOL/L (ref 4–13)
AST SERPL W P-5'-P-CCNC: 19 U/L (ref 13–39)
B BURGDOR IGG+IGM SER QL IA: NEGATIVE
BILIRUB SERPL-MCNC: 0.63 MG/DL (ref 0.2–1)
BUN SERPL-MCNC: 11 MG/DL (ref 5–25)
CALCIUM SERPL-MCNC: 9.3 MG/DL (ref 8.4–10.2)
CHLORIDE SERPL-SCNC: 103 MMOL/L (ref 96–108)
CHOLEST SERPL-MCNC: 175 MG/DL
CO2 SERPL-SCNC: 26 MMOL/L (ref 21–32)
CREAT SERPL-MCNC: 1.14 MG/DL (ref 0.6–1.3)
ERYTHROCYTE [DISTWIDTH] IN BLOOD BY AUTOMATED COUNT: 12.9 % (ref 11.6–15.1)
GFR SERPL CREATININE-BSD FRML MDRD: 78 ML/MIN/1.73SQ M
GLUCOSE P FAST SERPL-MCNC: 123 MG/DL (ref 65–99)
HCT VFR BLD AUTO: 46.5 % (ref 36.5–49.3)
HDLC SERPL-MCNC: 52 MG/DL
HGB BLD-MCNC: 15.5 G/DL (ref 12–17)
LDLC SERPL CALC-MCNC: 104 MG/DL (ref 0–100)
MCH RBC QN AUTO: 31.3 PG (ref 26.8–34.3)
MCHC RBC AUTO-ENTMCNC: 33.3 G/DL (ref 31.4–37.4)
MCV RBC AUTO: 94 FL (ref 82–98)
NONHDLC SERPL-MCNC: 123 MG/DL
PLATELET # BLD AUTO: 265 THOUSANDS/UL (ref 149–390)
PMV BLD AUTO: 9.8 FL (ref 8.9–12.7)
POTASSIUM SERPL-SCNC: 4.1 MMOL/L (ref 3.5–5.3)
PROT SERPL-MCNC: 6.9 G/DL (ref 6.4–8.4)
RBC # BLD AUTO: 4.95 MILLION/UL (ref 3.88–5.62)
SODIUM SERPL-SCNC: 138 MMOL/L (ref 135–147)
TRIGL SERPL-MCNC: 95 MG/DL
WBC # BLD AUTO: 6.79 THOUSAND/UL (ref 4.31–10.16)

## 2024-03-12 PROCEDURE — 80061 LIPID PANEL: CPT

## 2024-03-12 PROCEDURE — 85027 COMPLETE CBC AUTOMATED: CPT

## 2024-03-12 PROCEDURE — 80053 COMPREHEN METABOLIC PANEL: CPT

## 2024-03-12 PROCEDURE — 86618 LYME DISEASE ANTIBODY: CPT

## 2024-03-12 PROCEDURE — 36415 COLL VENOUS BLD VENIPUNCTURE: CPT

## 2024-03-12 RX ORDER — SODIUM CHLORIDE 9 MG/ML
125 INJECTION, SOLUTION INTRAVENOUS CONTINUOUS
Status: CANCELLED | OUTPATIENT
Start: 2024-03-12

## 2024-03-12 RX ORDER — ALBUTEROL SULFATE 2.5 MG/3ML
2.5 SOLUTION RESPIRATORY (INHALATION) ONCE AS NEEDED
Status: CANCELLED | OUTPATIENT
Start: 2024-03-12

## 2024-03-12 NOTE — PATIENT COMMUNICATION
Called and notified patient that lyme testing was ordered.  He stated that the bites are fine right now and not red, itchy, or warm to the touch.  Patient was instructed that if any of these symptoms would occur that he should call to schedule an appointment.

## 2024-03-13 ENCOUNTER — HOSPITAL ENCOUNTER (OUTPATIENT)
Dept: GASTROENTEROLOGY | Facility: MEDICAL CENTER | Age: 44
Setting detail: OUTPATIENT SURGERY
Discharge: HOME | End: 2024-03-13
Attending: INTERNAL MEDICINE
Payer: COMMERCIAL

## 2024-03-13 ENCOUNTER — ANESTHESIA (OUTPATIENT)
Dept: GASTROENTEROLOGY | Facility: MEDICAL CENTER | Age: 44
End: 2024-03-13

## 2024-03-13 ENCOUNTER — ANESTHESIA EVENT (OUTPATIENT)
Dept: GASTROENTEROLOGY | Facility: MEDICAL CENTER | Age: 44
End: 2024-03-13

## 2024-03-13 VITALS
SYSTOLIC BLOOD PRESSURE: 105 MMHG | WEIGHT: 167 LBS | BODY MASS INDEX: 25.31 KG/M2 | OXYGEN SATURATION: 96 % | DIASTOLIC BLOOD PRESSURE: 72 MMHG | RESPIRATION RATE: 22 BRPM | HEART RATE: 66 BPM | HEIGHT: 68 IN | TEMPERATURE: 97.3 F

## 2024-03-13 DIAGNOSIS — K20.0 EOSINOPHILIC ESOPHAGITIS: ICD-10-CM

## 2024-03-13 PROCEDURE — 88305 TISSUE EXAM BY PATHOLOGIST: CPT | Performed by: PATHOLOGY

## 2024-03-13 PROCEDURE — 88342 IMHCHEM/IMCYTCHM 1ST ANTB: CPT | Performed by: PATHOLOGY

## 2024-03-13 PROCEDURE — 43239 EGD BIOPSY SINGLE/MULTIPLE: CPT | Performed by: INTERNAL MEDICINE

## 2024-03-13 RX ORDER — SODIUM CHLORIDE 9 MG/ML
125 INJECTION, SOLUTION INTRAVENOUS CONTINUOUS
Status: DISCONTINUED | OUTPATIENT
Start: 2024-03-13 | End: 2024-03-17 | Stop reason: HOSPADM

## 2024-03-13 RX ORDER — PROPOFOL 10 MG/ML
INJECTION, EMULSION INTRAVENOUS AS NEEDED
Status: DISCONTINUED | OUTPATIENT
Start: 2024-03-13 | End: 2024-03-13

## 2024-03-13 RX ORDER — ALBUTEROL SULFATE 2.5 MG/3ML
2.5 SOLUTION RESPIRATORY (INHALATION) ONCE AS NEEDED
Status: DISCONTINUED | OUTPATIENT
Start: 2024-03-13 | End: 2024-03-17 | Stop reason: HOSPADM

## 2024-03-13 RX ADMIN — SODIUM CHLORIDE 125 ML/HR: 0.9 INJECTION, SOLUTION INTRAVENOUS at 08:37

## 2024-03-13 RX ADMIN — PROPOFOL 50 MG: 10 INJECTION, EMULSION INTRAVENOUS at 09:05

## 2024-03-13 RX ADMIN — PROPOFOL 50 MG: 10 INJECTION, EMULSION INTRAVENOUS at 09:03

## 2024-03-13 RX ADMIN — PROPOFOL 50 MG: 10 INJECTION, EMULSION INTRAVENOUS at 09:01

## 2024-03-13 RX ADMIN — PROPOFOL 150 MG: 10 INJECTION, EMULSION INTRAVENOUS at 09:00

## 2024-03-13 NOTE — ANESTHESIA PREPROCEDURE EVALUATION
Procedure:  EGD    Relevant Problems   CARDIO   (+) Chronic midline thoracic back pain   (+) Prolonged QT interval syndrome      MUSCULOSKELETAL   (+) Chronic midline low back pain without sciatica   (+) Chronic midline thoracic back pain   (+) Degenerative disc disease, lumbar   (+) Myofascial pain syndrome      NEURO/PSYCH   (+) Anxiety   (+) Chronic headaches   (+) Chronic midline low back pain without sciatica   (+) Chronic midline thoracic back pain   (+) Chronic pain syndrome   (+) Myofascial pain syndrome      PULMONARY   (+) Allergic asthma   (+) Smoking        Physical Exam    Airway    Mallampati score: I  TM Distance: >3 FB  Neck ROM: full     Dental   No notable dental hx     Cardiovascular  Rhythm: regular, Rate: normal, Cardiovascular exam normal    Pulmonary  Pulmonary exam normal Breath sounds clear to auscultation    Other Findings        Anesthesia Plan  ASA Score- 3     Anesthesia Type- IV sedation with anesthesia with ASA Monitors.         Additional Monitors:     Airway Plan:            Plan Factors-Exercise tolerance (METS): >4 METS.    Chart reviewed.    Patient summary reviewed.    Patient is a current smoker (cigarettes and nightly Marijuana).  Patient instructed to abstain from smoking on day of procedure. Patient smoked on day of surgery.            Induction-     Postoperative Plan-     Informed Consent-

## 2024-03-13 NOTE — ANESTHESIA POSTPROCEDURE EVALUATION
"Post-Op Assessment Note    CV Status:  Stable    Pain management: adequate       Mental Status:  Alert and awake   Hydration Status:  Euvolemic   PONV Controlled:  Controlled   Airway Patency:  Patent     Post Op Vitals Reviewed: Yes    No anethesia notable event occurred.    Staff: Anesthesiologist               BP      Temp      Pulse     Resp      SpO2      /72   Pulse 66   Temp (!) 97.3 °F (36.3 °C)   Resp 22   Ht 5' 8\" (1.727 m)   Wt 75.8 kg (167 lb)   SpO2 96%   BMI 25.39 kg/m²     "

## 2024-03-13 NOTE — INTERVAL H&P NOTE
H&P reviewed. After examining the patient I find no changes in the patients condition since the H&P had been written.    Vitals:    03/13/24 0829   BP: 130/87   Pulse: 58   Resp: 16   Temp: (!) 97.3 °F (36.3 °C)   SpO2: 98%

## 2024-03-20 PROCEDURE — 88305 TISSUE EXAM BY PATHOLOGIST: CPT | Performed by: PATHOLOGY

## 2024-03-20 PROCEDURE — 88342 IMHCHEM/IMCYTCHM 1ST ANTB: CPT | Performed by: PATHOLOGY

## 2024-03-26 DIAGNOSIS — K52.9 IBD (INFLAMMATORY BOWEL DISEASE): ICD-10-CM

## 2024-03-26 DIAGNOSIS — K62.3 RECTAL PROLAPSE: ICD-10-CM

## 2024-03-26 RX ORDER — HYDROCORTISONE 25 MG/G
1 CREAM TOPICAL 2 TIMES DAILY
Qty: 60 G | Refills: 11 | Status: SHIPPED | OUTPATIENT
Start: 2024-03-26

## 2024-04-01 ENCOUNTER — HOSPITAL ENCOUNTER (OUTPATIENT)
Dept: RADIOLOGY | Facility: IMAGING CENTER | Age: 44
Discharge: HOME | End: 2024-04-01
Payer: COMMERCIAL

## 2024-04-01 ENCOUNTER — OFFICE VISIT (OUTPATIENT)
Dept: INTERNAL MEDICINE CLINIC | Facility: OTHER | Age: 44
End: 2024-04-01
Payer: COMMERCIAL

## 2024-04-01 VITALS
TEMPERATURE: 98.7 F | OXYGEN SATURATION: 99 % | SYSTOLIC BLOOD PRESSURE: 120 MMHG | DIASTOLIC BLOOD PRESSURE: 88 MMHG | WEIGHT: 167 LBS | HEART RATE: 67 BPM | BODY MASS INDEX: 25.31 KG/M2 | HEIGHT: 68 IN

## 2024-04-01 DIAGNOSIS — F41.9 ANXIETY: ICD-10-CM

## 2024-04-01 DIAGNOSIS — M25.521 RIGHT ELBOW PAIN: Primary | ICD-10-CM

## 2024-04-01 DIAGNOSIS — M25.521 RIGHT ELBOW PAIN: ICD-10-CM
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
## 2024-04-01 DIAGNOSIS — S30.861A TICK BITE OF ABDOMEN, INITIAL ENCOUNTER: ICD-10-CM

## 2024-04-01 PROCEDURE — G2211 COMPLEX E/M VISIT ADD ON: HCPCS

## 2024-04-01 PROCEDURE — 99213 OFFICE O/P EST LOW 20 MIN: CPT

## 2024-04-01 PROCEDURE — 73080 X-RAY EXAM OF ELBOW: CPT

## 2024-04-01 RX ORDER — TRIAMCINOLONE ACETONIDE 5 MG/G
CREAM TOPICAL 2 TIMES DAILY
Qty: 15 G | Refills: 0 | Status: SHIPPED | OUTPATIENT
Start: 2024-04-01

## 2024-04-01 RX ORDER — CLONAZEPAM 0.5 MG/1
0.5 TABLET ORAL EVERY 12 HOURS PRN
Qty: 60 TABLET | Refills: 0 | Status: CANCELLED | OUTPATIENT
Start: 2024-04-01 | End: 2024-08-18

## 2024-04-01 RX ORDER — CLONAZEPAM 0.5 MG/1
0.5 TABLET ORAL EVERY 12 HOURS PRN
Qty: 60 TABLET | Refills: 3 | Status: SHIPPED | OUTPATIENT
Start: 2024-04-01

## 2024-04-01 NOTE — PROGRESS NOTES
Assessment/Plan:    1. Right elbow pain  Comments:  Suspect lateral epicondylitis  Will order x-ray  Continue Tylenol as needed for pain  Advised RICE  Follow-up if not improving, consider ortho referral  Orders:  -     XR elbow 3+ vw right; Future; Expected date: 04/01/2024    2. Tick bite of abdomen, initial encounter  Comments:  Will give triamcinolone cream to apply topically to area- patient states no allergy to topical steroids, only oral  If not improving, referral to Derm  Orders:  -     Ambulatory Referral to Dermatology; Future  -     triamcinolone (KENALOG) 0.5 % cream; Apply topically 2 (two) times a day       Discussed case as above with Dr. Alberto BARROSO*GI-View software was used to dictate this note.  It may contain errors with dictating incorrect words or incorrect spelling. Please contact the provider directly with any questions.    Subjective:      Patient ID: Torito Redd is a 43 y.o. male.    Patient is a 43-year-old male presenting to the office with multiple concerns    Tick bite  Notes that he had a tick bite in November 2023 to the left abdomen  Had labs done 3-: Negative for Lyme antibodies, CBC within normal limits  Notes itching to the area occasionally  Removed tick at home- was not seen for it by a provider  Some redness to the area- not sure if it from him scratching  Denies pain, drainage from the area, fevers/chills    Right elbow pain  X 1 month  No injury know  Feels the pain is getting worse- exacerbated with lifting heavier objects  Tx tried: tylenol occasionally-mild improvement        Arm Pain   The incident occurred more than 1 week ago. There was no injury mechanism. The pain is present in the right elbow. The quality of the pain is described as aching and shooting. The pain is moderate. Pertinent negatives include no chest pain, muscle weakness, numbness or tingling. He has tried heat and acetaminophen for the symptoms.   Insect Bite  This is a new problem. The  current episode started more than 1 month ago. Pertinent negatives include no chest pain, chills, coughing, fatigue, fever, nausea, numbness or vomiting. He has tried nothing for the symptoms.       The following portions of the patient's history were reviewed and updated as appropriate: allergies, current medications, past family history, past medical history, past social history, past surgical history, and problem list.    Review of Systems   Constitutional:  Negative for chills, fatigue and fever.   Respiratory:  Negative for cough, chest tightness, shortness of breath and wheezing.    Cardiovascular:  Negative for chest pain and palpitations.   Gastrointestinal:  Negative for nausea and vomiting.   Neurological:  Negative for dizziness, tingling, light-headedness and numbness.         Past Medical History:   Diagnosis Date    Abdominal pain     Abnormal liver function test     last assessed: Oct 16, 2013     Abnormal weight loss     last assessed: Yung 15, 2014     Acute left-sided low back pain with left-sided sciatica 10/31/2019    Allergic rhinitis due to pollen     last assessed: Yung 15, 2014     Anxiety     Anxiety     last assessed/resolved: Aug 5, 2015     Asthma     last assessed: Yung 15, 2014     Benign essential HTN     last assessed/resolved: Feb 23, 2017     Cervical lymphadenopathy     last assessed/resolved: Feb 23, 2017     Chronic sinusitis     last assessed: Yung 15, 2014     Constipation     Crohn's disease (HCC) 01/01/2011    last assessed: Yung 15, 2014     Dysuria     last assessed: April 29, 2016     Elevated BP without diagnosis of hypertension     last assessed/resolved: Feb 23, 2017     Esophageal reflux     last assessed/resolved: Feb 23, 2017     Eustachian tube anomaly     Resolved: Nov 9, 2017     External hemorrhoids     last assessed: Yung 15, 2014     Gastritis and duodenitis 06/14/2021    GERD (gastroesophageal reflux disease)     Hypertension     borderline    Hypothyroidism due to  iodide excess     last assessed/resolved: July 19, 2017     Inflammatory bowel disease     Pancreatic neoplasm     last assessed: Yung 15, 2014     Positive depression screening     resolved: July 24, 2017     Postconcussion syndrome 11/15/2021    Postoperative ileus (HCC) 11/12/2018    Psoriasis     Seasonal allergies     Small bowel obstruction (HCC) 11/11/2018    Vitamin B12 deficiency     last assessed/resolved: Feb 23, 2017          Current Outpatient Medications:     acetaminophen (TYLENOL) 500 mg tablet, Take 1,000 mg by mouth every 4 (four) hours as needed , Disp: , Rfl:     cetirizine (ZyrTEC) 10 mg tablet, Take 1 tablet (10 mg total) by mouth daily as needed for allergies, Disp: 30 tablet, Rfl: 5    clonazePAM (KlonoPIN) 0.5 mg tablet, Take 1 tablet (0.5 mg total) by mouth every 12 (twelve) hours as needed for anxiety, Disp: 60 tablet, Rfl: 0    fluticasone (FLONASE) 50 mcg/act nasal spray, 1 spray into each nostril daily as needed for rhinitis, Disp: 16 g, Rfl: 1    hydrocortisone (ANUSOL-HC) 2.5 % rectal cream, APPLY TOPICALLY TWICE A DAY, Disp: 60 g, Rfl: 11    linaCLOtide (Linzess) 145 MCG CAPS, Take 1 capsule (145 mcg total) by mouth daily, Disp: 90 capsule, Rfl: 1    NON FORMULARY, Medical marijuana, Disp: , Rfl:     omeprazole (PriLOSEC) 20 mg delayed release capsule, Take 1 capsule (20 mg total) by mouth daily (Patient taking differently: Take 20 mg by mouth if needed), Disp: 90 capsule, Rfl: 1    triamcinolone (KENALOG) 0.5 % cream, Apply topically 2 (two) times a day, Disp: 15 g, Rfl: 0    Allergies   Allergen Reactions    Apple - Food Allergy Anaphylaxis    Aspirin Anaphylaxis and Hives     Category: Allergy;     Corticosteroids Anaphylaxis and Drowsiness     Other reaction(s): Drowsiness    Eggs Or Egg-Derived Products - Food Allergy GI Intolerance     GI upset    Ibuprofen Anaphylaxis and Hives     Category: Allergy; aspirin    Nsaids Anaphylaxis     Category: Allergy;   Category: Allergy;      Other Anaphylaxis     Category: Allergy; Annotation - [id number]: cherries, grapes , and kiwis; pt states all fruits    Peanuts [Peanut Oil - Food Allergy] Anaphylaxis     nuts    Penicillins Shortness Of Breath    Methocarbamol Other (See Comments)     Patient reports not feeling well. ( all muscle relaxer's )     Ciprofloxacin Other (See Comments)     Prolongs QT interval due to heart conditon, cannot take.    Pollen Extract     Zofran [Ondansetron] Other (See Comments)     Prolonged QT interval    Codeine Anxiety       Social History   Past Surgical History:   Procedure Laterality Date    CARDIAC LOOP RECORDER      Late 2021    CERVICAL FUSION  05/2023    CHOLECYSTECTOMY      resolved: 2011     COLONOSCOPY N/A 11/18/2016    Procedure: COLONOSCOPY;  Surgeon: Job Fowler MD;  Location:  GI LAB;  Service:     COLONOSCOPY N/A 04/28/2016    Procedure: COLONOSCOPY;  Surgeon: Job Fowler MD;  Location: [facility] GI [facility];  Service:     COLONOSCOPY  02/09/2021    ESOPHAGOGASTRODUODENOSCOPY N/A 04/28/2016    Procedure: ESOPHAGOGASTRODUODENOSCOPY (EGD);  Surgeon: Job Fowler MD;  Location: [facility] GI LAB;  Service:     FRONTAL SINUSOTOMY      resolved: April 2009     HI COLONOSCOPY FLX DX W/COLLJ SPEC WHEN PFRMD N/A 10/12/2018    Procedure: EGD AND COLONOSCOPY;  Surgeon: Alpesh Ceja MD;  Location: [facility] GI LAB;  Service: Gastroenterology    HI LAP RPR HRNA XCPT INCAL/INGUN NCRC8/STRANGULATED N/A 11/08/2018    Procedure: REPAIR HERNIA INCISIONAL LAPAROSCOPIC;  Surgeon: Corey Reid MD;  Location:  MAIN OR;  Service: General    HI SEPTOPLASTY/SUBMUCOUS RESECJ W/WO CARTILAGE GRF N/A 07/28/2017    Procedure: REVISION SEPTOPLASTY; TURBINOPLASTY; BILATERAL  GRAFTS; ALAR GRAFT; POSSIBLE AURICULAR CARTILAGE GRAFT;  Surgeon: Silverio Malhotra MD;  Location: [facility];  Service: ENT    TONSILLECTOMY AND ADENOIDECTOMY      UPPER GASTROINTESTINAL ENDOSCOPY  02/09/2021     Family History   Problem Relation Age of Onset     "Diabetes Mother         mellitus     Hypertension Mother     Thyroid disease Mother     Fibromyalgia Mother     Hypertension Father     Hypertension Sister     Heart disease Sister         had defibulator put due to late beats    No Known Problems Maternal Grandmother     No Known Problems Maternal Grandfather     Diabetes Paternal Grandmother     Diabetes Paternal Grandfather     Kidney failure Paternal Grandfather     Diabetes Other         mellitus     Rheum arthritis Cousin        Objective:  /88 (BP Location: Left arm, Patient Position: Sitting, Cuff Size: Adult)   Pulse 67   Temp 98.7 °F (37.1 °C)   Ht 5' 8\" (1.727 m)   Wt 75.8 kg (167 lb)   SpO2 99%   BMI 25.39 kg/m²      Physical Exam  Vitals and nursing note reviewed.   Constitutional:       General: He is not in acute distress.     Appearance: Normal appearance. He is not ill-appearing or diaphoretic.   HENT:      Head: Normocephalic and atraumatic.      Right Ear: External ear normal.      Left Ear: External ear normal.      Mouth/Throat:      Mouth: Mucous membranes are moist.   Eyes:      General: No scleral icterus.        Right eye: No discharge.         Left eye: No discharge.      Conjunctiva/sclera: Conjunctivae normal.   Cardiovascular:      Rate and Rhythm: Normal rate.   Pulmonary:      Effort: Pulmonary effort is normal. No respiratory distress.   Musculoskeletal:      Right shoulder: Normal. Normal range of motion.      Left shoulder: Normal. Normal range of motion.      Right elbow: No swelling or deformity. Normal range of motion. Tenderness present in lateral epicondyle.      Left elbow: Normal. Normal range of motion.   Skin:     General: Skin is warm and dry.      Findings: Erythema and lesion present.      Comments: 0.5 cm nontender, raised lesion located to left flank   No drainage, no evidence of foreign bodies noted on visual inspection   Neurological:      General: No focal deficit present.      Mental Status: He is alert " and oriented to person, place, and time. Mental status is at baseline.   Psychiatric:         Mood and Affect: Mood normal.         Behavior: Behavior normal.               Disclaimer: This note was generated with voice recognition software.  Phonetic, grammatical, and spelling errors may be present as a result.  Please contact provider with any concerns or questions

## 2024-05-29 ENCOUNTER — APPOINTMENT (EMERGENCY)
Dept: CT IMAGING | Facility: HOSPITAL | Age: 44
End: 2024-05-29
Payer: COMMERCIAL

## 2024-05-29 ENCOUNTER — HOSPITAL ENCOUNTER (EMERGENCY)
Facility: HOSPITAL | Age: 44
Discharge: HOME | End: 2024-05-29
Attending: EMERGENCY MEDICINE
Payer: COMMERCIAL

## 2024-05-29 VITALS
OXYGEN SATURATION: 98 % | TEMPERATURE: 97.6 F | HEART RATE: 74 BPM | RESPIRATION RATE: 16 BRPM | DIASTOLIC BLOOD PRESSURE: 93 MMHG | SYSTOLIC BLOOD PRESSURE: 158 MMHG

## 2024-05-29 DIAGNOSIS — I88.0 MESENTERIC ADENITIS: Primary | ICD-10-CM

## 2024-05-29 LAB
ALBUMIN SERPL BCP-MCNC: 4.8 G/DL (ref 3.5–5)
ALP SERPL-CCNC: 59 U/L (ref 34–104)
ALT SERPL W P-5'-P-CCNC: 21 U/L (ref 7–52)
ANION GAP SERPL CALCULATED.3IONS-SCNC: 7 MMOL/L (ref 4–13)
AST SERPL W P-5'-P-CCNC: 18 U/L (ref 13–39)
BASOPHILS # BLD AUTO: 0.09 THOUSANDS/ÂΜL (ref 0–0.1)
BASOPHILS NFR BLD AUTO: 1 % (ref 0–1)
BILIRUB SERPL-MCNC: 0.69 MG/DL (ref 0.2–1)
BILIRUB UR QL STRIP: NEGATIVE
BUN SERPL-MCNC: 11 MG/DL (ref 5–25)
CALCIUM SERPL-MCNC: 9.8 MG/DL (ref 8.4–10.2)
CHLORIDE SERPL-SCNC: 104 MMOL/L (ref 96–108)
CLARITY UR: CLEAR
CO2 SERPL-SCNC: 25 MMOL/L (ref 21–32)
COLOR UR: ABNORMAL
CREAT SERPL-MCNC: 1.04 MG/DL (ref 0.6–1.3)
EOSINOPHIL # BLD AUTO: 0.15 THOUSAND/ÂΜL (ref 0–0.61)
EOSINOPHIL NFR BLD AUTO: 2 % (ref 0–6)
ERYTHROCYTE [DISTWIDTH] IN BLOOD BY AUTOMATED COUNT: 12.7 % (ref 11.6–15.1)
GFR SERPL CREATININE-BSD FRML MDRD: 87 ML/MIN/1.73SQ M
GLUCOSE SERPL-MCNC: 85 MG/DL (ref 65–140)
GLUCOSE UR STRIP-MCNC: NEGATIVE MG/DL
HCT VFR BLD AUTO: 46.8 % (ref 36.5–49.3)
HGB BLD-MCNC: 16.1 G/DL (ref 12–17)
HGB UR QL STRIP.AUTO: NEGATIVE
IMM GRANULOCYTES # BLD AUTO: 0.03 THOUSAND/UL (ref 0–0.2)
IMM GRANULOCYTES NFR BLD AUTO: 0 % (ref 0–2)
KETONES UR STRIP-MCNC: NEGATIVE MG/DL
LEUKOCYTE ESTERASE UR QL STRIP: NEGATIVE
LIPASE SERPL-CCNC: 15 U/L (ref 11–82)
LYMPHOCYTES # BLD AUTO: 3.25 THOUSANDS/ÂΜL (ref 0.6–4.47)
LYMPHOCYTES NFR BLD AUTO: 37 % (ref 14–44)
MCH RBC QN AUTO: 32.2 PG (ref 26.8–34.3)
MCHC RBC AUTO-ENTMCNC: 34.4 G/DL (ref 31.4–37.4)
MCV RBC AUTO: 94 FL (ref 82–98)
MONOCYTES # BLD AUTO: 0.65 THOUSAND/ÂΜL (ref 0.17–1.22)
MONOCYTES NFR BLD AUTO: 7 % (ref 4–12)
NEUTROPHILS # BLD AUTO: 4.64 THOUSANDS/ÂΜL (ref 1.85–7.62)
NEUTS SEG NFR BLD AUTO: 53 % (ref 43–75)
NITRITE UR QL STRIP: NEGATIVE
NRBC BLD AUTO-RTO: 0 /100 WBCS
PH UR STRIP.AUTO: 6 [PH]
PLATELET # BLD AUTO: 257 THOUSANDS/UL (ref 149–390)
PMV BLD AUTO: 9.5 FL (ref 8.9–12.7)
POTASSIUM SERPL-SCNC: 4 MMOL/L (ref 3.5–5.3)
PROT SERPL-MCNC: 7.8 G/DL (ref 6.4–8.4)
PROT UR STRIP-MCNC: NEGATIVE MG/DL
RBC # BLD AUTO: 5 MILLION/UL (ref 3.88–5.62)
SODIUM SERPL-SCNC: 136 MMOL/L (ref 135–147)
SP GR UR STRIP.AUTO: <=1.005
UROBILINOGEN UR QL STRIP.AUTO: 0.2 E.U./DL
WBC # BLD AUTO: 8.81 THOUSAND/UL (ref 4.31–10.16)

## 2024-05-29 PROCEDURE — 74177 CT ABD & PELVIS W/CONTRAST: CPT

## 2024-05-29 PROCEDURE — 96374 THER/PROPH/DIAG INJ IV PUSH: CPT

## 2024-05-29 PROCEDURE — 36415 COLL VENOUS BLD VENIPUNCTURE: CPT | Performed by: EMERGENCY MEDICINE

## 2024-05-29 PROCEDURE — 99284 EMERGENCY DEPT VISIT MOD MDM: CPT

## 2024-05-29 PROCEDURE — 80053 COMPREHEN METABOLIC PANEL: CPT | Performed by: EMERGENCY MEDICINE

## 2024-05-29 PROCEDURE — 96361 HYDRATE IV INFUSION ADD-ON: CPT

## 2024-05-29 PROCEDURE — 81003 URINALYSIS AUTO W/O SCOPE: CPT | Performed by: EMERGENCY MEDICINE

## 2024-05-29 PROCEDURE — 99285 EMERGENCY DEPT VISIT HI MDM: CPT | Performed by: EMERGENCY MEDICINE

## 2024-05-29 PROCEDURE — 85025 COMPLETE CBC W/AUTO DIFF WBC: CPT | Performed by: EMERGENCY MEDICINE

## 2024-05-29 PROCEDURE — 83690 ASSAY OF LIPASE: CPT | Performed by: EMERGENCY MEDICINE

## 2024-05-29 RX ORDER — LORAZEPAM 2 MG/ML
0.5 INJECTION INTRAMUSCULAR ONCE
Status: COMPLETED | OUTPATIENT
Start: 2024-05-29 | End: 2024-05-29

## 2024-05-29 RX ORDER — ACETAMINOPHEN 325 MG/1
650 TABLET ORAL ONCE
Status: COMPLETED | OUTPATIENT
Start: 2024-05-29 | End: 2024-05-29

## 2024-05-29 RX ADMIN — SODIUM CHLORIDE 1000 ML: 0.9 INJECTION, SOLUTION INTRAVENOUS at 15:46

## 2024-05-29 RX ADMIN — ACETAMINOPHEN 650 MG: 325 TABLET ORAL at 16:26

## 2024-05-29 RX ADMIN — IOHEXOL 100 ML: 350 INJECTION, SOLUTION INTRAVENOUS at 16:03

## 2024-05-29 RX ADMIN — LORAZEPAM 0.5 MG: 2 INJECTION INTRAMUSCULAR; INTRAVENOUS at 15:26

## 2024-05-29 NOTE — ED PROVIDER NOTES
History  Chief Complaint   Patient presents with    Flank Pain     Patient reports right sided flank pain starting yesterday. Also reports nausea.     Patient is a 44-year-old male with history of Crohn's disease that presents for evaluation of abdominal pain.  Patient says over the past 24 hours she has had worsening right lower quadrant abdominal pain.  He endorses some nausea without vomiting.  No urinary complaints including hematuria or dysuria.  He denies diarrhea melena hematochezia.  He does have history of hernia repair and cholecystectomy.  Otherwise no fevers.        Prior to Admission Medications   Prescriptions Last Dose Informant Patient Reported? Taking?   NON FORMULARY  Self Yes No   Sig: Medical marijuana   acetaminophen (TYLENOL) 500 mg tablet  Self Yes No   Sig: Take 1,000 mg by mouth every 4 (four) hours as needed    cetirizine (ZyrTEC) 10 mg tablet  Self No No   Sig: Take 1 tablet (10 mg total) by mouth daily as needed for allergies   clonazePAM (KlonoPIN) 0.5 mg tablet   No No   Sig: take 1 tablet by mouth every 12 hours if needed for anxiety   fluticasone (FLONASE) 50 mcg/act nasal spray  Self No No   Si spray into each nostril daily as needed for rhinitis   hydrocortisone (ANUSOL-HC) 2.5 % rectal cream   No No   Sig: APPLY TOPICALLY TWICE A DAY   linaCLOtide (Linzess) 145 MCG CAPS   No No   Sig: Take 1 capsule (145 mcg total) by mouth daily   omeprazole (PriLOSEC) 20 mg delayed release capsule  Self No No   Sig: Take 1 capsule (20 mg total) by mouth daily   Patient taking differently: Take 20 mg by mouth if needed   triamcinolone (KENALOG) 0.5 % cream   No No   Sig: Apply topically 2 (two) times a day      Facility-Administered Medications: None       Past Medical History:   Diagnosis Date    Abdominal pain     Abnormal liver function test     last assessed: Oct 16, 2013     Abnormal weight loss     last assessed: Yung 15, 2014     Acute left-sided low back pain with left-sided sciatica  10/31/2019    Allergic rhinitis due to pollen     last assessed: Yung 15, 2014     Anxiety     Anxiety     last assessed/resolved: Aug 5, 2015     Asthma     last assessed: Yung 15, 2014     Benign essential HTN     last assessed/resolved: Feb 23, 2017     Cervical lymphadenopathy     last assessed/resolved: Feb 23, 2017     Chronic sinusitis     last assessed: Yung 15, 2014     Constipation     Crohn's disease (HCC) 01/01/2011    last assessed: Yung 15, 2014     Dysuria     last assessed: April 29, 2016     Elevated BP without diagnosis of hypertension     last assessed/resolved: Feb 23, 2017     Esophageal reflux     last assessed/resolved: Feb 23, 2017     Eustachian tube anomaly     Resolved: Nov 9, 2017     External hemorrhoids     last assessed: Yung 15, 2014     Gastritis and duodenitis 06/14/2021    GERD (gastroesophageal reflux disease)     Hypertension     borderline    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     last assessed/resolved: July 19, 2017     Inflammatory bowel disease     Pancreatic neoplasm     last assessed: Yung 15, 2014     Positive depression screening     resolved: July 24, 2017     Postconcussion syndrome 11/15/2021    Postoperative ileus (HCC) 11/12/2018    Psoriasis     Seasonal allergies     Small bowel obstruction (HCC) 11/11/2018    Vitamin B12 deficiency     last assessed/resolved: Feb 23, 2017        Past Surgical History:   Procedure Laterality Date    CARDIAC LOOP RECORDER      Late 2021    CERVICAL FUSION  05/2023    CHOLECYSTECTOMY      resolved: 2011     COLONOSCOPY N/A 11/18/2016    Procedure: COLONOSCOPY;  Surgeon: Job Fowler MD;  Location:  GI LAB;  Service:     COLONOSCOPY N/A 04/28/2016    Procedure: COLONOSCOPY;  Surgeon: Job Fowler MD;  Location: [facility] GI LAB;  Service:     COLONOSCOPY  02/09/2021    ESOPHAGOGASTRODUODENOSCOPY N/A 04/28/2016    Procedure: ESOPHAGOGASTRODUODENOSCOPY (EGD);  Surgeon: Job Fowler MD;  Location: [facility] GI LAB;  Service:     FRONTAL  SINUSOTOMY      resolved: April 2009     WA COLONOSCOPY FLX DX W/COLLJ SPEC WHEN PFRMD N/A 10/12/2018    Procedure: EGD AND COLONOSCOPY;  Surgeon: Alpesh Ceja MD;  Location: [facility] GI LAB;  Service: Gastroenterology    WA LAP RPR HRNA XCPT INCAL/INGUN NCRC8/STRANGULATED N/A 11/08/2018    Procedure: REPAIR HERNIA INCISIONAL LAPAROSCOPIC;  Surgeon: Corey Reid MD;  Location:  MAIN OR;  Service: General    WA SEPTOPLASTY/SUBMUCOUS RESECJ W/WO CARTILAGE GRF N/A 07/28/2017    Procedure: REVISION SEPTOPLASTY; TURBINOPLASTY; BILATERAL  GRAFTS; ALAR GRAFT; POSSIBLE AURICULAR CARTILAGE GRAFT;  Surgeon: Silverio Malhotra MD;  Location:  MAIN OR;  Service: ENT    TONSILLECTOMY AND ADENOIDECTOMY      UPPER GASTROINTESTINAL ENDOSCOPY  02/09/2021       Family History   Problem Relation Age of Onset    Diabetes Mother         mellitus     Hypertension Mother     Thyroid disease Mother     Fibromyalgia Mother     Hypertension Father     Hypertension Sister     Heart disease Sister         had defibulator put due to late beats    No Known Problems Maternal Grandmother     No Known Problems Maternal Grandfather     Diabetes Paternal Grandmother     Diabetes Paternal Grandfather     Kidney failure Paternal Grandfather     Diabetes Other         mellitus     Rheum arthritis Cousin      I have reviewed and agree with the history as documented.    E-Cigarette/Vaping    E-Cigarette Use Current Some Day User     Comments vapes medical marijuana      E-Cigarette/Vaping Substances    Nicotine No     THC Yes     CBD Yes     Flavoring No     Other No      Social History     Tobacco Use    Smoking status: Every Day     Current packs/day: 0.50     Average packs/day: 0.5 packs/day for 21.4 years (10.7 ttl pk-yrs)     Types: Cigarettes     Start date: 2003    Smokeless tobacco: Never    Tobacco comments:     Dependence on nicotine in cigarettes - 1/2 PPD x 20 years    Vaping Use    Vaping status: Some Days    Substances: THC, CBD    Substance Use Topics    Alcohol use: Yes     Comment: rare - once a year    Drug use: Yes     Types: Marijuana     Comment: uses medical marijuana via vaping. last use 2/8/19       Review of Systems   Constitutional:  Negative for fever.   HENT:  Negative for sore throat.    Eyes:  Negative for photophobia.   Respiratory:  Negative for shortness of breath.    Cardiovascular:  Negative for chest pain.   Gastrointestinal:  Positive for abdominal pain.   Genitourinary:  Positive for flank pain. Negative for dysuria.   Musculoskeletal:  Negative for back pain.   Skin:  Negative for rash.   Neurological:  Negative for light-headedness.   Hematological:  Negative for adenopathy.   Psychiatric/Behavioral:  Negative for agitation.    All other systems reviewed and are negative.      Physical Exam  Physical Exam  Vitals reviewed.   Constitutional:       General: He is not in acute distress.     Appearance: He is well-developed.   HENT:      Head: Normocephalic.   Eyes:      Pupils: Pupils are equal, round, and reactive to light.   Cardiovascular:      Rate and Rhythm: Normal rate and regular rhythm.      Heart sounds: Normal heart sounds. No murmur heard.     No friction rub. No gallop.   Pulmonary:      Effort: Pulmonary effort is normal.      Breath sounds: Normal breath sounds.   Abdominal:      General: Bowel sounds are normal. There is no distension.      Palpations: Abdomen is soft.      Tenderness: There is abdominal tenderness. There is no guarding.      Comments: Moderate right lower quadrant abdominal tenderness, no rebound tenderness or guarding   Musculoskeletal:         General: Normal range of motion.      Cervical back: Normal range of motion and neck supple.   Skin:     Capillary Refill: Capillary refill takes less than 2 seconds.   Neurological:      Mental Status: He is alert and oriented to person, place, and time.      Cranial Nerves: No cranial nerve deficit.      Sensory: No sensory deficit.       Motor: No abnormal muscle tone.   Psychiatric:         Behavior: Behavior normal.         Thought Content: Thought content normal.         Judgment: Judgment normal.         Vital Signs  ED Triage Vitals   Temperature Pulse Respirations Blood Pressure SpO2   05/29/24 1515 05/29/24 1514 05/29/24 1514 05/29/24 1517 05/29/24 1514   97.6 °F (36.4 °C) 74 16 158/93 98 %      Temp Source Heart Rate Source Patient Position - Orthostatic VS BP Location FiO2 (%)   05/29/24 1515 -- 05/29/24 1514 -- --   Temporal  Sitting        Pain Score       05/29/24 1517       7           Vitals:    05/29/24 1514 05/29/24 1517   BP:  158/93   Pulse: 74    Patient Position - Orthostatic VS: Sitting          Visual Acuity      ED Medications  Medications   LORazepam (ATIVAN) injection 0.5 mg (0.5 mg Intravenous Given 5/29/24 1526)   sodium chloride 0.9 % bolus 1,000 mL (0 mL Intravenous Stopped 5/29/24 1626)   iohexol (OMNIPAQUE) 350 MG/ML injection (MULTI-DOSE) 100 mL (100 mL Intravenous Given 5/29/24 1603)   acetaminophen (TYLENOL) tablet 650 mg (650 mg Oral Given 5/29/24 1626)       Diagnostic Studies  Results Reviewed       Procedure Component Value Units Date/Time    [facility] [[id number]] Collected: 05/29/24 1521    Lab Status: Final result Specimen: Blood from Arm, Left Updated: 05/29/24 1545     Sodium 136 mmol/L      Potassium 4.0 mmol/L      Chloride 104 mmol/L      CO2 25 mmol/L      ANION GAP 7 mmol/L      BUN 11 mg/dL      Creatinine 1.04 mg/dL      Glucose 85 mg/dL      Calcium 9.8 mg/dL      AST 18 U/L      ALT 21 U/L      Alkaline Phosphatase 59 U/L      Total Protein 7.8 g/dL      Albumin 4.8 g/dL      Total Bilirubin 0.69 mg/dL      eGFR 87 ml/min/1.73sq m     Narrative:      National Kid[employer] Disease Foundation guidelines for Chronic Kidney Disease (CKD):     Stage 1 with normal or high GFR (GFR > 90 mL/min/1.73 square meters)    Stage 2 Mild CKD (GFR = 60-89 mL/min/1.73 square meters)    Stage 3A Moderate CKD (GFR = 45-59  mL/min/1.73 square meters)    Stage 3B Moderate CKD (GFR = 30-44 mL/min/1.73 square meters)    Stage 4 Severe CKD (GFR = 15-29 mL/min/1.73 square meters)    Stage 5 End Stage CKD (GFR <15 mL/min/1.73 square meters)  Note: GFR calculation is accurate only with a steady state creatinine    Lipase [[id number]]  (Normal) Collected: 05/29/24 1521    Lab Status: Final result Specimen: Blood from Arm, Left Updated: 05/29/24 1545     Lipase 15 u/L     UA w Reflex to Microscopic w Reflex to Culture [[id number]]  (Abnormal) Collected: 05/29/24 1522    Lab Status: Final result Specimen: Urine, Other Updated: 05/29/24 1533     Color, UA Straw     Clarity, UA Clear     Specific Gravity, UA <=1.005     pH, UA 6.0     Leukocytes, UA Negative     Nitrite, UA Negative     Protein, UA Negative mg/dl      Glucose, UA Negative mg/dl      Ketones, UA Negative mg/dl      Urobilinogen, UA 0.2 E.U./dl      Bilirubin, UA Negative     Occult Blood, UA Negative    CBC and differential [[id number]] Collected: 05/29/24 1521    Lab Status: Final result Specimen: Blood from Arm, Left Updated: 05/29/24 1525     WBC 8.81 Thousand/uL      RBC 5.00 Million/uL      Hemoglobin 16.1 g/dL      Hematocrit 46.8 %      MCV 94 fL      MCH 32.2 pg      MCHC 34.4 g/dL      RDW 12.7 %      MPV 9.5 fL      Platelets 257 Thousands/uL      nRBC 0 /100 WBCs      Segmented % 53 %      Immature Grans % 0 %      Lymphocytes % 37 %      Monocytes % 7 %      Eosinophils Relative 2 %      Basophils Relative 1 %      Absolute Neutrophils 4.64 Thousands/µL      Absolute Immature Grans 0.03 Thousand/uL      Absolute Lymphocytes 3.25 Thousands/µL      Absolute Monocytes 0.65 Thousand/µL      Eosinophils Absolute 0.15 Thousand/µL      Basophils Absolute 0.09 Thousands/µL                    CT abdomen pelvis with contrast   Final Result by Ernesto Anaya MD (05/29 1612)      Cluster of small lymph nodes in the right lower abdomen. Cannot exclude mesenteric lymphadenitis.  Otherwise, no acute CT findings in the abdomen or pelvis. Normal appendix.      Additional findings as above.         Workstation performed: [id number]                    Procedures  Procedures         ED Course                                             Medical Decision Making  Patient is a 44-year-old male who presents for evaluation of abdominal pain and flank pain.  Is concern for appendicitis.  Blood work reviewed unremarkable.  CT scan shows enteric adenitis.  Signed out to Dr. Villegas pending CT imaging.    Amount and/or Complexity of Data Reviewed  Labs: ordered.  Radiology: ordered.    Risk  OTC drugs.  Prescription drug management.             Disposition  Final diagnoses:   Mesenteric adenitis     Time reflects when diagnosis was documented in both MDM as applicable and the Disposition within this note       Time User Action Codes Description Comment    5/29/2024  4:24 PM Robert Villegas Add [I88.0] Mesenteric adenitis           ED Disposition       ED Disposition   Discharge    Condition   Stable    Date/Time   Wed May 29, 2024  4:24 PM    Comment   Torito Redd discharge to home/self care.                   Follow-up Information       Follow up With Specialties Details Why Contact Info    Rober Dominguez MD Internal Medicine   [address] [address]  [phone]              Discharge Medication List as of 5/29/2024  4:25 PM        CONTINUE these medications which have NOT CHANGED    Details   acetaminophen (TYLENOL) 500 mg tablet Take 1,000 mg by mouth every 4 (four) hours as needed , Historical Med      cetirizine (ZyrTEC) 10 mg tablet Take 1 tablet (10 mg total) by mouth daily as needed for allergies, Starting Wed 4/4/2018, Normal      clonazePAM (KlonoPIN) 0.5 mg tablet take 1 tablet by mouth every 12 hours if needed for anxiety, Starting Mon 4/1/2024, Normal      fluticasone (FLONASE) 50 mcg/act nasal spray 1 spray into each nostril daily as needed for  rhinitis, Starting Wed 2/23/2022, Normal      hydrocortisone (ANUSOL-HC) 2.5 % rectal cream APPLY TOPICALLY TWICE A DAY, Starting Tue 3/26/2024, Normal      linaCLOtide (Linzess) 145 MCG CAPS Take 1 capsule (145 mcg total) by mouth daily, Starting Mon 3/4/2024, Normal      NON FORMULARY Medical marijuana, Historical Med      omeprazole (PriLOSEC) 20 mg delayed release capsule Take 1 capsule (20 mg total) by mouth daily, Starting Tue 11/29/2022, Normal      triamcinolone (KENALOG) 0.5 % cream Apply topically 2 (two) times a day, Starting Mon 4/1/2024, Normal             No discharge procedures on file.    PDMP Review         Value Time User    PDMP Reviewed  Yes 4/1/2024  3:30 PM Whitley Roblero PA-C            ED Provider  Electronically Signed by             Douglas Wayne MD  05/30/24 8584

## 2024-05-29 NOTE — DISCHARGE INSTRUCTIONS
Your CT scan overall was reassuring.  She has increased lymph nodes which suggest a process called mesenteric adenitis which is a benign, self-limiting illness.  There is no specific treatment for this other than anti-inflammatories, however in your case I would recommend Tylenol.  Symptoms generally last 2 to 3 days and resolve spontaneously.

## 2024-05-29 NOTE — Clinical Note
Torito Redd was seen and treated in our emergency department on 5/29/2024.    No restrictions            Diagnosis:     Torito  .    He may return on this date:          If you have any questions or concerns, please don't hesitate to call.      Robert Villegas, DO    ______________________________           _______________          _______________  Hospital Representative                              Date                                Time

## 2024-05-30 ENCOUNTER — VBI (OUTPATIENT)
Dept: ADMINISTRATIVE | Facility: OTHER | Age: 44
End: 2024-05-30

## 2024-05-30 DIAGNOSIS — M25.521 RIGHT ELBOW PAIN: Primary | ICD-10-CM

## 2024-05-30 NOTE — ED CARE HANDOFF
Emergency Department Sign Out Note        Sign out and transfer of care from [facility]. See Separate Emergency Department note.     The patient, Torito Redd, was evaluated by the previous provider for mesenteric adenitis.    Workup Completed:  Labs, CT scan pending    ED Course / Workup Pending (followup):  CT scan shows mesenteric adenitis.  Pain controlled.  Patient comfortable with discharge                                     Procedures  Medical Decision Making  Amount and/or Complexity of Data Reviewed  Labs: ordered.  Radiology: ordered.    Risk  OTC drugs.  Prescription drug management.            Disposition  Final diagnoses:   Mesenteric adenitis     Time reflects when diagnosis was documented in both MDM as applicable and the Disposition within this note       Time User Action Codes Description Comment    5/29/2024  4:24 PM Robert Villegas Add [I88.0] Mesenteric adenitis           ED Disposition       ED Disposition   Discharge    Condition   Stable    Date/Time   Wed May 29, 2024  4:24 PM    Comment   Torito Redd discharge to home/self care.                   Follow-up Information       Follow up With Specialties Details Why Contact Info    Rober Dominguez MD Internal Medicine   [address] [address]  [phone]            Discharge Medication List as of 5/29/2024  4:25 PM        CONTINUE these medications which have NOT CHANGED    Details   acetaminophen (TYLENOL) 500 mg tablet Take 1,000 mg by mouth every 4 (four) hours as needed , Historical Med      cetirizine (ZyrTEC) 10 mg tablet Take 1 tablet (10 mg total) by mouth daily as needed for allergies, Starting Wed 4/4/2018, Normal      clonazePAM (KlonoPIN) 0.5 mg tablet take 1 tablet by mouth every 12 hours if needed for anxiety, Starting Mon 4/1/2024, Normal      fluticasone (FLONASE) 50 mcg/act nasal spray 1 spray into each nostril daily as needed for rhinitis, Starting Wed 2/23/2022, Normal       hydrocortisone (ANUSOL-HC) 2.5 % rectal cream APPLY TOPICALLY TWICE A DAY, Starting Tue 3/26/2024, Normal      linaCLOtide (Linzess) 145 MCG CAPS Take 1 capsule (145 mcg total) by mouth daily, Starting Mon 3/4/2024, Normal      NON FORMULARY Medical marijuana, Historical Med      omeprazole (PriLOSEC) 20 mg delayed release capsule Take 1 capsule (20 mg total) by mouth daily, Starting Tue 11/29/2022, Normal      triamcinolone (KENALOG) 0.5 % cream Apply topically 2 (two) times a day, Starting Mon 4/1/2024, Normal           No discharge procedures on file.       ED Provider  Electronically Signed by     Robert Villegas DO  05/29/24 9522

## 2024-05-30 NOTE — TELEPHONE ENCOUNTER
05/30/24 2:15 PM    Patient contacted post ED visit, [facility] department spoke with patient/caregiver and outreach was successful.    Thank you.  Carlotta Wood  [facility]

## 2024-06-17 ENCOUNTER — OFFICE VISIT (OUTPATIENT)
Dept: OBGYN CLINIC | Facility: CLINIC | Age: 44
End: 2024-06-17
Payer: COMMERCIAL

## 2024-06-17 VITALS
WEIGHT: 169 LBS | BODY MASS INDEX: 25.61 KG/M2 | HEIGHT: 68 IN | DIASTOLIC BLOOD PRESSURE: 87 MMHG | HEART RATE: 67 BPM | SYSTOLIC BLOOD PRESSURE: 131 MMHG

## 2024-06-17 DIAGNOSIS — M25.521 PAIN IN RIGHT ELBOW: ICD-10-CM

## 2024-06-17 DIAGNOSIS — M77.11 LATERAL EPICONDYLITIS OF RIGHT ELBOW: Primary | ICD-10-CM

## 2024-06-17 DIAGNOSIS — M25.521 RIGHT ELBOW PAIN: ICD-10-CM

## 2024-06-17 PROCEDURE — 99204 OFFICE O/P NEW MOD 45 MIN: CPT | Performed by: STUDENT IN AN ORGANIZED HEALTH CARE EDUCATION/TRAINING PROGRAM

## 2024-06-17 NOTE — PROGRESS NOTES
Ortho Sports Medicine New Patient Elbow Visit     Assesment:   44 y.o.male male with right elbow pain ongoing for about 8 months in the setting of lateral epicondylitis.     Plan:  I reviewed the history, exam, and imaging with the patient plan today.  I did review the patient's x-rays show no significant degenerative changes, fracture, or dislocation.  On exam, the patient has tenderness over the lateral epicondyle with pain exacerbated with wrist extension and finger extension.  Devora with the patient that his symptoms are consistent with lateral epicondylitis.  Patient has not done physical therapy for this issue yet.  I discussed that treatment typically consists of extensive physical therapy including stretching and strengthening of the extensor muscles.  I discussed that he could try ice or anti-inflammatories.  I also discussed bracing including an elbow strap as well as a wrist brace to minimize extension of the wrist particularly when sleeping.  I did discuss the possibility of an injection if his symptoms do not respond to physical therapy.  I provided the patient with prescription physical therapy.  I also provided the patient with both an elbow brace as well as a wrist brace.  I recommend the patient follow-up in 2 months for repeat evaluation.  Patient demonstrated understanding of the discussion was in agreement the plan.  All questions were answered.  He can reach out to clinic with any questions or concerns anytime.    Conservative treatment:  Ice to elbow 1-2 times daily, for 20 minutes at a time.  PT for ROM and strengthening to elbow, wrist and forearm  Let pain guide activities.  May try an elbow strap.  Comfort form wrist brace provided.  Patient informed this may take time to resolve  Tylenol prn pain.    Imaging:  All imaging from prior to today was reviewed by myself and explained to the patient.     Injection:  No Injection planned at this time.    Surgery:  No surgery is recommended at this  point, continue with conservative treatment plan as noted.    Follow up:  Return in about 2 months (around 8/17/2024).        Chief Complaint   Patient presents with    Right Elbow - Pain       History of Present Illness:  The patient is a 44 y.o. RHD male seen in clinic for right elbow pain. The pain started about 8 months ago. The mechanism of injury was a possible pulling injury starting a chain saw. Patient felt immediate aching pain that worsened the following day and remained constant for a period (about 5 months). The pain is now located laterally and is associated with weakness with hand . The patient characterizes the intensity of pain as a 3 out of 10 at worst. The patient states that the pain is not interfering with her sleep.  Symptoms are aggravated by lifting, and extension of the elbow/wrist.  Pain will also extend distally into the wrist extensor muscles. The patient has tried rest, elbow brace, Tylenol, ice and heat. Symptoms have improved slightly 2-3 months ago but have since remained unchanged. Patient has no history of prior injury, surgery. No pain in the wrist or shoulder. Brief episode of numbness/tingling in the thumb for several days following the injury.    Occupation: disability for Crohn's disease  Sports/Activities: playing ball with his grandson    The patient has the following co-morbidities: allergy to NSAIDs. History of neck fusion.      Hand/wrist Surgical History:  None    Past Medical, Social and Family History:  Past Medical History:   Diagnosis Date    Abdominal pain     Abnormal liver function test     last assessed: Oct 16, 2013     Abnormal weight loss     last assessed: Yung 15, 2014     Acute left-sided low back pain with left-sided sciatica 10/31/2019    Allergic rhinitis due to pollen     last assessed: Yung 15, 2014     Anxiety     Anxiety     last assessed/resolved: Aug 5, 2015     Asthma     last assessed: Yung 15, 2014     Benign essential HTN     last  assessed/resolved: Feb 23, 2017     Cervical lymphadenopathy     last assessed/resolved: Feb 23, 2017     Chronic sinusitis     last assessed: Yung 15, 2014     Constipation     Crohn's disease (HCC) 01/01/2011    last assessed: Yung 15, 2014     Dysuria     last assessed: April 29, 2016     Elevated BP without diagnosis of hypertension     last assessed/resolved: Feb 23, 2017     Esophageal reflux     last assessed/resolved: Feb 23, 2017     Eustachian tube anomaly     Resolved: Nov 9, 2017     External hemorrhoids     last assessed: Yung 15, 2014     Gastritis and duodenitis 06/14/2021    GERD (gastroesophageal reflux disease)     Hypertension     borderline    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) 11/12/2018    Psoriasis     Seasonal allergies     Small bowel obstruction ([facility] located within [facility]) 11/11/2018    Vitamin B12 deficiency     last assessed/resolved: Feb 23, 2017      Past Surgical History:   Procedure Laterality Date    CARDIAC LOOP RECORDER      Late 2021    CERVICAL FUSION  05/2023    CHOLECYSTECTOMY      resolved: 2011     COLONOSCOPY N/A 11/18/2016    Procedure: COLONOSCOPY;  Surgeon: Job Fowler MD;  Location:  GI LAB;  Service:     COLONOSCOPY N/A 04/28/2016    Procedure: COLONOSCOPY;  Surgeon: Job Fowler MD;  Location: [facility];  Service:     COLONOSCOPY  02/09/2021    ESOPHAGOGASTRODUODENOSCOPY N/A 04/28/2016    Procedure: ESOPHAGOGASTRODUODENOSCOPY (EGD);  Surgeon: Job Fowler MD;  Location: [facility];  Service:     FRONTAL SINUSOTOMY      resolved: April 2009     NH COLONOSCOPY FLX DX W/COLLJ SPEC WHEN PFRMD N/A 10/12/2018    Procedure: EGD AND COLONOSCOPY;  Surgeon: Alpesh Ceja MD;  Location: [facility];  Service: Gastroenterology    NH  LAP RPR HRNA XCPT INCAL/INGUN NCRC8/STRANGULATED N/A 11/08/2018    Procedure: REPAIR HERNIA INCISIONAL LAPAROSCOPIC;  Surgeon: Corey Reid MD;  Location: [facility];  Service: General    WY SEPTOPLASTY/SUBMUCOUS RESECJ W/WO CARTILAGE GRF N/A 07/28/2017    Procedure: REVISION SEPTOPLASTY; TURBINOPLASTY; BILATERAL  GRAFTS; ALAR GRAFT; POSSIBLE AURICULAR CARTILAGE GRAFT;  Surgeon: Silverio Malhotra MD;  Location: [facility];  Service: ENT    TONSILLECTOMY AND ADENOIDECTOMY      UPPER GASTROINTESTINAL ENDOSCOPY  02/09/2021     Allergies   Allergen Reactions    Apple - Food Allergy Anaphylaxis    Aspirin Anaphylaxis and Hives     Category: Allergy;     Corticosteroids Anaphylaxis and Drowsiness     Other reaction(s): Drowsiness    Eggs Or Egg-Derived Products - Food Allergy GI Intolerance     GI upset    Ibuprofen Anaphylaxis and Hives     Category: Allergy; aspirin    Nsaids Anaphylaxis     Category: Allergy;   Category: Allergy;     Other Anaphylaxis     Category: Allergy; Annotation - 04Apr2016: cherries, grapes , and kiwis; pt states all fruits    Peanuts [Peanut Oil - Food Allergy] Anaphylaxis     nuts    Penicillins Shortness Of Breath    Methocarbamol Other (See Comments)     Patient reports not feeling well. ( all muscle relaxer's )     Ciprofloxacin Other (See Comments)     Prolongs QT interval due to heart conditon, cannot take.    Pollen Extract     Zofran [Ondansetron] Other (See Comments)     Prolonged QT interval    Codeine Anxiety     Current Outpatient Medications on File Prior to Visit   Medication Sig Dispense Refill    acetaminophen (TYLENOL) 500 mg tablet Take 1,000 mg by mouth every 4 (four) hours as needed       cetirizine (ZyrTEC) 10 mg tablet Take 1 tablet (10 mg total) by mouth daily as needed for allergies 30 tablet 5    clonazePAM (KlonoPIN) 0.5 mg tablet take 1 tablet by mouth every 12 hours if needed for anxiety 60 tablet 3    fluticasone (FLONASE) 50 mcg/act nasal spray 1 spray into  "each nostril daily as needed for rhinitis 16 g 1    hydrocortisone (ANUSOL-HC) 2.5 % rectal cream APPLY TOPICALLY TWICE A DAY 60 g 11    linaCLOtide (Linzess) 145 MCG CAPS Take 1 capsule (145 mcg total) by mouth daily 90 capsule 1    NON FORMULARY Medical marijuana      omeprazole (PriLOSEC) 20 mg delayed release capsule Take 1 capsule (20 mg total) by mouth daily (Patient taking differently: Take 20 mg by mouth if needed) 90 capsule 1    triamcinolone (KENALOG) 0.5 % cream Apply topically 2 (two) times a day 15 g 0     No current facility-administered medications on file prior to visit.     Social History     Socioeconomic History    Marital status: /Civil Union     Spouse name: Not on file    Number of children: Not on file    Years of education: Not on file    Highest education level: Not on file   Occupational History    Occupation:     Tobacco Use    Smoking status: Every Day     Current packs/day: 0.50     Average packs/day: 0.5 packs/day for 21.5 years (10.7 ttl pk-yrs)     Types: Cigarettes     Start date: 2003    Smokeless tobacco: Never    Tobacco comments:     Dependence on nicotine in cigarettes - 1/2 PPD x 20 years    Vaping Use    Vaping status: Some Days    Substances: THC, CBD   Substance and Sexual Activity    Alcohol use: Yes     Comment: rare - once a year    Drug use: Yes     Types: Marijuana     Comment: uses medical marijuana via vaping. last use 2/8/19    Sexual activity: Yes   Other Topics Concern    Not on file   Social History Narrative    Single noted in \"[employer]\"      Social Determinants of Health     Financial Resource Strain: Medium Risk (7/24/2023)    Overall Financial Resource Strain (CARDIA)     Difficulty of Paying Living Expenses: Somewhat hard   Food Insecurity: Not on file   Transportation Needs: No Transportation Needs (7/24/2023)    PRAPARE - Transportation     Lack of Transportation (Medical): No     Lack of Transportation (Non-Medical): No   Physical " "Activity: Not on file   Stress: Not on file   Social Connections: Not on file   Intimate Partner Violence: Not on file   Housing Stability: Not on file         I have reviewed the past medical, surgical, social and family history, medications and allergies as documented in the EMR.    Review of systems: ROS is negative other than that noted in the HPI.  Psychiatric/Behavioral: Negative for agitation.     Physical Exam:    Blood pressure 131/87, pulse 67, height 5' 8\" (1.727 m), weight 76.7 kg (169 lb).    General/Constitutional: NAD, well developed, well nourished  HENT: Normocephalic, atraumatic  CV: Intact distal pulses, regular rate  Resp: No respiratory distress or labored breathing  Neuro: Alert and Oriented x 3  Psych: Normal mood, normal affect, normal judgement, normal behavior  Skin: Warm, dry, no rashes, no erythema      Focused right Elbow Exam:  Skin is intact.  No ecchymosis, erythema or effusion noted on exam.    Full range of motion of the elbow including full extension, full flexion, full pronation, and full supination without pain or mechanical block    No tenderness over the medial epicondyle but exquisitely tender over the lateral epicondyles, olecranon, radial head, proximal wrist extensors, or proximal wrist flexors.      No tenderness and negative Tinel's over the cubital tunnel.  No subluxation of the ulnar nerve with flexion or extension of the elbow.    5/5 strength with elbow flexion and extension, wrist flexion and extension, forearm pronation and supination.  Pain with resisted wrist extension but not wrist flexion.  No pain with resisted pronation or supination of the forearm.     Negative hook test of the distal biceps tendon.    Elbow is stable to 0 and 30 degrees of varus and valgus stress at both 0 and 30 degrees of flexion.  Negative moving valgus stress test.    UE NV Exam: +2 Radial pulses bilaterally  Sensation intact to light touch C5-T1 bilaterally, Radial/median/ulnar nerve " motor intact    Bilateral shoulder, wrist/hand, and forearm ROM full, painless with passive ROM, no ttp or crepitance throughout extremities above wrist joint    Cervical ROM is full without pain, numbness or tingling    Negative spurling maneuver bilaterally       Elbow Imaging:    X-rays of the right elbow were obtained 4/1/24 and reviewed with the patient.  Based on my independent review, imaging shows no acute osseous abnormalities or degenerative changes.        Scribe Attestation      I,:  Ge Newsome PA-C am acting as a scribe while in the presence of the attending physician.:       I,:  Dany Cardenas MD personally performed the services described in this documentation    as scribed in my presence.:

## 2024-06-17 NOTE — PROGRESS NOTES
PT Evaluation     Today's date: 2024  Patient name: Torito Redd  : 1980  MRN: [id number]  Referring provider: Ge Newsome PA-C  Dx:   Encounter Diagnosis     ICD-10-CM    1. Lateral epicondylitis of right elbow  M77.11 Ambulatory Referral to Physical Therapy      2. Right elbow pain  M25.521                      Assessment  Impairments: abnormal or restricted ROM, activity intolerance, impaired physical strength, lacks appropriate home exercise program and pain with function  Functional limitations: Difficulty lifting/carrying objects heavier than 5 pounds, difficulty throwing a ball, and difficulty gripping objects tightly/opening jar  Symptom irritability: moderate    Assessment details: Torito Redd is a 44 y.o. male with a history of postconcussion syndrome, gastritis/duodenitis, SBO, L sciatica, anxiety, asthma, GERD, inflammatory bowel disease, psoriasis that presents for a moderate complexity physical therapy initial evaluation.  The patient demonstrates signs and symptoms consistent with R elbow lateral epicondylitis; R elbow pain.  During the examination the patient demonstrated decreased R UE strength, decreased R elbow/wrist ROM, activity intolerance and R lateral elbow pain.  The patient's impairments are causing the following functional limitations: Difficulty lifting/carrying objects heavier than 5 pounds, difficulty throwing a ball, and difficulty gripping objects tightly/opening jar.  The patient's clinical presentation is evolving due to a number of participation restrictions, significant medial history, and functional limitation (FOTO 60% function).  The patient will benefit from skilled PT services to address impairments, work towards goals, and restore PLOF.      Understanding of Dx/Px/POC: good     Prognosis: good  Prognosis details: Positive prognostic indicators include: positive attitude toward recovery, good understanding of diagnosis/treatment plan, and absence  of observed red flags.      Negative prognostic indicators include: Significant medical Hx, chronicity of condition    Goals  STG ( Achieve in 4-6 weeks)  1.  Patient's R elbow pain decrease by 50% at it's worst to allow improved activity tolerance.  2.  Patient's R elbow/wrist ROM improve by 10 degrees to allow normal motion for work tasks/recreation.  3.  Patient R  strength improve by 10-15 pounds without R elbow pain > 2/10 while gripping.    LTG ( Achieve in 6-12 weeks)  1.  Patient tolerate throwing a ball without R elbow pain > 2/10 to achieve patient specific goal.  2.  Patient's elbow FOTO score improve by 15% function to indicate return to PLOF.  3.  Patient to be independent with a home exercise program by the end of formal therapy.          Plan  Patient would benefit from: skilled physical therapy  Planned modality interventions: cryotherapy, thermotherapy: hydrocollator packs and TENS    Planned therapy interventions: joint mobilization, manual therapy, massage, neuromuscular re-education, patient education, postural training, self care, strengthening, stretching, therapeutic activities, therapeutic exercise, home exercise program, abdominal trunk stabilization and IASTM    Frequency: 1-3x/wk.  Duration in weeks: 12  Plan of Care beginning date: 6/19/2024  Plan of Care expiration date: 9/18/2024  Treatment plan discussed with: PTA and patient  Plan details: RE-ASSESS 1X/MONTH    PATIENT REQUESTING TREATMENT 1x/week        Subjective Evaluation    History of Present Illness  Date of onset: 11/8/2023  Mechanism of injury: Torito Redd is a 44 y.o. male that presents to outpatient physical therapy with complaints of right elbow pain and difficulty functioning.  The patient reports onset > 6 months prior possibly from using the R UE to pull start a chain saw.  The patient notes difficulty with throwing a ball, difficulty gripping and lifting a gallon of milk.  The patient notes his symptoms are  variable with good and bad days.  The patient denies any present N/T but did have 3 days of tingling months back.  The patient has been wearing a Chopat strap which has helped decrease the pain.  The patient's main goal for physical therapy is to throw a ball without pain.     Patient Goals  Patient goals for therapy: decreased pain, increased motion, increased strength, independence with ADLs/IADLs and return to sport/leisure activities  Patient goal: throw without pain  Pain  Current pain rating: 3  At best pain rating: 3  At worst pain ratin  Location: R lateral elbow  Quality: dull ache (tender)  Aggravating factors: lifting (throwing)  Progression: no change    Social Support    Employment status: not working  Hand dominance: right    Treatments  Current treatment: physical therapy  Current treatment comments: bracing.       Objective     Cervical/Thoracic Screen   Cervical range of motion within normal limits  Thoracic range of motion within normal limits    Neurological Testing     Sensation     Elbow   Left Elbow   Intact: light touch    Right Elbow   Intact: light touch    Reflexes   Left   Prajapati's reflex: negative    Right   Prajapati's reflex: negative    Active Range of Motion   Left Shoulder   Normal active range of motion    Right Shoulder   Normal active range of motion    Left Elbow   Flexion: 145 degrees   Extension: 4 degrees   Forearm supination: 90 degrees   Forearm pronation: 86 degrees     Right Elbow   Flexion: 145 degrees   Extension: -7 degrees with pain  Forearm supination: 90 degrees   Forearm pronation: 88 degrees     Left Wrist   Wrist flexion: 70 degrees   Wrist extension: 70 degrees     Right Wrist   Wrist flexion: 60 degrees   Wrist extension: 60 degrees with pain    Passive Range of Motion     Left Elbow   Flexion: WFL  Extension: WFL  Forearm supination: WFL  Forearm pronation: WFL    Right Elbow   Flexion: WFL  Extension: 0 degrees   Forearm supination: WFL  Forearm pronation:  WFL    Left Wrist   Wrist flexion: 74 degrees   Wrist extension: 80 degrees     Right Wrist   Wrist flexion: 74 degrees   Wrist extension: 74 degrees     Strength/Myotome Testing     Left Elbow   Flexion: 5  Extension: 5  Forearm supination: 5  Forearm pronation: 5    Right Elbow   Flexion: 4 (pain)  Extension: 4 (pain)  Forearm supination: 4  Forearm pronation: 5    Left Wrist/Hand   Wrist extension: 5  Wrist flexion: 5    Right Wrist/Hand   Wrist extension: 4- (pain)  Wrist flexion: 5    Additional Strength Details   STRENGTH: L: 130 pounds R: 110 pounds p!      Tests     Additional Tests Details  FOTO: 60% ( predicted 70%)               Precautions: anxiety  RE EVALUATION: 7/17  [id number]     Manuals  6/19            PROM  ELBOW/ WRIST             Mulligan Elbow Strap Mob with gripping             P/A radial head mob with gripping             Elbow Distraction             IASTM R Elbow                 Therapeutic Exercise 6/19            UBE FWD/RETRO for UE strength/ROM  *           Wrist isometrics             tricep pull downs band  *                                     Tricep stretch  *           Posterior shoulder stretch  *           T-spine EXT  *           Self end range elbow EXT stretch loaded 10x  Review for HEP                         Self Wrist flexion  Stretch - elbow extended             Neuro Re-Ed             Palm lifts/wrist extension isolation  *            Chin tuck + cervical EXT X2 NO CHANGE            Eccentric wrist extension  Twists  *           Chin tucks X2 NO CHANGE            Eccentric (band resist) wrist extension BLUE 3x10                         IYT wall w/ lift off  *           Scap retract w/ ER  *                                                               Therapeutic Activity                                                        Modalities              CP L elbow                                              The patient was given a new home exercise plan with instruction,  pictures, and verbal feedback.  The patient accepts and understands the new home activities.

## 2024-06-19 ENCOUNTER — EVALUATION (OUTPATIENT)
Dept: PHYSICAL THERAPY | Facility: CLINIC | Age: 44
End: 2024-06-19
Payer: COMMERCIAL

## 2024-06-19 DIAGNOSIS — M77.11 LATERAL EPICONDYLITIS OF RIGHT ELBOW: Primary | ICD-10-CM

## 2024-06-19 DIAGNOSIS — M25.521 RIGHT ELBOW PAIN: ICD-10-CM

## 2024-06-19 PROCEDURE — 97112 NEUROMUSCULAR REEDUCATION: CPT | Performed by: PHYSICAL THERAPIST

## 2024-06-19 PROCEDURE — 97110 THERAPEUTIC EXERCISES: CPT | Performed by: PHYSICAL THERAPIST

## 2024-06-19 PROCEDURE — 97162 PT EVAL MOD COMPLEX 30 MIN: CPT | Performed by: PHYSICAL THERAPIST

## 2024-06-26 ENCOUNTER — OFFICE VISIT (OUTPATIENT)
Dept: PHYSICAL THERAPY | Facility: CLINIC | Age: 44
End: 2024-06-26
Payer: COMMERCIAL

## 2024-06-26 DIAGNOSIS — M77.11 LATERAL EPICONDYLITIS OF RIGHT ELBOW: Primary | ICD-10-CM

## 2024-06-26 PROCEDURE — 97112 NEUROMUSCULAR REEDUCATION: CPT | Performed by: PHYSICAL THERAPIST

## 2024-06-26 PROCEDURE — 97110 THERAPEUTIC EXERCISES: CPT | Performed by: PHYSICAL THERAPIST

## 2024-06-26 NOTE — PROGRESS NOTES
Daily Note     Today's date: 2024  Patient name: Torito Redd  : 1980  MRN: [id number]  Referring provider: Ge Newsome PA-C  Dx:   Encounter Diagnosis     ICD-10-CM    1. Lateral epicondylitis of right elbow  M77.11                      Subjective: The patient notes the eccentric wrist extension exercise at home only bothers him when the elbow is hurting.  It isn't painful with resistance on days where he is not having pain.      Objective: See treatment diary below      Assessment: The patient tolerated all activities well today.  The patient was instructed in shoulder/scapular stretching and strengthening today.  Noted improvement in pain at rest after the scapular exercises.  There were no complaints of increased pain or problems after the session today.  The patient will benefit from continued skilled physical therapy to progress towards achieving patient centered goals.         Plan: Continue per plan of care.  Progress treatment as tolerated.       Precautions: anxiety  RE EVALUATION:   [id number]     Manuals             PROM  ELBOW/ WRIST             Mulligan Elbow Strap Mob with gripping             P/A radial head mob with gripping             Elbow Distraction             IASTM R Elbow                 Therapeutic Exercise            UBE FWD/RETRO for UE strength/ROM  *90/70 x 6 mins           Wrist isometrics             tricep pull downs band  *                                     Tricep stretch  *4x:20           Posterior shoulder stretch  *4x:20           T-spine EXT  *x10 seated           Self end range elbow EXT stretch loaded 10x  Review for HEP X10 reviewed line of drive for self stretch                        Self Wrist flexion  Stretch - elbow extended             Neuro Re-Ed             Palm lifts/wrist extension isolation  *            Chin tuck + cervical EXT X2 NO CHANGE            Eccentric wrist extension  Twists  *NP           Chin tucks X2 NO  CHANGE            Eccentric (band resist) wrist extension BLUE 3x10 reviewed                        IYT wall w/ lift off  *Y x10           Scap retract w/ ER  *x15                                                               Therapeutic Activity                                                        Modalities              CP L elbow

## 2024-07-03 ENCOUNTER — OFFICE VISIT (OUTPATIENT)
Dept: PHYSICAL THERAPY | Facility: CLINIC | Age: 44
End: 2024-07-03

## 2024-07-03 DIAGNOSIS — M25.521 RIGHT ELBOW PAIN: ICD-10-CM

## 2024-07-03 DIAGNOSIS — M77.11 LATERAL EPICONDYLITIS OF RIGHT ELBOW: Primary | ICD-10-CM

## 2024-07-03 PROCEDURE — 97112 NEUROMUSCULAR REEDUCATION: CPT

## 2024-07-03 PROCEDURE — 97110 THERAPEUTIC EXERCISES: CPT

## 2024-07-03 PROCEDURE — 97140 MANUAL THERAPY 1/> REGIONS: CPT

## 2024-07-03 NOTE — PROGRESS NOTES
Daily Note     Today's date: 7/3/2024  Patient name: Torito Redd  : 1980  MRN: [id number]  Referring provider: Ge Newsome PA-C  Dx:   Encounter Diagnosis     ICD-10-CM    1. Lateral epicondylitis of right elbow  M77.11       2. Right elbow pain  M25.521           Start Time: 1453  Stop Time: 1536      Subjective: Patient states he has no pain today. He feels good today. His last exercises he was not able to perform at home due to it causing bad headaches that lasted for several days. He declined some of his exercises.       Objective: See treatment diary below      Assessment: Tolerated treatment well. Patient would benefit from continued PT for stretching and strengthening. Patient was able to perform some of his exercises with verbal cues for proper performance of exercises. He seemed to understand all education on new exercises. He felt the tools were ok on his arm. Definite crackles occurred on forearm and posterior/ lateral arm above elbow with tools. Patient felt ok and had no increase pain with treatment.       Plan: Continue per plan of care.  Progress treatment as tolerated.       Precautions: anxiety  RE EVALUATION:   [id number]     Manuals   73          PROM  ELBOW/ WRIST             Mulligan Elbow Strap Mob with gripping             P/A radial head mob with gripping             Elbow Distraction             IASTM R Elbow   performed              Therapeutic Exercise 6/19 6/26 7/3          UBE FWD/RETRO for UE strength/ROM  *90/70 x 6 mins 90/70 x6 min          Wrist isometrics             tricep pull downs band  *                                     Tricep stretch  *4x:20 hold          Posterior shoulder stretch  *4x:20 hold          T-spine EXT  *x10 seated x10          Self end range elbow EXT stretch loaded 10x  Review for HEP X10 reviewed line of drive for self stretch                        Self Wrist flexion  Stretch - elbow extended             Neuro Re-Ed              Palm lifts/wrist extension isolation  *  :05x10          Chin tuck + cervical EXT X2 NO CHANGE            Eccentric wrist extension  Twists  *NP :15x4 ea flex st    Twist x10          Chin tucks X2 NO CHANGE            Eccentric (band resist) wrist extension BLUE 3x10 reviewed                        IYT wall w/ lift off  *Y x10 held          Scap retract w/ ER  *x15 held                                                              Therapeutic Activity                                                        Modalities              CP L elbow                                         Access Code: [email or web address]  URL: [email or web address]  Date: 07/03/2024  Prepared by: Lizy Hobson    Exercises  - Loaded elbow extension  - 4-6 x daily - 7 x weekly - 1 sets - 10 reps  - Eccentric Wrist Extension with Resistance  - 1 x daily - 7 x weekly - 3 sets - 10 reps  - Standing Shoulder Posterior Capsule Stretch (Mirrored)  - 1-2 x daily - 7 x weekly - 1 sets - 4 reps - 20 hold  - Low Trap Setting at Wall  - 1-2 x daily - 7 x weekly - 2 sets - 10 reps - 3 hold  - Standing Overhead Triceps Stretch  - 1-2 x daily - 7 x weekly - 1 sets - 4 reps - 20 hold  - Shoulder External Rotation and Scapular Retraction  - 2 x daily - 7 x weekly - 2 sets - 10 reps - 5 hold  - Seated Thoracic Extension with Hands Behind Neck  - 1-2 x daily - 7 x weekly - 1 sets - 10 reps - 2 hold  - Isometric Wrist Extension Pronated  - 2 x daily - 7 x weekly - 1 sets - 4 reps - 5 sec hold  - Standing Upper Extremity Nerve Glide  - 2 x daily - 7 x weekly - 1 sets - 10 reps  - Standing Wrist Flexion Stretch  - 2 x daily - 7 x weekly - 1 sets - 4 reps - 15 sec hold

## 2024-07-10 ENCOUNTER — APPOINTMENT (OUTPATIENT)
Dept: PHYSICAL THERAPY | Facility: CLINIC | Age: 44
End: 2024-07-10
Payer: COMMERCIAL

## 2024-07-17 ENCOUNTER — APPOINTMENT (OUTPATIENT)
Dept: PHYSICAL THERAPY | Facility: CLINIC | Age: 44
End: 2024-07-17
Payer: COMMERCIAL

## 2024-07-19 ENCOUNTER — TELEPHONE (OUTPATIENT)
Age: 44
End: 2024-07-19

## 2024-07-19 NOTE — TELEPHONE ENCOUNTER
Pt has been recieveing bills for his last few appts. Torito states he called his insurance and found out we are not on PAR. Pt can not change his insurance until open enrollment and would rather not change Drs. Pt would like to know if he can get prior auths for each visit? Please call pt. Thank you

## 2024-08-19 ENCOUNTER — TELEPHONE (OUTPATIENT)
Dept: INTERNAL MEDICINE CLINIC | Facility: OTHER | Age: 44
End: 2024-08-19

## 2024-08-31 DIAGNOSIS — F41.9 ANXIETY: ICD-10-CM

## 2024-09-03 ENCOUNTER — TELEPHONE (OUTPATIENT)
Dept: GASTROENTEROLOGY | Facility: CLINIC | Age: 44
End: 2024-09-03

## 2024-09-03 RX ORDER — CLONAZEPAM 0.5 MG/1
0.5 TABLET ORAL EVERY 12 HOURS PRN
Qty: 60 TABLET | Refills: 0 | Status: SHIPPED | OUTPATIENT
Start: 2024-09-03

## 2024-09-03 NOTE — TELEPHONE ENCOUNTER
"Pt scheduled an appt for 1/3/25 for his MAW. He cannot come in any earlier due to the office not accepting his insurance \"[facility]". I did tell him he would be given a discount but he said he is financially unable to come in without insurance. He needs his clonazePAM (KlonoPIN) 0.5 mg filled asap as he only has 2 pills left.     Please advise patient if any issues.    [id number] - [place] [facility] - [address] [place] [address]     "

## 2024-09-03 NOTE — TELEPHONE ENCOUNTER
Called and spoke to the patient.   He is on and off with the pain.  He has pain every day. He was recently in the ER.   Will trial paxil and refer to [facility]

## 2024-09-03 NOTE — TELEPHONE ENCOUNTER
Next Office Visit  Left message via my chart for patient to call to schedule missed medicare wellness from 8/5/24        PDMP checked

## 2024-10-14 DIAGNOSIS — F41.9 ANXIETY: ICD-10-CM

## 2024-10-14 RX ORDER — CLONAZEPAM 0.5 MG/1
0.5 TABLET ORAL EVERY 12 HOURS PRN
Qty: 60 TABLET | Refills: 0 | Status: SHIPPED | OUTPATIENT
Start: 2024-10-14

## 2024-10-14 NOTE — TELEPHONE ENCOUNTER
Medication: Clonazepam     Dose/Frequency: .5mg 1 tab q 12 hrs PRN    Quantity: 60    Pharmacy: [facility]     Office:   [x] PCP/Provider -   [] Speciality/Provider -     Does the patient have enough for 3 days?   [] Yes   [x] No - Send as HP to POD

## 2024-11-06 ENCOUNTER — RA CDI HCC (OUTPATIENT)
Dept: OTHER | Facility: HOSPITAL | Age: 44
End: 2024-11-06

## 2024-11-12 ENCOUNTER — OFFICE VISIT (OUTPATIENT)
Dept: INTERNAL MEDICINE CLINIC | Facility: OTHER | Age: 44
End: 2024-11-12
Payer: COMMERCIAL

## 2024-11-12 VITALS
OXYGEN SATURATION: 98 % | DIASTOLIC BLOOD PRESSURE: 78 MMHG | WEIGHT: 168 LBS | BODY MASS INDEX: 25.46 KG/M2 | HEART RATE: 74 BPM | TEMPERATURE: 98 F | SYSTOLIC BLOOD PRESSURE: 118 MMHG | HEIGHT: 68 IN

## 2024-11-12 DIAGNOSIS — F41.9 ANXIETY: Primary | ICD-10-CM

## 2024-11-12 DIAGNOSIS — Z00.00 MEDICARE ANNUAL WELLNESS VISIT, SUBSEQUENT: ICD-10-CM

## 2024-11-12 DIAGNOSIS — Z13.1 SCREENING FOR DIABETES MELLITUS: ICD-10-CM

## 2024-11-12 DIAGNOSIS — I45.81 PROLONGED QT INTERVAL SYNDROME: ICD-10-CM

## 2024-11-12 DIAGNOSIS — Z13.6 SCREENING FOR CARDIOVASCULAR CONDITION: ICD-10-CM

## 2024-11-12 DIAGNOSIS — G89.4 CHRONIC PAIN SYNDROME: ICD-10-CM

## 2024-11-12 DIAGNOSIS — K50.00 CROHN'S DISEASE INVOLVING TERMINAL ILEUM (HCC): ICD-10-CM

## 2024-11-12 DIAGNOSIS — M54.12 CERVICAL RADICULOPATHY: ICD-10-CM

## 2024-11-12 PROCEDURE — G0444 DEPRESSION SCREEN ANNUAL: HCPCS | Performed by: INTERNAL MEDICINE

## 2024-11-12 PROCEDURE — 99214 OFFICE O/P EST MOD 30 MIN: CPT | Performed by: INTERNAL MEDICINE

## 2024-11-12 PROCEDURE — G0439 PPPS, SUBSEQ VISIT: HCPCS | Performed by: INTERNAL MEDICINE

## 2024-11-12 RX ORDER — CLONAZEPAM 0.5 MG/1
0.5 TABLET ORAL EVERY 12 HOURS PRN
Qty: 60 TABLET | Refills: 3 | Status: SHIPPED | OUTPATIENT
Start: 2024-11-12

## 2024-11-12 NOTE — ASSESSMENT & PLAN NOTE
Stable, controlled. Continue klonopin     Orders:    CBC; Future    clonazePAM (KlonoPIN) 0.5 mg tablet; Take 1 tablet (0.5 mg total) by mouth every 12 (twelve) hours as needed for anxiety

## 2024-11-12 NOTE — ASSESSMENT & PLAN NOTE
Continue ROM and stretching exercises. Continue tylenol PRN for pain.     Orders:    CBC; Future

## 2024-11-12 NOTE — PROGRESS NOTES
Hello seeing a little bit ambulatory Visit  Name: Torito Redd      : 1980      MRN: [id number]  Encounter Provider: Rober Dominguez MD  Encounter Date: 2024   Encounter department: [facility] [place]    Assessment & Plan  Prolonged QT interval syndrome  Avid QT prolong medications.     Orders:    CBC; Future    Crohn's disease involving terminal ileum (HCC)  Continue follow up with GI.     Orders:    CBC; Future    Cervical radiculopathy  Continue ROM and stretching exercises. Continue tylenol PRN for pain.     Orders:    CBC; Future    Anxiety  Stable, controlled. Continue klonopin     Orders:    CBC; Future    clonazePAM (KlonoPIN) 0.5 mg tablet; Take 1 tablet (0.5 mg total) by mouth every 12 (twelve) hours as needed for anxiety    Chronic pain syndrome         Medicare annual wellness visit, subsequent         Screening for diabetes mellitus    Orders:    Comprehensive metabolic panel; Future    Screening for cardiovascular condition    Orders:    Lipid panel; Future       Preventive health issues were discussed with patient, and age appropriate screening tests were ordered as noted in patient's After Visit Summary. Personalized health advice and appropriate referrals for health education or preventive services given if needed, as noted in patient's After Visit Summary.    History of Present Illness     Torito Redd is seen today for follow up of chronic conditions.   Recent laboratory studies reviewed today with the patient.   he has been compliant with his medication regimen.   He will be scheduling an appointment with colorectal surgery regarding hemorrhoids.   he has no complaints or concerns at this time.       Heartburn  He reports no abdominal pain, no chest pain, no choking, no coughing, no nausea, no sore throat or no wheezing. This is a chronic problem. The current episode started more than 1 year ago. The problem occurs rarely. The problem has  been unchanged. The symptoms are aggravated by certain foods. Pertinent negatives include no fatigue. He has tried a PPI for the symptoms. The treatment provided moderate relief.   Anxiety  Presents for follow-up visit. Patient reports no chest pain, dizziness, nausea, palpitations, shortness of breath or suicidal ideas. Symptoms occur occasionally. The severity of symptoms is mild. The quality of sleep is good. Nighttime awakenings: none.          Patient Care Team:  Rober Dominguez MD as PCP - General (Internal Medicine)  Avi Geiger PA-C as Advanced Practitioner  SUHAIL Higgins MD as Endoscopist    Review of Systems   Constitutional:  Negative for activity change, appetite change, chills, diaphoresis, fatigue and fever.   HENT:  Negative for congestion, postnasal drip, rhinorrhea, sinus pressure, sinus pain, sneezing and sore throat.    Eyes:  Negative for visual disturbance.   Respiratory:  Negative for apnea, cough, choking, chest tightness, shortness of breath and wheezing.    Cardiovascular:  Negative for chest pain, palpitations and leg swelling.   Gastrointestinal:  Negative for abdominal distention, abdominal pain, anal bleeding, blood in stool, constipation, diarrhea, nausea and vomiting.   Endocrine: Negative for cold intolerance and heat intolerance.   Genitourinary:  Negative for difficulty urinating, dysuria and hematuria.   Musculoskeletal: Negative.    Skin: Negative.    Neurological:  Negative for dizziness, weakness, light-headedness, numbness and headaches.   Hematological:  Negative for adenopathy.   Psychiatric/Behavioral:  Negative for agitation, sleep disturbance and suicidal ideas.    All other systems reviewed and are negative.    Medical History Reviewed by provider this encounter:  Tobacco  Allergies  Meds  Problems  Med Hx  Surg Hx  Fam Hx       Annual Wellness Visit Questionnaire   Torito is here for his Subsequent Wellness visit. Last Medicare Wellness visit  information reviewed, patient interviewed and updates made to the record today.      Health Risk Assessment:   Patient rates overall health as good. Patient feels that their physical health rating is same. Patient is satisfied with their life. Eyesight was rated as same. Hearing was rated as same. Patient feels that their emotional and mental health rating is same. Patients states they are sometimes angry. Patient states they are sometimes unusually tired/fatigued. Pain experienced in the last 7 days has been some. Patient's pain rating has been 1/10. Patient states that he has experienced no weight loss or gain in last 6 months.     Depression Screening:   PHQ-2 Score: 0      Fall Risk Screening:   In the past year, patient has experienced: no history of falling in past year      Home Safety:  Patient does not have trouble with stairs inside or outside of their home. Patient has working smoke alarms and has working carbon monoxide detector. Home safety hazards include: none.     Nutrition:   Current diet is Regular.     Medications:   Patient is not currently taking any over-the-counter supplements. Patient is able to manage medications.     Activities of Daily Living (ADLs)/Instrumental Activities of Daily Living (IADLs):   Walk and transfer into and out of bed and chair?: Yes  Dress and groom yourself?: Yes    Bathe or shower yourself?: Yes    Feed yourself? Yes  Do your laundry/housekeeping?: No  Manage your money, pay your bills and track your expenses?: Yes  Make your own meals?: No    Do your own shopping?: No    Previous Hospitalizations:   Any hospitalizations or ED visits within the last 12 months?: Yes    How many hospitalizations have you had in the last year?: 1-2    Advance Care Planning:   Living will: No    Durable POA for healthcare: No    Advanced directive counseling given: Yes    End of Life Decisions reviewed with patient: Yes    Provider agrees with end of life decisions: Yes      Cognitive  Screening:   Provider or family/friend/caregiver concerned regarding cognition?: No    PREVENTIVE SCREENINGS      Cardiovascular Screening:    General: Screening Current and Risks and Benefits Discussed    Due for: Lipid Panel      Diabetes Screening:     General: Screening Current and Risks and Benefits Discussed    Due for: Blood Glucose      Colorectal Cancer Screening:     General: Screening Current      Prostate Cancer Screening:    General: Screening Not Indicated and Risks and Benefits Discussed    Due for: PSA      Osteoporosis Screening:    General: Screening Not Indicated      Abdominal Aortic Aneurysm (AAA) Screening:    Risk factors include: tobacco use        Lung Cancer Screening:     General: Screening Not Indicated      Hepatitis C Screening:    General: Screening Current and Risks and Benefits Discussed    Screening, Brief Intervention, and Referral to Treatment (SBIRT)      Brief Intervention  Alcohol & drug use screenings were reviewed. No concerns regarding substance use disorder identified.     Annual Depression Screening  Time spent screening and evaluating the patient for depression during today's encounter was 10 minutes.    Review of Current Opioid Use    Opioid Risk Tool (ORT) Interpretation: Complete Opioid Risk Tool (ORT)    [place] PDMP or [place]  reviewed. No red flags were identified    Other Counseling Topics:   Car/seat belt/driving safety, skin self-exam, sunscreen and calcium and vitamin D intake and regular weightbearing exercise.     Social Determinants of Health     Financial Resource Strain: Medium Risk (7/24/2023)    Overall Financial Resource Strain (CARDIA)     Difficulty of Paying Living Expenses: Somewhat hard   Food Insecurity: No Food Insecurity (11/12/2024)    Hunger Vital Sign     Worried About Running Out of Food in the Last Year: Never true     Ran Out of Food in the Last Year: Never true   Transportation Needs: No Transportation Needs (11/12/2024)    PRAPARE -  "Transportation     Lack of Transportation (Medical): No     Lack of Transportation (Non-Medical): No   Housing Stability: Unknown (11/12/2024)    Housing Stability Vital Sign     Unable to Pay for Housing in the Last Year: No     Homeless in the Last Year: No   Utilities: Not At Risk (11/12/2024)    [facility] Utilities     Threatened with loss of utilities: No     No results found.    Objective     /78 (BP Location: Left arm, Patient Position: Sitting, Cuff Size: Large)   Pulse 74   Temp 98 °F (36.7 °C) (Temporal)   Ht 5' 8\" (1.727 m)   Wt 76.2 kg (168 lb)   SpO2 98%   BMI 25.54 kg/m²     Physical Exam  Vitals and nursing note reviewed.   Constitutional:       General: He is not in acute distress.     Appearance: He is well-developed. He is not diaphoretic.   HENT:      Head: Normocephalic and atraumatic.   Eyes:      General: No scleral icterus.        Right eye: No discharge.         Left eye: No discharge.      Conjunctiva/sclera: Conjunctivae normal.      Pupils: Pupils are equal, round, and reactive to light.   Neck:      Thyroid: No thyromegaly.      Vascular: No JVD.   Cardiovascular:      Rate and Rhythm: Normal rate and regular rhythm.      Heart sounds: Normal heart sounds. No murmur heard.     No friction rub. No gallop.   Pulmonary:      Effort: Pulmonary effort is normal. No respiratory distress.      Breath sounds: Normal breath sounds. No wheezing or rales.   Chest:      Chest wall: No tenderness.   Abdominal:      General: Bowel sounds are normal. There is no distension.      Palpations: Abdomen is soft. There is no mass.      Tenderness: There is no abdominal tenderness. There is no guarding or rebound.   Musculoskeletal:         General: No tenderness or deformity. Normal range of motion.      Cervical back: Normal range of motion and neck supple.   Lymphadenopathy:      Cervical: No cervical adenopathy.   Skin:     General: Skin is warm and dry.      Coloration: Skin is not pale.      " Findings: No erythema or rash.   Neurological:      Mental Status: He is alert and oriented to person, place, and time.      Cranial Nerves: No cranial nerve deficit.      Coordination: Coordination normal.      Deep Tendon Reflexes: Reflexes are normal and symmetric.   Psychiatric:         Behavior: Behavior normal.         Thought Content: Thought content normal.         Judgment: Judgment normal.

## 2024-11-12 NOTE — PATIENT INSTRUCTIONS
Medicare Preventive Visit Patient Instructions  Thank you for completing your Welcome to Medicare Visit or Medicare Annual Wellness Visit today. Your next wellness visit will be due in one year (11/13/2025).  The screening/preventive services that you may require over the next 5-10 years are detailed below. Some tests may not apply to you based off risk factors and/or age. Screening tests ordered at today's visit but not completed yet may show as past due. Also, please note that scanned in results may not display below.  Preventive Screenings:  Service Recommendations Previous Testing/Comments   Colorectal Cancer Screening  Colonoscopy    Fecal Occult Blood Test (FOBT)/Fecal Immunochemical Test (FIT)  Fecal DNA/Cologuard Test  Flexible Sigmoidoscopy Age: 45-75 years old   Colonoscopy: every 10 years (May be performed more frequently if at higher risk)  OR  FOBT/FIT: every 1 year  OR  Cologuard: every 3 years  OR  Sigmoidoscopy: every 5 years  Screening may be recommended earlier than age 45 if at higher risk for colorectal cancer. Also, an individualized decision between you and your healthcare provider will decide whether screening between the ages of 76-85 would be appropriate. Colonoscopy: 11/17/2022  FOBT/FIT: Not on file  Cologuard: Not on file  Sigmoidoscopy: Not on file    Screening Current     Prostate Cancer Screening Individualized decision between patient and health care provider in men between ages of 55-69   Medicare will cover every 12 months beginning on the day after your 50th birthday PSA: 0.8 ng/mL     Screening Not Indicated     Hepatitis C Screening Once for adults born between 1945 and 1965  More frequently in patients at high risk for Hepatitis C Hep C Antibody: Not on file    Screening Current   Diabetes Screening 1-2 times per year if you're at risk for diabetes or have pre-diabetes Fasting glucose: 123 mg/dL (3/12/2024)  A1C: No results in last 5 years (No results in last 5 years)  Screening  Current   Cholesterol Screening Once every 5 years if you don't have a lipid disorder. May order more often based on risk factors. Lipid panel: 03/12/2024  Screening Current      Other Preventive Screenings Covered by Medicare:  Abdominal Aortic Aneurysm (AAA) Screening: covered once if your at risk. You're considered to be at risk if you have a family history of AAA or a male between the age of 65-75 who smoking at least 100 cigarettes in your lifetime.  Lung Cancer Screening: covers low dose CT scan once per year if you meet all of the following conditions: (1) Age 55-77; (2) No signs or symptoms of lung cancer; (3) Current smoker or have quit smoking within the last 15 years; (4) You have a tobacco smoking history of at least 20 pack years (packs per day x number of years you smoked); (5) You get a written order from a healthcare provider.  Glaucoma Screening: covered annually if you're considered high risk: (1) You have diabetes OR (2) Family history of glaucoma OR (3)  aged 50 and older OR (4)  American aged 65 and older  Osteoporosis Screening: covered every 2 years if you meet one of the following conditions: (1) Have a vertebral abnormality; (2) On glucocorticoid therapy for more than 3 months; (3) Have primary hyperparathyroidism; (4) On osteoporosis medications and need to assess response to drug therapy.  HIV Screening: covered annually if you're between the age of 15-65. Also covered annually if you are younger than 15 and older than 65 with risk factors for HIV infection. For pregnant patients, it is covered up to 3 times per pregnancy.    Immunizations:  Immunization Recommendations   Influenza Vaccine Annual influenza vaccination during flu season is recommended for all persons aged >= 6 months who do not have contraindications   Pneumococcal Vaccine   * Pneumococcal conjugate vaccine = PCV13 (Prevnar 13), PCV15 (Vaxneuvance), PCV20 (Prevnar 20)  * Pneumococcal polysaccharide  vaccine = PPSV23 (Pneumovax) Adults 19-65 yo with certain risk factors or if 65+ yo  If never received any pneumonia vaccine: recommend Prevnar 20 (PCV20)  Give PCV20 if previously received 1 dose of PCV13 or PPSV23   Hepatitis B Vaccine 3 dose series if at intermediate or high risk (ex: diabetes, end stage renal disease, liver disease)   Respiratory syncytial virus (RSV) Vaccine - COVERED BY MEDICARE PART D  * RSVPreF3 (Arexvy) CDC recommends that adults 60 years of age and older may receive a single dose of RSV vaccine using shared clinical decision-making (SCDM)   Tetanus (Td) Vaccine - COST NOT COVERED BY MEDICARE PART B Following completion of primary series, a booster dose should be given every 10 years to maintain immunity against tetanus. Td may also be given as tetanus wound prophylaxis.   Tdap Vaccine - COST NOT COVERED BY MEDICARE PART B Recommended at least once for all adults. For pregnant patients, recommended with each pregnancy.   Shingles Vaccine (Shingrix) - COST NOT COVERED BY MEDICARE PART B  2 shot series recommended in those 19 years and older who have or will have weakened immune systems or those 50 years and older     Health Maintenance Due:      Topic Date Due   • Colorectal Cancer Screening  11/14/2032   • HIV Screening  Completed   • Hepatitis C Screening  Completed     Immunizations Due:      Topic Date Due   • Pneumococcal Vaccine: Pediatrics (0 to 5 Years) and At-Risk Patients (6 to 64 Years) (1 of 2 - PCV) Never done   • Hepatitis A Vaccine (1 of 2 - Risk 2-dose series) Never done   • Influenza Vaccine (1) Never done   • COVID-19 Vaccine (1 - 2023-24 season) Never done     Advance Directives   What are advance directives?  Advance directives are legal documents that state your wishes and plans for medical care. These plans are made ahead of time in case you lose your ability to make decisions for yourself. Advance directives can apply to any medical decision, such as the treatments you  want, and if you want to donate organs.   What are the types of advance directives?  There are many types of advance directives, and each state has rules about how to use them. You may choose a combination of any of the following:  Living will:  This is a written record of the treatment you want. You can also choose which treatments you do not want, which to limit, and which to stop at a certain time. This includes surgery, medicine, IV fluid, and tube feedings.   Durable power of  for healthcare (DPAHC):  This is a written record that states who you want to make healthcare choices for you when you are unable to make them for yourself. This person, called a proxy, is usually a family member or a friend. You may choose more than 1 proxy.  Do not resuscitate (DNR) order:  A DNR order is used in case your heart stops beating or you stop breathing. It is a request not to have certain forms of treatment, such as CPR. A DNR order may be included in other types of advance directives.  Medical directive:  This covers the care that you want if you are in a coma, near death, or unable to make decisions for yourself. You can list the treatments you want for each condition. Treatment may include pain medicine, surgery, blood transfusions, dialysis, IV or tube feedings, and a ventilator (breathing machine).  Values history:  This document has questions about your views, beliefs, and how you feel and think about life. This information can help others choose the care that you would choose.  Why are advance directives important?  An advance directive helps you control your care. Although spoken wishes may be used, it is better to have your wishes written down. Spoken wishes can be misunderstood, or not followed. Treatments may be given even if you do not want them. An advance directive may make it easier for your family to make difficult choices about your care.   Cigarette Smoking and Your Health   Risks to your health if  you smoke:  Nicotine and other chemicals found in tobacco damage every cell in your body. Even if you are a light smoker, you have an increased risk for cancer, heart disease, and lung disease. If you are pregnant or have diabetes, smoking increases your risk for complications.   Benefits to your health if you stop smoking:   You decrease respiratory symptoms such as coughing, wheezing, and shortness of breath.   You reduce your risk for cancers of the lung, mouth, throat, kidney, bladder, pancreas, stomach, and cervix. If you already have cancer, you increase the benefits of chemotherapy. You also reduce your risk for cancer returning or a second cancer from developing.   You reduce your risk for heart disease, blood clots, heart attack, and stroke.   You reduce your risk for lung infections, and diseases such as pneumonia, asthma, chronic bronchitis, and emphysema.  Your circulation improves. More oxygen can be delivered to your body. If you have diabetes, you lower your risk for complications, such as kidney, artery, and eye diseases. You also lower your risk for nerve damage. Nerve damage can lead to amputations, poor vision, and blindness.  You improve your body's ability to heal and to fight infections.  For more information and support to stop smoking:   [place]  Phone: 7- 658 - 781-6297  Web Address: www.rumr: turn off the lights  Weight Management   Why it is important to manage your weight:  Being overweight increases your risk of health conditions such as heart disease, high blood pressure, type 2 diabetes, and certain types of cancer. It can also increase your risk for osteoarthritis, sleep apnea, and other respiratory problems. Aim for a slow, steady weight loss. Even a small amount of weight loss can lower your risk of health problems.  How to lose weight safely:  A safe and healthy way to lose weight is to eat fewer calories and get regular exercise. You can lose up about 1 pound a week by decreasing the  number of calories you eat by 500 calories each day.   Healthy meal plan for weight management:  A healthy meal plan includes a variety of foods, contains fewer calories, and helps you stay healthy. A healthy meal plan includes the following:  Eat whole-grain foods more often.  A healthy meal plan should contain fiber. Fiber is the part of grains, fruits, and vegetables that is not broken down by your body. Whole-grain foods are healthy and provide extra fiber in your diet. Some examples of whole-grain foods are whole-wheat breads and pastas, oatmeal, brown rice, and bulgur.  Eat a variety of vegetables every day.  Include dark, leafy greens such as spinach, kale, aakash greens, and mustard greens. Eat yellow and orange vegetables such as carrots, sweet potatoes, and winter squash.   Eat a variety of fruits every day.  Choose fresh or canned fruit (canned in its own juice or light syrup) instead of juice. Fruit juice has very little or no fiber.  Eat low-fat dairy foods.  Drink fat-free (skim) milk or 1% milk. Eat fat-free yogurt and low-fat cottage cheese. Try low-fat cheeses such as mozzarella and other reduced-fat cheeses.  Choose meat and other protein foods that are low in fat.  Choose beans or other legumes such as split peas or lentils. Choose fish, skinless poultry (chicken or turkey), or lean cuts of red meat (beef or pork). Before you cook meat or poultry, cut off any visible fat.   Use less fat and oil.  Try baking foods instead of frying them. Add less fat, such as margarine, sour cream, regular salad dressing and mayonnaise to foods. Eat fewer high-fat foods. Some examples of high-fat foods include french fries, doughnuts, ice cream, and cakes.  Eat fewer sweets.  Limit foods and drinks that are high in sugar. This includes candy, cookies, regular soda, and sweetened drinks.  Exercise:  Exercise at least 30 minutes per day on most days of the week. Some examples of exercise include walking, biking,  dancing, and swimming. You can also fit in more physical activity by taking the stairs instead of the elevator or parking farther away from stores. Ask your healthcare provider about the best exercise plan for you.   Narcotic (Opioid) Safety    Use narcotics safely:  Take prescribed narcotics exactly as directed  Do not give narcotics to others or take narcotics that belong to someone else  Do not mix narcotics without medicines or alcohol  Do not drive or operate heavy machinery after you take the narcotic  Monitor for side effects and notify your healthcare provider if you experienced side effects such as nausea, sleepiness, itching, or trouble thinking clearly.    Manage constipation:    Constipation is the most common side effect of narcotic medicine. Constipation is when you have hard, dry bowel movements, or you go longer than usual between bowel movements. Tell your healthcare provider about all changes in your bowel movements while you are taking narcotics. He or she may recommend laxative medicine to help you have a bowel movement. He or she may also change the kind of narcotic you are taking, or change when you take it. The following are more ways you can prevent or relieve constipation:    Drink liquids as directed.  You may need to drink extra liquids to help soften and move your bowels. Ask how much liquid to drink each day and which liquids are best for you.  Eat high-fiber foods.  This may help decrease constipation by adding bulk to your bowel movements. High-fiber foods include fruits, vegetables, whole-grain breads and cereals, and beans. Your healthcare provider or dietitian can help you create a high-fiber meal plan. Your provider may also recommend a fiber supplement if you cannot get enough fiber from food.  Exercise regularly.  Regular physical activity can help stimulate your intestines. Walking is a good exercise to prevent or relieve constipation. Ask which exercises are best for  you.  Schedule a time each day to have a bowel movement.  This may help train your body to have regular bowel movements. Bend forward while you are on the toilet to help move the bowel movement out. Sit on the toilet for at least 10 minutes, even if you do not have a bowel movement.    Store narcotics safely:   Store narcotics where others cannot easily get them.  Keep them in a locked cabinet or secure area. Do not  keep them in a purse or other bag you carry with you. A person may be looking for something else and find the narcotics.  Make sure narcotics are stored out of the reach of children.  A child can easily overdose on narcotics. Narcotics may look like candy to a small child.    The best way to dispose of narcotics:      The laws vary by country and area. In the [place], the best way is to return the narcotics through a take-back program. This program is offered by the [place] Drug Enforcement Agency (GO). The following are options for using the program:  Take the narcotics to a GO collection site.  The site is often a law enforcement center. Call your local law enforcement center for scheduled take-back days in your area. You will be given information on where to go if the collection site is in a different location.  Take the narcotics to an approved pharmacy or hospital.  A pharmacy or hospital may be set up as a collection site. You will need to ask if it is a GO collection site if you were not directed there. A pharmacy or doctor's office may not be able to take back narcotics unless it is a GO site.  Use a mail-back system.  This means you are given containers to put the narcotics into. You will then mail them in the containers.  Use a take-back drop box.  This is a place to leave the narcotics at any time. People and animals will not be able to get into the box. Your local law enforcement agency can tell you where to find a drop box in your area.    Other ways to manage pain:   Ask your  healthcare provider about non-narcotic medicines to control pain.  Nonprescription medicines include NSAIDs (such as ibuprofen) and acetaminophen. Prescription medicines include muscle relaxers, antidepressants, and steroids.  Pain may be managed without any medicines.  Some ways to relieve pain include massage, aromatherapy, or meditation. Physical or occupational therapy may also help.    For more information:   Drug Enforcement Administration  [address] [place] [address]  Phone: 9- 409 - 698-0379  Web Address: https://www.deadiversion.[employer].gov/drug_disposal/    [place] Food and Drug Administration  [address] [place] [address]  Phone: 5- 239 - 343-4030  Web Address: http://www.fda.gov     © Copyright [employer] 2018 Information is for End User's use only and may not be sold, redistributed or otherwise used for commercial purposes. All illustrations and images included in CareNotes® are the copyrighted property of [employer] or "[employer]    Medicare Preventive Visit Patient Instructions  Thank you for completing your Welcome to Medicare Visit or Medicare Annual Wellness Visit today. Your next wellness visit will be due in one year (11/13/2025).  The screening/preventive services that you may require over the next 5-10 years are detailed below. Some tests may not apply to you based off risk factors and/or age. Screening tests ordered at today's visit but not completed yet may show as past due. Also, please note that scanned in results may not display below.  Preventive Screenings:  Service Recommendations Previous Testing/Comments   Colorectal Cancer Screening  Colonoscopy    Fecal Occult Blood Test (FOBT)/Fecal Immunochemical Test (FIT)  Fecal DNA/Cologuard Test  Flexible Sigmoidoscopy Age: 45-75 years old   Colonoscopy: every 10 years (May be performed more frequently if at higher risk)  OR  FOBT/FIT: every 1 year  OR  Cologuard: every 3 years   OR  Sigmoidoscopy: every 5 years  Screening may be recommended earlier than age 45 if at higher risk for colorectal cancer. Also, an individualized decision between you and your healthcare provider will decide whether screening between the ages of 76-85 would be appropriate. Colonoscopy: 11/17/2022  FOBT/FIT: Not on file  Cologuard: Not on file  Sigmoidoscopy: Not on file    Screening Current     Prostate Cancer Screening Individualized decision between patient and health care provider in men between ages of 55-69   Medicare will cover every 12 months beginning on the day after your 50th birthday PSA: 0.8 ng/mL     Screening Not Indicated     Hepatitis C Screening Once for adults born between 1945 and 1965  More frequently in patients at high risk for Hepatitis C Hep C Antibody: Not on file    Screening Current   Diabetes Screening 1-2 times per year if you're at risk for diabetes or have pre-diabetes Fasting glucose: 123 mg/dL (3/12/2024)  A1C: No results in last 5 years (No results in last 5 years)  Screening Current   Cholesterol Screening Once every 5 years if you don't have a lipid disorder. May order more often based on risk factors. Lipid panel: 03/12/2024  Screening Current      Other Preventive Screenings Covered by Medicare:  Abdominal Aortic Aneurysm (AAA) Screening: covered once if your at risk. You're considered to be at risk if you have a family history of AAA or a male between the age of 65-75 who smoking at least 100 cigarettes in your lifetime.  Lung Cancer Screening: covers low dose CT scan once per year if you meet all of the following conditions: (1) Age 55-77; (2) No signs or symptoms of lung cancer; (3) Current smoker or have quit smoking within the last 15 years; (4) You have a tobacco smoking history of at least 20 pack years (packs per day x number of years you smoked); (5) You get a written order from a healthcare provider.  Glaucoma Screening: covered annually if you're considered high  risk: (1) You have diabetes OR (2) Family history of glaucoma OR (3)  aged 50 and older OR (4)  American aged 65 and older  Osteoporosis Screening: covered every 2 years if you meet one of the following conditions: (1) Have a vertebral abnormality; (2) On glucocorticoid therapy for more than 3 months; (3) Have primary hyperparathyroidism; (4) On osteoporosis medications and need to assess response to drug therapy.  HIV Screening: covered annually if you're between the age of 15-65. Also covered annually if you are younger than 15 and older than 65 with risk factors for HIV infection. For pregnant patients, it is covered up to 3 times per pregnancy.    Immunizations:  Immunization Recommendations   Influenza Vaccine Annual influenza vaccination during flu season is recommended for all persons aged >= 6 months who do not have contraindications   Pneumococcal Vaccine   * Pneumococcal conjugate vaccine = PCV13 (Prevnar 13), PCV15 (Vaxneuvance), PCV20 (Prevnar 20)  * Pneumococcal polysaccharide vaccine = PPSV23 (Pneumovax) Adults 19-65 yo with certain risk factors or if 65+ yo  If never received any pneumonia vaccine: recommend Prevnar 20 (PCV20)  Give PCV20 if previously received 1 dose of PCV13 or PPSV23   Hepatitis B Vaccine 3 dose series if at intermediate or high risk (ex: diabetes, end stage renal disease, liver disease)   Respiratory syncytial virus (RSV) Vaccine - COVERED BY MEDICARE PART D  * RSVPreF3 (Arexvy) CDC recommends that adults 60 years of age and older may receive a single dose of RSV vaccine using shared clinical decision-making (SCDM)   Tetanus (Td) Vaccine - COST NOT COVERED BY MEDICARE PART B Following completion of primary series, a booster dose should be given every 10 years to maintain immunity against tetanus. Td may also be given as tetanus wound prophylaxis.   Tdap Vaccine - COST NOT COVERED BY MEDICARE PART B Recommended at least once for all adults. For pregnant  patients, recommended with each pregnancy.   Shingles Vaccine (Shingrix) - COST NOT COVERED BY MEDICARE PART B  2 shot series recommended in those 19 years and older who have or will have weakened immune systems or those 50 years and older     Health Maintenance Due:      Topic Date Due   • Colorectal Cancer Screening  11/14/2032   • HIV Screening  Completed   • Hepatitis C Screening  Completed     Immunizations Due:      Topic Date Due   • Pneumococcal Vaccine: Pediatrics (0 to 5 Years) and At-Risk Patients (6 to 64 Years) (1 of 2 - PCV) Never done   • Hepatitis A Vaccine (1 of 2 - Risk 2-dose series) Never done   • Influenza Vaccine (1) Never done   • COVID-19 Vaccine (1 - 2023-24 season) Never done     Advance Directives   What are advance directives?  Advance directives are legal documents that state your wishes and plans for medical care. These plans are made ahead of time in case you lose your ability to make decisions for yourself. Advance directives can apply to any medical decision, such as the treatments you want, and if you want to donate organs.   What are the types of advance directives?  There are many types of advance directives, and each state has rules about how to use them. You may choose a combination of any of the following:  Living will:  This is a written record of the treatment you want. You can also choose which treatments you do not want, which to limit, and which to stop at a certain time. This includes surgery, medicine, IV fluid, and tube feedings.   Durable power of  for healthcare (DPAHC):  This is a written record that states who you want to make healthcare choices for you when you are unable to make them for yourself. This person, called a proxy, is usually a family member or a friend. You may choose more than 1 proxy.  Do not resuscitate (DNR) order:  A DNR order is used in case your heart stops beating or you stop breathing. It is a request not to have certain forms of  treatment, such as CPR. A DNR order may be included in other types of advance directives.  Medical directive:  This covers the care that you want if you are in a coma, near death, or unable to make decisions for yourself. You can list the treatments you want for each condition. Treatment may include pain medicine, surgery, blood transfusions, dialysis, IV or tube feedings, and a ventilator (breathing machine).  Values history:  This document has questions about your views, beliefs, and how you feel and think about life. This information can help others choose the care that you would choose.  Why are advance directives important?  An advance directive helps you control your care. Although spoken wishes may be used, it is better to have your wishes written down. Spoken wishes can be misunderstood, or not followed. Treatments may be given even if you do not want them. An advance directive may make it easier for your family to make difficult choices about your care.   Cigarette Smoking and Your Health   Risks to your health if you smoke:  Nicotine and other chemicals found in tobacco damage every cell in your body. Even if you are a light smoker, you have an increased risk for cancer, heart disease, and lung disease. If you are pregnant or have diabetes, smoking increases your risk for complications.   Benefits to your health if you stop smoking:   You decrease respiratory symptoms such as coughing, wheezing, and shortness of breath.   You reduce your risk for cancers of the lung, mouth, throat, kidney, bladder, pancreas, stomach, and cervix. If you already have cancer, you increase the benefits of chemotherapy. You also reduce your risk for cancer returning or a second cancer from developing.   You reduce your risk for heart disease, blood clots, heart attack, and stroke.   You reduce your risk for lung infections, and diseases such as pneumonia, asthma, chronic bronchitis, and emphysema.  Your circulation improves.  More oxygen can be delivered to your body. If you have diabetes, you lower your risk for complications, such as kidney, artery, and eye diseases. You also lower your risk for nerve damage. Nerve damage can lead to amputations, poor vision, and blindness.  You improve your body's ability to heal and to fight infections.  For more information and support to stop smoking:   [employer]  Phone: 3- 664 - 667-2388  Web Address: www.[employer]  Weight Management   Why it is important to manage your weight:  Being overweight increases your risk of health conditions such as heart disease, high blood pressure, type 2 diabetes, and certain types of cancer. It can also increase your risk for osteoarthritis, sleep apnea, and other respiratory problems. Aim for a slow, steady weight loss. Even a small amount of weight loss can lower your risk of health problems.  How to lose weight safely:  A safe and healthy way to lose weight is to eat fewer calories and get regular exercise. You can lose up about 1 pound a week by decreasing the number of calories you eat by 500 calories each day.   Healthy meal plan for weight management:  A healthy meal plan includes a variety of foods, contains fewer calories, and helps you stay healthy. A healthy meal plan includes the following:  Eat whole-grain foods more often.  A healthy meal plan should contain fiber. Fiber is the part of grains, fruits, and vegetables that is not broken down by your body. Whole-grain foods are healthy and provide extra fiber in your diet. Some examples of whole-grain foods are whole-wheat breads and pastas, oatmeal, brown rice, and bulgur.  Eat a variety of vegetables every day.  Include dark, leafy greens such as spinach, kale, aakash greens, and mustard greens. Eat yellow and orange vegetables such as carrots, sweet potatoes, and winter squash.   Eat a variety of fruits every day.  Choose fresh or canned fruit (canned in its own juice or light syrup) instead  of juice. Fruit juice has very little or no fiber.  Eat low-fat dairy foods.  Drink fat-free (skim) milk or 1% milk. Eat fat-free yogurt and low-fat cottage cheese. Try low-fat cheeses such as mozzarella and other reduced-fat cheeses.  Choose meat and other protein foods that are low in fat.  Choose beans or other legumes such as split peas or lentils. Choose fish, skinless poultry (chicken or turkey), or lean cuts of red meat (beef or pork). Before you cook meat or poultry, cut off any visible fat.   Use less fat and oil.  Try baking foods instead of frying them. Add less fat, such as margarine, sour cream, regular salad dressing and mayonnaise to foods. Eat fewer high-fat foods. Some examples of high-fat foods include french fries, doughnuts, ice cream, and cakes.  Eat fewer sweets.  Limit foods and drinks that are high in sugar. This includes candy, cookies, regular soda, and sweetened drinks.  Exercise:  Exercise at least 30 minutes per day on most days of the week. Some examples of exercise include walking, biking, dancing, and swimming. You can also fit in more physical activity by taking the stairs instead of the elevator or parking farther away from stores. Ask your healthcare provider about the best exercise plan for you.   Narcotic (Opioid) Safety    Use narcotics safely:  Take prescribed narcotics exactly as directed  Do not give narcotics to others or take narcotics that belong to someone else  Do not mix narcotics without medicines or alcohol  Do not drive or operate heavy machinery after you take the narcotic  Monitor for side effects and notify your healthcare provider if you experienced side effects such as nausea, sleepiness, itching, or trouble thinking clearly.    Manage constipation:    Constipation is the most common side effect of narcotic medicine. Constipation is when you have hard, dry bowel movements, or you go longer than usual between bowel movements. Tell your healthcare provider about  all changes in your bowel movements while you are taking narcotics. He or she may recommend laxative medicine to help you have a bowel movement. He or she may also change the kind of narcotic you are taking, or change when you take it. The following are more ways you can prevent or relieve constipation:    Drink liquids as directed.  You may need to drink extra liquids to help soften and move your bowels. Ask how much liquid to drink each day and which liquids are best for you.  Eat high-fiber foods.  This may help decrease constipation by adding bulk to your bowel movements. High-fiber foods include fruits, vegetables, whole-grain breads and cereals, and beans. Your healthcare provider or dietitian can help you create a high-fiber meal plan. Your provider may also recommend a fiber supplement if you cannot get enough fiber from food.  Exercise regularly.  Regular physical activity can help stimulate your intestines. Walking is a good exercise to prevent or relieve constipation. Ask which exercises are best for you.  Schedule a time each day to have a bowel movement.  This may help train your body to have regular bowel movements. Bend forward while you are on the toilet to help move the bowel movement out. Sit on the toilet for at least 10 minutes, even if you do not have a bowel movement.    Store narcotics safely:   Store narcotics where others cannot easily get them.  Keep them in a locked cabinet or secure area. Do not  keep them in a purse or other bag you carry with you. A person may be looking for something else and find the narcotics.  Make sure narcotics are stored out of the reach of children.  A child can easily overdose on narcotics. Narcotics may look like candy to a small child.    The best way to dispose of narcotics:      The laws vary by country and area. In the [place], the best way is to return the narcotics through a take-back program. This program is offered by the [place] Drug Enforcement  Agency (GO). The following are options for using the program:  Take the narcotics to a GO collection site.  The site is often a law enforcement center. Call your local law enforcement center for scheduled take-back days in your area. You will be given information on where to go if the collection site is in a different location.  Take the narcotics to an approved pharmacy or hospital.  A pharmacy or hospital may be set up as a collection site. You will need to ask if it is a GO collection site if you were not directed there. A pharmacy or doctor's office may not be able to take back narcotics unless it is a GO site.  Use a mail-back system.  This means you are given containers to put the narcotics into. You will then mail them in the containers.  Use a take-back drop box.  This is a place to leave the narcotics at any time. People and animals will not be able to get into the box. Your local law enforcement agency can tell you where to find a drop box in your area.    Other ways to manage pain:   Ask your healthcare provider about non-narcotic medicines to control pain.  Nonprescription medicines include NSAIDs (such as ibuprofen) and acetaminophen. Prescription medicines include muscle relaxers, antidepressants, and steroids.  Pain may be managed without any medicines.  Some ways to relieve pain include massage, aromatherapy, or meditation. Physical or occupational therapy may also help.    For more information:   Drug Enforcement Administration  [address] [place] [address]  Phone: 9- 129 - 076-7638  Web Address: https://www.deadiversion.[facility] Cent[facility].gov/drug_disposal/    [place] Food and Drug Administration  [address] [place] [address]  Phone: 8- 735 - 321-7285  Web Address: http://www.fda.gov     © Copyright [employer] 2018 Information is for End User's use only and may not be sold, redistributed or otherwise used for commercial purposes. All illustrations and images  included in CareNotes® are the copyrighted property of [employer] or [employer]

## 2024-11-13 ENCOUNTER — TELEPHONE (OUTPATIENT)
Age: 44
End: 2024-11-13

## 2024-11-13 NOTE — TELEPHONE ENCOUNTER
CALLED PHARMACY AND MEDICATION DOES NOT NEED A PA. MEDICATION WAS FILLED ON 11/12/24 WITH A COPAY OF $2.93.

## 2025-01-13 ENCOUNTER — APPOINTMENT (OUTPATIENT)
Dept: LAB | Facility: CLINIC | Age: 45
End: 2025-01-13

## 2025-01-13 DIAGNOSIS — Z00.6 ENCOUNTER FOR EXAMINATION FOR NORMAL COMPARISON OR CONTROL IN CLINICAL RESEARCH PROGRAM: ICD-10-CM

## 2025-01-13 PROCEDURE — 36415 COLL VENOUS BLD VENIPUNCTURE: CPT

## 2025-01-21 ENCOUNTER — TELEPHONE (OUTPATIENT)
Age: 45
End: 2025-01-21

## 2025-01-21 NOTE — TELEPHONE ENCOUNTER
Patients GI provider:  Dr. Stiles    Number to return call: [phone]    Reason for call: Pt calling wanting an appt w/ Dr. Stiles. I informed pt that there is no availability w/ Dr. Stiles and suggested Dr. Chopra. Pt refused and stated that if I send a message to Dr. Stiles that he will squeeze pt in.     Attn: Clinical - Pt also asking what did the anesthesiologist use to put pt to sleep on his last colonoscopy he had w/ Dr. Stiles. Pt apparently had a procedure w/ C&R doctor that Dr. Stiles recommended and now he is having some issues.     Please reach out to pt on these, thank you.    Scheduled procedure/appointment date if applicable: N/A

## 2025-01-21 NOTE — TELEPHONE ENCOUNTER
Spoke to pt - scheduled Jan 28th and informed Dr Stiles is leaving [facility] to go to a health network in [facility] - pt understood and agreeable to appointment    I informed him typically Propofol is the anesthesia used by our department. He informed me he aspirated during the procedure and wanted to know what could have caused it, could it have been the anesthesia used

## 2025-01-28 ENCOUNTER — OFFICE VISIT (OUTPATIENT)
Age: 45
End: 2025-01-28
Payer: COMMERCIAL

## 2025-01-28 VITALS
HEIGHT: 68 IN | TEMPERATURE: 97.9 F | SYSTOLIC BLOOD PRESSURE: 132 MMHG | HEART RATE: 73 BPM | DIASTOLIC BLOOD PRESSURE: 82 MMHG | WEIGHT: 179.8 LBS | OXYGEN SATURATION: 99 % | BODY MASS INDEX: 27.25 KG/M2

## 2025-01-28 DIAGNOSIS — I88.9 LYMPHADENITIS: ICD-10-CM

## 2025-01-28 DIAGNOSIS — K21.00 GASTROESOPHAGEAL REFLUX DISEASE WITH ESOPHAGITIS, UNSPECIFIED WHETHER HEMORRHAGE: ICD-10-CM

## 2025-01-28 DIAGNOSIS — K92.9 FUNCTIONAL GASTROINTESTINAL DISORDER: ICD-10-CM

## 2025-01-28 DIAGNOSIS — R10.31 RIGHT LOWER QUADRANT ABDOMINAL PAIN: Primary | ICD-10-CM

## 2025-01-28 DIAGNOSIS — I45.81 PROLONGED QT INTERVAL SYNDROME: ICD-10-CM

## 2025-01-28 DIAGNOSIS — K50.00 CROHN'S DISEASE INVOLVING TERMINAL ILEUM (HCC): ICD-10-CM

## 2025-01-28 DIAGNOSIS — K62.3 RECTAL PROLAPSE: ICD-10-CM

## 2025-01-28 DIAGNOSIS — K58.0 IRRITABLE BOWEL SYNDROME WITH DIARRHEA: ICD-10-CM

## 2025-01-28 PROCEDURE — 99214 OFFICE O/P EST MOD 30 MIN: CPT | Performed by: INTERNAL MEDICINE

## 2025-01-28 PROCEDURE — G2211 COMPLEX E/M VISIT ADD ON: HCPCS | Performed by: INTERNAL MEDICINE

## 2025-01-28 NOTE — ASSESSMENT & PLAN NOTE
The patient is a 44 y.o. male with chronic abdominal pain, abnormal stools, possible mild Crohn's disease of the small bowel based on prior and recent video capsule endoscopy with small bowel erosions but unclear if degree of findings match with symptomatology, last seen June 2023 who now presents for follow-up.  Overall his symptoms, in particular his pain, has been difficult to manage given the unclear diagnosis.  It has been thought that there may be an inflammatory component versus functional pathology versus a combination of the above.  His previously tried multiple different treatments including prednisone, budesonide, peppermint oil, anti-gas medication, Elavil, rifaximin, Linzess without significant relief from many of these medication options.      He continues to have occasional abdominal pain and abnormal stools.      Endoscopy and colonoscopy from November 2022 with concentric esophageal rings, hiatal hernia, normal TI and colon in appearance, external hemorrhoids; biopsies revealed chronic duodenal inflammation with suggestion of peptic duodenitis, inactive gastritis, increased intraepithelial eosinophils, normal terminal ileum, normal colon.  Prior CT abdomen pelvis from August 2021 with concern for ascending colitis.  Prior MR enterography normal.  Prior SIBO testing negative.  Capsule endoscopy from January 2023 with possible aphthous ulcerations and erosions in the mid to distal small bowel.  Endoscopy from March 2024 with 2 cm hiatal hernia but overall normal in appearance.  Biopsies with some mild inactive gastritis negative for H. pylori and distal esophagus with eosinophils up to 85 per high-power field but relatively normal proximal esophagus.     Chest x-ray from November with no acute cardiopulmonary abnormalities.    CT abdomen pelvis from May 2024 with cluster of small lymph nodes in the right lower quadrant and cannot exclude mesenteric lymphadenitis but otherwise no acute CT findings in  the abdomen or pelvis.     Most recent CMP and CBC normal.  Prior fecal fats, pancreatic elastase, calprotectin normal.  Prior CRP normal.     Consider repeat imaging for follow-up of possible mesenteric lymphadenitis  Will refer to hematology      Linzess Monday, Wednesday and Friday  Trial of Enteragam  Consider medications to help with functional pain

## 2025-01-28 NOTE — PROGRESS NOTES
Name: Torito Redd      : 1980      MRN: [id number]  Encounter Provider: Steven Stiles MD  Encounter Date: 2025   Encounter department: [facility]  :  Assessment & Plan  Right lower quadrant abdominal pain  The patient is a 44 y.o. male with chronic abdominal pain, abnormal stools, possible mild Crohn's disease of the small bowel based on prior and recent video capsule endoscopy with small bowel erosions but unclear if degree of findings match with symptomatology, last seen 2023 who now presents for follow-up.  Overall his symptoms, in particular his pain, has been difficult to manage given the unclear diagnosis.  It has been thought that there may be an inflammatory component versus functional pathology versus a combination of the above.  His previously tried multiple different treatments including prednisone, budesonide, peppermint oil, anti-gas medication, Elavil, rifaximin, Linzess without significant relief from many of these medication options.      He continues to have occasional abdominal pain and abnormal stools.      Endoscopy and colonoscopy from 2022 with concentric esophageal rings, hiatal hernia, normal TI and colon in appearance, external hemorrhoids; biopsies revealed chronic duodenal inflammation with suggestion of peptic duodenitis, inactive gastritis, increased intraepithelial eosinophils, normal terminal ileum, normal colon.  Prior CT abdomen pelvis from 2021 with concern for ascending colitis.  Prior MR enterography normal.  Prior SIBO testing negative.  Capsule endoscopy from 2023 with possible aphthous ulcerations and erosions in the mid to distal small bowel.  Endoscopy from 2024 with 2 cm hiatal hernia but overall normal in appearance.  Biopsies with some mild inactive gastritis negative for H. pylori and distal esophagus with eosinophils up to 85 per high-power field but relatively normal  proximal esophagus.     Chest x-ray from November with no acute cardiopulmonary abnormalities.    CT abdomen pelvis from May 2024 with cluster of small lymph nodes in the right lower quadrant and cannot exclude mesenteric lymphadenitis but otherwise no acute CT findings in the abdomen or pelvis.     Most recent CMP and CBC normal.  Prior fecal fats, pancreatic elastase, calprotectin normal.  Prior CRP normal.     Consider repeat imaging for follow-up of possible mesenteric lymphadenitis  Will refer to hematology      Linzess Monday, Wednesday and Friday  Trial of Enteragam  Consider medications to help with functional pain         Functional gastrointestinal disorder  As noted above         Irritable bowel syndrome with diarrhea  As above         Crohn's disease involving terminal ileum (HCC)  As noted above         Gastroesophageal reflux disease with esophagitis, unspecified whether hemorrhage  Using omeprazole as needed.  Consider Pepcid as needed.  Gaviscon or Tums as needed for breakthrough symptoms as well.  Things that can help improved reflux include: avoidance of trigger foods (potential foods include coffee, caffeine, chocolate, mint, tomato-based products, spicy foods, fatty foods), avoid tight fitting clothing, elevated head of bed 30 degrees, avoid eating 2-3 hours prior to bedtime, weight loss, avoid alcohol, avoid tobacco use.    Consider repeat endoscopy to assess for eosinophilic esophagitis         Prolonged QT interval syndrome  Avoid QT prolonging medication and continue to monitor EKG         Rectal prolapse    Follow-up colorectal surgery       Lymphadenitis    Orders:    Ambulatory Referral to Hematology / Oncology; Future        History of Present Illness     Torito Redd is a 44 y.o. male who presents with ileitis.     He had his procedure for his hemorrhoids last week. He vomited bile during the procedure and it had to be stopped.   The first time when he wakes up he will burp of bile.  "  He denies indigestion.   He is having pain and bleeding.   He has another appointment with his cardiologist.     A few weeks ago the abdominal pain was acting up. It got better. Last week it has not been bad. It goes up and down. He is getting used to some of it.     Bms variable. Usually sticky or diarrhea consistency. Number varies. No blood or black stools aside from hemorrhoids.         Review of Systems  10 point ROS reviewed and negative, except as above         Objective   /82 (BP Location: Right arm, Patient Position: Sitting, Cuff Size: Large)   Pulse 73   Temp 97.9 °F (36.6 °C) (Tympanic)   Ht 5' 8\" (1.727 m)   Wt 81.6 kg (179 lb 12.8 oz)   SpO2 99%   BMI 27.34 kg/m²      PHYSICAL EXAMINATION:    General Appearance:   Alert, cooperative, no distress   HEENT:  Normocephalic, atraumatic, anicteric. Neck supple, symmetrical, trachea midline.   Lungs:   Equal chest rise and unlabored breathing, normal effort, no coughing.   Cardiovascular:   No visualized JVD.   Abdomen:   No abdominal distension.   Skin:   No jaundice, rashes, or lesions.    Musculoskeletal:   Normal range of motion visualized.   Psych:  Normal affect and normal insight.   Neuro:  Alert and appropriate.           "

## 2025-01-28 NOTE — ASSESSMENT & PLAN NOTE
Using omeprazole as needed.  Consider Pepcid as needed.  Gaviscon or Tums as needed for breakthrough symptoms as well.  Things that can help improved reflux include: avoidance of trigger foods (potential foods include coffee, caffeine, chocolate, mint, tomato-based products, spicy foods, fatty foods), avoid tight fitting clothing, elevated head of bed 30 degrees, avoid eating 2-3 hours prior to bedtime, weight loss, avoid alcohol, avoid tobacco use.    Consider repeat endoscopy to assess for eosinophilic esophagitis

## 2025-01-30 LAB
APOB+LDLR+PCSK9 GENE MUT ANL BLD/T: NOT DETECTED
BRCA1+BRCA2 DEL+DUP + FULL MUT ANL BLD/T: NOT DETECTED
MLH1+MSH2+MSH6+PMS2 GN DEL+DUP+FUL M: NOT DETECTED

## 2025-01-31 DIAGNOSIS — R10.31 RIGHT LOWER QUADRANT ABDOMINAL PAIN: ICD-10-CM

## 2025-01-31 DIAGNOSIS — I88.9 LYMPHADENITIS: Primary | ICD-10-CM

## 2025-02-18 ENCOUNTER — OFFICE VISIT (OUTPATIENT)
Dept: HEMATOLOGY ONCOLOGY | Facility: CLINIC | Age: 45
End: 2025-02-18
Payer: COMMERCIAL

## 2025-02-18 VITALS
OXYGEN SATURATION: 97 % | HEIGHT: 68 IN | TEMPERATURE: 98.6 F | RESPIRATION RATE: 17 BRPM | HEART RATE: 71 BPM | DIASTOLIC BLOOD PRESSURE: 88 MMHG | SYSTOLIC BLOOD PRESSURE: 130 MMHG | BODY MASS INDEX: 27.43 KG/M2 | WEIGHT: 181 LBS

## 2025-02-18 DIAGNOSIS — K52.9 CHRONIC DIARRHEA: ICD-10-CM

## 2025-02-18 DIAGNOSIS — K29.80 DUODENITIS DETERMINED BY BIOPSY: Primary | ICD-10-CM

## 2025-02-18 DIAGNOSIS — E53.8 DEFICIENCY OF OTHER SPECIFIED B GROUP VITAMINS: ICD-10-CM

## 2025-02-18 DIAGNOSIS — I88.9 LYMPHADENITIS: ICD-10-CM

## 2025-02-18 PROCEDURE — G2211 COMPLEX E/M VISIT ADD ON: HCPCS | Performed by: PHYSICIAN ASSISTANT

## 2025-02-18 PROCEDURE — 99205 OFFICE O/P NEW HI 60 MIN: CPT | Performed by: PHYSICIAN ASSISTANT

## 2025-02-18 NOTE — PROGRESS NOTES
"Name: Torito Redd      : 1980      MRN: [id number]  Encounter Provider: Demi Vidal PA-C  Encounter Date: 2025   Encounter department: [facility] [place]  :  Assessment & Plan  Duodenitis determined by biopsy  Spoke with the patient that with history of reoccurring inflammation in his duodenum that some substrates would likely not be absorbed including iron and folate.  Furthermore, vitamin B12 can also be affected secondary to terminal ileum issues.  Substrate deficiencies will be checked.    Previous testing demonstrated possible peptic duodenitis.  I would recommend following up with GI.  Patient does not take a PPI regularly or an H2 blocker regularly.  Patient also has some bile regurgitation or reflux.  Defer management to GI.    Orders:    CBC and differential; Future    Comprehensive metabolic panel; Future    Iron Panel (Includes Ferritin, Iron Sat%, Iron, and TIBC); Future    Vitamin B12; Future    Folate; Future    Lymphadenitis  \"Cluster of small lymph nodes are seen in the right lower abdominal mesentery. \" - CT AP from 2024    This is a 44-year-old male with history of the above.  Patient reports abdominal symptoms over period of 13 years.  At times, patient has periods with severe diarrhea-nonbloody-can be up to 5 times a day.  Patient has other stool changes including color changes -pale.  Patient also has problems in the morning with regurgitation/vomiting bile.    Area present in the right lower abdomen is consistent with the pain that the patient has experienced more recently.  Considering the focal area and GI symptoms I believe adenitis to be related to something primary within the GI tract.  Patient has had normal CBCs without elevated or depressed white blood cell count.  Patient has also never been anemic or had any problems with thrombocytosis or thrombocytopenia.  Patient is not symptomatic of lymphoma and denies weight loss, " progressive profound fatigue, night sweats, recent infections (last antibiotic use 3 years ago.)    Patient will undergo repeat blood work as last testing was done in May.  However ultimately I believe this to be a primary GI issue and referral back to GI was recommended.      Orders:    Ambulatory Referral to Hematology / Oncology    CBC and differential; Future    Comprehensive metabolic panel; Future    Iron Panel (Includes Ferritin, Iron Sat%, Iron, and TIBC); Future    Vitamin B12; Future    Folate; Future    Deficiency of other specified B group vitamins  Patient has had episodes of duodenitis/peptic duodenitis.  It is possible that there are multiple vitamin deficiencies that could be connected to irritation to this area.  Moreover, patient has chronic right-sided lower abdominal pain which can also cause deficiencies of other B vitamins.  Recommendation is for testing as outlined below.  Orders:    Vitamin B12; Future    Folate; Future    Chronic diarrhea  Patient is seeing GI.  Diarrhea can be upwards to 5-6 times a day.  Patient has been on Linzess.  Patient continues to have problems despite measures previously.    For my interview today and believe the patient would benefit from routine follow-up after regular appointments with changing 1 particular component of his therapy.  Patient notes that he is being investigated for pancreatic insufficiency/dumping syndrome from gallbladder.  Recommend continue follow-up with GI as it does appear to be with each scope/capsule endoscopy some type of irritation that appears to be migratory.    Orders:    CBC and differential; Future    Comprehensive metabolic panel; Future    Iron Panel (Includes Ferritin, Iron Sat%, Iron, and TIBC); Future    Vitamin B12; Future    Folate; Future        Return if symptoms worsen or fail to improve, for [facility] Office Visit.    History of Present Illness   Chief Complaint   Patient presents with    Consult     Pertinent  Medical History   This is a 44-year-old male with past medical history of Crohn's disease involving the terminal ileum, reflux disease, IBS with diarrhea, myofascial pain syndrome, herniation of C5-C6, thoracic and low back chronic pain, repair of ventral hernia and prolonged QT syndrome who presents to the hematology clinic for assessment of lymphadenitis.    Patient gives history of related bowel issues starting in his very early 30s to late 20s.  He notes that he has had intermittent times with profound diarrhea having to go the bathroom 5-6 times a day.  Patient notes that presently, diarrhea is intermittent.  He is taking Linzess 3 times a week.  Patient notes that color of his formed stool does change and can go anywhere from normal brown to a very light pale beige color.  Patient previously has not been diagnosed with any pancreatic issues however he does state that pancreatic enzyme supplementation was discussed previously.  Patient had his gallbladder removed in his early 30s which is caused dumping syndrome also correlates to around the same time of bowel issue onset.     2/9/2021 EGD demonstrated peptic duodenitis and gastritis on biopsy.  Colonoscopy completed the same day demonstrated focal colitis of the transverse colon.    4/30/21, capsule endoscopy demonstrated bile reflux, patchy mucosal erythema and focal versions in the stomach, patchy mucosal erythema and focal erosions in the duodenum.  Few scattered areas of mucosal erythema in the mid distal small bowel also noted.    11/17/2022: EGD demonstrated mild chronic inflammatory and focal gastric metaplasia suggestive of peptic duodenitis.  Esophageal biopsy demonstrated prominently increased intraepithelial eosinophils    1/4/2023: Capsule endoscopy found the following:  Pyloric erosion with adjacent erythema.  Small bowel erythema noted, small bowel erosion noted.  Aphthous erosion at several spots throughout the small bowel up until the terminal  ileum.  Terminal ileum noted to have erythematous mucosa    3/13/2024: Small bowel mucosa with no significant pathologic abnormalities however, there was increased intraepithelial lymphocytes or crypt hyperplasia to suggest of malabsorption enteropathy.  Stomach biopsy demonstrated mild chronic inactive gastritis.    Distal esophagus a gas demonstrates an elevated amount of eosinophils 85 per high-power field increased from previous.    Proximal esophageal biopsy demonstrated only 5 eosinophils per high-powered field.    In May 2024 patient presented to the emergency room secondary to severe right lower quadrant pain.  Patient had CT scan that was done that demonstrated cluster of small lymph nodes of the right lower abdomen cannot exclude mesenteric lymphadenitis.  Otherwise no acute CT findings in the abdomen or pelvis.  Normal appendix.  CBC completed on that visit demonstrated white blood cell count of 8.1, hemoglobin of 16.1, MCV 94 and platelet count of 257, normal differential urinalysis stimulated dilute color, total bilirubin 0.69, AST and ALT WNL, lipase normal at 15.    02/18/25: Patient notes that he still has diarrheal episodes ranging from 5-6 time episodes a day.  Patient's pain is mostly in the right lower quadrant.  Patient denies antibiotic use in the last 3 years, weight is always stable oscillating around 165 pounds.  Patient notes he is heavier than his baseline due to the time of year and lack of mobility.  Patient denies drenching night sweats and notes that he is not excessively sleepy during the day, decent sleep quality at night as long as his back does not hurt.     Review of Systems   Constitutional:  Positive for fatigue. Negative for activity change, appetite change and fever.   HENT:  Negative for nosebleeds.    Respiratory:  Negative for cough, choking and shortness of breath.    Cardiovascular:  Negative for chest pain, palpitations and leg swelling.   Gastrointestinal:  Negative for  "abdominal distention, abdominal pain, anal bleeding, blood in stool, constipation, diarrhea, nausea and vomiting.   Endocrine: Negative for cold intolerance.   Genitourinary:  Negative for hematuria.   Musculoskeletal:  Negative for myalgias.   Skin:  Negative for color change, pallor and rash.   Allergic/Immunologic: Negative for immunocompromised state.   Neurological:  Negative for headaches.   Hematological:  Negative for adenopathy. Does not bruise/bleed easily.   All other systems reviewed and are negative.          Objective   /88 (BP Location: Right arm, Patient Position: Sitting, Cuff Size: Adult)   Pulse 71   Temp 98.6 °F (37 °C) (Temporal)   Resp 17   Ht 5' 8\" (1.727 m)   Wt 82.1 kg (181 lb)   SpO2 97%   BMI 27.52 kg/m²     Physical Exam  Constitutional:       General: He is not in acute distress.     Appearance: He is well-developed.   HENT:      Head: Normocephalic and atraumatic.      Mouth/Throat:      Pharynx: No oropharyngeal exudate.   Eyes:      General: No scleral icterus.     Conjunctiva/sclera: Conjunctivae normal.   Cardiovascular:      Rate and Rhythm: Normal rate and regular rhythm.      Heart sounds: No murmur heard.  Pulmonary:      Effort: Pulmonary effort is normal. No respiratory distress.      Breath sounds: Normal breath sounds.   Abdominal:      General: Bowel sounds are normal. There is no distension.      Palpations: Abdomen is soft. There is no hepatomegaly, splenomegaly or mass.      Tenderness: There is abdominal tenderness in the right lower quadrant. There is no right CVA tenderness, left CVA tenderness, guarding or rebound.       Musculoskeletal:         General: No tenderness.      Cervical back: Normal range of motion.   Lymphadenopathy:      Cervical: No cervical adenopathy.   Skin:     Coloration: Skin is not pale.      Findings: No erythema or rash.   Neurological:      Mental Status: He is alert and oriented to person, place, and time.         Labs: I have " reviewed the following labs:  Results for orders placed or performed in visit on 01/13/25   [facility] Molecular Screen    Specimen: Blood   Result Value Ref Range    FRIEDMAN SYNDROME DNA ANALYSIS Not Detected     HEREDITARY BREAST & OVARIAN CANCER DNA ANALYSIS Not Detected     FAMILIAL HYPERCHOLESTEROLEMIA DNA ANALYSIS Not Detected      *Note: Due to a large number of results and/or encounters for the requested time period, some results have not been displayed. A complete set of results can be found in Results Review.     Lab Results   Component Value Date    WBC 8.81 05/29/2024    HGB 16.1 05/29/2024    HCT 46.8 05/29/2024    MCV 94 05/29/2024     05/29/2024     Lab Results   Component Value Date    SODIUM 136 05/29/2024    K 4.0 05/29/2024     05/29/2024    CO2 25 05/29/2024    AGAP 7 05/29/2024    BUN 11 05/29/2024    CREATININE 1.04 05/29/2024    GLUC 85 05/29/2024    GLUF 123 (H) 03/12/2024    CALCIUM 9.8 05/29/2024    AST 18 05/29/2024    ALT 21 05/29/2024    ALKPHOS 59 05/29/2024    TP 7.8 05/29/2024    TBILI 0.69 05/29/2024    EGFR 87 05/29/2024         Administrative Statements   I have spent a total time of 65 minutes in caring for this patient on the day of the visit/encounter including Instructions for management, Patient and family education, Counseling / Coordination of care, Documenting in the medical record, and Reviewing/placing orders in the medical record (including tests, medications, and/or procedures).

## 2025-02-18 NOTE — PATIENT INSTRUCTIONS
[facility] Medical Oncology and Hematology Team  [facility] - [phone]    Your Team Member:  Advanced Practitioner:  Demi Vidal PA-C    Please answer Private and Unavailable Calls - this may be your team(s) contacting you.  If you have medical questions/concerns/issues - contact us either by (1) My Chart (2) [facility]

## 2025-02-24 ENCOUNTER — TELEPHONE (OUTPATIENT)
Age: 45
End: 2025-02-24

## 2025-02-24 DIAGNOSIS — Z13.1 SCREENING FOR DIABETES MELLITUS: ICD-10-CM

## 2025-02-24 DIAGNOSIS — Z13.6 SCREENING FOR CARDIOVASCULAR CONDITION: ICD-10-CM

## 2025-02-24 NOTE — TELEPHONE ENCOUNTER
Last labs done in March and May 2024 can he do them earlier or do you want them in May closer to his appointment

## 2025-02-24 NOTE — TELEPHONE ENCOUNTER
Patient would like to go tomorrow morning and have his labs done, however the expected date is 5/12/25 - he is asking if this can be changed so that the lab will not give him any issues.

## 2025-02-26 ENCOUNTER — APPOINTMENT (OUTPATIENT)
Dept: LAB | Facility: CLINIC | Age: 45
End: 2025-02-26
Payer: COMMERCIAL

## 2025-02-26 DIAGNOSIS — M54.12 CERVICAL RADICULOPATHY: ICD-10-CM

## 2025-02-26 DIAGNOSIS — Z13.6 SCREENING FOR CARDIOVASCULAR CONDITION: ICD-10-CM

## 2025-02-26 DIAGNOSIS — K29.80 DUODENITIS DETERMINED BY BIOPSY: ICD-10-CM

## 2025-02-26 DIAGNOSIS — Z13.1 SCREENING FOR DIABETES MELLITUS: ICD-10-CM

## 2025-02-26 DIAGNOSIS — F41.9 ANXIETY: ICD-10-CM

## 2025-02-26 DIAGNOSIS — K52.9 CHRONIC DIARRHEA: ICD-10-CM

## 2025-02-26 DIAGNOSIS — I88.9 LYMPHADENITIS: ICD-10-CM

## 2025-02-26 DIAGNOSIS — E53.8 DEFICIENCY OF OTHER SPECIFIED B GROUP VITAMINS: ICD-10-CM

## 2025-02-26 DIAGNOSIS — I45.81 PROLONGED QT INTERVAL SYNDROME: ICD-10-CM

## 2025-02-26 DIAGNOSIS — K50.00 CROHN'S DISEASE INVOLVING TERMINAL ILEUM (HCC): ICD-10-CM

## 2025-02-26 LAB
ALBUMIN SERPL BCG-MCNC: 4.3 G/DL (ref 3.5–5)
ALP SERPL-CCNC: 68 U/L (ref 34–104)
ALT SERPL W P-5'-P-CCNC: 27 U/L (ref 7–52)
ANION GAP SERPL CALCULATED.3IONS-SCNC: 11 MMOL/L (ref 4–13)
AST SERPL W P-5'-P-CCNC: 22 U/L (ref 13–39)
BASOPHILS # BLD AUTO: 0.11 THOUSANDS/ÂΜL (ref 0–0.1)
BASOPHILS NFR BLD AUTO: 2 % (ref 0–1)
BILIRUB SERPL-MCNC: 0.85 MG/DL (ref 0.2–1)
BUN SERPL-MCNC: 12 MG/DL (ref 5–25)
CALCIUM SERPL-MCNC: 9.3 MG/DL (ref 8.4–10.2)
CHLORIDE SERPL-SCNC: 104 MMOL/L (ref 96–108)
CHOLEST SERPL-MCNC: 202 MG/DL (ref ?–200)
CO2 SERPL-SCNC: 25 MMOL/L (ref 21–32)
CREAT SERPL-MCNC: 1.02 MG/DL (ref 0.6–1.3)
EOSINOPHIL # BLD AUTO: 0.33 THOUSAND/ÂΜL (ref 0–0.61)
EOSINOPHIL NFR BLD AUTO: 5 % (ref 0–6)
ERYTHROCYTE [DISTWIDTH] IN BLOOD BY AUTOMATED COUNT: 13.2 % (ref 11.6–15.1)
FERRITIN SERPL-MCNC: 125 NG/ML (ref 24–336)
FOLATE SERPL-MCNC: 7.5 NG/ML
GFR SERPL CREATININE-BSD FRML MDRD: 89 ML/MIN/1.73SQ M
GLUCOSE P FAST SERPL-MCNC: 102 MG/DL (ref 65–99)
HCT VFR BLD AUTO: 43.1 % (ref 36.5–49.3)
HDLC SERPL-MCNC: 47 MG/DL
HGB BLD-MCNC: 14.8 G/DL (ref 12–17)
IMM GRANULOCYTES # BLD AUTO: 0.03 THOUSAND/UL (ref 0–0.2)
IMM GRANULOCYTES NFR BLD AUTO: 0 % (ref 0–2)
IRON SATN MFR SERPL: 31 % (ref 15–50)
IRON SERPL-MCNC: 108 UG/DL (ref 50–212)
LDLC SERPL CALC-MCNC: 136 MG/DL (ref 0–100)
LYMPHOCYTES # BLD AUTO: 2.83 THOUSANDS/ÂΜL (ref 0.6–4.47)
LYMPHOCYTES NFR BLD AUTO: 40 % (ref 14–44)
MCH RBC QN AUTO: 32 PG (ref 26.8–34.3)
MCHC RBC AUTO-ENTMCNC: 34.3 G/DL (ref 31.4–37.4)
MCV RBC AUTO: 93 FL (ref 82–98)
MONOCYTES # BLD AUTO: 0.73 THOUSAND/ÂΜL (ref 0.17–1.22)
MONOCYTES NFR BLD AUTO: 10 % (ref 4–12)
NEUTROPHILS # BLD AUTO: 3.09 THOUSANDS/ÂΜL (ref 1.85–7.62)
NEUTS SEG NFR BLD AUTO: 43 % (ref 43–75)
NONHDLC SERPL-MCNC: 155 MG/DL
NRBC BLD AUTO-RTO: 0 /100 WBCS
PLATELET # BLD AUTO: 244 THOUSANDS/UL (ref 149–390)
PMV BLD AUTO: 10.1 FL (ref 8.9–12.7)
POTASSIUM SERPL-SCNC: 4 MMOL/L (ref 3.5–5.3)
PROT SERPL-MCNC: 6.7 G/DL (ref 6.4–8.4)
RBC # BLD AUTO: 4.63 MILLION/UL (ref 3.88–5.62)
SODIUM SERPL-SCNC: 140 MMOL/L (ref 135–147)
TIBC SERPL-MCNC: 345.8 UG/DL (ref 250–450)
TRANSFERRIN SERPL-MCNC: 247 MG/DL (ref 203–362)
TRIGL SERPL-MCNC: 95 MG/DL (ref ?–150)
UIBC SERPL-MCNC: 238 UG/DL (ref 155–355)
VIT B12 SERPL-MCNC: 262 PG/ML (ref 180–914)
WBC # BLD AUTO: 7.12 THOUSAND/UL (ref 4.31–10.16)

## 2025-02-26 PROCEDURE — 80053 COMPREHEN METABOLIC PANEL: CPT

## 2025-02-26 PROCEDURE — 82728 ASSAY OF FERRITIN: CPT

## 2025-02-26 PROCEDURE — 82607 VITAMIN B-12: CPT

## 2025-02-26 PROCEDURE — 82746 ASSAY OF FOLIC ACID SERUM: CPT

## 2025-02-26 PROCEDURE — 83550 IRON BINDING TEST: CPT

## 2025-02-26 PROCEDURE — 85025 COMPLETE CBC W/AUTO DIFF WBC: CPT

## 2025-02-26 PROCEDURE — 80061 LIPID PANEL: CPT

## 2025-02-26 PROCEDURE — 83540 ASSAY OF IRON: CPT

## 2025-02-26 PROCEDURE — 36415 COLL VENOUS BLD VENIPUNCTURE: CPT

## 2025-02-27 ENCOUNTER — RESULTS FOLLOW-UP (OUTPATIENT)
Dept: HEMATOLOGY ONCOLOGY | Facility: CLINIC | Age: 45
End: 2025-02-27

## 2025-02-27 DIAGNOSIS — E53.8 FOLIC ACID DEFICIENCY: ICD-10-CM

## 2025-02-27 DIAGNOSIS — E53.8 B12 DEFICIENCY: ICD-10-CM

## 2025-02-27 DIAGNOSIS — K29.80 DUODENITIS DETERMINED BY BIOPSY: Primary | ICD-10-CM

## 2025-02-27 RX ORDER — FOLIC ACID 1 MG/1
1 TABLET ORAL DAILY
Qty: 90 TABLET | Refills: 1 | Status: SHIPPED | OUTPATIENT
Start: 2025-02-27

## 2025-02-28 NOTE — TELEPHONE ENCOUNTER
----- Message from Demi Vidal PA-C sent at 2/27/2025 10:17 AM EST -----  B12 and folate were a bit low -    I called out rx for 6 months for folate.   Advise pt to take B12 as follows:  B-12 extra-strength gummy vitamin 3000 mcg (2 gummies) daily x 2 months then 1500 mcg (1 gummy) daily.      Pt should have a virtual folllow up in 6 months with CBC, IRON Panel, Folate and B12 prior.

## 2025-02-28 NOTE — TELEPHONE ENCOUNTER
Telephone call spoke with Pt.  Reviewed Demi's note.  Pt stated he cannot take B12 as he did in the past and it makes him feel like he is going to have a heart attack.  He will  the folate and start that.  Office is closed and I will update Demi on Monday.  He stated he understands.

## 2025-04-09 DIAGNOSIS — F41.9 ANXIETY: ICD-10-CM

## 2025-04-10 RX ORDER — CLONAZEPAM 0.5 MG/1
0.5 TABLET ORAL EVERY 12 HOURS PRN
Qty: 60 TABLET | Refills: 0 | Status: SHIPPED | OUTPATIENT
Start: 2025-04-10

## 2025-05-08 DIAGNOSIS — K21.00 GASTROESOPHAGEAL REFLUX DISEASE WITH ESOPHAGITIS, UNSPECIFIED WHETHER HEMORRHAGE: ICD-10-CM

## 2025-05-08 DIAGNOSIS — R14.0 GASSINESS: ICD-10-CM

## 2025-05-08 DIAGNOSIS — R19.5 ABNORMAL STOOLS: ICD-10-CM

## 2025-05-08 DIAGNOSIS — J30.9 ALLERGIC RHINITIS, UNSPECIFIED SEASONALITY, UNSPECIFIED TRIGGER: ICD-10-CM

## 2025-05-08 DIAGNOSIS — K58.0 IRRITABLE BOWEL SYNDROME WITH DIARRHEA: ICD-10-CM

## 2025-05-08 DIAGNOSIS — R10.9 ABDOMINAL PAIN, UNSPECIFIED ABDOMINAL LOCATION: ICD-10-CM

## 2025-05-08 DIAGNOSIS — R10.11 RIGHT UPPER QUADRANT ABDOMINAL PAIN: ICD-10-CM

## 2025-05-09 RX ORDER — FLUTICASONE PROPIONATE 50 MCG
1 SPRAY, SUSPENSION (ML) NASAL DAILY PRN
Qty: 16 G | Refills: 5 | Status: SHIPPED | OUTPATIENT
Start: 2025-05-09

## 2025-05-09 RX ORDER — OMEPRAZOLE 20 MG/1
20 CAPSULE, DELAYED RELEASE ORAL DAILY
Qty: 90 CAPSULE | Refills: 1 | Status: SHIPPED | OUTPATIENT
Start: 2025-05-09

## 2025-05-19 ENCOUNTER — OFFICE VISIT (OUTPATIENT)
Dept: INTERNAL MEDICINE CLINIC | Facility: OTHER | Age: 45
End: 2025-05-19
Payer: COMMERCIAL

## 2025-05-19 VITALS
HEART RATE: 72 BPM | SYSTOLIC BLOOD PRESSURE: 134 MMHG | WEIGHT: 181.6 LBS | DIASTOLIC BLOOD PRESSURE: 88 MMHG | OXYGEN SATURATION: 98 % | RESPIRATION RATE: 18 BRPM | HEIGHT: 68 IN | TEMPERATURE: 98.8 F | BODY MASS INDEX: 27.52 KG/M2

## 2025-05-19 DIAGNOSIS — K92.9 FUNCTIONAL GASTROINTESTINAL DISORDER: ICD-10-CM

## 2025-05-19 DIAGNOSIS — E53.8 B12 DEFICIENCY: ICD-10-CM

## 2025-05-19 DIAGNOSIS — K21.00 GASTROESOPHAGEAL REFLUX DISEASE WITH ESOPHAGITIS WITHOUT HEMORRHAGE: ICD-10-CM

## 2025-05-19 DIAGNOSIS — J30.2 SEASONAL ALLERGIC RHINITIS, UNSPECIFIED TRIGGER: ICD-10-CM

## 2025-05-19 DIAGNOSIS — K29.80 DUODENITIS DETERMINED BY BIOPSY: ICD-10-CM

## 2025-05-19 DIAGNOSIS — K50.00 CROHN'S DISEASE INVOLVING TERMINAL ILEUM (HCC): ICD-10-CM

## 2025-05-19 DIAGNOSIS — M54.12 CERVICAL RADICULOPATHY: ICD-10-CM

## 2025-05-19 DIAGNOSIS — F41.9 ANXIETY: Primary | ICD-10-CM

## 2025-05-19 DIAGNOSIS — E53.8 FOLIC ACID DEFICIENCY: ICD-10-CM

## 2025-05-19 DIAGNOSIS — J45.20 MILD INTERMITTENT EXTRINSIC ASTHMA WITHOUT COMPLICATION: ICD-10-CM

## 2025-05-19 PROBLEM — R10.31 RIGHT LOWER QUADRANT ABDOMINAL PAIN: Status: RESOLVED | Noted: 2019-09-13 | Resolved: 2025-05-19

## 2025-05-19 PROCEDURE — G2211 COMPLEX E/M VISIT ADD ON: HCPCS | Performed by: INTERNAL MEDICINE

## 2025-05-19 PROCEDURE — 99214 OFFICE O/P EST MOD 30 MIN: CPT | Performed by: INTERNAL MEDICINE

## 2025-05-19 RX ORDER — CLONAZEPAM 0.5 MG/1
0.5 TABLET ORAL EVERY 12 HOURS PRN
Qty: 60 TABLET | Refills: 0 | Status: SHIPPED | OUTPATIENT
Start: 2025-05-19

## 2025-05-19 RX ORDER — FOLIC ACID 1 MG/1
1 TABLET ORAL DAILY
Qty: 90 TABLET | Refills: 1 | Status: SHIPPED | OUTPATIENT
Start: 2025-05-19

## 2025-05-19 NOTE — ASSESSMENT & PLAN NOTE
Stable, controlled. Continue klonopin     Orders:  •  clonazePAM (KlonoPIN) 0.5 mg tablet; Take 1 tablet (0.5 mg total) by mouth every 12 (twelve) hours as needed for anxiety

## 2025-05-19 NOTE — PROGRESS NOTES
Name: Torito Redd      : 1980      MRN: [id number]  Encounter Provider: Rober Dominguez MD  Encounter Date: 2025   Encounter department: [facility]  :  Assessment & Plan  Anxiety  Stable, controlled. Continue klonopin     Orders:  •  clonazePAM (KlonoPIN) 0.5 mg tablet; Take 1 tablet (0.5 mg total) by mouth every 12 (twelve) hours as needed for anxiety    Seasonal allergic rhinitis, unspecified trigger  Controlled, continue flonase and zyrtec.        Mild intermittent extrinsic asthma without complication  Stable.  Continue Flonase and Zyrtec.       Gastroesophageal reflux disease with esophagitis without hemorrhage  Continue omeprazole daily.        Functional gastrointestinal disorder  Continue follow up with GI.        Duodenitis determined by biopsy    Orders:  •  folic acid (KP Folic Acid) 1 mg tablet; Take 1 tablet (1 mg total) by mouth daily    B12 deficiency  Discussed increasing dietary B12.   Orders:  •  folic acid (KP Folic Acid) 1 mg tablet; Take 1 tablet (1 mg total) by mouth daily    Folic acid deficiency  Recommended he start folic acid.   Orders:  •  folic acid (KP Folic Acid) 1 mg tablet; Take 1 tablet (1 mg total) by mouth daily    Crohn's disease involving terminal ileum (HCC)  Continue follow up with GI.          Cervical radiculopathy  Continue ROM and stretching exercises. Continue tylenol PRN for pain.                 History of Present Illness   Torito Redd is seen today for follow up of chronic conditions.   Recent laboratory studies reviewed today with the patient.   he has been compliant with his medication regimen.   He was found to have low B12 and folate levels. He has not started folic acid.   he has no complaints or concerns at this time.       Heartburn  He reports no abdominal pain, no chest pain, no coughing, no nausea, no sore throat or no wheezing. This is a chronic problem. The current episode started more than 1  "year ago. The problem occurs rarely. The problem has been unchanged. The symptoms are aggravated by certain foods. Pertinent negatives include no fatigue. He has tried a PPI for the symptoms. The treatment provided moderate relief.   Anxiety  Presents for follow-up visit. Patient reports no chest pain, confusion, nausea, palpitations or shortness of breath. Symptoms occur rarely. The severity of symptoms is mild. The quality of sleep is good. Nighttime awakenings: none.     Compliance with medications is %.     Review of Systems   Constitutional: Negative.  Negative for chills, fatigue and fever.   HENT:  Negative for congestion, ear pain, postnasal drip, rhinorrhea and sore throat.    Eyes: Negative.    Respiratory:  Negative for cough, chest tightness, shortness of breath and wheezing.    Cardiovascular:  Negative for chest pain and palpitations.   Gastrointestinal:  Negative for abdominal distention, abdominal pain, blood in stool, constipation, diarrhea and nausea.   Endocrine: Negative.    Genitourinary:  Negative for difficulty urinating, dysuria and hematuria.   Musculoskeletal: Negative.    Skin: Negative.    Allergic/Immunologic: Negative for environmental allergies and food allergies.   Neurological: Negative.    Hematological:  Negative for adenopathy.   Psychiatric/Behavioral:  Negative for agitation, behavioral problems, confusion and sleep disturbance.    All other systems reviewed and are negative.      Objective   /88 (BP Location: Left arm, Patient Position: Sitting, Cuff Size: Standard)   Pulse 72   Temp 98.8 °F (37.1 °C) (Temporal)   Resp 18   Ht 5' 8\" (1.727 m)   Wt 82.4 kg (181 lb 9.6 oz)   SpO2 98%   BMI 27.61 kg/m²      Physical Exam  Vitals and nursing note reviewed.   Constitutional:       General: He is not in acute distress.     Appearance: He is well-developed. He is not diaphoretic.   HENT:      Head: Normocephalic and atraumatic.     Eyes:      General: No scleral " icterus.        Right eye: No discharge.         Left eye: No discharge.      Conjunctiva/sclera: Conjunctivae normal.      Pupils: Pupils are equal, round, and reactive to light.     Neck:      Thyroid: No thyromegaly.      Vascular: No JVD.     Cardiovascular:      Rate and Rhythm: Normal rate and regular rhythm.      Heart sounds: Normal heart sounds. No murmur heard.     No friction rub. No gallop.   Pulmonary:      Effort: Pulmonary effort is normal. No respiratory distress.      Breath sounds: Normal breath sounds. No wheezing or rales.   Chest:      Chest wall: No tenderness.   Abdominal:      General: Bowel sounds are normal. There is no distension.      Palpations: Abdomen is soft. There is no mass.      Tenderness: There is no abdominal tenderness. There is no guarding or rebound.     Musculoskeletal:         General: No tenderness or deformity. Normal range of motion.      Cervical back: Normal range of motion and neck supple.   Lymphadenopathy:      Cervical: No cervical adenopathy.     Skin:     General: Skin is warm and dry.      Coloration: Skin is not pale.      Findings: No erythema or rash.     Neurological:      Mental Status: He is alert and oriented to person, place, and time.      Cranial Nerves: No cranial nerve deficit.      Coordination: Coordination normal.      Deep Tendon Reflexes: Reflexes are normal and symmetric.     Psychiatric:         Behavior: Behavior normal.         Thought Content: Thought content normal.         Judgment: Judgment normal.

## 2025-07-02 DIAGNOSIS — F41.9 ANXIETY: ICD-10-CM

## 2025-07-02 RX ORDER — CLONAZEPAM 0.5 MG/1
0.5 TABLET ORAL EVERY 12 HOURS PRN
Qty: 60 TABLET | Refills: 0 | Status: SHIPPED | OUTPATIENT
Start: 2025-07-02

## 2025-07-02 NOTE — TELEPHONE ENCOUNTER
Medication: clonazePAM (KlonoPIN) 0.5 mg tablet     Dose/Frequency: Take 1 tablet (0.5 mg total) by mouth every 12 (twelve) hours as needed for anxiety     Quantity: 60    Pharmacy: [facility]    Office:   [x] PCP/Provider - Dr Dominguez  [] Speciality/Provider -     Does the patient have enough for 3 days?   [x] Yes   [] No - Send as HP to POD

## (undated) DEVICE — ENDOPATH PNEUMONEEDLE INSUFFLATION NEEDLES WITH LUER LOCK CONNECTORS 120MM: Brand: ENDOPATH

## (undated) DEVICE — GLOVE SRG BIOGEL ORTHOPEDIC 7.5

## (undated) DEVICE — MAGNETIC INSTRUMENT PAD SMALL

## (undated) DEVICE — REM POLYHESIVE ADULT PATIENT RETURN ELECTRODE: Brand: VALLEYLAB

## (undated) DEVICE — GLOVE SRG BIOGEL ORTHOPEDIC 7

## (undated) DEVICE — INTENDED FOR TISSUE SEPARATION, AND OTHER PROCEDURES THAT REQUIRE A SHARP SURGICAL BLADE TO PUNCTURE OR CUT.: Brand: BARD-PARKER SAFETY BLADES SIZE 11, STERILE

## (undated) DEVICE — ENDOPATH XCEL BLADELESS TROCARS WITH STABILITY SLEEVES: Brand: ENDOPATH XCEL

## (undated) DEVICE — ENDOPATH XCEL UNIVERSAL TROCAR STABLILITY SLEEVES: Brand: ENDOPATH XCEL

## (undated) DEVICE — INSUFLATION TUBING INSUFLOW (LEXION)

## (undated) DEVICE — DRAPE SURGIKIT SADDLE BAG

## (undated) DEVICE — 3M™ IOBAN™ 2 ANTIMICROBIAL INCISE DRAPE 6650EZ: Brand: IOBAN™ 2

## (undated) DEVICE — Device: Brand: OMNICLOSE TROCAR SITE CLOSURE DEVICE

## (undated) DEVICE — SUT PLAIN 4-0 SC-1 18 IN 1828H

## (undated) DEVICE — NEURO PATTIES 1/2 X 1 1/2

## (undated) DEVICE — 3000CC GUARDIAN II: Brand: GUARDIAN

## (undated) DEVICE — SUT PROLENE 3-0 SH 36 IN 8522H

## (undated) DEVICE — ADHESIVE SKN CLSR HISTOACRYL FLEX 0.5ML LF

## (undated) DEVICE — SUT VICRYL 0 UR-6 27 IN J603H

## (undated) DEVICE — GLOVE INDICATOR PI UNDERGLOVE SZ 8 BLUE

## (undated) DEVICE — NEEDLE 25G X 1 1/2

## (undated) DEVICE — SUT ETHIBOND 0 SH 30 IN X834H

## (undated) DEVICE — SPLINT 1527005 10PK PAIR NASAL STD THICK

## (undated) DEVICE — SUT MONOCRYL 4-0 PS-2 18 IN Y496G

## (undated) DEVICE — SUT CHROMIC 4-0 P-3 18 MM 1654G

## (undated) DEVICE — INTENDED FOR TISSUE SEPARATION, AND OTHER PROCEDURES THAT REQUIRE A SHARP SURGICAL BLADE TO PUNCTURE OR CUT.: Brand: BARD-PARKER ® CARBON RIB-BACK BLADES

## (undated) DEVICE — STERILE NASAL PACK: Brand: CARDINAL HEALTH

## (undated) DEVICE — CHLORAPREP HI-LITE 26ML ORANGE

## (undated) DEVICE — PACK PBDS LAP CHOLE RF